# Patient Record
Sex: MALE | Race: ASIAN | NOT HISPANIC OR LATINO | URBAN - METROPOLITAN AREA
[De-identification: names, ages, dates, MRNs, and addresses within clinical notes are randomized per-mention and may not be internally consistent; named-entity substitution may affect disease eponyms.]

---

## 2017-10-11 ENCOUNTER — OUTPATIENT (OUTPATIENT)
Dept: OUTPATIENT SERVICES | Facility: HOSPITAL | Age: 62
LOS: 1 days | Discharge: HOME | End: 2017-10-11
Payer: MEDICAID

## 2017-10-11 ENCOUNTER — RESULT REVIEW (OUTPATIENT)
Age: 62
End: 2017-10-11

## 2017-10-11 DIAGNOSIS — R10.826 EPIGASTRIC REBOUND ABDOMINAL TENDERNESS: ICD-10-CM

## 2017-10-11 LAB — GLUCOSE BLDC GLUCOMTR-MCNC: 93 MG/DL — SIGNIFICANT CHANGE UP (ref 70–99)

## 2017-10-11 PROCEDURE — 88305 TISSUE EXAM BY PATHOLOGIST: CPT | Mod: 26

## 2017-10-11 PROCEDURE — 88312 SPECIAL STAINS GROUP 1: CPT | Mod: 26

## 2017-10-13 LAB — SURGICAL PATHOLOGY STUDY: SIGNIFICANT CHANGE UP

## 2018-05-14 PROBLEM — Z00.00 ENCOUNTER FOR PREVENTIVE HEALTH EXAMINATION: Status: ACTIVE | Noted: 2018-05-14

## 2018-05-15 ENCOUNTER — APPOINTMENT (OUTPATIENT)
Dept: CT IMAGING | Facility: IMAGING CENTER | Age: 63
End: 2018-05-15
Payer: MEDICAID

## 2018-05-15 ENCOUNTER — OUTPATIENT (OUTPATIENT)
Dept: OUTPATIENT SERVICES | Facility: HOSPITAL | Age: 63
LOS: 1 days | End: 2018-05-15
Payer: MEDICAID

## 2018-05-15 DIAGNOSIS — Z00.8 ENCOUNTER FOR OTHER GENERAL EXAMINATION: ICD-10-CM

## 2018-05-15 PROCEDURE — 74177 CT ABD & PELVIS W/CONTRAST: CPT | Mod: 26

## 2018-05-15 PROCEDURE — 82565 ASSAY OF CREATININE: CPT

## 2018-05-15 PROCEDURE — 74177 CT ABD & PELVIS W/CONTRAST: CPT

## 2022-01-01 ENCOUNTER — EMERGENCY (EMERGENCY)
Facility: HOSPITAL | Age: 67
LOS: 1 days | Discharge: HOME | End: 2022-01-01
Attending: EMERGENCY MEDICINE | Admitting: STUDENT IN AN ORGANIZED HEALTH CARE EDUCATION/TRAINING PROGRAM

## 2022-01-01 ENCOUNTER — EMERGENCY (EMERGENCY)
Facility: HOSPITAL | Age: 67
LOS: 1 days | Discharge: HOME | End: 2022-01-01
Attending: STUDENT IN AN ORGANIZED HEALTH CARE EDUCATION/TRAINING PROGRAM | Admitting: STUDENT IN AN ORGANIZED HEALTH CARE EDUCATION/TRAINING PROGRAM
Payer: MEDICARE

## 2022-01-01 VITALS
OXYGEN SATURATION: 98 % | RESPIRATION RATE: 18 BRPM | DIASTOLIC BLOOD PRESSURE: 66 MMHG | SYSTOLIC BLOOD PRESSURE: 150 MMHG | HEART RATE: 60 BPM | TEMPERATURE: 99 F

## 2022-01-01 VITALS
SYSTOLIC BLOOD PRESSURE: 135 MMHG | TEMPERATURE: 99 F | DIASTOLIC BLOOD PRESSURE: 59 MMHG | OXYGEN SATURATION: 98 % | HEART RATE: 65 BPM | RESPIRATION RATE: 18 BRPM

## 2022-01-01 VITALS
SYSTOLIC BLOOD PRESSURE: 158 MMHG | DIASTOLIC BLOOD PRESSURE: 72 MMHG | RESPIRATION RATE: 16 BRPM | TEMPERATURE: 100 F | HEART RATE: 70 BPM | OXYGEN SATURATION: 100 %

## 2022-01-01 VITALS
RESPIRATION RATE: 20 BRPM | OXYGEN SATURATION: 100 % | HEART RATE: 58 BPM | DIASTOLIC BLOOD PRESSURE: 66 MMHG | TEMPERATURE: 98 F | SYSTOLIC BLOOD PRESSURE: 186 MMHG

## 2022-01-01 DIAGNOSIS — Z95.1 PRESENCE OF AORTOCORONARY BYPASS GRAFT: Chronic | ICD-10-CM

## 2022-01-01 LAB
ALBUMIN SERPL ELPH-MCNC: 3.6 G/DL — SIGNIFICANT CHANGE UP (ref 3.3–5)
ALBUMIN SERPL ELPH-MCNC: 3.8 G/DL — SIGNIFICANT CHANGE UP (ref 3.3–5)
ALP SERPL-CCNC: 261 U/L — HIGH (ref 40–120)
ALP SERPL-CCNC: 261 U/L — HIGH (ref 40–120)
ALT FLD-CCNC: 13 U/L — SIGNIFICANT CHANGE UP (ref 4–41)
ALT FLD-CCNC: 18 U/L — SIGNIFICANT CHANGE UP (ref 4–41)
AMMONIA BLD-MCNC: 14 UMOL/L — SIGNIFICANT CHANGE UP (ref 11–55)
ANION GAP SERPL CALC-SCNC: 11 MMOL/L — SIGNIFICANT CHANGE UP (ref 7–14)
ANION GAP SERPL CALC-SCNC: 8 MMOL/L — SIGNIFICANT CHANGE UP (ref 7–14)
ANISOCYTOSIS BLD QL: SLIGHT — SIGNIFICANT CHANGE UP
APPEARANCE UR: CLEAR — SIGNIFICANT CHANGE UP
APTT BLD: 34.2 SEC — SIGNIFICANT CHANGE UP (ref 27–36.3)
AST SERPL-CCNC: 31 U/L — SIGNIFICANT CHANGE UP (ref 4–40)
AST SERPL-CCNC: 42 U/L — HIGH (ref 4–40)
B-OH-BUTYR SERPL-SCNC: <0 MMOL/L — SIGNIFICANT CHANGE UP (ref 0–0.4)
BASE EXCESS BLDV CALC-SCNC: 2.8 MMOL/L — SIGNIFICANT CHANGE UP (ref -2–3)
BASOPHILS # BLD AUTO: 0 K/UL — SIGNIFICANT CHANGE UP (ref 0–0.2)
BASOPHILS # BLD AUTO: 0.01 K/UL — SIGNIFICANT CHANGE UP (ref 0–0.2)
BASOPHILS NFR BLD AUTO: 0 % — SIGNIFICANT CHANGE UP (ref 0–2)
BASOPHILS NFR BLD AUTO: 0.4 % — SIGNIFICANT CHANGE UP (ref 0–2)
BILIRUB SERPL-MCNC: 0.9 MG/DL — SIGNIFICANT CHANGE UP (ref 0.2–1.2)
BILIRUB SERPL-MCNC: 0.9 MG/DL — SIGNIFICANT CHANGE UP (ref 0.2–1.2)
BILIRUB UR-MCNC: NEGATIVE — SIGNIFICANT CHANGE UP
BLOOD GAS VENOUS COMPREHENSIVE RESULT: SIGNIFICANT CHANGE UP
BUN SERPL-MCNC: 11 MG/DL — SIGNIFICANT CHANGE UP (ref 7–23)
BUN SERPL-MCNC: 9 MG/DL — SIGNIFICANT CHANGE UP (ref 7–23)
CALCIUM SERPL-MCNC: 9.1 MG/DL — SIGNIFICANT CHANGE UP (ref 8.4–10.5)
CALCIUM SERPL-MCNC: 9.3 MG/DL — SIGNIFICANT CHANGE UP (ref 8.4–10.5)
CHLORIDE BLDV-SCNC: 109 MMOL/L — HIGH (ref 96–108)
CHLORIDE SERPL-SCNC: 102 MMOL/L — SIGNIFICANT CHANGE UP (ref 98–107)
CHLORIDE SERPL-SCNC: 108 MMOL/L — HIGH (ref 98–107)
CO2 BLDV-SCNC: 29.3 MMOL/L — HIGH (ref 22–26)
CO2 SERPL-SCNC: 24 MMOL/L — SIGNIFICANT CHANGE UP (ref 22–31)
CO2 SERPL-SCNC: 26 MMOL/L — SIGNIFICANT CHANGE UP (ref 22–31)
COLOR SPEC: SIGNIFICANT CHANGE UP
CREAT SERPL-MCNC: 1.05 MG/DL — SIGNIFICANT CHANGE UP (ref 0.5–1.3)
CREAT SERPL-MCNC: 1.24 MG/DL — SIGNIFICANT CHANGE UP (ref 0.5–1.3)
CULTURE RESULTS: SIGNIFICANT CHANGE UP
DIFF PNL FLD: NEGATIVE — SIGNIFICANT CHANGE UP
EGFR: 63 ML/MIN/1.73M2 — SIGNIFICANT CHANGE UP
EGFR: 77 ML/MIN/1.73M2 — SIGNIFICANT CHANGE UP
EOSINOPHIL # BLD AUTO: 0.08 K/UL — SIGNIFICANT CHANGE UP (ref 0–0.5)
EOSINOPHIL # BLD AUTO: 0.14 K/UL — SIGNIFICANT CHANGE UP (ref 0–0.5)
EOSINOPHIL NFR BLD AUTO: 2.9 % — SIGNIFICANT CHANGE UP (ref 0–6)
EOSINOPHIL NFR BLD AUTO: 4 % — SIGNIFICANT CHANGE UP (ref 0–6)
FLUAV AG NPH QL: SIGNIFICANT CHANGE UP
FLUBV AG NPH QL: SIGNIFICANT CHANGE UP
GAS PNL BLDV: 139 MMOL/L — SIGNIFICANT CHANGE UP (ref 136–145)
GLUCOSE BLDV-MCNC: 177 MG/DL — HIGH (ref 70–99)
GLUCOSE SERPL-MCNC: 153 MG/DL — HIGH (ref 70–99)
GLUCOSE SERPL-MCNC: 222 MG/DL — HIGH (ref 70–99)
GLUCOSE UR QL: NEGATIVE — SIGNIFICANT CHANGE UP
HCO3 BLDV-SCNC: 28 MMOL/L — SIGNIFICANT CHANGE UP (ref 22–29)
HCT VFR BLD CALC: 33.5 % — LOW (ref 39–50)
HCT VFR BLD CALC: 37.4 % — LOW (ref 39–50)
HCT VFR BLDA CALC: 34 % — LOW (ref 39–51)
HGB BLD CALC-MCNC: 11.3 G/DL — LOW (ref 13–17)
HGB BLD-MCNC: 10.8 G/DL — LOW (ref 13–17)
HGB BLD-MCNC: 12.2 G/DL — LOW (ref 13–17)
IANC: 1.53 K/UL — LOW (ref 1.8–7.4)
IANC: 2.29 K/UL — SIGNIFICANT CHANGE UP (ref 1.8–7.4)
IMM GRANULOCYTES NFR BLD AUTO: 0.4 % — SIGNIFICANT CHANGE UP (ref 0–0.9)
INR BLD: 1.18 RATIO — HIGH (ref 0.88–1.16)
KETONES UR-MCNC: NEGATIVE — SIGNIFICANT CHANGE UP
LACTATE BLDV-MCNC: 1.8 MMOL/L — SIGNIFICANT CHANGE UP (ref 0.5–2)
LEUKOCYTE ESTERASE UR-ACNC: NEGATIVE — SIGNIFICANT CHANGE UP
LYMPHOCYTES # BLD AUTO: 0.38 K/UL — LOW (ref 1–3.3)
LYMPHOCYTES # BLD AUTO: 0.66 K/UL — LOW (ref 1–3.3)
LYMPHOCYTES # BLD AUTO: 11 % — LOW (ref 13–44)
LYMPHOCYTES # BLD AUTO: 24.1 % — SIGNIFICANT CHANGE UP (ref 13–44)
MAGNESIUM SERPL-MCNC: 2.1 MG/DL — SIGNIFICANT CHANGE UP (ref 1.6–2.6)
MANUAL SMEAR VERIFICATION: SIGNIFICANT CHANGE UP
MCHC RBC-ENTMCNC: 27.3 PG — SIGNIFICANT CHANGE UP (ref 27–34)
MCHC RBC-ENTMCNC: 27.8 PG — SIGNIFICANT CHANGE UP (ref 27–34)
MCHC RBC-ENTMCNC: 32.2 GM/DL — SIGNIFICANT CHANGE UP (ref 32–36)
MCHC RBC-ENTMCNC: 32.6 GM/DL — SIGNIFICANT CHANGE UP (ref 32–36)
MCV RBC AUTO: 84.6 FL — SIGNIFICANT CHANGE UP (ref 80–100)
MCV RBC AUTO: 85.2 FL — SIGNIFICANT CHANGE UP (ref 80–100)
MONOCYTES # BLD AUTO: 0.38 K/UL — SIGNIFICANT CHANGE UP (ref 0–0.9)
MONOCYTES # BLD AUTO: 0.45 K/UL — SIGNIFICANT CHANGE UP (ref 0–0.9)
MONOCYTES NFR BLD AUTO: 11 % — SIGNIFICANT CHANGE UP (ref 2–14)
MONOCYTES NFR BLD AUTO: 16.4 % — HIGH (ref 2–14)
NEUTROPHILS # BLD AUTO: 1.53 K/UL — LOW (ref 1.8–7.4)
NEUTROPHILS # BLD AUTO: 2.46 K/UL — SIGNIFICANT CHANGE UP (ref 1.8–7.4)
NEUTROPHILS NFR BLD AUTO: 55.8 % — SIGNIFICANT CHANGE UP (ref 43–77)
NEUTROPHILS NFR BLD AUTO: 71 % — SIGNIFICANT CHANGE UP (ref 43–77)
NITRITE UR-MCNC: NEGATIVE — SIGNIFICANT CHANGE UP
NRBC # BLD: 0 /100 WBCS — SIGNIFICANT CHANGE UP (ref 0–0)
NRBC # BLD: 0 /100 — SIGNIFICANT CHANGE UP (ref 0–0)
NRBC # FLD: 0 K/UL — SIGNIFICANT CHANGE UP (ref 0–0)
NT-PROBNP SERPL-SCNC: 604 PG/ML — HIGH
PCO2 BLDV: 44 MMHG — SIGNIFICANT CHANGE UP (ref 42–55)
PH BLDV: 7.41 — SIGNIFICANT CHANGE UP (ref 7.32–7.43)
PH UR: 6.5 — SIGNIFICANT CHANGE UP (ref 5–8)
PLAT MORPH BLD: NORMAL — SIGNIFICANT CHANGE UP
PLATELET # BLD AUTO: 72 K/UL — LOW (ref 150–400)
PLATELET # BLD AUTO: 87 K/UL — LOW (ref 150–400)
PLATELET COUNT - ESTIMATE: ABNORMAL
PO2 BLDV: 23 MMHG — SIGNIFICANT CHANGE UP
POTASSIUM BLDV-SCNC: 4.5 MMOL/L — SIGNIFICANT CHANGE UP (ref 3.5–5.1)
POTASSIUM SERPL-MCNC: 4.3 MMOL/L — SIGNIFICANT CHANGE UP (ref 3.5–5.3)
POTASSIUM SERPL-MCNC: 4.7 MMOL/L — SIGNIFICANT CHANGE UP (ref 3.5–5.3)
POTASSIUM SERPL-SCNC: 4.3 MMOL/L — SIGNIFICANT CHANGE UP (ref 3.5–5.3)
POTASSIUM SERPL-SCNC: 4.7 MMOL/L — SIGNIFICANT CHANGE UP (ref 3.5–5.3)
PROT SERPL-MCNC: 7.4 G/DL — SIGNIFICANT CHANGE UP (ref 6–8.3)
PROT SERPL-MCNC: 7.5 G/DL — SIGNIFICANT CHANGE UP (ref 6–8.3)
PROT UR-MCNC: NEGATIVE — SIGNIFICANT CHANGE UP
PROTHROM AB SERPL-ACNC: 13.7 SEC — HIGH (ref 10.5–13.4)
RBC # BLD: 3.96 M/UL — LOW (ref 4.2–5.8)
RBC # BLD: 4.39 M/UL — SIGNIFICANT CHANGE UP (ref 4.2–5.8)
RBC # FLD: 15.9 % — HIGH (ref 10.3–14.5)
RBC # FLD: 18.8 % — HIGH (ref 10.3–14.5)
RBC BLD AUTO: ABNORMAL
RSV RNA NPH QL NAA+NON-PROBE: SIGNIFICANT CHANGE UP
SAO2 % BLDV: 23 % — SIGNIFICANT CHANGE UP
SARS-COV-2 RNA SPEC QL NAA+PROBE: SIGNIFICANT CHANGE UP
SODIUM SERPL-SCNC: 137 MMOL/L — SIGNIFICANT CHANGE UP (ref 135–145)
SODIUM SERPL-SCNC: 142 MMOL/L — SIGNIFICANT CHANGE UP (ref 135–145)
SP GR SPEC: 1.01 — LOW (ref 1.01–1.05)
SPECIMEN SOURCE: SIGNIFICANT CHANGE UP
TROPONIN T, HIGH SENSITIVITY RESULT: 15 NG/L — SIGNIFICANT CHANGE UP
TROPONIN T, HIGH SENSITIVITY RESULT: 16 NG/L — SIGNIFICANT CHANGE UP
UROBILINOGEN FLD QL: SIGNIFICANT CHANGE UP
VARIANT LYMPHS # BLD: 3 % — SIGNIFICANT CHANGE UP (ref 0–6)
WBC # BLD: 2.74 K/UL — LOW (ref 3.8–10.5)
WBC # BLD: 3.46 K/UL — LOW (ref 3.8–10.5)
WBC # FLD AUTO: 2.74 K/UL — LOW (ref 3.8–10.5)
WBC # FLD AUTO: 3.46 K/UL — LOW (ref 3.8–10.5)

## 2022-01-01 PROCEDURE — 73060 X-RAY EXAM OF HUMERUS: CPT | Mod: 26,LT

## 2022-01-01 PROCEDURE — 99217: CPT

## 2022-01-01 PROCEDURE — 73030 X-RAY EXAM OF SHOULDER: CPT | Mod: 26,LT

## 2022-01-01 PROCEDURE — 71250 CT THORAX DX C-: CPT | Mod: 26,MA

## 2022-01-01 PROCEDURE — 99220: CPT

## 2022-01-01 PROCEDURE — 99284 EMERGENCY DEPT VISIT MOD MDM: CPT

## 2022-01-01 PROCEDURE — 71045 X-RAY EXAM CHEST 1 VIEW: CPT | Mod: 26

## 2022-01-01 RX ORDER — CLOPIDOGREL BISULFATE 75 MG/1
75 TABLET, FILM COATED ORAL DAILY
Refills: 0 | Status: DISCONTINUED | OUTPATIENT
Start: 2022-01-01 | End: 2022-01-01

## 2022-01-01 RX ORDER — SIMETHICONE 80 MG/1
120 TABLET, CHEWABLE ORAL DAILY
Refills: 0 | Status: DISCONTINUED | OUTPATIENT
Start: 2022-01-01 | End: 2022-01-01

## 2022-01-01 RX ORDER — LORATADINE 10 MG/1
10 TABLET ORAL DAILY
Refills: 0 | Status: DISCONTINUED | OUTPATIENT
Start: 2022-01-01 | End: 2022-01-01

## 2022-01-01 RX ORDER — GABAPENTIN 400 MG/1
300 CAPSULE ORAL DAILY
Refills: 0 | Status: DISCONTINUED | OUTPATIENT
Start: 2022-01-01 | End: 2022-01-01

## 2022-01-01 RX ORDER — FUROSEMIDE 40 MG
20 TABLET ORAL DAILY
Refills: 0 | Status: DISCONTINUED | OUTPATIENT
Start: 2022-01-01 | End: 2022-01-01

## 2022-01-01 RX ORDER — ACETAMINOPHEN 500 MG
975 TABLET ORAL ONCE
Refills: 0 | Status: COMPLETED | OUTPATIENT
Start: 2022-01-01 | End: 2022-01-01

## 2022-01-01 RX ORDER — PANTOPRAZOLE SODIUM 20 MG/1
40 TABLET, DELAYED RELEASE ORAL
Refills: 0 | Status: DISCONTINUED | OUTPATIENT
Start: 2022-01-01 | End: 2022-01-01

## 2022-01-01 RX ORDER — TAMSULOSIN HYDROCHLORIDE 0.4 MG/1
0.4 CAPSULE ORAL AT BEDTIME
Refills: 0 | Status: DISCONTINUED | OUTPATIENT
Start: 2022-01-01 | End: 2022-01-01

## 2022-01-01 RX ORDER — IBUPROFEN 200 MG
600 TABLET ORAL ONCE
Refills: 0 | Status: COMPLETED | OUTPATIENT
Start: 2022-01-01 | End: 2022-01-01

## 2022-01-01 RX ADMIN — TAMSULOSIN HYDROCHLORIDE 0.4 MILLIGRAM(S): 0.4 CAPSULE ORAL at 21:50

## 2022-01-01 RX ADMIN — Medication 600 MILLIGRAM(S): at 05:44

## 2022-01-01 RX ADMIN — PANTOPRAZOLE SODIUM 40 MILLIGRAM(S): 20 TABLET, DELAYED RELEASE ORAL at 07:38

## 2022-01-01 RX ADMIN — Medication 20 MILLIGRAM(S): at 07:39

## 2022-01-01 RX ADMIN — Medication 975 MILLIGRAM(S): at 05:44

## 2022-05-29 NOTE — ED PROVIDER NOTE - PATIENT PORTAL LINK FT
You can access the FollowMyHealth Patient Portal offered by [facility] by registering at the following website: [email or web address]. By joining [employer]’s FollowMyHealth portal, you will also be able to view your health information using other applications (apps) compatible with our system.

## 2022-05-29 NOTE — ED ADULT TRIAGE NOTE - CHIEF COMPLAINT QUOTE
Pt arrives with 3 days of atraumatic L shoulder pain. As per EMS, L shoulder was assessed to be red prior to sling placement, tender to palpation, limited ROM. Denies CP, SOB, N+V, abd pain. PMHx DM, BPH, HLD, liver cirrhosis.

## 2022-05-29 NOTE — ED PROVIDER NOTE - OBJECTIVE STATEMENT
65 yo M with hx of HTN, DM, CAD s/p CABG presenting with L shoulder pain x 4 days. Patient reports progressively worsening L shoulder pain. Denies recent falls or injury. Unable to range the shoulder due to the pain tonight. Has been putting warm compresses on the area with minimal relief. Denies chest pain, shortness of breath, fevers/chills.

## 2022-05-29 NOTE — ED PROVIDER NOTE - NSFOLLOWUPCLINICS_GEN_ALL_ED_FT
[facility]  Orthopedic Surgery  [address], 3rd & 4th [place] [address]  Phone: [phone]  Fax:     [facility]  Orthopedic Surgery  [address] [address]  Phone: [phone]  Fax:

## 2022-05-29 NOTE — ED PROVIDER NOTE - ATTENDING CONTRIBUTION TO CARE
I have personally performed a face to face medical and diagnostic evaluation of the patient. I have discussed with and reviewed the Resident's note and agree with the History, ROS, Physical Exam and MDM unless otherwise indicated. A brief summary of my personal evaluation and impression can be found below.    66M hx of HTN DM CAD s/p CABG on Plavix presents with a cc of atraumatic L shoulder pain worsening now a/w difficulty w ROM 2/2 pain, has been using warm compresses w minimal relief, has not taken OTC meds. No CP SOB or AQUINO. Denies numbness, tingling or loss of sensation. Denies loss of motor function. No rash. No fever. Has not had episode like this before.     All other ROS negative, except as above and as per HPI and ROS section.    VITALS: Initial triage and subsequent vitals have been reviewed by me.  GEN APPEARANCE: WDWN, alert, non-toxic, NAD  HEAD: Atraumatic.  EYES: PERRLa, EOMI, vision grossly intact.   NECK: Supple  CV: RRR, S1S2, no c/r/m/g. Cap refill <2 seconds. No bruits.   LUNGS: CTAB. No abnormal breath sounds.  ABDOMEN: Soft, NTND. No guarding or rebound.   MSK/EXT: L shoulder AC joint point ttp, mild warmth decreased ROM no humeral or clavicle or other surrounding erythremia No CVA ttp. Pelvis stable. No peripheral edema.  NEURO: Alert, follows commands. Weight bearing normal. Speech normal. Sensation and motor normal x4 extremities.   SKIN: Warm, dry and intact. No rash.  PSYCH: Appropriate    Plan/MDM: 66M hx of HTN DM CAD s/p CABG on Plavix presents with a cc of atraumatic L shoulder pain worsening now a/w difficulty w ROM 2/2 pain, has been using warm compresses w minimal relief, has not taken OTC meds. exam vss non toxic PE as above exam vss non toxic PE as above noted temp will check rectal noted decreased ROM however shoulder does not appear overly edematous is not erythematous, low c/f septic joint, plan to check basic labs xr meds as needed and reassess.

## 2022-05-29 NOTE — ED PROVIDER NOTE - NS ED ROS FT
Constitutional:  (-) fever, (-) chills, (-) fatigue  Eyes:  (-) eye pain (-) visual changes  ENMT: (-) nasal discharge, (-) sore throat. (-) neck pain or stiffness  Cardiac: (-) chest pain (-) palpitations  Respiratory:  (-) cough (-) shortness of breath  GI:  (-) nausea (-) vomiting (-) diarrhea (-) abdominal pain  :  (-) dysuria (-) frequency (-) burning  MS:  (+) shoulder pain (-) back pain  Neuro:  (-) headache (-) numbness (-) tingling (-) focal weakness  Skin:  (-) rash  Except as documented in the HPI,  all other systems are negative

## 2022-05-29 NOTE — ED PROVIDER NOTE - NSFOLLOWUPINSTRUCTIONS_ED_ALL_ED_FT
Thank you for visiting our Emergency Department, it has been a pleasure taking part in your healthcare.    Your discharge diagnosis is: pain in the Left AC joint  Please take all discharge medications as indicated below:  Take Motrin/Tylenol for pain as needed, please follow instructions on manufacturers label. If you have any questions please consult a pharmacist or your PMD.  Please follow up with your PMD within x48 hours.  Please follow up with orthopedics within x48 hours.  A copy of resulted labs, imaging, and findings have been provided to you.   You have had a detailed discussion with your provider regarding your diagnosis, care management and discharge planning including, but not limited to: return precautions, follow up visits with existing or new providers, new prescriptions and/or medication changes, wound and/or splint/cast care or other care   aspects specific to your diagnosis and treatment. You have been given the opportunity to have your questions answered. At this time you have been deemed stable and fit for discharge.  Return precautions to the Emergency Department include but are not limited to: unrelenting nausea, vomiting, fever, chills, chest pain, shortness of breath, dizziness, chest or abdominal pain, worsening back pain, syncope, blood in urine or stool, headache that doesn't resolve, numbness or tingling, loss of sensation, loss of motor function, or any other concerning symptoms.

## 2022-05-29 NOTE — ED ADULT NURSE NOTE - OBJECTIVE STATEMENT
Pt arrives to ED rm 26 A&Ox4 and ambulatory c/o L shoulder pain that started 4 days ago but worsened this morning. PMHx 3 blocked coronary arteries, and cirrhosis of the liver. Pt denies trauma to the shoulder, falls, or accidents. Pt is unsure of why the pain started, but suddenly worsened. Pt denies the pain radiating anywhere else. Shoulder does not appear swollen and there is no deformity, but is tender upon palpation with limited ROM without pain. Pt deneis chest pain, headache, abd pain, nausea, vomiting and diarrhea. MD at bedside for eval. Awaiting further orders.

## 2022-05-29 NOTE — ED PROVIDER NOTE - PHYSICAL EXAMINATION
Gen: NAD, AAOx3, uncomfortable, non-toxic appearing  HEENT: NCAT, normal conjunctiva, oral mucosa moist  Lung: speaking in full sentences, good aeration bilaterally, lungs CTA b/l  CV: regular rate and rhythm. cap refill <2x. peripheral pulses 2+bilaterally   Abd: soft, ND, NT  MSK: point tenderness of medial aspect L shoulder joint, range of motion limited 2/2 pain, no obvious joint swelling  Neuro: No focal deficits  Skin: Intact, no rash  Psych: normal affect

## 2022-12-10 PROBLEM — I25.10 ATHEROSCLEROTIC HEART DISEASE OF NATIVE CORONARY ARTERY WITHOUT ANGINA PECTORIS: Chronic | Status: ACTIVE | Noted: 2022-01-01

## 2022-12-10 PROBLEM — E11.9 TYPE 2 DIABETES MELLITUS WITHOUT COMPLICATIONS: Chronic | Status: ACTIVE | Noted: 2022-01-01

## 2022-12-10 NOTE — ED PROVIDER NOTE - CLINICAL SUMMARY MEDICAL DECISION MAKING FREE TEXT BOX
impression: 67-year-old male pmhx of  cirrhosis, diabetes comes to ED w/ episode of hypoglycemia with altered mental status. Their symptoms of altered mental status in the setting of their history of diabetes and missing a meal this morning are concerning for  episode of hypoglycemia due to the diabetes medication. Symptoms are fully resolved currently, we will monitor, and work-up for etiology of infectious viral URI like symptoms.    Ordered labs, imaging, medications for diagnosis, management, and treatment.

## 2022-12-10 NOTE — ED PROVIDER NOTE - ATTENDING CONTRIBUTION TO CARE
Agree with resident note  67-year-old male past medical history of cirrhosis, diabetes presents to the ED for altered mental status in the setting of hypoglycemia.  Family noticed this morning that patient was minimally responsive and mumbling.  He called EMS immediately when EMS arrived his fingerstick was found to be 48.  Was given oral glucose by EMS and immediately improved his altered mental status.  Patient states ate dinner last night, states may be eating less so recently due to his cirrhosis and abdominal ascites.  Has been taking his normal diabetic medications which include a sulfonylurea.  Denies any infectious symptoms recently.  At present patient has no complaints.  Physical exam  Gen: pt well appearing in no respiratory distress  vital signs stable  NCAT  Lungs: CTAB/L  Cardiac: s1 s2 no m/r/g  abdomen: soft/NT/ND  ext: no edema  Neuro: CNs intact 5/5 motor UE and LE; sensation intact; gait stable  skin: no rash  Impression; hypoglycemia likely due to decreased p.o. intake, patient on sulfonylurea; will work-up for infectious causes give lunch to eat placed in observation given on sulfonylurea.  Patient understands if eating less needs to monitor sugar and possibly give himself less meds.

## 2022-12-10 NOTE — ED CDU PROVIDER INITIAL DAY NOTE - OBJECTIVE STATEMENT
67-year-old male with past medical history of diabetes, hypertension, CAD status post CABG, cirrhosis, presents to ED complaining of altered mental status with hypoglycemia this morning.  Family called EMS as patient was confused, and not making sense and mumbling his words.  Fingerstick was found to be 48.  Patient states he did not take any medication or eat today.  Patient states he last took his diabetes medication last night after having dinner.  Patient takes glipizide and a sulfonylurea, however does not know the rest of his medication.  His family is bringing his medicine to ER.  Patient states he feels back to normal. Pt is also complaining of a productive cough for the last 3 weeks.  Denies any chest pain shortness of breath abdominal pain nausea vomiting fevers.  Patient is a former smoker.

## 2022-12-10 NOTE — ED ADULT NURSE NOTE - OBJECTIVE STATEMENT
Patient A&o X4, history of , received in room , with complaints of . Patient states, " ". Patient able to speak in clear sentences, respirations equal and unlabored. Lung sounds clear b/l, equal chest rise and fall noted. Denies CP/SOB, fever, chills, nausea, vomiting and diarrhea at this time. Patient awaiting provider eval, pending further orders. Patient A&o X4, history of DM, CAD and ascites, received in room 20, with complaints of hypoglycemia. Patient states, "I wasn't sure what happened this morning". As per family patient was confused at home, EMS FS 48 on arrival to home. Patient's BG currently 105. Patient complains of weakness at this time, denies blurry vision or dizziness at this time. Patient denies any pain at this time. Patient able to speak in clear sentences, respirations equal and unlabored. Lung sounds clear b/l, equal chest rise and fall noted. Denies CP/SOB, fever, chills, nausea, vomiting and diarrhea at this time. Patient awaiting provider eval, pending further orders.

## 2022-12-10 NOTE — ED CDU PROVIDER INITIAL DAY NOTE - MEDICAL DECISION MAKING DETAILS
67-year-old male with past medical history of diabetes, hypertension, CAD status post CABG, cirrhosis, presents to ED complaining of altered mental status with hypoglycemia this morning.  Family called EMS as patient was confused, and not making sense and mumbling his words.  Fingerstick was found to be 48.  Patient states he did not take any medication or eat today.  Patient states he last took his diabetes medication last night after having dinner.  Patient takes glipizide and a sulfonylurea, however does not know the rest of his medication.  His family is bringing his medicine to ER.  Patient states he feels back to normal. Pt is also complaining of a productive cough for the last 3 weeks. pt cxr with concern for possible pneumonia, suggesting ct chest. plan to check ct, monitor glucose in CDU, and consult endo.

## 2022-12-10 NOTE — ED PROVIDER NOTE - OBJECTIVE STATEMENT
67-year-old male pmhx of  cirrhosis, diabetes comes to ED w/  episode of hypoglycemia with altered mental status.  Patient woke up this morning  when family noticed  that he was mumbling and not making much sense.  They called EMS who checked his fingerstick glucose which was 48.  He was given oral glucose by EMS and started improving.  Patient's last meal was last night patient and the last took their diabetes medication last night as well. Patient is on glipizide in addition to 2 other medications for diabetes but does not remember the other medications. Their AMS symptom is resolved, fully mediated with oral glucose. Patient recently started developing a cough and cold over the past 3 weeks. Denies any other current symptoms.

## 2022-12-10 NOTE — ED ADULT TRIAGE NOTE - CHIEF COMPLAINT QUOTE
Pt brought in for altered mental status this morning. Pt FS 48 upon EMS arrival. Pt given oral glucose. Pt awake and alert currently.

## 2022-12-10 NOTE — ED CDU PROVIDER INITIAL DAY NOTE - ATTENDING APP SHARED VISIT CONTRIBUTION OF CARE
agree with PA note  "67-year-old male past medical history of cirrhosis, diabetes presents to the ED for altered mental status in the setting of hypoglycemia.  Family noticed this morning that patient was minimally responsive and mumbling.  He called EMS immediately when EMS arrived his fingerstick was found to be 48.  Was given oral glucose by EMS and immediately improved his altered mental status.  Patient states ate dinner last night, states may be eating less so recently due to his cirrhosis and abdominal ascites.  Has been taking his normal diabetic medications which include a sulfonylurea.  Denies any infectious symptoms recently.  At present patient has no complaints.  Physical exam  Gen: pt well appearing in no respiratory distress  vital signs stable  NCAT  Lungs: CTAB/L  Cardiac: s1 s2 no m/r/g  abdomen: soft/NT/ND  ext: no edema  Neuro: CNs intact 5/5 motor UE and LE; sensation intact; gait stable  skin: no rash  Impression; hypoglycemia likely due to decreased p.o. intake, patient on sulfonylurea; will work-up for infectious causes give lunch to eat placed in observation given on sulfonylurea.  Patient understands if eating less needs to monitor sugar and possibly give himself less meds."

## 2022-12-10 NOTE — ED PROVIDER NOTE - NS ED ROS FT
Constitutional: no fevers, chills  HEENT: cough, rhinorrhea  Cardiac: no chest pain, palpitations  Respiratory: no SOB  GI: no n/v, abd pain, bloody or dark stools  : no dysuria, frequency, or hematuria  MSK: no joint pain  Skin: no rashes  Neuro: no headache, change in vision, weakness  Psych: negative

## 2022-12-10 NOTE — ED CDU PROVIDER INITIAL DAY NOTE - RESPIRATORY, MLM
Problem: Falls - Risk of  Goal: *Absence of Falls  Description: Document Bard  Fall Risk and appropriate interventions in the flowsheet.   Outcome: Progressing Towards Goal  Note: Fall Risk Interventions:  Mobility Interventions: Communicate number of staff needed for ambulation/transfer         Medication Interventions: Assess postural VS orthostatic hypotension    Elimination Interventions: Call light in reach Breath sounds clear and equal bilaterally.

## 2022-12-11 NOTE — ED CDU PROVIDER SUBSEQUENT DAY NOTE - ATTENDING APP SHARED VISIT CONTRIBUTION OF CARE
I have personally performed a face to face medical and diagnostic evaluation of the patient. I have discussed with and reviewed the DONNA's note and agree with the History, ROS, Physical Exam and MDM unless otherwise indicated. A brief summary of my personal evaluation and impression can be found below.    Clay DAVENPORT: 68 y/o male pmh htn, CAD, Cirrhosis, c/o AMS and hypoglycemia. Pt was found by EMS with glucose in the 40s. After receiving oral glucose, pts mental status improved and he returned to baseline. pt is on a sulfonylurea and was sent to CDU for glucose monitoring and endocrine eval. Pt did well in CDU and glucose remained stable. Endocrine recommend to stop the glipizide and to continue other home meds. Pt has private endocrinologist to f/u with. Pt made aware of R moderate pleural effusion on CT scan, pt has MRI results from this week which also showed R pleural effusion. Pt has appointment tomorrow at [facility] to have ascites drained. Pt currently feels better, has no sob and is not hypoxic, pt is stable for dc as he has definitive care set up as outpatient.     No acute events overnight pt reports feels well, has no complaints at this time. As above pt with excellent follow up care for pleural effusion and was found on MRI results earlier this week. He is currently not symptomatic from his effusion and has an appointment with his doctor tomorrow.     All other ROS negative, except as above and as per HPI and ROS section.    VITALS: Initial triage and subsequent vitals have been reviewed by me.  GEN APPEARANCE: WDWN, alert, non-toxic, NAD  HEAD: Atraumatic.  EYES: PERRLa, EOMI, vision grossly intact.   NECK: Supple  CV: RRR, S1S2, no c/r/m/g. Cap refill <2 seconds. No bruits.   LUNGS: diminished R breath sounds.  ABDOMEN: Soft, NTND. No guarding or rebound.   MSK/EXT: No spinal or extremity point tenderness. No CVA ttp. Pelvis stable. No peripheral edema.  NEURO: Alert, follows commands. Weight bearing normal. Speech normal. Sensation and motor normal x4 extremities.   SKIN: Warm, dry and intact. No rash.  PSYCH: Appropriate    Plan/MDM:  Clay DAVENPORT: 68 y/o male pmh htn, CAD, Cirrhosis, c/o AMS and hypoglycemia. Pt was found by EMS with glucose in the 40s. After receiving oral glucose, pts mental status improved and he returned to baseline. pt is on a sulfonylurea and was sent to CDU for glucose monitoring and endocrine eval. Pt did well in CDU and glucose remained stable. Endocrine recommend to stop the glipizide and to continue other home meds. Pt has private endocrinologist to f/u with. Pt made aware of R moderate pleural effusion on CT scan, pt has MRI results from this week which also showed R pleural effusion. Pt has appointment tomorrow at [facility] to have ascites drained. Pt currently feels better, has no sob and is not hypoxic, pt is stable for dc as he has definitive care set up as outpatient.     No acute events overnight pt reports feels well, has no complaints at this time. As above pt with excellent follow up care for pleural effusion and was found on MRI results earlier this week. He is currently not symptomatic from his effusion and has an appointment with his doctor tomorrow. exam vss non toxic PE as above ddx c/f likely hypoglycemia in setting of sulfonylurea, seen by endo plan to dc sulfonylurea, pleural effusion okay for further workup outpt as above, no symptoms at this time, possibly 2/2 known cirrhosis. stable for dc.

## 2022-12-11 NOTE — CHART NOTE - NSCHARTNOTEFT_GEN_A_CORE
67M with T2DM, cirrhosis presenting for hypoglycemic episode at home associated with confusion.     A1c is 6.5%  Per team, patient is on Glipizide 10 mg daily, Januvia 50 mg daily, and Pioglitazone 15 mg daily.   Hypoglycemia likely 2/2 sulfonylurea. Has not been hypoglycemic for > 24 hrs now    Recs:   -Stop Glipizide 10 mg daily  -Continue Januvia 50 mg daily and Pioglitazone 15 mg daily  -Can continue f/u with PCP - consider GLP1 agonist as an outpatient in setting of CAD hx- would confirm with hepatologist as an outpatient in setting of cirrhosis  -F/u with PCP    Discussed with primary team    Please call back with questions/concerns    Ana Laura Ulloa MD  Endocrine Fellow  Can be reached via teams.     For all urgent matters, can call answering service at [phone] (weekdays); [phone] (nights/weekends)  Any non-urgent consults or questions can be emailed to [email or web address] or [email or web address] 67M with T2DM, cirrhosis presenting for hypoglycemic episode at home associated with confusion.     A1c is 6.5%  Per team, patient is on Glipizide 10 mg daily, Januvia 50 mg daily, and Pioglitazone 15 mg daily.   Hypoglycemia likely 2/2 sulfonylurea. Has not been hypoglycemic for > 24 hrs now    Recs:   -While inpatient: can start low dose correctional scale before each meal and low dose correctional scale at bedtime  -For discharge:   --Stop Glipizide 10 mg daily  --Continue Januvia 50 mg daily and Pioglitazone 15 mg daily  --Can continue f/u with PCP - consider GLP1 agonist as an outpatient in setting of CAD hx- would confirm with hepatologist as an outpatient in setting of cirrhosis  -F/u with PCP    Discussed with primary team    Please call back with questions/concerns    Ana Laura Ulloa MD  Endocrine Fellow  Can be reached via teams.     For all urgent matters, can call answering service at [phone] (weekdays); [phone] (nights/weekends)  Any non-urgent consults or questions can be emailed to [email or web address] or [email or web address]

## 2022-12-11 NOTE — ED CDU PROVIDER DISPOSITION NOTE - CLINICAL COURSE
68 y/o male pmh htn, CAD, Cirrhosis, c/o AMS and hypoglycemia. Pt was found by EMS with glucose in the 40s. After receiveing oral glucose, pts mental status improved and he returned to baseline. pt is on a sulfonylurea and was sent to CDU for glucose monitoring and endocrine eval. Pt did well in CDU and glucose remained stable. Endocrine recommend to stop the glipizide and to continue other home meds. Pt has private endocrinologist to f/u with. Pt made aware of R moderate pleural effusion on CT scan, pt has MRI results from this week which also showed R pleural effusion. Pt has appointment tomorrow at [facility] to have ascites drained. Pt currently feels better, has no sob and is not hypoxic, pt is stable for dc as he has definitive care set up as outpatient. Pt given strict return precautions. stable for dc.

## 2022-12-11 NOTE — ED CDU PROVIDER SUBSEQUENT DAY NOTE - PROGRESS NOTE DETAILS
Pt feeling better after CDU stay, glucose has remoained stable. Spoke with endocrine fellow who recommends to dc glipizinde and continue other 2 medications. Pt to f/u with his PMD/Endocrinologist as an outpatient. Pt also made aware of moderate R pleural effusion on CT scan. Pt was c/o cough x2 weeks ago but denies currently and denies sob or chest pain or fever.  PT also states that he has an appointment tomorrow at [facility] to have his ascites drained. Pt had outpatient MRI earlier this week which also showed R pleural effusion so his doctors are aware. Pt is currently doing well, not hypoxic and not sob. Will dc for is appointment tomorrow. Pt given strict return precautions

## 2022-12-11 NOTE — ED CDU PROVIDER DISPOSITION NOTE - ATTENDING CONTRIBUTION TO CARE
I have personally performed a face to face medical and diagnostic evaluation of the patient. I have discussed with and reviewed the DONNA's note and agree with the History, ROS, Physical Exam and MDM unless otherwise indicated. A brief summary of my personal evaluation and impression can be found below.    Please see CDU Subsequent day note for further details.

## 2022-12-11 NOTE — ED CDU PROVIDER DISPOSITION NOTE - PATIENT PORTAL LINK FT
You can access the FollowMyHealth Patient Portal offered by [facility] by registering at the following website: [email or web address]. By joining [employer]’s FollowMyHealth portal, you will also be able to view your health information using other applications (apps) compatible with our system.

## 2022-12-11 NOTE — ED CDU PROVIDER SUBSEQUENT DAY NOTE - HISTORY
67-year-old male with past medical history of diabetes, hypertension, CAD status post CABG, cirrhosis, presents to ED complaining of altered mental status with hypoglycemia.  Family called EMS as patient was confused, and not making sense and mumbling his words.  Fingerstick was found to be 48. Glucose controlled overnight. CT chest shows moderate R pleural effusion. No respiratory distress. Pt to be seen by Endo this morning.

## 2023-01-01 ENCOUNTER — TRANSCRIPTION ENCOUNTER (OUTPATIENT)
Age: 68
End: 2023-01-01

## 2023-01-01 ENCOUNTER — INPATIENT (INPATIENT)
Facility: HOSPITAL | Age: 68
LOS: 42 days | Discharge: EXPIRED | End: 2023-04-08
Attending: HOSPITALIST | Admitting: HOSPITALIST
Payer: MEDICARE

## 2023-01-01 VITALS
DIASTOLIC BLOOD PRESSURE: 11 MMHG | RESPIRATION RATE: 10 BRPM | HEART RATE: 55 BPM | OXYGEN SATURATION: 58 % | SYSTOLIC BLOOD PRESSURE: 37 MMHG

## 2023-01-01 VITALS
RESPIRATION RATE: 18 BRPM | OXYGEN SATURATION: 100 % | HEART RATE: 59 BPM | DIASTOLIC BLOOD PRESSURE: 57 MMHG | TEMPERATURE: 98 F | SYSTOLIC BLOOD PRESSURE: 106 MMHG

## 2023-01-01 DIAGNOSIS — D69.6 THROMBOCYTOPENIA, UNSPECIFIED: ICD-10-CM

## 2023-01-01 DIAGNOSIS — J15.1 PNEUMONIA DUE TO PSEUDOMONAS: ICD-10-CM

## 2023-01-01 DIAGNOSIS — Z29.9 ENCOUNTER FOR PROPHYLACTIC MEASURES, UNSPECIFIED: ICD-10-CM

## 2023-01-01 DIAGNOSIS — Z71.89 OTHER SPECIFIED COUNSELING: ICD-10-CM

## 2023-01-01 DIAGNOSIS — E87.0 HYPEROSMOLALITY AND HYPERNATREMIA: ICD-10-CM

## 2023-01-01 DIAGNOSIS — G93.1 ANOXIC BRAIN DAMAGE, NOT ELSEWHERE CLASSIFIED: ICD-10-CM

## 2023-01-01 DIAGNOSIS — Z51.5 ENCOUNTER FOR PALLIATIVE CARE: ICD-10-CM

## 2023-01-01 DIAGNOSIS — R63.8 OTHER SYMPTOMS AND SIGNS CONCERNING FOOD AND FLUID INTAKE: ICD-10-CM

## 2023-01-01 DIAGNOSIS — I95.9 HYPOTENSION, UNSPECIFIED: ICD-10-CM

## 2023-01-01 DIAGNOSIS — N17.9 ACUTE KIDNEY FAILURE, UNSPECIFIED: ICD-10-CM

## 2023-01-01 DIAGNOSIS — K74.60 UNSPECIFIED CIRRHOSIS OF LIVER: ICD-10-CM

## 2023-01-01 DIAGNOSIS — E03.9 HYPOTHYROIDISM, UNSPECIFIED: ICD-10-CM

## 2023-01-01 DIAGNOSIS — B19.10 UNSPECIFIED VIRAL HEPATITIS B WITHOUT HEPATIC COMA: ICD-10-CM

## 2023-01-01 DIAGNOSIS — E11.9 TYPE 2 DIABETES MELLITUS WITHOUT COMPLICATIONS: ICD-10-CM

## 2023-01-01 DIAGNOSIS — I85.01 ESOPHAGEAL VARICES WITH BLEEDING: ICD-10-CM

## 2023-01-01 DIAGNOSIS — R57.8 OTHER SHOCK: ICD-10-CM

## 2023-01-01 DIAGNOSIS — K92.2 GASTROINTESTINAL HEMORRHAGE, UNSPECIFIED: ICD-10-CM

## 2023-01-01 DIAGNOSIS — Z95.1 PRESENCE OF AORTOCORONARY BYPASS GRAFT: Chronic | ICD-10-CM

## 2023-01-01 DIAGNOSIS — C82.90 FOLLICULAR LYMPHOMA, UNSPECIFIED, UNSPECIFIED SITE: ICD-10-CM

## 2023-01-01 DIAGNOSIS — R11.2 NAUSEA WITH VOMITING, UNSPECIFIED: ICD-10-CM

## 2023-01-01 DIAGNOSIS — K75.81 NONALCOHOLIC STEATOHEPATITIS (NASH): ICD-10-CM

## 2023-01-01 DIAGNOSIS — G93.41 METABOLIC ENCEPHALOPATHY: ICD-10-CM

## 2023-01-01 LAB
-  AMIKACIN: SIGNIFICANT CHANGE UP
-  AZTREONAM: SIGNIFICANT CHANGE UP
-  CEFEPIME: SIGNIFICANT CHANGE UP
-  CEFTAZIDIME: SIGNIFICANT CHANGE UP
-  CIPROFLOXACIN: SIGNIFICANT CHANGE UP
-  GENTAMICIN: SIGNIFICANT CHANGE UP
-  IMIPENEM: SIGNIFICANT CHANGE UP
-  LEVOFLOXACIN: SIGNIFICANT CHANGE UP
-  MEROPENEM: SIGNIFICANT CHANGE UP
-  PIPERACILLIN/TAZOBACTAM: SIGNIFICANT CHANGE UP
-  TOBRAMYCIN: SIGNIFICANT CHANGE UP
A1C WITH ESTIMATED AVERAGE GLUCOSE RESULT: 6.1 % — HIGH (ref 4–5.6)
ALBUMIN FLD-MCNC: 0.4 G/DL — SIGNIFICANT CHANGE UP
ALBUMIN FLD-MCNC: <0.3 G/DL — SIGNIFICANT CHANGE UP
ALBUMIN SERPL ELPH-MCNC: 1.8 G/DL — LOW (ref 3.3–5)
ALBUMIN SERPL ELPH-MCNC: 1.9 G/DL — LOW (ref 3.3–5)
ALBUMIN SERPL ELPH-MCNC: 2.2 G/DL — LOW (ref 3.3–5)
ALBUMIN SERPL ELPH-MCNC: 2.3 G/DL — LOW (ref 3.3–5)
ALBUMIN SERPL ELPH-MCNC: 2.3 G/DL — LOW (ref 3.3–5)
ALBUMIN SERPL ELPH-MCNC: 2.4 G/DL — LOW (ref 3.3–5)
ALBUMIN SERPL ELPH-MCNC: 2.4 G/DL — LOW (ref 3.3–5)
ALBUMIN SERPL ELPH-MCNC: 2.5 G/DL — LOW (ref 3.3–5)
ALBUMIN SERPL ELPH-MCNC: 2.6 G/DL — LOW (ref 3.3–5)
ALBUMIN SERPL ELPH-MCNC: 2.7 G/DL — LOW (ref 3.3–5)
ALBUMIN SERPL ELPH-MCNC: 2.8 G/DL — LOW (ref 3.3–5)
ALBUMIN SERPL ELPH-MCNC: 2.9 G/DL — LOW (ref 3.3–5)
ALBUMIN SERPL ELPH-MCNC: 2.9 G/DL — LOW (ref 3.3–5)
ALBUMIN SERPL ELPH-MCNC: 3.1 G/DL — LOW (ref 3.3–5)
ALBUMIN SERPL ELPH-MCNC: 3.2 G/DL — LOW (ref 3.3–5)
ALBUMIN SERPL ELPH-MCNC: 3.3 G/DL — SIGNIFICANT CHANGE UP (ref 3.3–5)
ALBUMIN SERPL ELPH-MCNC: 3.4 G/DL — SIGNIFICANT CHANGE UP (ref 3.3–5)
ALBUMIN SERPL ELPH-MCNC: 3.8 G/DL — SIGNIFICANT CHANGE UP (ref 3.3–5)
ALBUMIN SERPL ELPH-MCNC: 4 G/DL — SIGNIFICANT CHANGE UP (ref 3.3–5)
ALBUMIN SERPL ELPH-MCNC: 4.4 G/DL — SIGNIFICANT CHANGE UP (ref 3.3–5)
ALBUMIN SERPL ELPH-MCNC: 4.5 G/DL — SIGNIFICANT CHANGE UP (ref 3.3–5)
ALP SERPL-CCNC: 118 U/L — SIGNIFICANT CHANGE UP (ref 40–120)
ALP SERPL-CCNC: 118 U/L — SIGNIFICANT CHANGE UP (ref 40–120)
ALP SERPL-CCNC: 121 U/L — HIGH (ref 40–120)
ALP SERPL-CCNC: 129 U/L — HIGH (ref 40–120)
ALP SERPL-CCNC: 129 U/L — HIGH (ref 40–120)
ALP SERPL-CCNC: 130 U/L — HIGH (ref 40–120)
ALP SERPL-CCNC: 133 U/L — HIGH (ref 40–120)
ALP SERPL-CCNC: 140 U/L — HIGH (ref 40–120)
ALP SERPL-CCNC: 143 U/L — HIGH (ref 40–120)
ALP SERPL-CCNC: 157 U/L — HIGH (ref 40–120)
ALP SERPL-CCNC: 172 U/L — HIGH (ref 40–120)
ALP SERPL-CCNC: 173 U/L — HIGH (ref 40–120)
ALP SERPL-CCNC: 179 U/L — HIGH (ref 40–120)
ALP SERPL-CCNC: 210 U/L — HIGH (ref 40–120)
ALP SERPL-CCNC: 211 U/L — HIGH (ref 40–120)
ALP SERPL-CCNC: 211 U/L — HIGH (ref 40–120)
ALP SERPL-CCNC: 234 U/L — HIGH (ref 40–120)
ALP SERPL-CCNC: 256 U/L — HIGH (ref 40–120)
ALP SERPL-CCNC: 274 U/L — HIGH (ref 40–120)
ALP SERPL-CCNC: 360 U/L — HIGH (ref 40–120)
ALP SERPL-CCNC: 402 U/L — HIGH (ref 40–120)
ALP SERPL-CCNC: 403 U/L — HIGH (ref 40–120)
ALP SERPL-CCNC: 404 U/L — HIGH (ref 40–120)
ALP SERPL-CCNC: 411 U/L — HIGH (ref 40–120)
ALP SERPL-CCNC: 412 U/L — HIGH (ref 40–120)
ALP SERPL-CCNC: 416 U/L — HIGH (ref 40–120)
ALP SERPL-CCNC: 429 U/L — HIGH (ref 40–120)
ALP SERPL-CCNC: 461 U/L — HIGH (ref 40–120)
ALP SERPL-CCNC: 487 U/L — HIGH (ref 40–120)
ALP SERPL-CCNC: 493 U/L — HIGH (ref 40–120)
ALP SERPL-CCNC: 496 U/L — HIGH (ref 40–120)
ALP SERPL-CCNC: 516 U/L — HIGH (ref 40–120)
ALP SERPL-CCNC: 525 U/L — HIGH (ref 40–120)
ALP SERPL-CCNC: 551 U/L — HIGH (ref 40–120)
ALP SERPL-CCNC: 554 U/L — HIGH (ref 40–120)
ALP SERPL-CCNC: 592 U/L — HIGH (ref 40–120)
ALP SERPL-CCNC: 667 U/L — HIGH (ref 40–120)
ALT FLD-CCNC: 100 U/L — HIGH (ref 4–41)
ALT FLD-CCNC: 105 U/L — HIGH (ref 4–41)
ALT FLD-CCNC: 109 U/L — HIGH (ref 4–41)
ALT FLD-CCNC: 115 U/L — HIGH (ref 4–41)
ALT FLD-CCNC: 15 U/L — SIGNIFICANT CHANGE UP (ref 4–41)
ALT FLD-CCNC: 158 U/L — HIGH (ref 4–41)
ALT FLD-CCNC: 21 U/L — SIGNIFICANT CHANGE UP (ref 4–41)
ALT FLD-CCNC: 35 U/L — SIGNIFICANT CHANGE UP (ref 4–41)
ALT FLD-CCNC: 36 U/L — SIGNIFICANT CHANGE UP (ref 4–41)
ALT FLD-CCNC: 37 U/L — SIGNIFICANT CHANGE UP (ref 4–41)
ALT FLD-CCNC: 37 U/L — SIGNIFICANT CHANGE UP (ref 4–41)
ALT FLD-CCNC: 38 U/L — SIGNIFICANT CHANGE UP (ref 4–41)
ALT FLD-CCNC: 38 U/L — SIGNIFICANT CHANGE UP (ref 4–41)
ALT FLD-CCNC: 40 U/L — SIGNIFICANT CHANGE UP (ref 4–41)
ALT FLD-CCNC: 41 U/L — SIGNIFICANT CHANGE UP (ref 4–41)
ALT FLD-CCNC: 44 U/L — HIGH (ref 4–41)
ALT FLD-CCNC: 53 U/L — HIGH (ref 4–41)
ALT FLD-CCNC: 53 U/L — HIGH (ref 4–41)
ALT FLD-CCNC: 54 U/L — HIGH (ref 4–41)
ALT FLD-CCNC: 55 U/L — HIGH (ref 4–41)
ALT FLD-CCNC: 60 U/L — HIGH (ref 4–41)
ALT FLD-CCNC: 63 U/L — HIGH (ref 4–41)
ALT FLD-CCNC: 64 U/L — HIGH (ref 4–41)
ALT FLD-CCNC: 78 U/L — HIGH (ref 4–41)
ALT FLD-CCNC: 82 U/L — HIGH (ref 4–41)
ALT FLD-CCNC: 82 U/L — HIGH (ref 4–41)
ALT FLD-CCNC: 85 U/L — HIGH (ref 4–41)
ALT FLD-CCNC: 86 U/L — HIGH (ref 4–41)
ALT FLD-CCNC: 92 U/L — HIGH (ref 4–41)
ALT FLD-CCNC: 93 U/L — HIGH (ref 4–41)
ALT FLD-CCNC: SIGNIFICANT CHANGE UP U/L (ref 4–41)
AMMONIA BLD-MCNC: 150 UMOL/L — HIGH (ref 11–55)
AMMONIA BLD-MCNC: 160 UMOL/L — HIGH (ref 11–55)
AMMONIA BLD-MCNC: 164 UMOL/L — HIGH (ref 11–55)
AMMONIA BLD-MCNC: 173 UMOL/L — HIGH (ref 11–55)
AMMONIA BLD-MCNC: 195 UMOL/L — HIGH (ref 11–55)
AMMONIA BLD-MCNC: 25 UMOL/L — SIGNIFICANT CHANGE UP (ref 11–55)
AMMONIA BLD-MCNC: 36 UMOL/L — SIGNIFICANT CHANGE UP (ref 11–55)
AMMONIA BLD-MCNC: 37 UMOL/L — SIGNIFICANT CHANGE UP (ref 11–55)
AMMONIA BLD-MCNC: 51 UMOL/L — SIGNIFICANT CHANGE UP (ref 11–55)
AMMONIA BLD-MCNC: 54 UMOL/L — SIGNIFICANT CHANGE UP (ref 11–55)
AMMONIA BLD-MCNC: 55 UMOL/L — SIGNIFICANT CHANGE UP (ref 11–55)
AMMONIA BLD-MCNC: 65 UMOL/L — HIGH (ref 11–55)
AMMONIA BLD-MCNC: 76 UMOL/L — HIGH (ref 11–55)
AMMONIA BLD-MCNC: 95 UMOL/L — HIGH (ref 11–55)
ANION GAP SERPL CALC-SCNC: 10 MMOL/L — SIGNIFICANT CHANGE UP (ref 7–14)
ANION GAP SERPL CALC-SCNC: 11 MMOL/L — SIGNIFICANT CHANGE UP (ref 7–14)
ANION GAP SERPL CALC-SCNC: 12 MMOL/L — SIGNIFICANT CHANGE UP (ref 7–14)
ANION GAP SERPL CALC-SCNC: 13 MMOL/L — SIGNIFICANT CHANGE UP (ref 7–14)
ANION GAP SERPL CALC-SCNC: 14 MMOL/L — SIGNIFICANT CHANGE UP (ref 7–14)
ANION GAP SERPL CALC-SCNC: 15 MMOL/L — HIGH (ref 7–14)
ANION GAP SERPL CALC-SCNC: 17 MMOL/L — HIGH (ref 7–14)
ANION GAP SERPL CALC-SCNC: 17 MMOL/L — HIGH (ref 7–14)
ANION GAP SERPL CALC-SCNC: 8 MMOL/L — SIGNIFICANT CHANGE UP (ref 7–14)
ANION GAP SERPL CALC-SCNC: 9 MMOL/L — SIGNIFICANT CHANGE UP (ref 7–14)
ANISOCYTOSIS BLD QL: SIGNIFICANT CHANGE UP
ANISOCYTOSIS BLD QL: SLIGHT — SIGNIFICANT CHANGE UP
APPEARANCE UR: ABNORMAL
APPEARANCE UR: ABNORMAL
APPEARANCE UR: CLEAR — SIGNIFICANT CHANGE UP
APPEARANCE UR: CLEAR — SIGNIFICANT CHANGE UP
APTT BLD: 24.8 SEC — LOW (ref 27–36.3)
APTT BLD: 27.7 SEC — SIGNIFICANT CHANGE UP (ref 27–36.3)
APTT BLD: 28 SEC — SIGNIFICANT CHANGE UP (ref 27–36.3)
APTT BLD: 28.7 SEC — SIGNIFICANT CHANGE UP (ref 27–36.3)
APTT BLD: 29.4 SEC — SIGNIFICANT CHANGE UP (ref 27–36.3)
APTT BLD: 30.8 SEC — SIGNIFICANT CHANGE UP (ref 27–36.3)
APTT BLD: 31 SEC — SIGNIFICANT CHANGE UP (ref 27–36.3)
APTT BLD: 31.1 SEC — SIGNIFICANT CHANGE UP (ref 27–36.3)
APTT BLD: 31.4 SEC — SIGNIFICANT CHANGE UP (ref 27–36.3)
APTT BLD: 31.6 SEC — SIGNIFICANT CHANGE UP (ref 27–36.3)
APTT BLD: 31.7 SEC — SIGNIFICANT CHANGE UP (ref 27–36.3)
APTT BLD: 32.2 SEC — SIGNIFICANT CHANGE UP (ref 27–36.3)
APTT BLD: 32.7 SEC — SIGNIFICANT CHANGE UP (ref 27–36.3)
APTT BLD: 32.7 SEC — SIGNIFICANT CHANGE UP (ref 27–36.3)
APTT BLD: 33.5 SEC — SIGNIFICANT CHANGE UP (ref 27–36.3)
APTT BLD: 34.5 SEC — SIGNIFICANT CHANGE UP (ref 27–36.3)
APTT BLD: 35 SEC — SIGNIFICANT CHANGE UP (ref 27–36.3)
APTT BLD: 35.6 SEC — SIGNIFICANT CHANGE UP (ref 27–36.3)
APTT BLD: 38.3 SEC — HIGH (ref 27–36.3)
APTT BLD: 38.4 SEC — HIGH (ref 27–36.3)
APTT BLD: 38.6 SEC — HIGH (ref 27–36.3)
APTT BLD: 51.2 SEC — HIGH (ref 27–36.3)
APTT BLD: 54.8 SEC — HIGH (ref 27–36.3)
APTT BLD: 56.5 SEC — HIGH (ref 27–36.3)
AST SERPL-CCNC: 107 U/L — HIGH (ref 4–40)
AST SERPL-CCNC: 108 U/L — HIGH (ref 4–40)
AST SERPL-CCNC: 112 U/L — HIGH (ref 4–40)
AST SERPL-CCNC: 118 U/L — HIGH (ref 4–40)
AST SERPL-CCNC: 119 U/L — HIGH (ref 4–40)
AST SERPL-CCNC: 119 U/L — HIGH (ref 4–40)
AST SERPL-CCNC: 121 U/L — HIGH (ref 4–40)
AST SERPL-CCNC: 126 U/L — HIGH (ref 4–40)
AST SERPL-CCNC: 130 U/L — HIGH (ref 4–40)
AST SERPL-CCNC: 145 U/L — HIGH (ref 4–40)
AST SERPL-CCNC: 149 U/L — HIGH (ref 4–40)
AST SERPL-CCNC: 189 U/L — HIGH (ref 4–40)
AST SERPL-CCNC: 210 U/L — HIGH (ref 4–40)
AST SERPL-CCNC: 242 U/L — HIGH (ref 4–40)
AST SERPL-CCNC: 293 U/L — HIGH (ref 4–40)
AST SERPL-CCNC: 314 U/L — HIGH (ref 4–40)
AST SERPL-CCNC: 42 U/L — HIGH (ref 4–40)
AST SERPL-CCNC: 48 U/L — HIGH (ref 4–40)
AST SERPL-CCNC: 51 U/L — HIGH (ref 4–40)
AST SERPL-CCNC: 52 U/L — HIGH (ref 4–40)
AST SERPL-CCNC: 59 U/L — HIGH (ref 4–40)
AST SERPL-CCNC: 62 U/L — HIGH (ref 4–40)
AST SERPL-CCNC: 63 U/L — HIGH (ref 4–40)
AST SERPL-CCNC: 64 U/L — HIGH (ref 4–40)
AST SERPL-CCNC: 64 U/L — HIGH (ref 4–40)
AST SERPL-CCNC: 65 U/L — HIGH (ref 4–40)
AST SERPL-CCNC: 66 U/L — HIGH (ref 4–40)
AST SERPL-CCNC: 70 U/L — HIGH (ref 4–40)
AST SERPL-CCNC: 72 U/L — HIGH (ref 4–40)
AST SERPL-CCNC: 72 U/L — HIGH (ref 4–40)
AST SERPL-CCNC: 76 U/L — HIGH (ref 4–40)
AST SERPL-CCNC: 76 U/L — HIGH (ref 4–40)
AST SERPL-CCNC: 78 U/L — HIGH (ref 4–40)
AST SERPL-CCNC: 86 U/L — HIGH (ref 4–40)
AST SERPL-CCNC: 89 U/L — HIGH (ref 4–40)
AST SERPL-CCNC: 94 U/L — HIGH (ref 4–40)
AST SERPL-CCNC: SIGNIFICANT CHANGE UP U/L (ref 4–40)
B PERT IGG+IGM PNL SER: ABNORMAL
BACTERIA # UR AUTO: ABNORMAL
BACTERIA # UR AUTO: ABNORMAL
BACTERIA # UR AUTO: NEGATIVE — SIGNIFICANT CHANGE UP
BASE EXCESS BLDA CALC-SCNC: -3.8 MMOL/L — LOW (ref -2–3)
BASE EXCESS BLDV CALC-SCNC: -0.7 MMOL/L — SIGNIFICANT CHANGE UP (ref -2–3)
BASE EXCESS BLDV CALC-SCNC: -2.3 MMOL/L — LOW (ref -2–3)
BASE EXCESS BLDV CALC-SCNC: -2.7 MMOL/L — LOW (ref -2–3)
BASE EXCESS BLDV CALC-SCNC: -4.1 MMOL/L — LOW (ref -2–3)
BASE EXCESS BLDV CALC-SCNC: 0.1 MMOL/L — SIGNIFICANT CHANGE UP (ref -2–3)
BASOPHILS # BLD AUTO: 0 K/UL — SIGNIFICANT CHANGE UP (ref 0–0.2)
BASOPHILS # BLD AUTO: 0.01 K/UL — SIGNIFICANT CHANGE UP (ref 0–0.2)
BASOPHILS # BLD AUTO: 0.02 K/UL — SIGNIFICANT CHANGE UP (ref 0–0.2)
BASOPHILS # BLD AUTO: 0.03 K/UL — SIGNIFICANT CHANGE UP (ref 0–0.2)
BASOPHILS # BLD AUTO: 0.04 K/UL — SIGNIFICANT CHANGE UP (ref 0–0.2)
BASOPHILS # BLD AUTO: 0.05 K/UL — SIGNIFICANT CHANGE UP (ref 0–0.2)
BASOPHILS # BLD AUTO: 0.06 K/UL — SIGNIFICANT CHANGE UP (ref 0–0.2)
BASOPHILS # BLD AUTO: 0.1 K/UL — SIGNIFICANT CHANGE UP (ref 0–0.2)
BASOPHILS # BLD AUTO: 0.1 K/UL — SIGNIFICANT CHANGE UP (ref 0–0.2)
BASOPHILS # BLD AUTO: 0.11 K/UL — SIGNIFICANT CHANGE UP (ref 0–0.2)
BASOPHILS # BLD AUTO: 0.14 K/UL — SIGNIFICANT CHANGE UP (ref 0–0.2)
BASOPHILS NFR BLD AUTO: 0 % — SIGNIFICANT CHANGE UP (ref 0–2)
BASOPHILS NFR BLD AUTO: 0.1 % — SIGNIFICANT CHANGE UP (ref 0–2)
BASOPHILS NFR BLD AUTO: 0.2 % — SIGNIFICANT CHANGE UP (ref 0–2)
BASOPHILS NFR BLD AUTO: 0.3 % — SIGNIFICANT CHANGE UP (ref 0–2)
BASOPHILS NFR BLD AUTO: 0.4 % — SIGNIFICANT CHANGE UP (ref 0–2)
BASOPHILS NFR BLD AUTO: 0.5 % — SIGNIFICANT CHANGE UP (ref 0–2)
BASOPHILS NFR BLD AUTO: 0.6 % — SIGNIFICANT CHANGE UP (ref 0–2)
BASOPHILS NFR BLD AUTO: 0.7 % — SIGNIFICANT CHANGE UP (ref 0–2)
BASOPHILS NFR BLD AUTO: 0.8 % — SIGNIFICANT CHANGE UP (ref 0–2)
BASOPHILS NFR BLD AUTO: 0.9 % — SIGNIFICANT CHANGE UP (ref 0–2)
BASOPHILS NFR BLD AUTO: 1 % — SIGNIFICANT CHANGE UP (ref 0–2)
BILIRUB DIRECT SERPL-MCNC: 0.5 MG/DL — HIGH (ref 0–0.3)
BILIRUB DIRECT SERPL-MCNC: 0.7 MG/DL — HIGH (ref 0–0.3)
BILIRUB DIRECT SERPL-MCNC: 1 MG/DL — HIGH (ref 0–0.3)
BILIRUB DIRECT SERPL-MCNC: 1.1 MG/DL — HIGH (ref 0–0.3)
BILIRUB INDIRECT FLD-MCNC: 0.9 MG/DL — SIGNIFICANT CHANGE UP (ref 0–1)
BILIRUB INDIRECT FLD-MCNC: 1.1 MG/DL — HIGH (ref 0–1)
BILIRUB INDIRECT FLD-MCNC: 1.2 MG/DL — HIGH (ref 0–1)
BILIRUB INDIRECT FLD-MCNC: 1.5 MG/DL — HIGH (ref 0–1)
BILIRUB INDIRECT FLD-MCNC: 1.9 MG/DL — HIGH (ref 0–1)
BILIRUB SERPL-MCNC: 0.8 MG/DL — SIGNIFICANT CHANGE UP (ref 0.2–1.2)
BILIRUB SERPL-MCNC: 1 MG/DL — SIGNIFICANT CHANGE UP (ref 0.2–1.2)
BILIRUB SERPL-MCNC: 1 MG/DL — SIGNIFICANT CHANGE UP (ref 0.2–1.2)
BILIRUB SERPL-MCNC: 1.2 MG/DL — SIGNIFICANT CHANGE UP (ref 0.2–1.2)
BILIRUB SERPL-MCNC: 1.5 MG/DL — HIGH (ref 0.2–1.2)
BILIRUB SERPL-MCNC: 1.5 MG/DL — HIGH (ref 0.2–1.2)
BILIRUB SERPL-MCNC: 1.6 MG/DL — HIGH (ref 0.2–1.2)
BILIRUB SERPL-MCNC: 1.7 MG/DL — HIGH (ref 0.2–1.2)
BILIRUB SERPL-MCNC: 1.7 MG/DL — HIGH (ref 0.2–1.2)
BILIRUB SERPL-MCNC: 1.8 MG/DL — HIGH (ref 0.2–1.2)
BILIRUB SERPL-MCNC: 1.9 MG/DL — HIGH (ref 0.2–1.2)
BILIRUB SERPL-MCNC: 1.9 MG/DL — HIGH (ref 0.2–1.2)
BILIRUB SERPL-MCNC: 2 MG/DL — HIGH (ref 0.2–1.2)
BILIRUB SERPL-MCNC: 2.1 MG/DL — HIGH (ref 0.2–1.2)
BILIRUB SERPL-MCNC: 2.2 MG/DL — HIGH (ref 0.2–1.2)
BILIRUB SERPL-MCNC: 2.2 MG/DL — HIGH (ref 0.2–1.2)
BILIRUB SERPL-MCNC: 2.3 MG/DL — HIGH (ref 0.2–1.2)
BILIRUB SERPL-MCNC: 2.3 MG/DL — HIGH (ref 0.2–1.2)
BILIRUB SERPL-MCNC: 2.5 MG/DL — HIGH (ref 0.2–1.2)
BILIRUB SERPL-MCNC: 2.5 MG/DL — HIGH (ref 0.2–1.2)
BILIRUB SERPL-MCNC: 2.6 MG/DL — HIGH (ref 0.2–1.2)
BILIRUB SERPL-MCNC: 2.8 MG/DL — HIGH (ref 0.2–1.2)
BILIRUB SERPL-MCNC: 2.9 MG/DL — HIGH (ref 0.2–1.2)
BILIRUB SERPL-MCNC: 3 MG/DL — HIGH (ref 0.2–1.2)
BILIRUB SERPL-MCNC: 3.9 MG/DL — HIGH (ref 0.2–1.2)
BILIRUB SERPL-MCNC: 4 MG/DL — HIGH (ref 0.2–1.2)
BILIRUB SERPL-MCNC: 4.8 MG/DL — HIGH (ref 0.2–1.2)
BILIRUB SERPL-MCNC: 5.2 MG/DL — HIGH (ref 0.2–1.2)
BILIRUB SERPL-MCNC: 5.4 MG/DL — HIGH (ref 0.2–1.2)
BILIRUB UR-MCNC: NEGATIVE — SIGNIFICANT CHANGE UP
BLD GP AB SCN SERPL QL: NEGATIVE — SIGNIFICANT CHANGE UP
BLOOD GAS ARTERIAL - LYTES,HGB,ICA,LACT RESULT: SIGNIFICANT CHANGE UP
BLOOD GAS ARTERIAL COMPREHENSIVE RESULT: SIGNIFICANT CHANGE UP
BLOOD GAS VENOUS COMPREHENSIVE RESULT: SIGNIFICANT CHANGE UP
BUN SERPL-MCNC: 100 MG/DL — HIGH (ref 7–23)
BUN SERPL-MCNC: 100 MG/DL — HIGH (ref 7–23)
BUN SERPL-MCNC: 106 MG/DL — HIGH (ref 7–23)
BUN SERPL-MCNC: 19 MG/DL — SIGNIFICANT CHANGE UP (ref 7–23)
BUN SERPL-MCNC: 20 MG/DL — SIGNIFICANT CHANGE UP (ref 7–23)
BUN SERPL-MCNC: 24 MG/DL — HIGH (ref 7–23)
BUN SERPL-MCNC: 30 MG/DL — HIGH (ref 7–23)
BUN SERPL-MCNC: 31 MG/DL — HIGH (ref 7–23)
BUN SERPL-MCNC: 33 MG/DL — HIGH (ref 7–23)
BUN SERPL-MCNC: 34 MG/DL — HIGH (ref 7–23)
BUN SERPL-MCNC: 34 MG/DL — HIGH (ref 7–23)
BUN SERPL-MCNC: 35 MG/DL — HIGH (ref 7–23)
BUN SERPL-MCNC: 35 MG/DL — HIGH (ref 7–23)
BUN SERPL-MCNC: 36 MG/DL — HIGH (ref 7–23)
BUN SERPL-MCNC: 37 MG/DL — HIGH (ref 7–23)
BUN SERPL-MCNC: 37 MG/DL — HIGH (ref 7–23)
BUN SERPL-MCNC: 38 MG/DL — HIGH (ref 7–23)
BUN SERPL-MCNC: 39 MG/DL — HIGH (ref 7–23)
BUN SERPL-MCNC: 39 MG/DL — HIGH (ref 7–23)
BUN SERPL-MCNC: 41 MG/DL — HIGH (ref 7–23)
BUN SERPL-MCNC: 41 MG/DL — HIGH (ref 7–23)
BUN SERPL-MCNC: 42 MG/DL — HIGH (ref 7–23)
BUN SERPL-MCNC: 42 MG/DL — HIGH (ref 7–23)
BUN SERPL-MCNC: 43 MG/DL — HIGH (ref 7–23)
BUN SERPL-MCNC: 44 MG/DL — HIGH (ref 7–23)
BUN SERPL-MCNC: 46 MG/DL — HIGH (ref 7–23)
BUN SERPL-MCNC: 55 MG/DL — HIGH (ref 7–23)
BUN SERPL-MCNC: 58 MG/DL — HIGH (ref 7–23)
BUN SERPL-MCNC: 58 MG/DL — HIGH (ref 7–23)
BUN SERPL-MCNC: 60 MG/DL — HIGH (ref 7–23)
BUN SERPL-MCNC: 64 MG/DL — HIGH (ref 7–23)
BUN SERPL-MCNC: 70 MG/DL — HIGH (ref 7–23)
BUN SERPL-MCNC: 72 MG/DL — HIGH (ref 7–23)
BUN SERPL-MCNC: 75 MG/DL — HIGH (ref 7–23)
BUN SERPL-MCNC: 80 MG/DL — HIGH (ref 7–23)
BUN SERPL-MCNC: 96 MG/DL — HIGH (ref 7–23)
BURR CELLS BLD QL SMEAR: PRESENT — SIGNIFICANT CHANGE UP
BURR CELLS BLD QL SMEAR: PRESENT — SIGNIFICANT CHANGE UP
CA-I SERPL-SCNC: 1.16 MMOL/L — SIGNIFICANT CHANGE UP (ref 1.15–1.33)
CA-I SERPL-SCNC: 1.21 MMOL/L — SIGNIFICANT CHANGE UP (ref 1.15–1.33)
CA-I SERPL-SCNC: 1.21 MMOL/L — SIGNIFICANT CHANGE UP (ref 1.15–1.33)
CALCIUM SERPL-MCNC: 6.3 MG/DL — CRITICAL LOW (ref 8.4–10.5)
CALCIUM SERPL-MCNC: 6.8 MG/DL — LOW (ref 8.4–10.5)
CALCIUM SERPL-MCNC: 7.8 MG/DL — LOW (ref 8.4–10.5)
CALCIUM SERPL-MCNC: 7.8 MG/DL — LOW (ref 8.4–10.5)
CALCIUM SERPL-MCNC: 7.9 MG/DL — LOW (ref 8.4–10.5)
CALCIUM SERPL-MCNC: 8 MG/DL — LOW (ref 8.4–10.5)
CALCIUM SERPL-MCNC: 8 MG/DL — LOW (ref 8.4–10.5)
CALCIUM SERPL-MCNC: 8.1 MG/DL — LOW (ref 8.4–10.5)
CALCIUM SERPL-MCNC: 8.2 MG/DL — LOW (ref 8.4–10.5)
CALCIUM SERPL-MCNC: 8.2 MG/DL — LOW (ref 8.4–10.5)
CALCIUM SERPL-MCNC: 8.3 MG/DL — LOW (ref 8.4–10.5)
CALCIUM SERPL-MCNC: 8.4 MG/DL — SIGNIFICANT CHANGE UP (ref 8.4–10.5)
CALCIUM SERPL-MCNC: 8.5 MG/DL — SIGNIFICANT CHANGE UP (ref 8.4–10.5)
CALCIUM SERPL-MCNC: 8.6 MG/DL — SIGNIFICANT CHANGE UP (ref 8.4–10.5)
CALCIUM SERPL-MCNC: 8.7 MG/DL — SIGNIFICANT CHANGE UP (ref 8.4–10.5)
CALCIUM SERPL-MCNC: 8.8 MG/DL — SIGNIFICANT CHANGE UP (ref 8.4–10.5)
CALCIUM SERPL-MCNC: 8.9 MG/DL — SIGNIFICANT CHANGE UP (ref 8.4–10.5)
CALCIUM SERPL-MCNC: 8.9 MG/DL — SIGNIFICANT CHANGE UP (ref 8.4–10.5)
CALCIUM SERPL-MCNC: 9 MG/DL — SIGNIFICANT CHANGE UP (ref 8.4–10.5)
CALCIUM SERPL-MCNC: 9.1 MG/DL — SIGNIFICANT CHANGE UP (ref 8.4–10.5)
CHLORIDE BLDV-SCNC: 102 MMOL/L — SIGNIFICANT CHANGE UP (ref 96–108)
CHLORIDE BLDV-SCNC: 103 MMOL/L — SIGNIFICANT CHANGE UP (ref 96–108)
CHLORIDE BLDV-SCNC: 104 MMOL/L — SIGNIFICANT CHANGE UP (ref 96–108)
CHLORIDE BLDV-SCNC: 115 MMOL/L — HIGH (ref 96–108)
CHLORIDE BLDV-SCNC: 115 MMOL/L — HIGH (ref 96–108)
CHLORIDE SERPL-SCNC: 102 MMOL/L — SIGNIFICANT CHANGE UP (ref 98–107)
CHLORIDE SERPL-SCNC: 106 MMOL/L — SIGNIFICANT CHANGE UP (ref 98–107)
CHLORIDE SERPL-SCNC: 107 MMOL/L — SIGNIFICANT CHANGE UP (ref 98–107)
CHLORIDE SERPL-SCNC: 108 MMOL/L — HIGH (ref 98–107)
CHLORIDE SERPL-SCNC: 109 MMOL/L — HIGH (ref 98–107)
CHLORIDE SERPL-SCNC: 110 MMOL/L — HIGH (ref 98–107)
CHLORIDE SERPL-SCNC: 110 MMOL/L — HIGH (ref 98–107)
CHLORIDE SERPL-SCNC: 111 MMOL/L — HIGH (ref 98–107)
CHLORIDE SERPL-SCNC: 111 MMOL/L — HIGH (ref 98–107)
CHLORIDE SERPL-SCNC: 112 MMOL/L — HIGH (ref 98–107)
CHLORIDE SERPL-SCNC: 113 MMOL/L — HIGH (ref 98–107)
CHLORIDE SERPL-SCNC: 114 MMOL/L — HIGH (ref 98–107)
CHLORIDE SERPL-SCNC: 115 MMOL/L — HIGH (ref 98–107)
CHLORIDE SERPL-SCNC: 115 MMOL/L — HIGH (ref 98–107)
CHLORIDE SERPL-SCNC: 116 MMOL/L — HIGH (ref 98–107)
CHLORIDE SERPL-SCNC: 118 MMOL/L — HIGH (ref 98–107)
CHLORIDE SERPL-SCNC: 119 MMOL/L — HIGH (ref 98–107)
CHLORIDE SERPL-SCNC: 121 MMOL/L — HIGH (ref 98–107)
CHLORIDE SERPL-SCNC: 122 MMOL/L — HIGH (ref 98–107)
CHLORIDE SERPL-SCNC: 122 MMOL/L — HIGH (ref 98–107)
CHLORIDE SERPL-SCNC: 123 MMOL/L — HIGH (ref 98–107)
CHLORIDE SERPL-SCNC: 123 MMOL/L — HIGH (ref 98–107)
CHLORIDE SERPL-SCNC: 124 MMOL/L — HIGH (ref 98–107)
CHLORIDE SERPL-SCNC: 126 MMOL/L — HIGH (ref 98–107)
CHLORIDE SERPL-SCNC: 126 MMOL/L — HIGH (ref 98–107)
CHLORIDE SERPL-SCNC: 97 MMOL/L — LOW (ref 98–107)
CLOSURE TME COLL+EPINEP BLD: 37 K/UL — LOW (ref 150–400)
CO2 BLDA-SCNC: 19 MMOL/L — SIGNIFICANT CHANGE UP (ref 19–24)
CO2 BLDV-SCNC: 22.9 MMOL/L — SIGNIFICANT CHANGE UP (ref 22–26)
CO2 BLDV-SCNC: 23.8 MMOL/L — SIGNIFICANT CHANGE UP (ref 22–26)
CO2 BLDV-SCNC: 23.8 MMOL/L — SIGNIFICANT CHANGE UP (ref 22–26)
CO2 BLDV-SCNC: 24.5 MMOL/L — SIGNIFICANT CHANGE UP (ref 22–26)
CO2 BLDV-SCNC: 25.9 MMOL/L — SIGNIFICANT CHANGE UP (ref 22–26)
CO2 SERPL-SCNC: 12 MMOL/L — LOW (ref 22–31)
CO2 SERPL-SCNC: 13 MMOL/L — LOW (ref 22–31)
CO2 SERPL-SCNC: 16 MMOL/L — LOW (ref 22–31)
CO2 SERPL-SCNC: 17 MMOL/L — LOW (ref 22–31)
CO2 SERPL-SCNC: 18 MMOL/L — LOW (ref 22–31)
CO2 SERPL-SCNC: 19 MMOL/L — LOW (ref 22–31)
CO2 SERPL-SCNC: 20 MMOL/L — LOW (ref 22–31)
CO2 SERPL-SCNC: 21 MMOL/L — LOW (ref 22–31)
CO2 SERPL-SCNC: 22 MMOL/L — SIGNIFICANT CHANGE UP (ref 22–31)
CO2 SERPL-SCNC: 23 MMOL/L — SIGNIFICANT CHANGE UP (ref 22–31)
CO2 SERPL-SCNC: 24 MMOL/L — SIGNIFICANT CHANGE UP (ref 22–31)
CO2 SERPL-SCNC: 9 MMOL/L — CRITICAL LOW (ref 22–31)
COLOR FLD: YELLOW
COLOR SPEC: ABNORMAL
COLOR SPEC: SIGNIFICANT CHANGE UP
COLOR SPEC: YELLOW — SIGNIFICANT CHANGE UP
COLOR SPEC: YELLOW — SIGNIFICANT CHANGE UP
CORTICOSTEROID BINDING GLOBULIN RESULT: 1 MG/DL — LOW
CORTIS AM PEAK SERPL-MCNC: 18.1 UG/DL — SIGNIFICANT CHANGE UP (ref 6–18.4)
CORTIS F PM SERPL-MCNC: 31.7 UG/DL — HIGH (ref 2.7–10.5)
CORTIS F/TOTAL MFR SERPL: 46 % — SIGNIFICANT CHANGE UP
CORTIS SERPL-MCNC: 12 UG/DL — SIGNIFICANT CHANGE UP
CORTISOL, FREE RESULT: 5.5 UG/DL — HIGH
CREAT ?TM UR-MCNC: 66 MG/DL — SIGNIFICANT CHANGE UP
CREAT ?TM UR-MCNC: 86 MG/DL — SIGNIFICANT CHANGE UP
CREAT SERPL-MCNC: 0.52 MG/DL — SIGNIFICANT CHANGE UP (ref 0.5–1.3)
CREAT SERPL-MCNC: 0.57 MG/DL — SIGNIFICANT CHANGE UP (ref 0.5–1.3)
CREAT SERPL-MCNC: 0.59 MG/DL — SIGNIFICANT CHANGE UP (ref 0.5–1.3)
CREAT SERPL-MCNC: 0.6 MG/DL — SIGNIFICANT CHANGE UP (ref 0.5–1.3)
CREAT SERPL-MCNC: 0.63 MG/DL — SIGNIFICANT CHANGE UP (ref 0.5–1.3)
CREAT SERPL-MCNC: 0.63 MG/DL — SIGNIFICANT CHANGE UP (ref 0.5–1.3)
CREAT SERPL-MCNC: 0.64 MG/DL — SIGNIFICANT CHANGE UP (ref 0.5–1.3)
CREAT SERPL-MCNC: 0.66 MG/DL — SIGNIFICANT CHANGE UP (ref 0.5–1.3)
CREAT SERPL-MCNC: 0.67 MG/DL — SIGNIFICANT CHANGE UP (ref 0.5–1.3)
CREAT SERPL-MCNC: 0.72 MG/DL — SIGNIFICANT CHANGE UP (ref 0.5–1.3)
CREAT SERPL-MCNC: 0.74 MG/DL — SIGNIFICANT CHANGE UP (ref 0.5–1.3)
CREAT SERPL-MCNC: 0.81 MG/DL — SIGNIFICANT CHANGE UP (ref 0.5–1.3)
CREAT SERPL-MCNC: 0.81 MG/DL — SIGNIFICANT CHANGE UP (ref 0.5–1.3)
CREAT SERPL-MCNC: 0.82 MG/DL — SIGNIFICANT CHANGE UP (ref 0.5–1.3)
CREAT SERPL-MCNC: 0.87 MG/DL — SIGNIFICANT CHANGE UP (ref 0.5–1.3)
CREAT SERPL-MCNC: 0.88 MG/DL — SIGNIFICANT CHANGE UP (ref 0.5–1.3)
CREAT SERPL-MCNC: 0.92 MG/DL — SIGNIFICANT CHANGE UP (ref 0.5–1.3)
CREAT SERPL-MCNC: 0.98 MG/DL — SIGNIFICANT CHANGE UP (ref 0.5–1.3)
CREAT SERPL-MCNC: 0.98 MG/DL — SIGNIFICANT CHANGE UP (ref 0.5–1.3)
CREAT SERPL-MCNC: 1.03 MG/DL — SIGNIFICANT CHANGE UP (ref 0.5–1.3)
CREAT SERPL-MCNC: 1.04 MG/DL — SIGNIFICANT CHANGE UP (ref 0.5–1.3)
CREAT SERPL-MCNC: 1.04 MG/DL — SIGNIFICANT CHANGE UP (ref 0.5–1.3)
CREAT SERPL-MCNC: 1.05 MG/DL — SIGNIFICANT CHANGE UP (ref 0.5–1.3)
CREAT SERPL-MCNC: 1.05 MG/DL — SIGNIFICANT CHANGE UP (ref 0.5–1.3)
CREAT SERPL-MCNC: 1.07 MG/DL — SIGNIFICANT CHANGE UP (ref 0.5–1.3)
CREAT SERPL-MCNC: 1.08 MG/DL — SIGNIFICANT CHANGE UP (ref 0.5–1.3)
CREAT SERPL-MCNC: 1.1 MG/DL — SIGNIFICANT CHANGE UP (ref 0.5–1.3)
CREAT SERPL-MCNC: 1.16 MG/DL — SIGNIFICANT CHANGE UP (ref 0.5–1.3)
CREAT SERPL-MCNC: 1.16 MG/DL — SIGNIFICANT CHANGE UP (ref 0.5–1.3)
CREAT SERPL-MCNC: 1.18 MG/DL — SIGNIFICANT CHANGE UP (ref 0.5–1.3)
CREAT SERPL-MCNC: 1.21 MG/DL — SIGNIFICANT CHANGE UP (ref 0.5–1.3)
CREAT SERPL-MCNC: 1.22 MG/DL — SIGNIFICANT CHANGE UP (ref 0.5–1.3)
CREAT SERPL-MCNC: 1.23 MG/DL — SIGNIFICANT CHANGE UP (ref 0.5–1.3)
CREAT SERPL-MCNC: 1.24 MG/DL — SIGNIFICANT CHANGE UP (ref 0.5–1.3)
CREAT SERPL-MCNC: 1.26 MG/DL — SIGNIFICANT CHANGE UP (ref 0.5–1.3)
CREAT SERPL-MCNC: 1.36 MG/DL — HIGH (ref 0.5–1.3)
CREAT SERPL-MCNC: 1.39 MG/DL — HIGH (ref 0.5–1.3)
CREAT SERPL-MCNC: 1.43 MG/DL — HIGH (ref 0.5–1.3)
CREAT SERPL-MCNC: 1.49 MG/DL — HIGH (ref 0.5–1.3)
CREAT SERPL-MCNC: 1.58 MG/DL — HIGH (ref 0.5–1.3)
CREAT SERPL-MCNC: 1.58 MG/DL — HIGH (ref 0.5–1.3)
CREAT SERPL-MCNC: 1.61 MG/DL — HIGH (ref 0.5–1.3)
CREAT SERPL-MCNC: 1.62 MG/DL — HIGH (ref 0.5–1.3)
CREAT SERPL-MCNC: 1.68 MG/DL — HIGH (ref 0.5–1.3)
CREAT SERPL-MCNC: 1.69 MG/DL — HIGH (ref 0.5–1.3)
CREAT SERPL-MCNC: 1.71 MG/DL — HIGH (ref 0.5–1.3)
CULTURE RESULTS: SIGNIFICANT CHANGE UP
DIFF PNL FLD: ABNORMAL
DIFF PNL FLD: NEGATIVE — SIGNIFICANT CHANGE UP
EGFR: 101 ML/MIN/1.73M2 — SIGNIFICANT CHANGE UP
EGFR: 102 ML/MIN/1.73M2 — SIGNIFICANT CHANGE UP
EGFR: 102 ML/MIN/1.73M2 — SIGNIFICANT CHANGE UP
EGFR: 103 ML/MIN/1.73M2 — SIGNIFICANT CHANGE UP
EGFR: 103 ML/MIN/1.73M2 — SIGNIFICANT CHANGE UP
EGFR: 104 ML/MIN/1.73M2 — SIGNIFICANT CHANGE UP
EGFR: 105 ML/MIN/1.73M2 — SIGNIFICANT CHANGE UP
EGFR: 106 ML/MIN/1.73M2 — SIGNIFICANT CHANGE UP
EGFR: 109 ML/MIN/1.73M2 — SIGNIFICANT CHANGE UP
EGFR: 43 ML/MIN/1.73M2 — LOW
EGFR: 43 ML/MIN/1.73M2 — LOW
EGFR: 44 ML/MIN/1.73M2 — LOW
EGFR: 46 ML/MIN/1.73M2 — LOW
EGFR: 46 ML/MIN/1.73M2 — LOW
EGFR: 47 ML/MIN/1.73M2 — LOW
EGFR: 47 ML/MIN/1.73M2 — LOW
EGFR: 50 ML/MIN/1.73M2 — LOW
EGFR: 53 ML/MIN/1.73M2 — LOW
EGFR: 55 ML/MIN/1.73M2 — LOW
EGFR: 56 ML/MIN/1.73M2 — LOW
EGFR: 62 ML/MIN/1.73M2 — SIGNIFICANT CHANGE UP
EGFR: 63 ML/MIN/1.73M2 — SIGNIFICANT CHANGE UP
EGFR: 64 ML/MIN/1.73M2 — SIGNIFICANT CHANGE UP
EGFR: 64 ML/MIN/1.73M2 — SIGNIFICANT CHANGE UP
EGFR: 65 ML/MIN/1.73M2 — SIGNIFICANT CHANGE UP
EGFR: 67 ML/MIN/1.73M2 — SIGNIFICANT CHANGE UP
EGFR: 68 ML/MIN/1.73M2 — SIGNIFICANT CHANGE UP
EGFR: 68 ML/MIN/1.73M2 — SIGNIFICANT CHANGE UP
EGFR: 73 ML/MIN/1.73M2 — SIGNIFICANT CHANGE UP
EGFR: 74 ML/MIN/1.73M2 — SIGNIFICANT CHANGE UP
EGFR: 75 ML/MIN/1.73M2 — SIGNIFICANT CHANGE UP
EGFR: 77 ML/MIN/1.73M2 — SIGNIFICANT CHANGE UP
EGFR: 77 ML/MIN/1.73M2 — SIGNIFICANT CHANGE UP
EGFR: 78 ML/MIN/1.73M2 — SIGNIFICANT CHANGE UP
EGFR: 78 ML/MIN/1.73M2 — SIGNIFICANT CHANGE UP
EGFR: 79 ML/MIN/1.73M2 — SIGNIFICANT CHANGE UP
EGFR: 83 ML/MIN/1.73M2 — SIGNIFICANT CHANGE UP
EGFR: 83 ML/MIN/1.73M2 — SIGNIFICANT CHANGE UP
EGFR: 90 ML/MIN/1.73M2 — SIGNIFICANT CHANGE UP
EGFR: 93 ML/MIN/1.73M2 — SIGNIFICANT CHANGE UP
EGFR: 93 ML/MIN/1.73M2 — SIGNIFICANT CHANGE UP
EGFR: 95 ML/MIN/1.73M2 — SIGNIFICANT CHANGE UP
EGFR: 98 ML/MIN/1.73M2 — SIGNIFICANT CHANGE UP
EGFR: 99 ML/MIN/1.73M2 — SIGNIFICANT CHANGE UP
EOSINOPHIL # BLD AUTO: 0 K/UL — SIGNIFICANT CHANGE UP (ref 0–0.5)
EOSINOPHIL # BLD AUTO: 0 K/UL — SIGNIFICANT CHANGE UP (ref 0–0.5)
EOSINOPHIL # BLD AUTO: 0.01 K/UL — SIGNIFICANT CHANGE UP (ref 0–0.5)
EOSINOPHIL # BLD AUTO: 0.01 K/UL — SIGNIFICANT CHANGE UP (ref 0–0.5)
EOSINOPHIL # BLD AUTO: 0.02 K/UL — SIGNIFICANT CHANGE UP (ref 0–0.5)
EOSINOPHIL # BLD AUTO: 0.03 K/UL — SIGNIFICANT CHANGE UP (ref 0–0.5)
EOSINOPHIL # BLD AUTO: 0.04 K/UL — SIGNIFICANT CHANGE UP (ref 0–0.5)
EOSINOPHIL # BLD AUTO: 0.07 K/UL — SIGNIFICANT CHANGE UP (ref 0–0.5)
EOSINOPHIL # BLD AUTO: 0.07 K/UL — SIGNIFICANT CHANGE UP (ref 0–0.5)
EOSINOPHIL # BLD AUTO: 0.08 K/UL — SIGNIFICANT CHANGE UP (ref 0–0.5)
EOSINOPHIL # BLD AUTO: 0.09 K/UL — SIGNIFICANT CHANGE UP (ref 0–0.5)
EOSINOPHIL # BLD AUTO: 0.1 K/UL — SIGNIFICANT CHANGE UP (ref 0–0.5)
EOSINOPHIL # BLD AUTO: 0.1 K/UL — SIGNIFICANT CHANGE UP (ref 0–0.5)
EOSINOPHIL # BLD AUTO: 0.12 K/UL — SIGNIFICANT CHANGE UP (ref 0–0.5)
EOSINOPHIL # BLD AUTO: 0.12 K/UL — SIGNIFICANT CHANGE UP (ref 0–0.5)
EOSINOPHIL # BLD AUTO: 0.13 K/UL — SIGNIFICANT CHANGE UP (ref 0–0.5)
EOSINOPHIL # BLD AUTO: 0.13 K/UL — SIGNIFICANT CHANGE UP (ref 0–0.5)
EOSINOPHIL # BLD AUTO: 0.15 K/UL — SIGNIFICANT CHANGE UP (ref 0–0.5)
EOSINOPHIL # BLD AUTO: 0.16 K/UL — SIGNIFICANT CHANGE UP (ref 0–0.5)
EOSINOPHIL # BLD AUTO: 0.17 K/UL — SIGNIFICANT CHANGE UP (ref 0–0.5)
EOSINOPHIL # BLD AUTO: 0.17 K/UL — SIGNIFICANT CHANGE UP (ref 0–0.5)
EOSINOPHIL # BLD AUTO: 0.18 K/UL — SIGNIFICANT CHANGE UP (ref 0–0.5)
EOSINOPHIL # BLD AUTO: 0.18 K/UL — SIGNIFICANT CHANGE UP (ref 0–0.5)
EOSINOPHIL # BLD AUTO: 0.19 K/UL — SIGNIFICANT CHANGE UP (ref 0–0.5)
EOSINOPHIL # BLD AUTO: 0.22 K/UL — SIGNIFICANT CHANGE UP (ref 0–0.5)
EOSINOPHIL # BLD AUTO: 0.24 K/UL — SIGNIFICANT CHANGE UP (ref 0–0.5)
EOSINOPHIL # BLD AUTO: 0.25 K/UL — SIGNIFICANT CHANGE UP (ref 0–0.5)
EOSINOPHIL # BLD AUTO: 0.27 K/UL — SIGNIFICANT CHANGE UP (ref 0–0.5)
EOSINOPHIL # BLD AUTO: 0.32 K/UL — SIGNIFICANT CHANGE UP (ref 0–0.5)
EOSINOPHIL # BLD AUTO: 0.34 K/UL — SIGNIFICANT CHANGE UP (ref 0–0.5)
EOSINOPHIL # BLD AUTO: 0.45 K/UL — SIGNIFICANT CHANGE UP (ref 0–0.5)
EOSINOPHIL # BLD AUTO: 0.48 K/UL — SIGNIFICANT CHANGE UP (ref 0–0.5)
EOSINOPHIL # BLD AUTO: 0.56 K/UL — HIGH (ref 0–0.5)
EOSINOPHIL # BLD AUTO: 0.74 K/UL — HIGH (ref 0–0.5)
EOSINOPHIL # BLD AUTO: 0.88 K/UL — HIGH (ref 0–0.5)
EOSINOPHIL # FLD: 0 % — SIGNIFICANT CHANGE UP
EOSINOPHIL # FLD: 0 % — SIGNIFICANT CHANGE UP
EOSINOPHIL NFR BLD AUTO: 0 % — SIGNIFICANT CHANGE UP (ref 0–6)
EOSINOPHIL NFR BLD AUTO: 0 % — SIGNIFICANT CHANGE UP (ref 0–6)
EOSINOPHIL NFR BLD AUTO: 0.1 % — SIGNIFICANT CHANGE UP (ref 0–6)
EOSINOPHIL NFR BLD AUTO: 0.1 % — SIGNIFICANT CHANGE UP (ref 0–6)
EOSINOPHIL NFR BLD AUTO: 0.2 % — SIGNIFICANT CHANGE UP (ref 0–6)
EOSINOPHIL NFR BLD AUTO: 0.4 % — SIGNIFICANT CHANGE UP (ref 0–6)
EOSINOPHIL NFR BLD AUTO: 0.6 % — SIGNIFICANT CHANGE UP (ref 0–6)
EOSINOPHIL NFR BLD AUTO: 0.6 % — SIGNIFICANT CHANGE UP (ref 0–6)
EOSINOPHIL NFR BLD AUTO: 0.7 % — SIGNIFICANT CHANGE UP (ref 0–6)
EOSINOPHIL NFR BLD AUTO: 0.9 % — SIGNIFICANT CHANGE UP (ref 0–6)
EOSINOPHIL NFR BLD AUTO: 1.6 % — SIGNIFICANT CHANGE UP (ref 0–6)
EOSINOPHIL NFR BLD AUTO: 1.8 % — SIGNIFICANT CHANGE UP (ref 0–6)
EOSINOPHIL NFR BLD AUTO: 1.9 % — SIGNIFICANT CHANGE UP (ref 0–6)
EOSINOPHIL NFR BLD AUTO: 2.1 % — SIGNIFICANT CHANGE UP (ref 0–6)
EOSINOPHIL NFR BLD AUTO: 2.2 % — SIGNIFICANT CHANGE UP (ref 0–6)
EOSINOPHIL NFR BLD AUTO: 2.3 % — SIGNIFICANT CHANGE UP (ref 0–6)
EOSINOPHIL NFR BLD AUTO: 2.5 % — SIGNIFICANT CHANGE UP (ref 0–6)
EOSINOPHIL NFR BLD AUTO: 2.6 % — SIGNIFICANT CHANGE UP (ref 0–6)
EOSINOPHIL NFR BLD AUTO: 2.7 % — SIGNIFICANT CHANGE UP (ref 0–6)
EOSINOPHIL NFR BLD AUTO: 2.8 % — SIGNIFICANT CHANGE UP (ref 0–6)
EOSINOPHIL NFR BLD AUTO: 2.9 % — SIGNIFICANT CHANGE UP (ref 0–6)
EOSINOPHIL NFR BLD AUTO: 2.9 % — SIGNIFICANT CHANGE UP (ref 0–6)
EOSINOPHIL NFR BLD AUTO: 3.1 % — SIGNIFICANT CHANGE UP (ref 0–6)
EOSINOPHIL NFR BLD AUTO: 3.4 % — SIGNIFICANT CHANGE UP (ref 0–6)
EOSINOPHIL NFR BLD AUTO: 3.6 % — SIGNIFICANT CHANGE UP (ref 0–6)
EOSINOPHIL NFR BLD AUTO: 3.6 % — SIGNIFICANT CHANGE UP (ref 0–6)
EOSINOPHIL NFR BLD AUTO: 3.8 % — SIGNIFICANT CHANGE UP (ref 0–6)
EOSINOPHIL NFR BLD AUTO: 4.4 % — SIGNIFICANT CHANGE UP (ref 0–6)
EOSINOPHIL NFR BLD AUTO: 4.6 % — SIGNIFICANT CHANGE UP (ref 0–6)
EOSINOPHIL NFR BLD AUTO: 4.6 % — SIGNIFICANT CHANGE UP (ref 0–6)
EOSINOPHIL NFR BLD AUTO: 4.9 % — SIGNIFICANT CHANGE UP (ref 0–6)
EOSINOPHIL NFR BLD AUTO: 5.1 % — SIGNIFICANT CHANGE UP (ref 0–6)
EOSINOPHIL NFR BLD AUTO: 5.4 % — SIGNIFICANT CHANGE UP (ref 0–6)
EOSINOPHIL NFR BLD AUTO: 5.5 % — SIGNIFICANT CHANGE UP (ref 0–6)
EOSINOPHIL NFR BLD AUTO: 5.7 % — SIGNIFICANT CHANGE UP (ref 0–6)
EPI CELLS # UR: 3 /HPF — SIGNIFICANT CHANGE UP (ref 0–5)
ESTIMATED AVERAGE GLUCOSE: 128 — SIGNIFICANT CHANGE UP
FLUAV AG NPH QL: SIGNIFICANT CHANGE UP
FLUBV AG NPH QL: SIGNIFICANT CHANGE UP
FLUID INTAKE SUBSTANCE CLASS: SIGNIFICANT CHANGE UP
FOLATE+VIT B12 SERBLD-IMP: 0 % — SIGNIFICANT CHANGE UP
FOLATE+VIT B12 SERBLD-IMP: 0 % — SIGNIFICANT CHANGE UP
GAS PNL BLDA: SIGNIFICANT CHANGE UP
GAS PNL BLDV: 132 MMOL/L — LOW (ref 136–145)
GAS PNL BLDV: 133 MMOL/L — LOW (ref 136–145)
GAS PNL BLDV: 134 MMOL/L — LOW (ref 136–145)
GAS PNL BLDV: 146 MMOL/L — HIGH (ref 136–145)
GAS PNL BLDV: 150 MMOL/L — HIGH (ref 136–145)
GAS PNL BLDV: SIGNIFICANT CHANGE UP
GIANT PLATELETS BLD QL SMEAR: PRESENT — SIGNIFICANT CHANGE UP
GLUCOSE BLDC GLUCOMTR-MCNC: 102 MG/DL — HIGH (ref 70–99)
GLUCOSE BLDC GLUCOMTR-MCNC: 106 MG/DL — HIGH (ref 70–99)
GLUCOSE BLDC GLUCOMTR-MCNC: 112 MG/DL — HIGH (ref 70–99)
GLUCOSE BLDC GLUCOMTR-MCNC: 114 MG/DL — HIGH (ref 70–99)
GLUCOSE BLDC GLUCOMTR-MCNC: 119 MG/DL — HIGH (ref 70–99)
GLUCOSE BLDC GLUCOMTR-MCNC: 125 MG/DL — HIGH (ref 70–99)
GLUCOSE BLDC GLUCOMTR-MCNC: 125 MG/DL — HIGH (ref 70–99)
GLUCOSE BLDC GLUCOMTR-MCNC: 126 MG/DL — HIGH (ref 70–99)
GLUCOSE BLDC GLUCOMTR-MCNC: 127 MG/DL — HIGH (ref 70–99)
GLUCOSE BLDC GLUCOMTR-MCNC: 128 MG/DL — HIGH (ref 70–99)
GLUCOSE BLDC GLUCOMTR-MCNC: 129 MG/DL — HIGH (ref 70–99)
GLUCOSE BLDC GLUCOMTR-MCNC: 132 MG/DL — HIGH (ref 70–99)
GLUCOSE BLDC GLUCOMTR-MCNC: 136 MG/DL — HIGH (ref 70–99)
GLUCOSE BLDC GLUCOMTR-MCNC: 136 MG/DL — HIGH (ref 70–99)
GLUCOSE BLDC GLUCOMTR-MCNC: 137 MG/DL — HIGH (ref 70–99)
GLUCOSE BLDC GLUCOMTR-MCNC: 137 MG/DL — HIGH (ref 70–99)
GLUCOSE BLDC GLUCOMTR-MCNC: 138 MG/DL — HIGH (ref 70–99)
GLUCOSE BLDC GLUCOMTR-MCNC: 141 MG/DL — HIGH (ref 70–99)
GLUCOSE BLDC GLUCOMTR-MCNC: 144 MG/DL — HIGH (ref 70–99)
GLUCOSE BLDC GLUCOMTR-MCNC: 146 MG/DL — HIGH (ref 70–99)
GLUCOSE BLDC GLUCOMTR-MCNC: 147 MG/DL — HIGH (ref 70–99)
GLUCOSE BLDC GLUCOMTR-MCNC: 147 MG/DL — HIGH (ref 70–99)
GLUCOSE BLDC GLUCOMTR-MCNC: 148 MG/DL — HIGH (ref 70–99)
GLUCOSE BLDC GLUCOMTR-MCNC: 149 MG/DL — HIGH (ref 70–99)
GLUCOSE BLDC GLUCOMTR-MCNC: 152 MG/DL — HIGH (ref 70–99)
GLUCOSE BLDC GLUCOMTR-MCNC: 152 MG/DL — HIGH (ref 70–99)
GLUCOSE BLDC GLUCOMTR-MCNC: 154 MG/DL — HIGH (ref 70–99)
GLUCOSE BLDC GLUCOMTR-MCNC: 154 MG/DL — HIGH (ref 70–99)
GLUCOSE BLDC GLUCOMTR-MCNC: 155 MG/DL — HIGH (ref 70–99)
GLUCOSE BLDC GLUCOMTR-MCNC: 155 MG/DL — HIGH (ref 70–99)
GLUCOSE BLDC GLUCOMTR-MCNC: 156 MG/DL — HIGH (ref 70–99)
GLUCOSE BLDC GLUCOMTR-MCNC: 157 MG/DL — HIGH (ref 70–99)
GLUCOSE BLDC GLUCOMTR-MCNC: 158 MG/DL — HIGH (ref 70–99)
GLUCOSE BLDC GLUCOMTR-MCNC: 161 MG/DL — HIGH (ref 70–99)
GLUCOSE BLDC GLUCOMTR-MCNC: 161 MG/DL — HIGH (ref 70–99)
GLUCOSE BLDC GLUCOMTR-MCNC: 163 MG/DL — HIGH (ref 70–99)
GLUCOSE BLDC GLUCOMTR-MCNC: 163 MG/DL — HIGH (ref 70–99)
GLUCOSE BLDC GLUCOMTR-MCNC: 164 MG/DL — HIGH (ref 70–99)
GLUCOSE BLDC GLUCOMTR-MCNC: 166 MG/DL — HIGH (ref 70–99)
GLUCOSE BLDC GLUCOMTR-MCNC: 166 MG/DL — HIGH (ref 70–99)
GLUCOSE BLDC GLUCOMTR-MCNC: 167 MG/DL — HIGH (ref 70–99)
GLUCOSE BLDC GLUCOMTR-MCNC: 167 MG/DL — HIGH (ref 70–99)
GLUCOSE BLDC GLUCOMTR-MCNC: 168 MG/DL — HIGH (ref 70–99)
GLUCOSE BLDC GLUCOMTR-MCNC: 169 MG/DL — HIGH (ref 70–99)
GLUCOSE BLDC GLUCOMTR-MCNC: 170 MG/DL — HIGH (ref 70–99)
GLUCOSE BLDC GLUCOMTR-MCNC: 170 MG/DL — HIGH (ref 70–99)
GLUCOSE BLDC GLUCOMTR-MCNC: 171 MG/DL — HIGH (ref 70–99)
GLUCOSE BLDC GLUCOMTR-MCNC: 173 MG/DL — HIGH (ref 70–99)
GLUCOSE BLDC GLUCOMTR-MCNC: 174 MG/DL — HIGH (ref 70–99)
GLUCOSE BLDC GLUCOMTR-MCNC: 175 MG/DL — HIGH (ref 70–99)
GLUCOSE BLDC GLUCOMTR-MCNC: 176 MG/DL — HIGH (ref 70–99)
GLUCOSE BLDC GLUCOMTR-MCNC: 179 MG/DL — HIGH (ref 70–99)
GLUCOSE BLDC GLUCOMTR-MCNC: 179 MG/DL — HIGH (ref 70–99)
GLUCOSE BLDC GLUCOMTR-MCNC: 180 MG/DL — HIGH (ref 70–99)
GLUCOSE BLDC GLUCOMTR-MCNC: 181 MG/DL — HIGH (ref 70–99)
GLUCOSE BLDC GLUCOMTR-MCNC: 183 MG/DL — HIGH (ref 70–99)
GLUCOSE BLDC GLUCOMTR-MCNC: 186 MG/DL — HIGH (ref 70–99)
GLUCOSE BLDC GLUCOMTR-MCNC: 187 MG/DL — HIGH (ref 70–99)
GLUCOSE BLDC GLUCOMTR-MCNC: 188 MG/DL — HIGH (ref 70–99)
GLUCOSE BLDC GLUCOMTR-MCNC: 189 MG/DL — HIGH (ref 70–99)
GLUCOSE BLDC GLUCOMTR-MCNC: 190 MG/DL — HIGH (ref 70–99)
GLUCOSE BLDC GLUCOMTR-MCNC: 197 MG/DL — HIGH (ref 70–99)
GLUCOSE BLDC GLUCOMTR-MCNC: 198 MG/DL — HIGH (ref 70–99)
GLUCOSE BLDC GLUCOMTR-MCNC: 199 MG/DL — HIGH (ref 70–99)
GLUCOSE BLDC GLUCOMTR-MCNC: 200 MG/DL — HIGH (ref 70–99)
GLUCOSE BLDC GLUCOMTR-MCNC: 203 MG/DL — HIGH (ref 70–99)
GLUCOSE BLDC GLUCOMTR-MCNC: 203 MG/DL — HIGH (ref 70–99)
GLUCOSE BLDC GLUCOMTR-MCNC: 204 MG/DL — HIGH (ref 70–99)
GLUCOSE BLDC GLUCOMTR-MCNC: 204 MG/DL — HIGH (ref 70–99)
GLUCOSE BLDC GLUCOMTR-MCNC: 205 MG/DL — HIGH (ref 70–99)
GLUCOSE BLDC GLUCOMTR-MCNC: 205 MG/DL — HIGH (ref 70–99)
GLUCOSE BLDC GLUCOMTR-MCNC: 207 MG/DL — HIGH (ref 70–99)
GLUCOSE BLDC GLUCOMTR-MCNC: 207 MG/DL — HIGH (ref 70–99)
GLUCOSE BLDC GLUCOMTR-MCNC: 208 MG/DL — HIGH (ref 70–99)
GLUCOSE BLDC GLUCOMTR-MCNC: 209 MG/DL — HIGH (ref 70–99)
GLUCOSE BLDC GLUCOMTR-MCNC: 211 MG/DL — HIGH (ref 70–99)
GLUCOSE BLDC GLUCOMTR-MCNC: 211 MG/DL — HIGH (ref 70–99)
GLUCOSE BLDC GLUCOMTR-MCNC: 214 MG/DL — HIGH (ref 70–99)
GLUCOSE BLDC GLUCOMTR-MCNC: 215 MG/DL — HIGH (ref 70–99)
GLUCOSE BLDC GLUCOMTR-MCNC: 218 MG/DL — HIGH (ref 70–99)
GLUCOSE BLDC GLUCOMTR-MCNC: 218 MG/DL — HIGH (ref 70–99)
GLUCOSE BLDC GLUCOMTR-MCNC: 220 MG/DL — HIGH (ref 70–99)
GLUCOSE BLDC GLUCOMTR-MCNC: 220 MG/DL — HIGH (ref 70–99)
GLUCOSE BLDC GLUCOMTR-MCNC: 221 MG/DL — HIGH (ref 70–99)
GLUCOSE BLDC GLUCOMTR-MCNC: 221 MG/DL — HIGH (ref 70–99)
GLUCOSE BLDC GLUCOMTR-MCNC: 223 MG/DL — HIGH (ref 70–99)
GLUCOSE BLDC GLUCOMTR-MCNC: 224 MG/DL — HIGH (ref 70–99)
GLUCOSE BLDC GLUCOMTR-MCNC: 226 MG/DL — HIGH (ref 70–99)
GLUCOSE BLDC GLUCOMTR-MCNC: 229 MG/DL — HIGH (ref 70–99)
GLUCOSE BLDC GLUCOMTR-MCNC: 229 MG/DL — HIGH (ref 70–99)
GLUCOSE BLDC GLUCOMTR-MCNC: 231 MG/DL — HIGH (ref 70–99)
GLUCOSE BLDC GLUCOMTR-MCNC: 232 MG/DL — HIGH (ref 70–99)
GLUCOSE BLDC GLUCOMTR-MCNC: 240 MG/DL — HIGH (ref 70–99)
GLUCOSE BLDC GLUCOMTR-MCNC: 240 MG/DL — HIGH (ref 70–99)
GLUCOSE BLDC GLUCOMTR-MCNC: 242 MG/DL — HIGH (ref 70–99)
GLUCOSE BLDC GLUCOMTR-MCNC: 244 MG/DL — HIGH (ref 70–99)
GLUCOSE BLDC GLUCOMTR-MCNC: 250 MG/DL — HIGH (ref 70–99)
GLUCOSE BLDC GLUCOMTR-MCNC: 257 MG/DL — HIGH (ref 70–99)
GLUCOSE BLDC GLUCOMTR-MCNC: 258 MG/DL — HIGH (ref 70–99)
GLUCOSE BLDC GLUCOMTR-MCNC: 261 MG/DL — HIGH (ref 70–99)
GLUCOSE BLDC GLUCOMTR-MCNC: 263 MG/DL — HIGH (ref 70–99)
GLUCOSE BLDC GLUCOMTR-MCNC: 263 MG/DL — HIGH (ref 70–99)
GLUCOSE BLDC GLUCOMTR-MCNC: 265 MG/DL — HIGH (ref 70–99)
GLUCOSE BLDC GLUCOMTR-MCNC: 267 MG/DL — HIGH (ref 70–99)
GLUCOSE BLDC GLUCOMTR-MCNC: 269 MG/DL — HIGH (ref 70–99)
GLUCOSE BLDC GLUCOMTR-MCNC: 276 MG/DL — HIGH (ref 70–99)
GLUCOSE BLDC GLUCOMTR-MCNC: 280 MG/DL — HIGH (ref 70–99)
GLUCOSE BLDC GLUCOMTR-MCNC: 280 MG/DL — HIGH (ref 70–99)
GLUCOSE BLDC GLUCOMTR-MCNC: 287 MG/DL — HIGH (ref 70–99)
GLUCOSE BLDC GLUCOMTR-MCNC: 302 MG/DL — HIGH (ref 70–99)
GLUCOSE BLDC GLUCOMTR-MCNC: 315 MG/DL — HIGH (ref 70–99)
GLUCOSE BLDC GLUCOMTR-MCNC: 317 MG/DL — HIGH (ref 70–99)
GLUCOSE BLDC GLUCOMTR-MCNC: 323 MG/DL — HIGH (ref 70–99)
GLUCOSE BLDC GLUCOMTR-MCNC: 325 MG/DL — HIGH (ref 70–99)
GLUCOSE BLDC GLUCOMTR-MCNC: 331 MG/DL — HIGH (ref 70–99)
GLUCOSE BLDC GLUCOMTR-MCNC: 333 MG/DL — HIGH (ref 70–99)
GLUCOSE BLDC GLUCOMTR-MCNC: 345 MG/DL — HIGH (ref 70–99)
GLUCOSE BLDC GLUCOMTR-MCNC: 363 MG/DL — HIGH (ref 70–99)
GLUCOSE BLDC GLUCOMTR-MCNC: 89 MG/DL — SIGNIFICANT CHANGE UP (ref 70–99)
GLUCOSE BLDC GLUCOMTR-MCNC: 98 MG/DL — SIGNIFICANT CHANGE UP (ref 70–99)
GLUCOSE BLDV-MCNC: 151 MG/DL — HIGH (ref 70–99)
GLUCOSE BLDV-MCNC: 215 MG/DL — HIGH (ref 70–99)
GLUCOSE BLDV-MCNC: 228 MG/DL — HIGH (ref 70–99)
GLUCOSE BLDV-MCNC: 266 MG/DL — HIGH (ref 70–99)
GLUCOSE BLDV-MCNC: 90 MG/DL — SIGNIFICANT CHANGE UP (ref 70–99)
GLUCOSE FLD-MCNC: 152 MG/DL — SIGNIFICANT CHANGE UP
GLUCOSE FLD-MCNC: 216 MG/DL — SIGNIFICANT CHANGE UP
GLUCOSE SERPL-MCNC: 100 MG/DL — HIGH (ref 70–99)
GLUCOSE SERPL-MCNC: 110 MG/DL — HIGH (ref 70–99)
GLUCOSE SERPL-MCNC: 123 MG/DL — HIGH (ref 70–99)
GLUCOSE SERPL-MCNC: 124 MG/DL — HIGH (ref 70–99)
GLUCOSE SERPL-MCNC: 127 MG/DL — HIGH (ref 70–99)
GLUCOSE SERPL-MCNC: 136 MG/DL — HIGH (ref 70–99)
GLUCOSE SERPL-MCNC: 136 MG/DL — HIGH (ref 70–99)
GLUCOSE SERPL-MCNC: 138 MG/DL — HIGH (ref 70–99)
GLUCOSE SERPL-MCNC: 139 MG/DL — HIGH (ref 70–99)
GLUCOSE SERPL-MCNC: 151 MG/DL — HIGH (ref 70–99)
GLUCOSE SERPL-MCNC: 151 MG/DL — HIGH (ref 70–99)
GLUCOSE SERPL-MCNC: 161 MG/DL — HIGH (ref 70–99)
GLUCOSE SERPL-MCNC: 161 MG/DL — HIGH (ref 70–99)
GLUCOSE SERPL-MCNC: 163 MG/DL — HIGH (ref 70–99)
GLUCOSE SERPL-MCNC: 164 MG/DL — HIGH (ref 70–99)
GLUCOSE SERPL-MCNC: 166 MG/DL — HIGH (ref 70–99)
GLUCOSE SERPL-MCNC: 169 MG/DL — HIGH (ref 70–99)
GLUCOSE SERPL-MCNC: 170 MG/DL — HIGH (ref 70–99)
GLUCOSE SERPL-MCNC: 175 MG/DL — HIGH (ref 70–99)
GLUCOSE SERPL-MCNC: 179 MG/DL — HIGH (ref 70–99)
GLUCOSE SERPL-MCNC: 181 MG/DL — HIGH (ref 70–99)
GLUCOSE SERPL-MCNC: 184 MG/DL — HIGH (ref 70–99)
GLUCOSE SERPL-MCNC: 184 MG/DL — HIGH (ref 70–99)
GLUCOSE SERPL-MCNC: 186 MG/DL — HIGH (ref 70–99)
GLUCOSE SERPL-MCNC: 187 MG/DL — HIGH (ref 70–99)
GLUCOSE SERPL-MCNC: 188 MG/DL — HIGH (ref 70–99)
GLUCOSE SERPL-MCNC: 191 MG/DL — HIGH (ref 70–99)
GLUCOSE SERPL-MCNC: 193 MG/DL — HIGH (ref 70–99)
GLUCOSE SERPL-MCNC: 208 MG/DL — HIGH (ref 70–99)
GLUCOSE SERPL-MCNC: 211 MG/DL — HIGH (ref 70–99)
GLUCOSE SERPL-MCNC: 224 MG/DL — HIGH (ref 70–99)
GLUCOSE SERPL-MCNC: 224 MG/DL — HIGH (ref 70–99)
GLUCOSE SERPL-MCNC: 225 MG/DL — HIGH (ref 70–99)
GLUCOSE SERPL-MCNC: 231 MG/DL — HIGH (ref 70–99)
GLUCOSE SERPL-MCNC: 231 MG/DL — HIGH (ref 70–99)
GLUCOSE SERPL-MCNC: 234 MG/DL — HIGH (ref 70–99)
GLUCOSE SERPL-MCNC: 238 MG/DL — HIGH (ref 70–99)
GLUCOSE SERPL-MCNC: 242 MG/DL — HIGH (ref 70–99)
GLUCOSE SERPL-MCNC: 242 MG/DL — HIGH (ref 70–99)
GLUCOSE SERPL-MCNC: 247 MG/DL — HIGH (ref 70–99)
GLUCOSE SERPL-MCNC: 254 MG/DL — HIGH (ref 70–99)
GLUCOSE SERPL-MCNC: 267 MG/DL — HIGH (ref 70–99)
GLUCOSE SERPL-MCNC: 271 MG/DL — HIGH (ref 70–99)
GLUCOSE SERPL-MCNC: 291 MG/DL — HIGH (ref 70–99)
GLUCOSE SERPL-MCNC: 320 MG/DL — HIGH (ref 70–99)
GLUCOSE SERPL-MCNC: 368 MG/DL — HIGH (ref 70–99)
GLUCOSE SERPL-MCNC: 373 MG/DL — HIGH (ref 70–99)
GLUCOSE SERPL-MCNC: 64 MG/DL — LOW (ref 70–99)
GLUCOSE SERPL-MCNC: 86 MG/DL — SIGNIFICANT CHANGE UP (ref 70–99)
GLUCOSE UR QL: NEGATIVE — SIGNIFICANT CHANGE UP
GRAM STN FLD: SIGNIFICANT CHANGE UP
HAPTOGLOB SERPL-MCNC: 86 MG/DL — SIGNIFICANT CHANGE UP (ref 34–200)
HBV DNA # SERPL NAA+PROBE: SIGNIFICANT CHANGE UP
HBV DNA SERPL NAA+PROBE-LOG#: SIGNIFICANT CHANGE UP LOGIU/ML
HCO3 BLDA-SCNC: 18 MMOL/L — LOW (ref 21–28)
HCO3 BLDV-SCNC: 22 MMOL/L — SIGNIFICANT CHANGE UP (ref 22–29)
HCO3 BLDV-SCNC: 23 MMOL/L — SIGNIFICANT CHANGE UP (ref 22–29)
HCO3 BLDV-SCNC: 25 MMOL/L — SIGNIFICANT CHANGE UP (ref 22–29)
HCT VFR BLD CALC: 18.9 % — CRITICAL LOW (ref 39–50)
HCT VFR BLD CALC: 20.1 % — CRITICAL LOW (ref 39–50)
HCT VFR BLD CALC: 21.5 % — LOW (ref 39–50)
HCT VFR BLD CALC: 25.2 % — LOW (ref 39–50)
HCT VFR BLD CALC: 25.3 % — LOW (ref 39–50)
HCT VFR BLD CALC: 26.1 % — LOW (ref 39–50)
HCT VFR BLD CALC: 26.2 % — LOW (ref 39–50)
HCT VFR BLD CALC: 26.3 % — LOW (ref 39–50)
HCT VFR BLD CALC: 26.5 % — LOW (ref 39–50)
HCT VFR BLD CALC: 26.6 % — LOW (ref 39–50)
HCT VFR BLD CALC: 26.7 % — LOW (ref 39–50)
HCT VFR BLD CALC: 26.8 % — LOW (ref 39–50)
HCT VFR BLD CALC: 26.9 % — LOW (ref 39–50)
HCT VFR BLD CALC: 27.2 % — LOW (ref 39–50)
HCT VFR BLD CALC: 27.3 % — LOW (ref 39–50)
HCT VFR BLD CALC: 27.7 % — LOW (ref 39–50)
HCT VFR BLD CALC: 28 % — LOW (ref 39–50)
HCT VFR BLD CALC: 28.1 % — LOW (ref 39–50)
HCT VFR BLD CALC: 28.2 % — LOW (ref 39–50)
HCT VFR BLD CALC: 28.3 % — LOW (ref 39–50)
HCT VFR BLD CALC: 28.5 % — LOW (ref 39–50)
HCT VFR BLD CALC: 28.8 % — LOW (ref 39–50)
HCT VFR BLD CALC: 29.6 % — LOW (ref 39–50)
HCT VFR BLD CALC: 29.8 % — LOW (ref 39–50)
HCT VFR BLD CALC: 30.2 % — LOW (ref 39–50)
HCT VFR BLD CALC: 30.2 % — LOW (ref 39–50)
HCT VFR BLD CALC: 31 % — LOW (ref 39–50)
HCT VFR BLD CALC: 31.1 % — LOW (ref 39–50)
HCT VFR BLD CALC: 31.1 % — LOW (ref 39–50)
HCT VFR BLD CALC: 31.5 % — LOW (ref 39–50)
HCT VFR BLD CALC: 31.9 % — LOW (ref 39–50)
HCT VFR BLD CALC: 32.4 % — LOW (ref 39–50)
HCT VFR BLD CALC: 32.7 % — LOW (ref 39–50)
HCT VFR BLD CALC: 32.7 % — LOW (ref 39–50)
HCT VFR BLD CALC: 33.2 % — LOW (ref 39–50)
HCT VFR BLD CALC: 33.3 % — LOW (ref 39–50)
HCT VFR BLD CALC: 33.5 % — LOW (ref 39–50)
HCT VFR BLD CALC: 33.5 % — LOW (ref 39–50)
HCT VFR BLD CALC: 33.9 % — LOW (ref 39–50)
HCT VFR BLD CALC: 34.1 % — LOW (ref 39–50)
HCT VFR BLD CALC: 34.8 % — LOW (ref 39–50)
HCT VFR BLD CALC: 35.4 % — LOW (ref 39–50)
HCT VFR BLD CALC: 35.6 % — LOW (ref 39–50)
HCT VFR BLD CALC: 35.6 % — LOW (ref 39–50)
HCT VFR BLD CALC: 36.3 % — LOW (ref 39–50)
HCT VFR BLD CALC: 36.5 % — LOW (ref 39–50)
HCT VFR BLD CALC: 37.3 % — LOW (ref 39–50)
HCT VFR BLD CALC: 37.7 % — LOW (ref 39–50)
HCT VFR BLD CALC: 37.9 % — LOW (ref 39–50)
HCT VFR BLD CALC: 38.1 % — LOW (ref 39–50)
HCT VFR BLD CALC: 38.4 % — LOW (ref 39–50)
HCT VFR BLD CALC: 38.6 % — LOW (ref 39–50)
HCT VFR BLD CALC: 39.2 % — SIGNIFICANT CHANGE UP (ref 39–50)
HCT VFR BLD CALC: 39.9 % — SIGNIFICANT CHANGE UP (ref 39–50)
HCT VFR BLD CALC: 40.1 % — SIGNIFICANT CHANGE UP (ref 39–50)
HCT VFR BLD CALC: 43.2 % — SIGNIFICANT CHANGE UP (ref 39–50)
HCT VFR BLD CALC: 45 % — SIGNIFICANT CHANGE UP (ref 39–50)
HCT VFR BLDA CALC: 20 % — CRITICAL LOW (ref 39–51)
HCT VFR BLDA CALC: 22 % — LOW (ref 39–51)
HCT VFR BLDA CALC: 25 % — LOW (ref 39–51)
HCT VFR BLDA CALC: 36 % — LOW (ref 39–51)
HCT VFR BLDA CALC: 39 % — SIGNIFICANT CHANGE UP (ref 39–51)
HEPARIN-PF4 AB RESULT: <0.6 U/ML — SIGNIFICANT CHANGE UP (ref 0–0.9)
HGB BLD CALC-MCNC: 12 G/DL — LOW (ref 12.6–17.4)
HGB BLD CALC-MCNC: 13 G/DL — SIGNIFICANT CHANGE UP (ref 12.6–17.4)
HGB BLD CALC-MCNC: 6.8 G/DL — CRITICAL LOW (ref 12.6–17.4)
HGB BLD CALC-MCNC: 7.3 G/DL — LOW (ref 12.6–17.4)
HGB BLD CALC-MCNC: 8.3 G/DL — LOW (ref 12.6–17.4)
HGB BLD-MCNC: 10 G/DL — LOW (ref 13–17)
HGB BLD-MCNC: 10.1 G/DL — LOW (ref 13–17)
HGB BLD-MCNC: 10.2 G/DL — LOW (ref 13–17)
HGB BLD-MCNC: 10.3 G/DL — LOW (ref 13–17)
HGB BLD-MCNC: 10.4 G/DL — LOW (ref 13–17)
HGB BLD-MCNC: 10.6 G/DL — LOW (ref 13–17)
HGB BLD-MCNC: 10.8 G/DL — LOW (ref 13–17)
HGB BLD-MCNC: 11.1 G/DL — LOW (ref 13–17)
HGB BLD-MCNC: 11.2 G/DL — LOW (ref 13–17)
HGB BLD-MCNC: 11.4 G/DL — LOW (ref 13–17)
HGB BLD-MCNC: 11.4 G/DL — LOW (ref 13–17)
HGB BLD-MCNC: 11.6 G/DL — LOW (ref 13–17)
HGB BLD-MCNC: 11.7 G/DL — LOW (ref 13–17)
HGB BLD-MCNC: 11.8 G/DL — LOW (ref 13–17)
HGB BLD-MCNC: 11.8 G/DL — LOW (ref 13–17)
HGB BLD-MCNC: 11.9 G/DL — LOW (ref 13–17)
HGB BLD-MCNC: 11.9 G/DL — LOW (ref 13–17)
HGB BLD-MCNC: 12.5 G/DL — LOW (ref 13–17)
HGB BLD-MCNC: 12.8 G/DL — LOW (ref 13–17)
HGB BLD-MCNC: 13 G/DL — SIGNIFICANT CHANGE UP (ref 13–17)
HGB BLD-MCNC: 13.8 G/DL — SIGNIFICANT CHANGE UP (ref 13–17)
HGB BLD-MCNC: 5.6 G/DL — CRITICAL LOW (ref 13–17)
HGB BLD-MCNC: 6.7 G/DL — CRITICAL LOW (ref 13–17)
HGB BLD-MCNC: 6.9 G/DL — CRITICAL LOW (ref 13–17)
HGB BLD-MCNC: 7.9 G/DL — LOW (ref 13–17)
HGB BLD-MCNC: 7.9 G/DL — LOW (ref 13–17)
HGB BLD-MCNC: 8.2 G/DL — LOW (ref 13–17)
HGB BLD-MCNC: 8.3 G/DL — LOW (ref 13–17)
HGB BLD-MCNC: 8.4 G/DL — LOW (ref 13–17)
HGB BLD-MCNC: 8.5 G/DL — LOW (ref 13–17)
HGB BLD-MCNC: 8.6 G/DL — LOW (ref 13–17)
HGB BLD-MCNC: 8.6 G/DL — LOW (ref 13–17)
HGB BLD-MCNC: 8.9 G/DL — LOW (ref 13–17)
HGB BLD-MCNC: 9.1 G/DL — LOW (ref 13–17)
HGB BLD-MCNC: 9.1 G/DL — LOW (ref 13–17)
HGB BLD-MCNC: 9.2 G/DL — LOW (ref 13–17)
HGB BLD-MCNC: 9.4 G/DL — LOW (ref 13–17)
HGB BLD-MCNC: 9.5 G/DL — LOW (ref 13–17)
HGB BLD-MCNC: 9.5 G/DL — LOW (ref 13–17)
HGB BLD-MCNC: 9.6 G/DL — LOW (ref 13–17)
HGB BLD-MCNC: 9.7 G/DL — LOW (ref 13–17)
HGB BLD-MCNC: 9.9 G/DL — LOW (ref 13–17)
HOROWITZ INDEX BLDA+IHG-RTO: 21 — SIGNIFICANT CHANGE UP
HYALINE CASTS # UR AUTO: ABNORMAL (ref 0–7)
IANC: 11.32 K/UL — HIGH (ref 1.8–7.4)
IANC: 12.79 K/UL — HIGH (ref 1.8–7.4)
IANC: 2.35 K/UL — SIGNIFICANT CHANGE UP (ref 1.8–7.4)
IANC: 2.4 K/UL — SIGNIFICANT CHANGE UP (ref 1.8–7.4)
IANC: 2.49 K/UL — SIGNIFICANT CHANGE UP (ref 1.8–7.4)
IANC: 2.62 K/UL — SIGNIFICANT CHANGE UP (ref 1.8–7.4)
IANC: 3.16 K/UL — SIGNIFICANT CHANGE UP (ref 1.8–7.4)
IANC: 3.31 K/UL — SIGNIFICANT CHANGE UP (ref 1.8–7.4)
IANC: 3.31 K/UL — SIGNIFICANT CHANGE UP (ref 1.8–7.4)
IANC: 3.47 K/UL — SIGNIFICANT CHANGE UP (ref 1.8–7.4)
IANC: 3.53 K/UL — SIGNIFICANT CHANGE UP (ref 1.8–7.4)
IANC: 3.61 K/UL — SIGNIFICANT CHANGE UP (ref 1.8–7.4)
IANC: 3.78 K/UL — SIGNIFICANT CHANGE UP (ref 1.8–7.4)
IANC: 3.84 K/UL — SIGNIFICANT CHANGE UP (ref 1.8–7.4)
IANC: 4.14 K/UL — SIGNIFICANT CHANGE UP (ref 1.8–7.4)
IANC: 4.45 K/UL — SIGNIFICANT CHANGE UP (ref 1.8–7.4)
IANC: 4.5 K/UL — SIGNIFICANT CHANGE UP (ref 1.8–7.4)
IANC: 4.6 K/UL — SIGNIFICANT CHANGE UP (ref 1.8–7.4)
IANC: 4.78 K/UL — SIGNIFICANT CHANGE UP (ref 1.8–7.4)
IANC: 4.79 K/UL — SIGNIFICANT CHANGE UP (ref 1.8–7.4)
IANC: 4.92 K/UL — SIGNIFICANT CHANGE UP (ref 1.8–7.4)
IANC: 5.12 K/UL — SIGNIFICANT CHANGE UP (ref 1.8–7.4)
IANC: 5.67 K/UL — SIGNIFICANT CHANGE UP (ref 1.8–7.4)
IANC: 6.08 K/UL — SIGNIFICANT CHANGE UP (ref 1.8–7.4)
IANC: 6.72 K/UL — SIGNIFICANT CHANGE UP (ref 1.8–7.4)
IANC: 6.75 K/UL — SIGNIFICANT CHANGE UP (ref 1.8–7.4)
IANC: 6.75 K/UL — SIGNIFICANT CHANGE UP (ref 1.8–7.4)
IANC: 6.86 K/UL — SIGNIFICANT CHANGE UP (ref 1.8–7.4)
IANC: 7.28 K/UL — SIGNIFICANT CHANGE UP (ref 1.8–7.4)
IANC: 7.35 K/UL — SIGNIFICANT CHANGE UP (ref 1.8–7.4)
IANC: 7.73 K/UL — HIGH (ref 1.8–7.4)
IANC: 7.77 K/UL — HIGH (ref 1.8–7.4)
IANC: 7.84 K/UL — HIGH (ref 1.8–7.4)
IANC: 8.38 K/UL — HIGH (ref 1.8–7.4)
IANC: 8.48 K/UL — HIGH (ref 1.8–7.4)
IANC: 8.54 K/UL — HIGH (ref 1.8–7.4)
IANC: 8.9 K/UL — HIGH (ref 1.8–7.4)
IANC: 8.93 K/UL — HIGH (ref 1.8–7.4)
IANC: 9.71 K/UL — HIGH (ref 1.8–7.4)
IANC: 9.73 K/UL — HIGH (ref 1.8–7.4)
IANC: 9.76 K/UL — HIGH (ref 1.8–7.4)
IMM GRANULOCYTES NFR BLD AUTO: 0.2 % — SIGNIFICANT CHANGE UP (ref 0–0.9)
IMM GRANULOCYTES NFR BLD AUTO: 0.3 % — SIGNIFICANT CHANGE UP (ref 0–0.9)
IMM GRANULOCYTES NFR BLD AUTO: 0.4 % — SIGNIFICANT CHANGE UP (ref 0–0.9)
IMM GRANULOCYTES NFR BLD AUTO: 0.5 % — SIGNIFICANT CHANGE UP (ref 0–0.9)
IMM GRANULOCYTES NFR BLD AUTO: 0.6 % — SIGNIFICANT CHANGE UP (ref 0–0.9)
IMM GRANULOCYTES NFR BLD AUTO: 0.7 % — SIGNIFICANT CHANGE UP (ref 0–0.9)
IMM GRANULOCYTES NFR BLD AUTO: 0.8 % — SIGNIFICANT CHANGE UP (ref 0–0.9)
IMM GRANULOCYTES NFR BLD AUTO: 0.9 % — SIGNIFICANT CHANGE UP (ref 0–0.9)
IMM GRANULOCYTES NFR BLD AUTO: 0.9 % — SIGNIFICANT CHANGE UP (ref 0–0.9)
IMM GRANULOCYTES NFR BLD AUTO: 1.1 % — HIGH (ref 0–0.9)
INR BLD: 1.41 RATIO — HIGH (ref 0.88–1.16)
INR BLD: 1.49 RATIO — HIGH (ref 0.88–1.16)
INR BLD: 1.54 RATIO — HIGH (ref 0.88–1.16)
INR BLD: 1.55 RATIO — HIGH (ref 0.88–1.16)
INR BLD: 1.56 RATIO — HIGH (ref 0.88–1.16)
INR BLD: 1.57 RATIO — HIGH (ref 0.88–1.16)
INR BLD: 1.57 RATIO — HIGH (ref 0.88–1.16)
INR BLD: 1.59 RATIO — HIGH (ref 0.88–1.16)
INR BLD: 1.6 RATIO — HIGH (ref 0.88–1.16)
INR BLD: 1.61 RATIO — HIGH (ref 0.88–1.16)
INR BLD: 1.62 RATIO — HIGH (ref 0.88–1.16)
INR BLD: 1.64 RATIO — HIGH (ref 0.88–1.16)
INR BLD: 1.66 RATIO — HIGH (ref 0.88–1.16)
INR BLD: 1.72 RATIO — HIGH (ref 0.88–1.16)
INR BLD: 1.74 RATIO — HIGH (ref 0.88–1.16)
INR BLD: 1.75 RATIO — HIGH (ref 0.88–1.16)
INR BLD: 1.78 RATIO — HIGH (ref 0.88–1.16)
INR BLD: 1.82 RATIO — HIGH (ref 0.88–1.16)
INR BLD: 1.85 RATIO — HIGH (ref 0.88–1.16)
INR BLD: 1.88 RATIO — HIGH (ref 0.88–1.16)
INR BLD: 1.91 RATIO — HIGH (ref 0.88–1.16)
INR BLD: 2.01 RATIO — HIGH (ref 0.88–1.16)
INR BLD: 2.05 RATIO — HIGH (ref 0.88–1.16)
INR BLD: 2.05 RATIO — HIGH (ref 0.88–1.16)
INR BLD: 2.08 RATIO — HIGH (ref 0.88–1.16)
INR BLD: 2.16 RATIO — HIGH (ref 0.88–1.16)
INR BLD: 2.18 RATIO — HIGH (ref 0.88–1.16)
INR BLD: 2.19 RATIO — HIGH (ref 0.88–1.16)
INR BLD: 2.26 RATIO — HIGH (ref 0.88–1.16)
KETONES UR-MCNC: ABNORMAL
KETONES UR-MCNC: ABNORMAL
KETONES UR-MCNC: NEGATIVE — SIGNIFICANT CHANGE UP
KETONES UR-MCNC: NEGATIVE — SIGNIFICANT CHANGE UP
LACTATE BLDV-MCNC: 1.7 MMOL/L — SIGNIFICANT CHANGE UP (ref 0.5–2)
LACTATE BLDV-MCNC: 1.8 MMOL/L — SIGNIFICANT CHANGE UP (ref 0.5–2)
LACTATE BLDV-MCNC: 2.3 MMOL/L — HIGH (ref 0.5–2)
LACTATE BLDV-MCNC: 2.8 MMOL/L — HIGH (ref 0.5–2)
LACTATE BLDV-MCNC: 4.1 MMOL/L — CRITICAL HIGH (ref 0.5–2)
LACTATE SERPL-SCNC: 2.8 MMOL/L — HIGH (ref 0.5–2)
LACTATE SERPL-SCNC: 4.7 MMOL/L — CRITICAL HIGH (ref 0.5–2)
LACTATE SERPL-SCNC: 7 MMOL/L — CRITICAL HIGH (ref 0.5–2)
LDH SERPL L TO P-CCNC: 243 U/L — HIGH (ref 135–225)
LDH SERPL L TO P-CCNC: 60 U/L — SIGNIFICANT CHANGE UP
LDH SERPL L TO P-CCNC: 71 U/L — SIGNIFICANT CHANGE UP
LEUKOCYTE ESTERASE UR-ACNC: ABNORMAL
LEUKOCYTE ESTERASE UR-ACNC: NEGATIVE — SIGNIFICANT CHANGE UP
LIDOCAIN IGE QN: 50 U/L — SIGNIFICANT CHANGE UP (ref 7–60)
LYMPHOCYTES # BLD AUTO: 0.13 K/UL — LOW (ref 1–3.3)
LYMPHOCYTES # BLD AUTO: 0.15 K/UL — LOW (ref 1–3.3)
LYMPHOCYTES # BLD AUTO: 0.26 K/UL — LOW (ref 1–3.3)
LYMPHOCYTES # BLD AUTO: 0.35 K/UL — LOW (ref 1–3.3)
LYMPHOCYTES # BLD AUTO: 0.37 K/UL — LOW (ref 1–3.3)
LYMPHOCYTES # BLD AUTO: 0.37 K/UL — LOW (ref 1–3.3)
LYMPHOCYTES # BLD AUTO: 0.38 K/UL — LOW (ref 1–3.3)
LYMPHOCYTES # BLD AUTO: 0.39 K/UL — LOW (ref 1–3.3)
LYMPHOCYTES # BLD AUTO: 0.4 K/UL — LOW (ref 1–3.3)
LYMPHOCYTES # BLD AUTO: 0.44 K/UL — LOW (ref 1–3.3)
LYMPHOCYTES # BLD AUTO: 0.44 K/UL — LOW (ref 1–3.3)
LYMPHOCYTES # BLD AUTO: 0.47 K/UL — LOW (ref 1–3.3)
LYMPHOCYTES # BLD AUTO: 0.47 K/UL — LOW (ref 1–3.3)
LYMPHOCYTES # BLD AUTO: 0.5 K/UL — LOW (ref 1–3.3)
LYMPHOCYTES # BLD AUTO: 0.51 K/UL — LOW (ref 1–3.3)
LYMPHOCYTES # BLD AUTO: 0.55 K/UL — LOW (ref 1–3.3)
LYMPHOCYTES # BLD AUTO: 0.59 K/UL — LOW (ref 1–3.3)
LYMPHOCYTES # BLD AUTO: 0.61 K/UL — LOW (ref 1–3.3)
LYMPHOCYTES # BLD AUTO: 0.61 K/UL — LOW (ref 1–3.3)
LYMPHOCYTES # BLD AUTO: 0.63 K/UL — LOW (ref 1–3.3)
LYMPHOCYTES # BLD AUTO: 0.63 K/UL — LOW (ref 1–3.3)
LYMPHOCYTES # BLD AUTO: 0.64 K/UL — LOW (ref 1–3.3)
LYMPHOCYTES # BLD AUTO: 0.68 K/UL — LOW (ref 1–3.3)
LYMPHOCYTES # BLD AUTO: 0.76 K/UL — LOW (ref 1–3.3)
LYMPHOCYTES # BLD AUTO: 0.78 K/UL — LOW (ref 1–3.3)
LYMPHOCYTES # BLD AUTO: 0.83 K/UL — LOW (ref 1–3.3)
LYMPHOCYTES # BLD AUTO: 0.83 K/UL — LOW (ref 1–3.3)
LYMPHOCYTES # BLD AUTO: 0.87 K/UL — LOW (ref 1–3.3)
LYMPHOCYTES # BLD AUTO: 0.9 % — LOW (ref 13–44)
LYMPHOCYTES # BLD AUTO: 0.93 K/UL — LOW (ref 1–3.3)
LYMPHOCYTES # BLD AUTO: 0.99 K/UL — LOW (ref 1–3.3)
LYMPHOCYTES # BLD AUTO: 1 K/UL — SIGNIFICANT CHANGE UP (ref 1–3.3)
LYMPHOCYTES # BLD AUTO: 1.05 K/UL — SIGNIFICANT CHANGE UP (ref 1–3.3)
LYMPHOCYTES # BLD AUTO: 1.35 K/UL — SIGNIFICANT CHANGE UP (ref 1–3.3)
LYMPHOCYTES # BLD AUTO: 1.45 K/UL — SIGNIFICANT CHANGE UP (ref 1–3.3)
LYMPHOCYTES # BLD AUTO: 1.63 K/UL — SIGNIFICANT CHANGE UP (ref 1–3.3)
LYMPHOCYTES # BLD AUTO: 1.75 K/UL — SIGNIFICANT CHANGE UP (ref 1–3.3)
LYMPHOCYTES # BLD AUTO: 1.79 K/UL — SIGNIFICANT CHANGE UP (ref 1–3.3)
LYMPHOCYTES # BLD AUTO: 1.8 % — LOW (ref 13–44)
LYMPHOCYTES # BLD AUTO: 10 % — LOW (ref 13–44)
LYMPHOCYTES # BLD AUTO: 10.6 % — LOW (ref 13–44)
LYMPHOCYTES # BLD AUTO: 10.6 % — LOW (ref 13–44)
LYMPHOCYTES # BLD AUTO: 10.9 % — LOW (ref 13–44)
LYMPHOCYTES # BLD AUTO: 11.4 % — LOW (ref 13–44)
LYMPHOCYTES # BLD AUTO: 11.6 % — LOW (ref 13–44)
LYMPHOCYTES # BLD AUTO: 11.8 % — LOW (ref 13–44)
LYMPHOCYTES # BLD AUTO: 12.3 % — LOW (ref 13–44)
LYMPHOCYTES # BLD AUTO: 12.8 % — LOW (ref 13–44)
LYMPHOCYTES # BLD AUTO: 13.9 % — SIGNIFICANT CHANGE UP (ref 13–44)
LYMPHOCYTES # BLD AUTO: 16.9 % — SIGNIFICANT CHANGE UP (ref 13–44)
LYMPHOCYTES # BLD AUTO: 17.5 % — SIGNIFICANT CHANGE UP (ref 13–44)
LYMPHOCYTES # BLD AUTO: 24 % — SIGNIFICANT CHANGE UP (ref 13–44)
LYMPHOCYTES # BLD AUTO: 3.8 % — LOW (ref 13–44)
LYMPHOCYTES # BLD AUTO: 4.7 % — LOW (ref 13–44)
LYMPHOCYTES # BLD AUTO: 5 % — LOW (ref 13–44)
LYMPHOCYTES # BLD AUTO: 5 % — LOW (ref 13–44)
LYMPHOCYTES # BLD AUTO: 5.6 % — LOW (ref 13–44)
LYMPHOCYTES # BLD AUTO: 5.8 % — LOW (ref 13–44)
LYMPHOCYTES # BLD AUTO: 6.3 % — LOW (ref 13–44)
LYMPHOCYTES # BLD AUTO: 6.5 % — LOW (ref 13–44)
LYMPHOCYTES # BLD AUTO: 6.6 % — LOW (ref 13–44)
LYMPHOCYTES # BLD AUTO: 6.6 % — LOW (ref 13–44)
LYMPHOCYTES # BLD AUTO: 7.2 % — LOW (ref 13–44)
LYMPHOCYTES # BLD AUTO: 7.3 % — LOW (ref 13–44)
LYMPHOCYTES # BLD AUTO: 7.4 % — LOW (ref 13–44)
LYMPHOCYTES # BLD AUTO: 7.5 % — LOW (ref 13–44)
LYMPHOCYTES # BLD AUTO: 7.6 % — LOW (ref 13–44)
LYMPHOCYTES # BLD AUTO: 7.7 % — LOW (ref 13–44)
LYMPHOCYTES # BLD AUTO: 7.8 % — LOW (ref 13–44)
LYMPHOCYTES # BLD AUTO: 7.8 % — LOW (ref 13–44)
LYMPHOCYTES # BLD AUTO: 8.5 % — LOW (ref 13–44)
LYMPHOCYTES # BLD AUTO: 9 % — LOW (ref 13–44)
LYMPHOCYTES # BLD AUTO: 9.1 % — LOW (ref 13–44)
LYMPHOCYTES # BLD AUTO: 9.4 % — LOW (ref 13–44)
LYMPHOCYTES # FLD: 18 % — SIGNIFICANT CHANGE UP
LYMPHOCYTES # FLD: 57 % — SIGNIFICANT CHANGE UP
LYMPHOCYTES # FLD: 60 % — SIGNIFICANT CHANGE UP
MACROCYTES BLD QL: SIGNIFICANT CHANGE UP
MACROCYTES BLD QL: SLIGHT — SIGNIFICANT CHANGE UP
MAGNESIUM SERPL-MCNC: 1.8 MG/DL — SIGNIFICANT CHANGE UP (ref 1.6–2.6)
MAGNESIUM SERPL-MCNC: 1.8 MG/DL — SIGNIFICANT CHANGE UP (ref 1.6–2.6)
MAGNESIUM SERPL-MCNC: 2 MG/DL — SIGNIFICANT CHANGE UP (ref 1.6–2.6)
MAGNESIUM SERPL-MCNC: 2 MG/DL — SIGNIFICANT CHANGE UP (ref 1.6–2.6)
MAGNESIUM SERPL-MCNC: 2.1 MG/DL — SIGNIFICANT CHANGE UP (ref 1.6–2.6)
MAGNESIUM SERPL-MCNC: 2.1 MG/DL — SIGNIFICANT CHANGE UP (ref 1.6–2.6)
MAGNESIUM SERPL-MCNC: 2.2 MG/DL — SIGNIFICANT CHANGE UP (ref 1.6–2.6)
MAGNESIUM SERPL-MCNC: 2.3 MG/DL — SIGNIFICANT CHANGE UP (ref 1.6–2.6)
MAGNESIUM SERPL-MCNC: 2.4 MG/DL — SIGNIFICANT CHANGE UP (ref 1.6–2.6)
MAGNESIUM SERPL-MCNC: 2.5 MG/DL — SIGNIFICANT CHANGE UP (ref 1.6–2.6)
MAGNESIUM SERPL-MCNC: 2.6 MG/DL — SIGNIFICANT CHANGE UP (ref 1.6–2.6)
MAGNESIUM SERPL-MCNC: 2.7 MG/DL — HIGH (ref 1.6–2.6)
MAGNESIUM SERPL-MCNC: 2.8 MG/DL — HIGH (ref 1.6–2.6)
MAGNESIUM SERPL-MCNC: 2.8 MG/DL — HIGH (ref 1.6–2.6)
MAGNESIUM SERPL-MCNC: 2.9 MG/DL — HIGH (ref 1.6–2.6)
MAGNESIUM SERPL-MCNC: 3.1 MG/DL — HIGH (ref 1.6–2.6)
MAGNESIUM SERPL-MCNC: 3.2 MG/DL — HIGH (ref 1.6–2.6)
MAGNESIUM SERPL-MCNC: 3.3 MG/DL — HIGH (ref 1.6–2.6)
MAGNESIUM SERPL-MCNC: 3.3 MG/DL — HIGH (ref 1.6–2.6)
MAGNESIUM SERPL-MCNC: 3.4 MG/DL — HIGH (ref 1.6–2.6)
MANUAL SMEAR VERIFICATION: SIGNIFICANT CHANGE UP
MANUAL SMEAR VERIFICATION: SIGNIFICANT CHANGE UP
MCHC RBC-ENTMCNC: 28.2 PG — SIGNIFICANT CHANGE UP (ref 27–34)
MCHC RBC-ENTMCNC: 28.3 PG — SIGNIFICANT CHANGE UP (ref 27–34)
MCHC RBC-ENTMCNC: 28.4 PG — SIGNIFICANT CHANGE UP (ref 27–34)
MCHC RBC-ENTMCNC: 28.5 PG — SIGNIFICANT CHANGE UP (ref 27–34)
MCHC RBC-ENTMCNC: 28.5 PG — SIGNIFICANT CHANGE UP (ref 27–34)
MCHC RBC-ENTMCNC: 28.6 GM/DL — LOW (ref 32–36)
MCHC RBC-ENTMCNC: 28.6 PG — SIGNIFICANT CHANGE UP (ref 27–34)
MCHC RBC-ENTMCNC: 28.7 PG — SIGNIFICANT CHANGE UP (ref 27–34)
MCHC RBC-ENTMCNC: 28.7 PG — SIGNIFICANT CHANGE UP (ref 27–34)
MCHC RBC-ENTMCNC: 28.8 PG — SIGNIFICANT CHANGE UP (ref 27–34)
MCHC RBC-ENTMCNC: 28.9 PG — SIGNIFICANT CHANGE UP (ref 27–34)
MCHC RBC-ENTMCNC: 28.9 PG — SIGNIFICANT CHANGE UP (ref 27–34)
MCHC RBC-ENTMCNC: 29 PG — SIGNIFICANT CHANGE UP (ref 27–34)
MCHC RBC-ENTMCNC: 29.1 GM/DL — LOW (ref 32–36)
MCHC RBC-ENTMCNC: 29.1 PG — SIGNIFICANT CHANGE UP (ref 27–34)
MCHC RBC-ENTMCNC: 29.1 PG — SIGNIFICANT CHANGE UP (ref 27–34)
MCHC RBC-ENTMCNC: 29.2 PG — SIGNIFICANT CHANGE UP (ref 27–34)
MCHC RBC-ENTMCNC: 29.2 PG — SIGNIFICANT CHANGE UP (ref 27–34)
MCHC RBC-ENTMCNC: 29.3 PG — SIGNIFICANT CHANGE UP (ref 27–34)
MCHC RBC-ENTMCNC: 29.4 GM/DL — LOW (ref 32–36)
MCHC RBC-ENTMCNC: 29.4 PG — SIGNIFICANT CHANGE UP (ref 27–34)
MCHC RBC-ENTMCNC: 29.5 PG — SIGNIFICANT CHANGE UP (ref 27–34)
MCHC RBC-ENTMCNC: 29.6 GM/DL — LOW (ref 32–36)
MCHC RBC-ENTMCNC: 29.6 GM/DL — LOW (ref 32–36)
MCHC RBC-ENTMCNC: 29.6 PG — SIGNIFICANT CHANGE UP (ref 27–34)
MCHC RBC-ENTMCNC: 29.6 PG — SIGNIFICANT CHANGE UP (ref 27–34)
MCHC RBC-ENTMCNC: 29.7 GM/DL — LOW (ref 32–36)
MCHC RBC-ENTMCNC: 29.7 PG — SIGNIFICANT CHANGE UP (ref 27–34)
MCHC RBC-ENTMCNC: 29.8 GM/DL — LOW (ref 32–36)
MCHC RBC-ENTMCNC: 29.8 PG — SIGNIFICANT CHANGE UP (ref 27–34)
MCHC RBC-ENTMCNC: 29.8 PG — SIGNIFICANT CHANGE UP (ref 27–34)
MCHC RBC-ENTMCNC: 29.9 GM/DL — LOW (ref 32–36)
MCHC RBC-ENTMCNC: 29.9 PG — SIGNIFICANT CHANGE UP (ref 27–34)
MCHC RBC-ENTMCNC: 29.9 PG — SIGNIFICANT CHANGE UP (ref 27–34)
MCHC RBC-ENTMCNC: 30 GM/DL — LOW (ref 32–36)
MCHC RBC-ENTMCNC: 30 GM/DL — LOW (ref 32–36)
MCHC RBC-ENTMCNC: 30 PG — SIGNIFICANT CHANGE UP (ref 27–34)
MCHC RBC-ENTMCNC: 30.1 GM/DL — LOW (ref 32–36)
MCHC RBC-ENTMCNC: 30.1 PG — SIGNIFICANT CHANGE UP (ref 27–34)
MCHC RBC-ENTMCNC: 30.2 GM/DL — LOW (ref 32–36)
MCHC RBC-ENTMCNC: 30.2 PG — SIGNIFICANT CHANGE UP (ref 27–34)
MCHC RBC-ENTMCNC: 30.2 PG — SIGNIFICANT CHANGE UP (ref 27–34)
MCHC RBC-ENTMCNC: 30.3 GM/DL — LOW (ref 32–36)
MCHC RBC-ENTMCNC: 30.3 PG — SIGNIFICANT CHANGE UP (ref 27–34)
MCHC RBC-ENTMCNC: 30.4 PG — SIGNIFICANT CHANGE UP (ref 27–34)
MCHC RBC-ENTMCNC: 30.5 GM/DL — LOW (ref 32–36)
MCHC RBC-ENTMCNC: 30.6 GM/DL — LOW (ref 32–36)
MCHC RBC-ENTMCNC: 30.6 GM/DL — LOW (ref 32–36)
MCHC RBC-ENTMCNC: 30.7 GM/DL — LOW (ref 32–36)
MCHC RBC-ENTMCNC: 30.8 GM/DL — LOW (ref 32–36)
MCHC RBC-ENTMCNC: 30.9 GM/DL — LOW (ref 32–36)
MCHC RBC-ENTMCNC: 30.9 GM/DL — LOW (ref 32–36)
MCHC RBC-ENTMCNC: 31 GM/DL — LOW (ref 32–36)
MCHC RBC-ENTMCNC: 31.2 GM/DL — LOW (ref 32–36)
MCHC RBC-ENTMCNC: 31.2 GM/DL — LOW (ref 32–36)
MCHC RBC-ENTMCNC: 31.4 GM/DL — LOW (ref 32–36)
MCHC RBC-ENTMCNC: 31.5 GM/DL — LOW (ref 32–36)
MCHC RBC-ENTMCNC: 31.5 GM/DL — LOW (ref 32–36)
MCHC RBC-ENTMCNC: 31.7 GM/DL — LOW (ref 32–36)
MCHC RBC-ENTMCNC: 31.8 GM/DL — LOW (ref 32–36)
MCHC RBC-ENTMCNC: 32 GM/DL — SIGNIFICANT CHANGE UP (ref 32–36)
MCHC RBC-ENTMCNC: 32.1 GM/DL — SIGNIFICANT CHANGE UP (ref 32–36)
MCHC RBC-ENTMCNC: 32.1 GM/DL — SIGNIFICANT CHANGE UP (ref 32–36)
MCHC RBC-ENTMCNC: 32.2 GM/DL — SIGNIFICANT CHANGE UP (ref 32–36)
MCHC RBC-ENTMCNC: 32.3 GM/DL — SIGNIFICANT CHANGE UP (ref 32–36)
MCHC RBC-ENTMCNC: 32.6 GM/DL — SIGNIFICANT CHANGE UP (ref 32–36)
MCHC RBC-ENTMCNC: 32.9 GM/DL — SIGNIFICANT CHANGE UP (ref 32–36)
MCHC RBC-ENTMCNC: 33.2 GM/DL — SIGNIFICANT CHANGE UP (ref 32–36)
MCHC RBC-ENTMCNC: 33.3 GM/DL — SIGNIFICANT CHANGE UP (ref 32–36)
MCHC RBC-ENTMCNC: 33.6 GM/DL — SIGNIFICANT CHANGE UP (ref 32–36)
MCHC RBC-ENTMCNC: 33.9 GM/DL — SIGNIFICANT CHANGE UP (ref 32–36)
MCHC RBC-ENTMCNC: 34 GM/DL — SIGNIFICANT CHANGE UP (ref 32–36)
MCHC RBC-ENTMCNC: 34.1 GM/DL — SIGNIFICANT CHANGE UP (ref 32–36)
MCHC RBC-ENTMCNC: 34.2 GM/DL — SIGNIFICANT CHANGE UP (ref 32–36)
MCHC RBC-ENTMCNC: 34.2 GM/DL — SIGNIFICANT CHANGE UP (ref 32–36)
MCHC RBC-ENTMCNC: 34.3 GM/DL — SIGNIFICANT CHANGE UP (ref 32–36)
MCHC RBC-ENTMCNC: 34.5 GM/DL — SIGNIFICANT CHANGE UP (ref 32–36)
MCHC RBC-ENTMCNC: 34.7 GM/DL — SIGNIFICANT CHANGE UP (ref 32–36)
MCHC RBC-ENTMCNC: 34.8 GM/DL — SIGNIFICANT CHANGE UP (ref 32–36)
MCHC RBC-ENTMCNC: 34.9 GM/DL — SIGNIFICANT CHANGE UP (ref 32–36)
MCHC RBC-ENTMCNC: 35 GM/DL — SIGNIFICANT CHANGE UP (ref 32–36)
MCHC RBC-ENTMCNC: 35.1 GM/DL — SIGNIFICANT CHANGE UP (ref 32–36)
MCHC RBC-ENTMCNC: 35.2 GM/DL — SIGNIFICANT CHANGE UP (ref 32–36)
MCV RBC AUTO: 100.3 FL — HIGH (ref 80–100)
MCV RBC AUTO: 100.4 FL — HIGH (ref 80–100)
MCV RBC AUTO: 100.7 FL — HIGH (ref 80–100)
MCV RBC AUTO: 100.7 FL — HIGH (ref 80–100)
MCV RBC AUTO: 100.8 FL — HIGH (ref 80–100)
MCV RBC AUTO: 101.3 FL — HIGH (ref 80–100)
MCV RBC AUTO: 101.6 FL — HIGH (ref 80–100)
MCV RBC AUTO: 101.6 FL — HIGH (ref 80–100)
MCV RBC AUTO: 105.6 FL — HIGH (ref 80–100)
MCV RBC AUTO: 80 FL — SIGNIFICANT CHANGE UP (ref 80–100)
MCV RBC AUTO: 82.5 FL — SIGNIFICANT CHANGE UP (ref 80–100)
MCV RBC AUTO: 83.1 FL — SIGNIFICANT CHANGE UP (ref 80–100)
MCV RBC AUTO: 83.2 FL — SIGNIFICANT CHANGE UP (ref 80–100)
MCV RBC AUTO: 83.4 FL — SIGNIFICANT CHANGE UP (ref 80–100)
MCV RBC AUTO: 84.4 FL — SIGNIFICANT CHANGE UP (ref 80–100)
MCV RBC AUTO: 84.4 FL — SIGNIFICANT CHANGE UP (ref 80–100)
MCV RBC AUTO: 84.5 FL — SIGNIFICANT CHANGE UP (ref 80–100)
MCV RBC AUTO: 84.7 FL — SIGNIFICANT CHANGE UP (ref 80–100)
MCV RBC AUTO: 84.7 FL — SIGNIFICANT CHANGE UP (ref 80–100)
MCV RBC AUTO: 85 FL — SIGNIFICANT CHANGE UP (ref 80–100)
MCV RBC AUTO: 85.1 FL — SIGNIFICANT CHANGE UP (ref 80–100)
MCV RBC AUTO: 85.2 FL — SIGNIFICANT CHANGE UP (ref 80–100)
MCV RBC AUTO: 86.5 FL — SIGNIFICANT CHANGE UP (ref 80–100)
MCV RBC AUTO: 87.5 FL — SIGNIFICANT CHANGE UP (ref 80–100)
MCV RBC AUTO: 87.5 FL — SIGNIFICANT CHANGE UP (ref 80–100)
MCV RBC AUTO: 87.9 FL — SIGNIFICANT CHANGE UP (ref 80–100)
MCV RBC AUTO: 88.7 FL — SIGNIFICANT CHANGE UP (ref 80–100)
MCV RBC AUTO: 88.8 FL — SIGNIFICANT CHANGE UP (ref 80–100)
MCV RBC AUTO: 89.4 FL — SIGNIFICANT CHANGE UP (ref 80–100)
MCV RBC AUTO: 89.9 FL — SIGNIFICANT CHANGE UP (ref 80–100)
MCV RBC AUTO: 90 FL — SIGNIFICANT CHANGE UP (ref 80–100)
MCV RBC AUTO: 91.1 FL — SIGNIFICANT CHANGE UP (ref 80–100)
MCV RBC AUTO: 91.7 FL — SIGNIFICANT CHANGE UP (ref 80–100)
MCV RBC AUTO: 92 FL — SIGNIFICANT CHANGE UP (ref 80–100)
MCV RBC AUTO: 92.3 FL — SIGNIFICANT CHANGE UP (ref 80–100)
MCV RBC AUTO: 92.4 FL — SIGNIFICANT CHANGE UP (ref 80–100)
MCV RBC AUTO: 93.1 FL — SIGNIFICANT CHANGE UP (ref 80–100)
MCV RBC AUTO: 93.4 FL — SIGNIFICANT CHANGE UP (ref 80–100)
MCV RBC AUTO: 94 FL — SIGNIFICANT CHANGE UP (ref 80–100)
MCV RBC AUTO: 94.1 FL — SIGNIFICANT CHANGE UP (ref 80–100)
MCV RBC AUTO: 94.7 FL — SIGNIFICANT CHANGE UP (ref 80–100)
MCV RBC AUTO: 94.8 FL — SIGNIFICANT CHANGE UP (ref 80–100)
MCV RBC AUTO: 95.1 FL — SIGNIFICANT CHANGE UP (ref 80–100)
MCV RBC AUTO: 95.2 FL — SIGNIFICANT CHANGE UP (ref 80–100)
MCV RBC AUTO: 95.2 FL — SIGNIFICANT CHANGE UP (ref 80–100)
MCV RBC AUTO: 95.4 FL — SIGNIFICANT CHANGE UP (ref 80–100)
MCV RBC AUTO: 95.5 FL — SIGNIFICANT CHANGE UP (ref 80–100)
MCV RBC AUTO: 95.8 FL — SIGNIFICANT CHANGE UP (ref 80–100)
MCV RBC AUTO: 96.3 FL — SIGNIFICANT CHANGE UP (ref 80–100)
MCV RBC AUTO: 96.7 FL — SIGNIFICANT CHANGE UP (ref 80–100)
MCV RBC AUTO: 97 FL — SIGNIFICANT CHANGE UP (ref 80–100)
MCV RBC AUTO: 98.5 FL — SIGNIFICANT CHANGE UP (ref 80–100)
MCV RBC AUTO: 98.6 FL — SIGNIFICANT CHANGE UP (ref 80–100)
MCV RBC AUTO: 99 FL — SIGNIFICANT CHANGE UP (ref 80–100)
MCV RBC AUTO: 99.2 FL — SIGNIFICANT CHANGE UP (ref 80–100)
MCV RBC AUTO: 99.3 FL — SIGNIFICANT CHANGE UP (ref 80–100)
MCV RBC AUTO: 99.7 FL — SIGNIFICANT CHANGE UP (ref 80–100)
MELD SCORE WITH DIALYSIS: 24 POINTS — SIGNIFICANT CHANGE UP
MELD SCORE WITHOUT DIALYSIS: 15 POINTS — SIGNIFICANT CHANGE UP
MESOTHL CELL # FLD: 1 % — SIGNIFICANT CHANGE UP
MESOTHL CELL # FLD: 3 % — SIGNIFICANT CHANGE UP
MESOTHL CELL # FLD: 6 % — SIGNIFICANT CHANGE UP
METHOD TYPE: SIGNIFICANT CHANGE UP
MONOCYTES # BLD AUTO: 0.09 K/UL — SIGNIFICANT CHANGE UP (ref 0–0.9)
MONOCYTES # BLD AUTO: 0.36 K/UL — SIGNIFICANT CHANGE UP (ref 0–0.9)
MONOCYTES # BLD AUTO: 0.42 K/UL — SIGNIFICANT CHANGE UP (ref 0–0.9)
MONOCYTES # BLD AUTO: 0.43 K/UL — SIGNIFICANT CHANGE UP (ref 0–0.9)
MONOCYTES # BLD AUTO: 0.44 K/UL — SIGNIFICANT CHANGE UP (ref 0–0.9)
MONOCYTES # BLD AUTO: 0.47 K/UL — SIGNIFICANT CHANGE UP (ref 0–0.9)
MONOCYTES # BLD AUTO: 0.47 K/UL — SIGNIFICANT CHANGE UP (ref 0–0.9)
MONOCYTES # BLD AUTO: 0.51 K/UL — SIGNIFICANT CHANGE UP (ref 0–0.9)
MONOCYTES # BLD AUTO: 0.54 K/UL — SIGNIFICANT CHANGE UP (ref 0–0.9)
MONOCYTES # BLD AUTO: 0.55 K/UL — SIGNIFICANT CHANGE UP (ref 0–0.9)
MONOCYTES # BLD AUTO: 0.57 K/UL — SIGNIFICANT CHANGE UP (ref 0–0.9)
MONOCYTES # BLD AUTO: 0.57 K/UL — SIGNIFICANT CHANGE UP (ref 0–0.9)
MONOCYTES # BLD AUTO: 0.6 K/UL — SIGNIFICANT CHANGE UP (ref 0–0.9)
MONOCYTES # BLD AUTO: 0.62 K/UL — SIGNIFICANT CHANGE UP (ref 0–0.9)
MONOCYTES # BLD AUTO: 0.64 K/UL — SIGNIFICANT CHANGE UP (ref 0–0.9)
MONOCYTES # BLD AUTO: 0.64 K/UL — SIGNIFICANT CHANGE UP (ref 0–0.9)
MONOCYTES # BLD AUTO: 0.65 K/UL — SIGNIFICANT CHANGE UP (ref 0–0.9)
MONOCYTES # BLD AUTO: 0.65 K/UL — SIGNIFICANT CHANGE UP (ref 0–0.9)
MONOCYTES # BLD AUTO: 0.69 K/UL — SIGNIFICANT CHANGE UP (ref 0–0.9)
MONOCYTES # BLD AUTO: 0.8 K/UL — SIGNIFICANT CHANGE UP (ref 0–0.9)
MONOCYTES # BLD AUTO: 0.81 K/UL — SIGNIFICANT CHANGE UP (ref 0–0.9)
MONOCYTES # BLD AUTO: 0.9 K/UL — SIGNIFICANT CHANGE UP (ref 0–0.9)
MONOCYTES # BLD AUTO: 0.92 K/UL — HIGH (ref 0–0.9)
MONOCYTES # BLD AUTO: 0.94 K/UL — HIGH (ref 0–0.9)
MONOCYTES # BLD AUTO: 1.03 K/UL — HIGH (ref 0–0.9)
MONOCYTES # BLD AUTO: 1.04 K/UL — HIGH (ref 0–0.9)
MONOCYTES # BLD AUTO: 1.21 K/UL — HIGH (ref 0–0.9)
MONOCYTES # BLD AUTO: 1.26 K/UL — HIGH (ref 0–0.9)
MONOCYTES # BLD AUTO: 1.37 K/UL — HIGH (ref 0–0.9)
MONOCYTES # BLD AUTO: 1.42 K/UL — HIGH (ref 0–0.9)
MONOCYTES # BLD AUTO: 1.46 K/UL — HIGH (ref 0–0.9)
MONOCYTES # BLD AUTO: 1.46 K/UL — HIGH (ref 0–0.9)
MONOCYTES # BLD AUTO: 1.75 K/UL — HIGH (ref 0–0.9)
MONOCYTES # BLD AUTO: 1.88 K/UL — HIGH (ref 0–0.9)
MONOCYTES # BLD AUTO: 1.97 K/UL — HIGH (ref 0–0.9)
MONOCYTES # BLD AUTO: 2.05 K/UL — HIGH (ref 0–0.9)
MONOCYTES # BLD AUTO: 2.29 K/UL — HIGH (ref 0–0.9)
MONOCYTES # BLD AUTO: 2.49 K/UL — HIGH (ref 0–0.9)
MONOCYTES # BLD AUTO: 3.4 K/UL — HIGH (ref 0–0.9)
MONOCYTES # BLD AUTO: 3.49 K/UL — HIGH (ref 0–0.9)
MONOCYTES # BLD AUTO: 4.38 K/UL — HIGH (ref 0–0.9)
MONOCYTES NFR BLD AUTO: 0.9 % — LOW (ref 2–14)
MONOCYTES NFR BLD AUTO: 10 % — SIGNIFICANT CHANGE UP (ref 2–14)
MONOCYTES NFR BLD AUTO: 10.1 % — SIGNIFICANT CHANGE UP (ref 2–14)
MONOCYTES NFR BLD AUTO: 10.1 % — SIGNIFICANT CHANGE UP (ref 2–14)
MONOCYTES NFR BLD AUTO: 10.4 % — SIGNIFICANT CHANGE UP (ref 2–14)
MONOCYTES NFR BLD AUTO: 10.4 % — SIGNIFICANT CHANGE UP (ref 2–14)
MONOCYTES NFR BLD AUTO: 10.8 % — SIGNIFICANT CHANGE UP (ref 2–14)
MONOCYTES NFR BLD AUTO: 10.9 % — SIGNIFICANT CHANGE UP (ref 2–14)
MONOCYTES NFR BLD AUTO: 11.5 % — SIGNIFICANT CHANGE UP (ref 2–14)
MONOCYTES NFR BLD AUTO: 11.6 % — SIGNIFICANT CHANGE UP (ref 2–14)
MONOCYTES NFR BLD AUTO: 11.8 % — SIGNIFICANT CHANGE UP (ref 2–14)
MONOCYTES NFR BLD AUTO: 12.1 % — SIGNIFICANT CHANGE UP (ref 2–14)
MONOCYTES NFR BLD AUTO: 12.1 % — SIGNIFICANT CHANGE UP (ref 2–14)
MONOCYTES NFR BLD AUTO: 12.3 % — SIGNIFICANT CHANGE UP (ref 2–14)
MONOCYTES NFR BLD AUTO: 12.4 % — SIGNIFICANT CHANGE UP (ref 2–14)
MONOCYTES NFR BLD AUTO: 12.6 % — SIGNIFICANT CHANGE UP (ref 2–14)
MONOCYTES NFR BLD AUTO: 12.6 % — SIGNIFICANT CHANGE UP (ref 2–14)
MONOCYTES NFR BLD AUTO: 12.7 % — SIGNIFICANT CHANGE UP (ref 2–14)
MONOCYTES NFR BLD AUTO: 13 % — SIGNIFICANT CHANGE UP (ref 2–14)
MONOCYTES NFR BLD AUTO: 13.2 % — SIGNIFICANT CHANGE UP (ref 2–14)
MONOCYTES NFR BLD AUTO: 13.4 % — SIGNIFICANT CHANGE UP (ref 2–14)
MONOCYTES NFR BLD AUTO: 13.8 % — SIGNIFICANT CHANGE UP (ref 2–14)
MONOCYTES NFR BLD AUTO: 13.9 % — SIGNIFICANT CHANGE UP (ref 2–14)
MONOCYTES NFR BLD AUTO: 13.9 % — SIGNIFICANT CHANGE UP (ref 2–14)
MONOCYTES NFR BLD AUTO: 15.1 % — HIGH (ref 2–14)
MONOCYTES NFR BLD AUTO: 16 % — HIGH (ref 2–14)
MONOCYTES NFR BLD AUTO: 17.1 % — HIGH (ref 2–14)
MONOCYTES NFR BLD AUTO: 18.2 % — HIGH (ref 2–14)
MONOCYTES NFR BLD AUTO: 19.3 % — HIGH (ref 2–14)
MONOCYTES NFR BLD AUTO: 19.8 % — HIGH (ref 2–14)
MONOCYTES NFR BLD AUTO: 20.1 % — HIGH (ref 2–14)
MONOCYTES NFR BLD AUTO: 20.7 % — HIGH (ref 2–14)
MONOCYTES NFR BLD AUTO: 22.7 % — HIGH (ref 2–14)
MONOCYTES NFR BLD AUTO: 26 % — HIGH (ref 2–14)
MONOCYTES NFR BLD AUTO: 5.2 % — SIGNIFICANT CHANGE UP (ref 2–14)
MONOCYTES NFR BLD AUTO: 7.8 % — SIGNIFICANT CHANGE UP (ref 2–14)
MONOCYTES NFR BLD AUTO: 9.1 % — SIGNIFICANT CHANGE UP (ref 2–14)
MONOCYTES NFR BLD AUTO: 9.5 % — SIGNIFICANT CHANGE UP (ref 2–14)
MONOCYTES NFR BLD AUTO: 9.7 % — SIGNIFICANT CHANGE UP (ref 2–14)
MONOS+MACROS # FLD: 41 % — SIGNIFICANT CHANGE UP
MONOS+MACROS # FLD: 53 % — SIGNIFICANT CHANGE UP
MONOS+MACROS # FLD: 9 % — SIGNIFICANT CHANGE UP
MRSA PCR RESULT.: DETECTED
MRSA PCR RESULT.: SIGNIFICANT CHANGE UP
MRSA PCR RESULT.: SIGNIFICANT CHANGE UP
NEUTROPHILS # BLD AUTO: 12.2 K/UL — HIGH (ref 1.8–7.4)
NEUTROPHILS # BLD AUTO: 14.79 K/UL — HIGH (ref 1.8–7.4)
NEUTROPHILS # BLD AUTO: 2.35 K/UL — SIGNIFICANT CHANGE UP (ref 1.8–7.4)
NEUTROPHILS # BLD AUTO: 2.4 K/UL — SIGNIFICANT CHANGE UP (ref 1.8–7.4)
NEUTROPHILS # BLD AUTO: 2.49 K/UL — SIGNIFICANT CHANGE UP (ref 1.8–7.4)
NEUTROPHILS # BLD AUTO: 2.62 K/UL — SIGNIFICANT CHANGE UP (ref 1.8–7.4)
NEUTROPHILS # BLD AUTO: 3.16 K/UL — SIGNIFICANT CHANGE UP (ref 1.8–7.4)
NEUTROPHILS # BLD AUTO: 3.31 K/UL — SIGNIFICANT CHANGE UP (ref 1.8–7.4)
NEUTROPHILS # BLD AUTO: 3.31 K/UL — SIGNIFICANT CHANGE UP (ref 1.8–7.4)
NEUTROPHILS # BLD AUTO: 3.47 K/UL — SIGNIFICANT CHANGE UP (ref 1.8–7.4)
NEUTROPHILS # BLD AUTO: 3.53 K/UL — SIGNIFICANT CHANGE UP (ref 1.8–7.4)
NEUTROPHILS # BLD AUTO: 3.61 K/UL — SIGNIFICANT CHANGE UP (ref 1.8–7.4)
NEUTROPHILS # BLD AUTO: 3.78 K/UL — SIGNIFICANT CHANGE UP (ref 1.8–7.4)
NEUTROPHILS # BLD AUTO: 3.84 K/UL — SIGNIFICANT CHANGE UP (ref 1.8–7.4)
NEUTROPHILS # BLD AUTO: 4.14 K/UL — SIGNIFICANT CHANGE UP (ref 1.8–7.4)
NEUTROPHILS # BLD AUTO: 4.45 K/UL — SIGNIFICANT CHANGE UP (ref 1.8–7.4)
NEUTROPHILS # BLD AUTO: 4.5 K/UL — SIGNIFICANT CHANGE UP (ref 1.8–7.4)
NEUTROPHILS # BLD AUTO: 4.6 K/UL — SIGNIFICANT CHANGE UP (ref 1.8–7.4)
NEUTROPHILS # BLD AUTO: 4.78 K/UL — SIGNIFICANT CHANGE UP (ref 1.8–7.4)
NEUTROPHILS # BLD AUTO: 4.79 K/UL — SIGNIFICANT CHANGE UP (ref 1.8–7.4)
NEUTROPHILS # BLD AUTO: 4.92 K/UL — SIGNIFICANT CHANGE UP (ref 1.8–7.4)
NEUTROPHILS # BLD AUTO: 5.67 K/UL — SIGNIFICANT CHANGE UP (ref 1.8–7.4)
NEUTROPHILS # BLD AUTO: 6.08 K/UL — SIGNIFICANT CHANGE UP (ref 1.8–7.4)
NEUTROPHILS # BLD AUTO: 6.47 K/UL — SIGNIFICANT CHANGE UP (ref 1.8–7.4)
NEUTROPHILS # BLD AUTO: 6.72 K/UL — SIGNIFICANT CHANGE UP (ref 1.8–7.4)
NEUTROPHILS # BLD AUTO: 6.75 K/UL — SIGNIFICANT CHANGE UP (ref 1.8–7.4)
NEUTROPHILS # BLD AUTO: 6.75 K/UL — SIGNIFICANT CHANGE UP (ref 1.8–7.4)
NEUTROPHILS # BLD AUTO: 6.86 K/UL — SIGNIFICANT CHANGE UP (ref 1.8–7.4)
NEUTROPHILS # BLD AUTO: 7.28 K/UL — SIGNIFICANT CHANGE UP (ref 1.8–7.4)
NEUTROPHILS # BLD AUTO: 7.73 K/UL — HIGH (ref 1.8–7.4)
NEUTROPHILS # BLD AUTO: 7.77 K/UL — HIGH (ref 1.8–7.4)
NEUTROPHILS # BLD AUTO: 7.84 K/UL — HIGH (ref 1.8–7.4)
NEUTROPHILS # BLD AUTO: 8.38 K/UL — HIGH (ref 1.8–7.4)
NEUTROPHILS # BLD AUTO: 8.48 K/UL — HIGH (ref 1.8–7.4)
NEUTROPHILS # BLD AUTO: 8.54 K/UL — HIGH (ref 1.8–7.4)
NEUTROPHILS # BLD AUTO: 8.72 K/UL — HIGH (ref 1.8–7.4)
NEUTROPHILS # BLD AUTO: 8.9 K/UL — HIGH (ref 1.8–7.4)
NEUTROPHILS # BLD AUTO: 8.93 K/UL — HIGH (ref 1.8–7.4)
NEUTROPHILS # BLD AUTO: 9.71 K/UL — HIGH (ref 1.8–7.4)
NEUTROPHILS # BLD AUTO: 9.73 K/UL — HIGH (ref 1.8–7.4)
NEUTROPHILS # BLD AUTO: 9.76 K/UL — HIGH (ref 1.8–7.4)
NEUTROPHILS NFR BLD AUTO: 57.7 % — SIGNIFICANT CHANGE UP (ref 43–77)
NEUTROPHILS NFR BLD AUTO: 62 % — SIGNIFICANT CHANGE UP (ref 43–77)
NEUTROPHILS NFR BLD AUTO: 62.7 % — SIGNIFICANT CHANGE UP (ref 43–77)
NEUTROPHILS NFR BLD AUTO: 64.8 % — SIGNIFICANT CHANGE UP (ref 43–77)
NEUTROPHILS NFR BLD AUTO: 68.6 % — SIGNIFICANT CHANGE UP (ref 43–77)
NEUTROPHILS NFR BLD AUTO: 69.4 % — SIGNIFICANT CHANGE UP (ref 43–77)
NEUTROPHILS NFR BLD AUTO: 70.3 % — SIGNIFICANT CHANGE UP (ref 43–77)
NEUTROPHILS NFR BLD AUTO: 70.4 % — SIGNIFICANT CHANGE UP (ref 43–77)
NEUTROPHILS NFR BLD AUTO: 70.6 % — SIGNIFICANT CHANGE UP (ref 43–77)
NEUTROPHILS NFR BLD AUTO: 71.5 % — SIGNIFICANT CHANGE UP (ref 43–77)
NEUTROPHILS NFR BLD AUTO: 71.9 % — SIGNIFICANT CHANGE UP (ref 43–77)
NEUTROPHILS NFR BLD AUTO: 72.1 % — SIGNIFICANT CHANGE UP (ref 43–77)
NEUTROPHILS NFR BLD AUTO: 72.3 % — SIGNIFICANT CHANGE UP (ref 43–77)
NEUTROPHILS NFR BLD AUTO: 72.3 % — SIGNIFICANT CHANGE UP (ref 43–77)
NEUTROPHILS NFR BLD AUTO: 72.8 % — SIGNIFICANT CHANGE UP (ref 43–77)
NEUTROPHILS NFR BLD AUTO: 73.3 % — SIGNIFICANT CHANGE UP (ref 43–77)
NEUTROPHILS NFR BLD AUTO: 73.3 % — SIGNIFICANT CHANGE UP (ref 43–77)
NEUTROPHILS NFR BLD AUTO: 73.6 % — SIGNIFICANT CHANGE UP (ref 43–77)
NEUTROPHILS NFR BLD AUTO: 73.8 % — SIGNIFICANT CHANGE UP (ref 43–77)
NEUTROPHILS NFR BLD AUTO: 73.9 % — SIGNIFICANT CHANGE UP (ref 43–77)
NEUTROPHILS NFR BLD AUTO: 74 % — SIGNIFICANT CHANGE UP (ref 43–77)
NEUTROPHILS NFR BLD AUTO: 74.1 % — SIGNIFICANT CHANGE UP (ref 43–77)
NEUTROPHILS NFR BLD AUTO: 74.6 % — SIGNIFICANT CHANGE UP (ref 43–77)
NEUTROPHILS NFR BLD AUTO: 74.7 % — SIGNIFICANT CHANGE UP (ref 43–77)
NEUTROPHILS NFR BLD AUTO: 75.4 % — SIGNIFICANT CHANGE UP (ref 43–77)
NEUTROPHILS NFR BLD AUTO: 75.9 % — SIGNIFICANT CHANGE UP (ref 43–77)
NEUTROPHILS NFR BLD AUTO: 76.3 % — SIGNIFICANT CHANGE UP (ref 43–77)
NEUTROPHILS NFR BLD AUTO: 76.7 % — SIGNIFICANT CHANGE UP (ref 43–77)
NEUTROPHILS NFR BLD AUTO: 76.7 % — SIGNIFICANT CHANGE UP (ref 43–77)
NEUTROPHILS NFR BLD AUTO: 76.8 % — SIGNIFICANT CHANGE UP (ref 43–77)
NEUTROPHILS NFR BLD AUTO: 76.9 % — SIGNIFICANT CHANGE UP (ref 43–77)
NEUTROPHILS NFR BLD AUTO: 77.1 % — HIGH (ref 43–77)
NEUTROPHILS NFR BLD AUTO: 77.8 % — HIGH (ref 43–77)
NEUTROPHILS NFR BLD AUTO: 78.6 % — HIGH (ref 43–77)
NEUTROPHILS NFR BLD AUTO: 80.6 % — HIGH (ref 43–77)
NEUTROPHILS NFR BLD AUTO: 80.9 % — HIGH (ref 43–77)
NEUTROPHILS NFR BLD AUTO: 81.2 % — HIGH (ref 43–77)
NEUTROPHILS NFR BLD AUTO: 82.2 % — HIGH (ref 43–77)
NEUTROPHILS NFR BLD AUTO: 87.7 % — HIGH (ref 43–77)
NEUTROPHILS NFR BLD AUTO: 87.8 % — HIGH (ref 43–77)
NEUTROPHILS NFR BLD AUTO: 87.8 % — HIGH (ref 43–77)
NEUTROPHILS-BODY FLUID: 1 % — SIGNIFICANT CHANGE UP
NEUTROPHILS-BODY FLUID: 23 % — SIGNIFICANT CHANGE UP
NEUTROPHILS-BODY FLUID: 28 % — SIGNIFICANT CHANGE UP
NEUTS BAND # BLD: 0.9 % — SIGNIFICANT CHANGE UP (ref 0–6)
NEUTS BAND # BLD: 5.2 % — SIGNIFICANT CHANGE UP (ref 0–6)
NITRITE UR-MCNC: NEGATIVE — SIGNIFICANT CHANGE UP
NON-GYNECOLOGICAL CYTOLOGY STUDY: SIGNIFICANT CHANGE UP
NRBC # BLD: 0 /100 WBCS — SIGNIFICANT CHANGE UP (ref 0–0)
NRBC # FLD: 0 K/UL — SIGNIFICANT CHANGE UP (ref 0–0)
NRBC # FLD: 0.02 K/UL — HIGH (ref 0–0)
NRBC # FLD: 0.03 K/UL — HIGH (ref 0–0)
NRBC # FLD: 0.04 K/UL — HIGH (ref 0–0)
NRBC # FLD: 0.06 K/UL — HIGH (ref 0–0)
ORGANISM # SPEC MICROSCOPIC CNT: SIGNIFICANT CHANGE UP
ORGANISM # SPEC MICROSCOPIC CNT: SIGNIFICANT CHANGE UP
OSMOLALITY UR: 529 MOSM/KG — SIGNIFICANT CHANGE UP (ref 50–1200)
OSMOLALITY UR: 613 MOSM/KG — SIGNIFICANT CHANGE UP (ref 50–1200)
OTHER CELLS FLD MANUAL: 0 % — SIGNIFICANT CHANGE UP
OTHER CELLS FLD MANUAL: 0 % — SIGNIFICANT CHANGE UP
OVALOCYTES BLD QL SMEAR: SLIGHT — SIGNIFICANT CHANGE UP
PCO2 BLDA: 24 MMHG — LOW (ref 35–48)
PCO2 BLDV: 32 MMHG — LOW (ref 42–55)
PCO2 BLDV: 39 MMHG — LOW (ref 42–55)
PCO2 BLDV: 40 MMHG — LOW (ref 42–55)
PCO2 BLDV: 41 MMHG — LOW (ref 42–55)
PCO2 BLDV: 42 MMHG — SIGNIFICANT CHANGE UP (ref 42–55)
PF4 HEPARIN CMPLX AB SER-ACNC: NEGATIVE — SIGNIFICANT CHANGE UP
PH BLDA: 7.48 — HIGH (ref 7.35–7.45)
PH BLDV: 7.33 — SIGNIFICANT CHANGE UP (ref 7.32–7.43)
PH BLDV: 7.35 — SIGNIFICANT CHANGE UP (ref 7.32–7.43)
PH BLDV: 7.36 — SIGNIFICANT CHANGE UP (ref 7.32–7.43)
PH BLDV: 7.41 — SIGNIFICANT CHANGE UP (ref 7.32–7.43)
PH BLDV: 7.46 — HIGH (ref 7.32–7.43)
PH UR: 5.5 — SIGNIFICANT CHANGE UP (ref 5–8)
PH UR: 6 — SIGNIFICANT CHANGE UP (ref 5–8)
PHOSPHATE SERPL-MCNC: 1.3 MG/DL — LOW (ref 2.5–4.5)
PHOSPHATE SERPL-MCNC: 1.4 MG/DL — LOW (ref 2.5–4.5)
PHOSPHATE SERPL-MCNC: 1.7 MG/DL — LOW (ref 2.5–4.5)
PHOSPHATE SERPL-MCNC: 1.8 MG/DL — LOW (ref 2.5–4.5)
PHOSPHATE SERPL-MCNC: 1.9 MG/DL — LOW (ref 2.5–4.5)
PHOSPHATE SERPL-MCNC: 2.1 MG/DL — LOW (ref 2.5–4.5)
PHOSPHATE SERPL-MCNC: 2.1 MG/DL — LOW (ref 2.5–4.5)
PHOSPHATE SERPL-MCNC: 2.2 MG/DL — LOW (ref 2.5–4.5)
PHOSPHATE SERPL-MCNC: 2.2 MG/DL — LOW (ref 2.5–4.5)
PHOSPHATE SERPL-MCNC: 2.3 MG/DL — LOW (ref 2.5–4.5)
PHOSPHATE SERPL-MCNC: 2.4 MG/DL — LOW (ref 2.5–4.5)
PHOSPHATE SERPL-MCNC: 2.4 MG/DL — LOW (ref 2.5–4.5)
PHOSPHATE SERPL-MCNC: 2.5 MG/DL — SIGNIFICANT CHANGE UP (ref 2.5–4.5)
PHOSPHATE SERPL-MCNC: 2.5 MG/DL — SIGNIFICANT CHANGE UP (ref 2.5–4.5)
PHOSPHATE SERPL-MCNC: 2.6 MG/DL — SIGNIFICANT CHANGE UP (ref 2.5–4.5)
PHOSPHATE SERPL-MCNC: 2.6 MG/DL — SIGNIFICANT CHANGE UP (ref 2.5–4.5)
PHOSPHATE SERPL-MCNC: 2.7 MG/DL — SIGNIFICANT CHANGE UP (ref 2.5–4.5)
PHOSPHATE SERPL-MCNC: 2.8 MG/DL — SIGNIFICANT CHANGE UP (ref 2.5–4.5)
PHOSPHATE SERPL-MCNC: 2.9 MG/DL — SIGNIFICANT CHANGE UP (ref 2.5–4.5)
PHOSPHATE SERPL-MCNC: 3 MG/DL — SIGNIFICANT CHANGE UP (ref 2.5–4.5)
PHOSPHATE SERPL-MCNC: 3.1 MG/DL — SIGNIFICANT CHANGE UP (ref 2.5–4.5)
PHOSPHATE SERPL-MCNC: 3.3 MG/DL — SIGNIFICANT CHANGE UP (ref 2.5–4.5)
PHOSPHATE SERPL-MCNC: 3.4 MG/DL — SIGNIFICANT CHANGE UP (ref 2.5–4.5)
PHOSPHATE SERPL-MCNC: 3.9 MG/DL — SIGNIFICANT CHANGE UP (ref 2.5–4.5)
PHOSPHATE SERPL-MCNC: 4.2 MG/DL — SIGNIFICANT CHANGE UP (ref 2.5–4.5)
PHOSPHATE SERPL-MCNC: 4.5 MG/DL — SIGNIFICANT CHANGE UP (ref 2.5–4.5)
PHOSPHATE SERPL-MCNC: 4.7 MG/DL — HIGH (ref 2.5–4.5)
PHOSPHATE SERPL-MCNC: 5.2 MG/DL — HIGH (ref 2.5–4.5)
PHOSPHATE SERPL-MCNC: 5.3 MG/DL — HIGH (ref 2.5–4.5)
PLAT MORPH BLD: NORMAL — SIGNIFICANT CHANGE UP
PLATELET # BLD AUTO: 101 K/UL — LOW (ref 150–400)
PLATELET # BLD AUTO: 101 K/UL — LOW (ref 150–400)
PLATELET # BLD AUTO: 106 K/UL — LOW (ref 150–400)
PLATELET # BLD AUTO: 109 K/UL — LOW (ref 150–400)
PLATELET # BLD AUTO: 109 K/UL — LOW (ref 150–400)
PLATELET # BLD AUTO: 113 K/UL — LOW (ref 150–400)
PLATELET # BLD AUTO: 117 K/UL — LOW (ref 150–400)
PLATELET # BLD AUTO: 118 K/UL — LOW (ref 150–400)
PLATELET # BLD AUTO: 139 K/UL — LOW (ref 150–400)
PLATELET # BLD AUTO: 139 K/UL — LOW (ref 150–400)
PLATELET # BLD AUTO: 147 K/UL — LOW (ref 150–400)
PLATELET # BLD AUTO: 155 K/UL — SIGNIFICANT CHANGE UP (ref 150–400)
PLATELET # BLD AUTO: 157 K/UL — SIGNIFICANT CHANGE UP (ref 150–400)
PLATELET # BLD AUTO: 159 K/UL — SIGNIFICANT CHANGE UP (ref 150–400)
PLATELET # BLD AUTO: 163 K/UL — SIGNIFICANT CHANGE UP (ref 150–400)
PLATELET # BLD AUTO: 163 K/UL — SIGNIFICANT CHANGE UP (ref 150–400)
PLATELET # BLD AUTO: 173 K/UL — SIGNIFICANT CHANGE UP (ref 150–400)
PLATELET # BLD AUTO: 188 K/UL — SIGNIFICANT CHANGE UP (ref 150–400)
PLATELET # BLD AUTO: 191 K/UL — SIGNIFICANT CHANGE UP (ref 150–400)
PLATELET # BLD AUTO: 197 K/UL — SIGNIFICANT CHANGE UP (ref 150–400)
PLATELET # BLD AUTO: 215 K/UL — SIGNIFICANT CHANGE UP (ref 150–400)
PLATELET # BLD AUTO: 235 K/UL — SIGNIFICANT CHANGE UP (ref 150–400)
PLATELET # BLD AUTO: 247 K/UL — SIGNIFICANT CHANGE UP (ref 150–400)
PLATELET # BLD AUTO: 26 K/UL — LOW (ref 150–400)
PLATELET # BLD AUTO: 32 K/UL — LOW (ref 150–400)
PLATELET # BLD AUTO: 34 K/UL — LOW (ref 150–400)
PLATELET # BLD AUTO: 37 K/UL — LOW (ref 150–400)
PLATELET # BLD AUTO: 38 K/UL — LOW (ref 150–400)
PLATELET # BLD AUTO: 40 K/UL — LOW (ref 150–400)
PLATELET # BLD AUTO: 43 K/UL — LOW (ref 150–400)
PLATELET # BLD AUTO: 44 K/UL — LOW (ref 150–400)
PLATELET # BLD AUTO: 45 K/UL — LOW (ref 150–400)
PLATELET # BLD AUTO: 45 K/UL — LOW (ref 150–400)
PLATELET # BLD AUTO: 47 K/UL — LOW (ref 150–400)
PLATELET # BLD AUTO: 47 K/UL — LOW (ref 150–400)
PLATELET # BLD AUTO: 48 K/UL — LOW (ref 150–400)
PLATELET # BLD AUTO: 50 K/UL — LOW (ref 150–400)
PLATELET # BLD AUTO: 51 K/UL — LOW (ref 150–400)
PLATELET # BLD AUTO: 53 K/UL — LOW (ref 150–400)
PLATELET # BLD AUTO: 54 K/UL — LOW (ref 150–400)
PLATELET # BLD AUTO: 55 K/UL — LOW (ref 150–400)
PLATELET # BLD AUTO: 58 K/UL — LOW (ref 150–400)
PLATELET # BLD AUTO: 58 K/UL — LOW (ref 150–400)
PLATELET # BLD AUTO: 63 K/UL — LOW (ref 150–400)
PLATELET # BLD AUTO: 66 K/UL — LOW (ref 150–400)
PLATELET # BLD AUTO: 70 K/UL — LOW (ref 150–400)
PLATELET # BLD AUTO: 71 K/UL — LOW (ref 150–400)
PLATELET # BLD AUTO: 72 K/UL — LOW (ref 150–400)
PLATELET # BLD AUTO: 73 K/UL — LOW (ref 150–400)
PLATELET # BLD AUTO: 76 K/UL — LOW (ref 150–400)
PLATELET # BLD AUTO: 77 K/UL — LOW (ref 150–400)
PLATELET # BLD AUTO: 80 K/UL — LOW (ref 150–400)
PLATELET # BLD AUTO: 83 K/UL — LOW (ref 150–400)
PLATELET # BLD AUTO: 91 K/UL — LOW (ref 150–400)
PLATELET # BLD AUTO: 91 K/UL — LOW (ref 150–400)
PLATELET COUNT - ESTIMATE: ABNORMAL
PLATELET COUNT - ESTIMATE: NORMAL — SIGNIFICANT CHANGE UP
PLATELET COUNT - ESTIMATE: NORMAL — SIGNIFICANT CHANGE UP
PO2 BLDA: 77 MMHG — LOW (ref 83–108)
PO2 BLDV: 22 MMHG — LOW (ref 25–45)
PO2 BLDV: 28 MMHG — SIGNIFICANT CHANGE UP (ref 25–45)
PO2 BLDV: 31 MMHG — SIGNIFICANT CHANGE UP (ref 25–45)
PO2 BLDV: 45 MMHG — SIGNIFICANT CHANGE UP (ref 25–45)
PO2 BLDV: 93 MMHG — HIGH (ref 25–45)
POIKILOCYTOSIS BLD QL AUTO: SIGNIFICANT CHANGE UP
POLYCHROMASIA BLD QL SMEAR: SIGNIFICANT CHANGE UP
POLYCHROMASIA BLD QL SMEAR: SIGNIFICANT CHANGE UP
POLYCHROMASIA BLD QL SMEAR: SLIGHT — SIGNIFICANT CHANGE UP
POTASSIUM BLDV-SCNC: 3.7 MMOL/L — SIGNIFICANT CHANGE UP (ref 3.5–5.1)
POTASSIUM BLDV-SCNC: 3.8 MMOL/L — SIGNIFICANT CHANGE UP (ref 3.5–5.1)
POTASSIUM BLDV-SCNC: 4.1 MMOL/L — SIGNIFICANT CHANGE UP (ref 3.5–5.1)
POTASSIUM BLDV-SCNC: 4.5 MMOL/L — SIGNIFICANT CHANGE UP (ref 3.5–5.1)
POTASSIUM BLDV-SCNC: 5 MMOL/L — SIGNIFICANT CHANGE UP (ref 3.5–5.1)
POTASSIUM SERPL-MCNC: 3 MMOL/L — LOW (ref 3.5–5.3)
POTASSIUM SERPL-MCNC: 3.2 MMOL/L — LOW (ref 3.5–5.3)
POTASSIUM SERPL-MCNC: 3.3 MMOL/L — LOW (ref 3.5–5.3)
POTASSIUM SERPL-MCNC: 3.3 MMOL/L — LOW (ref 3.5–5.3)
POTASSIUM SERPL-MCNC: 3.4 MMOL/L — LOW (ref 3.5–5.3)
POTASSIUM SERPL-MCNC: 3.5 MMOL/L — SIGNIFICANT CHANGE UP (ref 3.5–5.3)
POTASSIUM SERPL-MCNC: 3.6 MMOL/L — SIGNIFICANT CHANGE UP (ref 3.5–5.3)
POTASSIUM SERPL-MCNC: 3.7 MMOL/L — SIGNIFICANT CHANGE UP (ref 3.5–5.3)
POTASSIUM SERPL-MCNC: 3.8 MMOL/L — SIGNIFICANT CHANGE UP (ref 3.5–5.3)
POTASSIUM SERPL-MCNC: 3.9 MMOL/L — SIGNIFICANT CHANGE UP (ref 3.5–5.3)
POTASSIUM SERPL-MCNC: 4.1 MMOL/L — SIGNIFICANT CHANGE UP (ref 3.5–5.3)
POTASSIUM SERPL-MCNC: 4.2 MMOL/L — SIGNIFICANT CHANGE UP (ref 3.5–5.3)
POTASSIUM SERPL-MCNC: 4.3 MMOL/L — SIGNIFICANT CHANGE UP (ref 3.5–5.3)
POTASSIUM SERPL-MCNC: 4.4 MMOL/L — SIGNIFICANT CHANGE UP (ref 3.5–5.3)
POTASSIUM SERPL-MCNC: 4.5 MMOL/L — SIGNIFICANT CHANGE UP (ref 3.5–5.3)
POTASSIUM SERPL-MCNC: 4.5 MMOL/L — SIGNIFICANT CHANGE UP (ref 3.5–5.3)
POTASSIUM SERPL-MCNC: 5 MMOL/L — SIGNIFICANT CHANGE UP (ref 3.5–5.3)
POTASSIUM SERPL-MCNC: 5.1 MMOL/L — SIGNIFICANT CHANGE UP (ref 3.5–5.3)
POTASSIUM SERPL-SCNC: 3 MMOL/L — LOW (ref 3.5–5.3)
POTASSIUM SERPL-SCNC: 3.2 MMOL/L — LOW (ref 3.5–5.3)
POTASSIUM SERPL-SCNC: 3.3 MMOL/L — LOW (ref 3.5–5.3)
POTASSIUM SERPL-SCNC: 3.3 MMOL/L — LOW (ref 3.5–5.3)
POTASSIUM SERPL-SCNC: 3.4 MMOL/L — LOW (ref 3.5–5.3)
POTASSIUM SERPL-SCNC: 3.5 MMOL/L — SIGNIFICANT CHANGE UP (ref 3.5–5.3)
POTASSIUM SERPL-SCNC: 3.6 MMOL/L — SIGNIFICANT CHANGE UP (ref 3.5–5.3)
POTASSIUM SERPL-SCNC: 3.7 MMOL/L — SIGNIFICANT CHANGE UP (ref 3.5–5.3)
POTASSIUM SERPL-SCNC: 3.8 MMOL/L — SIGNIFICANT CHANGE UP (ref 3.5–5.3)
POTASSIUM SERPL-SCNC: 3.9 MMOL/L — SIGNIFICANT CHANGE UP (ref 3.5–5.3)
POTASSIUM SERPL-SCNC: 4.1 MMOL/L — SIGNIFICANT CHANGE UP (ref 3.5–5.3)
POTASSIUM SERPL-SCNC: 4.2 MMOL/L — SIGNIFICANT CHANGE UP (ref 3.5–5.3)
POTASSIUM SERPL-SCNC: 4.3 MMOL/L — SIGNIFICANT CHANGE UP (ref 3.5–5.3)
POTASSIUM SERPL-SCNC: 4.4 MMOL/L — SIGNIFICANT CHANGE UP (ref 3.5–5.3)
POTASSIUM SERPL-SCNC: 4.5 MMOL/L — SIGNIFICANT CHANGE UP (ref 3.5–5.3)
POTASSIUM SERPL-SCNC: 4.5 MMOL/L — SIGNIFICANT CHANGE UP (ref 3.5–5.3)
POTASSIUM SERPL-SCNC: 5 MMOL/L — SIGNIFICANT CHANGE UP (ref 3.5–5.3)
POTASSIUM SERPL-SCNC: 5.1 MMOL/L — SIGNIFICANT CHANGE UP (ref 3.5–5.3)
POTASSIUM UR-SCNC: 62.6 MMOL/L — SIGNIFICANT CHANGE UP
PROT ?TM UR-MCNC: 16 MG/DL — SIGNIFICANT CHANGE UP
PROT FLD-MCNC: 0.5 G/DL — SIGNIFICANT CHANGE UP
PROT FLD-MCNC: 0.8 G/DL — SIGNIFICANT CHANGE UP
PROT SERPL-MCNC: 3.2 G/DL — LOW (ref 6–8.3)
PROT SERPL-MCNC: 4.2 G/DL — LOW (ref 6–8.3)
PROT SERPL-MCNC: 5 G/DL — LOW (ref 6–8.3)
PROT SERPL-MCNC: 5 G/DL — LOW (ref 6–8.3)
PROT SERPL-MCNC: 5.1 G/DL — LOW (ref 6–8.3)
PROT SERPL-MCNC: 5.1 G/DL — LOW (ref 6–8.3)
PROT SERPL-MCNC: 5.2 G/DL — LOW (ref 6–8.3)
PROT SERPL-MCNC: 5.3 G/DL — LOW (ref 6–8.3)
PROT SERPL-MCNC: 5.3 G/DL — LOW (ref 6–8.3)
PROT SERPL-MCNC: 5.4 G/DL — LOW (ref 6–8.3)
PROT SERPL-MCNC: 5.5 G/DL — LOW (ref 6–8.3)
PROT SERPL-MCNC: 5.6 G/DL — LOW (ref 6–8.3)
PROT SERPL-MCNC: 5.7 G/DL — LOW (ref 6–8.3)
PROT SERPL-MCNC: 5.8 G/DL — LOW (ref 6–8.3)
PROT SERPL-MCNC: 5.9 G/DL — LOW (ref 6–8.3)
PROT SERPL-MCNC: 6 G/DL — SIGNIFICANT CHANGE UP (ref 6–8.3)
PROT SERPL-MCNC: 6 G/DL — SIGNIFICANT CHANGE UP (ref 6–8.3)
PROT SERPL-MCNC: 6.1 G/DL — SIGNIFICANT CHANGE UP (ref 6–8.3)
PROT SERPL-MCNC: 6.2 G/DL — SIGNIFICANT CHANGE UP (ref 6–8.3)
PROT UR-MCNC: ABNORMAL
PROT UR-MCNC: NEGATIVE — SIGNIFICANT CHANGE UP
PROT/CREAT UR-RTO: 0.2 RATIO — SIGNIFICANT CHANGE UP (ref 0–0.2)
PROTHROM AB SERPL-ACNC: 16.4 SEC — HIGH (ref 10.5–13.4)
PROTHROM AB SERPL-ACNC: 17.3 SEC — HIGH (ref 10.5–13.4)
PROTHROM AB SERPL-ACNC: 17.9 SEC — HIGH (ref 10.5–13.4)
PROTHROM AB SERPL-ACNC: 18.1 SEC — HIGH (ref 10.5–13.4)
PROTHROM AB SERPL-ACNC: 18.2 SEC — HIGH (ref 10.5–13.4)
PROTHROM AB SERPL-ACNC: 18.3 SEC — HIGH (ref 10.5–13.4)
PROTHROM AB SERPL-ACNC: 18.3 SEC — HIGH (ref 10.5–13.4)
PROTHROM AB SERPL-ACNC: 18.5 SEC — HIGH (ref 10.5–13.4)
PROTHROM AB SERPL-ACNC: 18.7 SEC — HIGH (ref 10.5–13.4)
PROTHROM AB SERPL-ACNC: 18.8 SEC — HIGH (ref 10.5–13.4)
PROTHROM AB SERPL-ACNC: 18.9 SEC — HIGH (ref 10.5–13.4)
PROTHROM AB SERPL-ACNC: 19.1 SEC — HIGH (ref 10.5–13.4)
PROTHROM AB SERPL-ACNC: 19.3 SEC — HIGH (ref 10.5–13.4)
PROTHROM AB SERPL-ACNC: 20.1 SEC — HIGH (ref 10.5–13.4)
PROTHROM AB SERPL-ACNC: 20.3 SEC — HIGH (ref 10.5–13.4)
PROTHROM AB SERPL-ACNC: 20.4 SEC — HIGH (ref 10.5–13.4)
PROTHROM AB SERPL-ACNC: 20.8 SEC — HIGH (ref 10.5–13.4)
PROTHROM AB SERPL-ACNC: 21.2 SEC — HIGH (ref 10.5–13.4)
PROTHROM AB SERPL-ACNC: 21.6 SEC — HIGH (ref 10.5–13.4)
PROTHROM AB SERPL-ACNC: 21.9 SEC — HIGH (ref 10.5–13.4)
PROTHROM AB SERPL-ACNC: 22.3 SEC — HIGH (ref 10.5–13.4)
PROTHROM AB SERPL-ACNC: 23.5 SEC — HIGH (ref 10.5–13.4)
PROTHROM AB SERPL-ACNC: 23.9 SEC — HIGH (ref 10.5–13.4)
PROTHROM AB SERPL-ACNC: 24 SEC — HIGH (ref 10.5–13.4)
PROTHROM AB SERPL-ACNC: 24.3 SEC — HIGH (ref 10.5–13.4)
PROTHROM AB SERPL-ACNC: 25.3 SEC — HIGH (ref 10.5–13.4)
PROTHROM AB SERPL-ACNC: 25.5 SEC — HIGH (ref 10.5–13.4)
PROTHROM AB SERPL-ACNC: 25.6 SEC — HIGH (ref 10.5–13.4)
PROTHROM AB SERPL-ACNC: 26.4 SEC — HIGH (ref 10.5–13.4)
RBC # BLD: 1.86 M/UL — LOW (ref 4.2–5.8)
RBC # BLD: 2.36 M/UL — LOW (ref 4.2–5.8)
RBC # BLD: 2.42 M/UL — LOW (ref 4.2–5.8)
RBC # BLD: 2.61 M/UL — LOW (ref 4.2–5.8)
RBC # BLD: 2.65 M/UL — LOW (ref 4.2–5.8)
RBC # BLD: 2.71 M/UL — LOW (ref 4.2–5.8)
RBC # BLD: 2.82 M/UL — LOW (ref 4.2–5.8)
RBC # BLD: 2.9 M/UL — LOW (ref 4.2–5.8)
RBC # BLD: 2.91 M/UL — LOW (ref 4.2–5.8)
RBC # BLD: 2.96 M/UL — LOW (ref 4.2–5.8)
RBC # BLD: 2.98 M/UL — LOW (ref 4.2–5.8)
RBC # BLD: 3 M/UL — LOW (ref 4.2–5.8)
RBC # BLD: 3 M/UL — LOW (ref 4.2–5.8)
RBC # BLD: 3.02 M/UL — LOW (ref 4.2–5.8)
RBC # BLD: 3.04 M/UL — LOW (ref 4.2–5.8)
RBC # BLD: 3.06 M/UL — LOW (ref 4.2–5.8)
RBC # BLD: 3.13 M/UL — LOW (ref 4.2–5.8)
RBC # BLD: 3.16 M/UL — LOW (ref 4.2–5.8)
RBC # BLD: 3.19 M/UL — LOW (ref 4.2–5.8)
RBC # BLD: 3.2 M/UL — LOW (ref 4.2–5.8)
RBC # BLD: 3.2 M/UL — LOW (ref 4.2–5.8)
RBC # BLD: 3.21 M/UL — LOW (ref 4.2–5.8)
RBC # BLD: 3.27 M/UL — LOW (ref 4.2–5.8)
RBC # BLD: 3.31 M/UL — LOW (ref 4.2–5.8)
RBC # BLD: 3.33 M/UL — LOW (ref 4.2–5.8)
RBC # BLD: 3.34 M/UL — LOW (ref 4.2–5.8)
RBC # BLD: 3.35 M/UL — LOW (ref 4.2–5.8)
RBC # BLD: 3.37 M/UL — LOW (ref 4.2–5.8)
RBC # BLD: 3.4 M/UL — LOW (ref 4.2–5.8)
RBC # BLD: 3.45 M/UL — LOW (ref 4.2–5.8)
RBC # BLD: 3.48 M/UL — LOW (ref 4.2–5.8)
RBC # BLD: 3.49 M/UL — LOW (ref 4.2–5.8)
RBC # BLD: 3.52 M/UL — LOW (ref 4.2–5.8)
RBC # BLD: 3.54 M/UL — LOW (ref 4.2–5.8)
RBC # BLD: 3.56 M/UL — LOW (ref 4.2–5.8)
RBC # BLD: 3.6 M/UL — LOW (ref 4.2–5.8)
RBC # BLD: 3.68 M/UL — LOW (ref 4.2–5.8)
RBC # BLD: 3.72 M/UL — LOW (ref 4.2–5.8)
RBC # BLD: 3.72 M/UL — LOW (ref 4.2–5.8)
RBC # BLD: 3.75 M/UL — LOW (ref 4.2–5.8)
RBC # BLD: 3.78 M/UL — LOW (ref 4.2–5.8)
RBC # BLD: 3.79 M/UL — LOW (ref 4.2–5.8)
RBC # BLD: 3.84 M/UL — LOW (ref 4.2–5.8)
RBC # BLD: 3.93 M/UL — LOW (ref 4.2–5.8)
RBC # BLD: 3.94 M/UL — LOW (ref 4.2–5.8)
RBC # BLD: 3.98 M/UL — LOW (ref 4.2–5.8)
RBC # BLD: 4 M/UL — LOW (ref 4.2–5.8)
RBC # BLD: 4.03 M/UL — LOW (ref 4.2–5.8)
RBC # BLD: 4.11 M/UL — LOW (ref 4.2–5.8)
RBC # BLD: 4.11 M/UL — LOW (ref 4.2–5.8)
RBC # BLD: 4.15 M/UL — LOW (ref 4.2–5.8)
RBC # BLD: 4.27 M/UL — SIGNIFICANT CHANGE UP (ref 4.2–5.8)
RBC # BLD: 4.38 M/UL — SIGNIFICANT CHANGE UP (ref 4.2–5.8)
RBC # BLD: 4.55 M/UL — SIGNIFICANT CHANGE UP (ref 4.2–5.8)
RBC # BLD: 4.71 M/UL — SIGNIFICANT CHANGE UP (ref 4.2–5.8)
RBC # FLD: 14.6 % — HIGH (ref 10.3–14.5)
RBC # FLD: 14.6 % — HIGH (ref 10.3–14.5)
RBC # FLD: 15.7 % — HIGH (ref 10.3–14.5)
RBC # FLD: 16.5 % — HIGH (ref 10.3–14.5)
RBC # FLD: 16.6 % — HIGH (ref 10.3–14.5)
RBC # FLD: 16.8 % — HIGH (ref 10.3–14.5)
RBC # FLD: 16.9 % — HIGH (ref 10.3–14.5)
RBC # FLD: 17 % — HIGH (ref 10.3–14.5)
RBC # FLD: 17.2 % — HIGH (ref 10.3–14.5)
RBC # FLD: 17.4 % — HIGH (ref 10.3–14.5)
RBC # FLD: 17.5 % — HIGH (ref 10.3–14.5)
RBC # FLD: 17.5 % — HIGH (ref 10.3–14.5)
RBC # FLD: 17.8 % — HIGH (ref 10.3–14.5)
RBC # FLD: 18.3 % — HIGH (ref 10.3–14.5)
RBC # FLD: 19.1 % — HIGH (ref 10.3–14.5)
RBC # FLD: 19.3 % — HIGH (ref 10.3–14.5)
RBC # FLD: 19.9 % — HIGH (ref 10.3–14.5)
RBC # FLD: 20.1 % — HIGH (ref 10.3–14.5)
RBC # FLD: 20.8 % — HIGH (ref 10.3–14.5)
RBC # FLD: 20.9 % — HIGH (ref 10.3–14.5)
RBC # FLD: 21.1 % — HIGH (ref 10.3–14.5)
RBC # FLD: 21.2 % — HIGH (ref 10.3–14.5)
RBC # FLD: 21.4 % — HIGH (ref 10.3–14.5)
RBC # FLD: 21.9 % — HIGH (ref 10.3–14.5)
RBC # FLD: 22.1 % — HIGH (ref 10.3–14.5)
RBC # FLD: 22.2 % — HIGH (ref 10.3–14.5)
RBC # FLD: 22.4 % — HIGH (ref 10.3–14.5)
RBC # FLD: 22.4 % — HIGH (ref 10.3–14.5)
RBC # FLD: 22.5 % — HIGH (ref 10.3–14.5)
RBC # FLD: 22.7 % — HIGH (ref 10.3–14.5)
RBC # FLD: 23 % — HIGH (ref 10.3–14.5)
RBC # FLD: 23 % — HIGH (ref 10.3–14.5)
RBC # FLD: 23.1 % — HIGH (ref 10.3–14.5)
RBC # FLD: 23.2 % — HIGH (ref 10.3–14.5)
RBC # FLD: 23.3 % — HIGH (ref 10.3–14.5)
RBC # FLD: 23.4 % — HIGH (ref 10.3–14.5)
RBC # FLD: 23.6 % — HIGH (ref 10.3–14.5)
RBC # FLD: 23.6 % — HIGH (ref 10.3–14.5)
RBC # FLD: 23.7 % — HIGH (ref 10.3–14.5)
RBC # FLD: 23.9 % — HIGH (ref 10.3–14.5)
RBC # FLD: 24 % — HIGH (ref 10.3–14.5)
RBC # FLD: 24.1 % — HIGH (ref 10.3–14.5)
RBC # FLD: 24.1 % — HIGH (ref 10.3–14.5)
RBC # FLD: 24.2 % — HIGH (ref 10.3–14.5)
RBC BLD AUTO: ABNORMAL
RBC CASTS # UR COMP ASSIST: 3 /HPF — SIGNIFICANT CHANGE UP (ref 0–4)
RBC CASTS # UR COMP ASSIST: SIGNIFICANT CHANGE UP /HPF (ref 0–4)
RCV VOL RI: 2000 CELLS/UL — HIGH (ref 0–5)
RCV VOL RI: 3000 CELLS/UL — HIGH (ref 0–5)
RCV VOL RI: <2000 CELLS/UL — HIGH (ref 0–5)
RH IG SCN BLD-IMP: POSITIVE — SIGNIFICANT CHANGE UP
RSV RNA NPH QL NAA+NON-PROBE: SIGNIFICANT CHANGE UP
S AUREUS DNA NOSE QL NAA+PROBE: DETECTED
S AUREUS DNA NOSE QL NAA+PROBE: DETECTED
S AUREUS DNA NOSE QL NAA+PROBE: SIGNIFICANT CHANGE UP
SAO2 % BLDA: 96.2 % — SIGNIFICANT CHANGE UP (ref 94–98)
SAO2 % BLDV: 28.7 % — LOW (ref 67–88)
SAO2 % BLDV: 43.5 % — LOW (ref 67–88)
SAO2 % BLDV: 57.1 % — LOW (ref 67–88)
SAO2 % BLDV: 79.2 % — SIGNIFICANT CHANGE UP (ref 67–88)
SAO2 % BLDV: 96.5 % — HIGH (ref 67–88)
SARS-COV-2 RNA SPEC QL NAA+PROBE: SIGNIFICANT CHANGE UP
SARS-COV-2 RNA SPEC QL NAA+PROBE: SIGNIFICANT CHANGE UP
SCHISTOCYTES BLD QL AUTO: SLIGHT — SIGNIFICANT CHANGE UP
SMUDGE CELLS # BLD: PRESENT — SIGNIFICANT CHANGE UP
SMUDGE CELLS # BLD: PRESENT — SIGNIFICANT CHANGE UP
SODIUM SERPL-SCNC: 130 MMOL/L — LOW (ref 135–145)
SODIUM SERPL-SCNC: 133 MMOL/L — LOW (ref 135–145)
SODIUM SERPL-SCNC: 137 MMOL/L — SIGNIFICANT CHANGE UP (ref 135–145)
SODIUM SERPL-SCNC: 139 MMOL/L — SIGNIFICANT CHANGE UP (ref 135–145)
SODIUM SERPL-SCNC: 140 MMOL/L — SIGNIFICANT CHANGE UP (ref 135–145)
SODIUM SERPL-SCNC: 141 MMOL/L — SIGNIFICANT CHANGE UP (ref 135–145)
SODIUM SERPL-SCNC: 143 MMOL/L — SIGNIFICANT CHANGE UP (ref 135–145)
SODIUM SERPL-SCNC: 144 MMOL/L — SIGNIFICANT CHANGE UP (ref 135–145)
SODIUM SERPL-SCNC: 145 MMOL/L — SIGNIFICANT CHANGE UP (ref 135–145)
SODIUM SERPL-SCNC: 145 MMOL/L — SIGNIFICANT CHANGE UP (ref 135–145)
SODIUM SERPL-SCNC: 146 MMOL/L — HIGH (ref 135–145)
SODIUM SERPL-SCNC: 147 MMOL/L — HIGH (ref 135–145)
SODIUM SERPL-SCNC: 148 MMOL/L — HIGH (ref 135–145)
SODIUM SERPL-SCNC: 149 MMOL/L — HIGH (ref 135–145)
SODIUM SERPL-SCNC: 149 MMOL/L — HIGH (ref 135–145)
SODIUM SERPL-SCNC: 150 MMOL/L — HIGH (ref 135–145)
SODIUM SERPL-SCNC: 151 MMOL/L — HIGH (ref 135–145)
SODIUM SERPL-SCNC: 152 MMOL/L — HIGH (ref 135–145)
SODIUM SERPL-SCNC: 153 MMOL/L — HIGH (ref 135–145)
SODIUM SERPL-SCNC: 153 MMOL/L — HIGH (ref 135–145)
SODIUM SERPL-SCNC: 154 MMOL/L — HIGH (ref 135–145)
SODIUM SERPL-SCNC: 154 MMOL/L — HIGH (ref 135–145)
SODIUM SERPL-SCNC: 156 MMOL/L — HIGH (ref 135–145)
SODIUM SERPL-SCNC: 159 MMOL/L — HIGH (ref 135–145)
SODIUM UR-SCNC: <20 MMOL/L — SIGNIFICANT CHANGE UP
SODIUM UR-SCNC: <20 MMOL/L — SIGNIFICANT CHANGE UP
SP GR SPEC: 1.01 — SIGNIFICANT CHANGE UP (ref 1.01–1.05)
SP GR SPEC: 1.02 — SIGNIFICANT CHANGE UP (ref 1.01–1.05)
SPECIMEN SOURCE: SIGNIFICANT CHANGE UP
TOTAL CELLS COUNTED, BODY FLUID: 100 CELLS — SIGNIFICANT CHANGE UP
TOTAL NUCLEATED CELL COUNT, BODY FLUID: 148 CELLS/UL — HIGH (ref 0–5)
TOTAL NUCLEATED CELL COUNT, BODY FLUID: 317 CELLS/UL — HIGH (ref 0–5)
TOTAL NUCLEATED CELL COUNT, BODY FLUID: 380 CELLS/UL — HIGH (ref 0–5)
TROPONIN T, HIGH SENSITIVITY RESULT: 12 NG/L — SIGNIFICANT CHANGE UP
TSH SERPL-MCNC: 1.4 UIU/ML — SIGNIFICANT CHANGE UP (ref 0.27–4.2)
TUBE TYPE: SIGNIFICANT CHANGE UP
UROBILINOGEN FLD QL: SIGNIFICANT CHANGE UP
UUN UR-MCNC: 1108.2 MG/DL — SIGNIFICANT CHANGE UP
VARIANT LYMPHS # BLD: 0.9 % — SIGNIFICANT CHANGE UP (ref 0–6)
VARIANT LYMPHS # BLD: 4.5 % — SIGNIFICANT CHANGE UP (ref 0–6)
WBC # BLD: 10.12 K/UL — SIGNIFICANT CHANGE UP (ref 3.8–10.5)
WBC # BLD: 10.35 K/UL — SIGNIFICANT CHANGE UP (ref 3.8–10.5)
WBC # BLD: 10.53 K/UL — HIGH (ref 3.8–10.5)
WBC # BLD: 10.75 K/UL — HIGH (ref 3.8–10.5)
WBC # BLD: 10.79 K/UL — HIGH (ref 3.8–10.5)
WBC # BLD: 10.84 K/UL — HIGH (ref 3.8–10.5)
WBC # BLD: 10.92 K/UL — HIGH (ref 3.8–10.5)
WBC # BLD: 10.96 K/UL — HIGH (ref 3.8–10.5)
WBC # BLD: 11.05 K/UL — HIGH (ref 3.8–10.5)
WBC # BLD: 11.31 K/UL — HIGH (ref 3.8–10.5)
WBC # BLD: 11.43 K/UL — HIGH (ref 3.8–10.5)
WBC # BLD: 11.54 K/UL — HIGH (ref 3.8–10.5)
WBC # BLD: 12.17 K/UL — HIGH (ref 3.8–10.5)
WBC # BLD: 12.73 K/UL — HIGH (ref 3.8–10.5)
WBC # BLD: 12.87 K/UL — HIGH (ref 3.8–10.5)
WBC # BLD: 14.22 K/UL — HIGH (ref 3.8–10.5)
WBC # BLD: 14.3 K/UL — HIGH (ref 3.8–10.5)
WBC # BLD: 14.97 K/UL — HIGH (ref 3.8–10.5)
WBC # BLD: 16.84 K/UL — HIGH (ref 3.8–10.5)
WBC # BLD: 16.86 K/UL — HIGH (ref 3.8–10.5)
WBC # BLD: 19.18 K/UL — HIGH (ref 3.8–10.5)
WBC # BLD: 2.84 K/UL — LOW (ref 3.8–10.5)
WBC # BLD: 3.33 K/UL — LOW (ref 3.8–10.5)
WBC # BLD: 3.48 K/UL — LOW (ref 3.8–10.5)
WBC # BLD: 3.56 K/UL — LOW (ref 3.8–10.5)
WBC # BLD: 3.64 K/UL — LOW (ref 3.8–10.5)
WBC # BLD: 3.87 K/UL — SIGNIFICANT CHANGE UP (ref 3.8–10.5)
WBC # BLD: 4.4 K/UL — SIGNIFICANT CHANGE UP (ref 3.8–10.5)
WBC # BLD: 4.48 K/UL — SIGNIFICANT CHANGE UP (ref 3.8–10.5)
WBC # BLD: 4.52 K/UL — SIGNIFICANT CHANGE UP (ref 3.8–10.5)
WBC # BLD: 4.69 K/UL — SIGNIFICANT CHANGE UP (ref 3.8–10.5)
WBC # BLD: 4.8 K/UL — SIGNIFICANT CHANGE UP (ref 3.8–10.5)
WBC # BLD: 4.88 K/UL — SIGNIFICANT CHANGE UP (ref 3.8–10.5)
WBC # BLD: 4.91 K/UL — SIGNIFICANT CHANGE UP (ref 3.8–10.5)
WBC # BLD: 4.91 K/UL — SIGNIFICANT CHANGE UP (ref 3.8–10.5)
WBC # BLD: 5.15 K/UL — SIGNIFICANT CHANGE UP (ref 3.8–10.5)
WBC # BLD: 5.15 K/UL — SIGNIFICANT CHANGE UP (ref 3.8–10.5)
WBC # BLD: 5.31 K/UL — SIGNIFICANT CHANGE UP (ref 3.8–10.5)
WBC # BLD: 5.87 K/UL — SIGNIFICANT CHANGE UP (ref 3.8–10.5)
WBC # BLD: 5.89 K/UL — SIGNIFICANT CHANGE UP (ref 3.8–10.5)
WBC # BLD: 5.99 K/UL — SIGNIFICANT CHANGE UP (ref 3.8–10.5)
WBC # BLD: 6.23 K/UL — SIGNIFICANT CHANGE UP (ref 3.8–10.5)
WBC # BLD: 6.42 K/UL — SIGNIFICANT CHANGE UP (ref 3.8–10.5)
WBC # BLD: 6.44 K/UL — SIGNIFICANT CHANGE UP (ref 3.8–10.5)
WBC # BLD: 6.48 K/UL — SIGNIFICANT CHANGE UP (ref 3.8–10.5)
WBC # BLD: 6.96 K/UL — SIGNIFICANT CHANGE UP (ref 3.8–10.5)
WBC # BLD: 7.86 K/UL — SIGNIFICANT CHANGE UP (ref 3.8–10.5)
WBC # BLD: 7.91 K/UL — SIGNIFICANT CHANGE UP (ref 3.8–10.5)
WBC # BLD: 8.21 K/UL — SIGNIFICANT CHANGE UP (ref 3.8–10.5)
WBC # BLD: 8.27 K/UL — SIGNIFICANT CHANGE UP (ref 3.8–10.5)
WBC # BLD: 8.35 K/UL — SIGNIFICANT CHANGE UP (ref 3.8–10.5)
WBC # BLD: 8.51 K/UL — SIGNIFICANT CHANGE UP (ref 3.8–10.5)
WBC # BLD: 8.74 K/UL — SIGNIFICANT CHANGE UP (ref 3.8–10.5)
WBC # BLD: 9.37 K/UL — SIGNIFICANT CHANGE UP (ref 3.8–10.5)
WBC # BLD: 9.69 K/UL — SIGNIFICANT CHANGE UP (ref 3.8–10.5)
WBC # BLD: 9.84 K/UL — SIGNIFICANT CHANGE UP (ref 3.8–10.5)
WBC # BLD: 9.95 K/UL — SIGNIFICANT CHANGE UP (ref 3.8–10.5)
WBC # FLD AUTO: 10.12 K/UL — SIGNIFICANT CHANGE UP (ref 3.8–10.5)
WBC # FLD AUTO: 10.35 K/UL — SIGNIFICANT CHANGE UP (ref 3.8–10.5)
WBC # FLD AUTO: 10.53 K/UL — HIGH (ref 3.8–10.5)
WBC # FLD AUTO: 10.75 K/UL — HIGH (ref 3.8–10.5)
WBC # FLD AUTO: 10.79 K/UL — HIGH (ref 3.8–10.5)
WBC # FLD AUTO: 10.84 K/UL — HIGH (ref 3.8–10.5)
WBC # FLD AUTO: 10.92 K/UL — HIGH (ref 3.8–10.5)
WBC # FLD AUTO: 10.96 K/UL — HIGH (ref 3.8–10.5)
WBC # FLD AUTO: 11.05 K/UL — HIGH (ref 3.8–10.5)
WBC # FLD AUTO: 11.31 K/UL — HIGH (ref 3.8–10.5)
WBC # FLD AUTO: 11.43 K/UL — HIGH (ref 3.8–10.5)
WBC # FLD AUTO: 11.54 K/UL — HIGH (ref 3.8–10.5)
WBC # FLD AUTO: 12.17 K/UL — HIGH (ref 3.8–10.5)
WBC # FLD AUTO: 12.73 K/UL — HIGH (ref 3.8–10.5)
WBC # FLD AUTO: 12.87 K/UL — HIGH (ref 3.8–10.5)
WBC # FLD AUTO: 14.22 K/UL — HIGH (ref 3.8–10.5)
WBC # FLD AUTO: 14.3 K/UL — HIGH (ref 3.8–10.5)
WBC # FLD AUTO: 14.97 K/UL — HIGH (ref 3.8–10.5)
WBC # FLD AUTO: 16.84 K/UL — HIGH (ref 3.8–10.5)
WBC # FLD AUTO: 16.86 K/UL — HIGH (ref 3.8–10.5)
WBC # FLD AUTO: 19.18 K/UL — HIGH (ref 3.8–10.5)
WBC # FLD AUTO: 2.84 K/UL — LOW (ref 3.8–10.5)
WBC # FLD AUTO: 3.33 K/UL — LOW (ref 3.8–10.5)
WBC # FLD AUTO: 3.48 K/UL — LOW (ref 3.8–10.5)
WBC # FLD AUTO: 3.56 K/UL — LOW (ref 3.8–10.5)
WBC # FLD AUTO: 3.64 K/UL — LOW (ref 3.8–10.5)
WBC # FLD AUTO: 3.87 K/UL — SIGNIFICANT CHANGE UP (ref 3.8–10.5)
WBC # FLD AUTO: 4.4 K/UL — SIGNIFICANT CHANGE UP (ref 3.8–10.5)
WBC # FLD AUTO: 4.48 K/UL — SIGNIFICANT CHANGE UP (ref 3.8–10.5)
WBC # FLD AUTO: 4.52 K/UL — SIGNIFICANT CHANGE UP (ref 3.8–10.5)
WBC # FLD AUTO: 4.69 K/UL — SIGNIFICANT CHANGE UP (ref 3.8–10.5)
WBC # FLD AUTO: 4.8 K/UL — SIGNIFICANT CHANGE UP (ref 3.8–10.5)
WBC # FLD AUTO: 4.88 K/UL — SIGNIFICANT CHANGE UP (ref 3.8–10.5)
WBC # FLD AUTO: 4.91 K/UL — SIGNIFICANT CHANGE UP (ref 3.8–10.5)
WBC # FLD AUTO: 4.91 K/UL — SIGNIFICANT CHANGE UP (ref 3.8–10.5)
WBC # FLD AUTO: 5.15 K/UL — SIGNIFICANT CHANGE UP (ref 3.8–10.5)
WBC # FLD AUTO: 5.15 K/UL — SIGNIFICANT CHANGE UP (ref 3.8–10.5)
WBC # FLD AUTO: 5.31 K/UL — SIGNIFICANT CHANGE UP (ref 3.8–10.5)
WBC # FLD AUTO: 5.87 K/UL — SIGNIFICANT CHANGE UP (ref 3.8–10.5)
WBC # FLD AUTO: 5.89 K/UL — SIGNIFICANT CHANGE UP (ref 3.8–10.5)
WBC # FLD AUTO: 5.99 K/UL — SIGNIFICANT CHANGE UP (ref 3.8–10.5)
WBC # FLD AUTO: 6.23 K/UL — SIGNIFICANT CHANGE UP (ref 3.8–10.5)
WBC # FLD AUTO: 6.42 K/UL — SIGNIFICANT CHANGE UP (ref 3.8–10.5)
WBC # FLD AUTO: 6.44 K/UL — SIGNIFICANT CHANGE UP (ref 3.8–10.5)
WBC # FLD AUTO: 6.48 K/UL — SIGNIFICANT CHANGE UP (ref 3.8–10.5)
WBC # FLD AUTO: 6.96 K/UL — SIGNIFICANT CHANGE UP (ref 3.8–10.5)
WBC # FLD AUTO: 7.86 K/UL — SIGNIFICANT CHANGE UP (ref 3.8–10.5)
WBC # FLD AUTO: 7.91 K/UL — SIGNIFICANT CHANGE UP (ref 3.8–10.5)
WBC # FLD AUTO: 8.21 K/UL — SIGNIFICANT CHANGE UP (ref 3.8–10.5)
WBC # FLD AUTO: 8.27 K/UL — SIGNIFICANT CHANGE UP (ref 3.8–10.5)
WBC # FLD AUTO: 8.35 K/UL — SIGNIFICANT CHANGE UP (ref 3.8–10.5)
WBC # FLD AUTO: 8.51 K/UL — SIGNIFICANT CHANGE UP (ref 3.8–10.5)
WBC # FLD AUTO: 8.74 K/UL — SIGNIFICANT CHANGE UP (ref 3.8–10.5)
WBC # FLD AUTO: 9.37 K/UL — SIGNIFICANT CHANGE UP (ref 3.8–10.5)
WBC # FLD AUTO: 9.69 K/UL — SIGNIFICANT CHANGE UP (ref 3.8–10.5)
WBC # FLD AUTO: 9.84 K/UL — SIGNIFICANT CHANGE UP (ref 3.8–10.5)
WBC # FLD AUTO: 9.95 K/UL — SIGNIFICANT CHANGE UP (ref 3.8–10.5)
WBC UR QL: 3 /HPF — SIGNIFICANT CHANGE UP (ref 0–5)
WBC UR QL: >50 /HPF — SIGNIFICANT CHANGE UP (ref 0–5)
WBC UR QL: SIGNIFICANT CHANGE UP /HPF (ref 0–5)
WBC UR QL: SIGNIFICANT CHANGE UP /HPF (ref 0–5)

## 2023-01-01 PROCEDURE — 71045 X-RAY EXAM CHEST 1 VIEW: CPT | Mod: 26

## 2023-01-01 PROCEDURE — 99232 SBSQ HOSP IP/OBS MODERATE 35: CPT

## 2023-01-01 PROCEDURE — 99232 SBSQ HOSP IP/OBS MODERATE 35: CPT | Mod: GC

## 2023-01-01 PROCEDURE — 99233 SBSQ HOSP IP/OBS HIGH 50: CPT

## 2023-01-01 PROCEDURE — 99291 CRITICAL CARE FIRST HOUR: CPT | Mod: GC,25

## 2023-01-01 PROCEDURE — 76705 ECHO EXAM OF ABDOMEN: CPT | Mod: 26

## 2023-01-01 PROCEDURE — 93010 ELECTROCARDIOGRAM REPORT: CPT

## 2023-01-01 PROCEDURE — 93306 TTE W/DOPPLER COMPLETE: CPT | Mod: 26

## 2023-01-01 PROCEDURE — 88305 TISSUE EXAM BY PATHOLOGIST: CPT | Mod: 26

## 2023-01-01 PROCEDURE — 36620 INSERTION CATHETER ARTERY: CPT

## 2023-01-01 PROCEDURE — 99222 1ST HOSP IP/OBS MODERATE 55: CPT | Mod: GC

## 2023-01-01 PROCEDURE — 76770 US EXAM ABDO BACK WALL COMP: CPT | Mod: 26

## 2023-01-01 PROCEDURE — 49083 ABD PARACENTESIS W/IMAGING: CPT | Mod: GC

## 2023-01-01 PROCEDURE — 99291 CRITICAL CARE FIRST HOUR: CPT | Mod: GC

## 2023-01-01 PROCEDURE — 99497 ADVNCD CARE PLAN 30 MIN: CPT | Mod: 25

## 2023-01-01 PROCEDURE — 76604 US EXAM CHEST: CPT | Mod: 26,GC

## 2023-01-01 PROCEDURE — 99498 ADVNCD CARE PLAN ADDL 30 MIN: CPT

## 2023-01-01 PROCEDURE — 99233 SBSQ HOSP IP/OBS HIGH 50: CPT | Mod: GC

## 2023-01-01 PROCEDURE — 95813 EEG EXTND MNTR 61-119 MIN: CPT | Mod: 26

## 2023-01-01 PROCEDURE — 95816 EEG AWAKE AND DROWSY: CPT | Mod: 26

## 2023-01-01 PROCEDURE — 99223 1ST HOSP IP/OBS HIGH 75: CPT | Mod: GC

## 2023-01-01 PROCEDURE — 99233 SBSQ HOSP IP/OBS HIGH 50: CPT | Mod: 25

## 2023-01-01 PROCEDURE — 70450 CT HEAD/BRAIN W/O DYE: CPT | Mod: 26

## 2023-01-01 PROCEDURE — 70553 MRI BRAIN STEM W/O & W/DYE: CPT | Mod: 26

## 2023-01-01 PROCEDURE — 99291 CRITICAL CARE FIRST HOUR: CPT

## 2023-01-01 PROCEDURE — 99222 1ST HOSP IP/OBS MODERATE 55: CPT

## 2023-01-01 PROCEDURE — 95720 EEG PHY/QHP EA INCR W/VEEG: CPT

## 2023-01-01 PROCEDURE — 99223 1ST HOSP IP/OBS HIGH 75: CPT

## 2023-01-01 PROCEDURE — 71045 X-RAY EXAM CHEST 1 VIEW: CPT | Mod: 26,76

## 2023-01-01 PROCEDURE — 99291 CRITICAL CARE FIRST HOUR: CPT | Mod: 25

## 2023-01-01 PROCEDURE — 88112 CYTOPATH CELL ENHANCE TECH: CPT | Mod: 26

## 2023-01-01 PROCEDURE — 36620 INSERTION CATHETER ARTERY: CPT | Mod: GC

## 2023-01-01 PROCEDURE — 74019 RADEX ABDOMEN 2 VIEWS: CPT | Mod: 26

## 2023-01-01 PROCEDURE — 43244 EGD VARICES LIGATION: CPT | Mod: GC

## 2023-01-01 PROCEDURE — 74177 CT ABD & PELVIS W/CONTRAST: CPT | Mod: 26

## 2023-01-01 PROCEDURE — 93308 TTE F-UP OR LMTD: CPT | Mod: 26,GC

## 2023-01-01 DEVICE — SPEEDBAND SPRVW 7: Type: IMPLANTABLE DEVICE | Status: FUNCTIONAL

## 2023-01-01 DEVICE — RETRIEVAL FOOD BOLUS ROTHNET: Type: IMPLANTABLE DEVICE | Status: FUNCTIONAL

## 2023-01-01 DEVICE — IMPLANTABLE DEVICE: Type: IMPLANTABLE DEVICE | Status: FUNCTIONAL

## 2023-01-01 RX ORDER — SODIUM CHLORIDE 9 MG/ML
1000 INJECTION, SOLUTION INTRAVENOUS
Refills: 0 | Status: DISCONTINUED | OUTPATIENT
Start: 2023-01-01 | End: 2023-01-01

## 2023-01-01 RX ORDER — HUMAN INSULIN 100 [IU]/ML
13 INJECTION, SUSPENSION SUBCUTANEOUS EVERY 6 HOURS
Refills: 0 | Status: DISCONTINUED | OUTPATIENT
Start: 2023-01-01 | End: 2023-01-01

## 2023-01-01 RX ORDER — PANTOPRAZOLE SODIUM 20 MG/1
8 TABLET, DELAYED RELEASE ORAL
Qty: 80 | Refills: 0 | Status: DISCONTINUED | OUTPATIENT
Start: 2023-01-01 | End: 2023-01-01

## 2023-01-01 RX ORDER — SODIUM CHLORIDE 9 MG/ML
500 INJECTION, SOLUTION INTRAVENOUS ONCE
Refills: 0 | Status: DISCONTINUED | OUTPATIENT
Start: 2023-01-01 | End: 2023-01-01

## 2023-01-01 RX ORDER — ATORVASTATIN CALCIUM 80 MG/1
80 TABLET, FILM COATED ORAL AT BEDTIME
Refills: 0 | Status: DISCONTINUED | OUTPATIENT
Start: 2023-01-01 | End: 2023-01-01

## 2023-01-01 RX ORDER — ROSUVASTATIN CALCIUM 5 MG/1
1 TABLET ORAL
Qty: 0 | Refills: 0 | DISCHARGE

## 2023-01-01 RX ORDER — DEXTROSE 50 % IN WATER 50 %
25 SYRINGE (ML) INTRAVENOUS ONCE
Refills: 0 | Status: DISCONTINUED | OUTPATIENT
Start: 2023-01-01 | End: 2023-01-01

## 2023-01-01 RX ORDER — SPIRONOLACTONE 25 MG/1
25 TABLET, FILM COATED ORAL DAILY
Refills: 0 | Status: DISCONTINUED | OUTPATIENT
Start: 2023-01-01 | End: 2023-01-01

## 2023-01-01 RX ORDER — HUMAN INSULIN 100 [IU]/ML
10 INJECTION, SUSPENSION SUBCUTANEOUS ONCE
Refills: 0 | Status: COMPLETED | OUTPATIENT
Start: 2023-01-01 | End: 2023-01-01

## 2023-01-01 RX ORDER — POTASSIUM CHLORIDE 20 MEQ
10 PACKET (EA) ORAL
Refills: 0 | Status: COMPLETED | OUTPATIENT
Start: 2023-01-01 | End: 2023-01-01

## 2023-01-01 RX ORDER — FUROSEMIDE 40 MG
40 TABLET ORAL ONCE
Refills: 0 | Status: COMPLETED | OUTPATIENT
Start: 2023-01-01 | End: 2023-01-01

## 2023-01-01 RX ORDER — HUMAN INSULIN 100 [IU]/ML
10 INJECTION, SUSPENSION SUBCUTANEOUS EVERY 6 HOURS
Refills: 0 | Status: DISCONTINUED | OUTPATIENT
Start: 2023-01-01 | End: 2023-01-01

## 2023-01-01 RX ORDER — CIPROFLOXACIN LACTATE 400MG/40ML
1 VIAL (ML) INTRAVENOUS
Qty: 0 | Refills: 0 | DISCHARGE
Start: 2023-01-01

## 2023-01-01 RX ORDER — FENTANYL CITRATE 50 UG/ML
50 INJECTION INTRAVENOUS ONCE
Refills: 0 | Status: DISCONTINUED | OUTPATIENT
Start: 2023-01-01 | End: 2023-01-01

## 2023-01-01 RX ORDER — CIPROFLOXACIN LACTATE 400MG/40ML
1 VIAL (ML) INTRAVENOUS
Qty: 30 | Refills: 0
Start: 2023-01-01 | End: 2023-05-06

## 2023-01-01 RX ORDER — PIPERACILLIN AND TAZOBACTAM 4; .5 G/20ML; G/20ML
3.38 INJECTION, POWDER, LYOPHILIZED, FOR SOLUTION INTRAVENOUS ONCE
Refills: 0 | Status: COMPLETED | OUTPATIENT
Start: 2023-01-01 | End: 2023-01-01

## 2023-01-01 RX ORDER — LACTULOSE 10 G/15ML
30 SOLUTION ORAL EVERY 8 HOURS
Refills: 0 | Status: DISCONTINUED | OUTPATIENT
Start: 2023-01-01 | End: 2023-01-01

## 2023-01-01 RX ORDER — DEXTROSE 50 % IN WATER 50 %
12.5 SYRINGE (ML) INTRAVENOUS ONCE
Refills: 0 | Status: DISCONTINUED | OUTPATIENT
Start: 2023-01-01 | End: 2023-01-01

## 2023-01-01 RX ORDER — OCTREOTIDE ACETATE 200 UG/ML
50 INJECTION, SOLUTION INTRAVENOUS; SUBCUTANEOUS
Qty: 500 | Refills: 0 | Status: DISCONTINUED | OUTPATIENT
Start: 2023-01-01 | End: 2023-01-01

## 2023-01-01 RX ORDER — HUMAN INSULIN 100 [IU]/ML
11 INJECTION, SUSPENSION SUBCUTANEOUS EVERY 6 HOURS
Refills: 0 | Status: DISCONTINUED | OUTPATIENT
Start: 2023-01-01 | End: 2023-01-01

## 2023-01-01 RX ORDER — HUMAN INSULIN 100 [IU]/ML
15 INJECTION, SUSPENSION SUBCUTANEOUS EVERY 6 HOURS
Refills: 0 | Status: DISCONTINUED | OUTPATIENT
Start: 2023-01-01 | End: 2023-01-01

## 2023-01-01 RX ORDER — PIPERACILLIN AND TAZOBACTAM 4; .5 G/20ML; G/20ML
3.38 INJECTION, POWDER, LYOPHILIZED, FOR SOLUTION INTRAVENOUS EVERY 8 HOURS
Refills: 0 | Status: DISCONTINUED | OUTPATIENT
Start: 2023-01-01 | End: 2023-01-01

## 2023-01-01 RX ORDER — CIPROFLOXACIN LACTATE 400MG/40ML
500 VIAL (ML) INTRAVENOUS EVERY 24 HOURS
Refills: 0 | Status: DISCONTINUED | OUTPATIENT
Start: 2023-01-01 | End: 2023-01-01

## 2023-01-01 RX ORDER — CEFTRIAXONE 500 MG/1
1000 INJECTION, POWDER, FOR SOLUTION INTRAMUSCULAR; INTRAVENOUS ONCE
Refills: 0 | Status: COMPLETED | OUTPATIENT
Start: 2023-01-01 | End: 2023-01-01

## 2023-01-01 RX ORDER — PROPOFOL 10 MG/ML
10 INJECTION, EMULSION INTRAVENOUS
Qty: 1000 | Refills: 0 | Status: DISCONTINUED | OUTPATIENT
Start: 2023-01-01 | End: 2023-01-01

## 2023-01-01 RX ORDER — FUROSEMIDE 40 MG
20 TABLET ORAL DAILY
Refills: 0 | Status: DISCONTINUED | OUTPATIENT
Start: 2023-01-01 | End: 2023-01-01

## 2023-01-01 RX ORDER — GLUCAGON INJECTION, SOLUTION 0.5 MG/.1ML
1 INJECTION, SOLUTION SUBCUTANEOUS ONCE
Refills: 0 | Status: DISCONTINUED | OUTPATIENT
Start: 2023-01-01 | End: 2023-01-01

## 2023-01-01 RX ORDER — HUMAN INSULIN 100 [IU]/ML
12 INJECTION, SUSPENSION SUBCUTANEOUS EVERY 6 HOURS
Refills: 0 | Status: DISCONTINUED | OUTPATIENT
Start: 2023-01-01 | End: 2023-01-01

## 2023-01-01 RX ORDER — POTASSIUM PHOSPHATE, MONOBASIC POTASSIUM PHOSPHATE, DIBASIC 236; 224 MG/ML; MG/ML
15 INJECTION, SOLUTION INTRAVENOUS ONCE
Refills: 0 | Status: COMPLETED | OUTPATIENT
Start: 2023-01-01 | End: 2023-01-01

## 2023-01-01 RX ORDER — MIDODRINE HYDROCHLORIDE 2.5 MG/1
10 TABLET ORAL EVERY 8 HOURS
Refills: 0 | Status: DISCONTINUED | OUTPATIENT
Start: 2023-01-01 | End: 2023-01-01

## 2023-01-01 RX ORDER — SPIRONOLACTONE 25 MG/1
50 TABLET, FILM COATED ORAL DAILY
Refills: 0 | Status: DISCONTINUED | OUTPATIENT
Start: 2023-01-01 | End: 2023-01-01

## 2023-01-01 RX ORDER — INSULIN GLARGINE 100 [IU]/ML
8 INJECTION, SOLUTION SUBCUTANEOUS AT BEDTIME
Refills: 0 | Status: DISCONTINUED | OUTPATIENT
Start: 2023-01-01 | End: 2023-01-01

## 2023-01-01 RX ORDER — NOREPINEPHRINE BITARTRATE/D5W 8 MG/250ML
0.05 PLASTIC BAG, INJECTION (ML) INTRAVENOUS
Qty: 8 | Refills: 0 | Status: DISCONTINUED | OUTPATIENT
Start: 2023-01-01 | End: 2023-01-01

## 2023-01-01 RX ORDER — LEVOTHYROXINE SODIUM 125 MCG
1 TABLET ORAL
Qty: 0 | Refills: 0 | DISCHARGE

## 2023-01-01 RX ORDER — LACTULOSE 10 G/15ML
20 SOLUTION ORAL
Refills: 0 | Status: DISCONTINUED | OUTPATIENT
Start: 2023-01-01 | End: 2023-01-01

## 2023-01-01 RX ORDER — POTASSIUM PHOSPHATE, MONOBASIC POTASSIUM PHOSPHATE, DIBASIC 236; 224 MG/ML; MG/ML
30 INJECTION, SOLUTION INTRAVENOUS ONCE
Refills: 0 | Status: COMPLETED | OUTPATIENT
Start: 2023-01-01 | End: 2023-01-01

## 2023-01-01 RX ORDER — FENTANYL CITRATE 50 UG/ML
100 INJECTION INTRAVENOUS ONCE
Refills: 0 | Status: DISCONTINUED | OUTPATIENT
Start: 2023-01-01 | End: 2023-01-01

## 2023-01-01 RX ORDER — TRAZODONE HCL 50 MG
1 TABLET ORAL
Qty: 0 | Refills: 0 | DISCHARGE

## 2023-01-01 RX ORDER — CHLORHEXIDINE GLUCONATE 213 G/1000ML
1 SOLUTION TOPICAL
Refills: 0 | Status: DISCONTINUED | OUTPATIENT
Start: 2023-01-01 | End: 2023-01-01

## 2023-01-01 RX ORDER — MUPIROCIN 20 MG/G
1 OINTMENT TOPICAL
Refills: 0 | Status: COMPLETED | OUTPATIENT
Start: 2023-01-01 | End: 2023-01-01

## 2023-01-01 RX ORDER — LACTULOSE 10 G/15ML
30 SOLUTION ORAL EVERY 4 HOURS
Refills: 0 | Status: DISCONTINUED | OUTPATIENT
Start: 2023-01-01 | End: 2023-01-01

## 2023-01-01 RX ORDER — CEFTRIAXONE 500 MG/1
1000 INJECTION, POWDER, FOR SOLUTION INTRAMUSCULAR; INTRAVENOUS EVERY 24 HOURS
Refills: 0 | Status: DISCONTINUED | OUTPATIENT
Start: 2023-01-01 | End: 2023-01-01

## 2023-01-01 RX ORDER — LACTULOSE 10 G/15ML
10 SOLUTION ORAL THREE TIMES A DAY
Refills: 0 | Status: DISCONTINUED | OUTPATIENT
Start: 2023-01-01 | End: 2023-01-01

## 2023-01-01 RX ORDER — PROPOFOL 10 MG/ML
11.73 INJECTION, EMULSION INTRAVENOUS
Qty: 1000 | Refills: 0 | Status: DISCONTINUED | OUTPATIENT
Start: 2023-01-01 | End: 2023-01-01

## 2023-01-01 RX ORDER — SODIUM CHLORIDE 9 MG/ML
1000 INJECTION INTRAMUSCULAR; INTRAVENOUS; SUBCUTANEOUS
Refills: 0 | Status: DISCONTINUED | OUTPATIENT
Start: 2023-01-01 | End: 2023-01-01

## 2023-01-01 RX ORDER — SITAGLIPTIN 50 MG/1
1 TABLET, FILM COATED ORAL
Qty: 0 | Refills: 0 | DISCHARGE

## 2023-01-01 RX ORDER — MIDAZOLAM HYDROCHLORIDE 1 MG/ML
0.02 INJECTION, SOLUTION INTRAMUSCULAR; INTRAVENOUS
Qty: 100 | Refills: 0 | Status: DISCONTINUED | OUTPATIENT
Start: 2023-01-01 | End: 2023-01-01

## 2023-01-01 RX ORDER — POTASSIUM CHLORIDE 20 MEQ
40 PACKET (EA) ORAL ONCE
Refills: 0 | Status: COMPLETED | OUTPATIENT
Start: 2023-01-01 | End: 2023-01-01

## 2023-01-01 RX ORDER — SITAGLIPTIN 50 MG/1
1 TABLET, FILM COATED ORAL
Qty: 30 | Refills: 0
Start: 2023-01-01 | End: 2023-05-06

## 2023-01-01 RX ORDER — HUMAN INSULIN 100 [IU]/ML
5 INJECTION, SUSPENSION SUBCUTANEOUS EVERY 6 HOURS
Refills: 0 | Status: DISCONTINUED | OUTPATIENT
Start: 2023-01-01 | End: 2023-01-01

## 2023-01-01 RX ORDER — HUMAN INSULIN 100 [IU]/ML
9 INJECTION, SUSPENSION SUBCUTANEOUS EVERY 6 HOURS
Refills: 0 | Status: DISCONTINUED | OUTPATIENT
Start: 2023-01-01 | End: 2023-01-01

## 2023-01-01 RX ORDER — POLYETHYLENE GLYCOL 3350 17 G/17G
17 POWDER, FOR SOLUTION ORAL
Refills: 0 | Status: DISCONTINUED | OUTPATIENT
Start: 2023-01-01 | End: 2023-01-01

## 2023-01-01 RX ORDER — TENOFOVIR DISOPROXIL FUMARATE 300 MG/1
1 TABLET, FILM COATED ORAL
Qty: 0 | Refills: 0 | DISCHARGE

## 2023-01-01 RX ORDER — LACTULOSE 10 G/15ML
10 SOLUTION ORAL ONCE
Refills: 0 | Status: COMPLETED | OUTPATIENT
Start: 2023-01-01 | End: 2023-01-01

## 2023-01-01 RX ORDER — MIDAZOLAM HYDROCHLORIDE 1 MG/ML
4 INJECTION, SOLUTION INTRAMUSCULAR; INTRAVENOUS ONCE
Refills: 0 | Status: DISCONTINUED | OUTPATIENT
Start: 2023-01-01 | End: 2023-01-01

## 2023-01-01 RX ORDER — SODIUM CHLORIDE 9 MG/ML
500 INJECTION INTRAMUSCULAR; INTRAVENOUS; SUBCUTANEOUS ONCE
Refills: 0 | Status: COMPLETED | OUTPATIENT
Start: 2023-01-01 | End: 2023-01-01

## 2023-01-01 RX ORDER — PANTOPRAZOLE SODIUM 20 MG/1
40 TABLET, DELAYED RELEASE ORAL DAILY
Refills: 0 | Status: DISCONTINUED | OUTPATIENT
Start: 2023-01-01 | End: 2023-01-01

## 2023-01-01 RX ORDER — ASPIRIN/CALCIUM CARB/MAGNESIUM 324 MG
81 TABLET ORAL DAILY
Refills: 0 | Status: DISCONTINUED | OUTPATIENT
Start: 2023-01-01 | End: 2023-01-01

## 2023-01-01 RX ORDER — MIDODRINE HYDROCHLORIDE 2.5 MG/1
10 TABLET ORAL ONCE
Refills: 0 | Status: COMPLETED | OUTPATIENT
Start: 2023-01-01 | End: 2023-01-01

## 2023-01-01 RX ORDER — LACTULOSE 10 G/15ML
200 SOLUTION ORAL EVERY 6 HOURS
Refills: 0 | Status: DISCONTINUED | OUTPATIENT
Start: 2023-01-01 | End: 2023-01-01

## 2023-01-01 RX ORDER — NOREPINEPHRINE BITARTRATE/D5W 8 MG/250ML
0.2 PLASTIC BAG, INJECTION (ML) INTRAVENOUS
Qty: 8 | Refills: 0 | Status: DISCONTINUED | OUTPATIENT
Start: 2023-01-01 | End: 2023-01-01

## 2023-01-01 RX ORDER — POTASSIUM CHLORIDE 20 MEQ
20 PACKET (EA) ORAL ONCE
Refills: 0 | Status: COMPLETED | OUTPATIENT
Start: 2023-01-01 | End: 2023-01-01

## 2023-01-01 RX ORDER — POTASSIUM CHLORIDE 20 MEQ
10 PACKET (EA) ORAL EVERY 4 HOURS
Refills: 0 | Status: DISCONTINUED | OUTPATIENT
Start: 2023-01-01 | End: 2023-01-01

## 2023-01-01 RX ORDER — INSULIN LISPRO 100/ML
VIAL (ML) SUBCUTANEOUS
Refills: 0 | Status: DISCONTINUED | OUTPATIENT
Start: 2023-01-01 | End: 2023-01-01

## 2023-01-01 RX ORDER — PANTOPRAZOLE SODIUM 20 MG/1
40 TABLET, DELAYED RELEASE ORAL EVERY 12 HOURS
Refills: 0 | Status: DISCONTINUED | OUTPATIENT
Start: 2023-01-01 | End: 2023-01-01

## 2023-01-01 RX ORDER — TAMSULOSIN HYDROCHLORIDE 0.4 MG/1
0.8 CAPSULE ORAL AT BEDTIME
Refills: 0 | Status: DISCONTINUED | OUTPATIENT
Start: 2023-01-01 | End: 2023-01-01

## 2023-01-01 RX ORDER — MIDODRINE HYDROCHLORIDE 2.5 MG/1
10 TABLET ORAL ONCE
Refills: 0 | Status: DISCONTINUED | OUTPATIENT
Start: 2023-01-01 | End: 2023-01-01

## 2023-01-01 RX ORDER — INSULIN LISPRO 100/ML
2 VIAL (ML) SUBCUTANEOUS
Refills: 0 | Status: DISCONTINUED | OUTPATIENT
Start: 2023-01-01 | End: 2023-01-01

## 2023-01-01 RX ORDER — LACTULOSE 10 G/15ML
30 SOLUTION ORAL
Qty: 0 | Refills: 0 | DISCHARGE
Start: 2023-01-01

## 2023-01-01 RX ORDER — MIDODRINE HYDROCHLORIDE 2.5 MG/1
10 TABLET ORAL THREE TIMES A DAY
Refills: 0 | Status: DISCONTINUED | OUTPATIENT
Start: 2023-01-01 | End: 2023-01-01

## 2023-01-01 RX ORDER — HUMAN INSULIN 100 [IU]/ML
3 INJECTION, SUSPENSION SUBCUTANEOUS ONCE
Refills: 0 | Status: COMPLETED | OUTPATIENT
Start: 2023-01-01 | End: 2023-01-01

## 2023-01-01 RX ORDER — ACETAMINOPHEN 500 MG
1000 TABLET ORAL ONCE
Refills: 0 | Status: COMPLETED | OUTPATIENT
Start: 2023-01-01 | End: 2023-01-01

## 2023-01-01 RX ORDER — OCTREOTIDE ACETATE 200 UG/ML
50 INJECTION, SOLUTION INTRAVENOUS; SUBCUTANEOUS ONCE
Refills: 0 | Status: COMPLETED | OUTPATIENT
Start: 2023-01-01 | End: 2023-01-01

## 2023-01-01 RX ORDER — SODIUM BICARBONATE 1 MEQ/ML
50 SYRINGE (ML) INTRAVENOUS ONCE
Refills: 0 | Status: COMPLETED | OUTPATIENT
Start: 2023-01-01 | End: 2023-01-01

## 2023-01-01 RX ORDER — HUMAN INSULIN 100 [IU]/ML
13 INJECTION, SUSPENSION SUBCUTANEOUS ONCE
Refills: 0 | Status: COMPLETED | OUTPATIENT
Start: 2023-01-01 | End: 2023-01-01

## 2023-01-01 RX ORDER — PANTOPRAZOLE SODIUM 20 MG/1
80 TABLET, DELAYED RELEASE ORAL ONCE
Refills: 0 | Status: COMPLETED | OUTPATIENT
Start: 2023-01-01 | End: 2023-01-01

## 2023-01-01 RX ORDER — TENOFOVIR DISOPROXIL FUMARATE 300 MG/1
300 TABLET, FILM COATED ORAL DAILY
Refills: 0 | Status: DISCONTINUED | OUTPATIENT
Start: 2023-01-01 | End: 2023-01-01

## 2023-01-01 RX ORDER — THIAMINE MONONITRATE (VIT B1) 100 MG
500 TABLET ORAL EVERY 8 HOURS
Refills: 0 | Status: COMPLETED | OUTPATIENT
Start: 2023-01-01 | End: 2023-01-01

## 2023-01-01 RX ORDER — ETOMIDATE 2 MG/ML
20 INJECTION INTRAVENOUS ONCE
Refills: 0 | Status: COMPLETED | OUTPATIENT
Start: 2023-01-01 | End: 2023-01-01

## 2023-01-01 RX ORDER — ALBUMIN HUMAN 25 %
250 VIAL (ML) INTRAVENOUS ONCE
Refills: 0 | Status: COMPLETED | OUTPATIENT
Start: 2023-01-01 | End: 2023-01-01

## 2023-01-01 RX ORDER — URSODIOL 250 MG/1
1 TABLET, FILM COATED ORAL
Qty: 0 | Refills: 0 | DISCHARGE

## 2023-01-01 RX ORDER — BNT162B2 ORIGINAL AND OMICRON BA.4/BA.5 .1125; .1125 MG/2.25ML; MG/2.25ML
0.3 INJECTION, SUSPENSION INTRAMUSCULAR ONCE
Refills: 0 | Status: DISCONTINUED | OUTPATIENT
Start: 2023-01-01 | End: 2023-01-01

## 2023-01-01 RX ORDER — LACTULOSE 10 G/15ML
200 SOLUTION ORAL DAILY
Refills: 0 | Status: DISCONTINUED | OUTPATIENT
Start: 2023-01-01 | End: 2023-01-01

## 2023-01-01 RX ORDER — NADOLOL 80 MG/1
1 TABLET ORAL
Qty: 0 | Refills: 0 | DISCHARGE

## 2023-01-01 RX ORDER — PANTOPRAZOLE SODIUM 20 MG/1
40 TABLET, DELAYED RELEASE ORAL
Refills: 0 | Status: DISCONTINUED | OUTPATIENT
Start: 2023-01-01 | End: 2023-01-01

## 2023-01-01 RX ORDER — INSULIN LISPRO 100/ML
VIAL (ML) SUBCUTANEOUS EVERY 6 HOURS
Refills: 0 | Status: DISCONTINUED | OUTPATIENT
Start: 2023-01-01 | End: 2023-01-01

## 2023-01-01 RX ORDER — HUMAN INSULIN 100 [IU]/ML
20 INJECTION, SUSPENSION SUBCUTANEOUS EVERY 6 HOURS
Refills: 0 | Status: DISCONTINUED | OUTPATIENT
Start: 2023-01-01 | End: 2023-01-01

## 2023-01-01 RX ORDER — HUMAN INSULIN 100 [IU]/ML
18 INJECTION, SUSPENSION SUBCUTANEOUS EVERY 6 HOURS
Refills: 0 | Status: DISCONTINUED | OUTPATIENT
Start: 2023-01-01 | End: 2023-01-01

## 2023-01-01 RX ORDER — OMEPRAZOLE 10 MG/1
1 CAPSULE, DELAYED RELEASE ORAL
Qty: 0 | Refills: 0 | DISCHARGE

## 2023-01-01 RX ORDER — DEXTROSE 50 % IN WATER 50 %
12.5 SYRINGE (ML) INTRAVENOUS ONCE
Refills: 0 | Status: COMPLETED | OUTPATIENT
Start: 2023-01-01 | End: 2023-01-01

## 2023-01-01 RX ORDER — MIDODRINE HYDROCHLORIDE 2.5 MG/1
1 TABLET ORAL
Qty: 0 | Refills: 0 | DISCHARGE
Start: 2023-01-01

## 2023-01-01 RX ORDER — FENTANYL CITRATE 50 UG/ML
0.5 INJECTION INTRAVENOUS
Qty: 2500 | Refills: 0 | Status: ACTIVE | OUTPATIENT
Start: 2023-01-01 | End: 2023-01-01

## 2023-01-01 RX ORDER — MUPIROCIN 20 MG/G
1 OINTMENT TOPICAL
Refills: 0 | Status: DISCONTINUED | OUTPATIENT
Start: 2023-01-01 | End: 2023-01-01

## 2023-01-01 RX ORDER — HUMAN INSULIN 100 [IU]/ML
16 INJECTION, SUSPENSION SUBCUTANEOUS EVERY 6 HOURS
Refills: 0 | Status: DISCONTINUED | OUTPATIENT
Start: 2023-01-01 | End: 2023-01-01

## 2023-01-01 RX ORDER — ERYTHROMYCIN ETHYLSUCCINATE 400 MG
250 TABLET ORAL ONCE
Refills: 0 | Status: DISCONTINUED | OUTPATIENT
Start: 2023-01-01 | End: 2023-01-01

## 2023-01-01 RX ORDER — LACTULOSE 10 G/15ML
30 SOLUTION ORAL
Qty: 1800 | Refills: 0
Start: 2023-01-01 | End: 2023-05-06

## 2023-01-01 RX ORDER — HUMAN INSULIN 100 [IU]/ML
8 INJECTION, SUSPENSION SUBCUTANEOUS EVERY 6 HOURS
Refills: 0 | Status: DISCONTINUED | OUTPATIENT
Start: 2023-01-01 | End: 2023-01-01

## 2023-01-01 RX ORDER — TAMSULOSIN HYDROCHLORIDE 0.4 MG/1
1 CAPSULE ORAL
Qty: 0 | Refills: 0 | DISCHARGE

## 2023-01-01 RX ORDER — SODIUM BICARBONATE 1 MEQ/ML
650 SYRINGE (ML) INTRAVENOUS THREE TIMES A DAY
Refills: 0 | Status: DISCONTINUED | OUTPATIENT
Start: 2023-01-01 | End: 2023-01-01

## 2023-01-01 RX ORDER — DEXTROSE 50 % IN WATER 50 %
15 SYRINGE (ML) INTRAVENOUS ONCE
Refills: 0 | Status: DISCONTINUED | OUTPATIENT
Start: 2023-01-01 | End: 2023-01-01

## 2023-01-01 RX ORDER — VANCOMYCIN HCL 1 G
1000 VIAL (EA) INTRAVENOUS ONCE
Refills: 0 | Status: COMPLETED | OUTPATIENT
Start: 2023-01-01 | End: 2023-01-01

## 2023-01-01 RX ORDER — CEFTRIAXONE 500 MG/1
2000 INJECTION, POWDER, FOR SOLUTION INTRAMUSCULAR; INTRAVENOUS EVERY 24 HOURS
Refills: 0 | Status: DISCONTINUED | OUTPATIENT
Start: 2023-01-01 | End: 2023-01-01

## 2023-01-01 RX ORDER — LACTULOSE 10 G/15ML
10 SOLUTION ORAL
Refills: 0 | Status: DISCONTINUED | OUTPATIENT
Start: 2023-01-01 | End: 2023-01-01

## 2023-01-01 RX ORDER — FUROSEMIDE 40 MG
1 TABLET ORAL
Qty: 0 | Refills: 0 | DISCHARGE

## 2023-01-01 RX ORDER — TENOFOVIR DISOPROXIL FUMARATE 300 MG/1
1 TABLET, FILM COATED ORAL
Qty: 30 | Refills: 0
Start: 2023-01-01 | End: 2023-05-06

## 2023-01-01 RX ORDER — FENTANYL CITRATE 50 UG/ML
0.5 INJECTION INTRAVENOUS
Qty: 2500 | Refills: 0 | Status: DISCONTINUED | OUTPATIENT
Start: 2023-01-01 | End: 2023-01-01

## 2023-01-01 RX ORDER — PANTOPRAZOLE SODIUM 20 MG/1
1 TABLET, DELAYED RELEASE ORAL
Qty: 30 | Refills: 0
Start: 2023-01-01 | End: 2023-05-06

## 2023-01-01 RX ORDER — FUROSEMIDE 40 MG
40 TABLET ORAL DAILY
Refills: 0 | Status: DISCONTINUED | OUTPATIENT
Start: 2023-01-01 | End: 2023-01-01

## 2023-01-01 RX ORDER — LEVOTHYROXINE SODIUM 125 MCG
75 TABLET ORAL AT BEDTIME
Refills: 0 | Status: DISCONTINUED | OUTPATIENT
Start: 2023-01-01 | End: 2023-01-01

## 2023-01-01 RX ORDER — MIDODRINE HYDROCHLORIDE 2.5 MG/1
5 TABLET ORAL THREE TIMES A DAY
Refills: 0 | Status: DISCONTINUED | OUTPATIENT
Start: 2023-01-01 | End: 2023-01-01

## 2023-01-01 RX ORDER — NOREPINEPHRINE BITARTRATE/D5W 8 MG/250ML
0.23 PLASTIC BAG, INJECTION (ML) INTRAVENOUS
Qty: 8 | Refills: 0 | Status: DISCONTINUED | OUTPATIENT
Start: 2023-01-01 | End: 2023-01-01

## 2023-01-01 RX ORDER — SODIUM BICARBONATE 1 MEQ/ML
0.04 SYRINGE (ML) INTRAVENOUS
Qty: 50 | Refills: 0 | Status: DISCONTINUED | OUTPATIENT
Start: 2023-01-01 | End: 2023-01-01

## 2023-01-01 RX ORDER — ONDANSETRON 8 MG/1
4 TABLET, FILM COATED ORAL EVERY 6 HOURS
Refills: 0 | Status: DISCONTINUED | OUTPATIENT
Start: 2023-01-01 | End: 2023-01-01

## 2023-01-01 RX ORDER — ALBUMIN HUMAN 25 %
250 VIAL (ML) INTRAVENOUS ONCE
Refills: 0 | Status: DISCONTINUED | OUTPATIENT
Start: 2023-01-01 | End: 2023-01-01

## 2023-01-01 RX ORDER — ASPIRIN/CALCIUM CARB/MAGNESIUM 324 MG
1 TABLET ORAL
Qty: 0 | Refills: 0 | DISCHARGE

## 2023-01-01 RX ORDER — POTASSIUM CHLORIDE 20 MEQ
40 PACKET (EA) ORAL ONCE
Refills: 0 | Status: DISCONTINUED | OUTPATIENT
Start: 2023-01-01 | End: 2023-01-01

## 2023-01-01 RX ORDER — SODIUM,POTASSIUM PHOSPHATES 278-250MG
1 POWDER IN PACKET (EA) ORAL THREE TIMES A DAY
Refills: 0 | Status: COMPLETED | OUTPATIENT
Start: 2023-01-01 | End: 2023-01-01

## 2023-01-01 RX ORDER — TENOFOVIR DISOPROXIL FUMARATE 300 MG/1
1 TABLET, FILM COATED ORAL
Qty: 0 | Refills: 0 | DISCHARGE
Start: 2023-01-01

## 2023-01-01 RX ORDER — INSULIN GLARGINE 100 [IU]/ML
6 INJECTION, SOLUTION SUBCUTANEOUS AT BEDTIME
Refills: 0 | Status: DISCONTINUED | OUTPATIENT
Start: 2023-01-01 | End: 2023-01-01

## 2023-01-01 RX ORDER — PIOGLITAZONE HYDROCHLORIDE 15 MG/1
1 TABLET ORAL
Qty: 0 | Refills: 0 | DISCHARGE

## 2023-01-01 RX ORDER — ROCURONIUM BROMIDE 10 MG/ML
100 VIAL (ML) INTRAVENOUS ONCE
Refills: 0 | Status: COMPLETED | OUTPATIENT
Start: 2023-01-01 | End: 2023-01-01

## 2023-01-01 RX ORDER — DEXMEDETOMIDINE HYDROCHLORIDE IN 0.9% SODIUM CHLORIDE 4 UG/ML
0.2 INJECTION INTRAVENOUS
Qty: 400 | Refills: 0 | Status: DISCONTINUED | OUTPATIENT
Start: 2023-01-01 | End: 2023-01-01

## 2023-01-01 RX ORDER — LEVOTHYROXINE SODIUM 125 MCG
100 TABLET ORAL DAILY
Refills: 0 | Status: DISCONTINUED | OUTPATIENT
Start: 2023-01-01 | End: 2023-01-01

## 2023-01-01 RX ORDER — MAGNESIUM SULFATE 500 MG/ML
2 VIAL (ML) INJECTION ONCE
Refills: 0 | Status: COMPLETED | OUTPATIENT
Start: 2023-01-01 | End: 2023-01-01

## 2023-01-01 RX ORDER — LEVOTHYROXINE SODIUM 125 MCG
1 TABLET ORAL
Qty: 30 | Refills: 0
Start: 2023-01-01 | End: 2023-05-06

## 2023-01-01 RX ORDER — FENTANYL CITRATE 50 UG/ML
0.75 INJECTION INTRAVENOUS
Qty: 2500 | Refills: 0 | Status: DISCONTINUED | OUTPATIENT
Start: 2023-01-01 | End: 2023-01-01

## 2023-01-01 RX ORDER — SODIUM BICARBONATE 1 MEQ/ML
0.2 SYRINGE (ML) INTRAVENOUS
Qty: 150 | Refills: 0 | Status: DISCONTINUED | OUTPATIENT
Start: 2023-01-01 | End: 2023-01-01

## 2023-01-01 RX ORDER — ALBUMIN HUMAN 25 %
50 VIAL (ML) INTRAVENOUS ONCE
Refills: 0 | Status: COMPLETED | OUTPATIENT
Start: 2023-01-01 | End: 2023-01-01

## 2023-01-01 RX ORDER — POTASSIUM CHLORIDE 20 MEQ
10 PACKET (EA) ORAL ONCE
Refills: 0 | Status: COMPLETED | OUTPATIENT
Start: 2023-01-01 | End: 2023-01-01

## 2023-01-01 RX ORDER — ALBUMIN HUMAN 25 %
100 VIAL (ML) INTRAVENOUS EVERY 8 HOURS
Refills: 0 | Status: COMPLETED | OUTPATIENT
Start: 2023-01-01 | End: 2023-01-01

## 2023-01-01 RX ORDER — NOREPINEPHRINE BITARTRATE/D5W 8 MG/250ML
0.05 PLASTIC BAG, INJECTION (ML) INTRAVENOUS
Qty: 16 | Refills: 0 | Status: DISCONTINUED | OUTPATIENT
Start: 2023-01-01 | End: 2023-01-01

## 2023-01-01 RX ORDER — RIFAXIMIN 200 MG/1
1 TABLET ORAL
Qty: 60 | Refills: 0
Start: 2023-01-01 | End: 2023-05-06

## 2023-01-01 RX ORDER — NOREPINEPHRINE BITARTRATE/D5W 8 MG/250ML
0.03 PLASTIC BAG, INJECTION (ML) INTRAVENOUS
Qty: 8 | Refills: 0 | Status: DISCONTINUED | OUTPATIENT
Start: 2023-01-01 | End: 2023-01-01

## 2023-01-01 RX ORDER — INSULIN GLARGINE 100 [IU]/ML
5 INJECTION, SOLUTION SUBCUTANEOUS AT BEDTIME
Refills: 0 | Status: DISCONTINUED | OUTPATIENT
Start: 2023-01-01 | End: 2023-01-01

## 2023-01-01 RX ORDER — MIDODRINE HYDROCHLORIDE 2.5 MG/1
1 TABLET ORAL
Qty: 90 | Refills: 0
Start: 2023-01-01 | End: 2023-05-06

## 2023-01-01 RX ORDER — MIDODRINE HYDROCHLORIDE 2.5 MG/1
5 TABLET ORAL ONCE
Refills: 0 | Status: COMPLETED | OUTPATIENT
Start: 2023-01-01 | End: 2023-01-01

## 2023-01-01 RX ORDER — LACTULOSE 10 G/15ML
10 SOLUTION ORAL EVERY 4 HOURS
Refills: 0 | Status: DISCONTINUED | OUTPATIENT
Start: 2023-01-01 | End: 2023-01-01

## 2023-01-01 RX ORDER — FENTANYL CITRATE 50 UG/ML
25 INJECTION INTRAVENOUS ONCE
Refills: 0 | Status: DISCONTINUED | OUTPATIENT
Start: 2023-01-01 | End: 2023-01-01

## 2023-01-01 RX ORDER — LACTULOSE 10 G/15ML
20 SOLUTION ORAL EVERY 8 HOURS
Refills: 0 | Status: DISCONTINUED | OUTPATIENT
Start: 2023-01-01 | End: 2023-01-01

## 2023-01-01 RX ORDER — SODIUM CHLORIDE 0.65 %
1 AEROSOL, SPRAY (ML) NASAL THREE TIMES A DAY
Refills: 0 | Status: DISCONTINUED | OUTPATIENT
Start: 2023-01-01 | End: 2023-01-01

## 2023-01-01 RX ORDER — METOCLOPRAMIDE HCL 10 MG
5 TABLET ORAL DAILY
Refills: 0 | Status: DISCONTINUED | OUTPATIENT
Start: 2023-01-01 | End: 2023-01-01

## 2023-01-01 RX ORDER — ALBUMIN HUMAN 25 %
250 VIAL (ML) INTRAVENOUS EVERY 8 HOURS
Refills: 0 | Status: COMPLETED | OUTPATIENT
Start: 2023-01-01 | End: 2023-01-01

## 2023-01-01 RX ORDER — LACTULOSE 10 G/15ML
200 SOLUTION ORAL ONCE
Refills: 0 | Status: COMPLETED | OUTPATIENT
Start: 2023-01-01 | End: 2023-01-01

## 2023-01-01 RX ORDER — SODIUM CHLORIDE 9 MG/ML
1000 INJECTION, SOLUTION INTRAVENOUS ONCE
Refills: 0 | Status: COMPLETED | OUTPATIENT
Start: 2023-01-01 | End: 2023-01-01

## 2023-01-01 RX ORDER — SIMETHICONE 80 MG/1
1 TABLET, CHEWABLE ORAL
Qty: 0 | Refills: 0 | DISCHARGE

## 2023-01-01 RX ORDER — PIPERACILLIN AND TAZOBACTAM 4; .5 G/20ML; G/20ML
3.38 INJECTION, POWDER, LYOPHILIZED, FOR SOLUTION INTRAVENOUS EVERY 8 HOURS
Refills: 0 | Status: COMPLETED | OUTPATIENT
Start: 2023-01-01 | End: 2023-01-01

## 2023-01-01 RX ORDER — PANTOPRAZOLE SODIUM 20 MG/1
1 TABLET, DELAYED RELEASE ORAL
Qty: 0 | Refills: 0 | DISCHARGE
Start: 2023-01-01

## 2023-01-01 RX ORDER — ACETAMINOPHEN 500 MG
500 TABLET ORAL ONCE
Refills: 0 | Status: COMPLETED | OUTPATIENT
Start: 2023-01-01 | End: 2023-01-01

## 2023-01-01 RX ORDER — CHLORHEXIDINE GLUCONATE 213 G/1000ML
15 SOLUTION TOPICAL EVERY 12 HOURS
Refills: 0 | Status: DISCONTINUED | OUTPATIENT
Start: 2023-01-01 | End: 2023-01-01

## 2023-01-01 RX ORDER — POTASSIUM CHLORIDE 20 MEQ
10 PACKET (EA) ORAL ONCE
Refills: 0 | Status: DISCONTINUED | OUTPATIENT
Start: 2023-01-01 | End: 2023-01-01

## 2023-01-01 RX ORDER — ONDANSETRON 8 MG/1
4 TABLET, FILM COATED ORAL ONCE
Refills: 0 | Status: COMPLETED | OUTPATIENT
Start: 2023-01-01 | End: 2023-01-01

## 2023-01-01 RX ORDER — RIFAXIMIN 200 MG/1
1 TABLET ORAL
Qty: 0 | Refills: 0 | DISCHARGE
Start: 2023-01-01

## 2023-01-01 RX ORDER — HEPARIN SODIUM 5000 [USP'U]/ML
5000 INJECTION INTRAVENOUS; SUBCUTANEOUS EVERY 12 HOURS
Refills: 0 | Status: DISCONTINUED | OUTPATIENT
Start: 2023-01-01 | End: 2023-01-01

## 2023-01-01 RX ORDER — INSULIN LISPRO 100/ML
5 VIAL (ML) SUBCUTANEOUS
Refills: 0 | Status: DISCONTINUED | OUTPATIENT
Start: 2023-01-01 | End: 2023-01-01

## 2023-01-01 RX ORDER — HYDROMORPHONE HYDROCHLORIDE 2 MG/ML
0.25 INJECTION INTRAMUSCULAR; INTRAVENOUS; SUBCUTANEOUS ONCE
Refills: 0 | Status: DISCONTINUED | OUTPATIENT
Start: 2023-01-01 | End: 2023-01-01

## 2023-01-01 RX ORDER — LACTULOSE 10 G/15ML
10 SOLUTION ORAL DAILY
Refills: 0 | Status: DISCONTINUED | OUTPATIENT
Start: 2023-01-01 | End: 2023-01-01

## 2023-01-01 RX ORDER — HYDROXYZINE HCL 10 MG
2 TABLET ORAL
Qty: 0 | Refills: 0 | DISCHARGE

## 2023-01-01 RX ORDER — HUMAN INSULIN 100 [IU]/ML
2 INJECTION, SUSPENSION SUBCUTANEOUS ONCE
Refills: 0 | Status: COMPLETED | OUTPATIENT
Start: 2023-01-01 | End: 2023-01-01

## 2023-01-01 RX ORDER — ALBUMIN HUMAN 25 %
250 VIAL (ML) INTRAVENOUS EVERY 8 HOURS
Refills: 0 | Status: DISCONTINUED | OUTPATIENT
Start: 2023-01-01 | End: 2023-01-01

## 2023-01-01 RX ORDER — LACTULOSE 10 G/15ML
30 SOLUTION ORAL THREE TIMES A DAY
Refills: 0 | Status: DISCONTINUED | OUTPATIENT
Start: 2023-01-01 | End: 2023-01-01

## 2023-01-01 RX ORDER — CALCIUM GLUCONATE 100 MG/ML
2 VIAL (ML) INTRAVENOUS ONCE
Refills: 0 | Status: COMPLETED | OUTPATIENT
Start: 2023-01-01 | End: 2023-01-01

## 2023-01-01 RX ORDER — HUMAN INSULIN 100 [IU]/ML
14 INJECTION, SUSPENSION SUBCUTANEOUS EVERY 6 HOURS
Refills: 0 | Status: DISCONTINUED | OUTPATIENT
Start: 2023-01-01 | End: 2023-01-01

## 2023-01-01 RX ORDER — INSULIN LISPRO 100/ML
VIAL (ML) SUBCUTANEOUS AT BEDTIME
Refills: 0 | Status: DISCONTINUED | OUTPATIENT
Start: 2023-01-01 | End: 2023-01-01

## 2023-01-01 RX ORDER — SPIRONOLACTONE 25 MG/1
1 TABLET, FILM COATED ORAL
Qty: 0 | Refills: 0 | DISCHARGE

## 2023-01-01 RX ORDER — CALCIUM GLUCONATE 100 MG/ML
1 VIAL (ML) INTRAVENOUS ONCE
Refills: 0 | Status: COMPLETED | OUTPATIENT
Start: 2023-01-01 | End: 2023-01-01

## 2023-01-01 RX ORDER — PHYTONADIONE (VIT K1) 5 MG
5 TABLET ORAL ONCE
Refills: 0 | Status: COMPLETED | OUTPATIENT
Start: 2023-01-01 | End: 2023-01-01

## 2023-01-01 RX ORDER — FENTANYL CITRATE 50 UG/ML
0.88 INJECTION INTRAVENOUS
Qty: 2500 | Refills: 0 | Status: DISCONTINUED | OUTPATIENT
Start: 2023-01-01 | End: 2023-01-01

## 2023-01-01 RX ORDER — HUMAN INSULIN 100 [IU]/ML
9 INJECTION, SUSPENSION SUBCUTANEOUS ONCE
Refills: 0 | Status: COMPLETED | OUTPATIENT
Start: 2023-01-01 | End: 2023-01-01

## 2023-01-01 RX ORDER — GABAPENTIN 400 MG/1
1 CAPSULE ORAL
Qty: 0 | Refills: 0 | DISCHARGE

## 2023-01-01 RX ORDER — LACTULOSE 10 G/15ML
10 SOLUTION ORAL EVERY 8 HOURS
Refills: 0 | Status: DISCONTINUED | OUTPATIENT
Start: 2023-01-01 | End: 2023-01-01

## 2023-01-01 RX ADMIN — HUMAN INSULIN 11 UNIT(S): 100 INJECTION, SUSPENSION SUBCUTANEOUS at 00:14

## 2023-01-01 RX ADMIN — PANTOPRAZOLE SODIUM 40 MILLIGRAM(S): 20 TABLET, DELAYED RELEASE ORAL at 18:15

## 2023-01-01 RX ADMIN — LACTULOSE 10 GRAM(S): 10 SOLUTION ORAL at 14:23

## 2023-01-01 RX ADMIN — MUPIROCIN 1 APPLICATION(S): 20 OINTMENT TOPICAL at 17:50

## 2023-01-01 RX ADMIN — PANTOPRAZOLE SODIUM 40 MILLIGRAM(S): 20 TABLET, DELAYED RELEASE ORAL at 06:11

## 2023-01-01 RX ADMIN — PANTOPRAZOLE SODIUM 10 MG/HR: 20 TABLET, DELAYED RELEASE ORAL at 09:23

## 2023-01-01 RX ADMIN — Medication 2: at 17:59

## 2023-01-01 RX ADMIN — HUMAN INSULIN 11 UNIT(S): 100 INJECTION, SUSPENSION SUBCUTANEOUS at 18:22

## 2023-01-01 RX ADMIN — Medication 2: at 23:21

## 2023-01-01 RX ADMIN — Medication 4: at 18:21

## 2023-01-01 RX ADMIN — Medication 4: at 05:21

## 2023-01-01 RX ADMIN — LACTULOSE 20 GRAM(S): 10 SOLUTION ORAL at 18:31

## 2023-01-01 RX ADMIN — Medication 1000 MILLIGRAM(S): at 22:39

## 2023-01-01 RX ADMIN — PANTOPRAZOLE SODIUM 40 MILLIGRAM(S): 20 TABLET, DELAYED RELEASE ORAL at 14:22

## 2023-01-01 RX ADMIN — OCTREOTIDE ACETATE 10 MICROGRAM(S)/HR: 200 INJECTION, SOLUTION INTRAVENOUS; SUBCUTANEOUS at 20:22

## 2023-01-01 RX ADMIN — TENOFOVIR DISOPROXIL FUMARATE 300 MILLIGRAM(S): 300 TABLET, FILM COATED ORAL at 12:33

## 2023-01-01 RX ADMIN — LACTULOSE 10 GRAM(S): 10 SOLUTION ORAL at 05:33

## 2023-01-01 RX ADMIN — SPIRONOLACTONE 50 MILLIGRAM(S): 25 TABLET, FILM COATED ORAL at 06:27

## 2023-01-01 RX ADMIN — CHLORHEXIDINE GLUCONATE 1 APPLICATION(S): 213 SOLUTION TOPICAL at 05:17

## 2023-01-01 RX ADMIN — MUPIROCIN 1 APPLICATION(S): 20 OINTMENT TOPICAL at 18:32

## 2023-01-01 RX ADMIN — PANTOPRAZOLE SODIUM 40 MILLIGRAM(S): 20 TABLET, DELAYED RELEASE ORAL at 18:31

## 2023-01-01 RX ADMIN — Medication 2: at 18:37

## 2023-01-01 RX ADMIN — MIDODRINE HYDROCHLORIDE 5 MILLIGRAM(S): 2.5 TABLET ORAL at 18:05

## 2023-01-01 RX ADMIN — POTASSIUM PHOSPHATE, MONOBASIC POTASSIUM PHOSPHATE, DIBASIC 83.33 MILLIMOLE(S): 236; 224 INJECTION, SOLUTION INTRAVENOUS at 06:29

## 2023-01-01 RX ADMIN — Medication 5 MILLIGRAM(S): at 12:59

## 2023-01-01 RX ADMIN — CHLORHEXIDINE GLUCONATE 1 APPLICATION(S): 213 SOLUTION TOPICAL at 05:57

## 2023-01-01 RX ADMIN — Medication 100 MILLIEQUIVALENT(S): at 03:19

## 2023-01-01 RX ADMIN — CHLORHEXIDINE GLUCONATE 1 APPLICATION(S): 213 SOLUTION TOPICAL at 06:28

## 2023-01-01 RX ADMIN — FENTANYL CITRATE 50 MICROGRAM(S): 50 INJECTION INTRAVENOUS at 03:35

## 2023-01-01 RX ADMIN — PROPOFOL 3.9 MICROGRAM(S)/KG/MIN: 10 INJECTION, EMULSION INTRAVENOUS at 08:08

## 2023-01-01 RX ADMIN — Medication 4: at 23:35

## 2023-01-01 RX ADMIN — Medication 2: at 06:09

## 2023-01-01 RX ADMIN — MIDODRINE HYDROCHLORIDE 10 MILLIGRAM(S): 2.5 TABLET ORAL at 12:49

## 2023-01-01 RX ADMIN — CHLORHEXIDINE GLUCONATE 1 APPLICATION(S): 213 SOLUTION TOPICAL at 05:26

## 2023-01-01 RX ADMIN — HUMAN INSULIN 13 UNIT(S): 100 INJECTION, SUSPENSION SUBCUTANEOUS at 13:04

## 2023-01-01 RX ADMIN — OCTREOTIDE ACETATE 10 MICROGRAM(S)/HR: 200 INJECTION, SOLUTION INTRAVENOUS; SUBCUTANEOUS at 16:50

## 2023-01-01 RX ADMIN — FENTANYL CITRATE 50 MICROGRAM(S): 50 INJECTION INTRAVENOUS at 23:00

## 2023-01-01 RX ADMIN — LACTULOSE 30 GRAM(S): 10 SOLUTION ORAL at 05:47

## 2023-01-01 RX ADMIN — Medication 100 MICROGRAM(S): at 05:32

## 2023-01-01 RX ADMIN — Medication 100 MICROGRAM(S): at 06:52

## 2023-01-01 RX ADMIN — TENOFOVIR DISOPROXIL FUMARATE 300 MILLIGRAM(S): 300 TABLET, FILM COATED ORAL at 12:27

## 2023-01-01 RX ADMIN — Medication 50 MILLILITER(S): at 11:26

## 2023-01-01 RX ADMIN — LACTULOSE 20 GRAM(S): 10 SOLUTION ORAL at 05:40

## 2023-01-01 RX ADMIN — Medication 2: at 18:30

## 2023-01-01 RX ADMIN — PROPOFOL 3.9 MICROGRAM(S)/KG/MIN: 10 INJECTION, EMULSION INTRAVENOUS at 07:48

## 2023-01-01 RX ADMIN — Medication 6: at 06:15

## 2023-01-01 RX ADMIN — Medication 105 MILLIGRAM(S): at 22:52

## 2023-01-01 RX ADMIN — Medication 2.6 MICROGRAM(S)/KG/MIN: at 11:10

## 2023-01-01 RX ADMIN — HUMAN INSULIN 14 UNIT(S): 100 INJECTION, SUSPENSION SUBCUTANEOUS at 17:48

## 2023-01-01 RX ADMIN — CHLORHEXIDINE GLUCONATE 1 APPLICATION(S): 213 SOLUTION TOPICAL at 05:29

## 2023-01-01 RX ADMIN — PIPERACILLIN AND TAZOBACTAM 200 GRAM(S): 4; .5 INJECTION, POWDER, LYOPHILIZED, FOR SOLUTION INTRAVENOUS at 20:50

## 2023-01-01 RX ADMIN — PANTOPRAZOLE SODIUM 40 MILLIGRAM(S): 20 TABLET, DELAYED RELEASE ORAL at 18:04

## 2023-01-01 RX ADMIN — Medication 100 MILLIEQUIVALENT(S): at 09:56

## 2023-01-01 RX ADMIN — CHLORHEXIDINE GLUCONATE 15 MILLILITER(S): 213 SOLUTION TOPICAL at 05:23

## 2023-01-01 RX ADMIN — Medication 1 SPRAY(S): at 01:43

## 2023-01-01 RX ADMIN — Medication 75 MICROGRAM(S): at 22:01

## 2023-01-01 RX ADMIN — LACTULOSE 30 GRAM(S): 10 SOLUTION ORAL at 05:51

## 2023-01-01 RX ADMIN — CHLORHEXIDINE GLUCONATE 15 MILLILITER(S): 213 SOLUTION TOPICAL at 05:19

## 2023-01-01 RX ADMIN — TENOFOVIR DISOPROXIL FUMARATE 300 MILLIGRAM(S): 300 TABLET, FILM COATED ORAL at 11:36

## 2023-01-01 RX ADMIN — SPIRONOLACTONE 50 MILLIGRAM(S): 25 TABLET, FILM COATED ORAL at 05:53

## 2023-01-01 RX ADMIN — LACTULOSE 20 GRAM(S): 10 SOLUTION ORAL at 17:49

## 2023-01-01 RX ADMIN — LACTULOSE 30 GRAM(S): 10 SOLUTION ORAL at 06:10

## 2023-01-01 RX ADMIN — MUPIROCIN 1 APPLICATION(S): 20 OINTMENT TOPICAL at 18:39

## 2023-01-01 RX ADMIN — Medication 100 MICROGRAM(S): at 05:05

## 2023-01-01 RX ADMIN — Medication 20 MILLIEQUIVALENT(S): at 05:54

## 2023-01-01 RX ADMIN — LACTULOSE 20 GRAM(S): 10 SOLUTION ORAL at 18:55

## 2023-01-01 RX ADMIN — HUMAN INSULIN 15 UNIT(S): 100 INJECTION, SUSPENSION SUBCUTANEOUS at 17:22

## 2023-01-01 RX ADMIN — Medication 125 MILLILITER(S): at 12:45

## 2023-01-01 RX ADMIN — Medication 50 MILLILITER(S): at 02:53

## 2023-01-01 RX ADMIN — Medication 75 MEQ/KG/HR: at 07:01

## 2023-01-01 RX ADMIN — SPIRONOLACTONE 50 MILLIGRAM(S): 25 TABLET, FILM COATED ORAL at 07:04

## 2023-01-01 RX ADMIN — LACTULOSE 30 GRAM(S): 10 SOLUTION ORAL at 15:36

## 2023-01-01 RX ADMIN — Medication 5 UNIT(S): at 09:25

## 2023-01-01 RX ADMIN — Medication 5 MILLIGRAM(S): at 14:10

## 2023-01-01 RX ADMIN — Medication 2: at 01:36

## 2023-01-01 RX ADMIN — CHLORHEXIDINE GLUCONATE 1 APPLICATION(S): 213 SOLUTION TOPICAL at 05:05

## 2023-01-01 RX ADMIN — MIDODRINE HYDROCHLORIDE 10 MILLIGRAM(S): 2.5 TABLET ORAL at 13:17

## 2023-01-01 RX ADMIN — Medication 2: at 08:02

## 2023-01-01 RX ADMIN — Medication 75 MICROGRAM(S): at 22:53

## 2023-01-01 RX ADMIN — HUMAN INSULIN 15 UNIT(S): 100 INJECTION, SUSPENSION SUBCUTANEOUS at 07:27

## 2023-01-01 RX ADMIN — Medication 2: at 05:32

## 2023-01-01 RX ADMIN — Medication 2: at 09:15

## 2023-01-01 RX ADMIN — Medication 4: at 18:40

## 2023-01-01 RX ADMIN — Medication 24.4 MICROGRAM(S)/KG/MIN: at 20:22

## 2023-01-01 RX ADMIN — PANTOPRAZOLE SODIUM 40 MILLIGRAM(S): 20 TABLET, DELAYED RELEASE ORAL at 05:51

## 2023-01-01 RX ADMIN — HUMAN INSULIN 11 UNIT(S): 100 INJECTION, SUSPENSION SUBCUTANEOUS at 18:23

## 2023-01-01 RX ADMIN — HUMAN INSULIN 15 UNIT(S): 100 INJECTION, SUSPENSION SUBCUTANEOUS at 13:13

## 2023-01-01 RX ADMIN — POTASSIUM PHOSPHATE, MONOBASIC POTASSIUM PHOSPHATE, DIBASIC 62.5 MILLIMOLE(S): 236; 224 INJECTION, SOLUTION INTRAVENOUS at 02:03

## 2023-01-01 RX ADMIN — TAMSULOSIN HYDROCHLORIDE 0.8 MILLIGRAM(S): 0.4 CAPSULE ORAL at 22:06

## 2023-01-01 RX ADMIN — CHLORHEXIDINE GLUCONATE 1 APPLICATION(S): 213 SOLUTION TOPICAL at 05:01

## 2023-01-01 RX ADMIN — LACTULOSE 20 GRAM(S): 10 SOLUTION ORAL at 21:09

## 2023-01-01 RX ADMIN — PANTOPRAZOLE SODIUM 40 MILLIGRAM(S): 20 TABLET, DELAYED RELEASE ORAL at 12:52

## 2023-01-01 RX ADMIN — OCTREOTIDE ACETATE 10 MICROGRAM(S)/HR: 200 INJECTION, SOLUTION INTRAVENOUS; SUBCUTANEOUS at 04:32

## 2023-01-01 RX ADMIN — MIDODRINE HYDROCHLORIDE 10 MILLIGRAM(S): 2.5 TABLET ORAL at 06:55

## 2023-01-01 RX ADMIN — Medication 500 MILLIGRAM(S): at 17:51

## 2023-01-01 RX ADMIN — LACTULOSE 30 GRAM(S): 10 SOLUTION ORAL at 21:56

## 2023-01-01 RX ADMIN — Medication 125 MILLILITER(S): at 09:33

## 2023-01-01 RX ADMIN — INSULIN GLARGINE 8 UNIT(S): 100 INJECTION, SOLUTION SUBCUTANEOUS at 22:07

## 2023-01-01 RX ADMIN — MUPIROCIN 1 APPLICATION(S): 20 OINTMENT TOPICAL at 18:10

## 2023-01-01 RX ADMIN — HUMAN INSULIN 13 UNIT(S): 100 INJECTION, SUSPENSION SUBCUTANEOUS at 17:38

## 2023-01-01 RX ADMIN — Medication 1 TABLET(S): at 13:19

## 2023-01-01 RX ADMIN — Medication 1 APPLICATION(S): at 17:36

## 2023-01-01 RX ADMIN — LACTULOSE 20 GRAM(S): 10 SOLUTION ORAL at 18:15

## 2023-01-01 RX ADMIN — Medication 500 MILLIGRAM(S): at 16:13

## 2023-01-01 RX ADMIN — HUMAN INSULIN 11 UNIT(S): 100 INJECTION, SUSPENSION SUBCUTANEOUS at 12:04

## 2023-01-01 RX ADMIN — LACTULOSE 20 GRAM(S): 10 SOLUTION ORAL at 05:43

## 2023-01-01 RX ADMIN — Medication 20 MILLIGRAM(S): at 06:26

## 2023-01-01 RX ADMIN — CHLORHEXIDINE GLUCONATE 15 MILLILITER(S): 213 SOLUTION TOPICAL at 17:52

## 2023-01-01 RX ADMIN — POLYETHYLENE GLYCOL 3350 17 GRAM(S): 17 POWDER, FOR SOLUTION ORAL at 21:59

## 2023-01-01 RX ADMIN — MUPIROCIN 1 APPLICATION(S): 20 OINTMENT TOPICAL at 17:10

## 2023-01-01 RX ADMIN — LACTULOSE 20 GRAM(S): 10 SOLUTION ORAL at 18:06

## 2023-01-01 RX ADMIN — CHLORHEXIDINE GLUCONATE 1 APPLICATION(S): 213 SOLUTION TOPICAL at 05:33

## 2023-01-01 RX ADMIN — SODIUM CHLORIDE 75 MILLILITER(S): 9 INJECTION, SOLUTION INTRAVENOUS at 10:46

## 2023-01-01 RX ADMIN — HUMAN INSULIN 18 UNIT(S): 100 INJECTION, SUSPENSION SUBCUTANEOUS at 12:38

## 2023-01-01 RX ADMIN — MIDODRINE HYDROCHLORIDE 10 MILLIGRAM(S): 2.5 TABLET ORAL at 18:30

## 2023-01-01 RX ADMIN — Medication 2: at 23:41

## 2023-01-01 RX ADMIN — MIDODRINE HYDROCHLORIDE 10 MILLIGRAM(S): 2.5 TABLET ORAL at 14:30

## 2023-01-01 RX ADMIN — HUMAN INSULIN 14 UNIT(S): 100 INJECTION, SUSPENSION SUBCUTANEOUS at 00:15

## 2023-01-01 RX ADMIN — CHLORHEXIDINE GLUCONATE 1 APPLICATION(S): 213 SOLUTION TOPICAL at 05:40

## 2023-01-01 RX ADMIN — PIPERACILLIN AND TAZOBACTAM 25 GRAM(S): 4; .5 INJECTION, POWDER, LYOPHILIZED, FOR SOLUTION INTRAVENOUS at 21:59

## 2023-01-01 RX ADMIN — MIDODRINE HYDROCHLORIDE 10 MILLIGRAM(S): 2.5 TABLET ORAL at 23:25

## 2023-01-01 RX ADMIN — CHLORHEXIDINE GLUCONATE 1 APPLICATION(S): 213 SOLUTION TOPICAL at 06:00

## 2023-01-01 RX ADMIN — HUMAN INSULIN 13 UNIT(S): 100 INJECTION, SUSPENSION SUBCUTANEOUS at 00:19

## 2023-01-01 RX ADMIN — TENOFOVIR DISOPROXIL FUMARATE 300 MILLIGRAM(S): 300 TABLET, FILM COATED ORAL at 14:23

## 2023-01-01 RX ADMIN — POTASSIUM PHOSPHATE, MONOBASIC POTASSIUM PHOSPHATE, DIBASIC 62.5 MILLIMOLE(S): 236; 224 INJECTION, SOLUTION INTRAVENOUS at 10:07

## 2023-01-01 RX ADMIN — MIDODRINE HYDROCHLORIDE 10 MILLIGRAM(S): 2.5 TABLET ORAL at 17:28

## 2023-01-01 RX ADMIN — Medication 1 PACKET(S): at 12:26

## 2023-01-01 RX ADMIN — OCTREOTIDE ACETATE 10 MICROGRAM(S)/HR: 200 INJECTION, SOLUTION INTRAVENOUS; SUBCUTANEOUS at 08:08

## 2023-01-01 RX ADMIN — Medication 500 MILLIGRAM(S): at 11:54

## 2023-01-01 RX ADMIN — TENOFOVIR DISOPROXIL FUMARATE 300 MILLIGRAM(S): 300 TABLET, FILM COATED ORAL at 17:06

## 2023-01-01 RX ADMIN — HUMAN INSULIN 15 UNIT(S): 100 INJECTION, SUSPENSION SUBCUTANEOUS at 23:33

## 2023-01-01 RX ADMIN — POLYETHYLENE GLYCOL 3350 17 GRAM(S): 17 POWDER, FOR SOLUTION ORAL at 14:17

## 2023-01-01 RX ADMIN — Medication 500 MILLIGRAM(S): at 12:49

## 2023-01-01 RX ADMIN — TENOFOVIR DISOPROXIL FUMARATE 300 MILLIGRAM(S): 300 TABLET, FILM COATED ORAL at 17:49

## 2023-01-01 RX ADMIN — Medication 75 MICROGRAM(S): at 21:37

## 2023-01-01 RX ADMIN — Medication 5 UNIT(S): at 17:31

## 2023-01-01 RX ADMIN — CHLORHEXIDINE GLUCONATE 15 MILLILITER(S): 213 SOLUTION TOPICAL at 05:15

## 2023-01-01 RX ADMIN — HUMAN INSULIN 11 UNIT(S): 100 INJECTION, SUSPENSION SUBCUTANEOUS at 23:09

## 2023-01-01 RX ADMIN — TAMSULOSIN HYDROCHLORIDE 0.8 MILLIGRAM(S): 0.4 CAPSULE ORAL at 21:40

## 2023-01-01 RX ADMIN — LACTULOSE 10 GRAM(S): 10 SOLUTION ORAL at 23:47

## 2023-01-01 RX ADMIN — LACTULOSE 30 GRAM(S): 10 SOLUTION ORAL at 09:03

## 2023-01-01 RX ADMIN — CHLORHEXIDINE GLUCONATE 1 APPLICATION(S): 213 SOLUTION TOPICAL at 05:28

## 2023-01-01 RX ADMIN — SPIRONOLACTONE 50 MILLIGRAM(S): 25 TABLET, FILM COATED ORAL at 05:27

## 2023-01-01 RX ADMIN — Medication 100 MICROGRAM(S): at 05:57

## 2023-01-01 RX ADMIN — FENTANYL CITRATE 4.88 MICROGRAM(S)/KG/HR: 50 INJECTION INTRAVENOUS at 07:51

## 2023-01-01 RX ADMIN — HUMAN INSULIN 12 UNIT(S): 100 INJECTION, SUSPENSION SUBCUTANEOUS at 18:26

## 2023-01-01 RX ADMIN — OCTREOTIDE ACETATE 50 MICROGRAM(S): 200 INJECTION, SOLUTION INTRAVENOUS; SUBCUTANEOUS at 09:38

## 2023-01-01 RX ADMIN — Medication 75 MICROGRAM(S): at 23:39

## 2023-01-01 RX ADMIN — Medication 75 MICROGRAM(S): at 22:35

## 2023-01-01 RX ADMIN — Medication 1 TABLET(S): at 11:36

## 2023-01-01 RX ADMIN — Medication 50 MILLILITER(S): at 19:49

## 2023-01-01 RX ADMIN — Medication 2.6 MICROGRAM(S)/KG/MIN: at 07:48

## 2023-01-01 RX ADMIN — Medication 1: at 17:32

## 2023-01-01 RX ADMIN — PANTOPRAZOLE SODIUM 40 MILLIGRAM(S): 20 TABLET, DELAYED RELEASE ORAL at 12:22

## 2023-01-01 RX ADMIN — Medication 10: at 18:06

## 2023-01-01 RX ADMIN — Medication 24.4 MICROGRAM(S)/KG/MIN: at 16:50

## 2023-01-01 RX ADMIN — LACTULOSE 10 GRAM(S): 10 SOLUTION ORAL at 11:53

## 2023-01-01 RX ADMIN — Medication 5 MILLIGRAM(S): at 03:00

## 2023-01-01 RX ADMIN — Medication 75 MICROGRAM(S): at 22:43

## 2023-01-01 RX ADMIN — CHLORHEXIDINE GLUCONATE 15 MILLILITER(S): 213 SOLUTION TOPICAL at 05:17

## 2023-01-01 RX ADMIN — Medication 40 MILLIEQUIVALENT(S): at 18:04

## 2023-01-01 RX ADMIN — HUMAN INSULIN 15 UNIT(S): 100 INJECTION, SUSPENSION SUBCUTANEOUS at 23:48

## 2023-01-01 RX ADMIN — CHLORHEXIDINE GLUCONATE 15 MILLILITER(S): 213 SOLUTION TOPICAL at 17:14

## 2023-01-01 RX ADMIN — OCTREOTIDE ACETATE 10 MICROGRAM(S)/HR: 200 INJECTION, SOLUTION INTRAVENOUS; SUBCUTANEOUS at 19:46

## 2023-01-01 RX ADMIN — PANTOPRAZOLE SODIUM 40 MILLIGRAM(S): 20 TABLET, DELAYED RELEASE ORAL at 05:32

## 2023-01-01 RX ADMIN — HUMAN INSULIN 15 UNIT(S): 100 INJECTION, SUSPENSION SUBCUTANEOUS at 12:01

## 2023-01-01 RX ADMIN — Medication 2: at 05:54

## 2023-01-01 RX ADMIN — Medication 1 SPRAY(S): at 12:36

## 2023-01-01 RX ADMIN — TENOFOVIR DISOPROXIL FUMARATE 300 MILLIGRAM(S): 300 TABLET, FILM COATED ORAL at 13:02

## 2023-01-01 RX ADMIN — HUMAN INSULIN 11 UNIT(S): 100 INJECTION, SUSPENSION SUBCUTANEOUS at 11:52

## 2023-01-01 RX ADMIN — Medication 40 MILLIGRAM(S): at 05:52

## 2023-01-01 RX ADMIN — OCTREOTIDE ACETATE 50 MICROGRAM(S): 200 INJECTION, SOLUTION INTRAVENOUS; SUBCUTANEOUS at 09:01

## 2023-01-01 RX ADMIN — PIPERACILLIN AND TAZOBACTAM 25 GRAM(S): 4; .5 INJECTION, POWDER, LYOPHILIZED, FOR SOLUTION INTRAVENOUS at 05:29

## 2023-01-01 RX ADMIN — Medication 25 GRAM(S): at 08:18

## 2023-01-01 RX ADMIN — PIPERACILLIN AND TAZOBACTAM 25 GRAM(S): 4; .5 INJECTION, POWDER, LYOPHILIZED, FOR SOLUTION INTRAVENOUS at 15:07

## 2023-01-01 RX ADMIN — Medication 24.4 MICROGRAM(S)/KG/MIN: at 07:57

## 2023-01-01 RX ADMIN — Medication 40 MILLIGRAM(S): at 04:48

## 2023-01-01 RX ADMIN — Medication 75 MICROGRAM(S): at 21:39

## 2023-01-01 RX ADMIN — Medication 105 MILLIGRAM(S): at 05:46

## 2023-01-01 RX ADMIN — PIPERACILLIN AND TAZOBACTAM 25 GRAM(S): 4; .5 INJECTION, POWDER, LYOPHILIZED, FOR SOLUTION INTRAVENOUS at 14:13

## 2023-01-01 RX ADMIN — LACTULOSE 20 GRAM(S): 10 SOLUTION ORAL at 18:10

## 2023-01-01 RX ADMIN — OCTREOTIDE ACETATE 10 MICROGRAM(S)/HR: 200 INJECTION, SOLUTION INTRAVENOUS; SUBCUTANEOUS at 07:50

## 2023-01-01 RX ADMIN — PANTOPRAZOLE SODIUM 10 MG/HR: 20 TABLET, DELAYED RELEASE ORAL at 16:50

## 2023-01-01 RX ADMIN — PANTOPRAZOLE SODIUM 40 MILLIGRAM(S): 20 TABLET, DELAYED RELEASE ORAL at 17:53

## 2023-01-01 RX ADMIN — HUMAN INSULIN 2 UNIT(S): 100 INJECTION, SUSPENSION SUBCUTANEOUS at 20:33

## 2023-01-01 RX ADMIN — Medication 400 MILLIGRAM(S): at 13:57

## 2023-01-01 RX ADMIN — PROPOFOL 3.9 MICROGRAM(S)/KG/MIN: 10 INJECTION, EMULSION INTRAVENOUS at 20:23

## 2023-01-01 RX ADMIN — CHLORHEXIDINE GLUCONATE 1 APPLICATION(S): 213 SOLUTION TOPICAL at 05:47

## 2023-01-01 RX ADMIN — HUMAN INSULIN 15 UNIT(S): 100 INJECTION, SUSPENSION SUBCUTANEOUS at 18:17

## 2023-01-01 RX ADMIN — Medication 105 MILLIGRAM(S): at 14:02

## 2023-01-01 RX ADMIN — Medication 1: at 12:11

## 2023-01-01 RX ADMIN — Medication 75 MICROGRAM(S): at 22:52

## 2023-01-01 RX ADMIN — HUMAN INSULIN 13 UNIT(S): 100 INJECTION, SUSPENSION SUBCUTANEOUS at 18:20

## 2023-01-01 RX ADMIN — MIDODRINE HYDROCHLORIDE 10 MILLIGRAM(S): 2.5 TABLET ORAL at 15:25

## 2023-01-01 RX ADMIN — CEFTRIAXONE 100 MILLIGRAM(S): 500 INJECTION, POWDER, FOR SOLUTION INTRAMUSCULAR; INTRAVENOUS at 14:17

## 2023-01-01 RX ADMIN — Medication 4: at 18:07

## 2023-01-01 RX ADMIN — Medication 100 MILLIEQUIVALENT(S): at 05:29

## 2023-01-01 RX ADMIN — PANTOPRAZOLE SODIUM 40 MILLIGRAM(S): 20 TABLET, DELAYED RELEASE ORAL at 18:36

## 2023-01-01 RX ADMIN — LACTULOSE 20 GRAM(S): 10 SOLUTION ORAL at 05:37

## 2023-01-01 RX ADMIN — LACTULOSE 10 GRAM(S): 10 SOLUTION ORAL at 12:15

## 2023-01-01 RX ADMIN — Medication 1: at 01:01

## 2023-01-01 RX ADMIN — HUMAN INSULIN 10 UNIT(S): 100 INJECTION, SUSPENSION SUBCUTANEOUS at 00:32

## 2023-01-01 RX ADMIN — HUMAN INSULIN 11 UNIT(S): 100 INJECTION, SUSPENSION SUBCUTANEOUS at 12:06

## 2023-01-01 RX ADMIN — PANTOPRAZOLE SODIUM 40 MILLIGRAM(S): 20 TABLET, DELAYED RELEASE ORAL at 05:30

## 2023-01-01 RX ADMIN — FENTANYL CITRATE 50 MICROGRAM(S): 50 INJECTION INTRAVENOUS at 06:37

## 2023-01-01 RX ADMIN — PANTOPRAZOLE SODIUM 40 MILLIGRAM(S): 20 TABLET, DELAYED RELEASE ORAL at 17:21

## 2023-01-01 RX ADMIN — PANTOPRAZOLE SODIUM 40 MILLIGRAM(S): 20 TABLET, DELAYED RELEASE ORAL at 17:19

## 2023-01-01 RX ADMIN — PANTOPRAZOLE SODIUM 40 MILLIGRAM(S): 20 TABLET, DELAYED RELEASE ORAL at 13:39

## 2023-01-01 RX ADMIN — Medication 500 MILLIGRAM(S): at 17:55

## 2023-01-01 RX ADMIN — Medication 101 MILLIGRAM(S): at 16:06

## 2023-01-01 RX ADMIN — TENOFOVIR DISOPROXIL FUMARATE 300 MILLIGRAM(S): 300 TABLET, FILM COATED ORAL at 11:53

## 2023-01-01 RX ADMIN — PANTOPRAZOLE SODIUM 40 MILLIGRAM(S): 20 TABLET, DELAYED RELEASE ORAL at 06:17

## 2023-01-01 RX ADMIN — PANTOPRAZOLE SODIUM 40 MILLIGRAM(S): 20 TABLET, DELAYED RELEASE ORAL at 12:18

## 2023-01-01 RX ADMIN — CEFTRIAXONE 100 MILLIGRAM(S): 500 INJECTION, POWDER, FOR SOLUTION INTRAMUSCULAR; INTRAVENOUS at 18:20

## 2023-01-01 RX ADMIN — HUMAN INSULIN 14 UNIT(S): 100 INJECTION, SUSPENSION SUBCUTANEOUS at 12:50

## 2023-01-01 RX ADMIN — TENOFOVIR DISOPROXIL FUMARATE 300 MILLIGRAM(S): 300 TABLET, FILM COATED ORAL at 21:52

## 2023-01-01 RX ADMIN — FENTANYL CITRATE 50 MICROGRAM(S): 50 INJECTION INTRAVENOUS at 03:40

## 2023-01-01 RX ADMIN — CHLORHEXIDINE GLUCONATE 1 APPLICATION(S): 213 SOLUTION TOPICAL at 06:19

## 2023-01-01 RX ADMIN — LACTULOSE 20 GRAM(S): 10 SOLUTION ORAL at 06:29

## 2023-01-01 RX ADMIN — HUMAN INSULIN 11 UNIT(S): 100 INJECTION, SUSPENSION SUBCUTANEOUS at 17:52

## 2023-01-01 RX ADMIN — Medication 75 MICROGRAM(S): at 23:46

## 2023-01-01 RX ADMIN — Medication 75 MICROGRAM(S): at 22:15

## 2023-01-01 RX ADMIN — Medication 5 UNIT(S): at 12:39

## 2023-01-01 RX ADMIN — Medication 2: at 12:42

## 2023-01-01 RX ADMIN — CHLORHEXIDINE GLUCONATE 1 APPLICATION(S): 213 SOLUTION TOPICAL at 05:53

## 2023-01-01 RX ADMIN — SPIRONOLACTONE 50 MILLIGRAM(S): 25 TABLET, FILM COATED ORAL at 05:16

## 2023-01-01 RX ADMIN — LACTULOSE 20 GRAM(S): 10 SOLUTION ORAL at 17:50

## 2023-01-01 RX ADMIN — LACTULOSE 10 GRAM(S): 10 SOLUTION ORAL at 21:02

## 2023-01-01 RX ADMIN — POLYETHYLENE GLYCOL 3350 17 GRAM(S): 17 POWDER, FOR SOLUTION ORAL at 21:30

## 2023-01-01 RX ADMIN — MUPIROCIN 1 APPLICATION(S): 20 OINTMENT TOPICAL at 05:02

## 2023-01-01 RX ADMIN — ATORVASTATIN CALCIUM 80 MILLIGRAM(S): 80 TABLET, FILM COATED ORAL at 22:06

## 2023-01-01 RX ADMIN — CHLORHEXIDINE GLUCONATE 1 APPLICATION(S): 213 SOLUTION TOPICAL at 05:03

## 2023-01-01 RX ADMIN — ATORVASTATIN CALCIUM 80 MILLIGRAM(S): 80 TABLET, FILM COATED ORAL at 22:03

## 2023-01-01 RX ADMIN — HUMAN INSULIN 15 UNIT(S): 100 INJECTION, SUSPENSION SUBCUTANEOUS at 06:06

## 2023-01-01 RX ADMIN — Medication 2: at 12:38

## 2023-01-01 RX ADMIN — CHLORHEXIDINE GLUCONATE 15 MILLILITER(S): 213 SOLUTION TOPICAL at 18:03

## 2023-01-01 RX ADMIN — LACTULOSE 20 GRAM(S): 10 SOLUTION ORAL at 14:10

## 2023-01-01 RX ADMIN — Medication 500 MILLIGRAM(S): at 17:26

## 2023-01-01 RX ADMIN — Medication 1: at 22:07

## 2023-01-01 RX ADMIN — LACTULOSE 20 GRAM(S): 10 SOLUTION ORAL at 04:44

## 2023-01-01 RX ADMIN — LACTULOSE 10 GRAM(S): 10 SOLUTION ORAL at 14:16

## 2023-01-01 RX ADMIN — LACTULOSE 10 GRAM(S): 10 SOLUTION ORAL at 14:49

## 2023-01-01 RX ADMIN — PANTOPRAZOLE SODIUM 40 MILLIGRAM(S): 20 TABLET, DELAYED RELEASE ORAL at 05:09

## 2023-01-01 RX ADMIN — Medication 1: at 06:28

## 2023-01-01 RX ADMIN — POLYETHYLENE GLYCOL 3350 17 GRAM(S): 17 POWDER, FOR SOLUTION ORAL at 05:49

## 2023-01-01 RX ADMIN — PIPERACILLIN AND TAZOBACTAM 25 GRAM(S): 4; .5 INJECTION, POWDER, LYOPHILIZED, FOR SOLUTION INTRAVENOUS at 05:14

## 2023-01-01 RX ADMIN — POLYETHYLENE GLYCOL 3350 17 GRAM(S): 17 POWDER, FOR SOLUTION ORAL at 06:13

## 2023-01-01 RX ADMIN — ATORVASTATIN CALCIUM 80 MILLIGRAM(S): 80 TABLET, FILM COATED ORAL at 21:29

## 2023-01-01 RX ADMIN — LACTULOSE 30 GRAM(S): 10 SOLUTION ORAL at 05:02

## 2023-01-01 RX ADMIN — Medication 4: at 23:21

## 2023-01-01 RX ADMIN — MUPIROCIN 1 APPLICATION(S): 20 OINTMENT TOPICAL at 05:34

## 2023-01-01 RX ADMIN — MUPIROCIN 1 APPLICATION(S): 20 OINTMENT TOPICAL at 17:49

## 2023-01-01 RX ADMIN — TENOFOVIR DISOPROXIL FUMARATE 300 MILLIGRAM(S): 300 TABLET, FILM COATED ORAL at 17:50

## 2023-01-01 RX ADMIN — LACTULOSE 20 GRAM(S): 10 SOLUTION ORAL at 18:32

## 2023-01-01 RX ADMIN — CHLORHEXIDINE GLUCONATE 1 APPLICATION(S): 213 SOLUTION TOPICAL at 06:59

## 2023-01-01 RX ADMIN — Medication 1 TABLET(S): at 11:55

## 2023-01-01 RX ADMIN — CHLORHEXIDINE GLUCONATE 15 MILLILITER(S): 213 SOLUTION TOPICAL at 05:33

## 2023-01-01 RX ADMIN — CHLORHEXIDINE GLUCONATE 1 APPLICATION(S): 213 SOLUTION TOPICAL at 05:39

## 2023-01-01 RX ADMIN — LACTULOSE 20 GRAM(S): 10 SOLUTION ORAL at 06:12

## 2023-01-01 RX ADMIN — Medication 500 MILLIGRAM(S): at 16:09

## 2023-01-01 RX ADMIN — HYDROMORPHONE HYDROCHLORIDE 0.25 MILLIGRAM(S): 2 INJECTION INTRAMUSCULAR; INTRAVENOUS; SUBCUTANEOUS at 22:09

## 2023-01-01 RX ADMIN — FENTANYL CITRATE 2.77 MICROGRAM(S)/KG/HR: 50 INJECTION INTRAVENOUS at 19:26

## 2023-01-01 RX ADMIN — PANTOPRAZOLE SODIUM 40 MILLIGRAM(S): 20 TABLET, DELAYED RELEASE ORAL at 18:03

## 2023-01-01 RX ADMIN — PANTOPRAZOLE SODIUM 40 MILLIGRAM(S): 20 TABLET, DELAYED RELEASE ORAL at 12:31

## 2023-01-01 RX ADMIN — PANTOPRAZOLE SODIUM 10 MG/HR: 20 TABLET, DELAYED RELEASE ORAL at 09:25

## 2023-01-01 RX ADMIN — LACTULOSE 200 GRAM(S): 10 SOLUTION ORAL at 16:33

## 2023-01-01 RX ADMIN — Medication 1 TABLET(S): at 11:40

## 2023-01-01 RX ADMIN — DEXMEDETOMIDINE HYDROCHLORIDE IN 0.9% SODIUM CHLORIDE 2.77 MICROGRAM(S)/KG/HR: 4 INJECTION INTRAVENOUS at 10:56

## 2023-01-01 RX ADMIN — CEFTRIAXONE 100 MILLIGRAM(S): 500 INJECTION, POWDER, FOR SOLUTION INTRAMUSCULAR; INTRAVENOUS at 13:00

## 2023-01-01 RX ADMIN — PIPERACILLIN AND TAZOBACTAM 25 GRAM(S): 4; .5 INJECTION, POWDER, LYOPHILIZED, FOR SOLUTION INTRAVENOUS at 01:00

## 2023-01-01 RX ADMIN — HUMAN INSULIN 16 UNIT(S): 100 INJECTION, SUSPENSION SUBCUTANEOUS at 00:07

## 2023-01-01 RX ADMIN — PROPOFOL 3.9 MICROGRAM(S)/KG/MIN: 10 INJECTION, EMULSION INTRAVENOUS at 16:51

## 2023-01-01 RX ADMIN — Medication 1 TABLET(S): at 12:33

## 2023-01-01 RX ADMIN — Medication 75 MICROGRAM(S): at 21:54

## 2023-01-01 RX ADMIN — TENOFOVIR DISOPROXIL FUMARATE 300 MILLIGRAM(S): 300 TABLET, FILM COATED ORAL at 11:54

## 2023-01-01 RX ADMIN — CHLORHEXIDINE GLUCONATE 1 APPLICATION(S): 213 SOLUTION TOPICAL at 05:48

## 2023-01-01 RX ADMIN — PANTOPRAZOLE SODIUM 40 MILLIGRAM(S): 20 TABLET, DELAYED RELEASE ORAL at 05:23

## 2023-01-01 RX ADMIN — LACTULOSE 30 GRAM(S): 10 SOLUTION ORAL at 01:51

## 2023-01-01 RX ADMIN — MIDODRINE HYDROCHLORIDE 5 MILLIGRAM(S): 2.5 TABLET ORAL at 05:38

## 2023-01-01 RX ADMIN — LACTULOSE 10 GRAM(S): 10 SOLUTION ORAL at 22:01

## 2023-01-01 RX ADMIN — POLYETHYLENE GLYCOL 3350 17 GRAM(S): 17 POWDER, FOR SOLUTION ORAL at 05:29

## 2023-01-01 RX ADMIN — LACTULOSE 30 GRAM(S): 10 SOLUTION ORAL at 14:30

## 2023-01-01 RX ADMIN — HUMAN INSULIN 15 UNIT(S): 100 INJECTION, SUSPENSION SUBCUTANEOUS at 12:45

## 2023-01-01 RX ADMIN — ONDANSETRON 4 MILLIGRAM(S): 8 TABLET, FILM COATED ORAL at 08:31

## 2023-01-01 RX ADMIN — Medication 100 MILLIEQUIVALENT(S): at 11:46

## 2023-01-01 RX ADMIN — LACTULOSE 20 GRAM(S): 10 SOLUTION ORAL at 05:29

## 2023-01-01 RX ADMIN — DEXMEDETOMIDINE HYDROCHLORIDE IN 0.9% SODIUM CHLORIDE 2.77 MICROGRAM(S)/KG/HR: 4 INJECTION INTRAVENOUS at 13:16

## 2023-01-01 RX ADMIN — TENOFOVIR DISOPROXIL FUMARATE 300 MILLIGRAM(S): 300 TABLET, FILM COATED ORAL at 12:48

## 2023-01-01 RX ADMIN — CHLORHEXIDINE GLUCONATE 1 APPLICATION(S): 213 SOLUTION TOPICAL at 05:42

## 2023-01-01 RX ADMIN — LACTULOSE 30 GRAM(S): 10 SOLUTION ORAL at 23:24

## 2023-01-01 RX ADMIN — TENOFOVIR DISOPROXIL FUMARATE 300 MILLIGRAM(S): 300 TABLET, FILM COATED ORAL at 18:38

## 2023-01-01 RX ADMIN — ETOMIDATE 20 MILLIGRAM(S): 2 INJECTION INTRAVENOUS at 11:10

## 2023-01-01 RX ADMIN — DEXMEDETOMIDINE HYDROCHLORIDE IN 0.9% SODIUM CHLORIDE 2.77 MICROGRAM(S)/KG/HR: 4 INJECTION INTRAVENOUS at 22:41

## 2023-01-01 RX ADMIN — Medication 105 MILLIGRAM(S): at 06:26

## 2023-01-01 RX ADMIN — TAMSULOSIN HYDROCHLORIDE 0.8 MILLIGRAM(S): 0.4 CAPSULE ORAL at 21:29

## 2023-01-01 RX ADMIN — Medication 75 MEQ/KG/HR: at 03:29

## 2023-01-01 RX ADMIN — Medication 100 MILLIEQUIVALENT(S): at 03:09

## 2023-01-01 RX ADMIN — Medication 500 MILLIGRAM(S): at 18:05

## 2023-01-01 RX ADMIN — HUMAN INSULIN 11 UNIT(S): 100 INJECTION, SUSPENSION SUBCUTANEOUS at 06:12

## 2023-01-01 RX ADMIN — MIDODRINE HYDROCHLORIDE 10 MILLIGRAM(S): 2.5 TABLET ORAL at 01:57

## 2023-01-01 RX ADMIN — POTASSIUM PHOSPHATE, MONOBASIC POTASSIUM PHOSPHATE, DIBASIC 62.5 MILLIMOLE(S): 236; 224 INJECTION, SOLUTION INTRAVENOUS at 14:25

## 2023-01-01 RX ADMIN — Medication 75 MICROGRAM(S): at 21:50

## 2023-01-01 RX ADMIN — Medication 250 MILLIGRAM(S): at 21:30

## 2023-01-01 RX ADMIN — Medication 400 MILLIGRAM(S): at 20:51

## 2023-01-01 RX ADMIN — PANTOPRAZOLE SODIUM 40 MILLIGRAM(S): 20 TABLET, DELAYED RELEASE ORAL at 17:01

## 2023-01-01 RX ADMIN — Medication 75 MICROGRAM(S): at 21:34

## 2023-01-01 RX ADMIN — Medication 75 MICROGRAM(S): at 22:32

## 2023-01-01 RX ADMIN — MIDAZOLAM HYDROCHLORIDE 4 MILLIGRAM(S): 1 INJECTION, SOLUTION INTRAMUSCULAR; INTRAVENOUS at 11:25

## 2023-01-01 RX ADMIN — PANTOPRAZOLE SODIUM 80 MILLIGRAM(S): 20 TABLET, DELAYED RELEASE ORAL at 08:31

## 2023-01-01 RX ADMIN — CHLORHEXIDINE GLUCONATE 1 APPLICATION(S): 213 SOLUTION TOPICAL at 05:30

## 2023-01-01 RX ADMIN — PIPERACILLIN AND TAZOBACTAM 25 GRAM(S): 4; .5 INJECTION, POWDER, LYOPHILIZED, FOR SOLUTION INTRAVENOUS at 13:18

## 2023-01-01 RX ADMIN — Medication 500 MILLIGRAM(S): at 18:31

## 2023-01-01 RX ADMIN — CHLORHEXIDINE GLUCONATE 15 MILLILITER(S): 213 SOLUTION TOPICAL at 05:29

## 2023-01-01 RX ADMIN — Medication 1: at 22:13

## 2023-01-01 RX ADMIN — Medication 4: at 06:43

## 2023-01-01 RX ADMIN — Medication 12.5 GRAM(S): at 23:46

## 2023-01-01 RX ADMIN — SPIRONOLACTONE 50 MILLIGRAM(S): 25 TABLET, FILM COATED ORAL at 21:36

## 2023-01-01 RX ADMIN — HUMAN INSULIN 15 UNIT(S): 100 INJECTION, SUSPENSION SUBCUTANEOUS at 05:53

## 2023-01-01 RX ADMIN — Medication 2: at 09:42

## 2023-01-01 RX ADMIN — Medication 4: at 07:27

## 2023-01-01 RX ADMIN — Medication 2: at 06:12

## 2023-01-01 RX ADMIN — CEFTRIAXONE 100 MILLIGRAM(S): 500 INJECTION, POWDER, FOR SOLUTION INTRAMUSCULAR; INTRAVENOUS at 14:22

## 2023-01-01 RX ADMIN — TENOFOVIR DISOPROXIL FUMARATE 300 MILLIGRAM(S): 300 TABLET, FILM COATED ORAL at 12:37

## 2023-01-01 RX ADMIN — Medication 1: at 23:47

## 2023-01-01 RX ADMIN — HUMAN INSULIN 13 UNIT(S): 100 INJECTION, SUSPENSION SUBCUTANEOUS at 13:16

## 2023-01-01 RX ADMIN — HUMAN INSULIN 14 UNIT(S): 100 INJECTION, SUSPENSION SUBCUTANEOUS at 12:13

## 2023-01-01 RX ADMIN — SODIUM CHLORIDE 1000 MILLILITER(S): 9 INJECTION INTRAMUSCULAR; INTRAVENOUS; SUBCUTANEOUS at 15:05

## 2023-01-01 RX ADMIN — Medication 2: at 05:35

## 2023-01-01 RX ADMIN — CHLORHEXIDINE GLUCONATE 15 MILLILITER(S): 213 SOLUTION TOPICAL at 17:36

## 2023-01-01 RX ADMIN — HUMAN INSULIN 11 UNIT(S): 100 INJECTION, SUSPENSION SUBCUTANEOUS at 23:26

## 2023-01-01 RX ADMIN — Medication 105 MILLIGRAM(S): at 17:49

## 2023-01-01 RX ADMIN — Medication 100 MILLIEQUIVALENT(S): at 02:03

## 2023-01-01 RX ADMIN — HUMAN INSULIN 18 UNIT(S): 100 INJECTION, SUSPENSION SUBCUTANEOUS at 17:31

## 2023-01-01 RX ADMIN — HUMAN INSULIN 11 UNIT(S): 100 INJECTION, SUSPENSION SUBCUTANEOUS at 05:58

## 2023-01-01 RX ADMIN — HUMAN INSULIN 11 UNIT(S): 100 INJECTION, SUSPENSION SUBCUTANEOUS at 05:34

## 2023-01-01 RX ADMIN — Medication 100 MICROGRAM(S): at 05:31

## 2023-01-01 RX ADMIN — PROPOFOL 3.9 MICROGRAM(S)/KG/MIN: 10 INJECTION, EMULSION INTRAVENOUS at 07:50

## 2023-01-01 RX ADMIN — Medication 12.5 GRAM(S): at 12:06

## 2023-01-01 RX ADMIN — Medication 2: at 12:39

## 2023-01-01 RX ADMIN — LACTULOSE 20 GRAM(S): 10 SOLUTION ORAL at 02:06

## 2023-01-01 RX ADMIN — Medication 5 UNIT(S): at 12:43

## 2023-01-01 RX ADMIN — Medication 500 MILLIGRAM(S): at 18:17

## 2023-01-01 RX ADMIN — SODIUM CHLORIDE 75 MILLILITER(S): 9 INJECTION INTRAMUSCULAR; INTRAVENOUS; SUBCUTANEOUS at 14:39

## 2023-01-01 RX ADMIN — LACTULOSE 10 GRAM(S): 10 SOLUTION ORAL at 07:58

## 2023-01-01 RX ADMIN — PANTOPRAZOLE SODIUM 40 MILLIGRAM(S): 20 TABLET, DELAYED RELEASE ORAL at 11:56

## 2023-01-01 RX ADMIN — HUMAN INSULIN 15 UNIT(S): 100 INJECTION, SUSPENSION SUBCUTANEOUS at 13:07

## 2023-01-01 RX ADMIN — LACTULOSE 10 GRAM(S): 10 SOLUTION ORAL at 21:51

## 2023-01-01 RX ADMIN — LACTULOSE 30 GRAM(S): 10 SOLUTION ORAL at 22:55

## 2023-01-01 RX ADMIN — HUMAN INSULIN 15 UNIT(S): 100 INJECTION, SUSPENSION SUBCUTANEOUS at 18:35

## 2023-01-01 RX ADMIN — CHLORHEXIDINE GLUCONATE 1 APPLICATION(S): 213 SOLUTION TOPICAL at 11:21

## 2023-01-01 RX ADMIN — LACTULOSE 30 GRAM(S): 10 SOLUTION ORAL at 12:45

## 2023-01-01 RX ADMIN — Medication 5 UNIT(S): at 18:31

## 2023-01-01 RX ADMIN — LACTULOSE 30 GRAM(S): 10 SOLUTION ORAL at 13:40

## 2023-01-01 RX ADMIN — PIPERACILLIN AND TAZOBACTAM 25 GRAM(S): 4; .5 INJECTION, POWDER, LYOPHILIZED, FOR SOLUTION INTRAVENOUS at 22:41

## 2023-01-01 RX ADMIN — OCTREOTIDE ACETATE 10 MICROGRAM(S)/HR: 200 INJECTION, SOLUTION INTRAVENOUS; SUBCUTANEOUS at 09:24

## 2023-01-01 RX ADMIN — Medication 20 MILLIEQUIVALENT(S): at 05:15

## 2023-01-01 RX ADMIN — Medication 5.19 MICROGRAM(S)/KG/MIN: at 21:35

## 2023-01-01 RX ADMIN — CHLORHEXIDINE GLUCONATE 1 APPLICATION(S): 213 SOLUTION TOPICAL at 05:19

## 2023-01-01 RX ADMIN — MIDODRINE HYDROCHLORIDE 10 MILLIGRAM(S): 2.5 TABLET ORAL at 05:32

## 2023-01-01 RX ADMIN — POTASSIUM PHOSPHATE, MONOBASIC POTASSIUM PHOSPHATE, DIBASIC 83.33 MILLIMOLE(S): 236; 224 INJECTION, SOLUTION INTRAVENOUS at 03:58

## 2023-01-01 RX ADMIN — LACTULOSE 10 GRAM(S): 10 SOLUTION ORAL at 12:26

## 2023-01-01 RX ADMIN — Medication 1 TABLET(S): at 13:02

## 2023-01-01 RX ADMIN — MUPIROCIN 1 APPLICATION(S): 20 OINTMENT TOPICAL at 06:29

## 2023-01-01 RX ADMIN — Medication 500 MILLIGRAM(S): at 12:34

## 2023-01-01 RX ADMIN — POLYETHYLENE GLYCOL 3350 17 GRAM(S): 17 POWDER, FOR SOLUTION ORAL at 21:52

## 2023-01-01 RX ADMIN — LACTULOSE 10 GRAM(S): 10 SOLUTION ORAL at 15:53

## 2023-01-01 RX ADMIN — PROPOFOL 3.9 MICROGRAM(S)/KG/MIN: 10 INJECTION, EMULSION INTRAVENOUS at 07:52

## 2023-01-01 RX ADMIN — Medication 105 MILLIGRAM(S): at 14:22

## 2023-01-01 RX ADMIN — SPIRONOLACTONE 25 MILLIGRAM(S): 25 TABLET, FILM COATED ORAL at 06:53

## 2023-01-01 RX ADMIN — Medication 4: at 13:13

## 2023-01-01 RX ADMIN — HUMAN INSULIN 13 UNIT(S): 100 INJECTION, SUSPENSION SUBCUTANEOUS at 06:13

## 2023-01-01 RX ADMIN — Medication 4: at 18:25

## 2023-01-01 RX ADMIN — PANTOPRAZOLE SODIUM 10 MG/HR: 20 TABLET, DELAYED RELEASE ORAL at 02:04

## 2023-01-01 RX ADMIN — LACTULOSE 20 GRAM(S): 10 SOLUTION ORAL at 17:57

## 2023-01-01 RX ADMIN — LACTULOSE 30 GRAM(S): 10 SOLUTION ORAL at 13:05

## 2023-01-01 RX ADMIN — LACTULOSE 20 GRAM(S): 10 SOLUTION ORAL at 17:26

## 2023-01-01 RX ADMIN — Medication 2: at 18:21

## 2023-01-01 RX ADMIN — Medication 500 MILLIGRAM(S): at 17:43

## 2023-01-01 RX ADMIN — Medication 2: at 18:22

## 2023-01-01 RX ADMIN — HUMAN INSULIN 11 UNIT(S): 100 INJECTION, SUSPENSION SUBCUTANEOUS at 23:45

## 2023-01-01 RX ADMIN — Medication 100 MILLIEQUIVALENT(S): at 10:36

## 2023-01-01 RX ADMIN — Medication 2: at 18:34

## 2023-01-01 RX ADMIN — CHLORHEXIDINE GLUCONATE 15 MILLILITER(S): 213 SOLUTION TOPICAL at 05:10

## 2023-01-01 RX ADMIN — HUMAN INSULIN 14 UNIT(S): 100 INJECTION, SUSPENSION SUBCUTANEOUS at 06:12

## 2023-01-01 RX ADMIN — Medication 500 MILLIGRAM(S): at 17:37

## 2023-01-01 RX ADMIN — HEPARIN SODIUM 5000 UNIT(S): 5000 INJECTION INTRAVENOUS; SUBCUTANEOUS at 17:25

## 2023-01-01 RX ADMIN — Medication 1: at 17:30

## 2023-01-01 RX ADMIN — HUMAN INSULIN 12 UNIT(S): 100 INJECTION, SUSPENSION SUBCUTANEOUS at 01:04

## 2023-01-01 RX ADMIN — ATORVASTATIN CALCIUM 80 MILLIGRAM(S): 80 TABLET, FILM COATED ORAL at 21:40

## 2023-01-01 RX ADMIN — HUMAN INSULIN 14 UNIT(S): 100 INJECTION, SUSPENSION SUBCUTANEOUS at 13:01

## 2023-01-01 RX ADMIN — PANTOPRAZOLE SODIUM 40 MILLIGRAM(S): 20 TABLET, DELAYED RELEASE ORAL at 05:33

## 2023-01-01 RX ADMIN — Medication 2: at 12:21

## 2023-01-01 RX ADMIN — HUMAN INSULIN 5 UNIT(S): 100 INJECTION, SUSPENSION SUBCUTANEOUS at 13:18

## 2023-01-01 RX ADMIN — HUMAN INSULIN 11 UNIT(S): 100 INJECTION, SUSPENSION SUBCUTANEOUS at 23:20

## 2023-01-01 RX ADMIN — Medication 1: at 18:31

## 2023-01-01 RX ADMIN — Medication 2: at 12:25

## 2023-01-01 RX ADMIN — MUPIROCIN 1 APPLICATION(S): 20 OINTMENT TOPICAL at 05:52

## 2023-01-01 RX ADMIN — Medication 4: at 17:37

## 2023-01-01 RX ADMIN — HUMAN INSULIN 11 UNIT(S): 100 INJECTION, SUSPENSION SUBCUTANEOUS at 18:30

## 2023-01-01 RX ADMIN — PANTOPRAZOLE SODIUM 40 MILLIGRAM(S): 20 TABLET, DELAYED RELEASE ORAL at 18:23

## 2023-01-01 RX ADMIN — Medication 500 MILLIGRAM(S): at 18:10

## 2023-01-01 RX ADMIN — TENOFOVIR DISOPROXIL FUMARATE 300 MILLIGRAM(S): 300 TABLET, FILM COATED ORAL at 11:55

## 2023-01-01 RX ADMIN — Medication 125 MILLILITER(S): at 02:06

## 2023-01-01 RX ADMIN — HUMAN INSULIN 16 UNIT(S): 100 INJECTION, SUSPENSION SUBCUTANEOUS at 06:50

## 2023-01-01 RX ADMIN — Medication 200 GRAM(S): at 16:50

## 2023-01-01 RX ADMIN — Medication 100 MILLIEQUIVALENT(S): at 09:12

## 2023-01-01 RX ADMIN — CEFTRIAXONE 100 MILLIGRAM(S): 500 INJECTION, POWDER, FOR SOLUTION INTRAMUSCULAR; INTRAVENOUS at 15:26

## 2023-01-01 RX ADMIN — Medication 4: at 12:05

## 2023-01-01 RX ADMIN — HUMAN INSULIN 10 UNIT(S): 100 INJECTION, SUSPENSION SUBCUTANEOUS at 18:36

## 2023-01-01 RX ADMIN — HUMAN INSULIN 11 UNIT(S): 100 INJECTION, SUSPENSION SUBCUTANEOUS at 00:00

## 2023-01-01 RX ADMIN — HUMAN INSULIN 11 UNIT(S): 100 INJECTION, SUSPENSION SUBCUTANEOUS at 05:48

## 2023-01-01 RX ADMIN — PANTOPRAZOLE SODIUM 40 MILLIGRAM(S): 20 TABLET, DELAYED RELEASE ORAL at 17:18

## 2023-01-01 RX ADMIN — Medication 2: at 12:55

## 2023-01-01 RX ADMIN — TENOFOVIR DISOPROXIL FUMARATE 300 MILLIGRAM(S): 300 TABLET, FILM COATED ORAL at 12:34

## 2023-01-01 RX ADMIN — LACTULOSE 10 GRAM(S): 10 SOLUTION ORAL at 10:22

## 2023-01-01 RX ADMIN — LACTULOSE 20 GRAM(S): 10 SOLUTION ORAL at 05:34

## 2023-01-01 RX ADMIN — POLYETHYLENE GLYCOL 3350 17 GRAM(S): 17 POWDER, FOR SOLUTION ORAL at 21:10

## 2023-01-01 RX ADMIN — PIPERACILLIN AND TAZOBACTAM 25 GRAM(S): 4; .5 INJECTION, POWDER, LYOPHILIZED, FOR SOLUTION INTRAVENOUS at 21:34

## 2023-01-01 RX ADMIN — Medication 125 MILLILITER(S): at 17:26

## 2023-01-01 RX ADMIN — HUMAN INSULIN 3 UNIT(S): 100 INJECTION, SUSPENSION SUBCUTANEOUS at 18:18

## 2023-01-01 RX ADMIN — Medication 2: at 11:58

## 2023-01-01 RX ADMIN — TENOFOVIR DISOPROXIL FUMARATE 300 MILLIGRAM(S): 300 TABLET, FILM COATED ORAL at 12:19

## 2023-01-01 RX ADMIN — HUMAN INSULIN 11 UNIT(S): 100 INJECTION, SUSPENSION SUBCUTANEOUS at 12:22

## 2023-01-01 RX ADMIN — HUMAN INSULIN 11 UNIT(S): 100 INJECTION, SUSPENSION SUBCUTANEOUS at 17:19

## 2023-01-01 RX ADMIN — PANTOPRAZOLE SODIUM 40 MILLIGRAM(S): 20 TABLET, DELAYED RELEASE ORAL at 05:47

## 2023-01-01 RX ADMIN — Medication 2: at 23:55

## 2023-01-01 RX ADMIN — Medication 1: at 09:03

## 2023-01-01 RX ADMIN — Medication 100 GRAM(S): at 01:35

## 2023-01-01 RX ADMIN — HUMAN INSULIN 14 UNIT(S): 100 INJECTION, SUSPENSION SUBCUTANEOUS at 18:50

## 2023-01-01 RX ADMIN — Medication 500 MILLIGRAM(S): at 18:03

## 2023-01-01 RX ADMIN — Medication 105 MILLIGRAM(S): at 22:04

## 2023-01-01 RX ADMIN — Medication 100 MILLIEQUIVALENT(S): at 11:01

## 2023-01-01 RX ADMIN — Medication 75 MICROGRAM(S): at 00:20

## 2023-01-01 RX ADMIN — LACTULOSE 20 GRAM(S): 10 SOLUTION ORAL at 08:49

## 2023-01-01 RX ADMIN — CHLORHEXIDINE GLUCONATE 1 APPLICATION(S): 213 SOLUTION TOPICAL at 05:13

## 2023-01-01 RX ADMIN — HUMAN INSULIN 10 UNIT(S): 100 INJECTION, SUSPENSION SUBCUTANEOUS at 02:20

## 2023-01-01 RX ADMIN — Medication 75 MICROGRAM(S): at 23:02

## 2023-01-01 RX ADMIN — Medication 5 UNIT(S): at 18:46

## 2023-01-01 RX ADMIN — Medication 40 MILLIEQUIVALENT(S): at 05:32

## 2023-01-01 RX ADMIN — LACTULOSE 20 GRAM(S): 10 SOLUTION ORAL at 05:30

## 2023-01-01 RX ADMIN — Medication 2: at 06:59

## 2023-01-01 RX ADMIN — PIPERACILLIN AND TAZOBACTAM 200 GRAM(S): 4; .5 INJECTION, POWDER, LYOPHILIZED, FOR SOLUTION INTRAVENOUS at 05:23

## 2023-01-01 RX ADMIN — Medication 500 MILLIGRAM(S): at 18:16

## 2023-01-01 RX ADMIN — Medication 4: at 06:40

## 2023-01-01 RX ADMIN — Medication 4: at 13:18

## 2023-01-01 RX ADMIN — ATORVASTATIN CALCIUM 80 MILLIGRAM(S): 80 TABLET, FILM COATED ORAL at 21:58

## 2023-01-01 RX ADMIN — Medication 4: at 17:53

## 2023-01-01 RX ADMIN — Medication 1: at 12:51

## 2023-01-01 RX ADMIN — CHLORHEXIDINE GLUCONATE 15 MILLILITER(S): 213 SOLUTION TOPICAL at 05:47

## 2023-01-01 RX ADMIN — HUMAN INSULIN 10 UNIT(S): 100 INJECTION, SUSPENSION SUBCUTANEOUS at 06:28

## 2023-01-01 RX ADMIN — Medication 2.6 MICROGRAM(S)/KG/MIN: at 11:27

## 2023-01-01 RX ADMIN — MIDAZOLAM HYDROCHLORIDE 4 MILLIGRAM(S): 1 INJECTION, SOLUTION INTRAMUSCULAR; INTRAVENOUS at 06:48

## 2023-01-01 RX ADMIN — Medication 75 MICROGRAM(S): at 00:00

## 2023-01-01 RX ADMIN — Medication 125 MILLILITER(S): at 23:27

## 2023-01-01 RX ADMIN — HUMAN INSULIN 10 UNIT(S): 100 INJECTION, SUSPENSION SUBCUTANEOUS at 06:44

## 2023-01-01 RX ADMIN — Medication 500 MILLIGRAM(S): at 16:25

## 2023-01-01 RX ADMIN — Medication 2 UNIT(S): at 08:43

## 2023-01-01 RX ADMIN — LACTULOSE 30 GRAM(S): 10 SOLUTION ORAL at 18:41

## 2023-01-01 RX ADMIN — Medication 125 MILLILITER(S): at 04:17

## 2023-01-01 RX ADMIN — Medication 2: at 00:10

## 2023-01-01 RX ADMIN — FENTANYL CITRATE 50 MICROGRAM(S): 50 INJECTION INTRAVENOUS at 01:23

## 2023-01-01 RX ADMIN — PANTOPRAZOLE SODIUM 40 MILLIGRAM(S): 20 TABLET, DELAYED RELEASE ORAL at 22:00

## 2023-01-01 RX ADMIN — HUMAN INSULIN 18 UNIT(S): 100 INJECTION, SUSPENSION SUBCUTANEOUS at 08:03

## 2023-01-01 RX ADMIN — HUMAN INSULIN 18 UNIT(S): 100 INJECTION, SUSPENSION SUBCUTANEOUS at 01:41

## 2023-01-01 RX ADMIN — Medication 5 MILLIGRAM(S): at 11:08

## 2023-01-01 RX ADMIN — HEPARIN SODIUM 5000 UNIT(S): 5000 INJECTION INTRAVENOUS; SUBCUTANEOUS at 05:16

## 2023-01-01 RX ADMIN — Medication 1000 MILLIGRAM(S): at 14:23

## 2023-01-01 RX ADMIN — FENTANYL CITRATE 4.88 MICROGRAM(S)/KG/HR: 50 INJECTION INTRAVENOUS at 08:08

## 2023-01-01 RX ADMIN — SPIRONOLACTONE 50 MILLIGRAM(S): 25 TABLET, FILM COATED ORAL at 06:30

## 2023-01-01 RX ADMIN — Medication 1 TABLET(S): at 14:46

## 2023-01-01 RX ADMIN — CHLORHEXIDINE GLUCONATE 1 APPLICATION(S): 213 SOLUTION TOPICAL at 06:40

## 2023-01-01 RX ADMIN — TAMSULOSIN HYDROCHLORIDE 0.8 MILLIGRAM(S): 0.4 CAPSULE ORAL at 22:08

## 2023-01-01 RX ADMIN — LACTULOSE 30 GRAM(S): 10 SOLUTION ORAL at 21:28

## 2023-01-01 RX ADMIN — PANTOPRAZOLE SODIUM 40 MILLIGRAM(S): 20 TABLET, DELAYED RELEASE ORAL at 06:53

## 2023-01-01 RX ADMIN — Medication 2: at 23:27

## 2023-01-01 RX ADMIN — Medication 1: at 18:46

## 2023-01-01 RX ADMIN — CHLORHEXIDINE GLUCONATE 1 APPLICATION(S): 213 SOLUTION TOPICAL at 06:44

## 2023-01-01 RX ADMIN — PIPERACILLIN AND TAZOBACTAM 25 GRAM(S): 4; .5 INJECTION, POWDER, LYOPHILIZED, FOR SOLUTION INTRAVENOUS at 14:23

## 2023-01-01 RX ADMIN — LACTULOSE 30 GRAM(S): 10 SOLUTION ORAL at 06:16

## 2023-01-01 RX ADMIN — LACTULOSE 20 GRAM(S): 10 SOLUTION ORAL at 05:31

## 2023-01-01 RX ADMIN — CEFTRIAXONE 1000 MILLIGRAM(S): 500 INJECTION, POWDER, FOR SOLUTION INTRAMUSCULAR; INTRAVENOUS at 09:09

## 2023-01-01 RX ADMIN — INSULIN GLARGINE 8 UNIT(S): 100 INJECTION, SOLUTION SUBCUTANEOUS at 21:28

## 2023-01-01 RX ADMIN — TENOFOVIR DISOPROXIL FUMARATE 300 MILLIGRAM(S): 300 TABLET, FILM COATED ORAL at 12:52

## 2023-01-01 RX ADMIN — PANTOPRAZOLE SODIUM 40 MILLIGRAM(S): 20 TABLET, DELAYED RELEASE ORAL at 13:00

## 2023-01-01 RX ADMIN — LACTULOSE 10 GRAM(S): 10 SOLUTION ORAL at 21:38

## 2023-01-01 RX ADMIN — Medication 75 MICROGRAM(S): at 23:10

## 2023-01-01 RX ADMIN — Medication 2: at 18:16

## 2023-01-01 RX ADMIN — HUMAN INSULIN 10 UNIT(S): 100 INJECTION, SUSPENSION SUBCUTANEOUS at 11:58

## 2023-01-01 RX ADMIN — Medication 2: at 17:18

## 2023-01-01 RX ADMIN — Medication 75 MICROGRAM(S): at 23:06

## 2023-01-01 RX ADMIN — FENTANYL CITRATE 4.88 MICROGRAM(S)/KG/HR: 50 INJECTION INTRAVENOUS at 16:50

## 2023-01-01 RX ADMIN — MIDODRINE HYDROCHLORIDE 10 MILLIGRAM(S): 2.5 TABLET ORAL at 08:59

## 2023-01-01 RX ADMIN — POLYETHYLENE GLYCOL 3350 17 GRAM(S): 17 POWDER, FOR SOLUTION ORAL at 18:20

## 2023-01-01 RX ADMIN — POLYETHYLENE GLYCOL 3350 17 GRAM(S): 17 POWDER, FOR SOLUTION ORAL at 22:42

## 2023-01-01 RX ADMIN — TENOFOVIR DISOPROXIL FUMARATE 300 MILLIGRAM(S): 300 TABLET, FILM COATED ORAL at 12:22

## 2023-01-01 RX ADMIN — MIDODRINE HYDROCHLORIDE 10 MILLIGRAM(S): 2.5 TABLET ORAL at 14:23

## 2023-01-01 RX ADMIN — Medication 2.6 MICROGRAM(S)/KG/MIN: at 07:50

## 2023-01-01 RX ADMIN — CEFTRIAXONE 100 MILLIGRAM(S): 500 INJECTION, POWDER, FOR SOLUTION INTRAMUSCULAR; INTRAVENOUS at 21:54

## 2023-01-01 RX ADMIN — PANTOPRAZOLE SODIUM 40 MILLIGRAM(S): 20 TABLET, DELAYED RELEASE ORAL at 05:41

## 2023-01-01 RX ADMIN — LACTULOSE 10 GRAM(S): 10 SOLUTION ORAL at 14:04

## 2023-01-01 RX ADMIN — HUMAN INSULIN 12 UNIT(S): 100 INJECTION, SUSPENSION SUBCUTANEOUS at 06:14

## 2023-01-01 RX ADMIN — PROPOFOL 3.9 MICROGRAM(S)/KG/MIN: 10 INJECTION, EMULSION INTRAVENOUS at 13:08

## 2023-01-01 RX ADMIN — PANTOPRAZOLE SODIUM 40 MILLIGRAM(S): 20 TABLET, DELAYED RELEASE ORAL at 12:00

## 2023-01-01 RX ADMIN — PANTOPRAZOLE SODIUM 40 MILLIGRAM(S): 20 TABLET, DELAYED RELEASE ORAL at 05:43

## 2023-01-01 RX ADMIN — CHLORHEXIDINE GLUCONATE 1 APPLICATION(S): 213 SOLUTION TOPICAL at 06:35

## 2023-01-01 RX ADMIN — Medication 4: at 05:49

## 2023-01-01 RX ADMIN — MUPIROCIN 1 APPLICATION(S): 20 OINTMENT TOPICAL at 17:23

## 2023-01-01 RX ADMIN — Medication 4: at 06:12

## 2023-01-01 RX ADMIN — Medication 1 PACKET(S): at 05:46

## 2023-01-01 RX ADMIN — Medication 75 MICROGRAM(S): at 21:32

## 2023-01-01 RX ADMIN — Medication 5 MILLIGRAM(S): at 12:15

## 2023-01-01 RX ADMIN — CHLORHEXIDINE GLUCONATE 1 APPLICATION(S): 213 SOLUTION TOPICAL at 05:16

## 2023-01-01 RX ADMIN — Medication 1: at 12:52

## 2023-01-01 RX ADMIN — Medication 500 MILLIGRAM(S): at 17:58

## 2023-01-01 RX ADMIN — PIPERACILLIN AND TAZOBACTAM 25 GRAM(S): 4; .5 INJECTION, POWDER, LYOPHILIZED, FOR SOLUTION INTRAVENOUS at 13:38

## 2023-01-01 RX ADMIN — MIDODRINE HYDROCHLORIDE 5 MILLIGRAM(S): 2.5 TABLET ORAL at 11:36

## 2023-01-01 RX ADMIN — INSULIN GLARGINE 8 UNIT(S): 100 INJECTION, SOLUTION SUBCUTANEOUS at 22:02

## 2023-01-01 RX ADMIN — HUMAN INSULIN 14 UNIT(S): 100 INJECTION, SUSPENSION SUBCUTANEOUS at 18:16

## 2023-01-01 RX ADMIN — MIDODRINE HYDROCHLORIDE 10 MILLIGRAM(S): 2.5 TABLET ORAL at 21:36

## 2023-01-01 RX ADMIN — Medication 1: at 05:19

## 2023-01-01 RX ADMIN — HUMAN INSULIN 13 UNIT(S): 100 INJECTION, SUSPENSION SUBCUTANEOUS at 23:21

## 2023-01-01 RX ADMIN — HUMAN INSULIN 14 UNIT(S): 100 INJECTION, SUSPENSION SUBCUTANEOUS at 23:05

## 2023-01-01 RX ADMIN — LACTULOSE 10 GRAM(S): 10 SOLUTION ORAL at 17:32

## 2023-01-01 RX ADMIN — Medication 3: at 09:25

## 2023-01-01 RX ADMIN — POLYETHYLENE GLYCOL 3350 17 GRAM(S): 17 POWDER, FOR SOLUTION ORAL at 05:57

## 2023-01-01 RX ADMIN — PANTOPRAZOLE SODIUM 40 MILLIGRAM(S): 20 TABLET, DELAYED RELEASE ORAL at 05:20

## 2023-01-01 RX ADMIN — Medication 105 MILLIGRAM(S): at 07:04

## 2023-01-01 RX ADMIN — Medication 20 MILLIGRAM(S): at 05:27

## 2023-01-01 RX ADMIN — Medication 6: at 11:59

## 2023-01-01 RX ADMIN — HUMAN INSULIN 13 UNIT(S): 100 INJECTION, SUSPENSION SUBCUTANEOUS at 18:12

## 2023-01-01 RX ADMIN — PIPERACILLIN AND TAZOBACTAM 25 GRAM(S): 4; .5 INJECTION, POWDER, LYOPHILIZED, FOR SOLUTION INTRAVENOUS at 21:56

## 2023-01-01 RX ADMIN — HUMAN INSULIN 11 UNIT(S): 100 INJECTION, SUSPENSION SUBCUTANEOUS at 06:37

## 2023-01-01 RX ADMIN — Medication 75 MICROGRAM(S): at 21:30

## 2023-01-01 RX ADMIN — MIDODRINE HYDROCHLORIDE 10 MILLIGRAM(S): 2.5 TABLET ORAL at 22:47

## 2023-01-01 RX ADMIN — MIDODRINE HYDROCHLORIDE 10 MILLIGRAM(S): 2.5 TABLET ORAL at 16:09

## 2023-01-01 RX ADMIN — POTASSIUM PHOSPHATE, MONOBASIC POTASSIUM PHOSPHATE, DIBASIC 62.5 MILLIMOLE(S): 236; 224 INJECTION, SOLUTION INTRAVENOUS at 03:03

## 2023-01-01 RX ADMIN — PIPERACILLIN AND TAZOBACTAM 25 GRAM(S): 4; .5 INJECTION, POWDER, LYOPHILIZED, FOR SOLUTION INTRAVENOUS at 21:12

## 2023-01-01 RX ADMIN — Medication 2.6 MICROGRAM(S)/KG/MIN: at 19:25

## 2023-01-01 RX ADMIN — Medication 500 MILLIGRAM(S): at 13:15

## 2023-01-01 RX ADMIN — SODIUM CHLORIDE 50 MILLILITER(S): 9 INJECTION INTRAMUSCULAR; INTRAVENOUS; SUBCUTANEOUS at 09:32

## 2023-01-01 RX ADMIN — Medication 5: at 23:05

## 2023-01-01 RX ADMIN — Medication 4: at 00:51

## 2023-01-01 RX ADMIN — HUMAN INSULIN 13 UNIT(S): 100 INJECTION, SUSPENSION SUBCUTANEOUS at 06:40

## 2023-01-01 RX ADMIN — HEPARIN SODIUM 5000 UNIT(S): 5000 INJECTION INTRAVENOUS; SUBCUTANEOUS at 17:16

## 2023-01-01 RX ADMIN — MUPIROCIN 1 APPLICATION(S): 20 OINTMENT TOPICAL at 05:39

## 2023-01-01 RX ADMIN — FENTANYL CITRATE 2.77 MICROGRAM(S)/KG/HR: 50 INJECTION INTRAVENOUS at 07:50

## 2023-01-01 RX ADMIN — FENTANYL CITRATE 4.88 MICROGRAM(S)/KG/HR: 50 INJECTION INTRAVENOUS at 12:07

## 2023-01-01 RX ADMIN — CHLORHEXIDINE GLUCONATE 15 MILLILITER(S): 213 SOLUTION TOPICAL at 17:20

## 2023-01-01 RX ADMIN — TAMSULOSIN HYDROCHLORIDE 0.8 MILLIGRAM(S): 0.4 CAPSULE ORAL at 22:01

## 2023-01-01 RX ADMIN — Medication 2: at 23:59

## 2023-01-01 RX ADMIN — Medication 1: at 17:28

## 2023-01-01 RX ADMIN — Medication 100 MILLIEQUIVALENT(S): at 04:18

## 2023-01-01 RX ADMIN — Medication 2.6 MICROGRAM(S)/KG/MIN: at 02:59

## 2023-01-01 RX ADMIN — Medication 2: at 00:23

## 2023-01-01 RX ADMIN — POLYETHYLENE GLYCOL 3350 17 GRAM(S): 17 POWDER, FOR SOLUTION ORAL at 17:36

## 2023-01-01 RX ADMIN — HUMAN INSULIN 14 UNIT(S): 100 INJECTION, SUSPENSION SUBCUTANEOUS at 06:29

## 2023-01-01 RX ADMIN — Medication 2.6 MICROGRAM(S)/KG/MIN: at 19:46

## 2023-01-01 RX ADMIN — TENOFOVIR DISOPROXIL FUMARATE 300 MILLIGRAM(S): 300 TABLET, FILM COATED ORAL at 13:00

## 2023-01-01 RX ADMIN — CEFTRIAXONE 100 MILLIGRAM(S): 500 INJECTION, POWDER, FOR SOLUTION INTRAMUSCULAR; INTRAVENOUS at 21:39

## 2023-01-01 RX ADMIN — SODIUM CHLORIDE 1000 MILLILITER(S): 9 INJECTION INTRAMUSCULAR; INTRAVENOUS; SUBCUTANEOUS at 15:13

## 2023-01-01 RX ADMIN — Medication 6: at 17:21

## 2023-01-01 RX ADMIN — LACTULOSE 20 GRAM(S): 10 SOLUTION ORAL at 05:15

## 2023-01-01 RX ADMIN — Medication 2: at 12:41

## 2023-01-01 RX ADMIN — FENTANYL CITRATE 2.77 MICROGRAM(S)/KG/HR: 50 INJECTION INTRAVENOUS at 03:00

## 2023-01-01 RX ADMIN — CHLORHEXIDINE GLUCONATE 15 MILLILITER(S): 213 SOLUTION TOPICAL at 17:18

## 2023-01-01 RX ADMIN — Medication 2 UNIT(S): at 17:44

## 2023-01-01 RX ADMIN — Medication 40 MILLIGRAM(S): at 13:17

## 2023-01-01 RX ADMIN — CHLORHEXIDINE GLUCONATE 15 MILLILITER(S): 213 SOLUTION TOPICAL at 05:41

## 2023-01-01 RX ADMIN — HUMAN INSULIN 14 UNIT(S): 100 INJECTION, SUSPENSION SUBCUTANEOUS at 23:12

## 2023-01-01 RX ADMIN — HUMAN INSULIN 10 UNIT(S): 100 INJECTION, SUSPENSION SUBCUTANEOUS at 06:09

## 2023-01-01 RX ADMIN — POLYETHYLENE GLYCOL 3350 17 GRAM(S): 17 POWDER, FOR SOLUTION ORAL at 05:17

## 2023-01-01 RX ADMIN — Medication 20 MILLIGRAM(S): at 07:04

## 2023-01-01 RX ADMIN — Medication 24.4 MICROGRAM(S)/KG/MIN: at 12:08

## 2023-01-01 RX ADMIN — FENTANYL CITRATE 50 MICROGRAM(S): 50 INJECTION INTRAVENOUS at 04:59

## 2023-01-01 RX ADMIN — HUMAN INSULIN 8 UNIT(S): 100 INJECTION, SUSPENSION SUBCUTANEOUS at 18:05

## 2023-01-01 RX ADMIN — Medication 1: at 18:10

## 2023-01-01 RX ADMIN — Medication 100 MICROGRAM(S): at 05:29

## 2023-01-01 RX ADMIN — Medication 20 MILLIGRAM(S): at 06:54

## 2023-01-01 RX ADMIN — HUMAN INSULIN 15 UNIT(S): 100 INJECTION, SUSPENSION SUBCUTANEOUS at 12:53

## 2023-01-01 RX ADMIN — POLYETHYLENE GLYCOL 3350 17 GRAM(S): 17 POWDER, FOR SOLUTION ORAL at 05:11

## 2023-01-01 RX ADMIN — HUMAN INSULIN 14 UNIT(S): 100 INJECTION, SUSPENSION SUBCUTANEOUS at 23:57

## 2023-01-01 RX ADMIN — Medication 2: at 14:09

## 2023-01-01 RX ADMIN — Medication 1: at 06:50

## 2023-01-01 RX ADMIN — PANTOPRAZOLE SODIUM 40 MILLIGRAM(S): 20 TABLET, DELAYED RELEASE ORAL at 13:06

## 2023-01-01 RX ADMIN — CHLORHEXIDINE GLUCONATE 15 MILLILITER(S): 213 SOLUTION TOPICAL at 17:01

## 2023-01-01 RX ADMIN — LACTULOSE 30 GRAM(S): 10 SOLUTION ORAL at 15:55

## 2023-01-01 RX ADMIN — PIPERACILLIN AND TAZOBACTAM 25 GRAM(S): 4; .5 INJECTION, POWDER, LYOPHILIZED, FOR SOLUTION INTRAVENOUS at 14:10

## 2023-01-01 RX ADMIN — LACTULOSE 20 GRAM(S): 10 SOLUTION ORAL at 21:39

## 2023-01-01 RX ADMIN — POTASSIUM PHOSPHATE, MONOBASIC POTASSIUM PHOSPHATE, DIBASIC 62.5 MILLIMOLE(S): 236; 224 INJECTION, SOLUTION INTRAVENOUS at 09:44

## 2023-01-01 RX ADMIN — Medication 1000 MILLIGRAM(S): at 00:00

## 2023-01-01 RX ADMIN — Medication 20 MILLIGRAM(S): at 05:16

## 2023-01-01 RX ADMIN — Medication 50 MILLIEQUIVALENT(S): at 02:15

## 2023-01-01 RX ADMIN — OCTREOTIDE ACETATE 10 MICROGRAM(S)/HR: 200 INJECTION, SOLUTION INTRAVENOUS; SUBCUTANEOUS at 23:47

## 2023-01-01 RX ADMIN — OCTREOTIDE ACETATE 10 MICROGRAM(S)/HR: 200 INJECTION, SOLUTION INTRAVENOUS; SUBCUTANEOUS at 07:03

## 2023-01-01 RX ADMIN — Medication 1 TABLET(S): at 12:14

## 2023-01-01 RX ADMIN — Medication 4: at 13:07

## 2023-01-01 RX ADMIN — Medication 100 MILLIEQUIVALENT(S): at 09:19

## 2023-01-01 RX ADMIN — CHLORHEXIDINE GLUCONATE 15 MILLILITER(S): 213 SOLUTION TOPICAL at 17:33

## 2023-01-01 RX ADMIN — LACTULOSE 30 GRAM(S): 10 SOLUTION ORAL at 22:47

## 2023-01-01 RX ADMIN — CEFTRIAXONE 100 MILLIGRAM(S): 500 INJECTION, POWDER, FOR SOLUTION INTRAMUSCULAR; INTRAVENOUS at 08:28

## 2023-01-01 RX ADMIN — Medication 5 MILLIGRAM(S): at 13:13

## 2023-01-01 RX ADMIN — Medication 75 MICROGRAM(S): at 21:52

## 2023-01-01 RX ADMIN — Medication 3: at 23:06

## 2023-01-01 RX ADMIN — MIDODRINE HYDROCHLORIDE 10 MILLIGRAM(S): 2.5 TABLET ORAL at 17:53

## 2023-01-01 RX ADMIN — Medication 5 UNIT(S): at 18:17

## 2023-01-01 RX ADMIN — PIPERACILLIN AND TAZOBACTAM 25 GRAM(S): 4; .5 INJECTION, POWDER, LYOPHILIZED, FOR SOLUTION INTRAVENOUS at 05:09

## 2023-01-01 RX ADMIN — MUPIROCIN 1 APPLICATION(S): 20 OINTMENT TOPICAL at 17:13

## 2023-01-01 RX ADMIN — MIDAZOLAM HYDROCHLORIDE 4 MILLIGRAM(S): 1 INJECTION, SOLUTION INTRAMUSCULAR; INTRAVENOUS at 16:49

## 2023-01-01 RX ADMIN — CEFTRIAXONE 100 MILLIGRAM(S): 500 INJECTION, POWDER, FOR SOLUTION INTRAMUSCULAR; INTRAVENOUS at 08:40

## 2023-01-01 RX ADMIN — PANTOPRAZOLE SODIUM 40 MILLIGRAM(S): 20 TABLET, DELAYED RELEASE ORAL at 17:06

## 2023-01-01 RX ADMIN — Medication 1: at 12:53

## 2023-01-01 RX ADMIN — TENOFOVIR DISOPROXIL FUMARATE 300 MILLIGRAM(S): 300 TABLET, FILM COATED ORAL at 18:21

## 2023-01-01 RX ADMIN — CHLORHEXIDINE GLUCONATE 1 APPLICATION(S): 213 SOLUTION TOPICAL at 05:41

## 2023-01-01 RX ADMIN — Medication 5 UNIT(S): at 12:56

## 2023-01-01 RX ADMIN — Medication 40 MILLIGRAM(S): at 04:17

## 2023-01-01 RX ADMIN — PIPERACILLIN AND TAZOBACTAM 25 GRAM(S): 4; .5 INJECTION, POWDER, LYOPHILIZED, FOR SOLUTION INTRAVENOUS at 05:32

## 2023-01-01 RX ADMIN — Medication 50 MILLILITER(S): at 02:59

## 2023-01-01 RX ADMIN — PANTOPRAZOLE SODIUM 40 MILLIGRAM(S): 20 TABLET, DELAYED RELEASE ORAL at 12:34

## 2023-01-01 RX ADMIN — Medication 2.6 MICROGRAM(S)/KG/MIN: at 07:49

## 2023-01-01 RX ADMIN — Medication 1 APPLICATION(S): at 05:33

## 2023-01-01 RX ADMIN — PIPERACILLIN AND TAZOBACTAM 25 GRAM(S): 4; .5 INJECTION, POWDER, LYOPHILIZED, FOR SOLUTION INTRAVENOUS at 21:08

## 2023-01-01 RX ADMIN — LACTULOSE 10 GRAM(S): 10 SOLUTION ORAL at 05:27

## 2023-01-01 RX ADMIN — HUMAN INSULIN 14 UNIT(S): 100 INJECTION, SUSPENSION SUBCUTANEOUS at 05:57

## 2023-01-01 RX ADMIN — MIDODRINE HYDROCHLORIDE 10 MILLIGRAM(S): 2.5 TABLET ORAL at 14:10

## 2023-01-01 RX ADMIN — POLYETHYLENE GLYCOL 3350 17 GRAM(S): 17 POWDER, FOR SOLUTION ORAL at 13:38

## 2023-01-01 RX ADMIN — LACTULOSE 30 GRAM(S): 10 SOLUTION ORAL at 21:36

## 2023-01-01 RX ADMIN — SODIUM CHLORIDE 1000 MILLILITER(S): 9 INJECTION, SOLUTION INTRAVENOUS at 10:46

## 2023-01-01 RX ADMIN — Medication 500 MILLIGRAM(S): at 23:30

## 2023-01-01 RX ADMIN — Medication 2: at 22:56

## 2023-01-01 RX ADMIN — HYDROMORPHONE HYDROCHLORIDE 0.25 MILLIGRAM(S): 2 INJECTION INTRAMUSCULAR; INTRAVENOUS; SUBCUTANEOUS at 21:39

## 2023-01-01 RX ADMIN — PANTOPRAZOLE SODIUM 40 MILLIGRAM(S): 20 TABLET, DELAYED RELEASE ORAL at 11:54

## 2023-01-01 RX ADMIN — PANTOPRAZOLE SODIUM 40 MILLIGRAM(S): 20 TABLET, DELAYED RELEASE ORAL at 05:15

## 2023-01-01 RX ADMIN — Medication 20 MILLIGRAM(S): at 21:37

## 2023-01-01 RX ADMIN — HUMAN INSULIN 14 UNIT(S): 100 INJECTION, SUSPENSION SUBCUTANEOUS at 18:10

## 2023-01-01 RX ADMIN — Medication 5 UNIT(S): at 12:45

## 2023-01-01 RX ADMIN — SODIUM CHLORIDE 75 MILLILITER(S): 9 INJECTION INTRAMUSCULAR; INTRAVENOUS; SUBCUTANEOUS at 00:18

## 2023-01-01 RX ADMIN — POLYETHYLENE GLYCOL 3350 17 GRAM(S): 17 POWDER, FOR SOLUTION ORAL at 05:34

## 2023-01-01 RX ADMIN — Medication 100 MILLIEQUIVALENT(S): at 04:35

## 2023-01-01 RX ADMIN — PANTOPRAZOLE SODIUM 40 MILLIGRAM(S): 20 TABLET, DELAYED RELEASE ORAL at 17:52

## 2023-01-01 RX ADMIN — Medication 100 MILLIGRAM(S): at 11:10

## 2023-01-01 RX ADMIN — TENOFOVIR DISOPROXIL FUMARATE 300 MILLIGRAM(S): 300 TABLET, FILM COATED ORAL at 11:38

## 2023-01-01 RX ADMIN — SPIRONOLACTONE 50 MILLIGRAM(S): 25 TABLET, FILM COATED ORAL at 05:14

## 2023-01-01 RX ADMIN — Medication 500 MILLIGRAM(S): at 11:59

## 2023-01-01 RX ADMIN — PIPERACILLIN AND TAZOBACTAM 25 GRAM(S): 4; .5 INJECTION, POWDER, LYOPHILIZED, FOR SOLUTION INTRAVENOUS at 13:54

## 2023-01-01 RX ADMIN — Medication 1: at 12:15

## 2023-01-01 RX ADMIN — DEXMEDETOMIDINE HYDROCHLORIDE IN 0.9% SODIUM CHLORIDE 2.77 MICROGRAM(S)/KG/HR: 4 INJECTION INTRAVENOUS at 19:49

## 2023-01-01 RX ADMIN — MIDODRINE HYDROCHLORIDE 5 MILLIGRAM(S): 2.5 TABLET ORAL at 11:35

## 2023-01-01 RX ADMIN — Medication 1: at 17:35

## 2023-01-01 RX ADMIN — MIDODRINE HYDROCHLORIDE 10 MILLIGRAM(S): 2.5 TABLET ORAL at 17:24

## 2023-01-01 RX ADMIN — PANTOPRAZOLE SODIUM 40 MILLIGRAM(S): 20 TABLET, DELAYED RELEASE ORAL at 17:36

## 2023-01-01 RX ADMIN — Medication 2: at 23:43

## 2023-01-01 RX ADMIN — HUMAN INSULIN 15 UNIT(S): 100 INJECTION, SUSPENSION SUBCUTANEOUS at 18:02

## 2023-01-01 RX ADMIN — Medication 200 MILLIGRAM(S): at 05:14

## 2023-01-01 RX ADMIN — MIDODRINE HYDROCHLORIDE 10 MILLIGRAM(S): 2.5 TABLET ORAL at 21:08

## 2023-01-01 RX ADMIN — Medication 3: at 05:32

## 2023-01-01 RX ADMIN — HUMAN INSULIN 10 UNIT(S): 100 INJECTION, SUSPENSION SUBCUTANEOUS at 00:11

## 2023-01-01 RX ADMIN — HUMAN INSULIN 15 UNIT(S): 100 INJECTION, SUSPENSION SUBCUTANEOUS at 00:10

## 2023-01-01 RX ADMIN — Medication 500 MILLIGRAM(S): at 17:30

## 2023-01-01 RX ADMIN — Medication 2: at 18:02

## 2023-01-01 RX ADMIN — MUPIROCIN 1 APPLICATION(S): 20 OINTMENT TOPICAL at 05:15

## 2023-01-01 RX ADMIN — POLYETHYLENE GLYCOL 3350 17 GRAM(S): 17 POWDER, FOR SOLUTION ORAL at 14:06

## 2023-01-01 RX ADMIN — OCTREOTIDE ACETATE 10 MICROGRAM(S)/HR: 200 INJECTION, SOLUTION INTRAVENOUS; SUBCUTANEOUS at 21:43

## 2023-01-01 RX ADMIN — Medication 5 UNIT(S): at 09:03

## 2023-01-01 RX ADMIN — Medication 100 MILLIEQUIVALENT(S): at 11:36

## 2023-01-01 RX ADMIN — HUMAN INSULIN 10 UNIT(S): 100 INJECTION, SUSPENSION SUBCUTANEOUS at 17:46

## 2023-01-01 RX ADMIN — Medication 75 MICROGRAM(S): at 21:38

## 2023-01-01 RX ADMIN — Medication 400 MILLIGRAM(S): at 22:09

## 2023-01-01 RX ADMIN — HUMAN INSULIN 11 UNIT(S): 100 INJECTION, SUSPENSION SUBCUTANEOUS at 00:52

## 2023-01-01 RX ADMIN — POLYETHYLENE GLYCOL 3350 17 GRAM(S): 17 POWDER, FOR SOLUTION ORAL at 21:56

## 2023-01-01 RX ADMIN — PANTOPRAZOLE SODIUM 10 MG/HR: 20 TABLET, DELAYED RELEASE ORAL at 08:08

## 2023-01-01 RX ADMIN — CHLORHEXIDINE GLUCONATE 1 APPLICATION(S): 213 SOLUTION TOPICAL at 05:10

## 2023-01-01 RX ADMIN — SODIUM CHLORIDE 50 MILLILITER(S): 9 INJECTION, SOLUTION INTRAVENOUS at 23:10

## 2023-01-01 RX ADMIN — Medication 400 MILLIGRAM(S): at 05:15

## 2023-01-01 RX ADMIN — Medication 3: at 18:29

## 2023-01-01 RX ADMIN — Medication 500 MILLIGRAM(S): at 06:04

## 2023-01-01 RX ADMIN — PROPOFOL 3.9 MICROGRAM(S)/KG/MIN: 10 INJECTION, EMULSION INTRAVENOUS at 19:26

## 2023-01-01 RX ADMIN — Medication 1: at 13:01

## 2023-01-01 RX ADMIN — CHLORHEXIDINE GLUCONATE 15 MILLILITER(S): 213 SOLUTION TOPICAL at 05:32

## 2023-01-01 RX ADMIN — Medication 75 MEQ/KG/HR: at 02:47

## 2023-01-01 RX ADMIN — ATORVASTATIN CALCIUM 80 MILLIGRAM(S): 80 TABLET, FILM COATED ORAL at 22:02

## 2023-01-01 RX ADMIN — Medication 75 MICROGRAM(S): at 21:08

## 2023-01-01 RX ADMIN — CHLORHEXIDINE GLUCONATE 1 APPLICATION(S): 213 SOLUTION TOPICAL at 17:17

## 2023-01-01 RX ADMIN — Medication 1000 MILLIGRAM(S): at 05:45

## 2023-01-01 RX ADMIN — POTASSIUM PHOSPHATE, MONOBASIC POTASSIUM PHOSPHATE, DIBASIC 62.5 MILLIMOLE(S): 236; 224 INJECTION, SOLUTION INTRAVENOUS at 15:51

## 2023-01-01 RX ADMIN — MIDODRINE HYDROCHLORIDE 10 MILLIGRAM(S): 2.5 TABLET ORAL at 10:55

## 2023-01-01 RX ADMIN — Medication 1: at 12:59

## 2023-01-01 RX ADMIN — TENOFOVIR DISOPROXIL FUMARATE 300 MILLIGRAM(S): 300 TABLET, FILM COATED ORAL at 13:07

## 2023-01-01 RX ADMIN — Medication 1 APPLICATION(S): at 17:21

## 2023-01-01 RX ADMIN — Medication 1: at 17:21

## 2023-01-01 RX ADMIN — Medication 500 MILLIGRAM(S): at 17:49

## 2023-01-01 RX ADMIN — HUMAN INSULIN 16 UNIT(S): 100 INJECTION, SUSPENSION SUBCUTANEOUS at 13:00

## 2023-01-01 RX ADMIN — PANTOPRAZOLE SODIUM 40 MILLIGRAM(S): 20 TABLET, DELAYED RELEASE ORAL at 12:24

## 2023-01-01 RX ADMIN — LACTULOSE 10 GRAM(S): 10 SOLUTION ORAL at 05:16

## 2023-01-01 RX ADMIN — Medication 2: at 08:37

## 2023-01-01 RX ADMIN — SODIUM CHLORIDE 50 MILLILITER(S): 9 INJECTION INTRAMUSCULAR; INTRAVENOUS; SUBCUTANEOUS at 20:18

## 2023-01-01 RX ADMIN — HUMAN INSULIN 9 UNIT(S): 100 INJECTION, SUSPENSION SUBCUTANEOUS at 06:30

## 2023-01-01 RX ADMIN — DEXMEDETOMIDINE HYDROCHLORIDE IN 0.9% SODIUM CHLORIDE 2.77 MICROGRAM(S)/KG/HR: 4 INJECTION INTRAVENOUS at 18:25

## 2023-01-01 RX ADMIN — Medication 2: at 06:36

## 2023-01-01 RX ADMIN — MIDODRINE HYDROCHLORIDE 10 MILLIGRAM(S): 2.5 TABLET ORAL at 15:55

## 2023-01-01 RX ADMIN — HUMAN INSULIN 12 UNIT(S): 100 INJECTION, SUSPENSION SUBCUTANEOUS at 12:45

## 2023-01-01 RX ADMIN — FENTANYL CITRATE 50 MICROGRAM(S): 50 INJECTION INTRAVENOUS at 05:02

## 2023-01-01 RX ADMIN — TENOFOVIR DISOPROXIL FUMARATE 300 MILLIGRAM(S): 300 TABLET, FILM COATED ORAL at 13:18

## 2023-01-01 RX ADMIN — HUMAN INSULIN 16 UNIT(S): 100 INJECTION, SUSPENSION SUBCUTANEOUS at 18:32

## 2023-01-01 RX ADMIN — CHLORHEXIDINE GLUCONATE 1 APPLICATION(S): 213 SOLUTION TOPICAL at 05:31

## 2023-01-01 RX ADMIN — TENOFOVIR DISOPROXIL FUMARATE 300 MILLIGRAM(S): 300 TABLET, FILM COATED ORAL at 16:08

## 2023-01-01 RX ADMIN — Medication 4: at 17:46

## 2023-01-01 RX ADMIN — Medication 75 MICROGRAM(S): at 21:55

## 2023-01-01 RX ADMIN — HUMAN INSULIN 15 UNIT(S): 100 INJECTION, SUSPENSION SUBCUTANEOUS at 06:14

## 2023-01-01 RX ADMIN — HUMAN INSULIN 15 UNIT(S): 100 INJECTION, SUSPENSION SUBCUTANEOUS at 06:42

## 2023-01-01 RX ADMIN — INSULIN GLARGINE 5 UNIT(S): 100 INJECTION, SOLUTION SUBCUTANEOUS at 22:43

## 2023-01-01 RX ADMIN — Medication 125 MILLILITER(S): at 10:55

## 2023-01-01 RX ADMIN — Medication 500 MILLIGRAM(S): at 17:16

## 2023-01-01 RX ADMIN — Medication 8: at 23:16

## 2023-01-01 RX ADMIN — HUMAN INSULIN 13 UNIT(S): 100 INJECTION, SUSPENSION SUBCUTANEOUS at 12:24

## 2023-01-01 RX ADMIN — Medication 1: at 22:53

## 2023-01-01 RX ADMIN — CHLORHEXIDINE GLUCONATE 1 APPLICATION(S): 213 SOLUTION TOPICAL at 06:17

## 2023-01-01 RX ADMIN — Medication 105 MILLIGRAM(S): at 21:31

## 2023-01-01 RX ADMIN — Medication 3: at 17:32

## 2023-01-01 RX ADMIN — Medication 75 MICROGRAM(S): at 21:42

## 2023-01-01 RX ADMIN — HUMAN INSULIN 11 UNIT(S): 100 INJECTION, SUSPENSION SUBCUTANEOUS at 05:21

## 2023-01-01 RX ADMIN — Medication 4: at 12:31

## 2023-01-01 RX ADMIN — FENTANYL CITRATE 2.77 MICROGRAM(S)/KG/HR: 50 INJECTION INTRAVENOUS at 07:49

## 2023-01-01 RX ADMIN — Medication 75 MICROGRAM(S): at 21:00

## 2023-01-01 RX ADMIN — Medication 5 UNIT(S): at 09:43

## 2023-01-01 RX ADMIN — Medication 2: at 17:30

## 2023-01-01 RX ADMIN — Medication 100 MILLIEQUIVALENT(S): at 06:44

## 2023-01-01 RX ADMIN — Medication 4: at 12:47

## 2023-01-01 RX ADMIN — Medication 5 UNIT(S): at 13:07

## 2023-01-01 RX ADMIN — Medication 100 MICROGRAM(S): at 06:27

## 2023-01-01 RX ADMIN — HUMAN INSULIN 15 UNIT(S): 100 INJECTION, SUSPENSION SUBCUTANEOUS at 18:38

## 2023-01-01 RX ADMIN — Medication 100 MILLIEQUIVALENT(S): at 04:00

## 2023-01-01 RX ADMIN — HUMAN INSULIN 11 UNIT(S): 100 INJECTION, SUSPENSION SUBCUTANEOUS at 06:54

## 2023-01-01 RX ADMIN — PANTOPRAZOLE SODIUM 40 MILLIGRAM(S): 20 TABLET, DELAYED RELEASE ORAL at 11:57

## 2023-01-01 RX ADMIN — Medication 1: at 14:51

## 2023-01-01 RX ADMIN — MUPIROCIN 1 APPLICATION(S): 20 OINTMENT TOPICAL at 18:37

## 2023-01-01 RX ADMIN — FENTANYL CITRATE 50 MICROGRAM(S): 50 INJECTION INTRAVENOUS at 17:15

## 2023-01-01 RX ADMIN — LACTULOSE 20 GRAM(S): 10 SOLUTION ORAL at 05:50

## 2023-01-01 RX ADMIN — HUMAN INSULIN 11 UNIT(S): 100 INJECTION, SUSPENSION SUBCUTANEOUS at 12:56

## 2023-01-01 RX ADMIN — Medication 100 MILLIEQUIVALENT(S): at 03:36

## 2023-01-01 RX ADMIN — Medication 3: at 08:43

## 2023-01-01 RX ADMIN — PANTOPRAZOLE SODIUM 10 MG/HR: 20 TABLET, DELAYED RELEASE ORAL at 12:33

## 2023-01-01 RX ADMIN — Medication 2: at 00:14

## 2023-01-01 RX ADMIN — CHLORHEXIDINE GLUCONATE 1 APPLICATION(S): 213 SOLUTION TOPICAL at 07:31

## 2023-01-01 RX ADMIN — PIPERACILLIN AND TAZOBACTAM 25 GRAM(S): 4; .5 INJECTION, POWDER, LYOPHILIZED, FOR SOLUTION INTRAVENOUS at 05:47

## 2023-01-01 RX ADMIN — PANTOPRAZOLE SODIUM 40 MILLIGRAM(S): 20 TABLET, DELAYED RELEASE ORAL at 11:50

## 2023-01-01 RX ADMIN — LACTULOSE 10 GRAM(S): 10 SOLUTION ORAL at 11:57

## 2023-01-01 RX ADMIN — Medication 6: at 12:57

## 2023-01-01 RX ADMIN — LACTULOSE 20 GRAM(S): 10 SOLUTION ORAL at 17:55

## 2023-01-01 RX ADMIN — CHLORHEXIDINE GLUCONATE 1 APPLICATION(S): 213 SOLUTION TOPICAL at 06:08

## 2023-01-01 RX ADMIN — PIPERACILLIN AND TAZOBACTAM 25 GRAM(S): 4; .5 INJECTION, POWDER, LYOPHILIZED, FOR SOLUTION INTRAVENOUS at 05:17

## 2023-01-01 RX ADMIN — SODIUM CHLORIDE 50 MILLILITER(S): 9 INJECTION, SOLUTION INTRAVENOUS at 09:21

## 2023-01-01 RX ADMIN — Medication 20 MILLIGRAM(S): at 05:46

## 2023-01-01 RX ADMIN — Medication 5 UNIT(S): at 18:11

## 2023-01-01 RX ADMIN — MUPIROCIN 1 APPLICATION(S): 20 OINTMENT TOPICAL at 18:55

## 2023-01-01 RX ADMIN — POTASSIUM PHOSPHATE, MONOBASIC POTASSIUM PHOSPHATE, DIBASIC 62.5 MILLIMOLE(S): 236; 224 INJECTION, SOLUTION INTRAVENOUS at 08:50

## 2023-01-01 RX ADMIN — Medication 1 SPRAY(S): at 18:36

## 2023-01-01 RX ADMIN — Medication 4: at 06:15

## 2023-01-01 RX ADMIN — Medication 5 MILLIGRAM(S): at 12:05

## 2023-01-01 RX ADMIN — HUMAN INSULIN 11 UNIT(S): 100 INJECTION, SUSPENSION SUBCUTANEOUS at 12:58

## 2023-01-01 RX ADMIN — Medication 2: at 12:56

## 2023-01-01 RX ADMIN — TENOFOVIR DISOPROXIL FUMARATE 300 MILLIGRAM(S): 300 TABLET, FILM COATED ORAL at 12:24

## 2023-01-01 RX ADMIN — TENOFOVIR DISOPROXIL FUMARATE 300 MILLIGRAM(S): 300 TABLET, FILM COATED ORAL at 14:43

## 2023-01-01 RX ADMIN — MUPIROCIN 1 APPLICATION(S): 20 OINTMENT TOPICAL at 07:01

## 2023-01-01 RX ADMIN — LACTULOSE 10 GRAM(S): 10 SOLUTION ORAL at 17:44

## 2023-01-01 RX ADMIN — PANTOPRAZOLE SODIUM 40 MILLIGRAM(S): 20 TABLET, DELAYED RELEASE ORAL at 17:13

## 2023-01-01 RX ADMIN — CHLORHEXIDINE GLUCONATE 15 MILLILITER(S): 213 SOLUTION TOPICAL at 18:15

## 2023-01-01 RX ADMIN — PANTOPRAZOLE SODIUM 40 MILLIGRAM(S): 20 TABLET, DELAYED RELEASE ORAL at 17:32

## 2023-01-01 RX ADMIN — HUMAN INSULIN 11 UNIT(S): 100 INJECTION, SUSPENSION SUBCUTANEOUS at 18:41

## 2023-01-01 RX ADMIN — PIPERACILLIN AND TAZOBACTAM 25 GRAM(S): 4; .5 INJECTION, POWDER, LYOPHILIZED, FOR SOLUTION INTRAVENOUS at 05:26

## 2023-01-01 RX ADMIN — Medication 100 MILLIEQUIVALENT(S): at 05:33

## 2023-01-01 RX ADMIN — INSULIN GLARGINE 8 UNIT(S): 100 INJECTION, SOLUTION SUBCUTANEOUS at 21:38

## 2023-01-01 RX ADMIN — INSULIN GLARGINE 6 UNIT(S): 100 INJECTION, SOLUTION SUBCUTANEOUS at 22:59

## 2023-01-01 RX ADMIN — PANTOPRAZOLE SODIUM 40 MILLIGRAM(S): 20 TABLET, DELAYED RELEASE ORAL at 05:17

## 2023-01-01 RX ADMIN — MIDAZOLAM HYDROCHLORIDE 1.11 MG/KG/HR: 1 INJECTION, SOLUTION INTRAMUSCULAR; INTRAVENOUS at 09:01

## 2023-01-01 RX ADMIN — HUMAN INSULIN 8 UNIT(S): 100 INJECTION, SUSPENSION SUBCUTANEOUS at 23:15

## 2023-01-01 RX ADMIN — MIDODRINE HYDROCHLORIDE 10 MILLIGRAM(S): 2.5 TABLET ORAL at 00:56

## 2023-01-01 RX ADMIN — Medication 50 MILLILITER(S): at 17:55

## 2023-01-01 RX ADMIN — MIDODRINE HYDROCHLORIDE 10 MILLIGRAM(S): 2.5 TABLET ORAL at 05:51

## 2023-01-01 RX ADMIN — FENTANYL CITRATE 50 MICROGRAM(S): 50 INJECTION INTRAVENOUS at 07:07

## 2023-01-01 RX ADMIN — HUMAN INSULIN 11 UNIT(S): 100 INJECTION, SUSPENSION SUBCUTANEOUS at 23:56

## 2023-01-01 RX ADMIN — FENTANYL CITRATE 4.88 MICROGRAM(S)/KG/HR: 50 INJECTION INTRAVENOUS at 20:22

## 2023-01-01 RX ADMIN — DEXMEDETOMIDINE HYDROCHLORIDE IN 0.9% SODIUM CHLORIDE 2.77 MICROGRAM(S)/KG/HR: 4 INJECTION INTRAVENOUS at 20:25

## 2023-01-01 RX ADMIN — TENOFOVIR DISOPROXIL FUMARATE 300 MILLIGRAM(S): 300 TABLET, FILM COATED ORAL at 11:59

## 2023-01-01 RX ADMIN — LACTULOSE 10 GRAM(S): 10 SOLUTION ORAL at 21:00

## 2023-01-01 RX ADMIN — PANTOPRAZOLE SODIUM 40 MILLIGRAM(S): 20 TABLET, DELAYED RELEASE ORAL at 11:37

## 2023-01-01 RX ADMIN — Medication 1: at 06:15

## 2023-01-01 RX ADMIN — Medication 5 MILLIGRAM(S): at 12:55

## 2023-01-01 RX ADMIN — HUMAN INSULIN 14 UNIT(S): 100 INJECTION, SUSPENSION SUBCUTANEOUS at 05:32

## 2023-01-01 RX ADMIN — PANTOPRAZOLE SODIUM 40 MILLIGRAM(S): 20 TABLET, DELAYED RELEASE ORAL at 17:50

## 2023-01-01 RX ADMIN — ATORVASTATIN CALCIUM 80 MILLIGRAM(S): 80 TABLET, FILM COATED ORAL at 22:44

## 2023-01-01 RX ADMIN — HUMAN INSULIN 10 UNIT(S): 100 INJECTION, SUSPENSION SUBCUTANEOUS at 20:23

## 2023-01-01 RX ADMIN — DEXMEDETOMIDINE HYDROCHLORIDE IN 0.9% SODIUM CHLORIDE 2.77 MICROGRAM(S)/KG/HR: 4 INJECTION INTRAVENOUS at 09:04

## 2023-01-01 RX ADMIN — MIDODRINE HYDROCHLORIDE 10 MILLIGRAM(S): 2.5 TABLET ORAL at 05:15

## 2023-01-01 RX ADMIN — SPIRONOLACTONE 50 MILLIGRAM(S): 25 TABLET, FILM COATED ORAL at 13:16

## 2023-01-01 RX ADMIN — HUMAN INSULIN 10 UNIT(S): 100 INJECTION, SUSPENSION SUBCUTANEOUS at 00:28

## 2023-01-01 RX ADMIN — PANTOPRAZOLE SODIUM 40 MILLIGRAM(S): 20 TABLET, DELAYED RELEASE ORAL at 05:37

## 2023-01-01 RX ADMIN — DEXMEDETOMIDINE HYDROCHLORIDE IN 0.9% SODIUM CHLORIDE 2.77 MICROGRAM(S)/KG/HR: 4 INJECTION INTRAVENOUS at 12:06

## 2023-01-01 RX ADMIN — Medication 75 MICROGRAM(S): at 22:12

## 2023-01-01 RX ADMIN — TAMSULOSIN HYDROCHLORIDE 0.8 MILLIGRAM(S): 0.4 CAPSULE ORAL at 22:44

## 2023-01-01 RX ADMIN — LACTULOSE 10 GRAM(S): 10 SOLUTION ORAL at 07:05

## 2023-01-01 RX ADMIN — OCTREOTIDE ACETATE 10 MICROGRAM(S)/HR: 200 INJECTION, SOLUTION INTRAVENOUS; SUBCUTANEOUS at 16:06

## 2023-01-01 RX ADMIN — LACTULOSE 10 GRAM(S): 10 SOLUTION ORAL at 05:15

## 2023-01-01 RX ADMIN — Medication 5 MILLIGRAM(S): at 11:20

## 2023-01-01 RX ADMIN — Medication 75 MICROGRAM(S): at 00:40

## 2023-01-01 RX ADMIN — Medication 1 PACKET(S): at 21:36

## 2023-01-01 RX ADMIN — Medication 2: at 11:51

## 2023-01-01 RX ADMIN — Medication 1: at 12:45

## 2023-01-01 RX ADMIN — SODIUM CHLORIDE 100 MILLILITER(S): 9 INJECTION INTRAMUSCULAR; INTRAVENOUS; SUBCUTANEOUS at 17:27

## 2023-01-01 RX ADMIN — Medication 2: at 05:58

## 2023-01-01 RX ADMIN — Medication 500 MILLIGRAM(S): at 12:38

## 2023-01-01 RX ADMIN — PANTOPRAZOLE SODIUM 10 MG/HR: 20 TABLET, DELAYED RELEASE ORAL at 20:22

## 2023-01-01 RX ADMIN — PANTOPRAZOLE SODIUM 40 MILLIGRAM(S): 20 TABLET, DELAYED RELEASE ORAL at 05:29

## 2023-02-24 NOTE — H&P ADULT - HISTORY OF PRESENT ILLNESS
67-year-old male with past medical history of lymphoma on chemo, diabetes, hypertension, CAD status post CABG, cirrhosis presenting with concern of R sided pain and constipation. Patient follows primarily at [facility]. He states the pain began this morning. Sharp, non radiating. No associated N/V/D, dysuria, trouble urinating, fever, chills, cough, dyspnea, sore throat. Pt states he has been constipated for 4d. Was told to take miralax by his oncologist. Pt has known TANG.

## 2023-02-24 NOTE — ED PROVIDER NOTE - CARE PLAN
1 Principal Discharge DX:	Upper GI bleed   Principal Discharge DX:	Upper GI bleed  Secondary Diagnosis:	Hypotension

## 2023-02-24 NOTE — ED PROCEDURE NOTE - ATTENDING CONTRIBUTION TO CARE
Dr. Diane:  I have personally performed a face to face bedside history and physical examination of this patient. I have discussed the history, examination, review of systems, assessment and plan of management with the resident. I have reviewed the electronic medical record and amended it to reflect my history, review of systems, physical exam, assessment and plan.
Dr. Diane:  I have personally performed a face to face bedside history and physical examination of this patient. I have discussed the history, examination, review of systems, assessment and plan of management with the resident. I have reviewed the electronic medical record and amended it to reflect my history, review of systems, physical exam, assessment and plan.

## 2023-02-24 NOTE — ED PROVIDER NOTE - PROGRESS NOTE DETAILS
Kirsten Navarro, PGY-2, EM: To the bedside by a PCA for hematemesis.  Patient with a large volume bright red blood with clots.  3 additional episodes of vomiting without any clots.  Ordered Zofran Protonix, 2 units of uncrossed unmatched blood.  Blood pressure 60s over 40s.  While awaiting blood liter of NS started.  Ordered ceftriaxone and octreotide gtt.  MICU consulted.  GI and hepatology consulted.  Blood at bedside on initiation of first unit patient had improvement of his blood pressure to 100s over 60s.  Throughout patient responding to questions.  Patient protecting airway throughout though airway equipment available at the bedside including suction. Family at bedside aware of treatment needs. Kirsten Navarro, PGY-2, EM: Pt continuing to have large volume hematemesis. CORDIS placed for massive transfusion. Pt started on levophed. After cordis placed pt continued to have large volume hematemesis. Pt intubated with glide scope. Admit to MICU Kirsten Navarro, PGY-2, EM: Pt continuing to have large volume hematemesis. CORDIS placed for massive transfusion. Pt started on levophed. After cordis placed pt continued to have large volume hematemesis. Pt intubated with glide scope. Admitted to MICU. Will likely transfuse and then patient will be transported upstairs. Benedicto:  Patient with multiple episodes of gross hematemesis in the ED.  BP fluctuating but repeatedly dropping to as low as 60s/40s.  Patient given Protonix, Octreotide, Ceftriaxone; also started on Levophed for BP support.  Patient had episode of sustained Vtach, that spontaneously resolved (after 3rd unit of RBC started).  Patient placed on Zoll, and massive transfusion protocol activated.  Cordis placed RIJ for rapid blood transfusion.  MICU and hepatologist consulted.  MICU team at bedside.  Given active hematemesis, will intubate for airway preparation and also for emergent endoscopy, which hepatology fellow states will be planned for when patient arrives in MICU.  Patient's sons also at bedside and informed of patient's progress/plans.

## 2023-02-24 NOTE — ED ADULT TRIAGE NOTE - AS PAIN REST
HPI:  Monik Santiago is a 75 year old female who is seen in consultation at the request of Ken Mar MD.    Pt presents for eval of:   (Onset, Location, L/R, Character, Treatments, Injury if yes)    XR Left and Right foot today, 1/4/2018     1. Onset Summer 2017, callus medial Left and Right foot > Left.   With pressure sharp, stabbing, pain 10  No barefoot walking  2. Flat feet  3. Left and Right ankle pain    2012 custom orthotics fabricated at [place], but does not wear them causes her feet to hurt w/swelling when she got them.    Retired.    BMI is normal.    ROS:  10 point ROS neg other than the symptoms noted above in the HPI.    PAST MEDICAL HISTORY:   Past Medical History:   Diagnosis Date     Clostridium difficile diarrhea      Coronary artery disease      Generalized anxiety disorder      GERD (gastroesophageal reflux disease)      History of MRI of brain and brain stem 4/10/2015    MR BRAIN FOR STROKE COMPLETE W/O & W CONTRAST 4/9/2015 9:34 PM MRI of the brain without and with contrast MRA of the head without contrast MRA of the neck without and with contrast History: eval for PRESS, Comparison: 3/19/2015,  Technique:  Brain: Axial diffusion-weighted with ADC map, T2-weighted with fat saturation, T1-weighted and turboFLAIR and coronal T1-weighted images of the brain were obt     Myocardial infarction     NSTEMI     Other alteration of consciousness 7/2007    see neuro consult 7/25/2007. MinCep THOMSON was neg. Does not have seizures.      Other and unspecified hyperlipidemia      Syncope 10/19/2009    related to BP medications     Unspecified essential hypertension         PAST SURGICAL HISTORY:   Past Surgical History:   Procedure Laterality Date     C NONSPECIFIC PROCEDURE      abd hernia repair     C TOTAL KNEE ARTHROPLASTY  08/23/10    Right knee     Cardiac stents       COLONOSCOPY N/A 2/17/2016    Procedure: COMBINED COLONOSCOPY, SINGLE OR MULTIPLE BIOPSY/POLYPECTOMY BY BIOPSY;  Surgeon: Elvia  Jose VILLATORO MD;  Location: [facility]     ESOPHAGOSCOPY, GASTROSCOPY, DUODENOSCOPY (EGD), COMBINED N/A 12/17/2014    Procedure: COMBINED ESOPHAGOSCOPY, GASTROSCOPY, DUODENOSCOPY (EGD);  Surgeon: Kaz Mccoy MD;  Location: [facility]     HC REMV CATARACT EXTRACAP,INSERT LENS,COMP      darrian     HC UGI ENDOSCOPY DIAG W BIOPSY  12/16/09     HC YAG LASER CAPSULOTOMY  9/17/2009    Right eye     HEART CATH, ANGIOPLASTY  10/03/2012    Stent of LAD     HEART CATH, ANGIOPLASTY  05/17/2014     CAD, patent stent of LAD     LAPAROSCOPIC CHOLECYSTECTOMY N/A 12/3/2016    Procedure: LAPAROSCOPIC CHOLECYSTECTOMY;  Surgeon: Viral Meyers MD;  Location: [facility]     LAPAROSCOPIC HERNIORRHAPHY HIATAL N/A 1/30/2015    Procedure: LAPAROSCOPIC HERNIORRHAPHY HIATAL;  Surgeon: Chase Camara MD;  Location: [facility]     LAPAROSCOPIC NISSEN FUNDOPLICATION N/A 1/30/2015    Procedure: LAPAROSCOPIC NISSEN FUNDOPLICATION;  Surgeon: Chase Camara MD;  Location: [facility]     MOHS MICROGRAPHIC PROCEDURE  09/14/11    left upper forehead-09/14/11        MEDICATIONS:   Current Outpatient Prescriptions:      amLODIPine (NORVASC) 5 MG tablet, TAKE 1 TABLET BY MOUTH EVERY DAY, Disp: 30 tablet, Rfl: 9     clobetasol (TEMOVATE) 0.05 % ointment, Apply sparingly to affected area for two weeks, then 2-3 times per week., Disp: 30 g, Rfl: 2     lisinopril (PRINIVIL/ZESTRIL) 20 MG tablet, TAKE 1 TABLET (20 MG) BY MOUTH 2 TIMES DAILY, Disp: 60 tablet, Rfl: 8     furosemide (LASIX) 20 MG tablet, Take 1 tablet (20 mg) by mouth daily, Disp: 90 tablet, Rfl: 1     Multiple Vitamins-Minerals (MULTIVITAMIN ADULT) CHEW, Take 2 chew tab by mouth daily, Disp: , Rfl:      loperamide (IMODIUM) 2 MG capsule, Take 2 mg by mouth 4 times daily as needed for diarrhea, Disp: , Rfl:      Probiotic Product (PROBIOTIC DAILY PO), Take 1 capsule by mouth daily, Disp: , Rfl:      DULoxetine (CYMBALTA) 20 MG EC capsule, Take 1 capsule (20 mg) by mouth 2 times daily, Disp: 180  "capsule, Rfl: 3     metoprolol (LOPRESSOR) 25 MG tablet, Take 1 tablet (25 mg) by mouth 2 times daily, Disp: 180 tablet, Rfl: 3     atorvastatin (LIPITOR) 40 MG tablet, Take 1 tablet (40 mg) by mouth daily, Disp: 90 tablet, Rfl: 3     ASPIRIN PO, Take 81 mg by mouth daily, Disp: , Rfl:      ACETAMINOPHEN PO, Take 500-1,000 mg by mouth every 8 hours as needed for pain, Disp: , Rfl:      Lysine 500 MG TABS, Take 1 tablet by mouth daily as needed , Disp: , Rfl:      NITROGLYCERIN SL, Place 0.4 mg under the tongue, Disp: , Rfl:      ALLERGIES:    Allergies   Allergen Reactions     Codeine Fatigue     Latex      Lidocaine Other (See Comments)     was one of 3 drugs given during GA that caused difficulty with anesthesia reversal     Sulfa Drugs Hives     Trimethoprim Hives     Valium Fatigue        SOCIAL HISTORY:   Social History     Social History     Marital status:      Spouse name: N/A     Number of children: N/A     Years of education: N/A     Occupational History     retired      Social History Main Topics     Smoking status: Never Smoker     Smokeless tobacco: Never Used     Alcohol use No     Drug use: No     Sexual activity: No     Other Topics Concern     Not on file     Social History Narrative    Lives with daughters family since June 2007. Moved from [place].        FAMILY HISTORY:   Family History   Problem Relation Age of Onset     Cardiovascular Father      Cardiovascular Brother      Cardiovascular Mother      Hypertension Mother      Lipids Mother      Cardiovascular Sister      stents x 2-3     Hypertension Sister      Cardiovascular Sister      MI 64     DIABETES No family hx of         EXAM:Vitals: Temp 97.9  F (36.6  C) (Temporal)  Ht 5' 2.6\" (1.59 m)  Wt 151 lb (68.5 kg)  BMI 27.09 kg/m2  BMI= Body mass index is 27.09 kg/(m^2).    General appearance: Patient is alert and fully cooperative with history & exam.  No sign of distress is noted during the visit.     Psychiatric: Affect is " pleasant & appropriate.  Patient appears motivated to improve health.     Respiratory: Breathing is regular & unlabored while sitting.     HEENT: Hearing is intact to spoken word.  Speech is clear.  No gross evidence of visual impairment that would impact ambulation.     Vascular: DP & PT pulses are intact & regular bilaterally.       Neurologic: Lower extremity sensation is intact to light touch.  No evidence of weakness or contracture in the lower extremities.  No evidence of neuropathy.    Dermatologic: Skin is intact to both lower extremities with adequate texture, turgor and tone about the integument.  No paronychia or evidence of soft tissue infection is noted.     Musculoskeletal: Patient is ambulatory without assistive device or brace.  Severe triplanar valgus deformity is noted about bilateral feet.  There is hyperkeratosis at the navicular head left much greater than right however no hematogenous exudate is noted.  This is rigid deformity partially reducible.  Equinus noted as well when attempting to reduce the deformity.    Radiographs: Generalized osteopenia noted throughout both radiographs with dislocation of the talus navicular joint.  Talus is near vertical with severe collapse of the medial column severe pes valgus and abnormal morphology of bones of the midfoot and rear foot.     ASSESSMENT:       ICD-10-CM    1. Acquired pes planus of both feet M21.41 XR Foot Bilateral G/E 3 Views    M21.42         PLAN:  Reviewed patient's chart in [facility].      1/4/2018   Interpreted radiographs  This is severe pes valgus and bilateral.  Conservative options include custom molded orthotics and much more aggressive shoe gear versus AFO such as ankle gauntlet with Velcro strap and then progressed to surgical options.  She is not a strong surgical candidate and this would require months of nonweightbearing and is bilateral.  After discussing these options with the patient and daughter who presents with her today they  would like to progress through conservative treatment options.  Her current custom molded orthotic conforms quite well but is quite flexible in her shoe gear, slippers, would not be adequate.  We discussed utilizing OTC shoes and appropriate fillers and written instructions were dispensed and if this is not satisfying or reasonable for the patient we will then consider molding AFO gauntlet and then obtain orthopedic shoes for her.  Follow-up with me in about 1 month.    Patient and daughter agree this is the best plan for now.    Chinedu Thorne DPM       0 (no pain/absence of nonverbal indicators of pain)

## 2023-02-24 NOTE — ED ADULT NURSE REASSESSMENT NOTE - NS ED NURSE REASSESS COMMENT FT1
guerda rn- pt in rm 17 intubation at present 7. 5 intubated,22 lip line- pt to respirator- peep 5, o2 60 ,tv 400  rr 16. og inserted draining bright red blood. 4 units packed cells completed. lpiv x 2 in place. r 20 ac,r 22 piv 22 in place. r ij cordis placed  by md.  pt awiting icu bed. ed attending at bedside with continuous monitoring

## 2023-02-24 NOTE — ED ADULT NURSE NOTE - OBJECTIVE STATEMENT
Pt received to room 16. Pt A&Ox4, ambulatory at baseline. Pt c/o constipation x4 days. Pmhx of lymphoma, states he just recently began chemo, last treatment yesterday. States he has not tried any at home remedies for constipation. RR even and unlabored, pt endorsing some abd pain. Denies any n/v/d, dizziness, SOB. VSS and noted. IV access established with 20G to L AC, labs collected and sent as per MD orders. Pt medicated as per MAR. Safety in place, family at bedside.

## 2023-02-24 NOTE — H&P ADULT - ASSESSMENT
NOTE IN PROGRESS    66yo M w/ pmhx CABG, TANG cirrhosis, HTN, DM presented to the ED w/ chest pain and constipation while in the ED patient w/ multiple episodes of hematemesis and hypotension; MICU consulted for hypotension.     PLAN _______________________________________________________    [ NEURO ]  - no active issues; AOx3 during examination  - started on Prop and ?? for sedation prior to intubation    [ CARDIO ]  # Hypovolemic Shock 2/2 GIB  - s/p 5U PRBCs;   - on Levophed     [ RESPIRATORY ]  # Airway Protection  - patient intubated for airway protection 2/2 hematemesis  -     [ GASTRO/NUTRITION ]  #UGIB  - pt presented for chest pain and constipation; during ED course patient with multiple episodes of massive hematemesis  - s/p Octreotide, PPI, CTX  - started on Octreotide and PPI ggt  - GI consulted; plan to scope at bedside  - continue Octreotide and PPI gtt      [ ENDO]      [ RENAL/ ]      [ METABOLIC ]      [ INFECTIOUS ]      [ HEME ]      [ SKIN ]      [ ETHICS ] 66yo M w/ pmhx CABG, TANG cirrhosis, HTN, DM presented to the ED w/ chest pain and constipation while in the ED patient w/ multiple episodes of hematemesis and hypotension; MICU consulted for hypotension, massive upper GI bleed s/p intubation requiring scope at bedside.     PLAN:     [ NEURO ]  - intubated and sedated   - started on Prop and fentanyl     [ CARDIO ]  # Hypovolemic Shock 2/2 GIB  - s/p 5U PRBCs;   - on Levophed, continue to monitor     [ RESPIRATORY ]  # Airway Protection  - patient intubated for airway protection 2/2 hematemesis  - A/C 400/16/5/100    [ GASTRO/NUTRITION ]  #UGIB  - pt presented for chest pain and constipation; during ED course patient with multiple episodes of massive hematemesis  - s/p Octreotide, PPI, CTX  - started on Octreotide and PPI ggt  - GI consulted; plan to scope at bedside, erythromycin given prior to scope   - continue Octreotide and PPI gtt    [ ENDO]  #DM   -NPO for now  -ISS when resume diet     [ RENAL/ ]  -no active issues  -Cr stable at 1.2, continue to monitor     [ INFECTIOUS ]  -Pt given 1 dose ceftriaxone given in ED for SBP prophylaxis  -Continue with ceftriaxone     [ HEME ]  #GI Bleed   -Hgb 6.7, patient given 5 units pRBC in ED with massive transfusion protocol. Continue with plan above      [ ETHICS ]  -Patient is Full Code

## 2023-02-24 NOTE — ED PROVIDER NOTE - CLINICAL SUMMARY MEDICAL DECISION MAKING FREE TEXT BOX
67-year-old male with past medical history of lymphoma on chemo, diabetes, hypertension, CAD status post CABG, cirrhosis presenting with concern of R sided pain and constipation. Differential diagnosis includes but is not limited to hepatitis, biliary pathology, infection, medication s/e, ACS, PE, PTX. Pt unwell appearing. No fever, less concerning for infection. Given tx with chemo, possibly a medication side effect. Would get labs, ecg, cxr, ua.

## 2023-02-24 NOTE — PATIENT PROFILE ADULT - FUNCTIONAL ASSESSMENT - BASIC MOBILITY 1.
Detail Level: Zone Plan: A.M. Routine:\\nGently scrub affected areas with prescribed cleanser and allow this to sit on the skin for approximately 1 minutes then rinse off (this provides a deeper pore cleansing).\\nApply Prescribed Cream Clindamycin 1% [place]\\nApply sunscreen Daily! ELTA MD CLEAR\\n\\nP.M. Routine:\\nGently scrub affected areas with prescribed cleanser allow and this to sit on the skin for approximately 1 minutes then rinse off (this provides a deeper pore cleansing).\\nApply prescribed cream at bedtime as directed Plan: Recommended tretinoin and melamix as directed 1 = Total assistance

## 2023-02-24 NOTE — CONSULT NOTE ADULT - ASSESSMENT
67M follows at [facility] Hx decompensated TANG cirrhosis (+ascites, +EV -- MELD Na 8), CAD s/p CABG s/p PCI (last in 2012 on ASA, now off plavix x2 weeks), newly diagnosed follicular lymphoma (on rituximab), HTN, DM, currently on tenofovir (HBV Core +) presented with right sided chest/abdominal pain now with lizzette hematemesis + clots.       Impression:   #Hemorrhagic shock: on-going lizzette hematemesis with clots, now hypotensive requiring pressor support. In setting of known cirrhosis with prior Hx of EV on EGD. C/f UGI bleed 2/2 EV currently.         Recommendations:   - NPO  - Trend CBC q8-12hr, transfuse Hb < 7   - Please stabilization patient with intubation + urgent ICU evaluation  - Active T&S  - Oct gtt  - PPI ggt  - CTX 1g daily  - Patient requires urgent endoscopic intervention once intubated/in ICU        Thank you for involving us in the care of this patient, please reach out if any further questions.     Theron Bryant MD  Gastroenterology/Hepatology Fellow, PGY6    Available on Microsoft Teams  [phone] ([facility])  [phone] ([facility])  Please contact on call fellow weekdays after 5pm-7am and weekends: [phone]   67M follows at [facility] Hx decompensated TANG cirrhosis (+ascites, +EV -- previously MELD Na 8), CAD s/p CABG s/p PCI (last in 2012 on ASA, now off plavix x2 weeks), newly diagnosed follicular lymphoma (on rituximab), HTN, DM, currently on tenofovir (HBV Core +) presented with right sided chest/abdominal pain now with lizzette hematemesis + clots.       Impression:   #Hemorrhagic shock: on-going lizzette hematemesis with clots, now hypotensive requiring pressor support. In setting of known cirrhosis with prior Hx of EV on EGD. C/f UGI bleed 2/2 EV currently.     #Decompensated TANG cirrhosis: follows at [facility], previously low meld Na 8  --MELD Na: 20 (2/24/23)  --Ascites: (+) Hx, on lasix 40, spironolactone 50  --SBP; no Hx  --EV: (+) Hx, no EVB --> is on nadolol at home  --PVT: no Hx  --HCC: no Hx      Recommendations:   - NPO  - Trend CBC q8-12hr, transfuse Hb < 7   - Please stabilization patient with intubation + urgent ICU evaluation  - Active T&S  - Oct gtt  - PPI ggt  - CTX 1g daily  - Patient requires urgent endoscopic intervention once intubated/in ICU        Thank you for involving us in the care of this patient, please reach out if any further questions.     Theron Bryant MD  Gastroenterology/Hepatology Fellow, PGY6    Available on Microsoft Teams  [phone] ([facility])  [id number] ([facility])  Please contact on call fellow weekdays after 5pm-7am and weekends: [phone]

## 2023-02-24 NOTE — ED ADULT NURSE REASSESSMENT NOTE - NS ED NURSE REASSESS COMMENT FT1
Immediately after shift change; pt had an episode of hematemesis; large clots of blood vomited; pt was assisted by team. Pt subsequently had two more episodes of hematemesis; pt was given 1 unit RBC emergently.

## 2023-02-24 NOTE — ED PROCEDURE NOTE - CPROC ED ANATOMIC LOCATION1
Recommended weight and BMI control through healthy diet and exercise, green leafy veggies, UV protection, and not smoking. Reviewed the benefits of AREDS II VITAMINS VERUS GENOTYPE directed vitamin therapy, and recommended following one or the other to try and prevent the progression of the disease. Reviewed the importance of daily monitoring of the vision in each eye independently, along with the use of the Amsler grid daily and instructed patient to call and return immediately for any new changes in their vision or on the Amsler grid. Patient instructed on the importance of regular follow up and monitoring for the early detection of conversion to wet AMD as early detection results in early treatment and better outcomes. internal jugular vein

## 2023-02-24 NOTE — ED PROCEDURE NOTE - PROCEDURE ADDITIONAL DETAILS
OG tube insertion after intubation protocol.  For intubation detail, see intubation note separately.  OG tube placed, with copious return of dark red blood.  Placement confirmed by chest x-ray.

## 2023-02-24 NOTE — CONSULT NOTE ADULT - SUBJECTIVE AND OBJECTIVE BOX
CHIEF COMPLAINT:    HPI:    PAST MEDICAL & SURGICAL HISTORY:  CAD (coronary artery disease)      Diabetes      Lymphoma      Cirrhosis      S/P CABG x 1          FAMILY HISTORY:  No pertinent family history in first degree relatives        SOCIAL HISTORY:  Smoking: __ packs x ___ years  EtOH Use:  Marital Status:  Occupation:  Recent Travel:  Country of Birth:  Advance Directives:    Allergies    No Known Allergies    Intolerances        HOME MEDICATIONS:    REVIEW OF SYSTEMS:  Constitutional:   Eyes:  ENT:  CV:  Resp:  GI:  :  MSK:  Integumentary:  Neurological:  Psychiatric:  Endocrine:  Hematologic/Lymphatic:  Allergic/Immunologic:  [ ] All other systems negative  [ ] Unable to assess ROS because ________    OBJECTIVE:  ICU Vital Signs Last 24 Hrs  T(C): 36.4 (24 Feb 2023 07:17), Max: 36.5 (24 Feb 2023 06:17)  T(F): 97.6 (24 Feb 2023 07:17), Max: 97.7 (24 Feb 2023 06:17)  HR: 60 (24 Feb 2023 07:17) (59 - 60)  BP: 120/51 (24 Feb 2023 07:17) (106/57 - 120/51)  BP(mean): --  ABP: --  ABP(mean): --  RR: 15 (24 Feb 2023 07:17) (15 - 18)  SpO2: 100% (24 Feb 2023 07:17) (100% - 100%)    O2 Parameters below as of 24 Feb 2023 07:17  Patient On (Oxygen Delivery Method): room air              CAPILLARY BLOOD GLUCOSE      POCT Blood Glucose.: 266 mg/dL (24 Feb 2023 06:21)      PHYSICAL EXAM:  General:   HEENT:   Lymph Nodes:  Neck:   Respiratory:   Cardiovascular:   Abdomen:   Extremities:   Skin:   Neurological:  Psychiatry:    HOSPITAL MEDICATIONS:  MEDICATIONS  (STANDING):  octreotide  Infusion 50 MICROgram(s)/Hr (10 mL/Hr) IV Continuous <Continuous>  octreotide  Injectable 50 MICROGram(s) IV Push Once  pantoprazole Infusion 8 mG/Hr (10 mL/Hr) IV Continuous <Continuous>    MEDICATIONS  (PRN):      LABS:                        6.9    10.96 )-----------( 117      ( 24 Feb 2023 07:57 )             21.5     02-24    130<L>  |  97<L>  |  19  ----------------------------<  254<H>  4.4   |  23  |  1.22    Ca    8.8      24 Feb 2023 07:57  Mg     2.00     02-24    TPro  5.8<L>  /  Alb  2.7<L>  /  TBili  0.8  /  DBili  x   /  AST  42<H>  /  ALT  15  /  AlkPhos  179<H>  02-24    PT/INR - ( 24 Feb 2023 07:57 )   PT: 18.8 sec;   INR: 1.61 ratio         PTT - ( 24 Feb 2023 07:57 )  PTT:24.8 sec      Venous Blood Gas:  02-24 @ 07:57  7.41/39/22/25/28.7  VBG Lactate: 1.7      MICROBIOLOGY:     RADIOLOGY:  [ ] Reviewed and interpreted by me    EKG:

## 2023-02-24 NOTE — CONSULT NOTE ADULT - SUBJECTIVE AND OBJECTIVE BOX
HPI: 67M follows at [facility] Hx decompensated TANG cirrhosis (+ascites, +EV -- MELD Na 8), CAD s/p CABG s/p PCI (last in 2012 on ASA, now off plavix x2 weeks), newly diagnosed follicular lymphoma (on rituximab), HTN, DM, currently on tenofovir (HBV Core +) presented with right sided chest/abdominal pain now with lizzette hematemesis + clots.     Son reports he brought father in 2/2 new one day complains of right sided chest/abdominal pain, constipation x several days. Denies n/v/d/f/c, worsening abd distension.     During ED visit, pt developed lizzette hematemesis with clots and was hypotensive to SBPs in 60s.           Allergies:  No Known Allergies      Home Medications:    Hospital Medications:  octreotide  Infusion 50 MICROgram(s)/Hr IV Continuous <Continuous>  pantoprazole Infusion 8 mG/Hr IV Continuous <Continuous>      PMHX/PSHX:  CAD (coronary artery disease)    Diabetes    Lymphoma    Cirrhosis    S/P CABG x 1        Family history:  No pertinent family history in first degree relatives        Denies family history of colon cancer/polyps, stomach cancer/polyps, pancreatic cancer/masses, liver cancer/disease, ovarian cancer and endometrial cancer.    Social History:     Tob: Denies  EtOH: Denies  Illicit Drugs: Denies    ROS: see above    PHYSICAL EXAM:     GENERAL:  No acute distress, tired appearing  HEENT:  Normocephalic/atraumatic, no scleral icterus  CHEST: no accessory muscle use  HEART:  Regular rate and rhythm, no murmurs/rubs/gallops  ABDOMEN:  Soft, non-tender, +distended  EXTREMITIES: No cyanosis, clubbing, or edema  SKIN:  No rash/warm/dry  NEURO:  Alert and oriented x 3, no asterixis    Vital Signs:  Vital Signs Last 24 Hrs  T(C): 36.4 (24 Feb 2023 09:10), Max: 36.5 (24 Feb 2023 06:17)  T(F): 97.5 (24 Feb 2023 09:10), Max: 97.7 (24 Feb 2023 06:17)  HR: 61 (24 Feb 2023 09:10) (59 - 61)  BP: 120/51 (24 Feb 2023 09:10) (106/57 - 128/45)  BP(mean): --  RR: 18 (24 Feb 2023 09:10) (15 - 18)  SpO2: 99% (24 Feb 2023 09:10) (99% - 100%)    Parameters below as of 24 Feb 2023 09:10    O2 Flow (L/min): 2    Daily     Daily     LABS:                        6.9    10.96 )-----------( 117      ( 24 Feb 2023 07:57 )             21.5     Mean Cell Volume: 88.8 fL (02-24-23 @ 07:57)    02-24    130<L>  |  97<L>  |  19  ----------------------------<  254<H>  4.4   |  23  |  1.22    Ca    8.8      24 Feb 2023 07:57  Mg     2.00     02-24    TPro  5.8<L>  /  Alb  2.7<L>  /  TBili  0.8  /  DBili  x   /  AST  42<H>  /  ALT  15  /  AlkPhos  179<H>  02-24    LIVER FUNCTIONS - ( 24 Feb 2023 07:57 )  Alb: 2.7 g/dL / Pro: 5.8 g/dL / ALK PHOS: 179 U/L / ALT: 15 U/L / AST: 42 U/L / GGT: x           PT/INR - ( 24 Feb 2023 07:57 )   PT: 18.8 sec;   INR: 1.61 ratio         PTT - ( 24 Feb 2023 07:57 )  PTT:24.8 sec    Amylase Serum--      Lipase serum50       Ammonia--                          6.9    10.96 )-----------( 117      ( 24 Feb 2023 07:57 )             21.5       Imaging:

## 2023-02-24 NOTE — ED PROVIDER NOTE - OBJECTIVE STATEMENT
67-year-old male with past medical history of diabetes, hypertension, CAD status post CABG, cirrhosis, 67-year-old male with past medical history of lymphoma on chemo, diabetes, hypertension, CAD status post CABG, cirrhosis presenting with concern of R sided pain and constipation. Patient follows primarily at [facility]. He states the pain began this morning. Sharp, non radiating. No associated N/V/D, dysuria, trouble urinating, fever, chills, cough, dyspnea, sore throat. Pt states he has been constipated for 4d. Was told to take miralax by his oncologist. Pt has known TANG.

## 2023-02-24 NOTE — CONSULT NOTE ADULT - ASSESSMENT
IN PROGRESS    66yo M w/ pmhx CABG, TANG cirrhosis, HTN, DM presented to the ED w/ chest pain and constipation while in the ED patient w/ multiple episodes of hematemesis and hypotension; MICU consulted for hypotension.   - on examination pt awake, aware, NAD, blood on gown and floor. IVFs were running and 1U PRBC was to be started  - repeat BP was 118/50 after ~250cc IVF  - pt asymptomatic at the moment of evaluation; labs were pending  - received CTX, Octreotide, PPI, Zofran; was to be started on PPI and Octreotide gtt    RECOMMENDATIONS  - Transfuse for Hgb goal >8 iso ischemic cardiac hx and bleed  - 2 large bore IV, continue PPI and Octreotide gtt  - f/u GI and Hep recs  - NPO  - Patient asymptomatic, AOx3, BP responded well to fluids >> Not a MICU candidate at the moment; reconsult as needed

## 2023-02-24 NOTE — ED PROVIDER NOTE - ATTENDING CONTRIBUTION TO CARE
Dr. Diane:  I have personally performed a face to face bedside history and physical examination of this patient. I have discussed the history, examination, review of systems, assessment and plan of management with the resident. I have reviewed the electronic medical record and amended it to reflect my history, review of systems, physical exam, assessment and plan.    67M h/o lymphoma on chemo infusion (last yesterday), CAD, DM, presents with constipation x 4 days.  Saw his oncologist and was recommended to take Miralax, but patient had not yet tried. Also c/o R sided upper abdomen/lower chest pain today.  Denies fever/chills, sob, n/v/d.    Exam:  - nad  - rrr  - ctab   -abd soft ntnd    A/P  - RUQ/side pain, non-tender abdomen; low suspicion for abd pathology at this time, will evaluate for PNA, r/o ACS  - cbc, cmp, trop, coags, ekg, cxr Dr. Diane:  I have personally performed a face to face bedside history and physical examination of this patient. I have discussed the history, examination, review of systems, assessment and plan of management with the resident. I have reviewed the electronic medical record and amended it to reflect my history, review of systems, physical exam, assessment and plan.    67M h/o lymphoma on chemo infusion (last yesterday), CAD, DM, presents with constipation x 4 days.  Saw his oncologist and was recommended to take Miralax, but patient had not yet tried. Also c/o R sided upper abdomen/lower chest pain today.  Denies fever/chills, sob, n/v/d.    Exam:  - nad  - rrr  - ctab   -abd soft ntnd    A/P  - RUQ/side pain, non-tender abdomen; eval abd pathology, PNA, r/o ACS  - cbc, cmp, trop, coags, ekg, cxr, CT abd/pelvis

## 2023-02-24 NOTE — ED PROVIDER NOTE - PHYSICAL EXAMINATION
General: chronically ill appearing gentleman  Head: Atraumatic, normocephalic  Eyes: EOM grossly in tact  ENT: dry mucous membranes  Neurology: A&Ox3, nonfocal, WILKERSON x 4  Respiratory: CTAB, no wheezing, normal respiratory effort  CV: RRR, good s1/s2, no S3, Extremities warm and well perfused  Abdominal: Soft, non-distended, non-tender, no masses  Extremities: No edema, no deformities  Skin: warm and dry. No rashes

## 2023-02-24 NOTE — PROCEDURE NOTE - SUPERVISORY STATEMENT
Dr. Diane:  I have personally performed a face to face bedside history and physical examination of this patient. I have discussed the history, examination, review of systems, assessment and plan of management with the resident. I have reviewed the electronic medical record and amended it to reflect my history, review of systems, physical exam, assessment and plan.

## 2023-02-24 NOTE — PATIENT PROFILE ADULT - FUNCTIONAL ASSESSMENT - BASIC MOBILITY 6.
1-calculated by average/Not able to assess (calculate score using [facility] averaging method)

## 2023-02-25 NOTE — PROGRESS NOTE ADULT - ATTENDING COMMENTS
Patient status post upper endoscopy on 2/24, large esophageal varices, status post band ligation x6.  Stable hemoglobin/hematocrit.  Hemodynamically stable.  Agree with above outlined recommendations.  Continue to monitor with follow-up labs.  Continue with IV octreotide for 72 hours postprocedure.  Patient status post diagnostic paracentesis today.  Ascitic fluid analysis negative for SBP.  Would also recommend continue with ceftriaxone empirically for SBP prophylaxis 5-7 days.

## 2023-02-25 NOTE — PROGRESS NOTE ADULT - ATTENDING COMMENTS
This is a 68 y/o M with PMHx of TANG cirrhosis decompensated by ascites, CAD s/p CABG, recent diagnosis of lymphoma on chemotherapy who presented for massive GI bleed s/p transfusion of blood products (6/1/1) requiring vasopressor support and admission to MICU. Endoscopy performed yesterday revealed large esophageal varices as the likely culprit for the bleed, s/p banding by GI yesterday. Remains in MICU for vasopressor support and mechanical ventilation.    1) Acute blood loss anemia 2/2 variceal bleed - S/p banding by GI yesterday. No further transfusions required in 24 hours. Continue PPI gtt and octreotide gtt. Continue ceftriaxone for SBP ppx. Monitor CBC and transfuse prn for Hb < 7.  2) Shock state - Bedside POCUS consistent with vasoplegic shock, potentially 2/2 sepsis. F/u blood cultures. Perform diagnostic paracentesis today to r/o SBP. Continue ceftriaxone.  3) TANG cirrhosis - Holding diuretics in the setting of shock state. No reported history of hepatic encephalopathy.  4) CAD - Holding antiplatelets in the setting of acute blood loss.  5) DVT ppx - SCD    Full Code

## 2023-02-25 NOTE — PROGRESS NOTE ADULT - SUBJECTIVE AND OBJECTIVE BOX
Interval Events:   Patient remains critically ill in the ICU.    ROS:   Unable to fully obtain ROS.    Hospital Medications:  chlorhexidine 0.12% Liquid 15 milliLiter(s) Oral Mucosa every 12 hours  chlorhexidine 2% Cloths 1 Application(s) Topical <User Schedule>  [id number]   Infusion. 0.881 MICROgram(s)/kG/Hr IV Continuous <Continuous>  norepinephrine Infusion 0.235 MICROgram(s)/kG/Min IV Continuous <Continuous>  octreotide  Infusion 50 MICROgram(s)/Hr IV Continuous <Continuous>  pantoprazole Infusion 8 mG/Hr IV Continuous <Continuous>  propofol Infusion 11.733 MICROgram(s)/kG/Min IV Continuous <Continuous>      PHYSICAL EXAM:   Vital Signs:  Vital Signs Last 24 Hrs  T(C): 37.7 (2023 08:00), Max: 37.7 (2023 08:00)  T(F): 99.8 (2023 08:00), Max: 99.8 (2023 08:00)  HR: 56 (2023 08:00) (43 - 250)  BP: 92/51 (2023 17:00) (55/39 - 193/94)  BP(mean): 65 (2023 17:00) (65 - 123)  RR: 18 (2023 08:00) (12 - 26)  SpO2: 99% (2023 08:00) (99% - 100%)    Parameters below as of 2023 08:00  Patient On (Oxygen Delivery Method): ventilator    O2 Concentration (%): 40  Daily Height in cm: 170.18 (2023 12:00)    Daily Weight in k.2 (2023 02:00)    GENERAL: critically ill  NEURO: not fully alert/oriented  HEENT: NCAT, no conjunctival pallor appreciated  CHEST: intubated, mechanical breath sounds  CARDIAC: regular rate, +S1/S2  ABDOMEN: soft, nontender, no rebound or guarding  EXTREMITIES: warm, well perfused  SKIN: no lesions noted    LABS: reviewed                         )-----------( 157      ( 2023 03:00 )             33.9     02-    137  |  106  |  24<H>  ----------------------------<  188<H>  3.9   |  22  |  1.16    Ca    8.3<L>      2023 03:00  Phos  3.3       Mg     1.80         TPro  5.2<L>  /  Alb  2.6<L>  /  TBili  1.2  /  DBili  x   /  AST  112<H>  /  ALT  53<H>  /  AlkPhos  118      LIVER FUNCTIONS - ( 2023 03:00 )  Alb: 2.6 g/dL / Pro: 5.2 g/dL / ALK PHOS: 118 U/L / ALT: 53 U/L / AST: 112 U/L / GGT: x             Interval Diagnostic Studies: see sunrise for full report

## 2023-02-25 NOTE — PROGRESS NOTE ADULT - ASSESSMENT
68yo M w/ pmhx CABG, TANG cirrhosis, HTN, DM presented to the ED w/ chest pain and constipation while in the ED patient w/ multiple episodes of hematemesis and hypotension; MICU consulted for hypotension, massive upper GI bleed s/p intubation requiring scope at bedside.     PLAN:     [ NEURO ]  - intubated and sedated   - started on Prop and fentanyl     [ CARDIO ]  # Hypovolemic Shock 2/2 GIB  - s/p 5U PRBCs;   - on Levophed, continue to monitor     [ RESPIRATORY ]  # Airway Protection  - patient intubated for airway protection 2/2 hematemesis  - A/C 400/16/5/100    [ GASTRO/NUTRITION ]  #UGIB  - pt presented for chest pain and constipation; during ED course patient with multiple episodes of massive hematemesis  - s/p Octreotide, PPI, CTX  - started on Octreotide and PPI ggt  - GI consulted; plan to scope at bedside, erythromycin given prior to scope   - continue Octreotide and PPI gtt    [ ENDO]  #DM   -NPO for now  -ISS when resume diet     [ RENAL/ ]  -no active issues  -Cr stable at 1.2, continue to monitor     [ INFECTIOUS ]  -Pt given 1 dose ceftriaxone given in ED for SBP prophylaxis  -Continue with ceftriaxone     [ HEME ]  #GI Bleed   -Hgb 6.7, patient given 5 units pRBC in ED with massive transfusion protocol. Continue with plan above      [ ETHICS ]  -Patient is Full Code  68yo M w/ pmhx CABG, TANG cirrhosis, HTN, DM presented to the ED w/ chest pain and constipation while in the ED patient w/ multiple episodes of hematemesis and hypotension; MICU consulted for hypotension, massive upper GI bleed s/p intubation requiring scope at bedside.     PLAN:     [ NEURO ]  - intubated and sedated   - continue Prop and fentanyl     [ CARDIO ]  # Hypovolemic Shock 2/2 GIB  - s/p 5U PRBCs;   - on Levophed, continue to monitor     [ RESPIRATORY ]  # Airway Protection  - patient intubated for airway protection 2/2 hematemesis  - A/C 400/16/5/100    [ GASTRO/NUTRITION ]  #UGIB  - pt presented for chest pain and constipation; during ED course patient with multiple episodes of massive hematemesis  - s/p Octreotide, PPI, CTX  - started on Octreotide and PPI ggt  - GI consulted; plan to scope at bedside, erythromycin given prior to scope   - continue Octreotide and PPI gtt    [ ENDO]  #DM   -NPO for now  -ISS when resume diet     [ RENAL/ ]  -no active issues  -Cr stable at 1.2, continue to monitor     [ INFECTIOUS ]  -Pt given 1 dose ceftriaxone given in ED for SBP prophylaxis  -Continue with ceftriaxone     [ HEME ]  #GI Bleed   -Hgb 6.7, patient given 5 units pRBC in ED with massive transfusion protocol. Continue with plan above      [ ETHICS ]  -Patient is Full Code  68yo M w/ pmhx CABG, TANG cirrhosis, HTN, DM presented to the ED w/ chest pain and constipation while in the ED patient w/ multiple episodes of hematemesis and hypotension; MICU consulted for hypotension, massive upper GI bleed s/p intubation requiring scope at bedside.     PLAN:     [ NEURO ]  - intubated and sedated   - continue Prop and fentanyl     [ CARDIO ]  # Hypovolemic Shock 2/2 GIB vs vasoplegia iso cirrhosis vs sepsis   - s/p 6U PRBCs  - on Levophed, with rising pressor requirements   - consider diagnostic paracentesis given hx of ascites, if continued increasing pressor requirements    [ RESPIRATORY ]  # Airway Protection  - patient intubated for airway protection 2/2 hematemesis  - A/C 310/18/5/40%    [ GASTRO/NUTRITION ]  #UGIB  - pt presented for chest pain and constipation; during ED course patient with multiple episodes of massive hematemesis  - s/p Octreotide, PPI, CTX  - GI consulted; s/p 2/24 scope, erythromycin given prior to scope -> multiple large esophageal varices, six bands successfully placed with incomplete eradication of varices. Per GI bleeding likely due to EV, however unable to completely visualize stomach given presence of clotted blood.  - continue Octreotide gtt x 5 days and PPI ggt  - f/u GI recs     [ ENDO]  #DM   -NPO for now  -FS q6 hrs  -ISS    [ RENAL/ ]  -no active issues  -Cr stable at 1.16, continue to monitor     [ INFECTIOUS ]  -Pt given 1 dose ceftriaxone given in ED for SBP prophylaxis  -Continue with ceftriaxone   -Order blood cx, UA  -Consider diagnostic para to r/o SBP given increasing pressor requirements    [ HEME ]  #GI Bleed   -Hgb 6.7 on admission  -given 6 units pRBC, 1 FFP, 1 plts  -Hgb stable today 11.8  -monitor CBC q8  -continue with plan above      [ ETHICS ]  -Patient is Full Code  68yo M w/ pmhx CABG, TANG cirrhosis, HTN, DM presented to the ED w/ chest pain and constipation while in the ED patient w/ multiple episodes of hematemesis and hypotension; MICU consulted for hypotension, massive upper GI bleed s/p intubation requiring scope at bedside.     PLAN:     [ NEURO ]  - intubated and sedated   - continue Prop and fentanyl     [ CARDIO ]  # Hypovolemic Shock 2/2 GIB vs vasoplegia iso cirrhosis vs sepsis   - s/p 6U PRBCs  - on Levophed, with rising pressor requirements   - consider diagnostic paracentesis given hx of ascites, if continued increasing pressor requirements  - bedside POCUS today AM with neema    [ RESPIRATORY ]  # Airway Protection  - patient intubated for airway protection 2/2 hematemesis  - A/C 310/18/5/40%    [ GASTRO/NUTRITION ]  #UGIB  - pt presented for chest pain and constipation; during ED course patient with multiple episodes of massive hematemesis  - s/p Octreotide, PPI, CTX  - GI consulted; s/p 2/24 scope, erythromycin given prior to scope -> multiple large esophageal varices, six bands successfully placed with incomplete eradication of varices. Per GI bleeding likely due to EV, however unable to completely visualize stomach given presence of clotted blood.  - continue Octreotide gtt x 5 days and PPI ggt  - f/u GI recs     [ ENDO]  #DM   -NPO for now  -FS q6 hrs  -ISS    [ RENAL/ ]  -no active issues  -Cr stable at 1.16, continue to monitor     [ INFECTIOUS ]  -Pt given 1 dose ceftriaxone given in ED for SBP prophylaxis  -Continue with ceftriaxone   -Order blood cx, UA  -Consider diagnostic para to r/o SBP given increasing pressor requirements    [ HEME ]  #GI Bleed   -Hgb 6.7 on admission  -given 6 units pRBC, 1 FFP, 1 plts  -Hgb stable today 11.8  -monitor CBC q8  -continue with plan above      [ ETHICS ]  -Patient is Full Code  68yo M w/ pmhx CAD s/p CABG, TANG cirrhosis, follicular lymphoma (on Rituximab infusions outpt), HTN, DM, currently on tenofovir (HBV Core +), presented to the ED w/ chest pain and constipation while in the ED patient w/ multiple episodes of hematemesis and hypotension; MICU consulted for hypotension, massive upper GI bleed s/p intubation, s/p 2/24 bedside scope with banding x6 of esophageal varices.     PLAN:     [ NEURO ]  - intubated and sedated   - continue Prop and fentanyl     [ CARDIO ]  # Hypovolemic Shock 2/2 GIB vs vasoplegia iso cirrhosis vs sepsis   - s/p 6U PRBCs  - on Levophed, with rising pressor requirements   - consider diagnostic paracentesis given hx of ascites, if continued increasing pressor requirements  - bedside POCUS today AM with neema    [ RESPIRATORY ]  # Airway Protection  - patient intubated for airway protection 2/2 hematemesis  - A/C 310/18/5/40%    [ GASTRO/NUTRITION ]  #UGIB  - pt presented for chest pain and constipation; during ED course patient with multiple episodes of massive hematemesis  - s/p Octreotide, PPI, CTX  - GI consulted; s/p 2/24 scope, erythromycin given prior to scope -> multiple large esophageal varices, six bands successfully placed with incomplete eradication of varices. Per GI bleeding likely due to EV, however unable to completely visualize stomach given presence of clotted blood.  - continue Octreotide gtt x 5 days and PPI ggt  - f/u GI recs     [ ENDO]  #DM   -NPO for now  -FS q6 hrs  -ISS    [ RENAL/ ]  -no active issues  -Cr stable at 1.16, continue to monitor     [ INFECTIOUS ]  -Pt given 1 dose ceftriaxone given in ED for SBP prophylaxis  -Continue with ceftriaxone   -Order blood cx, UA  -Consider diagnostic para to r/o SBP given increasing pressor requirements    [ HEME ]  #GI Bleed   -Hgb 6.7 on admission  -given 6 units pRBC, 1 FFP, 1 plts  -Hgb stable today 11.8  -monitor CBC q8  -continue with plan above      [ ETHICS ]  -Patient is Full Code  68yo M w/ pmhx CAD s/p CABG, TANG cirrhosis, follicular lymphoma (on Rituximab infusions outpt), HTN, DM, currently on tenofovir (HBV Core +), presented to the ED w/ chest pain and constipation while in the ED patient w/ multiple episodes of hematemesis and hypotension; MICU consulted for hypotension, massive upper GI bleed s/p intubation, s/p 2/24 bedside scope with banding x6 of esophageal varices.     PLAN:     [ NEURO ]  - intubated and sedated   - continue Prop and fentanyl     [ CARDIO ]  # Hypovolemic Shock 2/2 GIB vs vasoplegia iso cirrhosis vs sepsis   - s/p 6U PRBCs  - on Levophed, with rising pressor requirements   - consider diagnostic paracentesis given hx of ascites, if continued increasing pressor requirements  - bedside POCUS today AM with grossly normal LV systolic fx   - f/u formal TTE given shock state    [ RESPIRATORY ]  # Airway Protection  - patient intubated for airway protection 2/2 hematemesis  - A/C 310/18/5/40%    [ GASTRO/NUTRITION ]  #UGIB  - pt presented for chest pain and constipation; during ED course patient with multiple episodes of massive hematemesis  - s/p Octreotide, PPI, CTX  - GI consulted; s/p 2/24 scope, erythromycin given prior to scope -> multiple large esophageal varices, six bands successfully placed with incomplete eradication of varices. Per GI bleeding likely due to EV, however unable to completely visualize stomach given presence of clotted blood.  - continue Octreotide gtt x 5 days and PPI ggt  - f/u GI recs     [ ENDO]  #DM   -NPO for now  -FS q6 hrs  -ISS    [ RENAL/ ]  -no active issues  -Cr stable at 1.16, continue to monitor     [ INFECTIOUS ]  -Pt given 1 dose ceftriaxone given in ED for SBP prophylaxis  -Continue with ceftriaxone   -Order blood cx, UA  -Consider diagnostic para to r/o SBP given increasing pressor requirements    [ HEME ]  #GI Bleed   -Hgb 6.7 on admission  -given 6 units pRBC, 1 FFP, 1 plts  -Hgb stable today 11.8  -monitor CBC q8  -continue with plan above      [ ETHICS ]  -Patient is Full Code  68yo M w/ pmhx CAD s/p CABG, TANG cirrhosis, follicular lymphoma (on Rituximab infusions outpt), HTN, DM, hypothyroidism, currently on tenofovir (HBV Core +), presented to the ED w/ chest pain and constipation while in the ED patient w/ multiple episodes of hematemesis and hypotension; MICU consulted for hypotension, massive upper GI bleed s/p intubation, s/p 2/24 bedside scope with banding x6 of esophageal varices.     PLAN:     [ NEURO ]  - intubated and sedated   - continue Prop and fentanyl     [ CARDIO ]  # Hypovolemic Shock 2/2 GIB vs vasoplegia iso cirrhosis vs sepsis   - s/p 6U PRBCs  - on Levophed, with rising pressor requirements   - consider diagnostic paracentesis given hx of ascites, if continued increasing pressor requirements  - bedside POCUS today AM with grossly normal LV systolic fx   - f/u formal TTE given shock state    [ RESPIRATORY ]  # Airway Protection  - patient intubated for airway protection 2/2 hematemesis  - A/C 310/18/5/40%    [ GASTRO/NUTRITION ]  #UGIB  - pt presented for chest pain and constipation; during ED course patient with multiple episodes of massive hematemesis  - s/p Octreotide, PPI, CTX  - GI consulted; s/p 2/24 scope, erythromycin given prior to scope -> multiple large esophageal varices, six bands successfully placed with incomplete eradication of varices. Per GI bleeding likely due to EV, however unable to completely visualize stomach given presence of clotted blood.  - continue Octreotide gtt x 5 days and PPI ggt  - f/u GI recs     [ ENDO]  #DM   -NPO for now  -FS q6 hrs  -ISS    #Hypothyroidism  -home synthroid IV while NPO    [ RENAL/ ]  -no active issues  -Cr stable at 1.16, continue to monitor     [ INFECTIOUS ]  -Pt given 1 dose ceftriaxone given in ED for SBP prophylaxis  -Continue with ceftriaxone   -Order blood cx, UA  -Consider diagnostic para to r/o SBP given increasing pressor requirements    [ HEME ]  #GI Bleed   -Hgb 6.7 on admission  -given 6 units pRBC, 1 FFP, 1 plts  -Hgb stable today 11.8  -monitor CBC q8  -continue with plan above    -hold chemical DVT ppx given GIB, maintain SCDs    [ ETHICS ]  -Patient is Full Code  66yo M w/ pmhx CAD s/p CABG, TANG cirrhosis, follicular lymphoma (on Rituximab infusions outpt), HTN, DM, hypothyroidism, currently on tenofovir (HBV Core +), presented to the ED w/ chest pain and constipation while in the ED patient w/ multiple episodes of hematemesis and hypotension; MICU consulted for hypotension, massive upper GI bleed s/p intubation, s/p 2/24 bedside scope with banding x6 of esophageal varices.     PLAN:     [ NEURO ]  - intubated and sedated, but arousable  - continue Prop and fentanyl     [ CARDIO ]  # Hypovolemic Shock 2/2 GIB vs vasoplegia iso cirrhosis vs sepsis   - s/p 6U PRBCs  - on Levophed, with rising pressor requirements   - consider diagnostic paracentesis given hx of ascites, if continued increasing pressor requirements  - bedside POCUS today AM with grossly normal LV systolic fx   - f/u formal TTE given shock state    [ RESPIRATORY ]  # Airway Protection  - patient intubated for airway protection 2/2 hematemesis  - A/C 310/18/5/40%    [ GASTRO/NUTRITION ]  #UGIB  - pt presented for chest pain and constipation; during ED course patient with multiple episodes of massive hematemesis  - s/p Octreotide, PPI, CTX  - GI consulted; s/p 2/24 scope, erythromycin given prior to scope -> multiple large esophageal varices, six bands successfully placed with incomplete eradication of varices. Per GI bleeding likely due to EV, however unable to completely visualize stomach given presence of clotted blood.  - continue Octreotide gtt x 5 days and PPI ggt  - f/u GI recs     [ ENDO]  #DM   -NPO for now  -FS q6 hrs  -ISS    #Hypothyroidism  -home synthroid IV while NPO    [ RENAL/ ]  -no active issues  -Cr stable at 1.16, continue to monitor     [ INFECTIOUS ]  -Pt given 1 dose ceftriaxone given in ED for SBP prophylaxis  -Continue with ceftriaxone   -Order blood cx, UA  -Consider diagnostic para to r/o SBP given increasing pressor requirements    [ HEME ]  #GI Bleed   -Hgb 6.7 on admission  -given 6 units pRBC, 1 FFP, 1 plts  -Hgb stable today 11.8  -monitor CBC q8  -continue with plan above    -hold chemical DVT ppx given GIB, maintain SCDs    [ ETHICS ]  -Patient is Full Code  66yo M w/ pmhx CAD s/p CABG, TANG cirrhosis, follicular lymphoma (on Rituximab infusions outpt), HTN, DM, hypothyroidism, currently on tenofovir (HBV Core +), presented to the ED w/ chest pain and constipation while in the ED patient w/ multiple episodes of hematemesis and hypotension; MICU consulted for hypotension, massive upper GI bleed s/p intubation, s/p 2/24 bedside scope with banding x6 of esophageal varices.     PLAN:     [ NEURO ]  - intubated and sedated, but arousable  - continue Prop and fentanyl     [ CARDIO ]  # Hypovolemic Shock 2/2 GIB vs vasoplegia iso cirrhosis vs sepsis   - s/p 6U PRBCs  - on Levophed, with rising pressor requirements   - consider diagnostic paracentesis given hx of ascites, if continued increasing pressor requirements  - bedside POCUS today AM with grossly normal LV systolic fx   - f/u formal TTE given shock state    [ RESPIRATORY ]  # Airway Protection  - patient intubated for airway protection 2/2 hematemesis  - A/C 310/18/5/40%    [ GASTRO/NUTRITION ]  #UGIB  - pt presented for chest pain and constipation; during ED course patient with multiple episodes of massive hematemesis  - s/p Octreotide, PPI, CTX  - GI consulted; s/p 2/24 scope, erythromycin given prior to scope -> multiple large esophageal varices, six bands successfully placed with incomplete eradication of varices. Per GI bleeding likely due to EV, however unable to completely visualize stomach given presence of clotted blood.  - continue Octreotide gtt x 5 days and PPI ggt  - f/u GI recs     [ ENDO]  #DM   -NPO for now  -FS q6 hrs  -ISS    #Hypothyroidism  -home synthroid IV while NPO    [ RENAL/ ]  -no active issues  -Cr stable at 1.16, continue to monitor     [ INFECTIOUS ]  -Pt given 1 dose ceftriaxone given in ED for SBP prophylaxis  -Continue with ceftriaxone 1g daily for SBP ppx  -Monitor WBC, for fever  -Order blood cx, UA  -Consider diagnostic para to r/o SBP given increasing pressor requirements    [ HEME ]  #GI Bleed   -Hgb 6.7 on admission  -given 6 units pRBC, 1 FFP, 1 plts  -Hgb stable today 11.8  -monitor CBC q8  -continue with plan above    -hold chemical DVT ppx given GIB, maintain SCDs    [ ETHICS ]  -Patient is Full Code  68yo M w/ pmhx CAD s/p CABG, TANG cirrhosis, follicular lymphoma (on Rituximab infusions outpt), HTN, DM, hypothyroidism, currently on tenofovir (HBV Core +), presented to the ED w/ chest pain and constipation while in the ED patient w/ multiple episodes of hematemesis and hypotension; MICU consulted for hypotension, massive upper GI bleed s/p intubation, s/p 2/24 bedside scope with banding x6 of esophageal varices.     PLAN:     [ NEURO ]  - intubated and sedated, but arousable  - continue Prop and fentanyl     [ CARDIO ]  # Hypovolemic Shock 2/2 GIB vs vasoplegia iso cirrhosis vs sepsis   - s/p 6U PRBCs  - on Levophed, with rising pressor requirements   - consider diagnostic paracentesis given hx of ascites, if continued increasing pressor requirements  - bedside POCUS today AM with grossly normal LV systolic fx   - f/u formal TTE given shock state    [ RESPIRATORY ]  # Airway Protection  - patient intubated for airway protection 2/2 hematemesis  - A/C 310/18/5/40%    [ GASTRO/NUTRITION ]  #UGIB  - pt presented for chest pain and constipation; during ED course patient with multiple episodes of massive hematemesis  - given Octreotide, PPI, CTX  - GI consulted; s/p 2/24 scope, erythromycin given prior to scope -> multiple large esophageal varices, six bands successfully placed with incomplete eradication of varices. Per GI bleeding likely due to EV, however unable to completely visualize stomach given presence of clotted blood.  - continue Octreotide gtt x 5 days and PPI ggt  - f/u GI recs     [ ENDO]  #DM   -NPO for now  -FS q6 hrs  -ISS    #Hypothyroidism  -home synthroid IV while NPO    [ RENAL/ ]  -no active issues  -Cr stable at 1.16, continue to monitor     [ INFECTIOUS ]  -Pt given 1 dose ceftriaxone given in ED for SBP prophylaxis  -Continue with ceftriaxone 1g daily for SBP ppx  -Monitor WBC, for fever  -Order blood cx, UA  -Consider diagnostic para to r/o SBP given increasing pressor requirements    [ HEME ]  #GI Bleed   -Hgb 6.7 on admission  -given 6 units pRBC, 1 FFP, 1 plts  -Hgb stable today 11.8  -monitor CBC q8  -continue with plan above    -hold chemical DVT ppx given GIB, maintain SCDs    [ ETHICS ]  -Patient is Full Code  66yo M w/ pmhx CAD s/p CABG, TANG cirrhosis, follicular lymphoma (on Rituximab infusions outpt), HTN, DM, hypothyroidism, currently on tenofovir (HBV Core +), presented to the ED w/ chest pain and constipation while in the ED patient w/ multiple episodes of hematemesis and hypotension; MICU consulted for hypotension, massive upper GI bleed s/p intubation, s/p 2/24 bedside scope with banding x6 of esophageal varices.     PLAN:     [ NEURO ]  - intubated and sedated, but arousable  - continue Prop and fentanyl     [ CARDIO ]  # Hypovolemic Shock 2/2 GIB vs vasoplegia iso cirrhosis vs sepsis?   - s/p 6U PRBCs  - on Levophed, with rising pressor requirements   - infectious workup and abx as below  - consider diagnostic paracentesis given hx of ascites, if continued increasing pressor requirements  - bedside POCUS today AM with grossly normal LV systolic fx   - f/u formal TTE given shock state    [ RESPIRATORY ]  # Airway Protection  - patient intubated for airway protection 2/2 hematemesis  - A/C 310/18/5/40%    [ GASTRO/NUTRITION ]  #UGIB  - pt presented for chest pain and constipation; during ED course patient with multiple episodes of massive hematemesis  - given Octreotide, PPI, CTX  - GI consulted; s/p 2/24 scope, erythromycin given prior to scope -> multiple large esophageal varices, six bands successfully placed with incomplete eradication of varices. Per GI bleeding likely due to EV, however unable to completely visualize stomach given presence of clotted blood.  - continue Octreotide gtt x 5 days and PPI ggt  - f/u GI recs     [ ENDO]  #DM   -NPO for now  -FS q6 hrs  -ISS    #Hypothyroidism  -home synthroid IV while NPO    [ RENAL/ ]  -no active issues  -Cr stable at 1.16, continue to monitor     [ INFECTIOUS ]  -Pt given 1 dose ceftriaxone given in ED for SBP prophylaxis  -Continue with ceftriaxone 1g daily for SBP ppx  -Monitor WBC, for fever  -Order blood cx, UA  -Consider diagnostic para to r/o SBP given increasing pressor requirements    [ HEME ]  #GI Bleed   -Hgb 6.7 on admission  -given 6 units pRBC, 1 FFP, 1 plts  -Hgb stable today 11.8  -monitor CBC q8  -continue with plan above    -hold chemical DVT ppx given GIB, maintain SCDs    [ ETHICS ]  -Patient is Full Code  68yo M w/ pmhx CAD s/p CABG, TANG cirrhosis, follicular lymphoma (on Rituximab infusions outpt), HTN, DM, hypothyroidism, currently on tenofovir (HBV Core +), presented to the ED w/ chest pain and constipation while in the ED patient w/ multiple episodes of hematemesis and hypotension; MICU consulted for hypotension, massive upper GI bleed s/p intubation, s/p 2/24 bedside scope with banding x6 of esophageal varices.     PLAN:     [ NEURO ]  - intubated and sedated, but arousable  - continue Prop and fentanyl     [ CARDIO ]  # Hypovolemic Shock 2/2 GIB vs vasoplegia iso cirrhosis vs sepsis?   - s/p 6U PRBCs  - on Levophed, with rising pressor requirements   - infectious workup and abx as below  - consider diagnostic paracentesis to r/o SBP given hx of ascites, if continued increasing pressor requirements  - bedside POCUS today AM with grossly normal LV systolic fx   - f/u formal TTE given shock state    [ RESPIRATORY ]  # Airway Protection  - patient intubated for airway protection 2/2 hematemesis  - A/C 310/18/5/40%    [ GASTRO/NUTRITION ]  #UGIB  - pt presented for chest pain and constipation; during ED course patient with multiple episodes of massive hematemesis  - given Octreotide, PPI, CTX  - GI consulted; s/p 2/24 scope, erythromycin given prior to scope -> multiple large esophageal varices, six bands successfully placed with incomplete eradication of varices. Per GI bleeding likely due to EV, however unable to completely visualize stomach given presence of clotted blood.  - continue Octreotide gtt x 5 days and PPI ggt  - f/u GI recs     [ ENDO]  #DM   -NPO for now  -FS q6 hrs  -ISS    #Hypothyroidism  -home synthroid IV while NPO    [ RENAL/ ]  -no active issues  -Cr stable at 1.16, continue to monitor   -monitor electrolytes    [ INFECTIOUS ]  -Pt given 1 dose ceftriaxone given in ED for SBP prophylaxis  -Continue with ceftriaxone 1g daily for SBP ppx  -Monitor WBC, for fever  -Order blood cx, UA  -Consider diagnostic para to r/o SBP given increasing pressor requirements    [ HEME ]  #GI Bleed   -Hgb 6.7 on admission  -s/p 6 units pRBC, 1 FFP, 1 plts  -Hgb stable today 11.8  -monitor CBC q8 for now, if stable consider CBC q12  -continue with plan as above    -hold chemical DVT ppx given GIB, maintain SCDs    [ ETHICS ]  -Patient is Full Code  68yo M w/ pmhx CAD s/p CABG, TANG cirrhosis, follicular lymphoma (on Rituximab infusions outpt), HTN, DM, hypothyroidism, currently on tenofovir (HBV Core +), presented to the ED w/ chest pain and constipation while in the ED patient w/ multiple episodes of hematemesis and hypotension; MICU consulted for hypotension, massive upper GI bleed s/p intubation, s/p 2/24 bedside scope with banding x6 of esophageal varices.     PLAN:     [ NEURO ]  - intubated and sedated, but arousable  - continue Prop and fentanyl     [ CARDIO ]  # Hypovolemic Shock 2/2 GIB vs vasoplegia iso cirrhosis vs sepsis?   - s/p 6U PRBCs  - on Levophed, with rising pressor requirements   - infectious workup and abx as below  - consider diagnostic paracentesis to r/o SBP given hx of ascites, if continued increasing pressor requirements  - bedside POCUS today AM with grossly normal LV systolic fx   - f/u formal TTE given shock state    [ RESPIRATORY ]  # Airway Protection  - patient intubated for airway protection 2/2 hematemesis  - A/C 310/18/5/40%    [ GASTRO/NUTRITION ]  #UGIB  - pt presented for chest pain and constipation; during ED course patient with multiple episodes of massive hematemesis  - given Octreotide, PPI, CTX  - GI consulted; s/p 2/24 scope, erythromycin given prior to scope -> multiple large esophageal varices, six bands successfully placed with incomplete eradication of varices. Per GI bleeding likely due to EV, however unable to completely visualize stomach given presence of clotted blood.  - continue Octreotide gtt and PPI ggt  - f/u GI recs     [ ENDO]  #DM   -NPO for now  -FS q6 hrs  -ISS    #Hypothyroidism  -home synthroid IV while NPO    [ RENAL/ ]  -no active issues  -Cr stable at 1.16, continue to monitor   -monitor electrolytes    [ INFECTIOUS ]  -Pt given 1 dose ceftriaxone given in ED for SBP prophylaxis  -Continue with ceftriaxone 1g daily for SBP ppx  -Monitor WBC, for fever  -Order blood cx, UA  -Consider diagnostic para to r/o SBP given increasing pressor requirements    [ HEME ]  #GI Bleed   -Hgb 6.7 on admission  -s/p 6 units pRBC, 1 FFP, 1 plts  -Hgb stable today 11.8  -monitor CBC q8 for now, if stable consider CBC q12  -continue with plan as above    -hold chemical DVT ppx given GIB, maintain SCDs    [ ETHICS ]  -Patient is Full Code  68yo M w/ pmhx CAD s/p CABG, TANG cirrhosis, follicular lymphoma (on Rituximab infusions outpt), HTN, DM, hypothyroidism, currently on tenofovir (HBV Core +), presented to the ED w/ chest pain and constipation while in the ED patient w/ multiple episodes of hematemesis and hypotension; MICU consulted for hypotension, massive upper GI bleed s/p intubation, s/p 2/24 bedside scope with banding x6 of esophageal varices.     PLAN:     [ NEURO ]  - intubated and sedated, but arousable  - continue Prop and fentanyl     [ CARDIO ]  # Hypovolemic Shock 2/2 GIB vs vasoplegia iso cirrhosis vs sepsis?   - s/p 6U PRBCs  - on Levophed, with rising pressor requirements   - infectious workup and abx as below  - consider diagnostic paracentesis to r/o SBP given hx of ascites, if continued increasing pressor requirements  - bedside POCUS today AM with grossly normal LV systolic fx   - f/u formal TTE given shock state    [ RESPIRATORY ]  # Airway Protection  - patient intubated for airway protection 2/2 hematemesis  - A/C 310/18/5/40%    [ GASTRO/NUTRITION ]  #UGIB  - pt presented for chest pain and constipation; during ED course patient with multiple episodes of massive hematemesis  - given Octreotide, PPI, CTX  - GI consulted; s/p 2/24 scope, erythromycin given prior to scope -> multiple large esophageal varices, six bands successfully placed with incomplete eradication of varices. Per GI bleeding likely due to EV, however unable to completely visualize stomach given presence of clotted blood.  - continue Octreotide gtt and PPI gtt  - f/u GI recs     [ ENDO]  #DM   -NPO for now  -FS q6 hrs  -ISS    #Hypothyroidism  -home synthroid IV while NPO    [ RENAL/ ]  -no active issues  -Cr stable at 1.16, continue to monitor   -monitor electrolytes    [ INFECTIOUS ]  -Pt given 1 dose ceftriaxone given in ED for SBP prophylaxis  -Continue with ceftriaxone 1g daily for SBP ppx  -Monitor WBC, for fever  -Order blood cx, UA  -Consider diagnostic para to r/o SBP given increasing pressor requirements    [ HEME ]  #GI Bleed   -Hgb 6.7 on admission  -s/p 6 units pRBC, 1 FFP, 1 plts  -Hgb stable today 11.8  -monitor CBC q8 for now, if stable consider CBC q12  -continue with plan as above    -hold chemical DVT ppx given GIB, maintain SCDs    [ ETHICS ]  -Patient is Full Code  66yo M w/ pmhx CAD s/p CABG, TANG cirrhosis, follicular lymphoma (on Rituximab infusions outpt), HTN, DM, hypothyroidism, currently on tenofovir (HBV Core +), presented to the ED w/ chest pain and constipation while in the ED patient w/ multiple episodes of hematemesis and hypotension; MICU consulted for hypotension, massive upper GI bleed s/p intubation, s/p 2/24 bedside scope with banding x6 of esophageal varices.     PLAN:     [ NEURO ]  - intubated and sedated, but arousable  - continue Prop and fentanyl     [ CARDIO ]  # Hypovolemic Shock 2/2 GIB vs vasoplegia iso cirrhosis vs sepsis?   - s/p 6U PRBCs  - on Levophed, with rising pressor requirements   - infectious workup and abx as below  - consider diagnostic paracentesis to r/o SBP given hx of ascites, if continued increasing pressor requirements  - bedside POCUS today AM with grossly normal LV systolic fx   - f/u formal TTE given shock state    [ RESPIRATORY ]  # Airway Protection  - patient intubated for airway protection 2/2 hematemesis  - A/C 310/18/5/40%      [ GASTRO/NUTRITION ]  #UGIB  - pt presented for chest pain and constipation; during ED course patient with multiple episodes of massive hematemesis  - given Octreotide, PPI, CTX  - GI consulted; s/p 2/24 scope, erythromycin given prior to scope -> multiple large esophageal varices, six bands successfully placed with incomplete eradication of varices. Per GI bleeding likely due to EV, however unable to completely visualize stomach given presence of clotted blood.  - continue Octreotide gtt and PPI gtt  - f/u GI recs     [ ENDO]  #DM   -NPO for now  -FS q6 hrs  -ISS    #Hypothyroidism  -home synthroid IV while NPO    [ RENAL/ ]  -no active issues  -Cr stable at 1.16, continue to monitor   -monitor electrolytes    [ INFECTIOUS ]  -Pt given 1 dose ceftriaxone given in ED for SBP prophylaxis  -Continue with ceftriaxone 1g daily for SBP ppx  -Monitor WBC, for fever  -Order blood cx, UA  -Consider diagnostic para to r/o SBP given increasing pressor requirements    [ HEME ]  #GI Bleed   -Hgb 6.7 on admission  -s/p 6 units pRBC, 1 FFP, 1 plts  -Hgb stable today 11.8  -monitor CBC q8 for now, if stable consider CBC q12  -continue with plan as above    -hold chemical DVT ppx given GIB, maintain SCDs    [ ETHICS ]  -Patient is Full Code

## 2023-02-25 NOTE — PROGRESS NOTE ADULT - ASSESSMENT
67M follows at [facility] Hx decompensated TANG cirrhosis (+ascites, +EV -- previously MELD Na 8), CAD s/p CABG s/p PCI (last in 2012 on ASA, now off plavix x2 weeks), newly diagnosed follicular lymphoma (on rituximab), HTN, DM, currently on tenofovir (HBV Core +) presented with right sided chest/abdominal pain now with lizzette hematemesis + clots.     #Hemorrhagic shock  #Hematemesis: s/p EGD 2/24/2023 s/p EVL x6, bleeding likely due to EV, however unable to completely visualize stomach given presence of clotted blood  #Decompensated TANG cirrhosis: follows at [facility], previously low meld Na 8  --MELD Na: 20 (2/24/23)  --Ascites: (+) Hx, on lasix 40, spironolactone 50  --SBP; no Hx  --EV: (+) Hx, no EVB --> is on nadolol at home  --PVT: no Hx  --HCC: no Hx    Recommendations:  -trend clinical symptoms, exam findings, vital signs, CBC, CMP, INR, and monitor for clinical signs of bleeding  -maintain active type and screen, however would recommend extremely conservative transfusion strategy as to not exacerbate elevated portal pressures  -INR is a marker of hepatic synthetic function and does not correlate accurately with bleeding risk.  If concerned for cirrhotic coagulopathy contributing to hemorrhage, recommend guidance of transfusion of plasma products via thromboelastography [TEG] and not via INR  -transfusion goal to maintain hemoglobin >/= 7.0 and platelets >/= 50  -avoid NSAIDs  -PPI IV BID  -octreotide for 72 hours post-procedure  -ceftriaxone for empiric SBP prophylaxis for 7 day course  -if significant on-going GI bleeding with acute blood loss anemia / hemodynamic instability, may need IR consultation for further management of variceal bleeding    Note incomplete until finalized by attending signature/attestation.    Jose Flowers  GI/Hepatology Fellow    MONDAY-FRIDAY 8AM-5PM:  Pager# [phone] ([facility]) or [phone] ([facility])    NON-URGENT CONSULTS:  Please email [email or web address] OR [email or web address]  AT NIGHT AND ON WEEKENDS:  Contact on-call GI fellow via answering service ([phone]) from 5pm-8am and on weekends/holidays

## 2023-02-25 NOTE — PROGRESS NOTE ADULT - SUBJECTIVE AND OBJECTIVE BOX
INTERVAL HPI/OVERNIGHT EVENTS:    SUBJECTIVE: Patient seen and examined at bedside.       VITAL SIGNS:  ICU Vital Signs Last 24 Hrs  T(C): 36.7 (25 Feb 2023 04:00), Max: 36.8 (25 Feb 2023 00:00)  T(F): 98.1 (25 Feb 2023 04:00), Max: 98.3 (25 Feb 2023 00:00)  HR: 56 (25 Feb 2023 06:39) (43 - 250)  BP: 92/51 (24 Feb 2023 17:00) (55/39 - 193/94)  BP(mean): 65 (24 Feb 2023 17:00) (65 - 123)  ABP: 100/42 (25 Feb 2023 06:39) (81/32 - 141/50)  ABP(mean): 63 (25 Feb 2023 06:39) (50 - 81)  RR: 18 (25 Feb 2023 06:39) (12 - 26)  SpO2: 99% (25 Feb 2023 06:39) (99% - 100%)    O2 Parameters below as of 25 Feb 2023 06:39  Patient On (Oxygen Delivery Method): ventilator    O2 Concentration (%): 40      Mode: AC/ CMV (Assist Control/ Continuous Mandatory Ventilation), RR (machine): 18, TV (machine): 310, FiO2: 40, PEEP: 5, ITime: 0.71, MAP: 8, PIP: 19  Plateau pressure:   P/F ratio:     02-24 @ 07:01  -  02-25 @ 07:00  --------------------------------------------------------  IN: 2087.1 mL / OUT: 1035 mL / NET: 1052.1 mL      CAPILLARY BLOOD GLUCOSE      POCT Blood Glucose.: 232 mg/dL (24 Feb 2023 12:59)    ECG:    PHYSICAL EXAM:    General:   HEENT:   Neck:   Respiratory:   Cardiovascular:   Abdomen:   Extremities:  Neurological:    MEDICATIONS:  MEDICATIONS  (STANDING):  cefTRIAXone   IVPB 1000 milliGRAM(s) IV Intermittent once  chlorhexidine 0.12% Liquid 15 milliLiter(s) Oral Mucosa every 12 hours  chlorhexidine 2% Cloths 1 Application(s) Topical <User Schedule>  fentaNYL   Infusion. 0.881 MICROgram(s)/kG/Hr (4.88 mL/Hr) IV Continuous <Continuous>  norepinephrine Infusion 0.235 MICROgram(s)/kG/Min (24.4 mL/Hr) IV Continuous <Continuous>  octreotide  Infusion 50 MICROgram(s)/Hr (10 mL/Hr) IV Continuous <Continuous>  pantoprazole Infusion 8 mG/Hr (10 mL/Hr) IV Continuous <Continuous>  propofol Infusion 11.733 MICROgram(s)/kG/Min (3.9 mL/Hr) IV Continuous <Continuous>    MEDICATIONS  (PRN):      ALLERGIES:  Allergies    No Known Allergies    Intolerances        LABS:                        11.8   12.87 )-----------( 157      ( 25 Feb 2023 03:00 )             33.9     02-25    137  |  106  |  24<H>  ----------------------------<  188<H>  3.9   |  22  |  1.16    Ca    8.3<L>      25 Feb 2023 03:00  Phos  3.3     02-25  Mg     1.80     02-25    TPro  5.2<L>  /  Alb  2.6<L>  /  TBili  1.2  /  DBili  x   /  AST  112<H>  /  ALT  53<H>  /  AlkPhos  118  02-25    PT/INR - ( 25 Feb 2023 03:00 )   PT: 18.1 sec;   INR: 1.55 ratio         PTT - ( 25 Feb 2023 03:00 )  PTT:30.8 sec      RADIOLOGY & ADDITIONAL TESTS: Reviewed.   INTERVAL HPI/OVERNIGHT EVENTS:    SUBJECTIVE: Patient seen and examined at bedside.       VITAL SIGNS:  ICU Vital Signs Last 24 Hrs  T(C): 36.7 (25 Feb 2023 04:00), Max: 36.8 (25 Feb 2023 00:00)  T(F): 98.1 (25 Feb 2023 04:00), Max: 98.3 (25 Feb 2023 00:00)  HR: 56 (25 Feb 2023 06:39) (43 - 250)  BP: 92/51 (24 Feb 2023 17:00) (55/39 - 193/94)  BP(mean): 65 (24 Feb 2023 17:00) (65 - 123)  ABP: 100/42 (25 Feb 2023 06:39) (81/32 - 141/50)  ABP(mean): 63 (25 Feb 2023 06:39) (50 - 81)  RR: 18 (25 Feb 2023 06:39) (12 - 26)  SpO2: 99% (25 Feb 2023 06:39) (99% - 100%)    O2 Parameters below as of 25 Feb 2023 06:39  Patient On (Oxygen Delivery Method): ventilator    O2 Concentration (%): 40      Mode: AC/ CMV (Assist Control/ Continuous Mandatory Ventilation), RR (machine): 18, TV (machine): 310, FiO2: 40, PEEP: 5, ITime: 0.71, MAP: 8, PIP: 19  Plateau pressure:   P/F ratio:     02-24 @ 07:01  -  02-25 @ 07:00  --------------------------------------------------------  IN: 2087.1 mL / OUT: 1035 mL / NET: 1052.1 mL      CAPILLARY BLOOD GLUCOSE      POCT Blood Glucose.: 232 mg/dL (24 Feb 2023 12:59)    ECG:    PHYSICAL EXAM:    HEENT:   Respiratory: Intubated. Grossly clear to auscultation on anterior fields. Limited auscultation to posterior fields.    Cardiovascular: +S1, S2  Abdomen: Soft, distended   Extremities: No LE edema   Neurological: Sedated     MEDICATIONS:  MEDICATIONS  (STANDING):  cefTRIAXone   IVPB 1000 milliGRAM(s) IV Intermittent once  chlorhexidine 0.12% Liquid 15 milliLiter(s) Oral Mucosa every 12 hours  chlorhexidine 2% Cloths 1 Application(s) Topical <User Schedule>  fentaNYL   Infusion. 0.881 MICROgram(s)/kG/Hr (4.88 mL/Hr) IV Continuous <Continuous>  norepinephrine Infusion 0.235 MICROgram(s)/kG/Min (24.4 mL/Hr) IV Continuous <Continuous>  octreotide  Infusion 50 MICROgram(s)/Hr (10 mL/Hr) IV Continuous <Continuous>  pantoprazole Infusion 8 mG/Hr (10 mL/Hr) IV Continuous <Continuous>  propofol Infusion 11.733 MICROgram(s)/kG/Min (3.9 mL/Hr) IV Continuous <Continuous>    MEDICATIONS  (PRN):      ALLERGIES:  Allergies    No Known Allergies    Intolerances        LABS:                        11.8   12.87 )-----------( 157      ( 25 Feb 2023 03:00 )             33.9     02-25    137  |  106  |  24<H>  ----------------------------<  188<H>  3.9   |  22  |  1.16    Ca    8.3<L>      25 Feb 2023 03:00  Phos  3.3     02-25  Mg     1.80     02-25    TPro  5.2<L>  /  Alb  2.6<L>  /  TBili  1.2  /  DBili  x   /  AST  112<H>  /  ALT  53<H>  /  AlkPhos  118  02-25    PT/INR - ( 25 Feb 2023 03:00 )   PT: 18.1 sec;   INR: 1.55 ratio         PTT - ( 25 Feb 2023 03:00 )  PTT:30.8 sec      RADIOLOGY & ADDITIONAL TESTS: Reviewed.   INTERVAL HPI/OVERNIGHT EVENTS:    SUBJECTIVE: Patient seen and examined at bedside.       VITAL SIGNS:  ICU Vital Signs Last 24 Hrs  T(C): 36.7 (25 Feb 2023 04:00), Max: 36.8 (25 Feb 2023 00:00)  T(F): 98.1 (25 Feb 2023 04:00), Max: 98.3 (25 Feb 2023 00:00)  HR: 56 (25 Feb 2023 06:39) (43 - 250)  BP: 92/51 (24 Feb 2023 17:00) (55/39 - 193/94)  BP(mean): 65 (24 Feb 2023 17:00) (65 - 123)  ABP: 100/42 (25 Feb 2023 06:39) (81/32 - 141/50)  ABP(mean): 63 (25 Feb 2023 06:39) (50 - 81)  RR: 18 (25 Feb 2023 06:39) (12 - 26)  SpO2: 99% (25 Feb 2023 06:39) (99% - 100%)    O2 Parameters below as of 25 Feb 2023 06:39  Patient On (Oxygen Delivery Method): ventilator    O2 Concentration (%): 40      Mode: AC/ CMV (Assist Control/ Continuous Mandatory Ventilation), RR (machine): 18, TV (machine): 310, FiO2: 40, PEEP: 5, ITime: 0.71, MAP: 8, PIP: 19  Plateau pressure:   P/F ratio:     02-24 @ 07:01  -  02-25 @ 07:00  --------------------------------------------------------  IN: 2087.1 mL / OUT: 1035 mL / NET: 1052.1 mL      CAPILLARY BLOOD GLUCOSE      POCT Blood Glucose.: 232 mg/dL (24 Feb 2023 12:59)    ECG:    PHYSICAL EXAM:    HEENT: Normocephalic, atraumatic  Respiratory: Intubated. Grossly clear to auscultation on anterior fields. Limited auscultation to posterior fields.    Cardiovascular: +S1, S2  Abdomen: Soft, distended   Extremities: No LE edema. No gross deformities.   Neurological: Sedated     MEDICATIONS:  MEDICATIONS  (STANDING):  cefTRIAXone   IVPB 1000 milliGRAM(s) IV Intermittent once  chlorhexidine 0.12% Liquid 15 milliLiter(s) Oral Mucosa every 12 hours  chlorhexidine 2% Cloths 1 Application(s) Topical <User Schedule>  fentaNYL   Infusion. 0.881 MICROgram(s)/kG/Hr (4.88 mL/Hr) IV Continuous <Continuous>  norepinephrine Infusion 0.235 MICROgram(s)/kG/Min (24.4 mL/Hr) IV Continuous <Continuous>  octreotide  Infusion 50 MICROgram(s)/Hr (10 mL/Hr) IV Continuous <Continuous>  pantoprazole Infusion 8 mG/Hr (10 mL/Hr) IV Continuous <Continuous>  propofol Infusion 11.733 MICROgram(s)/kG/Min (3.9 mL/Hr) IV Continuous <Continuous>    MEDICATIONS  (PRN):      ALLERGIES:  Allergies    No Known Allergies    Intolerances        LABS:                        11.8   12.87 )-----------( 157      ( 25 Feb 2023 03:00 )             33.9     02-25    137  |  106  |  24<H>  ----------------------------<  188<H>  3.9   |  22  |  1.16    Ca    8.3<L>      25 Feb 2023 03:00  Phos  3.3     02-25  Mg     1.80     02-25    TPro  5.2<L>  /  Alb  2.6<L>  /  TBili  1.2  /  DBili  x   /  AST  112<H>  /  ALT  53<H>  /  AlkPhos  118  02-25    PT/INR - ( 25 Feb 2023 03:00 )   PT: 18.1 sec;   INR: 1.55 ratio         PTT - ( 25 Feb 2023 03:00 )  PTT:30.8 sec      RADIOLOGY & ADDITIONAL TESTS: Reviewed.   INTERVAL HPI/OVERNIGHT EVENTS:    SUBJECTIVE: Patient seen and examined at bedside.       VITAL SIGNS:  ICU Vital Signs Last 24 Hrs  T(C): 36.7 (25 Feb 2023 04:00), Max: 36.8 (25 Feb 2023 00:00)  T(F): 98.1 (25 Feb 2023 04:00), Max: 98.3 (25 Feb 2023 00:00)  HR: 56 (25 Feb 2023 06:39) (43 - 250)  BP: 92/51 (24 Feb 2023 17:00) (55/39 - 193/94)  BP(mean): 65 (24 Feb 2023 17:00) (65 - 123)  ABP: 100/42 (25 Feb 2023 06:39) (81/32 - 141/50)  ABP(mean): 63 (25 Feb 2023 06:39) (50 - 81)  RR: 18 (25 Feb 2023 06:39) (12 - 26)  SpO2: 99% (25 Feb 2023 06:39) (99% - 100%)    O2 Parameters below as of 25 Feb 2023 06:39  Patient On (Oxygen Delivery Method): ventilator    O2 Concentration (%): 40      Mode: AC/ CMV (Assist Control/ Continuous Mandatory Ventilation), RR (machine): 18, TV (machine): 310, FiO2: 40, PEEP: 5, ITime: 0.71, MAP: 8, PIP: 19  Plateau pressure:   P/F ratio:     02-24 @ 07:01  -  02-25 @ 07:00  --------------------------------------------------------  IN: 2087.1 mL / OUT: 1035 mL / NET: 1052.1 mL      CAPILLARY BLOOD GLUCOSE      POCT Blood Glucose.: 232 mg/dL (24 Feb 2023 12:59)    ECG:    PHYSICAL EXAM:    HEENT: Normocephalic, atraumatic  Respiratory: Intubated. Grossly clear to auscultation on anterior fields. Limited auscultation to posterior fields.    Cardiovascular: +S1, S2  Abdomen: Soft, distended   Extremities: No LE edema. No gross deformities.   Neurological: Sedated, but arousable to sound and touch.     MEDICATIONS:  MEDICATIONS  (STANDING):  cefTRIAXone   IVPB 1000 milliGRAM(s) IV Intermittent once  chlorhexidine 0.12% Liquid 15 milliLiter(s) Oral Mucosa every 12 hours  chlorhexidine 2% Cloths 1 Application(s) Topical <User Schedule>  fentaNYL   Infusion. 0.881 MICROgram(s)/kG/Hr (4.88 mL/Hr) IV Continuous <Continuous>  norepinephrine Infusion 0.235 MICROgram(s)/kG/Min (24.4 mL/Hr) IV Continuous <Continuous>  octreotide  Infusion 50 MICROgram(s)/Hr (10 mL/Hr) IV Continuous <Continuous>  pantoprazole Infusion 8 mG/Hr (10 mL/Hr) IV Continuous <Continuous>  propofol Infusion 11.733 MICROgram(s)/kG/Min (3.9 mL/Hr) IV Continuous <Continuous>    MEDICATIONS  (PRN):      ALLERGIES:  Allergies    No Known Allergies    Intolerances        LABS:                        11.8   12.87 )-----------( 157      ( 25 Feb 2023 03:00 )             33.9     02-25    137  |  106  |  24<H>  ----------------------------<  188<H>  3.9   |  22  |  1.16    Ca    8.3<L>      25 Feb 2023 03:00  Phos  3.3     02-25  Mg     1.80     02-25    TPro  5.2<L>  /  Alb  2.6<L>  /  TBili  1.2  /  DBili  x   /  AST  112<H>  /  ALT  53<H>  /  AlkPhos  118  02-25    PT/INR - ( 25 Feb 2023 03:00 )   PT: 18.1 sec;   INR: 1.55 ratio         PTT - ( 25 Feb 2023 03:00 )  PTT:30.8 sec      RADIOLOGY & ADDITIONAL TESTS: Reviewed.   INTERVAL HPI/OVERNIGHT EVENTS:    SUBJECTIVE: Patient seen and examined at bedside.   Overnight pt with increasing pressors requirements. Bedside POCUS by overnight team with B-lines on L side, b/l effusions. Given Lasix 40mg x1. Cardiac POCUS with concern for possible decreased LV systolic function. Formal TTE ordered for AM.     Pt's son given an update at bedside by team during morning rounds.       VITAL SIGNS:  ICU Vital Signs Last 24 Hrs  T(C): 36.7 (25 Feb 2023 04:00), Max: 36.8 (25 Feb 2023 00:00)  T(F): 98.1 (25 Feb 2023 04:00), Max: 98.3 (25 Feb 2023 00:00)  HR: 56 (25 Feb 2023 06:39) (43 - 250)  BP: 92/51 (24 Feb 2023 17:00) (55/39 - 193/94)  BP(mean): 65 (24 Feb 2023 17:00) (65 - 123)  ABP: 100/42 (25 Feb 2023 06:39) (81/32 - 141/50)  ABP(mean): 63 (25 Feb 2023 06:39) (50 - 81)  RR: 18 (25 Feb 2023 06:39) (12 - 26)  SpO2: 99% (25 Feb 2023 06:39) (99% - 100%)    O2 Parameters below as of 25 Feb 2023 06:39  Patient On (Oxygen Delivery Method): ventilator    O2 Concentration (%): 40      Mode: AC/ CMV (Assist Control/ Continuous Mandatory Ventilation), RR (machine): 18, TV (machine): 310, FiO2: 40, PEEP: 5, ITime: 0.71, MAP: 8, PIP: 19  Plateau pressure:   P/F ratio:     02-24 @ 07:01  -  02-25 @ 07:00  --------------------------------------------------------  IN: 2087.1 mL / OUT: 1035 mL / NET: 1052.1 mL      CAPILLARY BLOOD GLUCOSE      POCT Blood Glucose.: 232 mg/dL (24 Feb 2023 12:59)    ECG:    PHYSICAL EXAM:    HEENT: Normocephalic, atraumatic  Respiratory: Intubated. Grossly clear to auscultation on anterior fields. Limited auscultation to posterior fields.    Cardiovascular: +S1, S2  Abdomen: Soft, distended   Extremities: No LE edema. No gross deformities.   Neurological: Sedated, but arousable to sound and touch.     MEDICATIONS:  MEDICATIONS  (STANDING):  cefTRIAXone   IVPB 1000 milliGRAM(s) IV Intermittent once  chlorhexidine 0.12% Liquid 15 milliLiter(s) Oral Mucosa every 12 hours  chlorhexidine 2% Cloths 1 Application(s) Topical <User Schedule>  fentaNYL   Infusion. 0.881 MICROgram(s)/kG/Hr (4.88 mL/Hr) IV Continuous <Continuous>  norepinephrine Infusion 0.235 MICROgram(s)/kG/Min (24.4 mL/Hr) IV Continuous <Continuous>  octreotide  Infusion 50 MICROgram(s)/Hr (10 mL/Hr) IV Continuous <Continuous>  pantoprazole Infusion 8 mG/Hr (10 mL/Hr) IV Continuous <Continuous>  propofol Infusion 11.733 MICROgram(s)/kG/Min (3.9 mL/Hr) IV Continuous <Continuous>    MEDICATIONS  (PRN):      ALLERGIES:  Allergies    No Known Allergies    Intolerances        LABS:                        11.8   12.87 )-----------( 157      ( 25 Feb 2023 03:00 )             33.9     02-25    137  |  106  |  24<H>  ----------------------------<  188<H>  3.9   |  22  |  1.16    Ca    8.3<L>      25 Feb 2023 03:00  Phos  3.3     02-25  Mg     1.80     02-25    TPro  5.2<L>  /  Alb  2.6<L>  /  TBili  1.2  /  DBili  x   /  AST  112<H>  /  ALT  53<H>  /  AlkPhos  118  02-25    PT/INR - ( 25 Feb 2023 03:00 )   PT: 18.1 sec;   INR: 1.55 ratio         PTT - ( 25 Feb 2023 03:00 )  PTT:30.8 sec      RADIOLOGY & ADDITIONAL TESTS: Reviewed.   INTERVAL HPI/OVERNIGHT EVENTS:    SUBJECTIVE: Patient seen and examined at bedside.   Overnight pt with increasing pressors requirements. Bedside POCUS by overnight team with B-lines on L side, b/l effusions. Given Lasix 40mg x1. Cardiac POCUS with concern for possible decreased LV systolic function. Formal TTE ordered for AM.     Pt's son given an update at bedside by team during morning rounds.       VITAL SIGNS:  ICU Vital Signs Last 24 Hrs  T(C): 36.7 (25 Feb 2023 04:00), Max: 36.8 (25 Feb 2023 00:00)  T(F): 98.1 (25 Feb 2023 04:00), Max: 98.3 (25 Feb 2023 00:00)  HR: 56 (25 Feb 2023 06:39) (43 - 250)  BP: 92/51 (24 Feb 2023 17:00) (55/39 - 193/94)  BP(mean): 65 (24 Feb 2023 17:00) (65 - 123)  ABP: 100/42 (25 Feb 2023 06:39) (81/32 - 141/50)  ABP(mean): 63 (25 Feb 2023 06:39) (50 - 81)  RR: 18 (25 Feb 2023 06:39) (12 - 26)  SpO2: 99% (25 Feb 2023 06:39) (99% - 100%)    O2 Parameters below as of 25 Feb 2023 06:39  Patient On (Oxygen Delivery Method): ventilator    O2 Concentration (%): 40      Mode: AC/ CMV (Assist Control/ Continuous Mandatory Ventilation), RR (machine): 18, TV (machine): 310, FiO2: 40, PEEP: 5, ITime: 0.71, MAP: 8, PIP: 19  Plateau pressure:   P/F ratio:     02-24 @ 07:01  -  02-25 @ 07:00  --------------------------------------------------------  IN: 2087.1 mL / OUT: 1035 mL / NET: 1052.1 mL      CAPILLARY BLOOD GLUCOSE      POCT Blood Glucose.: 232 mg/dL (24 Feb 2023 12:59)    ECG:    PHYSICAL EXAM:    HEENT: Normocephalic, atraumatic  Respiratory: Intubated. Grossly clear to auscultation on anterior fields. Limited auscultation to posterior fields.    Cardiovascular: +S1, S2  Abdomen: Soft, distended   Extremities: No LE edema. No gross deformities. SCDs in place  Neurological: Sedated, but arousable to sound and touch.     MEDICATIONS:  MEDICATIONS  (STANDING):  cefTRIAXone   IVPB 1000 milliGRAM(s) IV Intermittent once  chlorhexidine 0.12% Liquid 15 milliLiter(s) Oral Mucosa every 12 hours  chlorhexidine 2% Cloths 1 Application(s) Topical <User Schedule>  fentaNYL   Infusion. 0.881 MICROgram(s)/kG/Hr (4.88 mL/Hr) IV Continuous <Continuous>  norepinephrine Infusion 0.235 MICROgram(s)/kG/Min (24.4 mL/Hr) IV Continuous <Continuous>  octreotide  Infusion 50 MICROgram(s)/Hr (10 mL/Hr) IV Continuous <Continuous>  pantoprazole Infusion 8 mG/Hr (10 mL/Hr) IV Continuous <Continuous>  propofol Infusion 11.733 MICROgram(s)/kG/Min (3.9 mL/Hr) IV Continuous <Continuous>    MEDICATIONS  (PRN):      ALLERGIES:  Allergies    No Known Allergies    Intolerances        LABS:                        11.8   12.87 )-----------( 157      ( 25 Feb 2023 03:00 )             33.9     02-25    137  |  106  |  24<H>  ----------------------------<  188<H>  3.9   |  22  |  1.16    Ca    8.3<L>      25 Feb 2023 03:00  Phos  3.3     02-25  Mg     1.80     02-25    TPro  5.2<L>  /  Alb  2.6<L>  /  TBili  1.2  /  DBili  x   /  AST  112<H>  /  ALT  53<H>  /  AlkPhos  118  02-25    PT/INR - ( 25 Feb 2023 03:00 )   PT: 18.1 sec;   INR: 1.55 ratio         PTT - ( 25 Feb 2023 03:00 )  PTT:30.8 sec      RADIOLOGY & ADDITIONAL TESTS: Reviewed.   INTERVAL HPI/OVERNIGHT EVENTS:    SUBJECTIVE: Patient seen and examined at bedside.   Overnight pt with increasing pressors requirements. Bedside POCUS by overnight team with B-lines, b/l effusions. Given Lasix 40mg x1. Cardiac POCUS with concern for possible decreased LV systolic function. Formal TTE ordered for AM.     Pt's son given an update at bedside by team during morning rounds.       VITAL SIGNS:  ICU Vital Signs Last 24 Hrs  T(C): 36.7 (25 Feb 2023 04:00), Max: 36.8 (25 Feb 2023 00:00)  T(F): 98.1 (25 Feb 2023 04:00), Max: 98.3 (25 Feb 2023 00:00)  HR: 56 (25 Feb 2023 06:39) (43 - 250)  BP: 92/51 (24 Feb 2023 17:00) (55/39 - 193/94)  BP(mean): 65 (24 Feb 2023 17:00) (65 - 123)  ABP: 100/42 (25 Feb 2023 06:39) (81/32 - 141/50)  ABP(mean): 63 (25 Feb 2023 06:39) (50 - 81)  RR: 18 (25 Feb 2023 06:39) (12 - 26)  SpO2: 99% (25 Feb 2023 06:39) (99% - 100%)    O2 Parameters below as of 25 Feb 2023 06:39  Patient On (Oxygen Delivery Method): ventilator    O2 Concentration (%): 40      Mode: AC/ CMV (Assist Control/ Continuous Mandatory Ventilation), RR (machine): 18, TV (machine): 310, FiO2: 40, PEEP: 5, ITime: 0.71, MAP: 8, PIP: 19  Plateau pressure:   P/F ratio:     02-24 @ 07:01  -  02-25 @ 07:00  --------------------------------------------------------  IN: 2087.1 mL / OUT: 1035 mL / NET: 1052.1 mL      CAPILLARY BLOOD GLUCOSE      POCT Blood Glucose.: 232 mg/dL (24 Feb 2023 12:59)    ECG:    PHYSICAL EXAM:    HEENT: Normocephalic, atraumatic  Respiratory: Intubated. Grossly clear to auscultation on anterior fields. Limited auscultation to posterior fields.    Cardiovascular: +S1, S2  Abdomen: Soft, distended   Extremities: No LE edema. No gross deformities. SCDs in place  Neurological: Sedated, but arousable to sound and touch.     MEDICATIONS:  MEDICATIONS  (STANDING):  cefTRIAXone   IVPB 1000 milliGRAM(s) IV Intermittent once  chlorhexidine 0.12% Liquid 15 milliLiter(s) Oral Mucosa every 12 hours  chlorhexidine 2% Cloths 1 Application(s) Topical <User Schedule>  fentaNYL   Infusion. 0.881 MICROgram(s)/kG/Hr (4.88 mL/Hr) IV Continuous <Continuous>  norepinephrine Infusion 0.235 MICROgram(s)/kG/Min (24.4 mL/Hr) IV Continuous <Continuous>  octreotide  Infusion 50 MICROgram(s)/Hr (10 mL/Hr) IV Continuous <Continuous>  pantoprazole Infusion 8 mG/Hr (10 mL/Hr) IV Continuous <Continuous>  propofol Infusion 11.733 MICROgram(s)/kG/Min (3.9 mL/Hr) IV Continuous <Continuous>    MEDICATIONS  (PRN):      ALLERGIES:  Allergies    No Known Allergies    Intolerances        LABS:                        11.8   12.87 )-----------( 157      ( 25 Feb 2023 03:00 )             33.9     02-25    137  |  106  |  24<H>  ----------------------------<  188<H>  3.9   |  22  |  1.16    Ca    8.3<L>      25 Feb 2023 03:00  Phos  3.3     02-25  Mg     1.80     02-25    TPro  5.2<L>  /  Alb  2.6<L>  /  TBili  1.2  /  DBili  x   /  AST  112<H>  /  ALT  53<H>  /  AlkPhos  118  02-25    PT/INR - ( 25 Feb 2023 03:00 )   PT: 18.1 sec;   INR: 1.55 ratio         PTT - ( 25 Feb 2023 03:00 )  PTT:30.8 sec      RADIOLOGY & ADDITIONAL TESTS: Reviewed.

## 2023-02-26 NOTE — PROGRESS NOTE ADULT - ATTENDING COMMENTS
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 Endoscopy performed yesterday revealed large esophageal varices as the likely culprit for the bleed, s/p banding by GI yesterday. Remains in MICU for vasopressor support and mechanical ventilation.    1) Acute blood loss anemia 2/2 variceal bleed - S/p banding by GI 2/24. No further transfusions required in 48 hours. Continue PPI bid and octreotide gtt. Continue ceftriaxone for SBP ppx. Monitor CBC and transfuse prn for Hb < 7. Keep NPO.  2) Shock state - Bedside POCUS consistent with vasoplegic shock, potentially 2/2 sepsis. F/u blood cultures. Paracentesis negative for SBP, UA concerning for infection. Continue ceftriaxone.  3) TANG cirrhosis - Holding diuretics in the setting of shock state. No reported history of hepatic encephalopathy.  4) Likely hemodynamically mediated resulting in ATN. Start albumin q8h today. F/u urine lytes. Monitor electrolytes and I/Os closely.  4) CAD - Holding antiplatelets in the setting of acute blood loss.  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 Endoscopy performed yesterday revealed large esophageal varices as the likely culprit for the bleed, s/p banding by GI yesterday. Remains in MICU for vasopressor support and mechanical ventilation.    1) Acute blood loss anemia 2/2 variceal bleed - S/p banding by GI 2/24. No further transfusions required in 48 hours. Continue PPI bid and octreotide gtt. Continue ceftriaxone for SBP ppx. Monitor CBC and transfuse prn for Hb < 7. Keep NPO.  2) Shock state - Bedside POCUS consistent with vasoplegic shock, potentially 2/2 sepsis. F/u blood cultures. Paracentesis negative for SBP, UA concerning for infection. Continue ceftriaxone.  3) TANG cirrhosis - Holding diuretics in the setting of shock state. No reported history of hepatic encephalopathy.  4) Likely hemodynamically mediated resulting in ATN. Start albumin q8h today. F/u urine lytes. Monitor electrolytes and I/Os closely.  5) CAD - Holding antiplatelets in the setting of acute blood loss.  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 Endoscopy performed yesterday revealed large esophageal varices as the likely culprit for the bleed, s/p banding by GI yesterday. Remains in MICU for vasopressor support and mechanical ventilation.    1) Acute blood loss anemia 2/2 variceal bleed - S/p banding by GI 2/24. No further transfusions required in 48 hours. Continue PPI bid and octreotide gtt. Continue ceftriaxone for SBP ppx. Monitor CBC and transfuse prn for Hb < 7. Keep NPO.  2) Shock state - Bedside POCUS consistent with vasoplegic shock, potentially 2/2 sepsis. F/u blood cultures. Paracentesis negative for SBP, UA concerning for infection. Continue ceftriaxone.  3) TANG cirrhosis - Holding diuretics in the setting of shock state. No reported history of hepatic encephalopathy.  4) NANCI - Likely hemodynamically mediated resulting in ATN. Start albumin q8h today. F/u urine lytes. Monitor electrolytes and I/Os closely.  5) CAD - Holding antiplatelets in the setting of acute blood loss.  6) DVT ppx - SCD    Full Code

## 2023-02-26 NOTE — PROGRESS NOTE ADULT - ASSESSMENT
66yo M w/ pmhx CAD s/p CABG, TANG cirrhosis, follicular lymphoma (on Rituximab infusions outpt), HTN, DM, hypothyroidism, currently on tenofovir (HBV Core +), presented to the ED w/ chest pain and constipation while in the ED patient w/ multiple episodes of hematemesis and hypotension; MICU consulted for hypotension, massive upper GI bleed s/p intubation, s/p 2/24 bedside scope with banding x6 of esophageal varices.     PLAN:     [ NEURO ]  - intubated and sedated, but arousable  - continue Prop and fentanyl     [ CARDIO ]  # Hypovolemic Shock 2/2 GIB vs vasoplegia iso cirrhosis vs sepsis?   - s/p 6U PRBCs  - on Levophed, with rising pressor requirements   - infectious workup and abx as below  - consider diagnostic paracentesis to r/o SBP given hx of ascites, if continued increasing pressor requirements  - bedside POCUS today AM with grossly normal LV systolic fx   - f/u formal TTE given shock state    [ RESPIRATORY ]  # Airway Protection  - patient intubated for airway protection 2/2 hematemesis  - A/C 310/18/5/40%      [ GASTRO/NUTRITION ]  #UGIB  - pt presented for chest pain and constipation; during ED course patient with multiple episodes of massive hematemesis  - given Octreotide, PPI, CTX  - GI consulted; s/p 2/24 scope, erythromycin given prior to scope -> multiple large esophageal varices, six bands successfully placed with incomplete eradication of varices. Per GI bleeding likely due to EV, however unable to completely visualize stomach given presence of clotted blood.  - continue Octreotide gtt and PPI gtt  - f/u GI recs     [ ENDO]  #DM   -NPO for now  -FS q6 hrs  -ISS    #Hypothyroidism  -home synthroid IV while NPO    [ RENAL/ ]  -no active issues  -Cr stable at 1.16, continue to monitor   -monitor electrolytes    [ INFECTIOUS ]  -Pt given 1 dose ceftriaxone given in ED for SBP prophylaxis  -Continue with ceftriaxone 1g daily for SBP ppx  -Monitor WBC, for fever  -Order blood cx, UA  -Consider diagnostic para to r/o SBP given increasing pressor requirements    [ HEME ]  #GI Bleed   -Hgb 6.7 on admission  -s/p 6 units pRBC, 1 FFP, 1 plts  -Hgb stable today 11.8  -monitor CBC q8 for now, if stable consider CBC q12  -continue with plan as above    -hold chemical DVT ppx given GIB, maintain SCDs    [ ETHICS ]  -Patient is Full Code  66yo M w/ pmhx CAD s/p CABG, TANG cirrhosis, follicular lymphoma (on Rituximab infusions outpt), HTN, DM, hypothyroidism, currently on tenofovir (HBV Core +), presented to the ED w/ chest pain and constipation while in the ED patient w/ multiple episodes of hematemesis and hypotension; MICU consulted for hypotension, massive upper GI bleed s/p intubation, s/p 2/24 bedside scope with banding x6 of esophageal varices.     PLAN:     [ NEURO ]  - intubated and sedated, but arousable  - on Prop wean as tolerated. Fent d/c 2/26     [ CARDIO ]  # Hypovolemic Shock 2/2 GIB vs vasoplegia iso cirrhosis vs sepsis  - s/p 6U PRBCs   - on Levophed at .62  - diagnostic paracentesis to r/o SBP given hx of ascites 2/25 - transudative with no evidence of SBP   - bedside POCUS with grossly normal LV systolic fx   - TTE completed 2/25 - dilated LA     [ RESPIRATORY ]  # Airway Protection  - patient intubated for airway protection 2/2 hematemesis  - A/C 310/18/5/40%      [ GASTRO/NUTRITION ]  #UGIB  - pt presented for chest pain and constipation; during ED course patient with multiple episodes of massive hematemesis  - given Octreotide, PPI, CTX  - GI consulted; s/p 2/24 scope, erythromycin given prior to scope -> multiple large esophageal varices, six bands successfully placed with incomplete eradication of varices. Per GI bleeding likely due to EV, however unable to completely visualize stomach given presence of clotted blood.  - continue Octreotide gtt (72 hours post procedure) and PPI 40mg IVP BID   - 2/26: GI no plan to rescope at this time, recommending albumin 25% 100cc q8h x3 doses    [ ENDO]  #DM   -NPO for now  -FS q6 hrs  -ISS    #Hypothyroidism  -home synthroid IV while NPO    [ RENAL/ ]  -no active issues  -Cr rising from 1.16 to 1.58, continue to monitor   -monitor electrolytes    [ INFECTIOUS ]  -Pt given 1 dose ceftriaxone given in ED for SBP prophylaxis  -Continue with ceftriaxone 1g daily for SBP ppx  -Monitor WBC, for fever  -f/u blood cx, UA    [ HEME ]  #GI Bleed   -Hgb 6.7 on admission  -s/p 6 units pRBC, 1 FFP, 1 plts  -Hgb stable today 12  -monitor CBC q8 for now, if stable consider CBC q12  -continue with plan as above    -hold chemical DVT ppx given GIB, maintain SCDs    [ ETHICS ]  -Patient is Full Code

## 2023-02-26 NOTE — PROGRESS NOTE ADULT - SUBJECTIVE AND OBJECTIVE BOX
INTERVAL HPI/OVERNIGHT EVENTS:    SUBJECTIVE: Patient seen and examined at bedside.       VITAL SIGNS:  ICU Vital Signs Last 24 Hrs  T(C): 37.9 (2023 04:00), Max: 38.3 (2023 08:00)  T(F): 100.3 (2023 04:00), Max: 101 (2023 08:00)  HR: 56 (2023 07:07) (54 - 62)  BP: --  BP(mean): --  ABP: 99/39 (2023 07:07) (81/36 - 115/42)  ABP(mean): 60 (2023 07:07) (52 - 71)  RR: 18 (2023 07:07) (17 - 21)  SpO2: 96% (2023 07:07) (93% - 99%)    O2 Parameters below as of 2023 07:07  Patient On (Oxygen Delivery Method): ventilator    O2 Concentration (%): 30      Mode: AC/ CMV (Assist Control/ Continuous Mandatory Ventilation), RR (machine): 18, TV (machine): 310, FiO2: 30, PEEP: 5, ITime: 0.62, MAP: 10, PIP: 29  Plateau pressure:   P/F ratio:     -25 @ 07:01  -  - @ 07:00  --------------------------------------------------------  IN: 1803 mL / OUT: 970 mL / NET: 833 mL      CAPILLARY BLOOD GLUCOSE      POCT Blood Glucose.: 127 mg/dL (2023 05:33)    ECG:    PHYSICAL EXAM:    General:   HEENT:   Neck:   Respiratory:   Cardiovascular:   Abdomen:   Extremities:  Neurological:    MEDICATIONS:  MEDICATIONS  (STANDING):  cefTRIAXone   IVPB 2000 milliGRAM(s) IV Intermittent every 24 hours  chlorhexidine 0.12% Liquid 15 milliLiter(s) Oral Mucosa every 12 hours  chlorhexidine 2% Cloths 1 Application(s) Topical <User Schedule>  coronavirus bivalent (EUA) Booster Vaccine (PFIZER) 0.3 milliLiter(s) IntraMuscular once  dextrose 5%. 1000 milliLiter(s) (100 mL/Hr) IV Continuous <Continuous>  dextrose 5%. 1000 milliLiter(s) (50 mL/Hr) IV Continuous <Continuous>  dextrose 50% Injectable 25 Gram(s) IV Push once  dextrose 50% Injectable 12.5 Gram(s) IV Push once  dextrose 50% Injectable 25 Gram(s) IV Push once  fentaNYL   Infusion. 0.881 MICROgram(s)/kG/Hr (4.88 mL/Hr) IV Continuous <Continuous>  glucagon  Injectable 1 milliGRAM(s) IntraMuscular once  insulin lispro (ADMELOG) corrective regimen sliding scale   SubCutaneous every 6 hours  levothyroxine Injectable 75 MICROGram(s) IV Push at bedtime  norepinephrine Infusion 0.05 MICROgram(s)/kG/Min (2.6 mL/Hr) IV Continuous <Continuous>  octreotide  Infusion 50 MICROgram(s)/Hr (10 mL/Hr) IV Continuous <Continuous>  pantoprazole  Injectable 40 milliGRAM(s) IV Push two times a day  propofol Infusion 11.733 MICROgram(s)/kG/Min (3.9 mL/Hr) IV Continuous <Continuous>    MEDICATIONS  (PRN):  dextrose Oral Gel 15 Gram(s) Oral once PRN Blood Glucose LESS THAN 70 milliGRAM(s)/deciliter      ALLERGIES:  Allergies    No Known Allergies    Intolerances        LABS:                        11.9   16.86 )-----------( 139      ( 2023 01:30 )             34.8         139  |  110<H>  |  38<H>  ----------------------------<  161<H>  3.9   |  20<L>  |  1.58<H>    Ca    8.0<L>      2023 01:30  Phos  3.9       Mg     2.40         TPro  5.3<L>  /  Alb  2.5<L>  /  TBili  1.0  /  DBili  x   /  AST  145<H>  /  ALT  86<H>  /  AlkPhos  129<H>      PT/INR - ( 2023 01:30 )   PT: 17.9 sec;   INR: 1.54 ratio         PTT - ( 2023 01:30 )  PTT:35.6 sec  Urinalysis Basic - ( 2023 11:10 )    Color: Light Yellow / Appearance: Clear / S.011 / pH: x  Gluc: x / Ketone: Negative  / Bili: Negative / Urobili: <2 mg/dL   Blood: x / Protein: Negative / Nitrite: Negative   Leuk Esterase: Moderate / RBC: 6-10 /HPF / WBC 10-15 /HPF   Sq Epi: x / Non Sq Epi: x / Bacteria: Occasional        RADIOLOGY & ADDITIONAL TESTS: Reviewed.   INTERVAL HPI/OVERNIGHT EVENTS:    -No acute events overnight  -Patient s/p paracentesis y/d - increase in ceftriaxone to 2g  -No additional episodes of bleeding, Hgb stable     SUBJECTIVE: Patient seen and examined at bedside.       VITAL SIGNS:  ICU Vital Signs Last 24 Hrs  T(C): 37.9 (2023 04:00), Max: 38.3 (2023 08:00)  T(F): 100.3 (2023 04:00), Max: 101 (2023 08:00)  HR: 56 (2023 07:07) (54 - 62)  BP: --  BP(mean): --  ABP: 99/39 (2023 07:07) (81/36 - 115/42)  ABP(mean): 60 (2023 07:07) (52 - 71)  RR: 18 (2023 07:07) (17 - 21)  SpO2: 96% (2023 07:07) (93% - 99%)    O2 Parameters below as of 2023 07:07  Patient On (Oxygen Delivery Method): ventilator    O2 Concentration (%): 30      Mode: AC/ CMV (Assist Control/ Continuous Mandatory Ventilation), RR (machine): 18, TV (machine): 310, FiO2: 30, PEEP: 5, ITime: 0.62, MAP: 10, PIP: 29  Plateau pressure:   P/F ratio:     - @ 07:01  -  02-26 @ 07:00  --------------------------------------------------------  IN: 1803 mL / OUT: 970 mL / NET: 833 mL      CAPILLARY BLOOD GLUCOSE      POCT Blood Glucose.: 127 mg/dL (2023 05:33)    ECG:    PHYSICAL EXAM:    HEENT: Normocephalic, atraumatic  Respiratory: Intubated. Grossly clear to auscultation on anterior fields. Limited auscultation to posterior fields.    Cardiovascular: +S1, S2  Abdomen: Soft, distended   Extremities: No LE edema. No gross deformities. SCDs in place  Neurological: Sedated, but arousable to sound and touch.       MEDICATIONS:  MEDICATIONS  (STANDING):  cefTRIAXone   IVPB 2000 milliGRAM(s) IV Intermittent every 24 hours  chlorhexidine 0.12% Liquid 15 milliLiter(s) Oral Mucosa every 12 hours  chlorhexidine 2% Cloths 1 Application(s) Topical <User Schedule>  coronavirus bivalent (EUA) Booster Vaccine (PFIZER) 0.3 milliLiter(s) IntraMuscular once  dextrose 5%. 1000 milliLiter(s) (100 mL/Hr) IV Continuous <Continuous>  dextrose 5%. 1000 milliLiter(s) (50 mL/Hr) IV Continuous <Continuous>  dextrose 50% Injectable 25 Gram(s) IV Push once  dextrose 50% Injectable 12.5 Gram(s) IV Push once  dextrose 50% Injectable 25 Gram(s) IV Push once  fentaNYL   Infusion. 0.881 MICROgram(s)/kG/Hr (4.88 mL/Hr) IV Continuous <Continuous>  glucagon  Injectable 1 milliGRAM(s) IntraMuscular once  insulin lispro (ADMELOG) corrective regimen sliding scale   SubCutaneous every 6 hours  levothyroxine Injectable 75 MICROGram(s) IV Push at bedtime  norepinephrine Infusion 0.05 MICROgram(s)/kG/Min (2.6 mL/Hr) IV Continuous <Continuous>  octreotide  Infusion 50 MICROgram(s)/Hr (10 mL/Hr) IV Continuous <Continuous>  pantoprazole  Injectable 40 milliGRAM(s) IV Push two times a day  propofol Infusion 11.733 MICROgram(s)/kG/Min (3.9 mL/Hr) IV Continuous <Continuous>    MEDICATIONS  (PRN):  dextrose Oral Gel 15 Gram(s) Oral once PRN Blood Glucose LESS THAN 70 milliGRAM(s)/deciliter      ALLERGIES:  Allergies    No Known Allergies    Intolerances        LABS:                        11.9   16.86 )-----------( 139      ( 2023 01:30 )             34.8     02-    139  |  110<H>  |  38<H>  ----------------------------<  161<H>  3.9   |  20<L>  |  1.58<H>    Ca    8.0<L>      2023 01:30  Phos  3.9     -  Mg     2.40         TPro  5.3<L>  /  Alb  2.5<L>  /  TBili  1.0  /  DBili  x   /  AST  145<H>  /  ALT  86<H>  /  AlkPhos  129<H>      PT/INR - ( 2023 01:30 )   PT: 17.9 sec;   INR: 1.54 ratio         PTT - ( 2023 01:30 )  PTT:35.6 sec  Urinalysis Basic - ( 2023 11:10 )    Color: Light Yellow / Appearance: Clear / S.011 / pH: x  Gluc: x / Ketone: Negative  / Bili: Negative / Urobili: <2 mg/dL   Blood: x / Protein: Negative / Nitrite: Negative   Leuk Esterase: Moderate / RBC: 6-10 /HPF / WBC 10-15 /HPF   Sq Epi: x / Non Sq Epi: x / Bacteria: Occasional        RADIOLOGY & ADDITIONAL TESTS: Reviewed.   INTERVAL HPI/OVERNIGHT EVENTS:    -No acute events overnight  -Patient s/p paracentesis y/d - increase in ceftriaxone to 2g  -No additional episodes of bleeding, Hgb stable     SUBJECTIVE: Patient seen and examined at bedside. Plan to wean sedation, if patient doesn't wake up, will order ammonia level. Plan for repeat EKG to evaluate for QT to give Reglan to push out any clotted blood evaluated on POCUS in stomach.       VITAL SIGNS:  ICU Vital Signs Last 24 Hrs  T(C): 37.9 (2023 04:00), Max: 38.3 (2023 08:00)  T(F): 100.3 (2023 04:00), Max: 101 (2023 08:00)  HR: 56 (2023 07:07) (54 - 62)  BP: --  BP(mean): --  ABP: 99/39 (2023 07:07) (81/36 - 115/42)  ABP(mean): 60 (2023 07:07) (52 - 71)  RR: 18 (2023 07:07) (17 - 21)  SpO2: 96% (2023 07:07) (93% - 99%)    O2 Parameters below as of 2023 07:07  Patient On (Oxygen Delivery Method): ventilator    O2 Concentration (%): 30      Mode: AC/ CMV (Assist Control/ Continuous Mandatory Ventilation), RR (machine): 18, TV (machine): 310, FiO2: 30, PEEP: 5, ITime: 0.62, MAP: 10, PIP: 29  Plateau pressure:   P/F ratio:     -25 @ 07:01  -  02-26 @ 07:00  --------------------------------------------------------  IN: 1803 mL / OUT: 970 mL / NET: 833 mL      CAPILLARY BLOOD GLUCOSE      POCT Blood Glucose.: 127 mg/dL (2023 05:33)    ECG: Prolonged QT     PHYSICAL EXAM:    HEENT: Normocephalic, atraumatic  Respiratory: Intubated. Grossly clear to auscultation on anterior fields. Limited auscultation to posterior fields.    Cardiovascular: +S1, S2  Abdomen: Soft, distended   Extremities: No LE edema. No gross deformities. SCDs in place  Neurological: Sedated, but arousable to sound and touch.       MEDICATIONS:  MEDICATIONS  (STANDING):  cefTRIAXone   IVPB 2000 milliGRAM(s) IV Intermittent every 24 hours  chlorhexidine 0.12% Liquid 15 milliLiter(s) Oral Mucosa every 12 hours  chlorhexidine 2% Cloths 1 Application(s) Topical <User Schedule>  coronavirus bivalent (EUA) Booster Vaccine (PFIZER) 0.3 milliLiter(s) IntraMuscular once  dextrose 5%. 1000 milliLiter(s) (100 mL/Hr) IV Continuous <Continuous>  dextrose 5%. 1000 milliLiter(s) (50 mL/Hr) IV Continuous <Continuous>  dextrose 50% Injectable 25 Gram(s) IV Push once  dextrose 50% Injectable 12.5 Gram(s) IV Push once  dextrose 50% Injectable 25 Gram(s) IV Push once  fentaNYL   Infusion. 0.881 MICROgram(s)/kG/Hr (4.88 mL/Hr) IV Continuous <Continuous>  glucagon  Injectable 1 milliGRAM(s) IntraMuscular once  insulin lispro (ADMELOG) corrective regimen sliding scale   SubCutaneous every 6 hours  levothyroxine Injectable 75 MICROGram(s) IV Push at bedtime  norepinephrine Infusion 0.05 MICROgram(s)/kG/Min (2.6 mL/Hr) IV Continuous <Continuous>  octreotide  Infusion 50 MICROgram(s)/Hr (10 mL/Hr) IV Continuous <Continuous>  pantoprazole  Injectable 40 milliGRAM(s) IV Push two times a day  propofol Infusion 11.733 MICROgram(s)/kG/Min (3.9 mL/Hr) IV Continuous <Continuous>    MEDICATIONS  (PRN):  dextrose Oral Gel 15 Gram(s) Oral once PRN Blood Glucose LESS THAN 70 milliGRAM(s)/deciliter      ALLERGIES:  Allergies    No Known Allergies    Intolerances        LABS:                        11.9   16.86 )-----------( 139      ( 2023 01:30 )             34.8     02-    139  |  110<H>  |  38<H>  ----------------------------<  161<H>  3.9   |  20<L>  |  1.58<H>    Ca    8.0<L>      2023 01:30  Phos  3.9       Mg     2.40         TPro  5.3<L>  /  Alb  2.5<L>  /  TBili  1.0  /  DBili  x   /  AST  145<H>  /  ALT  86<H>  /  AlkPhos  129<H>      PT/INR - ( 2023 01:30 )   PT: 17.9 sec;   INR: 1.54 ratio         PTT - ( 2023 01:30 )  PTT:35.6 sec  Urinalysis Basic - ( 2023 11:10 )    Color: Light Yellow / Appearance: Clear / S.011 / pH: x  Gluc: x / Ketone: Negative  / Bili: Negative / Urobili: <2 mg/dL   Blood: x / Protein: Negative / Nitrite: Negative   Leuk Esterase: Moderate / RBC: 6-10 /HPF / WBC 10-15 /HPF   Sq Epi: x / Non Sq Epi: x / Bacteria: Occasional        RADIOLOGY & ADDITIONAL TESTS: Reviewed.

## 2023-02-26 NOTE — PROGRESS NOTE ADULT - SUBJECTIVE AND OBJECTIVE BOX
Interval Events:   Patient remains critically ill in the ICU.  No overt GI bleeding reported overnight.  Family at bedside.    ROS:   Unable to fully obtain ROS.    Hospital Medications:  cefTRIAXone   IVPB 2000 milliGRAM(s) IV Intermittent every 24 hours  chlorhexidine 0.12% Liquid 15 milliLiter(s) Oral Mucosa every 12 hours  chlorhexidine 2% Cloths 1 Application(s) Topical <User Schedule>  coronavirus bivalent (EUA) Booster Vaccine (PFIZER) 0.3 milliLiter(s) IntraMuscular once  dextrose 5%. 1000 milliLiter(s) IV Continuous <Continuous>  dextrose 5%. 1000 milliLiter(s) IV Continuous <Continuous>  dextrose 50% Injectable 25 Gram(s) IV Push once  dextrose 50% Injectable 12.5 Gram(s) IV Push once  dextrose 50% Injectable 25 Gram(s) IV Push once  dextrose Oral Gel 15 Gram(s) Oral once PRN  fentaNYL   Infusion. 0.881 MICROgram(s)/kG/Hr IV Continuous <Continuous>  glucagon  Injectable 1 milliGRAM(s) IntraMuscular once  insulin lispro (ADMELOG) corrective regimen sliding scale   SubCutaneous every 6 hours  levothyroxine Injectable 75 MICROGram(s) IV Push at bedtime  norepinephrine Infusion 0.05 MICROgram(s)/kG/Min IV Continuous <Continuous>  octreotide  Infusion 50 MICROgram(s)/Hr IV Continuous <Continuous>  pantoprazole  Injectable 40 milliGRAM(s) IV Push two times a day  propofol Infusion 11.733 MICROgram(s)/kG/Min IV Continuous <Continuous>      PHYSICAL EXAM:   Vital Signs:  Vital Signs Last 24 Hrs  T(C): 37.9 (2023 04:00), Max: 38.1 (2023 12:00)  T(F): 100.3 (2023 04:00), Max: 100.5 (2023 12:00)  HR: 57 (2023 08:31) (54 - 62)  BP: --  BP(mean): --  RR: 18 (2023 07:07) (17 - 21)  SpO2: 96% (2023 08:31) (93% - 98%)    Parameters below as of 2023 07:07  Patient On (Oxygen Delivery Method): ventilator    O2 Concentration (%): 30  Daily     Daily Weight in k.3 (2023 04:00)    GENERAL: critically ill  NEURO: not fully alert/oriented  HEENT: NCAT, no conjunctival pallor appreciated  CHEST: intubated, mechanical breath sounds  CARDIAC: regular rate, +S1/S2  ABDOMEN: soft, nontender, no rebound or guarding  EXTREMITIES: warm, well perfused  SKIN: no lesions noted    LABS: reviewed                        11.9   16.86 )-----------( 139      ( 2023 01:30 )             34.8         139  |  110<H>  |  38<H>  ----------------------------<  161<H>  3.9   |  20<L>  |  1.58<H>    Ca    8.0<L>      2023 01:30  Phos  3.9       Mg     2.40         TPro  5.3<L>  /  Alb  2.5<L>  /  TBili  1.0  /  DBili  x   /  AST  145<H>  /  ALT  86<H>  /  AlkPhos  129<H>      LIVER FUNCTIONS - ( 2023 01:30 )  Alb: 2.5 g/dL / Pro: 5.3 g/dL / ALK PHOS: 129 U/L / ALT: 86 U/L / AST: 145 U/L / GGT: x             Interval Diagnostic Studies: see sunrise for full report

## 2023-02-26 NOTE — PROGRESS NOTE ADULT - ASSESSMENT
67M follows at [facility] Hx decompensated TANG cirrhosis (+ascites, +EV -- previously MELD Na 8), CAD s/p CABG s/p PCI (last in 2012 on ASA, now off plavix x2 weeks), newly diagnosed follicular lymphoma (on rituximab), HTN, DM, currently on tenofovir (HBV Core +) presented with right sided chest/abdominal pain now with lizzette hematemesis + clots.     #Hemorrhagic shock  #Hematemesis: s/p EGD 2/24/2023 s/p EVL x6, bleeding likely due to EV, however unable to completely visualize stomach given presence of clotted blood  #NANCI  #Decompensated TANG cirrhosis: follows at [facility], previously low meld Na 8  --MELD Na: 20 (2/24/23)  --Ascites: (+) Hx, on lasix 40, spironolactone 50  --SBP; no Hx  --EV: (+) Hx, no EVB --> is on nadolol at home  --PVT: no Hx  --HCC: no Hx    Recommendations:  -trend clinical symptoms, exam findings, vital signs, CBC, CMP, INR, and monitor for clinical signs of bleeding  -maintain active type and screen, however would recommend extremely conservative transfusion strategy as to not exacerbate elevated portal pressures  -INR is a marker of hepatic synthetic function and does not correlate accurately with bleeding risk.  If concerned for cirrhotic coagulopathy contributing to hemorrhage, recommend guidance of transfusion of plasma products via thromboelastography [TEG] and not via INR  -transfusion goal to maintain hemoglobin >/= 7.0 and platelets >/= 50  -avoid NSAIDs  -PPI IV BID  -octreotide for 72 hours post-procedure  -ceftriaxone for empiric SBP prophylaxis for 7 day course  -if significant on-going GI bleeding with acute blood loss anemia / hemodynamic instability, may need IR consultation for further management of variceal bleeding, however no evidence of that at this time  -for NANCI, paracentesis negative for SBP, however would augment with albumin 25% 100cc q8h x3 doses  -check urinalysis and urine sodium  -if stabilized and ultimately discharged home, will need repeat EGD in ~4 weeks for variceal surveillance    Note incomplete until finalized by attending signature/attestation.    Jose Flowers  GI/Hepatology Fellow    MONDAY-FRIDAY 8AM-5PM:  Pager# [phone] ([facility]) or [phone] ([facility])    NON-URGENT CONSULTS:  Please email [email or web address]  AT NIGHT AND ON WEEKENDS:  Contact on-call GI fellow via answering service ([phone]) from 5pm-8am and on weekends/holidays

## 2023-02-26 NOTE — PROGRESS NOTE ADULT - ATTENDING COMMENTS
Patient status post upper endoscopy on 2/24, large esophageal varices, status post band ligation x6.   Stable hemoglobin/hematocrit.    Hemodynamically stable.    Continues to remain intubated.  Agree with above outlined recommendations.    Continue to monitor with follow-up labs.    Continue with IV octreotide for 72 hours postprocedure.   Ascitic fluid analysis negative for SBP.    Would also recommend continue with ceftriaxone empirically for SBP prophylaxis 5-7 days.  Patient will need repeat EGD  in about 4 weeks as oupatient to evaluated for further band ligation.

## 2023-02-27 NOTE — PROGRESS NOTE ADULT - ASSESSMENT
67M follows at [facility] Hx decompensated TANG cirrhosis (+ascites, +EV -- previously MELD Na 8), CAD s/p CABG s/p PCI (last in 2012 on ASA, now off plavix x2 weeks), newly diagnosed follicular lymphoma (on rituximab), HTN, DM, currently on tenofovir (HBV Core +) presented with right sided chest/abdominal pain with lizzette hematemesis + clots c/b hemorrhagic shock, s/p intubation in MICU, EGD showing large varices s/p banding, unable to completely clear stomach due to clot. Now w/ new fever and downtrending Hg    #Hemorrhagic shock  #Hematemesis: s/p EGD 2/24/2023 s/p EVL x6, bleeding likely due to EV, however unable to completely visualize stomach given presence of clotted blood  #NANCI  #Decompensated TANG cirrhosis: follows at [facility], previously low meld Na 8  --MELD Na: 22  --Ascites: (+) Hx, on lasix 40, spironolactone 50 at home, holding here  --SBP; no Hx  --EV: (+) Hx, no EVB --> is on nadolol at home  --PVT: no Hx  --HCC: no Hx    Recommendations:  - f/u infectious workup, c/w zosyn  - c/w octreotide x 72 hours, then can discontinue  - c/w pantoprazole 40 IV BID  - no need for further albumi (serum albumin 3.2, NANCI improving)  - hold diuretics  - repeat EGD in 2 weeks  - rest of care per ICU    Hepatology will continue to follow.     All recommendations are tentative until note is attested by attending.     Jose Raul Bar, PGY6  Gastroenterology/Hepatology Fellow  During weekdays: Available on Microsoft Teams or pager:[phone] ([employer] Short Range Pager); [phone] (Long Range Pager)  Overnight/Weekend: Please page the on-call GI fellow       67M follows at [facility] Hx decompensated TANG cirrhosis (+ascites, +EV -- previously MELD Na 8), CAD s/p CABG s/p PCI (last in 2012 on ASA, now off plavix x2 weeks), newly diagnosed follicular lymphoma (on rituximab), HTN, DM, currently on tenofovir (HBV Core +) presented with right sided chest/abdominal pain with lizzetet hematemesis + clots c/b hemorrhagic shock, s/p intubation in MICU, EGD showing large varices s/p banding, unable to completely clear stomach due to clot. Now w/ new fever and downtrending Hg    #Hemorrhagic shock  #Hematemesis: s/p EGD 2/24/2023 s/p EVL x6, bleeding likely due to EV, however unable to completely visualize stomach given presence of clotted blood  #NANCI  #Decompensated TANG cirrhosis: follows at [facility], previously low meld Na 8  --MELD Na: 22  --Ascites: (+) Hx, on lasix 40, spironolactone 50 at home, holding here  --SBP; no Hx  --EV: (+) Hx, no EVB --> is on nadolol at home  --PVT: no Hx  --HCC: no Hx    Recommendations:  - CT A/P with contrast triple phase to evaluate anatomy and plan for possible early TIPS if patient continues to have bleeding from varices  - will need repeat EGD to clear rest of stomach, possibly tomorrow  - keep intubated   - f/u infectious workup, c/w zosyn  - c/w octreotide x 72 hours, then can discontinue  - c/w pantoprazole 40 IV BID  - no need for further albumin (serum albumin 3.2, NANCI improving)  - hold diuretics  - repeat EGD in 4 weeks for variceal banding  - rest of care per ICU    Hepatology will continue to follow.     All recommendations are tentative until note is attested by attending.     Jose Raul Bar, PGY6  Gastroenterology/Hepatology Fellow  During weekdays: Available on Microsoft Teams or pager:[phone] ([employer] Short Range Pager); [phone] (Long Range Pager)  Overnight/Weekend: Please page the on-call GI fellow

## 2023-02-27 NOTE — PROGRESS NOTE ADULT - ATTENDING COMMENTS
67M TANG Cirrhosis, hx of lymphoma being followed by Dr. Lu Gonzalez at [facility]  Baseline Hb 8.9 presenting with GI Bleeding  EGD with banding of large EV x 6 with incomplete eradication  Reported brown BM today  Off pressors    Obtain cross sectional imaging for possible need for TIPS  ECHO noted with some pulm HTN  NGT  Feeds and golytely  Aim for weaning off respirator

## 2023-02-27 NOTE — PROGRESS NOTE ADULT - ASSESSMENT
66yo M w/ pmhx CAD s/p CABG, TANG cirrhosis, follicular lymphoma (on Rituximab infusions outpt), HTN, DM, hypothyroidism, currently on tenofovir (HBV Core +), presented to the ED w/ chest pain and constipation while in the ED patient w/ multiple episodes of hematemesis and hypotension; MICU consulted for hypotension, massive upper GI bleed s/p intubation, s/p 2/24 bedside scope with banding x6 of esophageal varices.     PLAN:     [ NEURO ]  - intubated and sedated, but arousable  - on Prop wean as tolerated. Fent d/c 2/26     [ CARDIO ]  # Hypovolemic Shock 2/2 GIB vs vasoplegia iso cirrhosis vs sepsis  - s/p 6U PRBCs   - on Levophed at .62  - diagnostic paracentesis to r/o SBP given hx of ascites 2/25 - transudative with no evidence of SBP   - bedside POCUS with grossly normal LV systolic fx   - TTE completed 2/25 - dilated LA     [ RESPIRATORY ]  # Airway Protection  - patient intubated for airway protection 2/2 hematemesis  - A/C 310/18/5/40%      [ GASTRO/NUTRITION ]  #UGIB  - pt presented for chest pain and constipation; during ED course patient with multiple episodes of massive hematemesis  - given Octreotide, PPI, CTX  - GI consulted; s/p 2/24 scope, erythromycin given prior to scope -> multiple large esophageal varices, six bands successfully placed with incomplete eradication of varices. Per GI bleeding likely due to EV, however unable to completely visualize stomach given presence of clotted blood.  - continue Octreotide gtt (72 hours post procedure) and PPI 40mg IVP BID   - 2/26: GI no plan to rescope at this time, recommending albumin 25% 100cc q8h x3 doses    [ ENDO]  #DM   -NPO for now  -FS q6 hrs  -ISS    #Hypothyroidism  -home synthroid IV while NPO    [ RENAL/ ]  -no active issues  -Cr rising from 1.16 to 1.58, continue to monitor   -monitor electrolytes    [ INFECTIOUS ]  -Pt given 1 dose ceftriaxone given in ED for SBP prophylaxis  -Continue with ceftriaxone 1g daily for SBP ppx  -Monitor WBC, for fever  -f/u blood cx, UA    [ HEME ]  #GI Bleed   -Hgb 6.7 on admission  -s/p 6 units pRBC, 1 FFP, 1 plts  -Hgb stable today 12  -monitor CBC q8 for now, if stable consider CBC q12  -continue with plan as above    -hold chemical DVT ppx given GIB, maintain SCDs    [ ETHICS ]  -Patient is Full Code  66yo M w/ pmhx CAD s/p CABG, TANG cirrhosis, follicular lymphoma (on Rituximab infusions outpt), HTN, DM, hypothyroidism, currently on tenofovir (HBV Core +), presented to the ED w/ chest pain and constipation while in the ED patient w/ multiple episodes of hematemesis and hypotension; MICU consulted for hypotension, massive upper GI bleed s/p intubation, s/p 2/24 bedside scope with banding x6 of esophageal varices.     PLAN:     [ NEURO ]  -sedated on fent, prop, versed for over-breathing vent; wean as tolerated  -intubated and sedated    [ CARDIO ]  #Hypovolemic Shock 2/2 GIB vs vasoplegia iso cirrhosis vs sepsis  -s/p 6U PRBCs   -on Levophed at .6  -diagnostic paracentesis to r/o SBP given hx of ascites 2/25 - transudative with no evidence of SBP   -bedside POCUS with grossly normal LV systolic fx   -2/25 (TTE) - dilated LA     [ RESPIRATORY ]  #Airway Protection  -patient intubated for airway protection 2/2 hematemesis    #Respiratory Alkalosis  -noted episodes of over-breathing vent  -AC: 10 - 8 - 5 - 30    [ GASTRO]  #UGIB  -pt presented for chest pain and constipation; during ED course patient with multiple episodes of massive hematemesis  -given Octreotide, PPI, CTX  -GI consulted; s/p 2/24 scope, erythromycin given prior to scope -> multiple large esophageal varices, six bands successfully placed with incomplete eradication of varices. Per GI bleeding likely due to EV, however unable to completely visualize stomach given presence of clotted blood.  -s/p Octreotide gtt (72 hours post procedure) and PPI 40mg IVP BID   > GI no plan to rescope at this time, recommending albumin 25% 100cc q8h x3 doses  > Per hepatology, rec CT abdomen/pelvis for visualization in case TIPS procedure is required    [ RENAL/ ]  -no active issues  -monitor electrolytes, Cr    [ INFECTIOUS ]  -Pt given 1 dose ceftriaxone given in ED for SBP prophylaxis  -Stopped with ceftriaxone 1g daily for SBP ppx (2/27)  -transitioned to zosyn (2/27) for broad sepsis coverage in light of fever  -f/u blood cx, UA    [ HEME ]  #GI Bleed   -Hgb 6.7 on admission  -s/p 6 units pRBC, 1 FFP, 1 plts  > monitor CBC q8 for now, if stable consider CBC q12  > hold chemical DVT ppx given GIB  > maintain SCDs    [ENDO]  #DM   -NPO for now  -FS q6 hrs  -ISS    #Hypothyroidism  -home synthroid IV while NPO    [ ETHICS ]  -Patient is Full Code

## 2023-02-27 NOTE — PROGRESS NOTE ADULT - SUBJECTIVE AND OBJECTIVE BOX
Gastroenterology/Hepatology Progress Note      Interval Events:   - overnight febrile, recultured, broadened abx from CTX to zosyn  - increased sedation due to worsening vent synchrony  - 3 bowel movements, brown, downtrending Hg (now 8.4)    Allergies:  No Known Allergies      Hospital Medications:  chlorhexidine 0.12% Liquid 15 milliLiter(s) Oral Mucosa every 12 hours  chlorhexidine 2% Cloths 1 Application(s) Topical <User Schedule>  coronavirus bivalent (EUA) Booster Vaccine (PFIZER) 0.3 milliLiter(s) IntraMuscular once  dextrose 5%. 1000 milliLiter(s) IV Continuous <Continuous>  dextrose 5%. 1000 milliLiter(s) IV Continuous <Continuous>  dextrose 50% Injectable 25 Gram(s) IV Push once  dextrose 50% Injectable 12.5 Gram(s) IV Push once  dextrose 50% Injectable 25 Gram(s) IV Push once  dextrose Oral Gel 15 Gram(s) Oral once PRN  fentaNYL   Infusion. 0.5 MICROgram(s)/kG/Hr IV Continuous <Continuous>  glucagon  Injectable 1 milliGRAM(s) IntraMuscular once  insulin lispro (ADMELOG) corrective regimen sliding scale   SubCutaneous every 6 hours  levothyroxine Injectable 75 MICROGram(s) IV Push at bedtime  metoclopramide Injectable 5 milliGRAM(s) IV Push daily  midazolam Infusion 0.02 mG/kG/Hr IV Continuous <Continuous>  norepinephrine Infusion 0.05 MICROgram(s)/kG/Min IV Continuous <Continuous>  octreotide  Infusion 50 MICROgram(s)/Hr IV Continuous <Continuous>  pantoprazole  Injectable 40 milliGRAM(s) IV Push two times a day  petrolatum Ophthalmic Ointment 1 Application(s) Both EYES two times a day PRN  piperacillin/tazobactam IVPB.- 3.375 Gram(s) IV Intermittent once  piperacillin/tazobactam IVPB.. 3.375 Gram(s) IV Intermittent every 8 hours  propofol Infusion 10 MICROgram(s)/kG/Min IV Continuous <Continuous>  propofol Infusion 11.733 MICROgram(s)/kG/Min IV Continuous <Continuous>      ROS: 14 point ROS negative unless otherwise state in subjective    PHYSICAL EXAM:   Vital Signs:  Vital Signs Last 24 Hrs  T(C): 36.8 (2023 08:00), Max: 38.4 (2023 20:00)  T(F): 98.3 (2023 08:00), Max: 101.2 (2023 20:00)  HR: 66 (2023 09:00) (56 - 77)  BP: --  BP(mean): --  RR: 16 (2023 09:00) (16 - 26)  SpO2: 97% (2023 09:00) (95% - 100%)    Parameters below as of 2023 08:00  Patient On (Oxygen Delivery Method): ventilator    O2 Concentration (%): 30  Daily     Daily Weight in k.8 (2023 05:00)    GENERAL:  intubated, sedated   HEENT:  NCAT, no scleral icterus  CHEST: intubated   HEART:  RRR  ABDOMEN:  Soft, non-tender, non-distended, normoactive bowel sounds, no masses  EXTREMITIES:  No cyanosis, clubbing, or edema  SKIN:  No rash/erythema/ecchymoses/petechiae/wounds/abscess/warm/dry  NEURO: sedated    LABS:                        8.6    9.37  )-----------( 58       ( 2023 06:00 )             25.3     Mean Cell Volume: 83.2 fL (- @ 06:00)        141  |  111<H>  |  44<H>  ----------------------------<  164<H>  3.6   |  13<L>  |  1.49<H>    Ca    8.2<L>      2023 00:15  Phos  2.7       Mg     2.40         TPro  5.5<L>  /  Alb  3.2<L>  /  TBili  2.0<H>  /  DBili  x   /  AST  242<H>  /  ALT  100<H>  /  AlkPhos  121<H>      LIVER FUNCTIONS - ( 2023 00:15 )  Alb: 3.2 g/dL / Pro: 5.5 g/dL / ALK PHOS: 121 U/L / ALT: 100 U/L / AST: 242 U/L / GGT: x           PT/INR - ( 2023 00:15 )   PT: 26.4 sec;   INR: 2.26 ratio         PTT - ( 2023 00:15 )  PTT:34.5 sec  Urinalysis Basic - ( 2023 10:50 )    Color: Yellow / Appearance: Slightly Turbid / S.023 / pH: x  Gluc: x / Ketone: Trace  / Bili: Negative / Urobili: <2 mg/dL   Blood: x / Protein: 30 mg/dL / Nitrite: Negative   Leuk Esterase: Small / RBC: 10-20 /HPF / WBC 2-5 /HPF   Sq Epi: x / Non Sq Epi: x / Bacteria: x            Imaging:

## 2023-02-27 NOTE — PROGRESS NOTE ADULT - ATTENDING COMMENTS
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 Endoscopy performed yesterday revealed large esophageal varices as the likely culprit for the bleed, s/p banding by GI yesterday. Remains in MICU for vasopressor support and mechanical ventilation.    # Encephalopathy 2/2 to elevated ammonia level  # Cirrhosis  # Upper GI bleed 2/2 esophogeal varices  # Lymphoma  # Hep B  # Shock on pressors  - Shock on pressor, s/p PRBC and EGD s/p banding, trend CBC, transfuse PRN, pending repeat scope  - Completed octreotide, c/w PPI  - Wean pressors as tolerated, check cortisol level.   - Cirrhosis 2/2 to TANG, ammonia elevated to 195, will need to d/w GI possible NGT for lactulose and rifaxamin if unable to plave NGT will do lactulose enema  - Wean sedation as able  - NANCI improving, now off of albumin, continue to monitor  - DVT ppx on hold 2/2 to GIB  - Dispo- full code per family at bedside.     D/W MICU and family at bedside.

## 2023-02-27 NOTE — PROGRESS NOTE ADULT - SUBJECTIVE AND OBJECTIVE BOX
INTERVAL HPI/OVERNIGHT EVENTS:    SUBJECTIVE: Patient seen and examined at bedside.     OBJECTIVE:    VITAL SIGNS:  ICU Vital Signs Last 24 Hrs  T(C): 36.8 (2023 08:00), Max: 38.4 (2023 20:00)  T(F): 98.3 (2023 08:00), Max: 101.2 (2023 20:00)  HR: 66 (2023 10:00) (56 - 77)  BP: --  BP(mean): --  ABP: 109/41 (2023 10:00) (96/37 - 164/61)  ABP(mean): 67 (2023 10:00) (58 - 97)  RR: 14 (2023 10:00) (14 - 26)  SpO2: 97% (2023 10:00) (95% - 100%)    O2 Parameters below as of 2023 08:00  Patient On (Oxygen Delivery Method): ventilator    O2 Concentration (%): 30      Mode: AC/ CMV (Assist Control/ Continuous Mandatory Ventilation), RR (machine): 12, TV (machine): 250, FiO2: 30, PEEP: 5, MAP: 7, PIP: 10     @ 07: @ 07:00  --------------------------------------------------------  IN: 1649.1 mL / OUT: 1300 mL / NET: 349.1 mL     @ 07: @ 10:19  --------------------------------------------------------  IN: 190.4 mL / OUT: 205 mL / NET: -14.6 mL      CAPILLARY BLOOD GLUCOSE      POCT Blood Glucose.: 167 mg/dL (2023 06:14)      PHYSICAL EXAM:  GENERAL: NAD, well-groomed, well-developed  HEAD:  Atraumatic, Normocephalic  EYES: EOMI, PERRLA, conjunctiva and sclera clear  ENMT: No tonsillar erythema, exudates, or enlargement; Moist mucous membranes, Good dentition, No lesions  NECK: Supple, No JVD, Normal thyroid  CHEST/LUNG: Clear to auscultation bilaterally; No rales, rhonchi, wheezing, or rubs  HEART: Regular rate and rhythm; No murmurs, rubs, or gallops  ABDOMEN: Soft, Nontender, Nondistended; Bowel sounds present  VASCULAR:  2+ Peripheral Pulses, No clubbing, cyanosis, or edema  LYMPH: No lymphadenopathy noted  SKIN: No rashes or lesions  NERVOUS SYSTEM:  Alert & Oriented X3, Good concentration; Motor Strength 5/5 B/L upper and lower extremities; DTRs 2+ intact and symmetric    MEDICATIONS:  MEDICATIONS  (STANDING):  chlorhexidine 0.12% Liquid 15 milliLiter(s) Oral Mucosa every 12 hours  chlorhexidine 2% Cloths 1 Application(s) Topical <User Schedule>  coronavirus bivalent (EUA) Booster Vaccine (PFIZER) 0.3 milliLiter(s) IntraMuscular once  dextrose 5%. 1000 milliLiter(s) (100 mL/Hr) IV Continuous <Continuous>  dextrose 5%. 1000 milliLiter(s) (50 mL/Hr) IV Continuous <Continuous>  dextrose 50% Injectable 25 Gram(s) IV Push once  dextrose 50% Injectable 12.5 Gram(s) IV Push once  dextrose 50% Injectable 25 Gram(s) IV Push once  fentaNYL   Infusion. 0.5 MICROgram(s)/kG/Hr (2.77 mL/Hr) IV Continuous <Continuous>  glucagon  Injectable 1 milliGRAM(s) IntraMuscular once  insulin lispro (ADMELOG) corrective regimen sliding scale   SubCutaneous every 6 hours  levothyroxine Injectable 75 MICROGram(s) IV Push at bedtime  metoclopramide Injectable 5 milliGRAM(s) IV Push daily  midazolam Infusion 0.02 mG/kG/Hr (1.11 mL/Hr) IV Continuous <Continuous>  norepinephrine Infusion 0.05 MICROgram(s)/kG/Min (2.6 mL/Hr) IV Continuous <Continuous>  octreotide  Infusion 50 MICROgram(s)/Hr (10 mL/Hr) IV Continuous <Continuous>  pantoprazole  Injectable 40 milliGRAM(s) IV Push two times a day  piperacillin/tazobactam IVPB.- 3.375 Gram(s) IV Intermittent once  piperacillin/tazobactam IVPB.. 3.375 Gram(s) IV Intermittent every 8 hours  propofol Infusion 11.733 MICROgram(s)/kG/Min (3.9 mL/Hr) IV Continuous <Continuous>    MEDICATIONS  (PRN):  dextrose Oral Gel 15 Gram(s) Oral once PRN Blood Glucose LESS THAN 70 milliGRAM(s)/deciliter  petrolatum Ophthalmic Ointment 1 Application(s) Both EYES two times a day PRN dry eyes      ALLERGIES:  Allergies    No Known Allergies    Intolerances        LABS:                        8.6    9.37  )-----------( 58       ( 2023 06:00 )             25.3     02    141  |  111<H>  |  44<H>  ----------------------------<  164<H>  3.6   |  13<L>  |  1.49<H>    Ca    8.2<L>      2023 00:15  Phos  2.7       Mg     2.40         TPro  5.5<L>  /  Alb  3.2<L>  /  TBili  2.0<H>  /  DBili  x   /  AST  242<H>  /  ALT  100<H>  /  AlkPhos  121<H>      PT/INR - ( 2023 00:15 )   PT: 26.4 sec;   INR: 2.26 ratio         PTT - ( 2023 00:15 )  PTT:34.5 sec  Urinalysis Basic - ( 2023 10:50 )    Color: Yellow / Appearance: Slightly Turbid / S.023 / pH: x  Gluc: x / Ketone: Trace  / Bili: Negative / Urobili: <2 mg/dL   Blood: x / Protein: 30 mg/dL / Nitrite: Negative   Leuk Esterase: Small / RBC: 10-20 /HPF / WBC 2-5 /HPF   Sq Epi: x / Non Sq Epi: x / Bacteria: x        RADIOLOGY & ADDITIONAL TESTS: Reviewed. INTERVAL HPI/OVERNIGHT EVENTS: Fever overnight responsive to tylenol. Switched ceft to zosyn. Fentanyl, propofol, versed were started d/t pt over breathing the vent.      SUBJECTIVE: Patient seen and examined at bedside.     OBJECTIVE:    VITAL SIGNS:  ICU Vital Signs Last 24 Hrs  T(C): 36.8 (2023 08:00), Max: 38.4 (2023 20:00)  T(F): 98.3 (2023 08:00), Max: 101.2 (2023 20:00)  HR: 66 (2023 10:00) (56 - 77)  BP: --  BP(mean): --  ABP: 109/41 (2023 10:00) (96/37 - 164/61)  ABP(mean): 67 (2023 10:00) (58 - 97)  RR: 14 (2023 10:00) (14 - 26)  SpO2: 97% (2023 10:00) (95% - 100%)    O2 Parameters below as of 2023 08:00  Patient On (Oxygen Delivery Method): ventilator    O2 Concentration (%): 30      Mode: AC/ CMV (Assist Control/ Continuous Mandatory Ventilation), RR (machine): 12, TV (machine): 250, FiO2: 30, PEEP: 5, MAP: 7, PIP: 10     @ 07: @ 07:00  --------------------------------------------------------  IN: 1649.1 mL / OUT: 1300 mL / NET: 349.1 mL     @ 07: @ 10:19  --------------------------------------------------------  IN: 190.4 mL / OUT: 205 mL / NET: -14.6 mL      CAPILLARY BLOOD GLUCOSE      POCT Blood Glucose.: 167 mg/dL (2023 06:14)      PHYSICAL EXAM:  GENERAL: Sedated, intubated  HEAD:  Atraumatic, Normocephalic  EYES: EOMI, PERRLA, conjunctiva and sclera clear  ENMT: No tonsillar erythema, exudates, or enlargement; Moist mucous membranes, Good dentition, No lesions  NECK: Supple, No JVD, Normal thyroid  CHEST/LUNG: Clear to auscultation bilaterally; No rales, rhonchi, wheezing, or rubs  HEART: Regular rate and rhythm; No murmurs, rubs, or gallops  ABDOMEN: Soft, Nontender, Nondistended; Bowel sounds present  VASCULAR:  2+ Peripheral Pulses, No clubbing, cyanosis, or edema  LYMPH: No lymphadenopathy noted  SKIN: No rashes or lesions  NERVOUS SYSTEM: A&O x 0, sedated    MEDICATIONS:  MEDICATIONS  (STANDING):  chlorhexidine 0.12% Liquid 15 milliLiter(s) Oral Mucosa every 12 hours  chlorhexidine 2% Cloths 1 Application(s) Topical <User Schedule>  coronavirus bivalent (EUA) Booster Vaccine (PFIZER) 0.3 milliLiter(s) IntraMuscular once  dextrose 5%. 1000 milliLiter(s) (100 mL/Hr) IV Continuous <Continuous>  dextrose 5%. 1000 milliLiter(s) (50 mL/Hr) IV Continuous <Continuous>  dextrose 50% Injectable 25 Gram(s) IV Push once  dextrose 50% Injectable 12.5 Gram(s) IV Push once  dextrose 50% Injectable 25 Gram(s) IV Push once  fentaNYL   Infusion. 0.5 MICROgram(s)/kG/Hr (2.77 mL/Hr) IV Continuous <Continuous>  glucagon  Injectable 1 milliGRAM(s) IntraMuscular once  insulin lispro (ADMELOG) corrective regimen sliding scale   SubCutaneous every 6 hours  levothyroxine Injectable 75 MICROGram(s) IV Push at bedtime  metoclopramide Injectable 5 milliGRAM(s) IV Push daily  midazolam Infusion 0.02 mG/kG/Hr (1.11 mL/Hr) IV Continuous <Continuous>  norepinephrine Infusion 0.05 MICROgram(s)/kG/Min (2.6 mL/Hr) IV Continuous <Continuous>  octreotide  Infusion 50 MICROgram(s)/Hr (10 mL/Hr) IV Continuous <Continuous>  pantoprazole  Injectable 40 milliGRAM(s) IV Push two times a day  piperacillin/tazobactam IVPB.- 3.375 Gram(s) IV Intermittent once  piperacillin/tazobactam IVPB.. 3.375 Gram(s) IV Intermittent every 8 hours  propofol Infusion 11.733 MICROgram(s)/kG/Min (3.9 mL/Hr) IV Continuous <Continuous>    MEDICATIONS  (PRN):  dextrose Oral Gel 15 Gram(s) Oral once PRN Blood Glucose LESS THAN 70 milliGRAM(s)/deciliter  petrolatum Ophthalmic Ointment 1 Application(s) Both EYES two times a day PRN dry eyes      ALLERGIES:  Allergies    No Known Allergies    Intolerances        LABS:                        8.6    9.37  )-----------( 58       ( 2023 06:00 )             25.3     02-    141  |  111<H>  |  44<H>  ----------------------------<  164<H>  3.6   |  13<L>  |  1.49<H>    Ca    8.2<L>      2023 00:15  Phos  2.7       Mg     2.40         TPro  5.5<L>  /  Alb  3.2<L>  /  TBili  2.0<H>  /  DBili  x   /  AST  242<H>  /  ALT  100<H>  /  AlkPhos  121<H>      PT/INR - ( 2023 00:15 )   PT: 26.4 sec;   INR: 2.26 ratio         PTT - ( 2023 00:15 )  PTT:34.5 sec  Urinalysis Basic - ( 2023 10:50 )    Color: Yellow / Appearance: Slightly Turbid / S.023 / pH: x  Gluc: x / Ketone: Trace  / Bili: Negative / Urobili: <2 mg/dL   Blood: x / Protein: 30 mg/dL / Nitrite: Negative   Leuk Esterase: Small / RBC: 10-20 /HPF / WBC 2-5 /HPF   Sq Epi: x / Non Sq Epi: x / Bacteria: x        RADIOLOGY & ADDITIONAL TESTS: Reviewed. INTERVAL HPI/OVERNIGHT EVENTS: Fever overnight responsive to tylenol. Switched ceft to zosyn. Fentanyl, propofol, versed were started d/t pt over breathing the vent.      SUBJECTIVE: Patient seen and examined at bedside. Intubated, sedated.     OBJECTIVE:    VITAL SIGNS:  ICU Vital Signs Last 24 Hrs  T(C): 36.8 (2023 08:00), Max: 38.4 (2023 20:00)  T(F): 98.3 (2023 08:00), Max: 101.2 (2023 20:00)  HR: 66 (2023 10:00) (56 - 77)  BP: --  BP(mean): --  ABP: 109/41 (2023 10:00) (96/37 - 164/61)  ABP(mean): 67 (2023 10:00) (58 - 97)  RR: 14 (2023 10:) (14 - 26)  SpO2: 97% (2023 10:00) (95% - 100%)    O2 Parameters below as of 2023 08:00  Patient On (Oxygen Delivery Method): ventilator    O2 Concentration (%): 30      Mode: AC/ CMV (Assist Control/ Continuous Mandatory Ventilation), RR (machine): 12, TV (machine): 250, FiO2: 30, PEEP: 5, MAP: 7, PIP: 10     @ 07: @ 07:00  --------------------------------------------------------  IN: 1649.1 mL / OUT: 1300 mL / NET: 349.1 mL     @ 07:  -   @ 10:19  --------------------------------------------------------  IN: 190.4 mL / OUT: 205 mL / NET: -14.6 mL      CAPILLARY BLOOD GLUCOSE      POCT Blood Glucose.: 167 mg/dL (2023 06:14)      PHYSICAL EXAM:  GENERAL: Sedated, intubated  NECK: Supple, No JVD, Normal thyroid  CHEST/LUNG: Clear to auscultation bilaterally; No rales, rhonchi, wheezing, or rubs  HEART: Regular rate and rhythm; No murmurs, rubs, or gallops  ABDOMEN: Soft, Nontender, Nondistended; Bowel sounds present  VASCULAR:  2+ Peripheral Pulses, No clubbing, cyanosis, or edema  LYMPH: No lymphadenopathy noted  SKIN: No rashes or lesions  NERVOUS SYSTEM: A&O x 0, sedated    MEDICATIONS:  MEDICATIONS  (STANDING):  chlorhexidine 0.12% Liquid 15 milliLiter(s) Oral Mucosa every 12 hours  chlorhexidine 2% Cloths 1 Application(s) Topical <User Schedule>  coronavirus bivalent (EUA) Booster Vaccine (PFIZER) 0.3 milliLiter(s) IntraMuscular once  dextrose 5%. 1000 milliLiter(s) (100 mL/Hr) IV Continuous <Continuous>  dextrose 5%. 1000 milliLiter(s) (50 mL/Hr) IV Continuous <Continuous>  dextrose 50% Injectable 25 Gram(s) IV Push once  dextrose 50% Injectable 12.5 Gram(s) IV Push once  dextrose 50% Injectable 25 Gram(s) IV Push once  fentaNYL   Infusion. 0.5 MICROgram(s)/kG/Hr (2.77 mL/Hr) IV Continuous <Continuous>  glucagon  Injectable 1 milliGRAM(s) IntraMuscular once  insulin lispro (ADMELOG) corrective regimen sliding scale   SubCutaneous every 6 hours  levothyroxine Injectable 75 MICROGram(s) IV Push at bedtime  metoclopramide Injectable 5 milliGRAM(s) IV Push daily  midazolam Infusion 0.02 mG/kG/Hr (1.11 mL/Hr) IV Continuous <Continuous>  norepinephrine Infusion 0.05 MICROgram(s)/kG/Min (2.6 mL/Hr) IV Continuous <Continuous>  octreotide  Infusion 50 MICROgram(s)/Hr (10 mL/Hr) IV Continuous <Continuous>  pantoprazole  Injectable 40 milliGRAM(s) IV Push two times a day  piperacillin/tazobactam IVPB.- 3.375 Gram(s) IV Intermittent once  piperacillin/tazobactam IVPB.. 3.375 Gram(s) IV Intermittent every 8 hours  propofol Infusion 11.733 MICROgram(s)/kG/Min (3.9 mL/Hr) IV Continuous <Continuous>    MEDICATIONS  (PRN):  dextrose Oral Gel 15 Gram(s) Oral once PRN Blood Glucose LESS THAN 70 milliGRAM(s)/deciliter  petrolatum Ophthalmic Ointment 1 Application(s) Both EYES two times a day PRN dry eyes      ALLERGIES:  Allergies    No Known Allergies    Intolerances        LABS:                        8.6    9.37  )-----------( 58       ( 2023 06:00 )             25.3     02    141  |  111<H>  |  44<H>  ----------------------------<  164<H>  3.6   |  13<L>  |  1.49<H>    Ca    8.2<L>      2023 00:15  Phos  2.7       Mg     2.40         TPro  5.5<L>  /  Alb  3.2<L>  /  TBili  2.0<H>  /  DBili  x   /  AST  242<H>  /  ALT  100<H>  /  AlkPhos  121<H>      PT/INR - ( 2023 00:15 )   PT: 26.4 sec;   INR: 2.26 ratio         PTT - ( 2023 00:15 )  PTT:34.5 sec  Urinalysis Basic - ( 2023 10:50 )    Color: Yellow / Appearance: Slightly Turbid / S.023 / pH: x  Gluc: x / Ketone: Trace  / Bili: Negative / Urobili: <2 mg/dL   Blood: x / Protein: 30 mg/dL / Nitrite: Negative   Leuk Esterase: Small / RBC: 10-20 /HPF / WBC 2-5 /HPF   Sq Epi: x / Non Sq Epi: x / Bacteria: x        RADIOLOGY & ADDITIONAL TESTS: Reviewed.

## 2023-02-28 NOTE — PROGRESS NOTE ADULT - ASSESSMENT
67M follows at [facility] Hx decompensated TANG cirrhosis (+ascites, +EV -- previously MELD Na 8), CAD s/p CABG s/p PCI (last in 2012 on ASA, now off plavix x2 weeks), newly diagnosed follicular lymphoma (on rituximab), HTN, DM, currently on tenofovir (HBV Core +) presented with right sided chest/abdominal pain with lizzette hematemesis + clots c/b hemorrhagic shock, s/p intubation in MICU, EGD showing large varices s/p banding, unable to completely clear stomach due to clot. Now w/ new fever and downtrending Hg    #Hemorrhagic shock  #Hematemesis: s/p EGD 2/24/2023 s/p EVL x6, bleeding likely due to EV, however unable to completely visualize stomach given presence of clotted blood. H/H now stable.   #NANCI  #Decompensated TANG cirrhosis: follows at [facility], previously low meld Na 8  --MELD Na: 22  --Ascites: (+) Hx, on lasix 40, spironolactone 50 at home, holding here  --SBP; no Hx  --EV: (+) Hx, no EVB --> is on nadolol at home  --PVT: no Hx  --HCC: no Hx    Recommendations:  - CT A/P with contrast triple phase to evaluate anatomy and plan for possible early TIPS if patient continues to have bleeding from varices  - No plans for repeat EGD at this time  - keep intubated   - f/u infectious workup, c/w zosyn, consider repeat paracentesis  - c/w pantoprazole 40 IV BID  - no need for further albumin (serum albumin 3.2, NANCI improving)  - hold diuretics  - repeat EGD in 4 weeks for variceal banding  - rest of care per ICU, consider starting tube feeds    Please note that the recommendations are not final until attested by an attending.    Thank you for involving us in the care of this patient. Please reach out if any further questions.    Ulices Ray, PGY-6  Gastroenterology/Hepatology Fellow    Available on [employer] Teams  After 5PM/Weekends, please contact the on-call GI fellow: [phone]

## 2023-02-28 NOTE — DIETITIAN INITIAL EVALUATION ADULT - REASON FOR ADMISSION
66yo M w TANG cirrhosis & follicular lymphoma presented w chest pain and constipation.  Pt. with multiple episodes hematemesis & hypotension.  Found to have massive UGIB requiring intubation.  S/p EGD (2/24) w findings of esophageal varices x6, s/p banding, however with incomplete eradication of varices.

## 2023-02-28 NOTE — DIETITIAN INITIAL EVALUATION ADULT - OTHER INFO
Pt. remains intubated/sedated.  Enteral OGT in place & observed with dark red output.    Son states Pt.'s usual body weight was ~135lbs.

## 2023-02-28 NOTE — DIETITIAN INITIAL EVALUATION ADULT - PERTINENT MEDS FT
MEDICATIONS  (STANDING):  chlorhexidine 0.12% Liquid 15 milliLiter(s) Oral Mucosa every 12 hours  chlorhexidine 2% Cloths 1 Application(s) Topical <User Schedule>  coronavirus bivalent (EUA) Booster Vaccine (PFIZER) 0.3 milliLiter(s) IntraMuscular once  dextrose 5%. 1000 milliLiter(s) (100 mL/Hr) IV Continuous <Continuous>  dextrose 5%. 1000 milliLiter(s) (50 mL/Hr) IV Continuous <Continuous>  dextrose 50% Injectable 25 Gram(s) IV Push once  dextrose 50% Injectable 12.5 Gram(s) IV Push once  dextrose 50% Injectable 25 Gram(s) IV Push once  fentaNYL   Infusion. 0.5 MICROgram(s)/kG/Hr (2.77 mL/Hr) IV Continuous <Continuous>  glucagon  Injectable 1 milliGRAM(s) IntraMuscular once  insulin lispro (ADMELOG) corrective regimen sliding scale   SubCutaneous every 6 hours  lactulose Retention Enema 200 Gram(s) Rectal daily  levothyroxine Injectable 75 MICROGram(s) IV Push at bedtime  metoclopramide Injectable 5 milliGRAM(s) IV Push daily  norepinephrine Infusion 0.05 MICROgram(s)/kG/Min (2.6 mL/Hr) IV Continuous <Continuous>  pantoprazole  Injectable 40 milliGRAM(s) IV Push two times a day  piperacillin/tazobactam IVPB.. 3.375 Gram(s) IV Intermittent every 8 hours  propofol Infusion 11.733 MICROgram(s)/kG/Min (3.9 mL/Hr) IV Continuous <Continuous>  rifAXIMin 550 milliGRAM(s) Oral two times a day    MEDICATIONS  (PRN):  dextrose Oral Gel 15 Gram(s) Oral once PRN Blood Glucose LESS THAN 70 milliGRAM(s)/deciliter  petrolatum Ophthalmic Ointment 1 Application(s) Both EYES two times a day PRN dry eyes

## 2023-02-28 NOTE — DIETITIAN INITIAL EVALUATION ADULT - ENTERAL
-- Start VitalHP @ 10mL/hr then increase by 10mL q 4hrs, as tolerated, to goal of 50mL/hr continuously    provides:  1200mL total volume  1200 kcals  105g protein  1003mL free water

## 2023-02-28 NOTE — DIETITIAN INITIAL EVALUATION ADULT - PERTINENT LABORATORY DATA
02-28    144  |  113<H>  |  39<H>  ----------------------------<  151<H>  3.5   |  18<L>  |  1.21    Ca    8.3<L>      28 Feb 2023 00:45  Phos  2.6     02-27  Mg     2.50     02-27    TPro  5.4<L>  /  Alb  2.9<L>  /  TBili  2.5<H>  /  DBili  x   /  AST  149<H>  /  ALT  78<H>  /  AlkPhos  130<H>  02-28    CAPILLARY BLOOD GLUCOSE      POCT Blood Glucose.: 154 mg/dL (28 Feb 2023 06:27)  POCT Blood Glucose.: 146 mg/dL (28 Feb 2023 00:50)  POCT Blood Glucose.: 156 mg/dL (27 Feb 2023 17:19)  POCT Blood Glucose.: 167 mg/dL (27 Feb 2023 12:46)    A1C with Estimated Average Glucose Result: 6.1 % (02-26-23 @ 01:30)  A1C with Estimated Average Glucose Result: 6.5 % (12-10-22 @ 12:50)

## 2023-02-28 NOTE — PROGRESS NOTE ADULT - ATTENDING COMMENTS
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 Endoscopy performed yesterday revealed large esophageal varices as the likely culprit for the bleed, s/p banding by GI yesterday. Remains in MICU for vasopressor support and mechanical ventilation.    H/H stable but with persistent encephalopathy 2/2 to elevate ammonia, not with NGT getting rifaximin and lactulose.     # Encephalopathy 2/2 to elevated ammonia level  # Cirrhosis  # Upper GI bleed 2/2 esophogeal varices  # Lymphoma  # Hep B  # Shock on pressors  - Shock on pressor, s/p PRBC and EGD s/p banding, trend CBC, transfuse PRN, pending repeat scope  - Completed octreotide, c/w PPI  - Wean pressors as tolerated, check cortisol level.  - Cirrhosis 2/2 to TANG, ammonia elevated to 195, now with NGT for rifaximin and lactulose.  - Wean sedation as able  - NANCI improving, now off of albumin, continue to monitor  - DVT ppx on hold 2/2 to GIB  - Dispo- full code per family at bedside.     D/W MICU and family at bedside.

## 2023-02-28 NOTE — DIETITIAN INITIAL EVALUATION ADULT - ORAL INTAKE PTA/DIET HISTORY
Spoke w son.  Pt. with constipation and decreased PO intake x ~1 month since recent [new] Dx of lymphoma.  Has been sleeping throughout day and eating breakfast @ 2p, lunch @ 530p & dinner @ 8p.    Food allergy to beef and lentils w symptom of "itchiness" reported.

## 2023-02-28 NOTE — PROGRESS NOTE ADULT - SUBJECTIVE AND OBJECTIVE BOX
Interval Events:   - OG placed with 300-400 cc of "dark blood" per team  - H/H stable, pressors requirements unchanged  - No BMs overnight  - Febrile this AM    Allergies:  &quot;lentils&quot;-&quot;itchiness&quot; (Other)  Beef (Other)  Drug Allergies Not Recorded      Hospital Medications:  chlorhexidine 0.12% Liquid 15 milliLiter(s) Oral Mucosa every 12 hours  chlorhexidine 2% Cloths 1 Application(s) Topical <User Schedule>  coronavirus bivalent (EUA) Booster Vaccine (PFIZER) 0.3 milliLiter(s) IntraMuscular once  dextrose 5%. 1000 milliLiter(s) IV Continuous <Continuous>  dextrose 5%. 1000 milliLiter(s) IV Continuous <Continuous>  dextrose 50% Injectable 25 Gram(s) IV Push once  dextrose 50% Injectable 12.5 Gram(s) IV Push once  dextrose 50% Injectable 25 Gram(s) IV Push once  dextrose Oral Gel 15 Gram(s) Oral once PRN  fentaNYL   Infusion. 0.5 MICROgram(s)/kG/Hr IV Continuous <Continuous>  glucagon  Injectable 1 milliGRAM(s) IntraMuscular once  insulin lispro (ADMELOG) corrective regimen sliding scale   SubCutaneous every 6 hours  lactulose Retention Enema 200 Gram(s) Rectal daily  lactulose Syrup 10 Gram(s) Oral every 2 hours  levothyroxine Injectable 75 MICROGram(s) IV Push at bedtime  metoclopramide Injectable 5 milliGRAM(s) IV Push daily  midodrine 10 milliGRAM(s) Oral every 8 hours  norepinephrine Infusion 0.05 MICROgram(s)/kG/Min IV Continuous <Continuous>  pantoprazole  Injectable 40 milliGRAM(s) IV Push two times a day  petrolatum Ophthalmic Ointment 1 Application(s) Both EYES two times a day PRN  piperacillin/tazobactam IVPB.. 3.375 Gram(s) IV Intermittent every 8 hours  propofol Infusion 11.733 MICROgram(s)/kG/Min IV Continuous <Continuous>  rifAXIMin 550 milliGRAM(s) Oral two times a day      PMHX/PSHX:  CAD (coronary artery disease)    Diabetes    Lymphoma    Cirrhosis    S/P CABG x 1        Family history:  No pertinent family history in first degree relatives        ROS: As per HPI, otherwise 14-point ROS reviewed and negative.      PHYSICAL EXAM:   Vital Signs:  Vital Signs Last 24 Hrs  T(C): 38.3 (2023 08:00), Max: 38.5 (2023 04:00)  T(F): 100.9 (2023 08:00), Max: 101.3 (2023 04:00)  HR: 60 (2023 11:16) (58 - 69)  BP: --  BP(mean): --  RR: 15 (2023 11:00) (11 - 19)  SpO2: 95% (2023 11:16) (93% - 100%)    Parameters below as of 2023 08:00  Patient On (Oxygen Delivery Method): ventilator    O2 Concentration (%): 30  Daily     Daily Weight in k.8 (2023 01:00)      23 @ 07:01  -  23 @ 07:00  --------------------------------------------------------  IN: 1487.2 mL / OUT: 1815 mL / NET: -327.8 mL    23 @ 07:01  -  23 @ 12:03  --------------------------------------------------------  IN: 171.2 mL / OUT: 270 mL / NET: -98.8 mL        GENERAL:  intubated, sedated   HEENT:  NCAT, no scleral icterus  CHEST: intubated   HEART:  RRR  ABDOMEN:  Soft, non-tender, non-distended, normoactive bowel sounds, no masses  EXTREMITIES:  No cyanosis, clubbing, or edema  SKIN:  No rash/erythema/ecchymoses/petechiae/wounds/abscess/warm/dry  NEURO: sedated    LABS:                        9.9    10.53 )-----------( 77       ( 2023 10:26 )             28.3     Mean Cell Volume: 84.7 fL (23 @ 10:26)        144  |  113<H>  |  39<H>  ----------------------------<  151<H>  3.5   |  18<L>  |  1.21    Ca    8.3<L>      2023 00:45  Phos  2.6       Mg     2.50         TPro  5.4<L>  /  Alb  2.9<L>  /  TBili  2.5<H>  /  DBili  x   /  AST  149<H>  /  ALT  78<H>  /  AlkPhos  130<H>      LIVER FUNCTIONS - ( 2023 00:45 )  Alb: 2.9 g/dL / Pro: 5.4 g/dL / ALK PHOS: 130 U/L / ALT: 78 U/L / AST: 149 U/L / GGT: x           PT/INR - ( 2023 00:15 )   PT: 26.4 sec;   INR: 2.26 ratio         PTT - ( 2023 00:15 )  PTT:34.5 sec    Amylase Serum--      Lipase serum--       [id number]  Amylase Serum--      Lipase serum--       [id number]                          9.9    10.53 )-----------( 77       ( 2023 10:26 )             28.3                         9.5    10.92 )-----------( 83       ( 2023 01:44 )             27.2                         9.6    8.35  )-----------( 80       ( 2023 21:05 )             28.2                         9.1    10.35 )-----------( 76       ( 2023 13:09 )             26.7                         8.6    9.37  )-----------( 58       ( 2023 06:00 )             25.3       Imaging:

## 2023-02-28 NOTE — DIETITIAN INITIAL EVALUATION ADULT - NSFNSGIIOFT_GEN_A_CORE
02-27-23 @ 07:01  -  02-28-23 @ 07:00  --------------------------------------------------------  OUT:    Nasogastric/Oral tube (mL): 400 mL  Total OUT: 400 mL    Total NET: -400 mL     (2/27).      02-28-23 @ 07:01  -  02-28-23 @ 08:53  --------------------------------------------------------  OUT:    Nasogastric/Oral tube (mL): 150 mL  Total OUT: 150 mL    Total NET: -150 mL

## 2023-02-28 NOTE — PROGRESS NOTE ADULT - ATTENDING COMMENTS
67M with UGIB follows at [facility] -  of lymphoma  Reported bloody contents from OG tube but likely old clots  CBC stable  BP stable    Would give miralax via OG tube  Continue Rifaximin  Repeat infectious workup given fevers  On Zosyn    Defer repeat EGD at this time unless actively bleeding  Continue supportive management  Begin nutrition     Damion Larios MD

## 2023-02-28 NOTE — PROGRESS NOTE ADULT - ASSESSMENT
66yo M w/ pmhx CAD s/p CABG, TANG cirrhosis, follicular lymphoma (on Rituximab infusions outpt), HTN, DM, hypothyroidism, currently on tenofovir (HBV Core +), presented to the ED w/ chest pain and constipation while in the ED patient w/ multiple episodes of hematemesis and hypotension; MICU consulted for hypotension, massive upper GI bleed s/p intubation, s/p 2/24 bedside scope with banding x6 of esophageal varices.     PLAN:     [ NEURO ]  -sedated on fent, prop, versed for over-breathing vent; wean as tolerated  -intubated and sedated    [ CARDIO ]  #Hypovolemic Shock 2/2 GIB vs vasoplegia iso cirrhosis vs sepsis  -s/p 6U PRBCs   -on Levophed at .6  -diagnostic paracentesis to r/o SBP given hx of ascites 2/25 - transudative with no evidence of SBP   -bedside POCUS with grossly normal LV systolic fx   -2/25 (TTE) - dilated LA     [ RESPIRATORY ]  #Airway Protection  -patient intubated for airway protection 2/2 hematemesis    #Respiratory Alkalosis  -noted episodes of over-breathing vent  -AC: 10 - 8 - 5 - 30    [ GASTRO]  #UGIB  -pt presented for chest pain and constipation; during ED course patient with multiple episodes of massive hematemesis  -given Octreotide, PPI, CTX  -GI consulted; s/p 2/24 scope, erythromycin given prior to scope -> multiple large esophageal varices, six bands successfully placed with incomplete eradication of varices. Per GI bleeding likely due to EV, however unable to completely visualize stomach given presence of clotted blood.  -s/p Octreotide gtt (72 hours post procedure) and PPI 40mg IVP BID   > GI no plan to rescope at this time, recommending albumin 25% 100cc q8h x3 doses  > Per hepatology, rec CT abdomen/pelvis for visualization in case TIPS procedure is required    [ RENAL/ ]  -no active issues  -monitor electrolytes, Cr    [ INFECTIOUS ]  -Pt given 1 dose ceftriaxone given in ED for SBP prophylaxis  -Stopped with ceftriaxone 1g daily for SBP ppx (2/27)  -transitioned to zosyn (2/27) for broad sepsis coverage in light of fever  -f/u blood cx, UA    [ HEME ]  #GI Bleed   -Hgb 6.7 on admission  -s/p 6 units pRBC, 1 FFP, 1 plts  > monitor CBC q8 for now, if stable consider CBC q12  > hold chemical DVT ppx given GIB  > maintain SCDs    [ENDO]  #DM   -NPO for now  -FS q6 hrs  -ISS    #Hypothyroidism  -home synthroid IV while NPO    [ ETHICS ]  -Patient is Full Code  68yo M w/ pmhx CAD s/p CABG, TANG cirrhosis, follicular lymphoma (on Rituximab infusions outpt), HTN, DM, hypothyroidism, currently on tenofovir (HBV Core +), presented to the ED w/ chest pain and constipation while in the ED patient w/ multiple episodes of hematemesis and hypotension; MICU consulted for hypotension, massive upper GI bleed s/p intubation, s/p 2/24 bedside scope with banding x6 of esophageal varices.     PLAN:     [ NEURO ]  #AMS   #r/o sedation vs hepatic encep  -sedated on fent, prop for over-breathing vent; wean as tolerated  > f/u ammonia  > ordered rifaximin BID  > ordered lactulose syrup 10 q2hrs    [ CARDIO ]  #Hypovolemic Shock 2/2 GIB vs vasoplegia iso cirrhosis vs sepsis  -s/p 6U PRBCs   -on Levophed at .5, wean as tolerated  -diagnostic paracentesis to r/o SBP given hx of ascites 2/25 - transudative with no evidence of SBP   -bedside POCUS with grossly normal LV systolic fx   -2/25 (TTE) - dilated LA   > f/u AM cortisol  > ordered midodrine 10 TID    [ RESPIRATORY ]  #Airway Protection  -patient intubated for airway protection 2/2 hematemesis    #Respiratory Alkalosis  -noted episodes of over-breathing vent  -P-AC: 10 - 5 - 30    [ GASTRO]  #UGIB  -pt presented for chest pain and constipation; during ED course patient with multiple episodes of massive hematemesis  -given Octreotide, PPI, CTX  -GI consulted; s/p 2/24 scope, erythromycin given prior to scope -> multiple large esophageal varices, six bands successfully placed with incomplete eradication of varices. Per GI bleeding likely due to EV, however unable to completely visualize stomach given presence of clotted blood.  -s/p Octreotide gtt (72 hours post procedure) and PPI 40mg IVP BID   > GI no plan to re-scope at this time, suspected dried blood   > consider diagnostic tap (paracentesis) in light of o/n fever    [ RENAL/ ]  -no active issues  -monitor electrolytes, Cr    [ INFECTIOUS ]  -Pt given 1 dose ceftriaxone given in ED for SBP prophylaxis  -Stopped with ceftriaxone 1g daily for SBP ppx (2/27)  -transitioned to zosyn (2/27) for broad sepsis coverage in light of fever  > f/u Bcx (2/28)    [ HEME ]  #GI Bleed   -Hgb 6.7 on admission  -s/p 6 units pRBC, 1 FFP, 1 plts  > monitor CBC q8 for now, if stable consider CBC q12  > hold chemical DVT ppx given GIB  > maintain SCDs    [ENDO]  #DM   -NPO for now  -FS q6 hrs  -ISS    #Hypothyroidism  -home synthroid IV while NPO    [ ETHICS ]  -Patient is Full Code

## 2023-02-28 NOTE — PROGRESS NOTE ADULT - SUBJECTIVE AND OBJECTIVE BOX
INTERVAL HPI/OVERNIGHT EVENTS:    SUBJECTIVE: Patient seen and examined at bedside.     OBJECTIVE:    VITAL SIGNS:  ICU Vital Signs Last 24 Hrs  T(C): 38.3 (2023 08:00), Max: 38.5 (2023 04:00)  T(F): 100.9 (2023 08:00), Max: 101.3 (2023 04:00)  HR: 64 (:00) (58 - 69)  BP: --  BP(mean): --  ABP: 117/41 (2023 08:00) (109/41 - 139/48)  ABP(mean): 67 (2023 08:00) (67 - 83)  RR: 16 (:) (11 - 19)  SpO2: 94% (:00) (93% - 100%)    O2 Parameters below as of 2023 08:00  Patient On (Oxygen Delivery Method): ventilator    O2 Concentration (%): 30      Mode: AC/ CMV (Assist Control/ Continuous Mandatory Ventilation), RR (machine): 10, TV (machine): 250, FiO2: 50, PEEP: 5, ITime: 1, MAP: 8, PC: 8, PIP: 15     @ 07: @ 07:00  --------------------------------------------------------  IN: 1487.2 mL / OUT: 1815 mL / NET: -327.8 mL     @ 07:01   @ 09:24  --------------------------------------------------------  IN: 44.8 mL / OUT: 180 mL / NET: -135.2 mL      CAPILLARY BLOOD GLUCOSE      POCT Blood Glucose.: 154 mg/dL (2023 06:27)      PHYSICAL EXAM:  GENERAL: NAD, well-groomed, well-developed  HEAD:  Atraumatic, Normocephalic  EYES: EOMI, PERRLA, conjunctiva and sclera clear  ENMT: No tonsillar erythema, exudates, or enlargement; Moist mucous membranes, Good dentition, No lesions  NECK: Supple, No JVD, Normal thyroid  CHEST/LUNG: Clear to auscultation bilaterally; No rales, rhonchi, wheezing, or rubs  HEART: Regular rate and rhythm; No murmurs, rubs, or gallops  ABDOMEN: Soft, Nontender, Nondistended; Bowel sounds present  VASCULAR:  2+ Peripheral Pulses, No clubbing, cyanosis, or edema  LYMPH: No lymphadenopathy noted  SKIN: No rashes or lesions  NERVOUS SYSTEM:  Alert & Oriented X3, Good concentration; Motor Strength 5/5 B/L upper and lower extremities; DTRs 2+ intact and symmetric    MEDICATIONS:  MEDICATIONS  (STANDING):  chlorhexidine 0.12% Liquid 15 milliLiter(s) Oral Mucosa every 12 hours  chlorhexidine 2% Cloths 1 Application(s) Topical <User Schedule>  coronavirus bivalent (EUA) Booster Vaccine (PFIZER) 0.3 milliLiter(s) IntraMuscular once  dextrose 5%. 1000 milliLiter(s) (100 mL/Hr) IV Continuous <Continuous>  dextrose 5%. 1000 milliLiter(s) (50 mL/Hr) IV Continuous <Continuous>  dextrose 50% Injectable 25 Gram(s) IV Push once  dextrose 50% Injectable 12.5 Gram(s) IV Push once  dextrose 50% Injectable 25 Gram(s) IV Push once  fentaNYL   Infusion. 0.5 MICROgram(s)/kG/Hr (2.77 mL/Hr) IV Continuous <Continuous>  glucagon  Injectable 1 milliGRAM(s) IntraMuscular once  insulin lispro (ADMELOG) corrective regimen sliding scale   SubCutaneous every 6 hours  lactulose Retention Enema 200 Gram(s) Rectal daily  lactulose Syrup 10 Gram(s) Oral every 2 hours  levothyroxine Injectable 75 MICROGram(s) IV Push at bedtime  metoclopramide Injectable 5 milliGRAM(s) IV Push daily  midodrine 10 milliGRAM(s) Oral every 8 hours  norepinephrine Infusion 0.05 MICROgram(s)/kG/Min (2.6 mL/Hr) IV Continuous <Continuous>  pantoprazole  Injectable 40 milliGRAM(s) IV Push two times a day  piperacillin/tazobactam IVPB.. 3.375 Gram(s) IV Intermittent every 8 hours  propofol Infusion 11.733 MICROgram(s)/kG/Min (3.9 mL/Hr) IV Continuous <Continuous>  rifAXIMin 550 milliGRAM(s) Oral two times a day    MEDICATIONS  (PRN):  dextrose Oral Gel 15 Gram(s) Oral once PRN Blood Glucose LESS THAN 70 milliGRAM(s)/deciliter  petrolatum Ophthalmic Ointment 1 Application(s) Both EYES two times a day PRN dry eyes      ALLERGIES:  Allergies    &quot;lentils&quot;-&quot;itchiness&quot; (Other)  Beef (Other)  Drug Allergies Not Recorded    Intolerances        LABS:                        9.5    10.92 )-----------( 83       ( 2023 01:44 )             27.2     02-    144  |  113<H>  |  39<H>  ----------------------------<  151<H>  3.5   |  18<L>  |  1.21    Ca    8.3<L>      2023 00:45  Phos  2.6     02-  Mg     2.50     -    TPro  5.4<L>  /  Alb  2.9<L>  /  TBili  2.5<H>  /  DBili  x   /  AST  149<H>  /  ALT  78<H>  /  AlkPhos  130<H>      PT/INR - ( 2023 00:15 )   PT: 26.4 sec;   INR: 2.26 ratio         PTT - ( 2023 00:15 )  PTT:34.5 sec  Urinalysis Basic - ( 2023 10:50 )    Color: Yellow / Appearance: Slightly Turbid / S.023 / pH: x  Gluc: x / Ketone: Trace  / Bili: Negative / Urobili: <2 mg/dL   Blood: x / Protein: 30 mg/dL / Nitrite: Negative   Leuk Esterase: Small / RBC: 10-20 /HPF / WBC 2-5 /HPF   Sq Epi: x / Non Sq Epi: x / Bacteria: x        RADIOLOGY & ADDITIONAL TESTS: Reviewed. INTERVAL HPI/OVERNIGHT EVENTS: Noted 200-400 cc of dried blood per wall suction o/n.     SUBJECTIVE: Patient seen and examined at bedside. Sedated, intubated.     OBJECTIVE:    VITAL SIGNS:  ICU Vital Signs Last 24 Hrs  T(C): 38.3 (2023 08:00), Max: 38.5 (2023 04:00)  T(F): 100.9 (2023 08:00), Max: 101.3 (2023 04:00)  HR: 64 (:00) (58 - 69)  BP: --  BP(mean): --  ABP: 117/41 (2023 08:00) (109/41 - 139/48)  ABP(mean): 67 (:) (67 - 83)  RR: 16 (:) (11 - 19)  SpO2: 94% (:) (93% - 100%)    O2 Parameters below as of :00  Patient On (Oxygen Delivery Method): ventilator    O2 Concentration (%): 30      Mode: AC/ CMV (Assist Control/ Continuous Mandatory Ventilation), RR (machine): 10, TV (machine): 250, FiO2: 50, PEEP: 5, ITime: 1, MAP: 8, PC: 8, PIP: 15     @ 07: @ 07:00  --------------------------------------------------------  IN: 1487.2 mL / OUT: 1815 mL / NET: -327.8 mL     @ 07: @ 09:24  --------------------------------------------------------  IN: 44.8 mL / OUT: 180 mL / NET: -135.2 mL      CAPILLARY BLOOD GLUCOSE      POCT Blood Glucose.: 154 mg/dL (2023 06:27)      PHYSICAL EXAM:  GENERAL: Intubated, sedated  NECK: Supple, No JVD, Normal thyroid  CHEST/LUNG: Clear to auscultation bilaterally; No rales, rhonchi, wheezing, or rubs  HEART: Regular rate and rhythm; No murmurs, rubs, or gallops  ABDOMEN: Soft, Nontender, Nondistended; Bowel sounds present  SKIN: No rashes or lesions  NERVOUS SYSTEM:  Alert & Oriented X0, sedated, Noted deviated gaze, Unresponsive to pain    MEDICATIONS:  MEDICATIONS  (STANDING):  chlorhexidine 0.12% Liquid 15 milliLiter(s) Oral Mucosa every 12 hours  chlorhexidine 2% Cloths 1 Application(s) Topical <User Schedule>  coronavirus bivalent (EUA) Booster Vaccine (PFIZER) 0.3 milliLiter(s) IntraMuscular once  dextrose 5%. 1000 milliLiter(s) (100 mL/Hr) IV Continuous <Continuous>  dextrose 5%. 1000 milliLiter(s) (50 mL/Hr) IV Continuous <Continuous>  dextrose 50% Injectable 25 Gram(s) IV Push once  dextrose 50% Injectable 12.5 Gram(s) IV Push once  dextrose 50% Injectable 25 Gram(s) IV Push once  fentaNYL   Infusion. 0.5 MICROgram(s)/kG/Hr (2.77 mL/Hr) IV Continuous <Continuous>  glucagon  Injectable 1 milliGRAM(s) IntraMuscular once  insulin lispro (ADMELOG) corrective regimen sliding scale   SubCutaneous every 6 hours  lactulose Retention Enema 200 Gram(s) Rectal daily  lactulose Syrup 10 Gram(s) Oral every 2 hours  levothyroxine Injectable 75 MICROGram(s) IV Push at bedtime  metoclopramide Injectable 5 milliGRAM(s) IV Push daily  midodrine 10 milliGRAM(s) Oral every 8 hours  norepinephrine Infusion 0.05 MICROgram(s)/kG/Min (2.6 mL/Hr) IV Continuous <Continuous>  pantoprazole  Injectable 40 milliGRAM(s) IV Push two times a day  piperacillin/tazobactam IVPB.. 3.375 Gram(s) IV Intermittent every 8 hours  propofol Infusion 11.733 MICROgram(s)/kG/Min (3.9 mL/Hr) IV Continuous <Continuous>  rifAXIMin 550 milliGRAM(s) Oral two times a day    MEDICATIONS  (PRN):  dextrose Oral Gel 15 Gram(s) Oral once PRN Blood Glucose LESS THAN 70 milliGRAM(s)/deciliter  petrolatum Ophthalmic Ointment 1 Application(s) Both EYES two times a day PRN dry eyes      ALLERGIES:  Allergies    &quot;lentils&quot;-&quot;itchiness&quot; (Other)  Beef (Other)  Drug Allergies Not Recorded    Intolerances        LABS:                        9.5    10.92 )-----------( 83       ( 2023 01:44 )             27.2     02    144  |  113<H>  |  39<H>  ----------------------------<  151<H>  3.5   |  18<L>  |  1.21    Ca    8.3<L>      2023 00:45  Phos  2.6     -  Mg     2.50         TPro  5.4<L>  /  Alb  2.9<L>  /  TBili  2.5<H>  /  DBili  x   /  AST  149<H>  /  ALT  78<H>  /  AlkPhos  130<H>      PT/INR - ( 2023 00:15 )   PT: 26.4 sec;   INR: 2.26 ratio         PTT - ( 2023 00:15 )  PTT:34.5 sec  Urinalysis Basic - ( 2023 10:50 )    Color: Yellow / Appearance: Slightly Turbid / S.023 / pH: x  Gluc: x / Ketone: Trace  / Bili: Negative / Urobili: <2 mg/dL   Blood: x / Protein: 30 mg/dL / Nitrite: Negative   Leuk Esterase: Small / RBC: 10-20 /HPF / WBC 2-5 /HPF   Sq Epi: x / Non Sq Epi: x / Bacteria: x        RADIOLOGY & ADDITIONAL TESTS: Reviewed.

## 2023-03-01 NOTE — PROGRESS NOTE ADULT - SUBJECTIVE AND OBJECTIVE BOX
INTERVAL HPI/OVERNIGHT EVENTS:    SUBJECTIVE: Patient seen and examined at bedside.     OBJECTIVE:    VITAL SIGNS:  ICU Vital Signs Last 24 Hrs  T(C): 37.1 (01 Mar 2023 08:00), Max: 37.1 (28 Feb 2023 16:00)  T(F): 98.7 (01 Mar 2023 08:00), Max: 98.7 (28 Feb 2023 16:00)  HR: 66 (01 Mar 2023 10:00) (52 - 66)  BP: --  BP(mean): --  ABP: 125/50 (01 Mar 2023 10:00) (116/58 - 170/68)  ABP(mean): 76 (01 Mar 2023 10:00) (71 - 105)  RR: 25 (01 Mar 2023 10:00) (12 - 26)  SpO2: 96% (01 Mar 2023 10:00) (93% - 97%)    O2 Parameters below as of 01 Mar 2023 10:00  Patient On (Oxygen Delivery Method): ventilator    O2 Concentration (%): 30      Mode: AC/ CMV (Assist Control/ Continuous Mandatory Ventilation), RR (machine): 10, FiO2: 30, PEEP: 5, ITime: 1, MAP: 8, PC: 8, PIP: 15    02-28 @ 07:01 - 03-01 @ 07:00  --------------------------------------------------------  IN: 1777.5 mL / OUT: 1390 mL / NET: 387.5 mL    03-01 @ 07:01 - 03-01 @ 11:40  --------------------------------------------------------  IN: 91 mL / OUT: 110 mL / NET: -19 mL      CAPILLARY BLOOD GLUCOSE      POCT Blood Glucose.: 190 mg/dL (01 Mar 2023 05:17)      PHYSICAL EXAM:  GENERAL: NAD, well-groomed, well-developed  HEAD:  Atraumatic, Normocephalic  EYES: EOMI, PERRLA, conjunctiva and sclera clear  ENMT: No tonsillar erythema, exudates, or enlargement; Moist mucous membranes, Good dentition, No lesions  NECK: Supple, No JVD, Normal thyroid  CHEST/LUNG: Clear to auscultation bilaterally; No rales, rhonchi, wheezing, or rubs  HEART: Regular rate and rhythm; No murmurs, rubs, or gallops  ABDOMEN: Soft, Nontender, Nondistended; Bowel sounds present  VASCULAR:  2+ Peripheral Pulses, No clubbing, cyanosis, or edema  LYMPH: No lymphadenopathy noted  SKIN: No rashes or lesions  NERVOUS SYSTEM:  Alert & Oriented X3, Good concentration; Motor Strength 5/5 B/L upper and lower extremities; DTRs 2+ intact and symmetric    MEDICATIONS:  MEDICATIONS  (STANDING):  chlorhexidine 0.12% Liquid 15 milliLiter(s) Oral Mucosa every 12 hours  chlorhexidine 2% Cloths 1 Application(s) Topical <User Schedule>  coronavirus bivalent (EUA) Booster Vaccine (PFIZER) 0.3 milliLiter(s) IntraMuscular once  dexMEDEtomidine Infusion 0.2 MICROgram(s)/kG/Hr (2.77 mL/Hr) IV Continuous <Continuous>  dextrose 5%. 1000 milliLiter(s) (100 mL/Hr) IV Continuous <Continuous>  dextrose 5%. 1000 milliLiter(s) (50 mL/Hr) IV Continuous <Continuous>  dextrose 50% Injectable 25 Gram(s) IV Push once  dextrose 50% Injectable 12.5 Gram(s) IV Push once  dextrose 50% Injectable 25 Gram(s) IV Push once  glucagon  Injectable 1 milliGRAM(s) IntraMuscular once  insulin lispro (ADMELOG) corrective regimen sliding scale   SubCutaneous every 6 hours  lactulose Syrup 20 Gram(s) Oral every 2 hours  levothyroxine Injectable 75 MICROGram(s) IV Push at bedtime  metoclopramide Injectable 5 milliGRAM(s) IV Push daily  midodrine 10 milliGRAM(s) Oral every 8 hours  norepinephrine Infusion 0.05 MICROgram(s)/kG/Min (2.6 mL/Hr) IV Continuous <Continuous>  pantoprazole  Injectable 40 milliGRAM(s) IV Push two times a day  piperacillin/tazobactam IVPB.. 3.375 Gram(s) IV Intermittent every 8 hours  polyethylene glycol 3350 17 Gram(s) Oral two times a day  rifAXIMin 550 milliGRAM(s) Oral two times a day    MEDICATIONS  (PRN):  dextrose Oral Gel 15 Gram(s) Oral once PRN Blood Glucose LESS THAN 70 milliGRAM(s)/deciliter  petrolatum Ophthalmic Ointment 1 Application(s) Both EYES two times a day PRN dry eyes      ALLERGIES:  Allergies    [This allergen will not trigger allergy alert] &quot;lentils&quot;-&quot;itchiness&quot; (Other)  Beef (Other)  No Known Drug Allergies    Intolerances        LABS:                        9.4    5.99  )-----------( 47       ( 01 Mar 2023 09:00 )             28.0     03-01    144  |  114<H>  |  37<H>  ----------------------------<  208<H>  3.3<L>   |  18<L>  |  1.10    Ca    8.3<L>      01 Mar 2023 01:10  Phos  1.4     03-01  Mg     2.20     03-01    TPro  5.6<L>  /  Alb  2.6<L>  /  TBili  2.9<H>  /  DBili  x   /  AST  TNP  /  ALT  TNP  /  AlkPhos  157<H>  03-01    PT/INR - ( 01 Mar 2023 01:10 )   PT: 22.3 sec;   INR: 1.91 ratio         PTT - ( 01 Mar 2023 01:10 )  PTT:38.6 sec      RADIOLOGY & ADDITIONAL TESTS: Reviewed. INTERVAL HPI/OVERNIGHT EVENTS: Decreased levo, fent and prop stopped overnight.     SUBJECTIVE: Patient seen and examined at bedside. Sedated, intubated.     OBJECTIVE:    VITAL SIGNS:  ICU Vital Signs Last 24 Hrs  T(C): 37.1 (01 Mar 2023 08:00), Max: 37.1 (28 Feb 2023 16:00)  T(F): 98.7 (01 Mar 2023 08:00), Max: 98.7 (28 Feb 2023 16:00)  HR: 66 (01 Mar 2023 10:00) (52 - 66)  BP: --  BP(mean): --  ABP: 125/50 (01 Mar 2023 10:00) (116/58 - 170/68)  ABP(mean): 76 (01 Mar 2023 10:00) (71 - 105)  RR: 25 (01 Mar 2023 10:00) (12 - 26)  SpO2: 96% (01 Mar 2023 10:00) (93% - 97%)    O2 Parameters below as of 01 Mar 2023 10:00  Patient On (Oxygen Delivery Method): ventilator    O2 Concentration (%): 30      Mode: AC/ CMV (Assist Control/ Continuous Mandatory Ventilation), RR (machine): 10, FiO2: 30, PEEP: 5, ITime: 1, MAP: 8, PC: 8, PIP: 15    02-28 @ 07:01 - 03-01 @ 07:00  --------------------------------------------------------  IN: 1777.5 mL / OUT: 1390 mL / NET: 387.5 mL    03-01 @ 07:01 - 03-01 @ 11:40  --------------------------------------------------------  IN: 91 mL / OUT: 110 mL / NET: -19 mL      CAPILLARY BLOOD GLUCOSE      POCT Blood Glucose.: 190 mg/dL (01 Mar 2023 05:17)      PHYSICAL EXAM:  GENERAL: Intubated, sedated  NECK: Supple, No JVD, Normal thyroid  CHEST/LUNG: Clear to auscultation bilaterally; No rales, rhonchi, wheezing, or rubs  HEART: Regular rate and rhythm; No murmurs, rubs, or gallops  ABDOMEN: Soft, Nontender, Nondistended; Bowel sounds present  SKIN: No rashes or lesions  NERVOUS SYSTEM:  Alert & Oriented X0, sedated, Noted deviated gaze, Unresponsive to pain    MEDICATIONS:  MEDICATIONS  (STANDING):  chlorhexidine 0.12% Liquid 15 milliLiter(s) Oral Mucosa every 12 hours  chlorhexidine 2% Cloths 1 Application(s) Topical <User Schedule>  coronavirus bivalent (EUA) Booster Vaccine (PFIZER) 0.3 milliLiter(s) IntraMuscular once  dexMEDEtomidine Infusion 0.2 MICROgram(s)/kG/Hr (2.77 mL/Hr) IV Continuous <Continuous>  dextrose 5%. 1000 milliLiter(s) (100 mL/Hr) IV Continuous <Continuous>  dextrose 5%. 1000 milliLiter(s) (50 mL/Hr) IV Continuous <Continuous>  dextrose 50% Injectable 25 Gram(s) IV Push once  dextrose 50% Injectable 12.5 Gram(s) IV Push once  dextrose 50% Injectable 25 Gram(s) IV Push once  glucagon  Injectable 1 milliGRAM(s) IntraMuscular once  insulin lispro (ADMELOG) corrective regimen sliding scale   SubCutaneous every 6 hours  lactulose Syrup 20 Gram(s) Oral every 2 hours  levothyroxine Injectable 75 MICROGram(s) IV Push at bedtime  metoclopramide Injectable 5 milliGRAM(s) IV Push daily  midodrine 10 milliGRAM(s) Oral every 8 hours  norepinephrine Infusion 0.05 MICROgram(s)/kG/Min (2.6 mL/Hr) IV Continuous <Continuous>  pantoprazole  Injectable 40 milliGRAM(s) IV Push two times a day  piperacillin/tazobactam IVPB.. 3.375 Gram(s) IV Intermittent every 8 hours  polyethylene glycol 3350 17 Gram(s) Oral two times a day  rifAXIMin 550 milliGRAM(s) Oral two times a day    MEDICATIONS  (PRN):  dextrose Oral Gel 15 Gram(s) Oral once PRN Blood Glucose LESS THAN 70 milliGRAM(s)/deciliter  petrolatum Ophthalmic Ointment 1 Application(s) Both EYES two times a day PRN dry eyes      ALLERGIES:  Allergies    [This allergen will not trigger allergy alert] &quot;lentils&quot;-&quot;itchiness&quot; (Other)  Beef (Other)  No Known Drug Allergies    Intolerances        LABS:                        9.4    5.99  )-----------( 47       ( 01 Mar 2023 09:00 )             28.0     03-01    144  |  114<H>  |  37<H>  ----------------------------<  208<H>  3.3<L>   |  18<L>  |  1.10    Ca    8.3<L>      01 Mar 2023 01:10  Phos  1.4     03-01  Mg     2.20     03-01    TPro  5.6<L>  /  Alb  2.6<L>  /  TBili  2.9<H>  /  DBili  x   /  AST  TNP  /  ALT  TNP  /  AlkPhos  157<H>  03-01    PT/INR - ( 01 Mar 2023 01:10 )   PT: 22.3 sec;   INR: 1.91 ratio         PTT - ( 01 Mar 2023 01:10 )  PTT:38.6 sec      RADIOLOGY & ADDITIONAL TESTS: Reviewed.

## 2023-03-01 NOTE — PROGRESS NOTE ADULT - ATTENDING COMMENTS
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 Endoscopy performed yesterday revealed large esophageal varices as the likely culprit for the bleed, s/p banding by GI yesterday. Remains in MICU for vasopressor support and mechanical ventilation.    H/H stable but with persistent encephalopathy 2/2 to elevate ammonia, not with NGT getting rifaximin and lactulose.    # Encephalopathy 2/2 to elevated ammonia level  # Cirrhosis  # Upper GI bleed 2/2 esophogeal varices  # Lymphoma  # Hep B  # Shock on pressors  - Shock on pressor, s/p PRBC and EGD s/p banding, trend CBC, transfuse PRN, pending repeat scope  - Completed octreotide, c/w PPI  - Wean pressors as tolerated, check cortisol level.  - Cirrhosis 2/2 to TANG, ammonia elevated to 195, now with NGT for rifaximin and lactulose.  - Wean sedation as able  - NANCI improving, now off of albumin, continue to monitor  - DVT ppx on hold 2/2 to GIB  - Dispo- full code per family at bedside.     D/W MICU and family at bedside. 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 Endoscopy performed yesterday revealed large esophageal varices as the likely culprit for the bleed, s/p banding by GI yesterday. Remains in MICU for vasopressor support and mechanical ventilation.    H/H stable but with persistent encephalopathy 2/2 to elevate ammonia,   NGT getting rifaximin and lactulose.    # Encephalopathy 2/2 to elevated ammonia level  # Cirrhosis  # Upper GI bleed 2/2 esophogeal varices  # Lymphoma  # Hep B  # Shock on pressors  # PA pneumonia  - Shock on pressor, s/p PRBC and EGD s/p banding, trend CBC, transfuse PRN,   - Being weaned off pressors  - Completed octreotide, c/w PPI  - Wean pressors as tolerated, Cortisol WNL  - Cirrhosis 2/2 to TANG, ammonia elevated to 195, now with NGT for rifaximin and lactulose. Add miralax, now making stool.   - Monitor off sedation  - Wean sedation as able  - PA growing from sputum, c/w abx  - C/W midodrine.   - NANCI improving, now off of albumin, continue to monitor  - DVT ppx on hold 2/2 to GIB  - Dispo- full code per family at bedside.     D/W MICU and family at bedside.

## 2023-03-01 NOTE — PROGRESS NOTE ADULT - ASSESSMENT
67M follows at [facility] Hx decompensated TANG cirrhosis (+ascites, +EV -- previously MELD Na 8), CAD s/p CABG s/p PCI (last in 2012 on ASA, now off plavix x2 weeks), newly diagnosed follicular lymphoma (on rituximab), HTN, DM, currently on tenofovir (HBV Core +) presented with right sided chest/abdominal pain with lizzette hematemesis + clots c/b hemorrhagic shock, s/p intubation in MICU, EGD showing large varices s/p banding, unable to completely clear stomach due to clot. Now w/ new fever and downtrending Hg    #Hemorrhagic shock  #Hematemesis: s/p EGD 2/24/2023 s/p EVL x6, bleeding likely due to EV, however unable to completely visualize stomach given presence of clotted blood. H/H now stable.   #NANCI  #Decompensated TANG cirrhosis: follows at [facility], previously low meld Na 8  --MELD Na: 22  --Ascites: (+) Hx, on lasix 40, spironolactone 50 at home, holding here  --SBP; no Hx  --EV: (+) Hx, no EVB --> is on nadolol at home  --PVT: no Hx  --HCC: no Hx    Recommendations:  - CT A/P with contrast triple phase to evaluate anatomy and plan for possible early TIPS if patient continues to have bleeding from varices  - No plans for repeat EGD at this time  - f/u infectious workup, c/w zosyn, consider repeat paracentesis  - c/w pantoprazole 40 IV BID  - no need for further albumin (serum albumin 3.2, NANCI improving)  - hold diuretics  - repeat EGD in 4 weeks for variceal banding  - trend CMP, CBC, coags    Please note that the recommendations are not final until attested by an attending.    Thank you for involving us in the care of this patient. Please reach out if any further questions.    Ulices Ray, PGY-6  Gastroenterology/Hepatology Fellow    Available on [employer] Teams  After 5PM/Weekends, please contact the on-call GI fellow: [phone]

## 2023-03-01 NOTE — PROGRESS NOTE ADULT - ATTENDING COMMENTS
67M with UGIB follows at [facility] - hx of lymphoma  Reported bloody contents from OG tube but likely old clots - CBC stable - low dose pressors    Would give miralax via OG tube  Continue Rifaximin  Repeat infectious workup given fevers - On Zosyn  Continue supportive management  On tube feeds    Continue to wean sedation and aim to extubate as per ICU     Damion Larios MD

## 2023-03-01 NOTE — PROGRESS NOTE ADULT - SUBJECTIVE AND OBJECTIVE BOX
Interval Events:   - Stable H/H  - Pressors requirements trending down  - Having multiple bowel movements      Allergies:  [This allergen will not trigger allergy alert] &quot;lentils&quot;-&quot;itchiness&quot; (Other)  Beef (Other)  No Known Drug Allergies        Hospital Medications:  chlorhexidine 0.12% Liquid 15 milliLiter(s) Oral Mucosa every 12 hours  chlorhexidine 2% Cloths 1 Application(s) Topical <User Schedule>  coronavirus bivalent (EUA) Booster Vaccine (PFIZER) 0.3 milliLiter(s) IntraMuscular once  dextrose 5%. 1000 milliLiter(s) IV Continuous <Continuous>  dextrose 5%. 1000 milliLiter(s) IV Continuous <Continuous>  dextrose 50% Injectable 25 Gram(s) IV Push once  dextrose 50% Injectable 12.5 Gram(s) IV Push once  dextrose 50% Injectable 25 Gram(s) IV Push once  dextrose Oral Gel 15 Gram(s) Oral once PRN  glucagon  Injectable 1 milliGRAM(s) IntraMuscular once  insulin lispro (ADMELOG) corrective regimen sliding scale   SubCutaneous every 6 hours  lactulose Syrup 20 Gram(s) Oral every 8 hours  levothyroxine Injectable 75 MICROGram(s) IV Push at bedtime  metoclopramide Injectable 5 milliGRAM(s) IV Push daily  midodrine 10 milliGRAM(s) Oral every 8 hours  norepinephrine Infusion 0.05 MICROgram(s)/kG/Min IV Continuous <Continuous>  pantoprazole  Injectable 40 milliGRAM(s) IV Push two times a day  petrolatum Ophthalmic Ointment 1 Application(s) Both EYES two times a day PRN  piperacillin/tazobactam IVPB.. 3.375 Gram(s) IV Intermittent every 8 hours  polyethylene glycol 3350 17 Gram(s) Oral two times a day  rifAXIMin 550 milliGRAM(s) Oral two times a day      PMHX/PSHX:  CAD (coronary artery disease)    Diabetes    Lymphoma    Cirrhosis    S/P CABG x 1        Family history:  No pertinent family history in first degree relatives        ROS: As per HPI, otherwise 14-point ROS reviewed and negative.      PHYSICAL EXAM:   Vital Signs:  Vital Signs Last 24 Hrs  T(C): 36.9 (01 Mar 2023 12:00), Max: 37.1 (2023 16:00)  T(F): 98.4 (01 Mar 2023 12:00), Max: 98.7 (2023 16:00)  HR: 63 (01 Mar 2023 14:00) (52 - 66)  BP: --  BP(mean): --  RR: 18 (01 Mar 2023 14:00) (12 - 26)  SpO2: 97% (01 Mar 2023 14:00) (93% - 97%)    Parameters below as of 01 Mar 2023 12:00  Patient On (Oxygen Delivery Method): ventilator    O2 Concentration (%): 30  Daily     Daily Weight in k.5 (01 Mar 2023 03:00)      23 @ 07:01  -  23 @ 07:00  --------------------------------------------------------  IN: 1777.5 mL / OUT: 1390 mL / NET: 387.5 mL    23 @ 07:01  -  23 @ 14:40  --------------------------------------------------------  IN: 138.8 mL / OUT: 110 mL / NET: 28.8 mL      GENERAL:  intubated  HEENT:  NCAT, no scleral icterus  CHEST: intubated   HEART:  RRR  ABDOMEN:  Soft, non-tender, non-distended, normoactive bowel sounds, no masses  EXTREMITIES:  No cyanosis, clubbing, or edema  SKIN:  No rash/erythema/ecchymoses/petechiae/wounds/abscess/warm/dry  NEURO: A&O x 0    LABS:                        9.4    5.99  )-----------( 47       ( 01 Mar 2023 09:00 )             28.0     Mean Cell Volume: 87.5 fL (23 @ 09:00)    03    144  |  114<H>  |  37<H>  ----------------------------<  208<H>  3.3<L>   |  18<L>  |  1.10    Ca    8.3<L>      01 Mar 2023 01:10  Phos  1.4     03  Mg     2.20     03    TPro  5.6<L>  /  Alb  2.6<L>  /  TBili  2.9<H>  /  DBili  x   /  AST  TNP  /  ALT  TNP  /  AlkPhos  157<H>      LIVER FUNCTIONS - ( 01 Mar 2023 01:10 )  Alb: 2.6 g/dL / Pro: 5.6 g/dL / ALK PHOS: 157 U/L / ALT: TNP U/L / AST: TNP U/L / GGT: x           PT/INR - ( 01 Mar 2023 01:10 )   PT: 22.3 sec;   INR: 1.91 ratio         PTT - ( 01 Mar 2023 01:10 )  PTT:38.6 sec    Amylase Serum--      Lipase serum--       [id number]  Amylase Serum--      Lipase serum--       [id number]  Amylase Serum--      Lipase serum--       [id number]                          9.4    5.99  )-----------( 47       ( 01 Mar 2023 09:00 )             28.0                         10.0   8.74  )-----------( 66       ( 01 Mar 2023 01:10 )             28.8                         9.7    9.84  )-----------( 72       ( 2023 17:50 )             28.1                         9.9    10.53 )-----------( 77       ( 2023 10:26 )             28.3                         9.5    10.92 )-----------( 83       ( 2023 01:44 )             27.2       Imaging:

## 2023-03-01 NOTE — PROGRESS NOTE ADULT - ASSESSMENT
66yo M w/ pmhx CAD s/p CABG, TANG cirrhosis, follicular lymphoma (on Rituximab infusions outpt), HTN, DM, hypothyroidism, currently on tenofovir (HBV Core +), presented to the ED w/ chest pain and constipation while in the ED patient w/ multiple episodes of hematemesis and hypotension; MICU consulted for hypotension, massive upper GI bleed s/p intubation, s/p 2/24 bedside scope with banding x6 of esophageal varices.     PLAN:     [ NEURO ]  #AMS   #r/o sedation vs hepatic encep  -sedated on fent, prop for over-breathing vent; wean as tolerated  > f/u ammonia  > ordered rifaximin BID  > ordered lactulose syrup 10 q2hrs    [ CARDIO ]  #Hypovolemic Shock 2/2 GIB vs vasoplegia iso cirrhosis vs sepsis  -s/p 6U PRBCs   -on Levophed at .5, wean as tolerated  -diagnostic paracentesis to r/o SBP given hx of ascites 2/25 - transudative with no evidence of SBP   -bedside POCUS with grossly normal LV systolic fx   -2/25 (TTE) - dilated LA   > f/u AM cortisol  > ordered midodrine 10 TID    [ RESPIRATORY ]  #Airway Protection  -patient intubated for airway protection 2/2 hematemesis    #Respiratory Alkalosis  -noted episodes of over-breathing vent  -P-AC: 10 - 5 - 30    [ GASTRO]  #UGIB  -pt presented for chest pain and constipation; during ED course patient with multiple episodes of massive hematemesis  -given Octreotide, PPI, CTX  -GI consulted; s/p 2/24 scope, erythromycin given prior to scope -> multiple large esophageal varices, six bands successfully placed with incomplete eradication of varices. Per GI bleeding likely due to EV, however unable to completely visualize stomach given presence of clotted blood.  -s/p Octreotide gtt (72 hours post procedure) and PPI 40mg IVP BID   > GI no plan to re-scope at this time, suspected dried blood   > consider diagnostic tap (paracentesis) in light of o/n fever    [ RENAL/ ]  -no active issues  -monitor electrolytes, Cr    [ INFECTIOUS ]  -Pt given 1 dose ceftriaxone given in ED for SBP prophylaxis  -Stopped with ceftriaxone 1g daily for SBP ppx (2/27)  -transitioned to zosyn (2/27) for broad sepsis coverage in light of fever  > f/u Bcx (2/28)    [ HEME ]  #GI Bleed   -Hgb 6.7 on admission  -s/p 6 units pRBC, 1 FFP, 1 plts  > monitor CBC q8 for now, if stable consider CBC q12  > hold chemical DVT ppx given GIB  > maintain SCDs    [ENDO]  #DM   -NPO for now  -FS q6 hrs  -ISS    #Hypothyroidism  -home synthroid IV while NPO    [ ETHICS ]  -Patient is Full Code  68yo M w/ pmhx CAD s/p CABG, TANG cirrhosis, follicular lymphoma (on Rituximab infusions outpt), HTN, DM, hypothyroidism, currently on tenofovir (HBV Core +), presented to the ED w/ chest pain and constipation while in the ED patient w/ multiple episodes of hematemesis and hypotension; MICU consulted for hypotension, massive upper GI bleed s/p intubation, s/p 2/24 bedside scope with banding x6 of esophageal varices.     PLAN:     [ NEURO ]  #AMS   #r/o sedation vs hepatic encep  -sedated on fent, prop for over-breathing vent; wean as tolerated  > f/u ammonia  > ordered rifaximin BID  > ordered lactulose syrup 10 q2hrs    [ CARDIO ]  #Hypovolemic Shock 2/2 GIB vs vasoplegia iso cirrhosis vs sepsis  -s/p 6U PRBCs   -on Levophed at .5, wean as tolerated  -diagnostic paracentesis to r/o SBP given hx of ascites 2/25 - transudative with no evidence of SBP   -bedside POCUS with grossly normal LV systolic fx   -2/25 (TTE) - dilated LA   -cortisol normal  > ordered midodrine 10 TID    [ RESPIRATORY ]  #Airway Protection  -patient intubated for airway protection 2/2 hematemesis    #Respiratory Alkalosis  -noted episodes of over-breathing vent  -P-AC: 10 - 8/5 - 30  > consider precedex if r/p episode    [ GASTRO]  #UGIB  -pt presented for chest pain and constipation; during ED course patient with multiple episodes of massive hematemesis  -given Octreotide, PPI, CTX  -GI consulted; s/p 2/24 scope, erythromycin given prior to scope -> multiple large esophageal varices, six bands successfully placed with incomplete eradication of varices. Per GI bleeding likely due to EV, however unable to completely visualize stomach given presence of clotted blood.  -s/p Octreotide gtt (72 hours post procedure) and PPI 40mg IVP BID  -placed rectal tube; per hepatology no risk of rectal varices   > GI no plan to re-scope at this time, suspected dried blood   > consider diagnostic tap (paracentesis) in light of o/n fever    [ RENAL/ ]  -no active issues  -monitor electrolytes, Cr    [ INFECTIOUS ]  -Pt given 1 dose ceftriaxone given in ED for SBP prophylaxis  -Stopped with ceftriaxone 1g daily for SBP ppx (2/27)  -s/p ceft 2/24-2/27; transitioned to zosyn (2/27) for broad sepsis coverage in light of fever  -2/27 (sputum culture): growing pseudomonas   > c/w Zosyn (2/27-)    [ HEME ]  #GI Bleed   -Hgb 6.7 on admission  -s/p 6 units pRBC, 1 FFP, 1 plts  > monitor CBC q8 for now, if stable consider CBC q12  > hold chemical DVT ppx given GIB  > maintain SCDs    [ENDO]  #DM   -NPO for now  -FS q6 hrs  -ISS    #Hypothyroidism  -home synthroid IV while NPO    [ ETHICS ]  -Patient is Full Code

## 2023-03-02 NOTE — PROGRESS NOTE ADULT - SUBJECTIVE AND OBJECTIVE BOX
INTERVAL HPI/OVERNIGHT EVENTS: Provided Fent due to agitation.     SUBJECTIVE: Patient seen and examined at bedside. Intubated.     OBJECTIVE:    VITAL SIGNS:  ICU Vital Signs Last 24 Hrs  T(C): 37.4 (02 Mar 2023 08:00), Max: 37.4 (02 Mar 2023 08:00)  T(F): 99.3 (02 Mar 2023 08:00), Max: 99.3 (02 Mar 2023 08:00)  HR: 95 (02 Mar 2023 13:00) (60 - 95)  BP: 184/84 (02 Mar 2023 13:00) (110/54 - 184/84)  BP(mean): 112 (02 Mar 2023 13:00) (71 - 112)  ABP: 193/89 (02 Mar 2023 13:00) (101/43 - 195/81)  ABP(mean): 133 (02 Mar 2023 13:00) (65 - 133)  RR: 42 (02 Mar 2023 13:00) (16 - 42)  SpO2: 97% (02 Mar 2023 13:00) (95% - 100%)    O2 Parameters below as of 02 Mar 2023 09:00  Patient On (Oxygen Delivery Method): ventilator    O2 Concentration (%): 30      Mode: CPAP with PS, FiO2: 30, PEEP: 5, PS: 5, MAP: 7, PIP: 11    03-01 @ 07:01 - 03-02 @ 07:00  --------------------------------------------------------  IN: 1329.8 mL / OUT: 2565 mL / NET: -1235.2 mL    03-02 @ 07:01  -  03-02 @ 14:23  --------------------------------------------------------  IN: 120 mL / OUT: 260 mL / NET: -140 mL      CAPILLARY BLOOD GLUCOSE      POCT Blood Glucose.: 331 mg/dL (02 Mar 2023 11:55)      PHYSICAL EXAM:  GENERAL: Intubated, sedated  NECK: Supple, No JVD, Normal thyroid  CHEST/LUNG: Clear to auscultation bilaterally; No rales, rhonchi, wheezing, or rubs  HEART: Regular rate and rhythm; No murmurs, rubs, or gallops  ABDOMEN: Soft, Nontender, Nondistended; Bowel sounds present  SKIN: No rashes or lesions  NERVOUS SYSTEM:  Alert & Oriented X0, sedated, Noted deviated gaze, Nonpurposeful LE movement.     MEDICATIONS:  MEDICATIONS  (STANDING):  chlorhexidine 0.12% Liquid 15 milliLiter(s) Oral Mucosa every 12 hours  chlorhexidine 2% Cloths 1 Application(s) Topical <User Schedule>  coronavirus bivalent (EUA) Booster Vaccine (PFIZER) 0.3 milliLiter(s) IntraMuscular once  dexMEDEtomidine Infusion 0.2 MICROgram(s)/kG/Hr (2.77 mL/Hr) IV Continuous <Continuous>  dextrose 5%. 1000 milliLiter(s) (100 mL/Hr) IV Continuous <Continuous>  dextrose 5%. 1000 milliLiter(s) (50 mL/Hr) IV Continuous <Continuous>  dextrose 50% Injectable 25 Gram(s) IV Push once  dextrose 50% Injectable 12.5 Gram(s) IV Push once  dextrose 50% Injectable 25 Gram(s) IV Push once  glucagon  Injectable 1 milliGRAM(s) IntraMuscular once  insulin lispro (ADMELOG) corrective regimen sliding scale   SubCutaneous every 6 hours  insulin NPH human recombinant 5 Unit(s) SubCutaneous every 6 hours  levothyroxine Injectable 75 MICROGram(s) IV Push at bedtime  metoclopramide Injectable 5 milliGRAM(s) IV Push daily  pantoprazole  Injectable 40 milliGRAM(s) IV Push two times a day  piperacillin/tazobactam IVPB.. 3.375 Gram(s) IV Intermittent every 8 hours  polyethylene glycol 3350 17 Gram(s) Oral <User Schedule>  rifAXIMin 550 milliGRAM(s) Oral two times a day    MEDICATIONS  (PRN):  dextrose Oral Gel 15 Gram(s) Oral once PRN Blood Glucose LESS THAN 70 milliGRAM(s)/deciliter  petrolatum Ophthalmic Ointment 1 Application(s) Both EYES two times a day PRN dry eyes      ALLERGIES:  Allergies    [This allergen will not trigger allergy alert] &quot;lentils&quot;-&quot;itchiness&quot; (Other)  Beef (Other)  No Known Drug Allergies    Intolerances        LABS:                        8.9    5.31  )-----------( 47       ( 02 Mar 2023 01:15 )             26.8     03-02    153<H>  |  124<H>  |  38<H>  ----------------------------<  320<H>  3.0<L>   |  17<L>  |  1.05    Ca    8.2<L>      02 Mar 2023 01:15  Phos  1.3     03-02  Mg     2.40     03-02    TPro  5.2<L>  /  Alb  2.3<L>  /  TBili  2.3<H>  /  DBili  x   /  AST  78<H>  /  ALT  53<H>  /  AlkPhos  172<H>  03-02    PT/INR - ( 02 Mar 2023 01:15 )   PT: 23.9 sec;   INR: 2.05 ratio         PTT - ( 02 Mar 2023 01:15 )  PTT:35.0 sec      RADIOLOGY & ADDITIONAL TESTS: Reviewed.

## 2023-03-02 NOTE — PROGRESS NOTE ADULT - ASSESSMENT
68yo M w/ pmhx CAD s/p CABG, TANG cirrhosis, follicular lymphoma (on Rituximab infusions outpt), HTN, DM, hypothyroidism, currently on tenofovir (HBV Core +), presented to the ED w/ chest pain and constipation while in the ED patient w/ multiple episodes of hematemesis and hypotension; MICU consulted for hypotension, massive upper GI bleed s/p intubation, s/p 2/24 bedside scope with banding x6 of esophageal varices.     PLAN:     [ NEURO ]  #AMS   #r/o sedation vs hepatic encep  -sedation d/c on 3/1  > f/u daily ammonia  > c/w rifaximin BID, miralax BID  > consider head CT if no change in MS    [ CARDIO ]  #Hypovolemic Shock 2/2 GIB vs vasoplegia iso cirrhosis vs sepsis  -s/p 6U PRBCs   -on Levophed at .5, wean as tolerated  -diagnostic paracentesis to r/o SBP given hx of ascites 2/25 - transudative with no evidence of SBP   -bedside POCUS with grossly normal LV systolic fx   -2/25 (TTE) - dilated LA   -cortisol normal  > d/c midodrine    [ RESPIRATORY ]  #Airway Protection  -patient intubated for airway protection 2/2 hematemesis  > SBT as tolerated    #Respiratory Alkalosis  -noted episodes of over-breathing vent  -P-AC: 10 - 8/5 - 30  > consider precedex if r/p episode    [ GASTRO]  #UGIB  -pt presented for chest pain and constipation; during ED course patient with multiple episodes of massive hematemesis  -given Octreotide, PPI, CTX  -GI consulted; s/p 2/24 scope, erythromycin given prior to scope -> multiple large esophageal varices, six bands successfully placed with incomplete eradication of varices. Per GI bleeding likely due to EV, however unable to completely visualize stomach given presence of clotted blood.  -s/p Octreotide gtt (72 hours post procedure) and PPI 40mg IVP BID  -placed rectal tube; per hepatology no risk of rectal varices   > consider diagnostic tap (paracentesis) if continued fever  > f/u daily ammonia, c/w rifaximin BID, miralax BID  > c/w TF, goal 40 cc/hr  > will h/o CT abdomen     [ RENAL/ ]  -no active issues  -monitor electrolytes, Cr    [ INFECTIOUS ]  -Pt given 1 dose ceftriaxone given in ED for SBP prophylaxis  -Stopped with ceftriaxone 1g daily for SBP ppx (2/27)  -s/p ceft 2/24-2/27; transitioned to zosyn (2/27) for broad sepsis coverage in light of fever  -2/27 (sputum culture): growing pseudomonas   > c/w Zosyn (2/27-)  > ctm fevers, wbc    [ HEME ]  #GI Bleed   -Hgb 6.7 on admission  -s/p 6 units pRBC, 1 FFP, 1 plts  > monitor CBC q8 for now, if stable consider CBC q12  > hold chemical DVT ppx given GIB  > maintain SCDs    [ENDO]  #DM   -tube feeds  -FS q6 hrs  -ISS    #Hypothyroidism  -home synthroid IV     [ ETHICS ]  -Patient is Full Code  68yo M w/ pmhx CAD s/p CABG, TANG cirrhosis, follicular lymphoma (on Rituximab infusions outpt), HTN, DM, hypothyroidism, currently on tenofovir (HBV Core +), presented to the ED w/ chest pain and constipation while in the ED patient w/ multiple episodes of hematemesis and hypotension; MICU consulted for hypotension, massive upper GI bleed s/p intubation, s/p 2/24 bedside scope with banding x6 of esophageal varices.     PLAN:     [ NEURO ]  #AMS   #r/o sedation vs hepatic encep  -sedation d/c on 3/1  > f/u daily ammonia  > c/w rifaximin BID, miralax BID  > consider head CT if no change in MS    [ CARDIO ]  #Hypovolemic Shock 2/2 GIB vs vasoplegia iso cirrhosis vs sepsis  -s/p 6U PRBCs   -on Levophed at .5, wean as tolerated  -diagnostic paracentesis to r/o SBP given hx of ascites 2/25 - transudative with no evidence of SBP   -bedside POCUS with grossly normal LV systolic fx   -2/25 (TTE) - dilated LA   -cortisol normal  > d/c midodrine    [ RESPIRATORY ]  #Airway Protection  -patient intubated for airway protection 2/2 hematemesis  > SBT as tolerated    #Respiratory Alkalosis  -noted episodes of over-breathing vent  -P-AC: 10 - 8/5 - 30  > consider precedex if r/p episode    [ GASTRO]  #UGIB  -pt presented for chest pain and constipation; during ED course patient with multiple episodes of massive hematemesis  -given Octreotide, PPI, CTX  -GI consulted; s/p 2/24 scope, erythromycin given prior to scope -> multiple large esophageal varices, six bands successfully placed with incomplete eradication of varices. Per GI bleeding likely due to EV, however unable to completely visualize stomach given presence of clotted blood.  -s/p Octreotide gtt (72 hours post procedure) and PPI 40mg IVP BID  -placed rectal tube; per hepatology no risk of rectal varices   > consider diagnostic tap (paracentesis) if continued fever  > f/u daily ammonia, c/w rifaximin BID, miralax BID  > c/w TF, goal 40 cc/hr  > will h/o CT abdomen   > c/w reglan & PPI    [ RENAL/ ]  #Hypernatremia  -noted to Na 151  > c/w FWF 300q6  > 2.6L calculated deficit    [ INFECTIOUS ]  -Pt given 1 dose ceftriaxone given in ED for SBP prophylaxis  -Stopped with ceftriaxone 1g daily for SBP ppx (2/27)  -s/p ceft 2/24-2/27; transitioned to zosyn (2/27) for broad sepsis coverage in light of fever  -2/27 (sputum culture): growing pseudomonas   > c/w Zosyn (2/27-)  > ctm fevers, wbc    [ HEME ]  #GI Bleed   -Hgb 6.7 on admission  -s/p 6 units pRBC, 1 FFP, 1 plts  > monitor CBC q8 for now, if stable consider CBC q12  > hold chemical DVT ppx given GIB  > maintain SCDs    [ENDO]  #DM  -tube feeds  -NPH 5u q6hrs, ISS    #Hypothyroidism  -home synthroid IV     [ ETHICS ]  -Patient is Full Code

## 2023-03-02 NOTE — PROGRESS NOTE ADULT - ATTENDING COMMENTS
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 Endoscopy performed yesterday revealed large esophageal varices as the likely culprit for the bleed, s/p banding by GI yesterday. Remains in MICU for vasopressor support and mechanical ventilation.    H/H stable but with persistent encephalopathy 2/2 to elevate ammonia,   NGT getting rifaximin and lactulose.  Ammonia improving, still with poor mental status. renal function improving. No longer having fevers.     # Encephalopathy 2/2 to elevated ammonia level  # Cirrhosis  # Upper GI bleed 2/2 esophogeal varices  # Lymphoma  # Hep B  # Shock on pressors  # PA pneumonia  - Shock on pressor, s/p PRBC and EGD s/p banding, trend CBC, transfuse PRN,   - Being weaned off pressors  - Completed octreotide, c/w PPI  - Wean pressors as tolerated, Cortisol WNL  - Cirrhosis 2/2 to TANG, ammonia elevated to 195, now with NGT for rifaximin and lactulose. Add miralax, now making stool.   - Monitor off sedation  - Wean sedation as able  - PA growing from sputum, c/w abx  - C/W midodrine.   - NANCI improving, now off of albumin, continue to monitor  - DVT ppx on hold 2/2 to GIB  - Dispo- full code per family at bedside.     D/W MICU and family at bedside.

## 2023-03-03 NOTE — PROGRESS NOTE ADULT - ATTENDING COMMENTS
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 Endoscopy performed yesterday revealed large esophageal varices as the likely culprit for the bleed, s/p banding by GI yesterday. Remains in MICU for vasopressor support and mechanical ventilation.    H/H stable but with persistent encephalopathy 2/2 to elevate ammonia,   NGT getting rifaximin and lactulose.  Ammonia improving, still with poor mental status. renal function improving. No longer having fevers.    Now waking up despite normal ammonia. Checking CTH.     # Encephalopathy 2/2 to elevated ammonia level  # Cirrhosis  # Upper GI bleed 2/2 esophogeal varices  # Lymphoma  # Hep B  # Shock on pressors  # PA pneumonia  - Shock on pressor, s/p PRBC and EGD s/p banding, trend CBC, transfuse PRN,   - Being weaned off pressors- on and off. C/W midodrine.   - Completed octreotide, c/w PPI  - Wean pressors as tolerated, Cortisol WNL  - Cirrhosis 2/2 to TANG, ammonia elevated to 195, now with NGT for rifaximin and miralax. Now improved.  - Will check CT A/P with contrast to assess for TIPS  - Monitor off sedation- check CTH today.   - Wean sedation as able  - PA growing from sputum, c/w abx  - C/W midodrine.   - NANCI improving, now off of albumin, continue to monitor  - DVT ppx on hold 2/2 to GIB  - Dispo- full code per family at bedside.     D/W MICU and family at bedside.

## 2023-03-03 NOTE — PROGRESS NOTE ADULT - ASSESSMENT
67M follows at [facility] Hx decompensated TANG cirrhosis (+ascites, +EV -- previously MELD Na 8), CAD s/p CABG s/p PCI (last in 2012 on ASA, now off plavix x2 weeks), newly diagnosed follicular lymphoma (on rituximab), HTN, DM, currently on tenofovir (HBV Core +) presented with right sided chest/abdominal pain with lizzette hematemesis + clots c/b hemorrhagic shock, s/p intubation in MICU, EGD showing large varices s/p banding, unable to completely clear stomach due to clot. Now w/ new fever and downtrending Hg    #Hemorrhagic shock  #Hematemesis: s/p EGD 2/24/2023 s/p EVL x6, bleeding likely due to EV, however unable to completely visualize stomach given presence of clotted blood. H/H now stable.   #NANCI  #Decompensated TANG cirrhosis: follows at [facility], previously low meld Na 8  --MELD Na: 22  --Ascites: (+) Hx, on lasix 40, spironolactone 50 at home, holding here  --SBP; no Hx  --EV: (+) Hx, no EVB --> is on nadolol at home  --PVT: no Hx  --HCC: no Hx    Recommendations:  - CT A/P with contrast triple phase to evaluate anatomy and plan for possible early TIPS if patient continues to have bleeding from varices  - No plans for repeat EGD at this time  - Aggressive lactulose, titrate for 3-5BMs or ~800cc stool output per day when considering extubation  - f/u infectious workup, c/w zosyn, consider repeat paracentesis  - c/w pantoprazole 40 IV BID  - no need for further albumin  - hold diuretics  - repeat EGD in 4 weeks for variceal banding  - trend CMP, CBC, coags    Please note that the recommendations are not final until attested by an attending.    Thank you for involving us in the care of this patient. Please reach out if any further questions.    Ulices Ray, PGY-6  Gastroenterology/Hepatology Fellow    Available on [employer] Teams  After 5PM/Weekends, please contact the on-call GI fellow: [phone]

## 2023-03-03 NOTE — PROGRESS NOTE ADULT - ASSESSMENT
68yo M w/ pmhx CAD s/p CABG, TANG cirrhosis, follicular lymphoma (on Rituximab infusions outpt), HTN, DM, hypothyroidism, currently on tenofovir (HBV Core +), presented to the ED w/ chest pain and constipation while in the ED patient w/ multiple episodes of hematemesis and hypotension; MICU consulted for hypotension, massive upper GI bleed s/p intubation, s/p 2/24 bedside scope with banding x6 of esophageal varices.     PLAN:     [ NEURO ]  #AMS   #r/o sedation vs hepatic encep  -sedation d/c on 3/1  > f/u daily ammonia  > c/w rifaximin BID, miralax BID  > consider head CT if no change in MS    [ CARDIO ]  #Hypovolemic Shock 2/2 GIB vs vasoplegia iso cirrhosis vs sepsis  -s/p 6U PRBCs   -on Levophed at .5, wean as tolerated  -diagnostic paracentesis to r/o SBP given hx of ascites 2/25 - transudative with no evidence of SBP   -bedside POCUS with grossly normal LV systolic fx   -2/25 (TTE) - dilated LA   -cortisol normal  > d/c midodrine    [ RESPIRATORY ]  #Airway Protection  -patient intubated for airway protection 2/2 hematemesis  > SBT as tolerated    #Respiratory Alkalosis  -noted episodes of over-breathing vent  -P-AC: 10 - 8/5 - 30  > consider precedex if r/p episode    [ GASTRO]  #UGIB  -pt presented for chest pain and constipation; during ED course patient with multiple episodes of massive hematemesis  -given Octreotide, PPI, CTX  -GI consulted; s/p 2/24 scope, erythromycin given prior to scope -> multiple large esophageal varices, six bands successfully placed with incomplete eradication of varices. Per GI bleeding likely due to EV, however unable to completely visualize stomach given presence of clotted blood.  -s/p Octreotide gtt (72 hours post procedure) and PPI 40mg IVP BID  -placed rectal tube; per hepatology no risk of rectal varices   > consider diagnostic tap (paracentesis) if continued fever  > f/u daily ammonia, c/w rifaximin BID, miralax BID  > c/w TF, goal 40 cc/hr  > will h/o CT abdomen   > c/w reglan & PPI    [ RENAL/ ]  #Hypernatremia  -noted to Na 151  > c/w FWF 300q6  > 2.6L calculated deficit    [ INFECTIOUS ]  -Pt given 1 dose ceftriaxone given in ED for SBP prophylaxis  -Stopped with ceftriaxone 1g daily for SBP ppx (2/27)  -s/p ceft 2/24-2/27; transitioned to zosyn (2/27) for broad sepsis coverage in light of fever  -2/27 (sputum culture): growing pseudomonas   > c/w Zosyn (2/27-)  > ctm fevers, wbc    [ HEME ]  #GI Bleed   -Hgb 6.7 on admission  -s/p 6 units pRBC, 1 FFP, 1 plts  > monitor CBC q8 for now, if stable consider CBC q12  > hold chemical DVT ppx given GIB  > maintain SCDs    [ENDO]  #DM  -tube feeds  -NPH 5u q6hrs, ISS    #Hypothyroidism  -home synthroid IV     [ ETHICS ]  -Patient is Full Code  66yo M w/ pmhx CAD s/p CABG, TANG cirrhosis, follicular lymphoma (on Rituximab infusions outpt), HTN, DM, hypothyroidism, currently on tenofovir (HBV Core +), presented to the ED w/ chest pain and constipation while in the ED patient w/ multiple episodes of hematemesis and hypotension; MICU consulted for hypotension, massive upper GI bleed s/p intubation, s/p 2/24 bedside scope with banding x6 of esophageal varices.     PLAN:     [ NEURO ]  #AMS   #r/o sedation vs hepatic encep  -sedation d/c on 3/1  > f/u daily ammonia  > c/w rifaximin BID, miralax BID  > ordered CTH due to continued AMS    [ CARDIO ]  #Hypovolemic Shock 2/2 GIB vs vasoplegia iso cirrhosis vs sepsis  -s/p 6U PRBCs   -on Levophed at .5, wean as tolerated  -diagnostic paracentesis to r/o SBP given hx of ascites 2/25 - transudative with no evidence of SBP   -bedside POCUS with grossly normal LV systolic fx   -2/25 (TTE) - dilated LA   -cortisol normal  > consider midodrine if hypotensive    [ RESPIRATORY ]  #Airway Protection  -patient intubated for airway protection 2/2 hematemesis  > SBT as tolerated    #Respiratory Alkalosis  -noted episodes of over-breathing vent  -P-AC: 10 - 8/5 - 30  > wean Precedex    [ GASTRO]  #UGIB  -pt presented for chest pain and constipation; during ED course patient with multiple episodes of massive hematemesis  -given Octreotide, PPI, CTX  -GI consulted; s/p 2/24 scope, erythromycin given prior to scope -> multiple large esophageal varices, six bands successfully placed with incomplete eradication of varices. Per GI bleeding likely due to EV, however unable to completely visualize stomach given presence of clotted blood.  -s/p Octreotide gtt (72 hours post procedure) and PPI 40mg IVP BID  -placed rectal tube; per hepatology no risk of rectal varices   > consider diagnostic tap (paracentesis) if continued fever  > f/u daily ammonia, c/w rifaximin BID, miralax BID  > c/w TF, goal 40 cc/hr  > ordered CT abdomen   > c/w reglan & PPI    [ RENAL/ ]  #Hypernatremia  -noted to Na 151  > c/w FWF 350q4  > 2.6L calculated deficit    [ INFECTIOUS ]  -Pt given 1 dose ceftriaxone given in ED for SBP prophylaxis  -Stopped with ceftriaxone 1g daily for SBP ppx (2/27)  -s/p ceft 2/24-2/27; transitioned to zosyn (2/27) for broad sepsis coverage in light of fever  -2/27 (sputum culture): growing pseudomonas   > c/w Zosyn (2/27-), 7 days  > ctm fevers, wbc    [ HEME ]  #GI Bleed   -Hgb 6.7 on admission  -s/p 6 units pRBC, 1 FFP, 1 plts  > monitor CBC q8 for now, if stable consider CBC q12  > hold chemical DVT ppx given GIB  > maintain SCDs    [ENDO]  #DM  -tube feeds  -NPH 11u q6hrs, ISS    #Hypothyroidism  -home synthroid IV     [ ETHICS ]  -Patient is Full Code

## 2023-03-03 NOTE — PROGRESS NOTE ADULT - SUBJECTIVE AND OBJECTIVE BOX
INTERVAL HPI/OVERNIGHT EVENTS: Fent x 2 o/n w/ Precedex for agitation. Requiring levo for limited period.      SUBJECTIVE: Patient seen and examined at bedside. Intubated. A&O x 0.     OBJECTIVE:    VITAL SIGNS:  ICU Vital Signs Last 24 Hrs  T(C): 37.8 (03 Mar 2023 12:00), Max: 37.8 (03 Mar 2023 12:00)  T(F): 100 (03 Mar 2023 12:00), Max: 100 (03 Mar 2023 12:00)  HR: 63 (03 Mar 2023 14:55) (57 - 91)  BP: 110/66 (03 Mar 2023 08:30) (110/66 - 137/68)  BP(mean): 75 (03 Mar 2023 08:30) (75 - 90)  ABP: 101/44 (03 Mar 2023 14:00) (89/44 - 145/67)  ABP(mean): 65 (03 Mar 2023 14:00) (61 - 98)  RR: 26 (03 Mar 2023 14:00) (21 - 31)  SpO2: 96% (03 Mar 2023 14:55) (93% - 99%)    O2 Parameters below as of 03 Mar 2023 14:00  Patient On (Oxygen Delivery Method): ventilator,CPAP 5/5    O2 Concentration (%): 30      Mode: CPAP with PS, FiO2: 30, PEEP: 5, PS: 5, MAP: 8, PC: , PIP: 11    03-02 @ 07:01 - 03-03 @ 07:00  --------------------------------------------------------  IN: 2069.6 mL / OUT: 1995 mL / NET: 74.6 mL    03-03 @ 07:01  -  03-03 @ 15:39  --------------------------------------------------------  IN: 1249.6 mL / OUT: 285 mL / NET: 964.6 mL      CAPILLARY BLOOD GLUCOSE      POCT Blood Glucose.: 280 mg/dL (03 Mar 2023 12:32)      PHYSICAL EXAM:  GENERAL: Intubated, sedated  NECK: Supple, No JVD, Normal thyroid  CHEST/LUNG: Clear to auscultation bilaterally; No rales, rhonchi, wheezing, or rubs  HEART: Regular rate and rhythm; No murmurs, rubs, or gallops  ABDOMEN: Soft, Nontender, Nondistended; Bowel sounds present  SKIN: No rashes or lesions  NERVOUS SYSTEM:  Alert & Oriented X0, sedated, Noted deviated gaze, Nonpurposeful LE movement.     MEDICATIONS:  MEDICATIONS  (STANDING):  chlorhexidine 0.12% Liquid 15 milliLiter(s) Oral Mucosa every 12 hours  chlorhexidine 2% Cloths 1 Application(s) Topical <User Schedule>  coronavirus bivalent (EUA) Booster Vaccine (PFIZER) 0.3 milliLiter(s) IntraMuscular once  dexMEDEtomidine Infusion 0.2 MICROgram(s)/kG/Hr (2.77 mL/Hr) IV Continuous <Continuous>  dextrose 5%. 1000 milliLiter(s) (100 mL/Hr) IV Continuous <Continuous>  dextrose 5%. 1000 milliLiter(s) (50 mL/Hr) IV Continuous <Continuous>  dextrose 50% Injectable 25 Gram(s) IV Push once  dextrose 50% Injectable 12.5 Gram(s) IV Push once  dextrose 50% Injectable 25 Gram(s) IV Push once  glucagon  Injectable 1 milliGRAM(s) IntraMuscular once  insulin lispro (ADMELOG) corrective regimen sliding scale   SubCutaneous every 6 hours  insulin NPH human recombinant 11 Unit(s) SubCutaneous every 6 hours  levothyroxine Injectable 75 MICROGram(s) IV Push at bedtime  metoclopramide Injectable 5 milliGRAM(s) IV Push daily  norepinephrine Infusion 0.03 MICROgram(s)/kG/Min (3.12 mL/Hr) IV Continuous <Continuous>  pantoprazole  Injectable 40 milliGRAM(s) IV Push two times a day  piperacillin/tazobactam IVPB.. 3.375 Gram(s) IV Intermittent every 8 hours  polyethylene glycol 3350 17 Gram(s) Oral <User Schedule>  rifAXIMin 550 milliGRAM(s) Oral two times a day    MEDICATIONS  (PRN):  dextrose Oral Gel 15 Gram(s) Oral once PRN Blood Glucose LESS THAN 70 milliGRAM(s)/deciliter  petrolatum Ophthalmic Ointment 1 Application(s) Both EYES two times a day PRN dry eyes      ALLERGIES:  Allergies    [This allergen will not trigger allergy alert] &quot;lentils&quot;-&quot;itchiness&quot; (Other)  Beef (Other)  No Known Drug Allergies    Intolerances        LABS:                        8.3    4.91  )-----------( 55       ( 03 Mar 2023 01:30 )             25.2     03-03    154<H>  |  126<H>  |  44<H>  ----------------------------<  368<H>  3.6   |  17<L>  |  1.23    Ca    8.1<L>      03 Mar 2023 01:00  Phos  2.1     03-03  Mg     2.40     03-03    TPro  5.0<L>  /  Alb  2.2<L>  /  TBili  1.6<H>  /  DBili  x   /  AST  52<H>  /  ALT  40  /  AlkPhos  211<H>  03-03    PT/INR - ( 03 Mar 2023 01:00 )   PT: 23.5 sec;   INR: 2.01 ratio         PTT - ( 03 Mar 2023 01:00 )  PTT:38.3 sec      RADIOLOGY & ADDITIONAL TESTS: Reviewed.

## 2023-03-03 NOTE — PROGRESS NOTE ADULT - SUBJECTIVE AND OBJECTIVE BOX
Interval Events:   - Remains intubated  - Having multiple dark-green/brown BMs      Allergies:  [This allergen will not trigger allergy alert] &quot;lentils&quot;-&quot;itchiness&quot; (Other)  Beef (Other)  No Known Drug Allergies        Hospital Medications:  chlorhexidine 0.12% Liquid 15 milliLiter(s) Oral Mucosa every 12 hours  chlorhexidine 2% Cloths 1 Application(s) Topical <User Schedule>  coronavirus bivalent (EUA) Booster Vaccine (PFIZER) 0.3 milliLiter(s) IntraMuscular once  dexMEDEtomidine Infusion 0.2 MICROgram(s)/kG/Hr IV Continuous <Continuous>  dextrose 5%. 1000 milliLiter(s) IV Continuous <Continuous>  dextrose 5%. 1000 milliLiter(s) IV Continuous <Continuous>  dextrose 50% Injectable 25 Gram(s) IV Push once  dextrose 50% Injectable 12.5 Gram(s) IV Push once  dextrose 50% Injectable 25 Gram(s) IV Push once  dextrose Oral Gel 15 Gram(s) Oral once PRN  glucagon  Injectable 1 milliGRAM(s) IntraMuscular once  insulin lispro (ADMELOG) corrective regimen sliding scale   SubCutaneous every 6 hours  insulin NPH human recombinant 11 Unit(s) SubCutaneous every 6 hours  levothyroxine Injectable 75 MICROGram(s) IV Push at bedtime  metoclopramide Injectable 5 milliGRAM(s) IV Push daily  norepinephrine Infusion 0.03 MICROgram(s)/kG/Min IV Continuous <Continuous>  pantoprazole  Injectable 40 milliGRAM(s) IV Push two times a day  petrolatum Ophthalmic Ointment 1 Application(s) Both EYES two times a day PRN  piperacillin/tazobactam IVPB.. 3.375 Gram(s) IV Intermittent every 8 hours  polyethylene glycol 3350 17 Gram(s) Oral <User Schedule>  rifAXIMin 550 milliGRAM(s) Oral two times a day      PMHX/PSHX:  CAD (coronary artery disease)    Diabetes    Lymphoma    Cirrhosis    S/P CABG x 1        Family history:  No pertinent family history in first degree relatives        ROS: As per HPI, otherwise 14-point ROS reviewed and negative.      PHYSICAL EXAM:   Vital Signs:  Vital Signs Last 24 Hrs  T(C): 37.8 (03 Mar 2023 12:00), Max: 37.8 (03 Mar 2023 12:00)  T(F): 100 (03 Mar 2023 12:00), Max: 100 (03 Mar 2023 12:00)  HR: 64 (03 Mar 2023 13:00) (57 - 91)  BP: 110/66 (03 Mar 2023 08:30) (96/58 - 137/68)  BP(mean): 75 (03 Mar 2023 08:30) (71 - 90)  RR: 26 (03 Mar 2023 13:00) (21 - 31)  SpO2: 96% (03 Mar 2023 13:00) (93% - 99%)    Parameters below as of 03 Mar 2023 13:00  Patient On (Oxygen Delivery Method): ventilator,CPAP 5/5    O2 Concentration (%): 30  Daily     Daily Weight in k.2 (03 Mar 2023 04:00)      23 @ 07:01  -  23 @ 07:00  --------------------------------------------------------  IN: 2069.6 mL / OUT: 1995 mL / NET: 74.6 mL    23 @ 07:01  -  23 @ 13:52  --------------------------------------------------------  IN: 756.8 mL / OUT: 255 mL / NET: 501.8 mL      GENERAL:  intubated  HEENT:  NCAT, no scleral icterus  CHEST: intubated   HEART:  RRR  ABDOMEN:  Soft, non-tender, non-distended, normoactive bowel sounds, no masses  EXTREMITIES:  No cyanosis, clubbing, or edema  SKIN:  No rash/erythema/ecchymoses/petechiae/wounds/abscess/warm/dry  NEURO: A&O x 0    LABS:                        8.3    4.91  )-----------( 55       ( 03 Mar 2023 01:30 )             25.2     Mean Cell Volume: 89.4 fL (23 @ 01:30)        154<H>  |  126<H>  |  44<H>  ----------------------------<  368<H>  3.6   |  17<L>  |  1.23    Ca    8.1<L>      03 Mar 2023 01:00  Phos  2.1       Mg     2.40         TPro  5.0<L>  /  Alb  2.2<L>  /  TBili  1.6<H>  /  DBili  x   /  AST  52<H>  /  ALT  40  /  AlkPhos  211<H>      LIVER FUNCTIONS - ( 03 Mar 2023 01:00 )  Alb: 2.2 g/dL / Pro: 5.0 g/dL / ALK PHOS: 211 U/L / ALT: 40 U/L / AST: 52 U/L / GGT: x           PT/INR - ( 03 Mar 2023 01:00 )   PT: 23.5 sec;   INR: 2.01 ratio         PTT - ( 03 Mar 2023 01:00 )  PTT:38.3 sec    Amylase Serum--      Lipase serum--       [id number]  Amylase Serum--      Lipase serum--       [id number]                          8.3    4.91  )-----------( 55       ( 03 Mar 2023 01:30 )             25.2                         8.9    5.31  )-----------( 47       ( 02 Mar 2023 01:15 )             26.8                         9.2    5.87  )-----------( 44       ( 01 Mar 2023 17:30 )             26.9                         9.4    5.99  )-----------( 47       ( 01 Mar 2023 09:00 )             28.0                         10.0   8.74  )-----------( 66       ( 01 Mar 2023 01:10 )             28.8       Imaging:

## 2023-03-04 NOTE — PROGRESS NOTE ADULT - ATTENDING COMMENTS
67 M with TANG cirrhosis, CABG, lymphoma on chemo here with hemorraghic shock and acute hypoxemic respiratory failure requiring intubation s/p EGD and banding    Still in shock  still not fully awake, suspect due to hyperammonemia and sedation      # acute metabolic Encephalopathy 2/2 to elevated ammonia level  # Cirrhosis  # Upper GI bleed 2/2 esophogeal varices  # Lymphoma  # Shock on pressors, hemorraghic and septic  # PA pneumonia  - c/w full vent support for now, PS trials as tolerated  - c/w vasopressors as needed  - c/w ppi, serial CBCs  - f/u CT head given persistent encephalopathy, although suspect acute metabolic encephalopathy  - c/w broad abx for pseudomonas pneumonia, f/u CT ordered  - abdomen distended but having some BMs, f/u CT     Critically ill patient requiring frequent bedside visits with therapy changes.

## 2023-03-04 NOTE — PROGRESS NOTE ADULT - ASSESSMENT
68yo M w/ pmhx CAD s/p CABG, TANG cirrhosis, follicular lymphoma (on Rituximab infusions outpt), HTN, DM, hypothyroidism, currently on tenofovir (HBV Core +), presented to the ED w/ chest pain and constipation while in the ED patient w/ multiple episodes of hematemesis and hypotension; MICU consulted for hypotension, massive upper GI bleed s/p intubation, s/p 2/24 bedside scope with banding x6 of esophageal varices.     PLAN:     [ NEURO ]  #AMS   #r/o sedation vs hepatic encep  -sedation d/c on 3/1  > f/u daily ammonia  > c/w rifaximin BID, miralax BID  > ordered CTH due to continued AMS    [ CARDIO ]  #Hypovolemic Shock 2/2 GIB vs vasoplegia iso cirrhosis vs sepsis  -s/p 6U PRBCs   -on Levophed at .5, wean as tolerated  -diagnostic paracentesis to r/o SBP given hx of ascites 2/25 - transudative with no evidence of SBP   -bedside POCUS with grossly normal LV systolic fx   -2/25 (TTE) - dilated LA   -cortisol normal  > consider midodrine if hypotensive    [ RESPIRATORY ]  #Airway Protection  -patient intubated for airway protection 2/2 hematemesis  > SBT as tolerated    #Respiratory Alkalosis  -noted episodes of over-breathing vent  -P-AC: 10 - 8/5 - 30  > wean Precedex    [ GASTRO]  #UGIB  -pt presented for chest pain and constipation; during ED course patient with multiple episodes of massive hematemesis  -given Octreotide, PPI, CTX  -GI consulted; s/p 2/24 scope, erythromycin given prior to scope -> multiple large esophageal varices, six bands successfully placed with incomplete eradication of varices. Per GI bleeding likely due to EV, however unable to completely visualize stomach given presence of clotted blood.  -s/p Octreotide gtt (72 hours post procedure) and PPI 40mg IVP BID  -placed rectal tube; per hepatology no risk of rectal varices   > consider diagnostic tap (paracentesis) if continued fever  > f/u daily ammonia, c/w rifaximin BID, miralax BID  > c/w TF, goal 40 cc/hr  > ordered CT abdomen   > c/w reglan & PPI    [ RENAL/ ]  #Hypernatremia  -noted to Na 151  > c/w FWF 350q4  > 2.6L calculated deficit    [ INFECTIOUS ]  -Pt given 1 dose ceftriaxone given in ED for SBP prophylaxis  -Stopped with ceftriaxone 1g daily for SBP ppx (2/27)  -s/p ceft 2/24-2/27; transitioned to zosyn (2/27) for broad sepsis coverage in light of fever  -2/27 (sputum culture): growing pseudomonas   > c/w Zosyn (2/27-), 7 days  > ctm fevers, wbc    [ HEME ]  #GI Bleed   -Hgb 6.7 on admission  -s/p 6 units pRBC, 1 FFP, 1 plts  > monitor CBC q8 for now, if stable consider CBC q12  > hold chemical DVT ppx given GIB  > maintain SCDs    [ENDO]  #DM  -tube feeds  -NPH 11u q6hrs, ISS    #Hypothyroidism  -home synthroid IV     [ ETHICS ]  -Patient is Full Code  68yo M w/ pmhx CAD s/p CABG, TANG cirrhosis, follicular lymphoma (on Rituximab infusions outpt), HTN, DM, hypothyroidism, currently on tenofovir (HBV Core +), presented to the ED w/ chest pain and constipation while in the ED patient w/ multiple episodes of hematemesis and hypotension; MICU consulted for hypotension, massive upper GI bleed s/p intubation, s/p 2/24 bedside scope with banding x6 of esophageal varices.     PLAN:     [ NEURO ]  #AMS   #r/o sedation vs hepatic encep  -sedation d/c on 3/1  > f/u daily ammonia - normal now   > c/w rifaximin BID, miralax BID  > f/u CTH due to continued AMS  > consider EEG to r/o seizure given continued AMS with occ tachy/HTN    [ CARDIO ]  #Hypovolemic Shock 2/2 GIB vs vasoplegia iso cirrhosis vs sepsis  -s/p 6U PRBCs   -on Levophed at .5, wean as tolerated  -diagnostic paracentesis to r/o SBP given hx of ascites 2/25 - transudative with no evidence of SBP   -bedside POCUS with grossly normal LV systolic fx   -2/25 (TTE) - dilated LA   -cortisol normal  > on midodrine 10mg q8h     [ RESPIRATORY ]  #Airway Protection  -patient intubated for airway protection 2/2 hematemesis  > SBT as tolerated    #Respiratory Alkalosis  -noted episodes of over-breathing vent  -P-AC: 10 - 8/5 - 30  > attempt to wean Precedex    [ GASTRO]  #UGIB  -pt presented for chest pain and constipation; during ED course patient with multiple episodes of massive hematemesis  -given Octreotide, PPI, CTX  -GI consulted; s/p 2/24 scope, erythromycin given prior to scope -> multiple large esophageal varices, six bands successfully placed with incomplete eradication of varices. Per GI bleeding likely due to EV, however unable to completely visualize stomach given presence of clotted blood.  -s/p Octreotide gtt (72 hours post procedure) and PPI 40mg IVP BID  -placed rectal tube; per hepatology no risk of rectal varices   > consider diagnostic tap (paracentesis) if continued fever  > f/u daily ammonia, c/w rifaximin BID, miralax BID  > c/w TF, goal 40 cc/hr  > f/u CT abdomen w/ triple phase for TIPS planning  > c/w reglan & PPI    [ RENAL/ ]  #Hypernatremia  -noted to Na 149  > c/w FWF 350q4  > 2.6L calculated deficit    [ INFECTIOUS ]  -Pt given 1 dose ceftriaxone given in ED for SBP prophylaxis  -Stopped with ceftriaxone 1g daily for SBP ppx (2/27)  -s/p ceft 2/24-2/27; transitioned to zosyn (2/27) for broad sepsis coverage in light of fever  -2/27 (sputum culture): growing pseudomonas   > c/w Zosyn (2/27-3/5), 7 days  > ctm fevers, wbc    [ HEME ]  #GI Bleed   -Hgb 6.7 on admission  -s/p 6 units pRBC, 1 FFP, 1 plts  > monitor CBC q8 for now, if stable consider CBC q12  > hold chemical DVT ppx given GIB  > maintain SCDs    [ENDO]  #DM  -tube feeds  -NPH 11u q6hrs, ISS    #Hypothyroidism  -home synthroid IV     [ ETHICS ]  -Patient is Full Code  68yo M w/ pmhx CAD s/p CABG, TANG cirrhosis, follicular lymphoma (on Rituximab infusions outpt), HTN, DM, hypothyroidism, currently on tenofovir (HBV Core +), presented to the ED w/ chest pain and constipation while in the ED patient w/ multiple episodes of hematemesis and hypotension; MICU consulted for hypotension, massive upper GI bleed s/p intubation, s/p 2/24 bedside scope with banding x6 of esophageal varices.     PLAN:     [ NEURO ]  #AMS   #r/o sedation vs hepatic encep  -sedation d/c on 3/1  > f/u daily ammonia - normal now   > c/w rifaximin BID, miralax BID  > f/u CTH due to continued AMS  > f/u EEG to r/o seizure given continued AMS with occ tachy/HTN    [ CARDIO ]  #Hypovolemic Shock 2/2 GIB vs vasoplegia iso cirrhosis vs sepsis  -s/p 6U PRBCs   -on Levophed at .5, wean as tolerated  -diagnostic paracentesis to r/o SBP given hx of ascites 2/25 - transudative with no evidence of SBP   -bedside POCUS with grossly normal LV systolic fx   -2/25 (TTE) - dilated LA   -cortisol normal  > on midodrine 10mg q8h     [ RESPIRATORY ]  #Airway Protection  -patient intubated for airway protection 2/2 hematemesis  > SBT as tolerated  > pressure support     #Respiratory Alkalosis  -noted episodes of over-breathing vent  -P-AC: 10 - 5/5 - 30  > attempt to wean Precedex    [ GASTRO]  #UGIB  -pt presented for chest pain and constipation; during ED course patient with multiple episodes of massive hematemesis  -given Octreotide, PPI, CTX  -GI consulted; s/p 2/24 scope, erythromycin given prior to scope -> multiple large esophageal varices, six bands successfully placed with incomplete eradication of varices. Per GI bleeding likely due to EV, however unable to completely visualize stomach given presence of clotted blood.  -s/p Octreotide gtt (72 hours post procedure) and PPI 40mg IVP BID  -placed rectal tube; per hepatology no risk of rectal varices   > consider diagnostic tap (paracentesis) if continued fever  > f/u daily ammonia, c/w rifaximin BID, miralax BID  > c/w TF, goal 40 cc/hr  > f/u CT abdomen w/ triple phase for TIPS planning  > c/w reglan & PPI    [ RENAL/ ]  #Hypernatremia  -noted to Na 149  > c/w FWF 350q4  > 2.6L calculated deficit    [ INFECTIOUS ]  -Pt given 1 dose ceftriaxone given in ED for SBP prophylaxis  -Stopped with ceftriaxone 1g daily for SBP ppx (2/27)  -s/p ceft 2/24-2/27; transitioned to zosyn (2/27) for broad sepsis coverage in light of fever  -2/27 (sputum culture): growing pseudomonas   > c/w Zosyn (2/27-3/5), 7 days  > ctm fevers, wbc    [ HEME ]  #GI Bleed   -Hgb 6.7 on admission  -s/p 6 units pRBC, 1 FFP, 1 plts  > monitor CBC q8 for now, if stable consider CBC q12  > hold chemical DVT ppx given GIB  > maintain SCDs    [ENDO]  #DM  -tube feeds  -NPH 11u q6hrs, ISS    #Hypothyroidism  -home synthroid IV     [ ETHICS ]  -Patient is Full Code

## 2023-03-04 NOTE — PROGRESS NOTE ADULT - SUBJECTIVE AND OBJECTIVE BOX
Patient is a 67y old  Male who presents with a chief complaint of 66yo M w TANG cirrhosis & follicular lymphoma presented w chest pain and constipation.  Pt. with multiple episodes hematemesis & hypotension.  Found to have massive UGIB requiring intubation.  S/p EGD (2/24) w findings of esophageal varices x6, s/p banding, however with incomplete eradication of varices.      (28 Feb 2023 08:53)      INTERVAL HPI/OVERNIGHT EVENTS:   No overnight events   Afebrile, hemodynamically stable     ICU Vital Signs Last 24 Hrs  T(C): 38 (04 Mar 2023 00:00), Max: 38 (04 Mar 2023 00:00)  T(F): 100.4 (04 Mar 2023 00:00), Max: 100.4 (04 Mar 2023 00:00)  HR: 60 (04 Mar 2023 06:00) (54 - 74)  BP: 110/66 (03 Mar 2023 08:30) (110/66 - 110/66)  BP(mean): 75 (03 Mar 2023 08:30) (75 - 75)  ABP: 101/46 (04 Mar 2023 06:00) (98/39 - 149/53)  ABP(mean): 66 (04 Mar 2023 06:00) (60 - 90)  RR: 22 (04 Mar 2023 06:00) (17 - 31)  SpO2: 96% (04 Mar 2023 06:00) (93% - 99%)    O2 Parameters below as of 04 Mar 2023 07:00  Patient On (Oxygen Delivery Method): ventilator,p/ac    O2 Concentration (%): 30      I&O's Summary    03 Mar 2023 07:01  -  04 Mar 2023 07:00  --------------------------------------------------------  IN: 3464.6 mL / OUT: 1015 mL / NET: 2449.6 mL          LABS:                        8.6    6.48  )-----------( 54       ( 04 Mar 2023 02:15 )             26.6     03-04    149<H>  |  121<H>  |  41<H>  ----------------------------<  191<H>  3.5   |  17<L>  |  1.24    Ca    7.9<L>      04 Mar 2023 02:33  Phos  3.0     03-04  Mg     2.30     03-04    TPro  5.2<L>  /  Alb  2.5<L>  /  TBili  2.0<H>  /  DBili  x   /  AST  66<H>  /  ALT  37  /  AlkPhos  234<H>  03-04    PT/INR - ( 03 Mar 2023 01:00 )   PT: 23.5 sec;   INR: 2.01 ratio         PTT - ( 04 Mar 2023 02:15 )  PTT:32.7 sec    CAPILLARY BLOOD GLUCOSE      POCT Blood Glucose.: 174 mg/dL (04 Mar 2023 05:19)  POCT Blood Glucose.: 183 mg/dL (03 Mar 2023 23:05)  POCT Blood Glucose.: 218 mg/dL (03 Mar 2023 17:19)  POCT Blood Glucose.: 280 mg/dL (03 Mar 2023 12:32)    ABG - ( 04 Mar 2023 02:15 )  pH, Arterial: 7.48  pH, Blood: x     /  pCO2: 24    /  pO2: 77    / HCO3: 18    / Base Excess: -3.8  /  SaO2: 96.2                RADIOLOGY & ADDITIONAL TESTS:    Consultant(s) Notes Reviewed:  [x ] YES  [ ] NO    MEDICATIONS  (STANDING):  chlorhexidine 0.12% Liquid 15 milliLiter(s) Oral Mucosa every 12 hours  chlorhexidine 2% Cloths 1 Application(s) Topical <User Schedule>  coronavirus bivalent (EUA) Booster Vaccine (PFIZER) 0.3 milliLiter(s) IntraMuscular once  dexMEDEtomidine Infusion 0.2 MICROgram(s)/kG/Hr (2.77 mL/Hr) IV Continuous <Continuous>  dextrose 5%. 1000 milliLiter(s) (50 mL/Hr) IV Continuous <Continuous>  dextrose 5%. 1000 milliLiter(s) (100 mL/Hr) IV Continuous <Continuous>  dextrose 50% Injectable 25 Gram(s) IV Push once  dextrose 50% Injectable 12.5 Gram(s) IV Push once  dextrose 50% Injectable 25 Gram(s) IV Push once  glucagon  Injectable 1 milliGRAM(s) IntraMuscular once  insulin lispro (ADMELOG) corrective regimen sliding scale   SubCutaneous every 6 hours  insulin NPH human recombinant 11 Unit(s) SubCutaneous every 6 hours  levothyroxine Injectable 75 MICROGram(s) IV Push at bedtime  metoclopramide Injectable 5 milliGRAM(s) IV Push daily  midodrine 10 milliGRAM(s) Oral every 8 hours  norepinephrine Infusion 0.03 MICROgram(s)/kG/Min (3.12 mL/Hr) IV Continuous <Continuous>  pantoprazole  Injectable 40 milliGRAM(s) IV Push two times a day  piperacillin/tazobactam IVPB.. 3.375 Gram(s) IV Intermittent every 8 hours  polyethylene glycol 3350 17 Gram(s) Oral <User Schedule>  rifAXIMin 550 milliGRAM(s) Oral two times a day    MEDICATIONS  (PRN):  dextrose Oral Gel 15 Gram(s) Oral once PRN Blood Glucose LESS THAN 70 milliGRAM(s)/deciliter  petrolatum Ophthalmic Ointment 1 Application(s) Both EYES two times a day PRN dry eyes      PHYSICAL EXAM:  General: Comfortable, sedated  Neurological: sedated  HEENT: NC/AT; EOMI, clear conjunctiva, no nasal or oropharyngeal discharge or exudates  Cardiovascular: +S1/S2, no murmurs/rubs/gallops, RRR  Respiratory: CTA B/L, no diminished breath sounds, no wheezes/rales/rhonchi, no increased work of breathing or accessory muscle use  Gastrointestinal: soft, NT/ND; active BSx4 quadrants  Genitourinary: no suprapubic tenderness, no CVA tenderness  Extremities: no edema, clubbing or cyanosis  Vascular: 2+ radial, DP/PT pulses B/L  Skin: no rashes  Lines/Drains:     Care Discussed with Consultants/Other Providers [ x] YES  [ ] NO Patient is a 67y old  Male who presents with a chief complaint of 68yo M w TANG cirrhosis & follicular lymphoma presented w chest pain and constipation.  Pt. with multiple episodes hematemesis & hypotension.  Found to have massive UGIB requiring intubation.  S/p EGD (2/24) w findings of esophageal varices x6, s/p banding, however with incomplete eradication of varices.      (28 Feb 2023 08:53)      INTERVAL HPI/OVERNIGHT EVENTS:   No overnight events   Afebrile, hemodynamically stable     ICU Vital Signs Last 24 Hrs  T(C): 38 (04 Mar 2023 00:00), Max: 38 (04 Mar 2023 00:00)  T(F): 100.4 (04 Mar 2023 00:00), Max: 100.4 (04 Mar 2023 00:00)  HR: 60 (04 Mar 2023 06:00) (54 - 74)  BP: 110/66 (03 Mar 2023 08:30) (110/66 - 110/66)  BP(mean): 75 (03 Mar 2023 08:30) (75 - 75)  ABP: 101/46 (04 Mar 2023 06:00) (98/39 - 149/53)  ABP(mean): 66 (04 Mar 2023 06:00) (60 - 90)  RR: 22 (04 Mar 2023 06:00) (17 - 31)  SpO2: 96% (04 Mar 2023 06:00) (93% - 99%)    O2 Parameters below as of 04 Mar 2023 07:00  Patient On (Oxygen Delivery Method): ventilator,p/ac    O2 Concentration (%): 30      I&O's Summary    03 Mar 2023 07:01  -  04 Mar 2023 07:00  --------------------------------------------------------  IN: 3464.6 mL / OUT: 1015 mL / NET: 2449.6 mL          LABS:                        8.6    6.48  )-----------( 54       ( 04 Mar 2023 02:15 )             26.6     03-04    149<H>  |  121<H>  |  41<H>  ----------------------------<  191<H>  3.5   |  17<L>  |  1.24    Ca    7.9<L>      04 Mar 2023 02:33  Phos  3.0     03-04  Mg     2.30     03-04    TPro  5.2<L>  /  Alb  2.5<L>  /  TBili  2.0<H>  /  DBili  x   /  AST  66<H>  /  ALT  37  /  AlkPhos  234<H>  03-04    PT/INR - ( 03 Mar 2023 01:00 )   PT: 23.5 sec;   INR: 2.01 ratio         PTT - ( 04 Mar 2023 02:15 )  PTT:32.7 sec    CAPILLARY BLOOD GLUCOSE      POCT Blood Glucose.: 174 mg/dL (04 Mar 2023 05:19)  POCT Blood Glucose.: 183 mg/dL (03 Mar 2023 23:05)  POCT Blood Glucose.: 218 mg/dL (03 Mar 2023 17:19)  POCT Blood Glucose.: 280 mg/dL (03 Mar 2023 12:32)    ABG - ( 04 Mar 2023 02:15 )  pH, Arterial: 7.48  pH, Blood: x     /  pCO2: 24    /  pO2: 77    / HCO3: 18    / Base Excess: -3.8  /  SaO2: 96.2                RADIOLOGY & ADDITIONAL TESTS:    Consultant(s) Notes Reviewed:  [x ] YES  [ ] NO    MEDICATIONS  (STANDING):  chlorhexidine 0.12% Liquid 15 milliLiter(s) Oral Mucosa every 12 hours  chlorhexidine 2% Cloths 1 Application(s) Topical <User Schedule>  coronavirus bivalent (EUA) Booster Vaccine (PFIZER) 0.3 milliLiter(s) IntraMuscular once  dexMEDEtomidine Infusion 0.2 MICROgram(s)/kG/Hr (2.77 mL/Hr) IV Continuous <Continuous>  dextrose 5%. 1000 milliLiter(s) (50 mL/Hr) IV Continuous <Continuous>  dextrose 5%. 1000 milliLiter(s) (100 mL/Hr) IV Continuous <Continuous>  dextrose 50% Injectable 25 Gram(s) IV Push once  dextrose 50% Injectable 12.5 Gram(s) IV Push once  dextrose 50% Injectable 25 Gram(s) IV Push once  glucagon  Injectable 1 milliGRAM(s) IntraMuscular once  insulin lispro (ADMELOG) corrective regimen sliding scale   SubCutaneous every 6 hours  insulin NPH human recombinant 11 Unit(s) SubCutaneous every 6 hours  levothyroxine Injectable 75 MICROGram(s) IV Push at bedtime  metoclopramide Injectable 5 milliGRAM(s) IV Push daily  midodrine 10 milliGRAM(s) Oral every 8 hours  norepinephrine Infusion 0.03 MICROgram(s)/kG/Min (3.12 mL/Hr) IV Continuous <Continuous>  pantoprazole  Injectable 40 milliGRAM(s) IV Push two times a day  piperacillin/tazobactam IVPB.. 3.375 Gram(s) IV Intermittent every 8 hours  polyethylene glycol 3350 17 Gram(s) Oral <User Schedule>  rifAXIMin 550 milliGRAM(s) Oral two times a day    MEDICATIONS  (PRN):  dextrose Oral Gel 15 Gram(s) Oral once PRN Blood Glucose LESS THAN 70 milliGRAM(s)/deciliter  petrolatum Ophthalmic Ointment 1 Application(s) Both EYES two times a day PRN dry eyes      PHYSICAL EXAM:  General: Comfortable, sedated on precedex  Neurological: sedated with some spontaneous movements L>R  HEENT: NC/AT; EOMI, clear conjunctiva, no nasal or oropharyngeal discharge or exudates  Cardiovascular: +S1/S2, no murmurs/rubs/gallops, RRR  Respiratory: CTA B/L, no diminished breath sounds, no wheezes/rales/rhonchi, no increased work of breathing or accessory muscle use  Gastrointestinal: soft, NT/ND; active BSx4 quadrants  Extremities: edema evident in b/l hands       Care Discussed with Consultants/Other Providers [ x] YES  [ ] NO

## 2023-03-05 NOTE — EEG REPORT - NS EEG TEXT BOX
[facility]  [facility]  Report of Routine EEG with Video    [facility]: [address] , [place] [address], Phone [phone]  [place] Office: [address], [address], [place] [address] Phone [phone]    [facility]: [address], [place] [address], Phone [phone]  [place] Office: [address], [place] [address], Phone [phone]    Patient Name: ABBIE RODRIGUEZ    Age: 67 year  : 1955  Patient ID: -, MRN #: -, Location: [facility] 11-  Referring Physician: NETO REYES  EEG #: [id number]    Study Date: 3/5/2023     Technical Information:					  On Instrument: [id number]  Placement and Labeling of Electrodes:  The EEG was performed utilizing 20 channels referential EEG connections (coronal over temporal over parasagittal montage) using all standard 10-20 electrode placements with EKG.  Recording was at a sampling rate of 256 samples per second per channel.  Time synchronized digital video recording was done simultaneously with EEG recording.  A low light infrared camera was used for low light recording.  Rip and seizure detection algorithms were utilized.    History:   VEEG AT BEDSIDE MICU 11  67 YEAR OLD MALE  COR: AWAKE / DROWSY  P/W: GASTROINTESTINAL HEMORRHAGE  PMH:LYMPHOMA, CIRRHOSIS, DIABETES, CAD  HV:NOT COMPLETED DUE TO AGE  PHOTIC:COMPLETED  A&OX0  CONCERN FOR SEIZURE    Pertinent Medication  Synthroid  Protonix  REGLAN  Miralax  Promatine  Zosyn  Xifaxan  AdmeLOG  Humulin    Study Interpretation:  Findings: The background was continuous and reactive  No posterior dominant rhythm seen.  Background predominantly consisted of theta, delta and faster activities.    Focal Slowing:   Near continuous theta/delta slowing over the left posterior head region.    Sleep Background:  Stage II sleep transients were not recorded.    Other Non-Epileptiform Findings:  None were present.    Interictal Epileptiform Activity:   - lateralized periodic discharge with fast activity (LPD+F), left posterior (max O1); no evolution.    Events:  No event or seizure recorded.    Activation Procedures:   Hyperventilation was not performed.    Photic stimulation was not performed.     Artifacts:  Intermittent myogenic and movement artifacts were noted.    EEG Summary / Classification:  Abnormal EEG   - Left posterior LPDs and slowing.  - Moderate generalized slowing.    EEG Impression / Clinical Correlate:  Abnormal EEG study.  1. Potential epileptogenic focus in the left posterior head region.  2. Structural or functional abnormality in the left posterior head region.  3. Moderate nonspecific diffuse or multifocal cerebral dysfunction.   4. No seizure seen.    Makr Pedersen MD  Attending Physician, [facility]

## 2023-03-05 NOTE — PROGRESS NOTE ADULT - ASSESSMENT
68yo M w/ pmhx CAD s/p CABG, TANG cirrhosis, follicular lymphoma (on Rituximab infusions outpt), HTN, DM, hypothyroidism, currently on tenofovir (HBV Core +), presented to the ED w/ chest pain and constipation while in the ED patient w/ multiple episodes of hematemesis and hypotension; MICU consulted for hypotension, massive upper GI bleed s/p intubation, s/p 2/24 bedside scope with banding x6 of esophageal varices.     [ NEURO ]  #AMS   #r/o sedation vs hepatic encep  -sedation d/c on 3/1  -CTH (3/5): no IC abnormalities  > f/u daily ammonia - normal now   > c/w rifaximin BID, miralax BID  > f/u EEG to r/o seizure given continued AMS with occ tachy/HTN  > sedated on precedex, wean as tolerated    [ CARDIO ]  #Hypovolemic Shock 2/2 GIB vs vasoplegia iso cirrhosis vs sepsis  -s/p 6U PRBCs   -on Levophed at .5, wean as tolerated  -diagnostic paracentesis to r/o SBP given hx of ascites 2/25 - transudative with no evidence of SBP   -bedside POCUS with grossly normal LV systolic fx   -2/25 (TTE) - dilated LA   -cortisol normal  > on midodrine 10mg q8h     [ RESPIRATORY ]  #Airway Protection  -patient intubated for airway protection 2/2 hematemesis  > SBT as tolerated  > pressure support     #Respiratory Alkalosis  -noted episodes of over-breathing vent  -P-AC: 10 - 5/5 - 30  > attempt to wean Precedex    [ GASTRO]  #UGIB  -pt presented for chest pain and constipation; during ED course patient with multiple episodes of massive hematemesis  -given Octreotide, PPI, CTX  -GI consulted; s/p 2/24 scope, erythromycin given prior to scope -> multiple large esophageal varices, six bands successfully placed with incomplete eradication of varices. Per GI bleeding likely due to EV, however unable to completely visualize stomach given presence of clotted blood.  -s/p Octreotide gtt (72 hours post procedure) and PPI 40mg IVP BID  -placed rectal tube; per hepatology no risk of rectal varices   > consider diagnostic tap (paracentesis) if continued fever  > f/u daily ammonia, c/w rifaximin BID, miralax BID  > c/w TF, goal 40 cc/hr  > f/u CT abdomen w/ triple phase for TIPS planning  > c/w reglan & PPI    [ RENAL/ ]  #Hypernatremia  -noted to Na 149  > c/w FWF 350q4  > 2.6L calculated deficit    [ INFECTIOUS ]  -Pt given 1 dose ceftriaxone given in ED for SBP prophylaxis  -Stopped with ceftriaxone 1g daily for SBP ppx (2/27)  -s/p ceft 2/24-2/27; transitioned to zosyn (2/27) for broad sepsis coverage in light of fever  -2/27 (sputum culture): growing pseudomonas   > c/w Zosyn (2/27-3/5), 7 days  > ctm fevers, wbc    [ HEME ]  #GI Bleed   -Hgb 6.7 on admission  -s/p 6 units pRBC, 1 FFP, 1 plts  > monitor CBC q8 for now, if stable consider CBC q12  > hold chemical DVT ppx given GIB  > maintain SCDs    [ENDO]  #DM  -tube feeds  -NPH 11u q6hrs, ISS    #Hypothyroidism  -home synthroid IV     [ ETHICS ]  -Patient is Full Code    68yo M w/ pmhx CAD s/p CABG, TANG cirrhosis, follicular lymphoma (on Rituximab infusions outpt), HTN, DM, hypothyroidism, currently on tenofovir (HBV Core +), presented to the ED w/ chest pain and constipation while in the ED patient w/ multiple episodes of hematemesis and hypotension; MICU consulted for hypotension, massive upper GI bleed s/p intubation, s/p 2/24 bedside scope with banding x6 of esophageal varices.     [ NEURO ]  #AMS   #r/o sedation vs hepatic encep  -sedation d/c on 3/1  -CTH (3/5): no IC abnormalities  > f/u daily ammonia - normal now   > c/w rifaximin BID, miralax BID  > f/u EEG to r/o seizure given continued AMS with occ tachy/HTN  > sedated on precedex, wean as tolerated    [ CARDIO ]  #Hypovolemic Shock 2/2 GIB vs vasoplegia iso cirrhosis vs sepsis  -s/p 6U PRBCs   -on Levophed at .5, wean as tolerated  -diagnostic paracentesis to r/o SBP given hx of ascites 2/25 - transudative with no evidence of SBP   -bedside POCUS with grossly normal LV systolic fx   -2/25 (TTE) - dilated LA   -cortisol normal  > on midodrine 10mg q8h     [ RESPIRATORY ]  #Airway Protection  -patient intubated for airway protection 2/2 hematemesis  > SBT as tolerated  > pressure support     #Respiratory Alkalosis  -noted episodes of over-breathing vent  -P-AC: 10 - 5/5 - 30  > attempt to wean Precedex    [ GASTRO]  #UGIB  -pt presented for chest pain and constipation; during ED course patient with multiple episodes of massive hematemesis  -given Octreotide, PPI, CTX  -GI consulted; s/p 2/24 scope, erythromycin given prior to scope -> multiple large esophageal varices, six bands successfully placed with incomplete eradication of varices. Per GI bleeding likely due to EV, however unable to completely visualize stomach given presence of clotted blood.  -s/p Octreotide gtt (72 hours post procedure) and PPI 40mg IVP BID  -placed rectal tube; per hepatology no risk of rectal varices  > therapeutic para in light of abdominal ascites; may benefit respiratory status   > consider diagnostic tap (paracentesis) if continued fever  > f/u daily ammonia, c/w rifaximin BID, miralax BID  > c/w TF, goal 40 cc/hr  > f/u CT abdomen w/ triple phase for TIPS planning  > c/w reglan & PPI    [ RENAL/ ]  #Hypernatremia  -noted to Na 149  > c/w FWF 350q6  > 2.6L calculated deficit    [ INFECTIOUS ]  -Pt given 1 dose ceftriaxone given in ED for SBP prophylaxis  -Stopped with ceftriaxone 1g daily for SBP ppx (2/27)  -s/p ceft 2/24-2/27; transitioned to zosyn (2/27) for broad sepsis coverage in light of fever  -2/27 (sputum culture): growing pseudomonas   > c/w Zosyn (2/27-3/5), 7 days  > ctm fevers, wbc    [ HEME ]  #GI Bleed   -Hgb 6.7 on admission  -s/p 6 units pRBC, 1 FFP, 1 plts  > monitor CBC q8 for now, if stable consider CBC q12  > hold chemical DVT ppx given GIB  > maintain SCDs    [ENDO]  #DM  -tube feeds  -NPH 11u q6hrs, ISS    #Hypothyroidism  -home synthroid IV     [ ETHICS ]  -Patient is Full Code

## 2023-03-05 NOTE — PROGRESS NOTE ADULT - SUBJECTIVE AND OBJECTIVE BOX
INTERVAL HPI/OVERNIGHT EVENTS: NAEON.     SUBJECTIVE: Patient seen and examined at bedside. Intubated, agitated. Gtt: Precedex (.3). Vent 10/8/30/5.     OBJECTIVE:    VITAL SIGNS:  ICU Vital Signs Last 24 Hrs  T(C): 36.4 (05 Mar 2023 04:00), Max: 37.1 (04 Mar 2023 12:00)  T(F): 97.6 (05 Mar 2023 04:00), Max: 98.7 (04 Mar 2023 12:00)  HR: 42 (05 Mar 2023 07:17) (41 - 50)  BP: --  BP(mean): --  ABP: 112/47 (05 Mar 2023 07:17) (100/48 - 142/55)  ABP(mean): 69 (05 Mar 2023 07:17) (62 - 84)  RR: 21 (05 Mar 2023 07:17) (18 - 29)  SpO2: 99% (05 Mar 2023 07:17) (95% - 100%)    O2 Parameters below as of 05 Mar 2023 07:00  Patient On (Oxygen Delivery Method): ventilator    O2 Concentration (%): 30      Mode: AC/ CMV (Assist Control/ Continuous Mandatory Ventilation), RR (machine): 10, FiO2: 30, PEEP: 5, ITime: 1, MAP: 8, PC: 8, PIP: 15    03-04 @ 07:01  -  03-05 @ 07:00  --------------------------------------------------------  IN: 3406.5 mL / OUT: 845 mL / NET: 2561.5 mL      CAPILLARY BLOOD GLUCOSE      POCT Blood Glucose.: 211 mg/dL (05 Mar 2023 05:16)      PHYSICAL EXAM:  General: Comfortable, sedated on precedex  Neurological: sedated with some spontaneous movements L>R  HEENT: NC/AT; EOMI, clear conjunctiva, no nasal or oropharyngeal discharge or exudates  Cardiovascular: +S1/S2, no murmurs/rubs/gallops, RRR  Respiratory: CTA B/L, no diminished breath sounds, no wheezes/rales/rhonchi, no increased work of breathing or accessory muscle use  Gastrointestinal: soft, NT/ND; active BSx4 quadrants  Extremities: edema evident in b/l hands       MEDICATIONS:  MEDICATIONS  (STANDING):  chlorhexidine 0.12% Liquid 15 milliLiter(s) Oral Mucosa every 12 hours  chlorhexidine 2% Cloths 1 Application(s) Topical <User Schedule>  coronavirus bivalent (EUA) Booster Vaccine (PFIZER) 0.3 milliLiter(s) IntraMuscular once  dexMEDEtomidine Infusion 0.2 MICROgram(s)/kG/Hr (2.77 mL/Hr) IV Continuous <Continuous>  dextrose 5%. 1000 milliLiter(s) (50 mL/Hr) IV Continuous <Continuous>  dextrose 5%. 1000 milliLiter(s) (100 mL/Hr) IV Continuous <Continuous>  dextrose 50% Injectable 25 Gram(s) IV Push once  dextrose 50% Injectable 12.5 Gram(s) IV Push once  dextrose 50% Injectable 25 Gram(s) IV Push once  glucagon  Injectable 1 milliGRAM(s) IntraMuscular once  insulin lispro (ADMELOG) corrective regimen sliding scale   SubCutaneous every 6 hours  insulin NPH human recombinant 11 Unit(s) SubCutaneous every 6 hours  levothyroxine Injectable 75 MICROGram(s) IV Push at bedtime  metoclopramide Injectable 5 milliGRAM(s) IV Push daily  midodrine 10 milliGRAM(s) Oral every 8 hours  pantoprazole  Injectable 40 milliGRAM(s) IV Push two times a day  piperacillin/tazobactam IVPB.. 3.375 Gram(s) IV Intermittent every 8 hours  polyethylene glycol 3350 17 Gram(s) Oral <User Schedule>  rifAXIMin 550 milliGRAM(s) Oral two times a day    MEDICATIONS  (PRN):  dextrose Oral Gel 15 Gram(s) Oral once PRN Blood Glucose LESS THAN 70 milliGRAM(s)/deciliter  petrolatum Ophthalmic Ointment 1 Application(s) Both EYES two times a day PRN dry eyes      ALLERGIES:  Allergies    [This allergen will not trigger allergy alert] &quot;lentils&quot;-&quot;itchiness&quot; (Other)  Beef (Other)  No Known Drug Allergies    Intolerances        LABS:                        8.4    4.48  )-----------( 53       ( 05 Mar 2023 03:09 )             26.5     03-05    143  |  116<H>  |  42<H>  ----------------------------<  193<H>  3.8   |  17<L>  |  1.16    Ca    8.0<L>      05 Mar 2023 03:09  Phos  3.1     03-05  Mg     2.30     03-05    TPro  5.0<L>  /  Alb  2.3<L>  /  TBili  1.9<H>  /  DBili  x   /  AST  65<H>  /  ALT  36  /  AlkPhos  274<H>  03-05    PT/INR - ( 05 Mar 2023 03:09 )   PT: 20.4 sec;   INR: 1.75 ratio         PTT - ( 05 Mar 2023 03:09 )  PTT:31.0 sec      RADIOLOGY & ADDITIONAL TESTS: Reviewed.

## 2023-03-05 NOTE — CHART NOTE - NSCHARTNOTEFT_GEN_A_CORE
:  Nixon/LeJeune    INDICATION:  hypoxia, abdominal distension    PROCEDURE:  [ x] LIMITED ECHO  [x ] LIMITED CHEST  [ ] LIMITED RETROPERITONEAL  [ x] LIMITED ABDOMINAL  [ ] LIMITED DVT  [ ] NEEDLE GUIDANCE VASCULAR  [ ] NEEDLE GUIDANCE THORACENTESIS  [ ] NEEDLE GUIDANCE PARACENTESIS  [ ] NEEDLE GUIDANCE PERICARDIOCENTESIS  [ ] OTHER    FINDINGS:  ~5 b lines anteriorly    normal biv systolic function. no pericardial effusion.    ascites, safe pocket for para    INTERPRETATION:  normal biv systolic function  safe pocket for para    Images uploaded to [employer]    Time spent on the procedure was separate from the critical care time spent for patient care. :  Nixon/LeJeune    INDICATION:  hypoxia, abdominal distension    PROCEDURE:  [ x] LIMITED ECHO  [x ] LIMITED CHEST  [ ] LIMITED RETROPERITONEAL  [ x] LIMITED ABDOMINAL  [ ] LIMITED DVT  [ ] NEEDLE GUIDANCE VASCULAR  [ ] NEEDLE GUIDANCE THORACENTESIS  [ ] NEEDLE GUIDANCE PARACENTESIS  [ ] NEEDLE GUIDANCE PERICARDIOCENTESIS  [ ] OTHER    FINDINGS:  ~5 b lines anteriorly    normal biventricular systolic function. no pericardial effusion.    ascites, safe pocket for para    INTERPRETATION:  normal biv systolic function  safe pocket for para    Images uploaded to [employer]    Time spent on the procedure was separate from the critical care time spent for patient care.    Attending Attestation:  I was present during the key portions of the procedure and immediately available during the entire procedure.  Marisol Alicea MD  Attending  Pulmonary & Critical Care Medicine

## 2023-03-05 NOTE — PROGRESS NOTE ADULT - ATTENDING COMMENTS
67 M with TANG cirrhosis, CABG, lymphoma on chemo here with hemorraghic shock and acute hypoxemic respiratory failure requiring intubation s/p EGD and banding    Still in shock  still not fully awake, suspect due to hyperammonemia and sedation      # acute metabolic Encephalopathy 2/2 to elevated ammonia level  # Cirrhosis  # Upper GI bleed 2/2 esophogeal varices  # Lymphoma  # Shock on pressors, hemorraghic and septic  # PA pneumonia  - c/w full vent support for now, PS trials as tolerated  - c/w vasopressors as needed  - c/w ppi, serial CBCs  - suspect acute metabolic encephalopathy, opening eyes more now than prior  - c/w broad abx for pseudomonas pneumonia  - abdomen distended but having some BMs, f/u CT, may need paracentesis

## 2023-03-06 NOTE — PROGRESS NOTE ADULT - SUBJECTIVE AND OBJECTIVE BOX
INTERVAL HPI/OVERNIGHT EVENTS:    SUBJECTIVE: Patient seen and examined at bedside.     OBJECTIVE:    VITAL SIGNS:  ICU Vital Signs Last 24 Hrs  T(C): 37.1 (06 Mar 2023 04:00), Max: 37.3 (05 Mar 2023 16:00)  T(F): 98.7 (06 Mar 2023 04:00), Max: 99.1 (05 Mar 2023 16:00)  HR: 58 (06 Mar 2023 07:06) (43 - 61)  BP: --  BP(mean): --  ABP: 114/44 (06 Mar 2023 07:00) (95/34 - 153/55)  ABP(mean): 69 (06 Mar 2023 07:00) (58 - 89)  RR: 23 (06 Mar 2023 07:00) (19 - 28)  SpO2: 100% (06 Mar 2023 07:06) (95% - 100%)    O2 Parameters below as of 06 Mar 2023 07:00  Patient On (Oxygen Delivery Method): ventilator    O2 Concentration (%): 30      Mode: AC/ CMV (Assist Control/ Continuous Mandatory Ventilation), RR (machine): 10, FiO2: 30, PEEP: 5, ITime: 1, MAP: 8, PC: 8, PIP: 14    03-05 @ 07:01  -  03-06 @ 07:00  --------------------------------------------------------  IN: 2707.7 mL / OUT: 1185 mL / NET: 1522.7 mL      CAPILLARY BLOOD GLUCOSE      POCT Blood Glucose.: 179 mg/dL (06 Mar 2023 06:32)      PHYSICAL EXAM:  GENERAL: NAD, well-groomed, well-developed  HEAD:  Atraumatic, Normocephalic  EYES: EOMI, PERRLA, conjunctiva and sclera clear  ENMT: No tonsillar erythema, exudates, or enlargement; Moist mucous membranes, Good dentition, No lesions  NECK: Supple, No JVD, Normal thyroid  CHEST/LUNG: Clear to auscultation bilaterally; No rales, rhonchi, wheezing, or rubs  HEART: Regular rate and rhythm; No murmurs, rubs, or gallops  ABDOMEN: Soft, Nontender, Nondistended; Bowel sounds present  VASCULAR:  2+ Peripheral Pulses, No clubbing, cyanosis, or edema  LYMPH: No lymphadenopathy noted  SKIN: No rashes or lesions  NERVOUS SYSTEM:  Alert & Oriented X3, Good concentration; Motor Strength 5/5 B/L upper and lower extremities; DTRs 2+ intact and symmetric    MEDICATIONS:  MEDICATIONS  (STANDING):  chlorhexidine 0.12% Liquid 15 milliLiter(s) Oral Mucosa every 12 hours  chlorhexidine 2% Cloths 1 Application(s) Topical <User Schedule>  coronavirus bivalent (EUA) Booster Vaccine (PFIZER) 0.3 milliLiter(s) IntraMuscular once  dexMEDEtomidine Infusion 0.2 MICROgram(s)/kG/Hr (2.77 mL/Hr) IV Continuous <Continuous>  dextrose 5%. 1000 milliLiter(s) (50 mL/Hr) IV Continuous <Continuous>  dextrose 5%. 1000 milliLiter(s) (100 mL/Hr) IV Continuous <Continuous>  dextrose 50% Injectable 25 Gram(s) IV Push once  dextrose 50% Injectable 12.5 Gram(s) IV Push once  dextrose 50% Injectable 25 Gram(s) IV Push once  glucagon  Injectable 1 milliGRAM(s) IntraMuscular once  insulin lispro (ADMELOG) corrective regimen sliding scale   SubCutaneous every 6 hours  insulin NPH human recombinant 11 Unit(s) SubCutaneous every 6 hours  levothyroxine Injectable 75 MICROGram(s) IV Push at bedtime  metoclopramide Injectable 5 milliGRAM(s) IV Push daily  midodrine 10 milliGRAM(s) Oral every 8 hours  pantoprazole  Injectable 40 milliGRAM(s) IV Push two times a day  polyethylene glycol 3350 17 Gram(s) Oral <User Schedule>  rifAXIMin 550 milliGRAM(s) Oral two times a day    MEDICATIONS  (PRN):  dextrose Oral Gel 15 Gram(s) Oral once PRN Blood Glucose LESS THAN 70 milliGRAM(s)/deciliter  petrolatum Ophthalmic Ointment 1 Application(s) Both EYES two times a day PRN dry eyes      ALLERGIES:  Allergies    [This allergen will not trigger allergy alert] &quot;lentils&quot;-&quot;itchiness&quot; (Other)  Beef (Other)  No Known Drug Allergies    Intolerances        LABS:                        8.9    4.88  )-----------( 63       ( 06 Mar 2023 03:13 )             27.7     03-06    139  |  113<H>  |  38<H>  ----------------------------<  179<H>  3.8   |  17<L>  |  1.10    Ca    7.9<L>      06 Mar 2023 03:13  Phos  3.1     03-05  Mg     2.30     03-06    TPro  5.1<L>  /  Alb  2.4<L>  /  TBili  2.0<H>  /  DBili  x   /  AST  86<H>  /  ALT  37  /  AlkPhos  360<H>  03-06    PT/INR - ( 06 Mar 2023 03:13 )   PT: 18.9 sec;   INR: 1.62 ratio         PTT - ( 05 Mar 2023 03:09 )  PTT:31.0 sec      RADIOLOGY & ADDITIONAL TESTS: Reviewed. INTERVAL HPI/OVERNIGHT EVENTS: NAEON.     SUBJECTIVE: Patient seen and examined at bedside. Intubated, sedated.     OBJECTIVE:    VITAL SIGNS:  ICU Vital Signs Last 24 Hrs  T(C): 37.1 (06 Mar 2023 04:00), Max: 37.3 (05 Mar 2023 16:00)  T(F): 98.7 (06 Mar 2023 04:00), Max: 99.1 (05 Mar 2023 16:00)  HR: 58 (06 Mar 2023 07:06) (43 - 61)  BP: --  BP(mean): --  ABP: 114/44 (06 Mar 2023 07:00) (95/34 - 153/55)  ABP(mean): 69 (06 Mar 2023 07:00) (58 - 89)  RR: 23 (06 Mar 2023 07:00) (19 - 28)  SpO2: 100% (06 Mar 2023 07:06) (95% - 100%)    O2 Parameters below as of 06 Mar 2023 07:00  Patient On (Oxygen Delivery Method): ventilator    O2 Concentration (%): 30      Mode: AC/ CMV (Assist Control/ Continuous Mandatory Ventilation), RR (machine): 10, FiO2: 30, PEEP: 5, ITime: 1, MAP: 8, PC: 8, PIP: 14    03-05 @ 07:01  -  03-06 @ 07:00  --------------------------------------------------------  IN: 2707.7 mL / OUT: 1185 mL / NET: 1522.7 mL      CAPILLARY BLOOD GLUCOSE      POCT Blood Glucose.: 179 mg/dL (06 Mar 2023 06:32)      PHYSICAL EXAM:  General: Comfortable, sedated on precedex  Neurological: sedated with some spontaneous movements L>R  HEENT: NC/AT; EOMI, clear conjunctiva, no nasal or oropharyngeal discharge or exudates  Cardiovascular: +S1/S2, no murmurs/rubs/gallops, RRR  Respiratory: CTA B/L, no diminished breath sounds, no wheezes/rales/rhonchi, no increased work of breathing or accessory muscle use  Gastrointestinal: soft, NT/ND; active BSx4 quadrants  Extremities: edema evident in b/l hands ic    MEDICATIONS:  MEDICATIONS  (STANDING):  chlorhexidine 0.12% Liquid 15 milliLiter(s) Oral Mucosa every 12 hours  chlorhexidine 2% Cloths 1 Application(s) Topical <User Schedule>  coronavirus bivalent (EUA) Booster Vaccine (PFIZER) 0.3 milliLiter(s) IntraMuscular once  dexMEDEtomidine Infusion 0.2 MICROgram(s)/kG/Hr (2.77 mL/Hr) IV Continuous <Continuous>  dextrose 5%. 1000 milliLiter(s) (50 mL/Hr) IV Continuous <Continuous>  dextrose 5%. 1000 milliLiter(s) (100 mL/Hr) IV Continuous <Continuous>  dextrose 50% Injectable 25 Gram(s) IV Push once  dextrose 50% Injectable 12.5 Gram(s) IV Push once  dextrose 50% Injectable 25 Gram(s) IV Push once  glucagon  Injectable 1 milliGRAM(s) IntraMuscular once  insulin lispro (ADMELOG) corrective regimen sliding scale   SubCutaneous every 6 hours  insulin NPH human recombinant 11 Unit(s) SubCutaneous every 6 hours  levothyroxine Injectable 75 MICROGram(s) IV Push at bedtime  metoclopramide Injectable 5 milliGRAM(s) IV Push daily  midodrine 10 milliGRAM(s) Oral every 8 hours  pantoprazole  Injectable 40 milliGRAM(s) IV Push two times a day  polyethylene glycol 3350 17 Gram(s) Oral <User Schedule>  rifAXIMin 550 milliGRAM(s) Oral two times a day    MEDICATIONS  (PRN):  dextrose Oral Gel 15 Gram(s) Oral once PRN Blood Glucose LESS THAN 70 milliGRAM(s)/deciliter  petrolatum Ophthalmic Ointment 1 Application(s) Both EYES two times a day PRN dry eyes      ALLERGIES:  Allergies    [This allergen will not trigger allergy alert] &quot;lentils&quot;-&quot;itchiness&quot; (Other)  Beef (Other)  No Known Drug Allergies    Intolerances        LABS:                        8.9    4.88  )-----------( 63       ( 06 Mar 2023 03:13 )             27.7     03-06    139  |  113<H>  |  38<H>  ----------------------------<  179<H>  3.8   |  17<L>  |  1.10    Ca    7.9<L>      06 Mar 2023 03:13  Phos  3.1     03-05  Mg     2.30     03-06    TPro  5.1<L>  /  Alb  2.4<L>  /  TBili  2.0<H>  /  DBili  x   /  AST  86<H>  /  ALT  37  /  AlkPhos  360<H>  03-06    PT/INR - ( 06 Mar 2023 03:13 )   PT: 18.9 sec;   INR: 1.62 ratio         PTT - ( 05 Mar 2023 03:09 )  PTT:31.0 sec      RADIOLOGY & ADDITIONAL TESTS: Reviewed.

## 2023-03-06 NOTE — PROGRESS NOTE ADULT - ASSESSMENT
66yo M w/ pmhx CAD s/p CABG, TANG cirrhosis, follicular lymphoma (on Rituximab infusions outpt), HTN, DM, hypothyroidism, currently on tenofovir (HBV Core +), presented to the ED w/ chest pain and constipation while in the ED patient w/ multiple episodes of hematemesis and hypotension; MICU consulted for hypotension, massive upper GI bleed s/p intubation, s/p 2/24 bedside scope with banding x6 of esophageal varices.     [ NEURO ]  #AMS   #r/o sedation vs hepatic encep  -sedation d/c on 3/1  -CTH (3/5): no IC abnormalities  > f/u daily ammonia - normal now   > c/w rifaximin BID, miralax BID  > f/u EEG to r/o seizure given continued AMS with occ tachy/HTN  > sedated on precedex, wean as tolerated    [ CARDIO ]  #Hypovolemic Shock 2/2 GIB vs vasoplegia iso cirrhosis vs sepsis  -s/p 6U PRBCs   -on Levophed at .5, wean as tolerated  -diagnostic paracentesis to r/o SBP given hx of ascites 2/25 - transudative with no evidence of SBP   -bedside POCUS with grossly normal LV systolic fx   -2/25 (TTE) - dilated LA   -cortisol normal  > on midodrine 10mg q8h     [ RESPIRATORY ]  #Airway Protection  -patient intubated for airway protection 2/2 hematemesis  > SBT as tolerated  > pressure support     #Respiratory Alkalosis  -noted episodes of over-breathing vent  -P-AC: 10 - 5/5 - 30  > attempt to wean Precedex    [ GASTRO]  #UGIB  -pt presented for chest pain and constipation; during ED course patient with multiple episodes of massive hematemesis  -given Octreotide, PPI, CTX  -GI consulted; s/p 2/24 scope, erythromycin given prior to scope -> multiple large esophageal varices, six bands successfully placed with incomplete eradication of varices. Per GI bleeding likely due to EV, however unable to completely visualize stomach given presence of clotted blood.  -s/p Octreotide gtt (72 hours post procedure) and PPI 40mg IVP BID  -placed rectal tube; per hepatology no risk of rectal varices  > therapeutic para in light of abdominal ascites; may benefit respiratory status   > consider diagnostic tap (paracentesis) if continued fever  > f/u daily ammonia, c/w rifaximin BID, miralax BID  > c/w TF, goal 40 cc/hr  > f/u CT abdomen w/ triple phase for TIPS planning  > c/w reglan & PPI    [ RENAL/ ]  #Hypernatremia  -noted to Na 149  > c/w FWF 350q6  > 2.6L calculated deficit    [ INFECTIOUS ]  -Pt given 1 dose ceftriaxone given in ED for SBP prophylaxis  -Stopped with ceftriaxone 1g daily for SBP ppx (2/27)  -s/p ceft 2/24-2/27; transitioned to zosyn (2/27) for broad sepsis coverage in light of fever  -2/27 (sputum culture): growing pseudomonas   > c/w Zosyn (2/27-3/5), 7 days  > ctm fevers, wbc    [ HEME ]  #GI Bleed   -Hgb 6.7 on admission  -s/p 6 units pRBC, 1 FFP, 1 plts  > monitor CBC q8 for now, if stable consider CBC q12  > hold chemical DVT ppx given GIB  > maintain SCDs    [ENDO]  #DM  -tube feeds  -NPH 11u q6hrs, ISS    #Hypothyroidism  -home synthroid IV     [ ETHICS ]  -Patient is Full Code    66yo M w/ pmhx CAD s/p CABG, TANG cirrhosis, follicular lymphoma (on Rituximab infusions outpt), HTN, DM, hypothyroidism, currently on tenofovir (HBV Core +), presented to the ED w/ chest pain and constipation while in the ED patient w/ multiple episodes of hematemesis and hypotension; MICU consulted for hypotension, massive upper GI bleed s/p intubation, s/p 2/24 bedside scope with banding x6 of esophageal varices.     [ NEURO ]  #AMS   #r/o sedation vs hepatic encep  -sedation d/c on 3/1  -CTH (3/5): no IC abnormalities  -EEG: r/o seizure given continued AMS with occ tachy/HTN  -weaned precedex  > f/u daily ammonia - normal now   > c/w rifaximin BID, miralax BID    [ CARDIO ]  #Hypovolemic Shock 2/2 GIB vs vasoplegia iso cirrhosis vs sepsis  -s/p 6U PRBCs   -on Levophed at .5, wean as tolerated  -diagnostic paracentesis to r/o SBP given hx of ascites 2/25 - transudative with no evidence of SBP   -bedside POCUS with grossly normal LV systolic fx   -2/25 (TTE) - dilated LA   -cortisol normal  > on midodrine 10mg q8h w/ hold parameters     [ RESPIRATORY ]  #Airway Protection  -patient intubated for airway protection 2/2 hematemesis  -noted episodes of over-breathing vent, requiring limited pushes of sedation  > SBT as tolerated  > CPAP    [ GASTRO]  #UGIB  -pt presented for chest pain and constipation; during ED course patient with multiple episodes of massive hematemesis  -given Octreotide, PPI, CTX  -GI consulted; s/p 2/24 scope, erythromycin given prior to scope -> multiple large esophageal varices, six bands successfully placed with incomplete eradication of varices. Per GI bleeding likely due to EV, however unable to completely visualize stomach given presence of clotted blood.  -s/p Octreotide gtt (72 hours post procedure) and PPI 40mg IVP BID  -placed rectal tube; per hepatology no risk of rectal varices  -therapeutic para (3/5): removed 4.5L  -CT abdomen w/ triple phase (3/5): noted for cirrhosis  > consider diagnostic tap (paracentesis) if continued fever  > f/u daily ammonia, c/w rifaximin BID, miralax BID  > c/w TF, goal 40 cc/hr  > c/w reglan & PPI    [ RENAL/ ]  #Hypernatremia  -noted to Na 149  > c/w FWF 350q6  > 2.6L calculated deficit    [ INFECTIOUS ]  -Pt given 1 dose ceftriaxone given in ED for SBP prophylaxis  -Stopped with ceftriaxone 1g daily for SBP ppx (2/27)  -s/p ceft 2/24-2/27; transitioned to zosyn (2/27) for broad sepsis coverage in light of fever  -2/27 (sputum culture): growing pseudomonas   -s/p Zosyn (2/27-3/5), 7 days  > ctm fevers, wbc    [ HEME ]  #GI Bleed   -Hgb 6.7 on admission  -s/p 6 units pRBC, 1 FFP, 1 plts  > monitor CBC q8 for now, if stable consider CBC q12  > hold chemical DVT ppx given GIB  > maintain SCDs    [ENDO]  #DM  -tube feeds  -NPH 11u q6hrs, ISS    #Hypothyroidism  -home synthroid IV     [ ETHICS ]  -Patient is Full Code

## 2023-03-06 NOTE — PROGRESS NOTE ADULT - ATTENDING COMMENTS
— off zosyn, low ascites protein start CTX/cipro for SBP prophylaxis  — lactulose, titrate to 2-3 BM/day, continue rifaximin  — resume tenofovir

## 2023-03-06 NOTE — PROGRESS NOTE ADULT - ASSESSMENT
67M follows at [facility] Hx decompensated TANG cirrhosis (+ascites, +EV -- previously MELD Na 8), CAD s/p CABG s/p PCI (last in 2012 on ASA, now off plavix x2 weeks), newly diagnosed follicular lymphoma (on rituximab), HTN, DM, currently on tenofovir (HBV Core +) presented with right sided chest/abdominal pain with lizzette hematemesis + clots c/b hemorrhagic shock, s/p intubation in MICU, EGD showing large varices s/p banding, unable to completely clear stomach due to clot. Now w/ new fever and downtrending Hg    #Hemorrhagic shock  #Hematemesis: s/p EGD 2/24/2023 s/p EVL x6, bleeding likely due to EV, however unable to completely visualize stomach given presence of clotted blood. H/H now stable.   #NANCI  #Decompensated TANG cirrhosis: follows at [facility], previously low meld Na 8  --MELD Na: 22  --Ascites: (+) Hx, on lasix 40, spironolactone 50 at home, holding here  --SBP; no Hx  --EV: (+) Hx, no EVB --> is on nadolol at home  --PVT: no Hx  --HCC: no Hx    Recommendations:  - No plans for repeat EGD at this time  - c/w Rifaximin and Miralax, would  3-5BMs or ~800cc stool output per day when considering extubation  - Please start patient on ciprofloxacin 500mg daily for SBP prophylaxis given low ascitic protein  - c/w pantoprazole 40 IV BID  - hold diuretics  - No need to trend ammonia  - repeat EGD in 4 weeks for variceal banding  - trend CMP, CBC, coags    Please note that the recommendations are not final until attested by an attending.    Thank you for involving us in the care of this patient. Please reach out if any further questions.    Ulices Ray, PGY-6  Gastroenterology/Hepatology Fellow    Available on [employer] Teams  After 5PM/Weekends, please contact the on-call GI fellow: [phone]

## 2023-03-06 NOTE — PROGRESS NOTE ADULT - SUBJECTIVE AND OBJECTIVE BOX
Interval Events:     Allergies:  [This allergen will not trigger allergy alert] &quot;lentils&quot;-&quot;itchiness&quot; (Other)  Beef (Other)  No Known Drug Allergies        Hospital Medications:  chlorhexidine 0.12% Liquid 15 milliLiter(s) Oral Mucosa every 12 hours  chlorhexidine 2% Cloths 1 Application(s) Topical <User Schedule>  coronavirus bivalent (EUA) Booster Vaccine (PFIZER) 0.3 milliLiter(s) IntraMuscular once  dexMEDEtomidine Infusion 0.2 MICROgram(s)/kG/Hr IV Continuous <Continuous>  dextrose 5%. 1000 milliLiter(s) IV Continuous <Continuous>  dextrose 5%. 1000 milliLiter(s) IV Continuous <Continuous>  dextrose 50% Injectable 25 Gram(s) IV Push once  dextrose 50% Injectable 12.5 Gram(s) IV Push once  dextrose 50% Injectable 25 Gram(s) IV Push once  dextrose Oral Gel 15 Gram(s) Oral once PRN  glucagon  Injectable 1 milliGRAM(s) IntraMuscular once  insulin lispro (ADMELOG) corrective regimen sliding scale   SubCutaneous every 6 hours  insulin NPH human recombinant 11 Unit(s) SubCutaneous every 6 hours  levothyroxine Injectable 75 MICROGram(s) IV Push at bedtime  metoclopramide Injectable 5 milliGRAM(s) IV Push daily  midodrine 10 milliGRAM(s) Oral every 8 hours  pantoprazole  Injectable 40 milliGRAM(s) IV Push two times a day  petrolatum Ophthalmic Ointment 1 Application(s) Both EYES two times a day PRN  polyethylene glycol 3350 17 Gram(s) Oral <User Schedule>  rifAXIMin 550 milliGRAM(s) Oral two times a day      PMHX/PSHX:  CAD (coronary artery disease)    Diabetes    Lymphoma    Cirrhosis    S/P CABG x 1        Family history:  No pertinent family history in first degree relatives        ROS: As per HPI, otherwise 14-point ROS reviewed and negative.      PHYSICAL EXAM:   Vital Signs:  Vital Signs Last 24 Hrs  T(C): 37.4 (06 Mar 2023 08:00), Max: 37.4 (06 Mar 2023 08:00)  T(F): 99.3 (06 Mar 2023 08:00), Max: 99.3 (06 Mar 2023 08:00)  HR: 58 (06 Mar 2023 08:00) (44 - 61)  BP: --  BP(mean): --  RR: 23 (06 Mar 2023 08:00) (19 - 28)  SpO2: 99% (06 Mar 2023 08:00) (95% - 100%)    Parameters below as of 06 Mar 2023 08:00  Patient On (Oxygen Delivery Method): ventilator,PAC--10/8/5    O2 Concentration (%): 30  Daily     Daily Weight in k.9 (06 Mar 2023 06:00)      23 @ 07:01  -  23 @ 07:00  --------------------------------------------------------  IN: 2707.7 mL / OUT: 1185 mL / NET: 1522.7 mL    23 @ 07:01  -  23 @ 09:46  --------------------------------------------------------  IN: 41.4 mL / OUT: 35 mL / NET: 6.4 mL        GENERAL:  No acute distress  HEENT:  Normocephalic/atraumtic,  no scleral icterus  CHEST:  No accessory muscle use  HEART:  Regular rate and rhythm  ABDOMEN:  Soft, non-tender, non-distended, normoactive bowel sounds, no masses, no hepato-splenomegaly, no signs of chronic liver disease  EXTREMITIES:  No cyanosis, clubbing, or edema  SKIN:  No rash  NEURO:  Alert and oriented x 3, no asterixis, no tremor    LABS:                        8.9    4.88  )-----------( 63       ( 06 Mar 2023 03:13 )             27.7     Mean Cell Volume: 92.3 fL (23 @ 03:13)    03    139  |  113<H>  |  38<H>  ----------------------------<  179<H>  3.8   |  17<L>  |  1.10    Ca    7.9<L>      06 Mar 2023 03:13  Phos  3.1     03-05  Mg     2.30     -06    TPro  5.1<L>  /  Alb  2.4<L>  /  TBili  2.0<H>  /  DBili  x   /  AST  86<H>  /  ALT  37  /  AlkPhos  360<H>      LIVER FUNCTIONS - ( 06 Mar 2023 03:13 )  Alb: 2.4 g/dL / Pro: 5.1 g/dL / ALK PHOS: 360 U/L / ALT: 37 U/L / AST: 86 U/L / GGT: x           PT/INR - ( 06 Mar 2023 03:13 )   PT: 18.9 sec;   INR: 1.62 ratio         PTT - ( 05 Mar 2023 03:09 )  PTT:31.0 sec    Amylase Serum--      Lipase serum--       [id number]                          8.9    4.88  )-----------( 63       ( 06 Mar 2023 03:13 )             27.7                         8.4    4.48  )-----------( 53       ( 05 Mar 2023 03:09 )             26.5                         8.6    6.48  )-----------( 54       ( 04 Mar 2023 02:15 )             26.6                         8.5    5.15  )-----------( 50       ( 03 Mar 2023 17:00 )             26.1       Imaging:           Interval Events:   - Remains intubated, sedated on precedex, not fully awake  - EEG with focal epileptogenic focus  - Having adequate BMs per team  - Underwent paracentesis, 5L removed, no SBP    Allergies:  [This allergen will not trigger allergy alert] &quot;lentils&quot;-&quot;itchiness&quot; (Other)  Beef (Other)  No Known Drug Allergies        Hospital Medications:  chlorhexidine 0.12% Liquid 15 milliLiter(s) Oral Mucosa every 12 hours  chlorhexidine 2% Cloths 1 Application(s) Topical <User Schedule>  coronavirus bivalent (EUA) Booster Vaccine (PFIZER) 0.3 milliLiter(s) IntraMuscular once  dexMEDEtomidine Infusion 0.2 MICROgram(s)/kG/Hr IV Continuous <Continuous>  dextrose 5%. 1000 milliLiter(s) IV Continuous <Continuous>  dextrose 5%. 1000 milliLiter(s) IV Continuous <Continuous>  dextrose 50% Injectable 25 Gram(s) IV Push once  dextrose 50% Injectable 12.5 Gram(s) IV Push once  dextrose 50% Injectable 25 Gram(s) IV Push once  dextrose Oral Gel 15 Gram(s) Oral once PRN  glucagon  Injectable 1 milliGRAM(s) IntraMuscular once  insulin lispro (ADMELOG) corrective regimen sliding scale   SubCutaneous every 6 hours  insulin NPH human recombinant 11 Unit(s) SubCutaneous every 6 hours  levothyroxine Injectable 75 MICROGram(s) IV Push at bedtime  metoclopramide Injectable 5 milliGRAM(s) IV Push daily  midodrine 10 milliGRAM(s) Oral every 8 hours  pantoprazole  Injectable 40 milliGRAM(s) IV Push two times a day  petrolatum Ophthalmic Ointment 1 Application(s) Both EYES two times a day PRN  polyethylene glycol 3350 17 Gram(s) Oral <User Schedule>  rifAXIMin 550 milliGRAM(s) Oral two times a day      PMHX/PSHX:  CAD (coronary artery disease)    Diabetes    Lymphoma    Cirrhosis    S/P CABG x 1        Family history:  No pertinent family history in first degree relatives        ROS: As per HPI, otherwise 14-point ROS reviewed and negative.      PHYSICAL EXAM:   Vital Signs:  Vital Signs Last 24 Hrs  T(C): 37.4 (06 Mar 2023 08:00), Max: 37.4 (06 Mar 2023 08:00)  T(F): 99.3 (06 Mar 2023 08:00), Max: 99.3 (06 Mar 2023 08:00)  HR: 58 (06 Mar 2023 08:00) (44 - 61)  BP: --  BP(mean): --  RR: 23 (06 Mar 2023 08:00) (19 - 28)  SpO2: 99% (06 Mar 2023 08:00) (95% - 100%)    Parameters below as of 06 Mar 2023 08:00  Patient On (Oxygen Delivery Method): ventilator,PAC--10/8/5    O2 Concentration (%): 30  Daily     Daily Weight in k.9 (06 Mar 2023 06:00)      23 @ 07:01  -  23 @ 07:00  --------------------------------------------------------  IN: 2707.7 mL / OUT: 1185 mL / NET: 1522.7 mL    23 @ 07:01  -  23 @ 09:46  --------------------------------------------------------  IN: 41.4 mL / OUT: 35 mL / NET: 6.4 mL      GENERAL:  intubated  HEENT:  NCAT, no scleral icterus  CHEST: intubated   HEART:  RRR  ABDOMEN:  Soft, non-tender, non-distended, normoactive bowel sounds, no masses  EXTREMITIES:  No cyanosis, clubbing, or edema  SKIN:  No rash/erythema/ecchymoses/petechiae/wounds/abscess/warm/dry  NEURO: A&O x 0    LABS:                        8.9    4.88  )-----------( 63       ( 06 Mar 2023 03:13 )             27.7     Mean Cell Volume: 92.3 fL (23 @ 03:13)    03    139  |  113<H>  |  38<H>  ----------------------------<  179<H>  3.8   |  17<L>  |  1.10    Ca    7.9<L>      06 Mar 2023 03:13  Phos  3.1     03-05  Mg     2.30     -    TPro  5.1<L>  /  Alb  2.4<L>  /  TBili  2.0<H>  /  DBili  x   /  AST  86<H>  /  ALT  37  /  AlkPhos  360<H>      LIVER FUNCTIONS - ( 06 Mar 2023 03:13 )  Alb: 2.4 g/dL / Pro: 5.1 g/dL / ALK PHOS: 360 U/L / ALT: 37 U/L / AST: 86 U/L / GGT: x           PT/INR - ( 06 Mar 2023 03:13 )   PT: 18.9 sec;   INR: 1.62 ratio         PTT - ( 05 Mar 2023 03:09 )  PTT:31.0 sec    Amylase Serum--      Lipase serum--       [id number]                          8.9    4.88  )-----------( 63       ( 06 Mar 2023 03:13 )             27.7                         8.4    4.48  )-----------( 53       ( 05 Mar 2023 03:09 )             26.5                         8.6    6.48  )-----------( 54       ( 04 Mar 2023 02:15 )             26.6                         8.5    5.15  )-----------( 50       ( 03 Mar 2023 17:00 )             26.1       Imaging:

## 2023-03-07 NOTE — PROGRESS NOTE ADULT - ATTENDING COMMENTS
- variceal bleed: PPI once daily   - not waking up off sedatives: likely hepatic encephalopathy. Would switch from miralax to lactulose 30g tid, continue rifaximin.  - resume tenofovir  - LFT elevation: stop statin for now  - continue prophylactic antibiotic

## 2023-03-07 NOTE — PROGRESS NOTE ADULT - ASSESSMENT
68yo M w/ pmhx CAD s/p CABG, TANG cirrhosis, follicular lymphoma (on Rituximab infusions outpt), HTN, DM, hypothyroidism, currently on tenofovir (HBV Core +), presented to the ED w/ chest pain and constipation while in the ED patient w/ multiple episodes of hematemesis and hypotension; MICU consulted for hypotension, massive upper GI bleed s/p intubation, s/p 2/24 bedside scope with banding x6 of esophageal varices.     [ NEURO ]  #AMS   #r/o sedation vs hepatic encep  -sedation d/c on 3/1  -CTH (3/5): no IC abnormalities  -EEG: r/o seizure given continued AMS with occ tachy/HTN  -weaned precedex  > f/u daily ammonia - normal now   > c/w rifaximin BID, miralax BID    [ CARDIO ]  #Hypovolemic Shock 2/2 GIB vs vasoplegia iso cirrhosis vs sepsis  -s/p 6U PRBCs   -on Levophed at .5, wean as tolerated  -diagnostic paracentesis to r/o SBP given hx of ascites 2/25 - transudative with no evidence of SBP   -bedside POCUS with grossly normal LV systolic fx   -2/25 (TTE) - dilated LA   -cortisol normal  > on midodrine 10mg q8h w/ hold parameters     [ RESPIRATORY ]  #Airway Protection  -patient intubated for airway protection 2/2 hematemesis  -noted episodes of over-breathing vent, requiring limited pushes of sedation  > SBT as tolerated  > CPAP    [ GASTRO]  #UGIB  -pt presented for chest pain and constipation; during ED course patient with multiple episodes of massive hematemesis  -given Octreotide, PPI, CTX  -GI consulted; s/p 2/24 scope, erythromycin given prior to scope -> multiple large esophageal varices, six bands successfully placed with incomplete eradication of varices. Per GI bleeding likely due to EV, however unable to completely visualize stomach given presence of clotted blood.  -s/p Octreotide gtt (72 hours post procedure) and PPI 40mg IVP BID  -placed rectal tube; per hepatology no risk of rectal varices  -therapeutic para (3/5): removed 4.5L  -CT abdomen w/ triple phase (3/5): noted for cirrhosis  > consider diagnostic tap (paracentesis) if continued fever  > f/u daily ammonia, c/w rifaximin BID, miralax BID  > c/w TF, goal 40 cc/hr  > c/w reglan & PPI    [ RENAL/ ]  #Hypernatremia  -noted to Na 149  > c/w FWF 350q6  > 2.6L calculated deficit    [ INFECTIOUS ]  -Pt given 1 dose ceftriaxone given in ED for SBP prophylaxis  -Stopped with ceftriaxone 1g daily for SBP ppx (2/27)  -s/p ceft 2/24-2/27; transitioned to zosyn (2/27) for broad sepsis coverage in light of fever  -2/27 (sputum culture): growing pseudomonas   -s/p Zosyn (2/27-3/5), 7 days  > ctm fevers, wbc    [ HEME ]  #GI Bleed   -Hgb 6.7 on admission  -s/p 6 units pRBC, 1 FFP, 1 plts  > monitor CBC q8 for now, if stable consider CBC q12  > hold chemical DVT ppx given GIB  > maintain SCDs    [ENDO]  #DM  -tube feeds  -NPH 11u q6hrs, ISS    #Hypothyroidism  -home synthroid IV     [ ETHICS ]  -Patient is Full Code    66yo M w/ pmhx CAD s/p CABG, TANG cirrhosis, follicular lymphoma (on Rituximab infusions outpt), HTN, DM, hypothyroidism, currently on tenofovir (HBV Core +), presented to the ED w/ chest pain and constipation while in the ED patient w/ multiple episodes of hematemesis and hypotension; MICU consulted for hypotension, massive upper GI bleed s/p intubation, s/p 2/24 bedside scope with banding x6 of esophageal varices.     [ NEURO ]  #AMS   #r/o sedation vs hepatic encep  -sedation d/c on 3/1  -CTH (3/5): no IC abnormalities  -EEG: r/o seizure given continued AMS with occ tachy/HTN  -weaned precedex  > f/u daily ammonia - 65 on 3/6  > c/w rifaximin BID, miralax BID    [ CARDIO ]  #Hypovolemic Shock 2/2 GIB vs vasoplegia iso cirrhosis vs sepsis  -s/p 6U PRBCs   -on Levophed at .5, wean as tolerated  -diagnostic paracentesis to r/o SBP given hx of ascites 2/25 - transudative with no evidence of SBP   -bedside POCUS with grossly normal LV systolic fx   -2/25 (TTE) - dilated LA   -cortisol normal  > on midodrine 10mg q8h w/ hold parameters     [ RESPIRATORY ]  #Airway Protection  -patient intubated for airway protection 2/2 hematemesis  -noted episodes of over-breathing vent, requiring limited pushes of sedation  > SBT as tolerated  > CPAP for now, anticipate extubation when mental status improves    [ GASTRO ]  #UGIB  -pt presented for chest pain and constipation; during ED course patient with multiple episodes of massive hematemesis  -given Octreotide, PPI, CTX  -GI consulted; s/p 2/24 scope, erythromycin given prior to scope -> multiple large esophageal varices, six bands successfully placed with incomplete eradication of varices. Per GI bleeding likely due to EV, however unable to completely visualize stomach given presence of clotted blood.  -s/p Octreotide gtt (72 hours post procedure) and PPI 40mg IVP BID  -placed rectal tube; per hepatology no risk of rectal varices  -therapeutic para (3/5): removed 4.5L  -CT abdomen w/ triple phase (3/5): noted for cirrhosis  > consider diagnostic tap (paracentesis) if continued fever  > f/u daily ammonia, c/w rifaximin BID, miralax BID  > start ppx CTX as recommended by GI  > c/w TF @ 40 cc/hr  > c/w PPI  > d/c reglan, no clear indication    [ RENAL/ ]  #Hypernatremia  -noted to Na 149  > c/w FWF 350q6  > 2.6L calculated deficit    [ INFECTIOUS ]  -Pt given 1 dose ceftriaxone given in ED for SBP prophylaxis  -s/p ceft 2/24-2/27; transitioned to zosyn (2/27) for broad sepsis coverage in light of fever  -2/27 (sputum culture): growing pseudomonas   -s/p Zosyn (2/27-3/5), 7 days  > resume ceftriaxone 1g daily for SBP ppx  > ctm fevers, wbc    [ HEME ]  #GI Bleed   -Hgb 6.7 on admission  -s/p 6 units pRBC, 1 FFP, 1 plts  > monitor CBC q8 for now, if stable consider CBC q12  > hold chemical DVT ppx given GIB  > maintain SCDs    [ENDO]  #DM  -tube feeds  -NPH 11u q6hrs, ISS    #Hypothyroidism  -home synthroid IV     [ ETHICS ]  -Patient is Full Code

## 2023-03-07 NOTE — PROGRESS NOTE ADULT - SUBJECTIVE AND OBJECTIVE BOX
Interval Events:   - Minimal improvement in mental status, remains obtunded off sedation    Allergies:  [This allergen will not trigger allergy alert] &quot;lentils&quot;-&quot;itchiness&quot; (Other)  Beef (Other)  No Known Drug Allergies        Hospital Medications:  cefTRIAXone   IVPB 1000 milliGRAM(s) IV Intermittent every 24 hours  chlorhexidine 0.12% Liquid 15 milliLiter(s) Oral Mucosa every 12 hours  chlorhexidine 2% Cloths 1 Application(s) Topical <User Schedule>  coronavirus bivalent (EUA) Booster Vaccine (PFIZER) 0.3 milliLiter(s) IntraMuscular once  dextrose 5%. 1000 milliLiter(s) IV Continuous <Continuous>  dextrose 5%. 1000 milliLiter(s) IV Continuous <Continuous>  dextrose 50% Injectable 25 Gram(s) IV Push once  dextrose 50% Injectable 12.5 Gram(s) IV Push once  dextrose 50% Injectable 25 Gram(s) IV Push once  dextrose Oral Gel 15 Gram(s) Oral once PRN  glucagon  Injectable 1 milliGRAM(s) IntraMuscular once  insulin lispro (ADMELOG) corrective regimen sliding scale   SubCutaneous every 6 hours  insulin NPH human recombinant 11 Unit(s) SubCutaneous every 6 hours  levothyroxine Injectable 75 MICROGram(s) IV Push at bedtime  midodrine 10 milliGRAM(s) Oral every 8 hours  pantoprazole  Injectable 40 milliGRAM(s) IV Push two times a day  petrolatum Ophthalmic Ointment 1 Application(s) Both EYES two times a day PRN  polyethylene glycol 3350 17 Gram(s) Oral <User Schedule>  rifAXIMin 550 milliGRAM(s) Oral two times a day      PMHX/PSHX:  CAD (coronary artery disease)    Diabetes    Lymphoma    Cirrhosis    S/P CABG x 1        Family history:  No pertinent family history in first degree relatives        ROS: As per HPI, otherwise 14-point ROS reviewed and negative.      PHYSICAL EXAM:   Vital Signs:  Vital Signs Last 24 Hrs  T(C): 36.9 (07 Mar 2023 08:00), Max: 37.8 (06 Mar 2023 16:00)  T(F): 98.5 (07 Mar 2023 08:00), Max: 100.1 (06 Mar 2023 16:00)  HR: 75 (07 Mar 2023 11:00) (55 - 79)  BP: 117/61 (07 Mar 2023 08:00) (108/47 - 131/74)  BP(mean): 77 (07 Mar 2023 08:00) (64 - 91)  RR: 23 (07 Mar 2023 11:00) (19 - 31)  SpO2: 100% (07 Mar 2023 11:00) (95% - 100%)    Parameters below as of 07 Mar 2023 09:00  Patient On (Oxygen Delivery Method): BiPAP/CPAP    O2 Concentration (%): 30  Daily     Daily Weight in k.2 (07 Mar 2023 04:00)      23 @ 07:01  -  23 @ 07:00  --------------------------------------------------------  IN: 1565.6 mL / OUT: 1895 mL / NET: -329.4 mL    23 @ 07:01  -  23 @ 12:05  --------------------------------------------------------  IN: 350 mL / OUT: 550 mL / NET: -200 mL    GENERAL:  intubated  HEENT:  NCAT, no scleral icterus  CHEST: intubated   HEART:  RRR  ABDOMEN:  Soft, non-tender, non-distended, normoactive bowel sounds, no masses  EXTREMITIES:  No cyanosis, clubbing, or edema  SKIN:  No rash/erythema/ecchymoses/petechiae/wounds/abscess/warm/dry  NEURO: A&O x 0      LABS:                        9.7    8.21  )-----------( 91       ( 07 Mar 2023 02:55 )             31.1     Mean Cell Volume: 93.1 fL (23 @ 02:55)        141  |  114<H>  |  34<H>  ----------------------------<  127<H>  3.9   |  16<L>  |  0.98    Ca    7.9<L>      07 Mar 2023 02:55  Phos  2.9     03-07  Mg     2.10     03-07    TPro  5.4<L>  /  Alb  2.5<L>  /  TBili  2.8<H>  /  DBili  x   /  AST  89<H>  /  ALT  41  /  AlkPhos  403<H>  03-07    LIVER FUNCTIONS - ( 07 Mar 2023 02:55 )  Alb: 2.5 g/dL / Pro: 5.4 g/dL / ALK PHOS: 403 U/L / ALT: 41 U/L / AST: 89 U/L / GGT: x           PT/INR - ( 07 Mar 2023 02:55 )   PT: 18.1 sec;   INR: 1.55 ratio         PTT - ( 07 Mar 2023 02:55 )  PTT:31.1 sec                            9.7    8.21  )-----------( 91       ( 07 Mar 2023 02:55 )             31.1                         8.9    4.88  )-----------( 63       ( 06 Mar 2023 03:13 )             27.7                         8.4    4.48  )-----------( 53       ( 05 Mar 2023 03:09 )             26.5       Imaging:

## 2023-03-07 NOTE — PROGRESS NOTE ADULT - SUBJECTIVE AND OBJECTIVE BOX
Internal Medicine   Keon Vazquez | PGY-1    OVERNIGHT EVENTS: No acute overnight events. TF held for possible extubation. Weaned off precedex.    SUBJECTIVE: Unable to assess given mental status. Per son at bedside had opened eyes earlier.    MEDICATIONS  (STANDING):  cefTRIAXone   IVPB 1000 milliGRAM(s) IV Intermittent every 24 hours  chlorhexidine 0.12% Liquid 15 milliLiter(s) Oral Mucosa every 12 hours  chlorhexidine 2% Cloths 1 Application(s) Topical <User Schedule>  coronavirus bivalent (EUA) Booster Vaccine (PFIZER) 0.3 milliLiter(s) IntraMuscular once  dextrose 5%. 1000 milliLiter(s) (50 mL/Hr) IV Continuous <Continuous>  dextrose 5%. 1000 milliLiter(s) (100 mL/Hr) IV Continuous <Continuous>  dextrose 50% Injectable 25 Gram(s) IV Push once  dextrose 50% Injectable 12.5 Gram(s) IV Push once  dextrose 50% Injectable 25 Gram(s) IV Push once  glucagon  Injectable 1 milliGRAM(s) IntraMuscular once  insulin lispro (ADMELOG) corrective regimen sliding scale   SubCutaneous every 6 hours  insulin NPH human recombinant 11 Unit(s) SubCutaneous every 6 hours  levothyroxine Injectable 75 MICROGram(s) IV Push at bedtime  midodrine 10 milliGRAM(s) Oral every 8 hours  pantoprazole  Injectable 40 milliGRAM(s) IV Push two times a day  polyethylene glycol 3350 17 Gram(s) Oral <User Schedule>  rifAXIMin 550 milliGRAM(s) Oral two times a day    MEDICATIONS  (PRN):  dextrose Oral Gel 15 Gram(s) Oral once PRN Blood Glucose LESS THAN 70 milliGRAM(s)/deciliter  petrolatum Ophthalmic Ointment 1 Application(s) Both EYES two times a day PRN dry eyes    VITALS:  T(F): 98 (03-07-23 @ 12:00), Max: 100.1 (03-06-23 @ 16:00)  HR: 71 (03-07-23 @ 14:00) (55 - 79)  BP: 117/61 (03-07-23 @ 08:00) (108/47 - 131/74)  BP(mean): 77 (03-07-23 @ 08:00) (64 - 91)  RR: 27 (03-07-23 @ 14:00) (19 - 31)  SpO2: 99% (03-07-23 @ 14:00) (95% - 100%)    PHYSICAL EXAM:   GENERAL: NAD, lying in bed, intubated, not rousable / does not open eyes or follow commands however is spontaneously wiggling LE  HEAD: Atraumatic, normocephalic  EYES: PERRLA, conjunctiva and sclera clear, no nystagmus noted  ENT: Moist mucous membranes  CHEST/LUNG: Mildly rhonchorous throughout; no rales, wheezing, or rubs  HEART: Regular rate and rhythm; no murmurs, rubs, or gallops, normal S1/S2  ABDOMEN: Normal bowel sounds; soft, nontender, nondistended  EXTREMITIES: Extremities edematous; no clubbing or cyanosis  MSK: No gross deformities noted   NEURO: Unable to assess well; grossly nonfocal  SKIN: No rashes or lesions  PSYCH: Unable to assess    LABS:                      9.7    8.21  )-----------( 91       ( 07 Mar 2023 02:55 )             31.1     03-07  141  |  114<H>  |  34<H>  ----------------------------<  127<H>  3.9   |  16<L>  |  0.98    Ca    7.9<L>      07 Mar 2023 02:55  Phos  2.9     03-07  Mg     2.10     03-07    TPro  5.4<L>  /  Alb  2.5<L>  /  TBili  2.8<H>  /  DBili  x   /  AST  89<H>  /  ALT  41  /  AlkPhos  403<H>  03-07    PT/INR - ( 07 Mar 2023 02:55 )   PT: 18.1 sec;   INR: 1.55 ratio    PTT - ( 07 Mar 2023 02:55 )  PTT:31.1 sec    Creatinine Trend: 0.98<--, 1.10<--, 1.16<--, 1.24<--, 1.26<--, 1.23<--    I&O's Summary  06 Mar 2023 07:01  -  07 Mar 2023 07:00  --------------------------------------------------------  IN: 1565.6 mL / OUT: 1895 mL / NET: -329.4 mL    07 Mar 2023 07:01  -  07 Mar 2023 14:31  --------------------------------------------------------  IN: 510 mL / OUT: 725 mL / NET: -215 mL

## 2023-03-07 NOTE — PROGRESS NOTE ADULT - ASSESSMENT
67M follows at [facility] Hx decompensated TANG cirrhosis (+ascites, +EV -- previously MELD Na 8), CAD s/p CABG s/p PCI (last in 2012 on ASA, now off plavix x2 weeks), newly diagnosed follicular lymphoma (on rituximab), HTN, DM, currently on tenofovir (HBV Core +) presented with right sided chest/abdominal pain with lizzette hematemesis + clots c/b hemorrhagic shock, s/p intubation in MICU, EGD showing large varices s/p banding, unable to completely clear stomach due to clot. Now w/ new fever and downtrending Hg    # AMS - Possible component of HE. Would continue with an aggressive laxative regimen.  #Hemorrhagic shock  #Hematemesis: s/p EGD 2/24/2023 s/p EVL x6, bleeding likely due to EV, however unable to completely visualize stomach given presence of clotted blood. H/H now stable.   #NANCI  #Decompensated TANG cirrhosis: follows at [facility], previously low meld Na 8  --MELD Na: 22  --Ascites: (+) Hx, on lasix 40, spironolactone 50 at home, holding here  --SBP; no Hx  --EV: (+) Hx, no EVB --> is on nadolol at home  --PVT: no Hx  --HCC: no Hx    Recommendations:  - No plans for repeat EGD at this time  - c/w Rifaximin and Miralax, would  3-5BMs or ~800cc stool output per day when considering extubation, ideally would like to use lactulose if no significant abdominal distention  - Please start patient on ciprofloxacin 500mg daily for SBP prophylaxis given low ascitic protein  - c/w pantoprazole 40 IV BID  - Please resume tenofovir  - hold diuretics  - No need to trend ammonia  - repeat EGD in 4 weeks for variceal banding  - trend CMP, CBC, coags    Please note that the recommendations are not final until attested by an attending.    Thank you for involving us in the care of this patient. Please reach out if any further questions.    Ulices Ray, PGY-6  Gastroenterology/Hepatology Fellow    Available on [employer] Teams  After 5PM/Weekends, please contact the on-call GI fellow: [phone]

## 2023-03-08 NOTE — PROGRESS NOTE ADULT - SUBJECTIVE AND OBJECTIVE BOX
Interval Events:   - Extubated  - Remains altered, but opens eyes to voice    Allergies:  [This allergen will not trigger allergy alert] &quot;lentils&quot;-&quot;itchiness&quot; (Other)  Beef (Other)  No Known Drug Allergies      Hospital Medications:  cefTRIAXone   IVPB 1000 milliGRAM(s) IV Intermittent every 24 hours  chlorhexidine 2% Cloths 1 Application(s) Topical <User Schedule>  coronavirus bivalent (EUA) Booster Vaccine (PFIZER) 0.3 milliLiter(s) IntraMuscular once  dextrose 5%. 1000 milliLiter(s) IV Continuous <Continuous>  dextrose 5%. 1000 milliLiter(s) IV Continuous <Continuous>  dextrose 50% Injectable 25 Gram(s) IV Push once  dextrose 50% Injectable 12.5 Gram(s) IV Push once  dextrose 50% Injectable 25 Gram(s) IV Push once  dextrose Oral Gel 15 Gram(s) Oral once PRN  glucagon  Injectable 1 milliGRAM(s) IntraMuscular once  insulin lispro (ADMELOG) corrective regimen sliding scale   SubCutaneous every 6 hours  insulin NPH human recombinant 11 Unit(s) SubCutaneous every 6 hours  lactulose Syrup 10 Gram(s) Oral daily  levothyroxine Injectable 75 MICROGram(s) IV Push at bedtime  pantoprazole  Injectable 40 milliGRAM(s) IV Push two times a day  petrolatum Ophthalmic Ointment 1 Application(s) Both EYES two times a day PRN  polyethylene glycol 3350 17 Gram(s) Oral <User Schedule>  rifAXIMin 550 milliGRAM(s) Oral two times a day  tenofovir disoproxil fumarate (VIREAD) 300 milliGRAM(s) Oral daily      PMHX/PSHX:  CAD (coronary artery disease)    Diabetes    Lymphoma    Cirrhosis    S/P CABG x 1        Family history:  No pertinent family history in first degree relatives        ROS: As per HPI, otherwise 14-point ROS reviewed and negative.      PHYSICAL EXAM:   Vital Signs:  Vital Signs Last 24 Hrs  T(C): 37.2 (08 Mar 2023 12:00), Max: 37.4 (08 Mar 2023 00:00)  T(F): 99 (08 Mar 2023 12:00), Max: 99.3 (08 Mar 2023 00:00)  HR: 77 (08 Mar 2023 14:00) (74 - 92)  BP: --  BP(mean): --  RR: 20 (08 Mar 2023 14:00) (13 - 27)  SpO2: 100% (08 Mar 2023 14:00) (96% - 100%)    Parameters below as of 08 Mar 2023 14:00  Patient On (Oxygen Delivery Method): nasal cannula  O2 Flow (L/min): 4    Daily     Daily Weight in k (08 Mar 2023 02:00)      23 @ 07:01  -  23 @ 07:00  --------------------------------------------------------  IN: 2205 mL / OUT: 1795 mL / NET: 410 mL    23 @ 07:01  -  23 @ 14:58  --------------------------------------------------------  IN: 20 mL / OUT: 235 mL / NET: -215 mL        GENERAL:  NAD  HEENT:  NCAT, no scleral icterus  CHEST: intubated   HEART:  RRR  ABDOMEN:  Soft, non-tender, non-distended, normoactive bowel sounds, no masses  EXTREMITIES:  No cyanosis, clubbing, or edema  SKIN:  No rash/erythema/ecchymoses/petechiae/wounds/abscess/warm/dry  NEURO: A&O x 0, non focal    LABS:                        10.2   9.69  )-----------( 118      ( 08 Mar 2023 00:46 )             31.9     Mean Cell Volume: 91.7 fL (23 @ 00:46)    03-08    139  |  109<H>  |  36<H>  ----------------------------<  184<H>  3.7   |  17<L>  |  0.98    Ca    8.1<L>      08 Mar 2023 00:46  Phos  3.1     03-08  Mg     2.30     03-08    TPro  5.8<L>  /  Alb  2.6<L>  /  TBili  2.1<H>  /  DBili  x   /  AST  70<H>  /  ALT  38  /  AlkPhos  412<H>      LIVER FUNCTIONS - ( 08 Mar 2023 00:46 )  Alb: 2.6 g/dL / Pro: 5.8 g/dL / ALK PHOS: 412 U/L / ALT: 38 U/L / AST: 70 U/L / GGT: x           PT/INR - ( 08 Mar 2023 00:46 )   PT: 17.3 sec;   INR: 1.49 ratio         PTT - ( 08 Mar 2023 00:46 )  PTT:28.7 sec    Amylase Serum--      Lipase serum--       [id number]                          10.2   9.69  )-----------( 118      ( 08 Mar 2023 00:46 )             31.9                         9.7    8.21  )-----------( 91       ( 07 Mar 2023 02:55 )             31.1                         8.9    4.88  )-----------( 63       ( 06 Mar 2023 03:13 )             27.7       Imaging:

## 2023-03-08 NOTE — PROGRESS NOTE ADULT - ASSESSMENT
67M follows at [facility] Hx decompensated TANG cirrhosis (+ascites, +EV -- previously MELD Na 8), CAD s/p CABG s/p PCI (last in 2012 on ASA, now off plavix x2 weeks), newly diagnosed follicular lymphoma (on rituximab), HTN, DM, currently on tenofovir (HBV Core +) presented with right sided chest/abdominal pain with lizzette hematemesis + clots c/b hemorrhagic shock, s/p intubation in MICU, EGD showing large varices s/p banding, unable to completely clear stomach due to clot. Now w/ new fever and downtrending Hg    # AMS - Possible component of HE. Would continue with an aggressive laxative regimen.  #Hemorrhagic shock  #Hematemesis: s/p EGD 2/24/2023 s/p EVL x6, bleeding likely due to EV, however unable to completely visualize stomach given presence of clotted blood. H/H now stable.   #NANCI  #Decompensated TANG cirrhosis: follows at [facility], previously low meld Na 8  --MELD Na: 22  --Ascites: (+) Hx, on lasix 40, spironolactone 50 at home, holding here  --SBP; no Hx  --EV: (+) Hx, no EVB --> is on nadolol at home  --PVT: no Hx  --HCC: no Hx    Recommendations:  - No plans for repeat EGD at this time  - c/w Rifaximin and lactulose, aim for 2-3 BMs per day  - Once CTX in completed, should be on ciprofloxacin 500mg daily for SBP prophylaxis given low ascitic protein  - c/w pantoprazole 40 IV BID  - c/w tenofovir  - hold diuretics  - No need to trend ammonia  - repeat EGD in 4 weeks for variceal banding  - trend CMP, CBC, coags    Please note that the recommendations are not final until attested by an attending.    Thank you for involving us in the care of this patient. Please reach out if any further questions.    Ulices Ray, PGY-6  Gastroenterology/Hepatology Fellow    Available on [employer] Teams  After 5PM/Weekends, please contact the on-call GI fellow: [phone]

## 2023-03-08 NOTE — PROGRESS NOTE ADULT - SUBJECTIVE AND OBJECTIVE BOX
INTERVAL HPI/OVERNIGHT EVENTS:    SUBJECTIVE: Patient seen and examined at bedside.     OBJECTIVE:     VITAL SIGNS:  ICU Vital Signs Last 24 Hrs  T(C): 37.4 (08 Mar 2023 04:00), Max: 37.4 (08 Mar 2023 00:00)  T(F): 99.3 (08 Mar 2023 04:00), Max: 99.3 (08 Mar 2023 00:00)  HR: 86 (08 Mar 2023 07:00) (71 - 92)  BP: --  BP(mean): --  ABP: 149/63 (08 Mar 2023 07:00) (113/49 - 160/68)  ABP(mean): 96 (08 Mar 2023 07:00) (69 - 105)  RR: 24 (08 Mar 2023 07:00) (19 - 27)  SpO2: 100% (08 Mar 2023 07:00) (97% - 100%)    O2 Parameters below as of 08 Mar 2023 07:00  Patient On (Oxygen Delivery Method): ventilator,CPAP 5/5    O2 Concentration (%): 30      Mode: CPAP with PS, FiO2: 30, PEEP: 5, MAP: 8, PC: 5, PIP: 11    03-07 @ 07:01  -  03-08 @ 07:00  --------------------------------------------------------  IN: 2200 mL / OUT: 1795 mL / NET: 405 mL      CAPILLARY BLOOD GLUCOSE      POCT Blood Glucose.: 158 mg/dL (08 Mar 2023 05:39)      PHYSICAL EXAM:  GENERAL: NAD, well-groomed, well-developed  HEAD:  Atraumatic, Normocephalic  EYES: EOMI, PERRLA, conjunctiva and sclera clear  ENMT: No tonsillar erythema, exudates, or enlargement; Moist mucous membranes, Good dentition, No lesions  NECK: Supple, No JVD, Normal thyroid  CHEST/LUNG: Clear to auscultation bilaterally; No rales, rhonchi, wheezing, or rubs  HEART: Regular rate and rhythm; No murmurs, rubs, or gallops  ABDOMEN: Soft, Nontender, Nondistended; Bowel sounds present  VASCULAR:  2+ Peripheral Pulses, No clubbing, cyanosis, or edema  LYMPH: No lymphadenopathy noted  SKIN: No rashes or lesions  NERVOUS SYSTEM:  Alert & Oriented X3, Good concentration; Motor Strength 5/5 B/L upper and lower extremities; DTRs 2+ intact and symmetric    MEDICATIONS:  MEDICATIONS  (STANDING):  cefTRIAXone   IVPB 1000 milliGRAM(s) IV Intermittent every 24 hours  chlorhexidine 0.12% Liquid 15 milliLiter(s) Oral Mucosa every 12 hours  chlorhexidine 2% Cloths 1 Application(s) Topical <User Schedule>  coronavirus bivalent (EUA) Booster Vaccine (PFIZER) 0.3 milliLiter(s) IntraMuscular once  dextrose 5%. 1000 milliLiter(s) (100 mL/Hr) IV Continuous <Continuous>  dextrose 5%. 1000 milliLiter(s) (50 mL/Hr) IV Continuous <Continuous>  dextrose 50% Injectable 25 Gram(s) IV Push once  dextrose 50% Injectable 12.5 Gram(s) IV Push once  dextrose 50% Injectable 25 Gram(s) IV Push once  glucagon  Injectable 1 milliGRAM(s) IntraMuscular once  insulin lispro (ADMELOG) corrective regimen sliding scale   SubCutaneous every 6 hours  insulin NPH human recombinant 11 Unit(s) SubCutaneous every 6 hours  lactulose Syrup 10 Gram(s) Oral daily  levothyroxine Injectable 75 MICROGram(s) IV Push at bedtime  midodrine 10 milliGRAM(s) Oral every 8 hours  pantoprazole  Injectable 40 milliGRAM(s) IV Push two times a day  polyethylene glycol 3350 17 Gram(s) Oral <User Schedule>  rifAXIMin 550 milliGRAM(s) Oral two times a day  tenofovir disoproxil fumarate (VIREAD) 300 milliGRAM(s) Oral daily    MEDICATIONS  (PRN):  dextrose Oral Gel 15 Gram(s) Oral once PRN Blood Glucose LESS THAN 70 milliGRAM(s)/deciliter  petrolatum Ophthalmic Ointment 1 Application(s) Both EYES two times a day PRN dry eyes      ALLERGIES:  Allergies    [This allergen will not trigger allergy alert] &quot;lentils&quot;-&quot;itchiness&quot; (Other)  Beef (Other)  No Known Drug Allergies    Intolerances        LABS:                        10.2   9.69  )-----------( 118      ( 08 Mar 2023 00:46 )             31.9     03-08    139  |  109<H>  |  36<H>  ----------------------------<  184<H>  3.7   |  17<L>  |  0.98    Ca    8.1<L>      08 Mar 2023 00:46  Phos  3.1     03-08  Mg     2.30     03-08    TPro  5.8<L>  /  Alb  2.6<L>  /  TBili  2.1<H>  /  DBili  x   /  AST  70<H>  /  ALT  38  /  AlkPhos  412<H>  03-08    PT/INR - ( 08 Mar 2023 00:46 )   PT: 17.3 sec;   INR: 1.49 ratio         PTT - ( 08 Mar 2023 00:46 )  PTT:28.7 sec      RADIOLOGY & ADDITIONAL TESTS: Reviewed. INTERVAL HPI/OVERNIGHT EVENTS:    SUBJECTIVE: Patient seen and examined at bedside.     OBJECTIVE:     VITAL SIGNS:  ICU Vital Signs Last 24 Hrs  T(C): 37.4 (08 Mar 2023 04:00), Max: 37.4 (08 Mar 2023 00:00)  T(F): 99.3 (08 Mar 2023 04:00), Max: 99.3 (08 Mar 2023 00:00)  HR: 86 (08 Mar 2023 07:00) (71 - 92)  BP: --  BP(mean): --  ABP: 149/63 (08 Mar 2023 07:00) (113/49 - 160/68)  ABP(mean): 96 (08 Mar 2023 07:00) (69 - 105)  RR: 24 (08 Mar 2023 07:00) (19 - 27)  SpO2: 100% (08 Mar 2023 07:00) (97% - 100%)    O2 Parameters below as of 08 Mar 2023 07:00  Patient On (Oxygen Delivery Method): ventilator,CPAP 5/5    O2 Concentration (%): 30      Mode: CPAP with PS, FiO2: 30, PEEP: 5, MAP: 8, PC: 5, PIP: 11    03-07 @ 07:01  -  03-08 @ 07:00  --------------------------------------------------------  IN: 2200 mL / OUT: 1795 mL / NET: 405 mL      CAPILLARY BLOOD GLUCOSE      POCT Blood Glucose.: 158 mg/dL (08 Mar 2023 05:39)      PHYSICAL EXAM:  GENERAL: NAD, lying in bed, intubated, not rousable / does not open eyes or follow commands however is spontaneously wiggling LE  HEAD: Atraumatic, normocephalic  EYES: PERRLA, conjunctiva and sclera clear, no nystagmus noted  ENT: Moist mucous membranes  CHEST/LUNG: Mildly rhonchorous throughout; no rales, wheezing, or rubs  HEART: Regular rate and rhythm; no murmurs, rubs, or gallops, normal S1/S2  ABDOMEN: Normal bowel sounds; soft, nontender, nondistended  EXTREMITIES: Extremities edematous; no clubbing or cyanosis  MSK: No gross deformities noted   NEURO: Unable to assess well; grossly nonfocal  SKIN: No rashes or lesions  PSYCH: Unable to assess    MEDICATIONS:  MEDICATIONS  (STANDING):  cefTRIAXone   IVPB 1000 milliGRAM(s) IV Intermittent every 24 hours  chlorhexidine 0.12% Liquid 15 milliLiter(s) Oral Mucosa every 12 hours  chlorhexidine 2% Cloths 1 Application(s) Topical <User Schedule>  coronavirus bivalent (EUA) Booster Vaccine (PFIZER) 0.3 milliLiter(s) IntraMuscular once  dextrose 5%. 1000 milliLiter(s) (100 mL/Hr) IV Continuous <Continuous>  dextrose 5%. 1000 milliLiter(s) (50 mL/Hr) IV Continuous <Continuous>  dextrose 50% Injectable 25 Gram(s) IV Push once  dextrose 50% Injectable 12.5 Gram(s) IV Push once  dextrose 50% Injectable 25 Gram(s) IV Push once  glucagon  Injectable 1 milliGRAM(s) IntraMuscular once  insulin lispro (ADMELOG) corrective regimen sliding scale   SubCutaneous every 6 hours  insulin NPH human recombinant 11 Unit(s) SubCutaneous every 6 hours  lactulose Syrup 10 Gram(s) Oral daily  levothyroxine Injectable 75 MICROGram(s) IV Push at bedtime  midodrine 10 milliGRAM(s) Oral every 8 hours  pantoprazole  Injectable 40 milliGRAM(s) IV Push two times a day  polyethylene glycol 3350 17 Gram(s) Oral <User Schedule>  rifAXIMin 550 milliGRAM(s) Oral two times a day  tenofovir disoproxil fumarate (VIREAD) 300 milliGRAM(s) Oral daily    MEDICATIONS  (PRN):  dextrose Oral Gel 15 Gram(s) Oral once PRN Blood Glucose LESS THAN 70 milliGRAM(s)/deciliter  petrolatum Ophthalmic Ointment 1 Application(s) Both EYES two times a day PRN dry eyes      ALLERGIES:  Allergies    [This allergen will not trigger allergy alert] &quot;lentils&quot;-&quot;itchiness&quot; (Other)  Beef (Other)  No Known Drug Allergies    Intolerances        LABS:                        10.2   9.69  )-----------( 118      ( 08 Mar 2023 00:46 )             31.9     03-08    139  |  109<H>  |  36<H>  ----------------------------<  184<H>  3.7   |  17<L>  |  0.98    Ca    8.1<L>      08 Mar 2023 00:46  Phos  3.1     03-08  Mg     2.30     03-08    TPro  5.8<L>  /  Alb  2.6<L>  /  TBili  2.1<H>  /  DBili  x   /  AST  70<H>  /  ALT  38  /  AlkPhos  412<H>  03-08    PT/INR - ( 08 Mar 2023 00:46 )   PT: 17.3 sec;   INR: 1.49 ratio         PTT - ( 08 Mar 2023 00:46 )  PTT:28.7 sec      RADIOLOGY & ADDITIONAL TESTS: Reviewed. INTERVAL HPI/OVERNIGHT EVENTS: NAEON.     SUBJECTIVE: Patient seen and examined at bedside. Sedated & intubated.    OBJECTIVE:     VITAL SIGNS:  ICU Vital Signs Last 24 Hrs  T(C): 37.4 (08 Mar 2023 04:00), Max: 37.4 (08 Mar 2023 00:00)  T(F): 99.3 (08 Mar 2023 04:00), Max: 99.3 (08 Mar 2023 00:00)  HR: 86 (08 Mar 2023 07:00) (71 - 92)  BP: --  BP(mean): --  ABP: 149/63 (08 Mar 2023 07:00) (113/49 - 160/68)  ABP(mean): 96 (08 Mar 2023 07:00) (69 - 105)  RR: 24 (08 Mar 2023 07:00) (19 - 27)  SpO2: 100% (08 Mar 2023 07:00) (97% - 100%)    O2 Parameters below as of 08 Mar 2023 07:00  Patient On (Oxygen Delivery Method): ventilator,CPAP 5/5    O2 Concentration (%): 30      Mode: CPAP with PS, FiO2: 30, PEEP: 5, MAP: 8, PC: 5, PIP: 11    03-07 @ 07:01  -  03-08 @ 07:00  --------------------------------------------------------  IN: 2200 mL / OUT: 1795 mL / NET: 405 mL      CAPILLARY BLOOD GLUCOSE      POCT Blood Glucose.: 158 mg/dL (08 Mar 2023 05:39)      PHYSICAL EXAM:  GENERAL: NAD, lying in bed, intubated, arousable / does open eyes or follow commands  HEAD: Atraumatic, normocephalic  EYES: PERRLA, conjunctiva and sclera clear, no nystagmus noted  ENT: Moist mucous membranes  CHEST/LUNG: Mildly rhonchorous throughout; no rales, wheezing, or rubs  HEART: Regular rate and rhythm; no murmurs, rubs, or gallops, normal S1/S2  ABDOMEN: Normal bowel sounds; soft, nontender, nondistended  EXTREMITIES: Extremities edematous; no clubbing or cyanosis  MSK: No gross deformities noted   NEURO: Unable to assess well; grossly nonfocal  SKIN: No rashes or lesions  PSYCH: Unable to assess    MEDICATIONS:  MEDICATIONS  (STANDING):  cefTRIAXone   IVPB 1000 milliGRAM(s) IV Intermittent every 24 hours  chlorhexidine 0.12% Liquid 15 milliLiter(s) Oral Mucosa every 12 hours  chlorhexidine 2% Cloths 1 Application(s) Topical <User Schedule>  coronavirus bivalent (EUA) Booster Vaccine (PFIZER) 0.3 milliLiter(s) IntraMuscular once  dextrose 5%. 1000 milliLiter(s) (100 mL/Hr) IV Continuous <Continuous>  dextrose 5%. 1000 milliLiter(s) (50 mL/Hr) IV Continuous <Continuous>  dextrose 50% Injectable 25 Gram(s) IV Push once  dextrose 50% Injectable 12.5 Gram(s) IV Push once  dextrose 50% Injectable 25 Gram(s) IV Push once  glucagon  Injectable 1 milliGRAM(s) IntraMuscular once  insulin lispro (ADMELOG) corrective regimen sliding scale   SubCutaneous every 6 hours  insulin NPH human recombinant 11 Unit(s) SubCutaneous every 6 hours  lactulose Syrup 10 Gram(s) Oral daily  levothyroxine Injectable 75 MICROGram(s) IV Push at bedtime  midodrine 10 milliGRAM(s) Oral every 8 hours  pantoprazole  Injectable 40 milliGRAM(s) IV Push two times a day  polyethylene glycol 3350 17 Gram(s) Oral <User Schedule>  rifAXIMin 550 milliGRAM(s) Oral two times a day  tenofovir disoproxil fumarate (VIREAD) 300 milliGRAM(s) Oral daily    MEDICATIONS  (PRN):  dextrose Oral Gel 15 Gram(s) Oral once PRN Blood Glucose LESS THAN 70 milliGRAM(s)/deciliter  petrolatum Ophthalmic Ointment 1 Application(s) Both EYES two times a day PRN dry eyes      ALLERGIES:  Allergies    [This allergen will not trigger allergy alert] &quot;lentils&quot;-&quot;itchiness&quot; (Other)  Beef (Other)  No Known Drug Allergies    Intolerances        LABS:                        10.2   9.69  )-----------( 118      ( 08 Mar 2023 00:46 )             31.9     03-08    139  |  109<H>  |  36<H>  ----------------------------<  184<H>  3.7   |  17<L>  |  0.98    Ca    8.1<L>      08 Mar 2023 00:46  Phos  3.1     03-08  Mg     2.30     03-08    TPro  5.8<L>  /  Alb  2.6<L>  /  TBili  2.1<H>  /  DBili  x   /  AST  70<H>  /  ALT  38  /  AlkPhos  412<H>  03-08    PT/INR - ( 08 Mar 2023 00:46 )   PT: 17.3 sec;   INR: 1.49 ratio         PTT - ( 08 Mar 2023 00:46 )  PTT:28.7 sec      RADIOLOGY & ADDITIONAL TESTS: Reviewed.

## 2023-03-08 NOTE — PROGRESS NOTE ADULT - ATTENDING COMMENTS
- variceal bleed: PPI once daily   - extubated, was awake with eyes open, but unresponsive and unable to follow commands when seen - may have hepatic encephalopathy and delayed clearance of sedatives. If mentation does not improve with lactulose and rifaximin, will also consider Wernicke encephalopathy. Pt. is very thin.   - resumed tenofovir    -- lactulose 30 g TID - titrate to ~3 soft BM/day, continue rifaximin; would remove recta tube soon  -- decrease PPI to once daily PO/IV  -- hold statin  -- continue prophylactic antibiotic

## 2023-03-08 NOTE — PROGRESS NOTE ADULT - ASSESSMENT
68yo M w/ pmhx CAD s/p CABG, TANG cirrhosis, follicular lymphoma (on Rituximab infusions outpt), HTN, DM, hypothyroidism, currently on tenofovir (HBV Core +), presented to the ED w/ chest pain and constipation while in the ED patient w/ multiple episodes of hematemesis and hypotension; MICU consulted for hypotension, massive upper GI bleed s/p intubation, s/p 2/24 bedside scope with banding x6 of esophageal varices.     [ NEURO ]  #AMS   #r/o sedation vs hepatic encep  -sedation d/c on 3/1  -CTH (3/5): no IC abnormalities  -EEG: r/o seizure given continued AMS with occ tachy/HTN  -weaned precedex  > f/u daily ammonia - 65 on 3/6  > c/w rifaximin BID, miralax BID    [ CARDIO ]  #Hypovolemic Shock 2/2 GIB vs vasoplegia iso cirrhosis vs sepsis  -s/p 6U PRBCs   -on Levophed at .5, wean as tolerated  -diagnostic paracentesis to r/o SBP given hx of ascites 2/25 - transudative with no evidence of SBP   -bedside POCUS with grossly normal LV systolic fx   -2/25 (TTE) - dilated LA   -cortisol normal  > on midodrine 10mg q8h w/ hold parameters     [ RESPIRATORY ]  #Airway Protection  -patient intubated for airway protection 2/2 hematemesis  -noted episodes of over-breathing vent, requiring limited pushes of sedation  > SBT as tolerated  > CPAP for now, anticipate extubation when mental status improves    [ GASTRO ]  #UGIB  -pt presented for chest pain and constipation; during ED course patient with multiple episodes of massive hematemesis  -given Octreotide, PPI, CTX  -GI consulted; s/p 2/24 scope, erythromycin given prior to scope -> multiple large esophageal varices, six bands successfully placed with incomplete eradication of varices. Per GI bleeding likely due to EV, however unable to completely visualize stomach given presence of clotted blood.  -s/p Octreotide gtt (72 hours post procedure) and PPI 40mg IVP BID  -placed rectal tube; per hepatology no risk of rectal varices  -therapeutic para (3/5): removed 4.5L  -CT abdomen w/ triple phase (3/5): noted for cirrhosis  > consider diagnostic tap (paracentesis) if continued fever  > f/u daily ammonia, c/w rifaximin BID, miralax BID  > start ppx CTX as recommended by GI  > c/w TF @ 40 cc/hr  > c/w PPI  > d/c reglan, no clear indication    [ RENAL/ ]  #Hypernatremia  -noted to Na 149  > c/w FWF 350q6  > 2.6L calculated deficit    [ INFECTIOUS ]  -Pt given 1 dose ceftriaxone given in ED for SBP prophylaxis  -s/p ceft 2/24-2/27; transitioned to zosyn (2/27) for broad sepsis coverage in light of fever  -2/27 (sputum culture): growing pseudomonas   -s/p Zosyn (2/27-3/5), 7 days  > resume ceftriaxone 1g daily for SBP ppx  > ctm fevers, wbc    [ HEME ]  #GI Bleed   -Hgb 6.7 on admission  -s/p 6 units pRBC, 1 FFP, 1 plts  > monitor CBC q8 for now, if stable consider CBC q12  > hold chemical DVT ppx given GIB  > maintain SCDs    [ENDO]  #DM  -tube feeds  -NPH 11u q6hrs, ISS    #Hypothyroidism  -home synthroid IV     [ ETHICS ]  -Patient is Full Code    66yo M w/ pmhx CAD s/p CABG, TANG cirrhosis, follicular lymphoma (on Rituximab infusions outpt), HTN, DM, hypothyroidism, currently on tenofovir (HBV Core +), presented to the ED w/ chest pain and constipation while in the ED patient w/ multiple episodes of hematemesis and hypotension; MICU consulted for hypotension, massive upper GI bleed s/p intubation, s/p 2/24 bedside scope with banding x6 of esophageal varices.     [ NEURO ]  #AMS   #r/o sedation vs hepatic encep  -sedation d/c on 3/1  -CTH (3/5): no IC abnormalities  -EEG: r/o seizure given continued AMS with occ tachy/HTN  -weaned precedex  > f/u daily ammonia  > c/w rifaximin BID, miralax BID    [ CARDIO ]  #Hypovolemic Shock 2/2 GIB vs vasoplegia iso cirrhosis vs sepsis  -s/p 6U PRBCs   -on Levophed at .5, wean as tolerated  -diagnostic paracentesis to r/o SBP given hx of ascites 2/25 - transudative with no evidence of SBP   -bedside POCUS with grossly normal LV systolic fx   -2/25 (TTE) - dilated LA   -cortisol normal  > on midodrine 10mg q8h w/ hold parameters     [ RESPIRATORY ]  #Airway Protection  -patient intubated for airway protection 2/2 hematemesis  -noted episodes of over-breathing vent, requiring limited pushes of sedation  > SBT as tolerated  > CPAP for now, extubate today    [ GASTRO ]  #UGIB  -pt presented for chest pain and constipation; during ED course patient with multiple episodes of massive hematemesis  -given Octreotide, PPI, CTX  -GI consulted; s/p 2/24 scope, erythromycin given prior to scope -> multiple large esophageal varices, six bands successfully placed with incomplete eradication of varices. Per GI bleeding likely due to EV, however unable to completely visualize stomach given presence of clotted blood.  -s/p Octreotide gtt (72 hours post procedure) and PPI 40mg IVP BID  -placed rectal tube; per hepatology no risk of rectal varices  -therapeutic para (3/5): removed 4.5L  -CT abdomen w/ triple phase (3/5): noted for cirrhosis  > consider diagnostic tap (paracentesis) if continued fever  > f/u daily ammonia, c/w rifaximin BID, miralax BID  > start ppx CTX as recommended by GI  > c/w TF @ 40 cc/hr  > c/w PPI  > d/c reglan, no clear indication    [ RENAL/ ]  #Hypernatremia  -resolved  > c/w FWF 350q12  > 2.6L calculated deficit    [ INFECTIOUS ]  -Pt given 1 dose ceftriaxone given in ED for SBP prophylaxis  -s/p ceft 2/24-2/27; transitioned to zosyn (2/27) for broad sepsis coverage in light of fever  -2/27 (sputum culture): growing pseudomonas   -s/p Zosyn (2/27-3/5), 7 days  > resume ceftriaxone 1g daily for SBP ppx  > continue w/ tenofovir   > ctm fevers, wbc    [ HEME ]  #GI Bleed   -Hgb 6.7 on admission  -s/p 6 units pRBC, 1 FFP, 1 plts  > monitor CBC q8 for now, if stable consider CBC q12  > hold chemical DVT ppx given GIB  > maintain SCDs    [ENDO]  #DM  -tube feeds  -NPH 11u q6hrs, ISS    #Hypothyroidism  -home synthroid IV     [ ETHICS ]  -Patient is Full Code    66yo M w/ pmhx CAD s/p CABG, TANG cirrhosis, follicular lymphoma (on Rituximab infusions outpt), HTN, DM, hypothyroidism, currently on tenofovir (HBV Core +), presented to the ED w/ chest pain and constipation while in the ED patient w/ multiple episodes of hematemesis and hypotension; MICU consulted for hypotension, massive upper GI bleed s/p intubation, s/p 2/24 bedside scope with banding x6 of esophageal varices.     [ NEURO ]  #AMS   #r/o sedation vs hepatic encep  -sedation d/c on 3/1  -CTH (3/5): no IC abnormalities  -EEG: r/o seizure given continued AMS with occ tachy/HTN  -weaned precedex  > f/u daily ammonia  > c/w rifaximin BID, miralax BID, lactulose 10mlqday    [ CARDIO ]  #Hypovolemic Shock 2/2 GIB vs vasoplegia iso cirrhosis vs sepsis  -s/p 6U PRBCs   -on Levophed at .5, wean as tolerated  -diagnostic paracentesis to r/o SBP given hx of ascites 2/25 - transudative with no evidence of SBP   -bedside POCUS with grossly normal LV systolic fx   -2/25 (TTE) - dilated LA   -cortisol normal  > on midodrine 10mg q8h w/ hold parameters     [ RESPIRATORY ]  #Airway Protection  -patient intubated for airway protection 2/2 hematemesis  -noted episodes of over-breathing vent, requiring limited pushes of sedation  > SBT as tolerated  > CPAP for now, extubate today    [ GASTRO ]  #UGIB  -pt presented for chest pain and constipation; during ED course patient with multiple episodes of massive hematemesis  -given Octreotide, PPI, CTX  -GI consulted; s/p 2/24 scope, erythromycin given prior to scope -> multiple large esophageal varices, six bands successfully placed with incomplete eradication of varices. Per GI bleeding likely due to EV, however unable to completely visualize stomach given presence of clotted blood.  -s/p Octreotide gtt (72 hours post procedure) and PPI 40mg IVP BID  -placed rectal tube; per hepatology no risk of rectal varices  -therapeutic para (3/5): removed 4.5L  -CT abdomen w/ triple phase (3/5): noted for cirrhosis  > consider diagnostic tap (paracentesis) if continued fever  > f/u daily ammonia, c/w rifaximin BID, miralax BID  > start ppx CTX as recommended by GI  > c/w TF @ 40 cc/hr  > c/w PPI  > d/c reglan, no clear indication    [ RENAL/ ]  #Hypernatremia  -resolved  > c/w FWF 350q12  > 2.6L calculated deficit    [ INFECTIOUS ]  -Pt given 1 dose ceftriaxone given in ED for SBP prophylaxis  -s/p ceft 2/24-2/27; transitioned to zosyn (2/27) for broad sepsis coverage in light of fever  -2/27 (sputum culture): growing pseudomonas   -s/p Zosyn (2/27-3/5), 7 days  > resume ceftriaxone 1g daily for SBP ppx (3/7-; x 7 days)  > continue w/ tenofovir   > ctm fevers, wbc    [ HEME ]  #GI Bleed   -Hgb 6.7 on admission  -s/p 6 units pRBC, 1 FFP, 1 plts  > monitor CBC q8 for now, if stable consider CBC q12  > hold chemical DVT ppx given GIB  > maintain SCDs    [ENDO]  #DM  > NPO pending S/s  > will require NG tube if fails  > NPH 11u q6hrs, ISS    #Hypothyroidism  -home synthroid IV     [ ETHICS ]  -Patient is Full Code

## 2023-03-08 NOTE — AIRWAY REMOVAL NOTE  ADULT & PEDS - BREATH SOUNDS ALL FIELDS
Nursing you can call him, let him know I would use Lotrisone on the area twice daily until resolved, I did send this into his pharmacy coarse/Anterior:/Lateral:

## 2023-03-08 NOTE — CHART NOTE - NSCHARTNOTEFT_GEN_A_CORE
NUTRITION FOLLOW-UP      68yo M w PMH CAD s/p CABG, TANG cirrhosis, follicular lymphoma, HTN, DM, hypothyroidism, presented to the ED w/ chest pain and constipation while in the ED patient w/ multiple episodes of hematemesis and hypotension.  Dx of massive upper GI bleed s/p intubation. S/p scop with banding of esophageal varices x6.      Pt. has overall been consistently receiving Glucerna 1.2 at prescribed goal.  However, this regimen inadequately meets estimated kcal and protein needs.  Previously recommended for VitalHP.  Pt. with diarrhea w 325mL rectal tube output over past 24hrs (noted on Lactulose) and abdominal distention (attributed to ascites). S/p paracentesis (3/5) w 4.5L fluid removal.  Changes in fluid status likely contributory to significant weight variations.    Also receiving 350mL free H2O q 6hours.    TF currently on hold as Pt. pending possible extubation.  If TF remains warranted, advise changing to Glucerna 1.5 with goal of 45mL/hr addition of NoCarb Prosource protein supplement to help meet increased protein needs.         _________________Diet___________________  Diet, NPO with Tube Feed:   Tube Feeding Modality: Orogastric  Glucerna 1.2 Nelson (GLUCERNARTH)  Total Volume for 24 Hours (mL): 960  Continuous  Starting Tube Feed Rate {mL per Hour}: 40  Until Goal Tube Feed Rate (mL per Hour): 40  Tube Feed Duration (in Hours): 24  Tube Feed Start Time: 12:00 (03-07-23 @ 11:58) [Active]      provides:  1152 kcals & 58g protein       Weight:                    Height: 67"                    Ideal Body Weight: 148lbs/ 67.3kg  60kg (3/8)  64.5kg (3/1)  55.4kg (2/25 on admit)          _____Estimated Energy Needs (based on Ideal Body Weight)_____  20-25kcals/kg =5929-0202 kcals/d  1.5-1.8g protein/kg = 101-121g protein/d        Edema: 2+ generalized, 4+ scrotum  Skin: No pressure injuries         ________________________Pertinent Medications____________   MEDICATIONS  (STANDING):  cefTRIAXone   IVPB 1000 milliGRAM(s) IV Intermittent every 24 hours  chlorhexidine 2% Cloths 1 Application(s) Topical <User Schedule>  coronavirus bivalent (EUA) Booster Vaccine (PFIZER) 0.3 milliLiter(s) IntraMuscular once  dextrose 5%. 1000 milliLiter(s) (50 mL/Hr) IV Continuous <Continuous>  dextrose 5%. 1000 milliLiter(s) (100 mL/Hr) IV Continuous <Continuous>  dextrose 50% Injectable 25 Gram(s) IV Push once  dextrose 50% Injectable 12.5 Gram(s) IV Push once  dextrose 50% Injectable 25 Gram(s) IV Push once  glucagon  Injectable 1 milliGRAM(s) IntraMuscular once  insulin lispro (ADMELOG) corrective regimen sliding scale   SubCutaneous every 6 hours  insulin NPH human recombinant 11 Unit(s) SubCutaneous every 6 hours  lactulose Syrup 10 Gram(s) Oral daily  levothyroxine Injectable 75 MICROGram(s) IV Push at bedtime  midodrine 10 milliGRAM(s) Oral every 8 hours  pantoprazole  Injectable 40 milliGRAM(s) IV Push two times a day  polyethylene glycol 3350 17 Gram(s) Oral <User Schedule>  rifAXIMin 550 milliGRAM(s) Oral two times a day  tenofovir disoproxil fumarate (VIREAD) 300 milliGRAM(s) Oral daily    MEDICATIONS  (PRN):  dextrose Oral Gel 15 Gram(s) Oral once PRN Blood Glucose LESS THAN 70 milliGRAM(s)/deciliter  petrolatum Ophthalmic Ointment 1 Application(s) Both EYES two times a day PRN dry eyes          _________________________Pertinent Labs____________________     03-08    139  |  109<H>  |  36<H>  ----------------------------<  184<H>  3.7   |  17<L>  |  0.98    Ca    8.1<L>      08 Mar 2023 00:46  Phos  3.1     03-08  Mg     2.30     03-08    TPro  5.8<L>  /  Alb  2.6<L>  /  TBili  2.1<H>  /  DBili  x   /  AST  70<H>  /  ALT  38  /  AlkPhos  412<H>  03-08                                                                   10.2   9.69  )-----------( 118      ( 08 Mar 2023 00:46 )             31.9         CAPILLARY BLOOD GLUCOSE      POCT Blood Glucose.: 158 mg/dL (08 Mar 2023 05:39)    POCT Blood Glucose.: 158 mg/dL (03-08-23 @ 05:39)  POCT Blood Glucose.: 163 mg/dL (03-07-23 @ 23:18)  POCT Blood Glucose.: 161 mg/dL (03-07-23 @ 17:13)  POCT Blood Glucose.: 125 mg/dL (03-07-23 @ 11:42)        ________NUTRITION Dx_________    Pt. remains severely malnourished       PLAN/RECOMMENDATIONS:    1) If TF remains warranted, change enteral regimen to Glucerna 1.5 w goal of 45mL/hr continuously + 2packets NoCarb Prosource per day    provides:  1080mL total volume  1620 kcals  89g protein (+ 30g additional protein via NoCarb Prosource)= 119g total protein   820mL free water       2) Obtain daily weights  3) Monitor GI status, electrolytes, glucose     RDN remains available and will f/u PRN.          Jaki Alvarez, BOSSMANN, CDN pager [phone] NUTRITION FOLLOW-UP      68yo M w PMH CAD s/p CABG, TANG cirrhosis, follicular lymphoma, HTN, DM, hypothyroidism, presented to the ED w/ chest pain and constipation while in the ED patient w/ multiple episodes of hematemesis and hypotension.  Dx of massive upper GI bleed s/p intubation. S/p scop with banding of esophageal varices x6.      Pt. has overall been consistently receiving Glucerna 1.2 at prescribed goal.  However, this regimen inadequately meets estimated kcal and protein needs.  Previously recommended for VitalHP.  Pt. with diarrhea w 325mL rectal tube output over past 24hrs (noted on Lactulose) and abdominal distention (attributed to ascites). S/p paracentesis (3/5) w 4.5L fluid removal.  Changes in fluid status likely contributory to significant weight variations.    Also receiving 350mL free H2O q 6hours.    Pt. s/p extubation this morning.  OGT removed. Observed on face tent.   If TF remains warranted, advise changing to Glucerna 1.5 with goal of 45mL/hr addition of NoCarb Prosource protein supplement to help meet increased protein needs.  If/when mental status improves and considering advancing to PO diet, obtain swallow evaluation prior to.   Discussed w RN.        _________________Diet___________________  Diet, NPO with Tube Feed:   Tube Feeding Modality: Orogastric  Glucerna 1.2 Nelson (GLUCERNARTH)  Total Volume for 24 Hours (mL): 960  Continuous  Starting Tube Feed Rate {mL per Hour}: 40  Until Goal Tube Feed Rate (mL per Hour): 40  Tube Feed Duration (in Hours): 24  Tube Feed Start Time: 12:00 (03-07-23 @ 11:58) [Active]      provides:  1152 kcals & 58g protein       Weight:                    Height: 67"                    Ideal Body Weight: 148lbs/ 67.3kg  60kg (3/8)  64.5kg (3/1)  55.4kg (2/25 on admit)          _____Estimated Energy Needs (based on Ideal Body Weight)_____  20-25kcals/kg =1379-5183 kcals/d  1.5-1.8g protein/kg = 101-121g protein/d        Edema: 2+ generalized, 4+ scrotum  Skin: No pressure injuries         ________________________Pertinent Medications____________   MEDICATIONS  (STANDING):  cefTRIAXone   IVPB 1000 milliGRAM(s) IV Intermittent every 24 hours  chlorhexidine 2% Cloths 1 Application(s) Topical <User Schedule>  coronavirus bivalent (EUA) Booster Vaccine (PFIZER) 0.3 milliLiter(s) IntraMuscular once  dextrose 5%. 1000 milliLiter(s) (50 mL/Hr) IV Continuous <Continuous>  dextrose 5%. 1000 milliLiter(s) (100 mL/Hr) IV Continuous <Continuous>  dextrose 50% Injectable 25 Gram(s) IV Push once  dextrose 50% Injectable 12.5 Gram(s) IV Push once  dextrose 50% Injectable 25 Gram(s) IV Push once  glucagon  Injectable 1 milliGRAM(s) IntraMuscular once  insulin lispro (ADMELOG) corrective regimen sliding scale   SubCutaneous every 6 hours  insulin NPH human recombinant 11 Unit(s) SubCutaneous every 6 hours  lactulose Syrup 10 Gram(s) Oral daily  levothyroxine Injectable 75 MICROGram(s) IV Push at bedtime  midodrine 10 milliGRAM(s) Oral every 8 hours  pantoprazole  Injectable 40 milliGRAM(s) IV Push two times a day  polyethylene glycol 3350 17 Gram(s) Oral <User Schedule>  rifAXIMin 550 milliGRAM(s) Oral two times a day  tenofovir disoproxil fumarate (VIREAD) 300 milliGRAM(s) Oral daily    MEDICATIONS  (PRN):  dextrose Oral Gel 15 Gram(s) Oral once PRN Blood Glucose LESS THAN 70 milliGRAM(s)/deciliter  petrolatum Ophthalmic Ointment 1 Application(s) Both EYES two times a day PRN dry eyes          _________________________Pertinent Labs____________________     03-08    139  |  109<H>  |  36<H>  ----------------------------<  184<H>  3.7   |  17<L>  |  0.98    Ca    8.1<L>      08 Mar 2023 00:46  Phos  3.1     03-08  Mg     2.30     03-08    TPro  5.8<L>  /  Alb  2.6<L>  /  TBili  2.1<H>  /  DBili  x   /  AST  70<H>  /  ALT  38  /  AlkPhos  412<H>  03-08                                                                   10.2   9.69  )-----------( 118      ( 08 Mar 2023 00:46 )             31.9         CAPILLARY BLOOD GLUCOSE      POCT Blood Glucose.: 158 mg/dL (08 Mar 2023 05:39)    POCT Blood Glucose.: 158 mg/dL (03-08-23 @ 05:39)  POCT Blood Glucose.: 163 mg/dL (03-07-23 @ 23:18)  POCT Blood Glucose.: 161 mg/dL (03-07-23 @ 17:13)  POCT Blood Glucose.: 125 mg/dL (03-07-23 @ 11:42)        ________NUTRITION Dx_________    Pt. remains severely malnourished       PLAN/RECOMMENDATIONS:    1) As/if appropriate, can consider advancing to PO diet.... obtain swallow evaluation prior to initiating PO.    2) If TF warranted, change enteral regimen to Glucerna 1.5 w goal of 45mL/hr continuously + 2packets NoCarb Prosource per day    provides:  1080mL total volume  1620 kcals  89g protein (+ 30g additional protein via NoCarb Prosource)= 119g total protein   820mL free water         3) Obtain daily weights  4) Monitor GI status, electrolytes, glucose     RDN remains available and will f/u PRN.          Jaki Alvarez, KEVIN, CDN pager [phone]

## 2023-03-09 NOTE — PROGRESS NOTE ADULT - ASSESSMENT
68yo M w/ pmhx CAD s/p CABG, TANG cirrhosis, follicular lymphoma (on Rituximab infusions outpt), HTN, DM, hypothyroidism, currently on tenofovir (HBV Core +), presented to the ED w/ chest pain and constipation while in the ED patient w/ multiple episodes of hematemesis and hypotension; MICU consulted for hypotension, massive upper GI bleed s/p intubation, s/p 2/24 bedside scope with banding x6 of esophageal varices.     [ NEURO ]  #AMS   #r/o sedation vs hepatic encep  -sedation d/c on 3/1  -CTH (3/5): no IC abnormalities  -EEG: r/o seizure given continued AMS with occ tachy/HTN  -weaned precedex  > f/u daily ammonia  > c/w rifaximin BID, miralax BID, lactulose 10mlqday    [ CARDIO ]  #Hypovolemic Shock 2/2 GIB vs vasoplegia iso cirrhosis vs sepsis  -s/p 6U PRBCs   -on Levophed at .5, wean as tolerated  -diagnostic paracentesis to r/o SBP given hx of ascites 2/25 - transudative with no evidence of SBP   -bedside POCUS with grossly normal LV systolic fx   -2/25 (TTE) - dilated LA   -cortisol normal  > on midodrine 10mg q8h w/ hold parameters     [ RESPIRATORY ]  #Airway Protection  -patient intubated for airway protection 2/2 hematemesis  -noted episodes of over-breathing vent, requiring limited pushes of sedation  > SBT as tolerated  > CPAP for now, extubate today    [ GASTRO ]  #UGIB  -pt presented for chest pain and constipation; during ED course patient with multiple episodes of massive hematemesis  -given Octreotide, PPI, CTX  -GI consulted; s/p 2/24 scope, erythromycin given prior to scope -> multiple large esophageal varices, six bands successfully placed with incomplete eradication of varices. Per GI bleeding likely due to EV, however unable to completely visualize stomach given presence of clotted blood.  -s/p Octreotide gtt (72 hours post procedure) and PPI 40mg IVP BID  -placed rectal tube; per hepatology no risk of rectal varices  -therapeutic para (3/5): removed 4.5L  -CT abdomen w/ triple phase (3/5): noted for cirrhosis  > consider diagnostic tap (paracentesis) if continued fever  > f/u daily ammonia, c/w rifaximin BID, miralax BID  > start ppx CTX as recommended by GI  > c/w TF @ 40 cc/hr  > c/w PPI  > d/c reglan, no clear indication    [ RENAL/ ]  #Hypernatremia  -resolved  > c/w FWF 350q12  > 2.6L calculated deficit    [ INFECTIOUS ]  -Pt given 1 dose ceftriaxone given in ED for SBP prophylaxis  -s/p ceft 2/24-2/27; transitioned to zosyn (2/27) for broad sepsis coverage in light of fever  -2/27 (sputum culture): growing pseudomonas   -s/p Zosyn (2/27-3/5), 7 days  > resume ceftriaxone 1g daily for SBP ppx (3/7-; x 7 days)  > continue w/ tenofovir   > ctm fevers, wbc    [ HEME ]  #GI Bleed   -Hgb 6.7 on admission  -s/p 6 units pRBC, 1 FFP, 1 plts  > monitor CBC q8 for now, if stable consider CBC q12  > hold chemical DVT ppx given GIB  > maintain SCDs    [ENDO]  #DM  > NPO pending S/s  > will require NG tube if fails  > NPH 11u q6hrs, ISS    #Hypothyroidism  -home synthroid IV     [ ETHICS ]  -Patient is Full Code    68yo M w/ pmhx CAD s/p CABG, TANG cirrhosis, follicular lymphoma (on Rituximab infusions outpt), HTN, DM, hypothyroidism, currently on tenofovir (HBV Core +), presented to the ED w/ chest pain and constipation while in the ED patient w/ multiple episodes of hematemesis and hypotension; MICU consulted for hypotension, massive upper GI bleed s/p intubation, s/p 2/24 bedside scope with banding x6 of esophageal varices.     [ NEURO ]  #AMS   #r/o sedation vs hepatic encep  -CTH (3/5): no IC abnormalities  -EEG: r/o seizure given continued AMS with occ tachy/HTN  > c/w rifaximin BID, miralax BID, lactulose 10mlqday  > ctm MS    [ CARDIO ]  #Hypovolemic Shock 2/2 GIB vs vasoplegia iso cirrhosis vs sepsis  #Stable  -s/p 6U PRBCs   -on Levophed at .5, wean as tolerated  -diagnostic paracentesis to r/o SBP given hx of ascites 2/25 - transudative with no evidence of SBP   -bedside POCUS with grossly normal LV systolic fx   -2/25 (TTE) - dilated LA   -cortisol normal    [ RESPIRATORY ]  #Airway Protection  #Stable  -patient intubated for airway protection 2/2 hematemesis  -noted episodes of over-breathing vent, requiring limited pushes of sedation  -Extubated 3/8, tolerated well per ABG    [ GASTRO ]  #UGIB  -pt presented for chest pain and constipation; during ED course patient with multiple episodes of massive hematemesis  -given Octreotide, PPI, CTX  -GI consulted; s/p 2/24 scope, erythromycin given prior to scope -> multiple large esophageal varices, six bands successfully placed with incomplete eradication of varices. Per GI bleeding likely due to EV, however unable to completely visualize stomach given presence of clotted blood.  -s/p Octreotide gtt (72 hours post procedure) and PPI 40mg IVP BID  -placed rectal tube; per hepatology no risk of rectal varices  -therapeutic para (3/5): removed 4.5L  -CT abdomen w/ triple phase (3/5): noted for cirrhosis  > consider diagnostic tap (paracentesis) if continued fever  > c/w rifaximin BID, miralax BID, lactulose  > start ppx CTX as recommended by GI  > NG tube placed (3/8); confirmed w/ radiology reading room  > c/w TF @ 30 cc/hr  > c/w PPI    [ RENAL/ ]  #Hypernatremia  > c/w FWF 150q12    [ INFECTIOUS ]  -Pt given 1 dose ceftriaxone given in ED for SBP prophylaxis  -s/p ceft 2/24-2/27; transitioned to zosyn (2/27) for broad sepsis coverage in light of fever  -2/27 (sputum culture): growing pseudomonas   -s/p Zosyn (2/27-3/5), 7 days  > resume ceftriaxone 1g daily for SBP ppx (3/7-; x 7 days)  > continue w/ tenofovir   > ctm fevers, wbc    [ HEME ]  #GI Bleed   -Hgb 6.7 on admission  -s/p 6 units pRBC, 1 FFP, 1 plts  > monitor CBC q8 for now, if stable consider CBC q12  > hold chemical DVT ppx given GIB  > maintain SCDs    [ENDO]  #DM  > NPH 11u q6hrs, ISS    #Hypothyroidism  -home synthroid IV     [ ETHICS ]  -Patient is Full Code

## 2023-03-09 NOTE — PROGRESS NOTE ADULT - SUBJECTIVE AND OBJECTIVE BOX
INTERVAL HPI/OVERNIGHT EVENTS:    SUBJECTIVE: Patient seen and examined at bedside.     OBJECTIVE:    VITAL SIGNS:  ICU Vital Signs Last 24 Hrs  T(C): 36.8 (09 Mar 2023 04:00), Max: 37.3 (08 Mar 2023 08:00)  T(F): 98.2 (09 Mar 2023 04:00), Max: 99.1 (08 Mar 2023 08:00)  HR: 71 (09 Mar 2023 06:00) (64 - 88)  BP: 144/67 (09 Mar 2023 06:00) (127/86 - 160/74)  BP(mean): 89 (09 Mar 2023 06:00) (83 - 99)  ABP: 160/66 (08 Mar 2023 15:00) (132/58 - 160/66)  ABP(mean): 104 (08 Mar 2023 15:00) (86 - 104)  RR: 20 (09 Mar 2023 06:00) (13 - 26)  SpO2: 100% (09 Mar 2023 06:00) (96% - 100%)    O2 Parameters below as of 09 Mar 2023 06:00  Patient On (Oxygen Delivery Method): nasal cannula  O2 Flow (L/min): 2        Mode: standby    03-08 @ 07:01  -  03-09 @ 07:00  --------------------------------------------------------  IN: 1450 mL / OUT: 2520 mL / NET: -1070 mL      CAPILLARY BLOOD GLUCOSE      POCT Blood Glucose.: 186 mg/dL (09 Mar 2023 06:02)      PHYSICAL EXAM:  GENERAL: NAD, well-groomed, well-developed  HEAD:  Atraumatic, Normocephalic  EYES: EOMI, PERRLA, conjunctiva and sclera clear  ENMT: No tonsillar erythema, exudates, or enlargement; Moist mucous membranes, Good dentition, No lesions  NECK: Supple, No JVD, Normal thyroid  CHEST/LUNG: Clear to auscultation bilaterally; No rales, rhonchi, wheezing, or rubs  HEART: Regular rate and rhythm; No murmurs, rubs, or gallops  ABDOMEN: Soft, Nontender, Nondistended; Bowel sounds present  VASCULAR:  2+ Peripheral Pulses, No clubbing, cyanosis, or edema  LYMPH: No lymphadenopathy noted  SKIN: No rashes or lesions  NERVOUS SYSTEM:  Alert & Oriented X3, Good concentration; Motor Strength 5/5 B/L upper and lower extremities; DTRs 2+ intact and symmetric    MEDICATIONS:  MEDICATIONS  (STANDING):  cefTRIAXone   IVPB 1000 milliGRAM(s) IV Intermittent every 24 hours  chlorhexidine 2% Cloths 1 Application(s) Topical <User Schedule>  coronavirus bivalent (EUA) Booster Vaccine (PFIZER) 0.3 milliLiter(s) IntraMuscular once  dextrose 5%. 1000 milliLiter(s) (50 mL/Hr) IV Continuous <Continuous>  dextrose 5%. 1000 milliLiter(s) (100 mL/Hr) IV Continuous <Continuous>  dextrose 50% Injectable 25 Gram(s) IV Push once  dextrose 50% Injectable 12.5 Gram(s) IV Push once  dextrose 50% Injectable 25 Gram(s) IV Push once  glucagon  Injectable 1 milliGRAM(s) IntraMuscular once  insulin lispro (ADMELOG) corrective regimen sliding scale   SubCutaneous every 6 hours  insulin NPH human recombinant 11 Unit(s) SubCutaneous every 6 hours  lactulose Syrup 10 Gram(s) Oral daily  levothyroxine Injectable 75 MICROGram(s) IV Push at bedtime  pantoprazole  Injectable 40 milliGRAM(s) IV Push two times a day  polyethylene glycol 3350 17 Gram(s) Oral <User Schedule>  rifAXIMin 550 milliGRAM(s) Oral two times a day  tenofovir disoproxil fumarate (VIREAD) 300 milliGRAM(s) Oral daily    MEDICATIONS  (PRN):  dextrose Oral Gel 15 Gram(s) Oral once PRN Blood Glucose LESS THAN 70 milliGRAM(s)/deciliter  petrolatum Ophthalmic Ointment 1 Application(s) Both EYES two times a day PRN dry eyes      ALLERGIES:  Allergies    [This allergen will not trigger allergy alert] &quot;lentils&quot;-&quot;itchiness&quot; (Other)  Beef (Other)  No Known Drug Allergies    Intolerances        LABS:                        10.2   8.51  )-----------( 109      ( 09 Mar 2023 01:35 )             32.4     03-09    139  |  109<H>  |  31<H>  ----------------------------<  181<H>  3.8   |  19<L>  |  0.81    Ca    8.3<L>      09 Mar 2023 01:35  Phos  2.4     03-09  Mg     2.20     03-09    TPro  5.7<L>  /  Alb  2.7<L>  /  TBili  1.7<H>  /  DBili  x   /  AST  63<H>  /  ALT  38  /  AlkPhos  404<H>  03-09    PT/INR - ( 09 Mar 2023 01:35 )   PT: 16.4 sec;   INR: 1.41 ratio         PTT - ( 09 Mar 2023 01:35 )  PTT:29.4 sec      RADIOLOGY & ADDITIONAL TESTS: Reviewed. INTERVAL HPI/OVERNIGHT EVENTS: NAEON. TF r/s in AM after confirmation of NG tube placement w/ radiology reading room.     SUBJECTIVE: Patient seen and examined at bedside. A&O x 0.     OBJECTIVE:    VITAL SIGNS:  ICU Vital Signs Last 24 Hrs  T(C): 36.8 (09 Mar 2023 04:00), Max: 37.3 (08 Mar 2023 08:00)  T(F): 98.2 (09 Mar 2023 04:00), Max: 99.1 (08 Mar 2023 08:00)  HR: 71 (09 Mar 2023 06:00) (64 - 88)  BP: 144/67 (09 Mar 2023 06:00) (127/86 - 160/74)  BP(mean): 89 (09 Mar 2023 06:00) (83 - 99)  ABP: 160/66 (08 Mar 2023 15:00) (132/58 - 160/66)  ABP(mean): 104 (08 Mar 2023 15:00) (86 - 104)  RR: 20 (09 Mar 2023 06:00) (13 - 26)  SpO2: 100% (09 Mar 2023 06:00) (96% - 100%)    O2 Parameters below as of 09 Mar 2023 06:00  Patient On (Oxygen Delivery Method): nasal cannula  O2 Flow (L/min): 2        Mode: standby    03-08 @ 07:01  -  03-09 @ 07:00  --------------------------------------------------------  IN: 1450 mL / OUT: 2520 mL / NET: -1070 mL      CAPILLARY BLOOD GLUCOSE      POCT Blood Glucose.: 186 mg/dL (09 Mar 2023 06:02)      PHYSICAL EXAM:  GENERAL: NAD, lying in bed, arousable / does open eyes or follow commands  HEAD: Atraumatic, normocephalic  EYES: PERRLA, conjunctiva and sclera clear, no nystagmus noted  ENT: Moist mucous membranes  CHEST/LUNG: Mildly rhonchorous throughout; no rales, wheezing, or rubs  HEART: Regular rate and rhythm; no murmurs, rubs, or gallops, normal S1/S2  ABDOMEN: Normal bowel sounds; soft, nontender, nondistended  EXTREMITIES: Extremities edematous; no clubbing or cyanosis  MSK: No gross deformities noted   NEURO: Unable to assess well; grossly nonfocal  SKIN: No rashes or lesions  PSYCH: Unable to assess    MEDICATIONS:  MEDICATIONS  (STANDING):  cefTRIAXone   IVPB 1000 milliGRAM(s) IV Intermittent every 24 hours  chlorhexidine 2% Cloths 1 Application(s) Topical <User Schedule>  coronavirus bivalent (EUA) Booster Vaccine (PFIZER) 0.3 milliLiter(s) IntraMuscular once  dextrose 5%. 1000 milliLiter(s) (50 mL/Hr) IV Continuous <Continuous>  dextrose 5%. 1000 milliLiter(s) (100 mL/Hr) IV Continuous <Continuous>  dextrose 50% Injectable 25 Gram(s) IV Push once  dextrose 50% Injectable 12.5 Gram(s) IV Push once  dextrose 50% Injectable 25 Gram(s) IV Push once  glucagon  Injectable 1 milliGRAM(s) IntraMuscular once  insulin lispro (ADMELOG) corrective regimen sliding scale   SubCutaneous every 6 hours  insulin NPH human recombinant 11 Unit(s) SubCutaneous every 6 hours  lactulose Syrup 10 Gram(s) Oral daily  levothyroxine Injectable 75 MICROGram(s) IV Push at bedtime  pantoprazole  Injectable 40 milliGRAM(s) IV Push two times a day  polyethylene glycol 3350 17 Gram(s) Oral <User Schedule>  rifAXIMin 550 milliGRAM(s) Oral two times a day  tenofovir disoproxil fumarate (VIREAD) 300 milliGRAM(s) Oral daily    MEDICATIONS  (PRN):  dextrose Oral Gel 15 Gram(s) Oral once PRN Blood Glucose LESS THAN 70 milliGRAM(s)/deciliter  petrolatum Ophthalmic Ointment 1 Application(s) Both EYES two times a day PRN dry eyes      ALLERGIES:  Allergies    [This allergen will not trigger allergy alert] &quot;lentils&quot;-&quot;itchiness&quot; (Other)  Beef (Other)  No Known Drug Allergies    Intolerances        LABS:                        10.2   8.51  )-----------( 109      ( 09 Mar 2023 01:35 )             32.4     03-09    139  |  109<H>  |  31<H>  ----------------------------<  181<H>  3.8   |  19<L>  |  0.81    Ca    8.3<L>      09 Mar 2023 01:35  Phos  2.4     03-09  Mg     2.20     03-09    TPro  5.7<L>  /  Alb  2.7<L>  /  TBili  1.7<H>  /  DBili  x   /  AST  63<H>  /  ALT  38  /  AlkPhos  404<H>  03-09    PT/INR - ( 09 Mar 2023 01:35 )   PT: 16.4 sec;   INR: 1.41 ratio         PTT - ( 09 Mar 2023 01:35 )  PTT:29.4 sec      RADIOLOGY & ADDITIONAL TESTS: Reviewed.

## 2023-03-09 NOTE — PROGRESS NOTE ADULT - ATTENDING COMMENTS
Transferred from MICU to CTICU, stable, but with persistent, severe hepatic encephalopathy. Sits in bed, stares, barely moves hands when asked. + asterixis. Increase lactulose. Agree with above note.

## 2023-03-09 NOTE — PROGRESS NOTE ADULT - ASSESSMENT
67M follows at [facility] Hx decompensated TANG cirrhosis (+ascites, +EV -- previously MELD Na 8), CAD s/p CABG s/p PCI (last in 2012 on ASA, now off plavix x2 weeks), newly diagnosed follicular lymphoma (on rituximab), HTN, DM, currently on tenofovir (HBV Core +) presented with right sided chest/abdominal pain with lizzette hematemesis + clots c/b hemorrhagic shock, s/p intubation in MICU, EGD showing large varices s/p banding, unable to completely clear stomach due to clot. Now w/ new fever and downtrending Hg    # AMS - Possible component of HE. Would continue with an aggressive laxative regimen.  #Hemorrhagic shock  #Hematemesis: s/p EGD 2/24/2023 s/p EVL x6, bleeding likely due to EV, however unable to completely visualize stomach given presence of clotted blood. H/H now stable.   #NANCI  #Decompensated TANG cirrhosis: follows at [facility], previously low meld Na 8  --MELD Na: 22  --Ascites: (+) Hx, on lasix 40, spironolactone 50 at home, holding here  --SBP; no Hx  --EV: (+) Hx, no EVB --> is on nadolol at home  --PVT: no Hx  --HCC: no Hx    Recommendations:  - No plans for repeat EGD at this time  - c/w Rifaximin and lactulose (10g TID), aim for 2-3 BMs per day  - Once CTX in completed, should be on ciprofloxacin 500mg daily for SBP prophylaxis given low ascitic protein  - c/w pantoprazole 40 BID for 14 days total, then PPI 40mg daily  - c/w tenofovir  - hold diuretics  - No need to trend ammonia  - repeat EGD in 4 weeks for variceal banding  - trend CMP, CBC, coags    Please note that the recommendations are not final until attested by an attending.    Thank you for involving us in the care of this patient. Please reach out if any further questions.    Ulices Ray, PGY-6  Gastroenterology/Hepatology Fellow    Available on [employer] Teams  After 5PM/Weekends, please contact the on-call GI fellow: [phone]     67M follows at [facility] Hx decompensated TANG cirrhosis (+ascites, +EV -- previously MELD Na 8), CAD s/p CABG s/p PCI (last in 2012 on ASA, now off plavix x2 weeks), newly diagnosed follicular lymphoma (on rituximab), HTN, DM, currently on tenofovir (HBV Core +) presented with right sided chest/abdominal pain with lizzette hematemesis + clots c/b hemorrhagic shock, s/p intubation in MICU, EGD showing large varices s/p banding, unable to completely clear stomach due to clot. Now w/ new fever and downtrending Hg    # AMS - Possible component of HE. Would continue with an aggressive laxative regimen.  #Hemorrhagic shock  #Hematemesis: s/p EGD 2/24/2023 s/p EVL x6, bleeding likely due to EV, however unable to completely visualize stomach given presence of clotted blood. H/H now stable.   #NANCI  #Decompensated TANG cirrhosis: follows at [facility], previously low meld Na 8  --MELD Na: 22  --Ascites: (+) Hx, on lasix 40, spironolactone 50 at home, holding here  --SBP; no Hx  --EV: (+) Hx, no EVB --> is on nadolol at home  --PVT: no Hx  --HCC: no Hx    Recommendations:  - No plans for repeat EGD at this time  - Please remove rectal tube  - c/w Rifaximin and Lactulose (10g TID), aim for 3-5 BMs per day  - Once CTX in completed, should be on ciprofloxacin 500mg daily for SBP prophylaxis given low ascitic protein  - c/w pantoprazole 40 BID for 14 days total, then PPI 40mg daily  - c/w tenofovir  - hold diuretics  - No need to trend ammonia  - repeat EGD in 4 weeks for variceal banding  - trend CMP, CBC, coags    Please note that the recommendations are not final until attested by an attending.    Thank you for involving us in the care of this patient. Please reach out if any further questions.    Ulices Ray, PGY-6  Gastroenterology/Hepatology Fellow    Available on [employer] Teams  After 5PM/Weekends, please contact the on-call GI fellow: [phone]

## 2023-03-09 NOTE — CHART NOTE - NSCHARTNOTEFT_GEN_A_CORE
ICU Transfer Note    Transfer from: ICU    Transfer to: ( x ) Medicine    (  ) Telemetry     (   ) RCU        (    ) Palliative         (   ) Stroke Unit          (   ) __________________    Accepting Physician:  Signout given to:     HPI/ICU COURSE:  66yo M w/ pmhx CAD s/p CABG, TANG cirrhosis, follicular lymphoma (on Rituximab infusions outpt), HTN, DM, hypothyroidism, currently on tenofovir (HBV Core +), presented to the ED w/ chest pain and constipation while in the ED patient w/ multiple episodes of hematemesis and hypotension; MICU consulted for hypotension, massive upper GI bleed s/p intubation, s/p 2/24 bedside scope with banding x6 of esophageal varices.     H/c while in ICU complicated by tracheal cultures (+) for pseudomonas resulting in septic shock. Continued on abx based on sensitivities & pressors. W/ clinical improvement, pt otherwise weaned off of pressors & sedation, w/ noted HDS stability & without further episodes of fever. Regarding UGIB, Pt maintained stable H/H without need for further GI intervention. Extubated without complications. MS remains A&O x 0, which has been consistent throughout entire hospital course. Ruled out AMS 2/2 sedation vs hepatic encep. Awaiting further neurologic & cognitive improvement.     ASSESSMENT & PLAN:     [ NEURO ]  #AMS   #r/o sedation vs hepatic encep  -sedation d/c on 3/1  -CTH (3/5): no IC abnormalities  -EEG: r/o seizure given continued AMS with occ tachy/HTN  -weaned precedex  > c/w rifaximin BID, miralax BID, lactulose 10mlqday    [ CARDIO ]  #Hypovolemic Shock 2/2 GIB vs vasoplegia iso cirrhosis vs sepsis  -s/p 6U PRBCs   -on Levophed at .5, wean as tolerated  -diagnostic paracentesis to r/o SBP given hx of ascites 2/25 - transudative with no evidence of SBP   -bedside POCUS with grossly normal LV systolic fx   -2/25 (TTE) - dilated LA   -cortisol normal    [ RESPIRATORY ]  #Airway Protection  -patient intubated for airway protection 2/2 hematemesis  -noted episodes of over-breathing vent, requiring limited pushes of sedation  > extubated    [ GASTRO ]  #UGIB  -pt presented for chest pain and constipation; during ED course patient with multiple episodes of massive hematemesis  -given Octreotide, PPI, CTX  -GI consulted; s/p 2/24 scope, erythromycin given prior to scope -> multiple large esophageal varices, six bands successfully placed with incomplete eradication of varices. Per GI bleeding likely due to EV, however unable to completely visualize stomach given presence of clotted blood.  -s/p Octreotide gtt (72 hours post procedure) and PPI 40mg IVP BID  -placed rectal tube; per hepatology no risk of rectal varices  -therapeutic para (3/5): removed 4.5L  -CT abdomen w/ triple phase (3/5): noted for cirrhosis  > consider diagnostic tap (paracentesis) if continued fever  > c/w rifaximin BID, miralax BID  > start ppx CTX as recommended by GI  > c/w TF @ 40 cc/hr via NG tube  > c/w PPI  > d/c reglan, no clear indication    [ RENAL/ ]  #Hypernatremia  -resolved  > c/w FWF 150q12  > 2.6L calculated deficit    [ INFECTIOUS ]  -Pt given 1 dose ceftriaxone given in ED for SBP prophylaxis  -s/p ceft 2/24-2/27; transitioned to zosyn (2/27) for broad sepsis coverage in light of fever  -2/27 (sputum culture): growing pseudomonas   -s/p Zosyn (2/27-3/5), 7 days  > resume ceftriaxone 1g daily for SBP ppx (3/7-; x 7 days)  > continue w/ tenofovir   > ctm fevers, wbc    [ HEME ]  #GI Bleed   -Hgb 6.7 on admission  -s/p 6 units pRBC, 1 FFP, 1 plts  > monitor CBC q8 for now, if stable consider CBC q12  > hold chemical DVT ppx given GIB  > maintain SCDs    [ENDO]  #DM  > NPO pending S/s  > will require NG tube if fails  > NPH 11u q6hrs, ISS    #Hypothyroidism  -home synthroid IV     [ ETHICS ]  -Patient is Full Code           FOR FOLLOW UP:  [ ] H/H, VBG qDay  [ ] c/w ceftriaxone SBP ppx for 7 days (3/7-)  [ ] f/u hepatology recs  [ ] f/u PT/OT recs  [ ] consider r/p S/S eval   [ ] ctm neuro status ICU Transfer Note    Transfer from: ICU    Transfer to: ( x ) Medicine    (  ) Telemetry     (   ) RCU        (    ) Palliative         (   ) Stroke Unit          (   ) __________________  Room: 305N    Accepting Physician: Dr. Husam Wilkerson  Signout given to:     HPI/ICU COURSE:  68yo M w/ pmhx CAD s/p CABG, TANG cirrhosis, follicular lymphoma (on Rituximab infusions outpt), HTN, DM, hypothyroidism, currently on tenofovir (HBV Core +), presented to the ED w/ chest pain and constipation while in the ED patient w/ multiple episodes of hematemesis and hypotension; MICU consulted for hypotension, massive upper GI bleed s/p intubation, s/p 2/24 bedside scope with banding x6 of esophageal varices.     H/c while in ICU complicated by tracheal cultures (+) for pseudomonas resulting in septic shock. Continued on abx based on sensitivities & pressors. W/ clinical improvement, pt otherwise weaned off of pressors & sedation, w/ noted HDS stability & without further episodes of fever. Regarding UGIB, Pt maintained stable H/H without need for further GI intervention. Extubated without complications. MS remains A&O x 0, which has been consistent throughout entire hospital course. Ruled out AMS 2/2 sedation vs hepatic encep. Awaiting further neurologic & cognitive improvement.     ASSESSMENT & PLAN:     [ NEURO ]  #AMS   #r/o sedation vs hepatic encep  -sedation d/c on 3/1  -CT (3/5): no IC abnormalities  -EEG: r/o seizure given continued AMS with occ tachy/HTN  -weaned precedex  > c/w rifaximin BID, miralax BID, lactulose 10mlqday    [ CARDIO ]  #Hypovolemic Shock 2/2 GIB vs vasoplegia iso cirrhosis vs sepsis  -s/p 6U PRBCs   -on Levophed at .5, wean as tolerated  -diagnostic paracentesis to r/o SBP given hx of ascites 2/25 - transudative with no evidence of SBP   -bedside POCUS with grossly normal LV systolic fx   -2/25 (TTE) - dilated LA   -cortisol normal    [ RESPIRATORY ]  #Airway Protection  -patient intubated for airway protection 2/2 hematemesis  -noted episodes of over-breathing vent, requiring limited pushes of sedation  > extubated    [ GASTRO ]  #UGIB  -pt presented for chest pain and constipation; during ED course patient with multiple episodes of massive hematemesis  -given Octreotide, PPI, CTX  -GI consulted; s/p 2/24 scope, erythromycin given prior to scope -> multiple large esophageal varices, six bands successfully placed with incomplete eradication of varices. Per GI bleeding likely due to EV, however unable to completely visualize stomach given presence of clotted blood.  -s/p Octreotide gtt (72 hours post procedure) and PPI 40mg IVP BID  -placed rectal tube; per hepatology no risk of rectal varices  -therapeutic para (3/5): removed 4.5L  -CT abdomen w/ triple phase (3/5): noted for cirrhosis  > consider diagnostic tap (paracentesis) if continued fever  > c/w rifaximin BID, miralax BID  > start ppx CTX as recommended by GI  > c/w TF @ 40 cc/hr via NG tube  > c/w PPI  > d/c reglan, no clear indication    [ RENAL/ ]  #Hypernatremia  -resolved  > c/w FWF 150q12  > 2.6L calculated deficit    [ INFECTIOUS ]  -Pt given 1 dose ceftriaxone given in ED for SBP prophylaxis  -s/p ceft 2/24-2/27; transitioned to zosyn (2/27) for broad sepsis coverage in light of fever  -2/27 (sputum culture): growing pseudomonas   -s/p Zosyn (2/27-3/5), 7 days  > resume ceftriaxone 1g daily for SBP ppx (3/7-; x 7 days)  > continue w/ tenofovir   > ctm fevers, wbc    [ HEME ]  #GI Bleed   -Hgb 6.7 on admission  -s/p 6 units pRBC, 1 FFP, 1 plts  > monitor CBC q8 for now, if stable consider CBC q12  > hold chemical DVT ppx given GIB  > maintain SCDs    [ENDO]  #DM  > NPO pending S/s  > will require NG tube if fails  > NPH 11u q6hrs, ISS    #Hypothyroidism  -home synthroid IV     [ ETHICS ]  -Patient is Full Code           FOR FOLLOW UP:  [ ] H/H, VBG qDay  [ ] c/w ceftriaxone SBP ppx for 7 days (3/7-)  [ ] f/u hepatology recs  [ ] f/u PT/OT recs  [ ] consider r/p S/S eval   [ ] ctm neuro status

## 2023-03-09 NOTE — PROGRESS NOTE ADULT - SUBJECTIVE AND OBJECTIVE BOX
Interval Events:   - Remains altered    Allergies:  [This allergen will not trigger allergy alert] &quot;lentils&quot;-&quot;itchiness&quot; (Other)  Beef (Other)  No Known Drug Allergies        Hospital Medications:  cefTRIAXone   IVPB 1000 milliGRAM(s) IV Intermittent every 24 hours  chlorhexidine 2% Cloths 1 Application(s) Topical <User Schedule>  coronavirus bivalent (EUA) Booster Vaccine (PFIZER) 0.3 milliLiter(s) IntraMuscular once  dextrose 5%. 1000 milliLiter(s) IV Continuous <Continuous>  dextrose 5%. 1000 milliLiter(s) IV Continuous <Continuous>  dextrose 50% Injectable 25 Gram(s) IV Push once  dextrose 50% Injectable 12.5 Gram(s) IV Push once  dextrose 50% Injectable 25 Gram(s) IV Push once  dextrose Oral Gel 15 Gram(s) Oral once PRN  glucagon  Injectable 1 milliGRAM(s) IntraMuscular once  insulin lispro (ADMELOG) corrective regimen sliding scale   SubCutaneous every 6 hours  insulin NPH human recombinant 11 Unit(s) SubCutaneous every 6 hours  lactulose Syrup 30 Gram(s) Oral every 8 hours  levothyroxine Injectable 75 MICROGram(s) IV Push at bedtime  pantoprazole  Injectable 40 milliGRAM(s) IV Push daily  petrolatum Ophthalmic Ointment 1 Application(s) Both EYES two times a day PRN  rifAXIMin 550 milliGRAM(s) Oral two times a day  tenofovir disoproxil fumarate (VIREAD) 300 milliGRAM(s) Oral daily      PMHX/PSHX:  CAD (coronary artery disease)    Diabetes    Lymphoma    Cirrhosis    S/P CABG x 1        Family history:  No pertinent family history in first degree relatives        ROS: As per HPI, otherwise 14-point ROS reviewed and negative.      PHYSICAL EXAM:   Vital Signs:  Vital Signs Last 24 Hrs  T(C): 36.2 (09 Mar 2023 12:00), Max: 37.2 (08 Mar 2023 16:00)  T(F): 97.1 (09 Mar 2023 12:00), Max: 98.9 (08 Mar 2023 16:00)  HR: 70 (09 Mar 2023 14:00) (64 - 82)  BP: 134/59 (09 Mar 2023 14:00) (127/86 - 160/74)  BP(mean): 80 (09 Mar 2023 14:00) (80 - 99)  RR: 20 (09 Mar 2023 14:00) (17 - 26)  SpO2: 100% (09 Mar 2023 14:00) (100% - 100%)    Parameters below as of 09 Mar 2023 14:00  Patient On (Oxygen Delivery Method): room air      Daily     Daily Weight in k.2 (09 Mar 2023 05:00)      23 @ 07:01  -  23 @ 07:00  --------------------------------------------------------  IN: 1490 mL / OUT: 2550 mL / NET: -1060 mL    23 @ 07:01  -  23 @ 15:26  --------------------------------------------------------  IN: 280 mL / OUT: 720 mL / NET: -440 mL        GENERAL:  NAD  HEENT:  NCAT, no scleral icterus  CHEST: intubated   HEART:  RRR  ABDOMEN:  Soft, non-tender, non-distended, normoactive bowel sounds, no masses  EXTREMITIES:  No cyanosis, clubbing, or edema  SKIN:  No rash/erythema/ecchymoses/petechiae/wounds/abscess/warm/dry  NEURO: A&O x 0, non focal    LABS:                        10.2   8.51  )-----------( 109      ( 09 Mar 2023 01:35 )             32.4     Mean Cell Volume: 92.0 fL (23 @ 01:35)    03    139  |  109<H>  |  31<H>  ----------------------------<  181<H>  3.8   |  19<L>  |  0.81    Ca    8.3<L>      09 Mar 2023 01:35  Phos  2.4       Mg     2.20         TPro  5.7<L>  /  Alb  2.7<L>  /  TBili  1.7<H>  /  DBili  x   /  AST  63<H>  /  ALT  38  /  AlkPhos  404<H>      LIVER FUNCTIONS - ( 09 Mar 2023 01:35 )  Alb: 2.7 g/dL / Pro: 5.7 g/dL / ALK PHOS: 404 U/L / ALT: 38 U/L / AST: 63 U/L / GGT: x           PT/INR - ( 09 Mar 2023 01:35 )   PT: 16.4 sec;   INR: 1.41 ratio         PTT - ( 09 Mar 2023 01:35 )  PTT:29.4 sec    Amylase Serum--      Lipase serum--       [id number]                          10.2   8.51  )-----------( 109      ( 09 Mar 2023 01:35 )             32.4                         10.2   9.69  )-----------( 118      ( 08 Mar 2023 00:46 )             31.9                         9.7    8.21  )-----------( 91       ( 07 Mar 2023 02:55 )             31.1       Imaging:           Interval Events:   - Remains altered      Allergies:  [This allergen will not trigger allergy alert] &quot;lentils&quot;-&quot;itchiness&quot; (Other)  Beef (Other)  No Known Drug Allergies        Hospital Medications:  cefTRIAXone   IVPB 1000 milliGRAM(s) IV Intermittent every 24 hours  chlorhexidine 2% Cloths 1 Application(s) Topical <User Schedule>  coronavirus bivalent (EUA) Booster Vaccine (PFIZER) 0.3 milliLiter(s) IntraMuscular once  dextrose 5%. 1000 milliLiter(s) IV Continuous <Continuous>  dextrose 5%. 1000 milliLiter(s) IV Continuous <Continuous>  dextrose 50% Injectable 25 Gram(s) IV Push once  dextrose 50% Injectable 12.5 Gram(s) IV Push once  dextrose 50% Injectable 25 Gram(s) IV Push once  dextrose Oral Gel 15 Gram(s) Oral once PRN  glucagon  Injectable 1 milliGRAM(s) IntraMuscular once  insulin lispro (ADMELOG) corrective regimen sliding scale   SubCutaneous every 6 hours  insulin NPH human recombinant 11 Unit(s) SubCutaneous every 6 hours  lactulose Syrup 30 Gram(s) Oral every 8 hours  levothyroxine Injectable 75 MICROGram(s) IV Push at bedtime  pantoprazole  Injectable 40 milliGRAM(s) IV Push daily  petrolatum Ophthalmic Ointment 1 Application(s) Both EYES two times a day PRN  rifAXIMin 550 milliGRAM(s) Oral two times a day  tenofovir disoproxil fumarate (VIREAD) 300 milliGRAM(s) Oral daily      PMHX/PSHX:  CAD (coronary artery disease)    Diabetes    Lymphoma    Cirrhosis    S/P CABG x 1        Family history:  No pertinent family history in first degree relatives        ROS: As per HPI, otherwise 14-point ROS reviewed and negative.      PHYSICAL EXAM:   Vital Signs:  Vital Signs Last 24 Hrs  T(C): 36.2 (09 Mar 2023 12:00), Max: 37.2 (08 Mar 2023 16:00)  T(F): 97.1 (09 Mar 2023 12:00), Max: 98.9 (08 Mar 2023 16:00)  HR: 70 (09 Mar 2023 14:00) (64 - 82)  BP: 134/59 (09 Mar 2023 14:00) (127/86 - 160/74)  BP(mean): 80 (09 Mar 2023 14:00) (80 - 99)  RR: 20 (09 Mar 2023 14:00) (17 - 26)  SpO2: 100% (09 Mar 2023 14:00) (100% - 100%)    Parameters below as of 09 Mar 2023 14:00  Patient On (Oxygen Delivery Method): room air      Daily     Daily Weight in k.2 (09 Mar 2023 05:00)      23 @ 07:01  -  23 @ 07:00  --------------------------------------------------------  IN: 1490 mL / OUT: 2550 mL / NET: -1060 mL    23 @ 07:01  -  23 @ 15:26  --------------------------------------------------------  IN: 280 mL / OUT: 720 mL / NET: -440 mL        GENERAL:  NAD  HEENT:  NCAT, no scleral icterus  CHEST: intubated   HEART:  RRR  ABDOMEN:  Soft, non-tender, non-distended, normoactive bowel sounds, no masses  EXTREMITIES:  No cyanosis, clubbing, or edema  SKIN:  No rash/erythema/ecchymoses/petechiae/wounds/abscess/warm/dry  NEURO: A&O x 0, non focal    LABS:                        10.2   8.51  )-----------( 109      ( 09 Mar 2023 01:35 )             32.4     Mean Cell Volume: 92.0 fL (23 @ 01:35)    03    139  |  109<H>  |  31<H>  ----------------------------<  181<H>  3.8   |  19<L>  |  0.81    Ca    8.3<L>      09 Mar 2023 01:35  Phos  2.4       Mg     2.20         TPro  5.7<L>  /  Alb  2.7<L>  /  TBili  1.7<H>  /  DBili  x   /  AST  63<H>  /  ALT  38  /  AlkPhos  404<H>      LIVER FUNCTIONS - ( 09 Mar 2023 01:35 )  Alb: 2.7 g/dL / Pro: 5.7 g/dL / ALK PHOS: 404 U/L / ALT: 38 U/L / AST: 63 U/L / GGT: x           PT/INR - ( 09 Mar 2023 01:35 )   PT: 16.4 sec;   INR: 1.41 ratio         PTT - ( 09 Mar 2023 01:35 )  PTT:29.4 sec    Amylase Serum--      Lipase serum--       [id number]                          10.2   8.51  )-----------( 109      ( 09 Mar 2023 01:35 )             32.4                         10.2   9.69  )-----------( 118      ( 08 Mar 2023 00:46 )             31.9                         9.7    8.21  )-----------( 91       ( 07 Mar 2023 02:55 )             31.1       Imaging:

## 2023-03-10 NOTE — PROGRESS NOTE ADULT - SUBJECTIVE AND OBJECTIVE BOX
Patient is a 67y old  Male who presents with a chief complaint of UGIB    SUBJECTIVE / OVERNIGHT EVENTS:    Patient with eyes opening, ill-appearing, jaudince, does not consistently track, just looks around aimlessly  son at bedside, states he only work up some yesterday, has not spoken, states he does not think he recognizes him  will not follow command, stares, withdraws to touch and grimaces    Son states speaks English, walks and lives with family prior to arrival, no dementia     ROS unobtainable due MS    MEDICATIONS  (STANDING):  cefTRIAXone   IVPB 1000 milliGRAM(s) IV Intermittent every 24 hours  chlorhexidine 2% Cloths 1 Application(s) Topical <User Schedule>  coronavirus bivalent (EUA) Booster Vaccine (PFIZER) 0.3 milliLiter(s) IntraMuscular once  glucagon  Injectable 1 milliGRAM(s) IntraMuscular once  insulin lispro (ADMELOG) corrective regimen sliding scale   SubCutaneous every 6 hours  insulin NPH human recombinant 11 Unit(s) SubCutaneous every 6 hours  lactulose Syrup 10 Gram(s) Oral every 4 hours  levothyroxine Injectable 75 MICROGram(s) IV Push at bedtime  pantoprazole  Injectable 40 milliGRAM(s) IV Push two times a day  potassium phosphate / sodium phosphate Powder (PHOS-NaK) 1 Packet(s) Oral three times a day  rifAXIMin 550 milliGRAM(s) Oral two times a day  tenofovir disoproxil fumarate (VIREAD) 300 milliGRAM(s) Oral daily    MEDICATIONS  (PRN):  dextrose Oral Gel 15 Gram(s) Oral once PRN Blood Glucose LESS THAN 70 milliGRAM(s)/deciliter  petrolatum Ophthalmic Ointment 1 Application(s) Both EYES two times a day PRN dry eyes    T(C): 36.6 (03-10-23 @ 10:09), Max: 36.6 (03-10-23 @ 10:09)  HR: 81 (03-10-23 @ 10:09) (70 - 81)  BP: 134/70 (03-10-23 @ 10:09) (134/59 - 142/66)  RR: 18 (03-10-23 @ 10:09) (18 - 20)  SpO2: 100% (03-10-23 @ 10:09) (99% - 100%)    CAPILLARY BLOOD GLUCOSE  POCT Blood Glucose.: 175 mg/dL (10 Mar 2023 11:37)  POCT Blood Glucose.: 155 mg/dL (10 Mar 2023 06:54)  POCT Blood Glucose.: 205 mg/dL (10 Mar 2023 00:33)  POCT Blood Glucose.: 154 mg/dL (09 Mar 2023 17:48)    I&O's Summary    09 Mar 2023 07:01  -  10 Mar 2023 07:00  --------------------------------------------------------  IN: 560 mL / OUT: 1770 mL / NET: -1210 mL    10 Mar 2023 07:01  -  10 Mar 2023 12:14  --------------------------------------------------------  IN: 160 mL / OUT: 450 mL / NET: -290 mL      PHYSICAL EXAM:  GENERAL: ill-appearing, thin  HEENT: MMM dry, +scleral icterus   CHEST/LUNG: Clear to auscultation bilaterally; No wheeze  HEART: Regular rate and rhythm; No murmurs, rubs, or gallops  ABDOMEN: Soft, Nontender, mild distended, no r/g; Bowel sounds present  EXTREMITIES:   SCD, thigh edema   PSYCH: eyes open, will not follow commands or speak  NEUROLOGY: passive ROM, pushes back with passive ROM of arms   +mcconnell  +NGT  rectal tube dc     LABS:                        11.7   7.91  )-----------( 139      ( 10 Mar 2023 06:10 )             37.3     03-10    139  |  108<H>  |  30<H>  ----------------------------<  169<H>  4.1   |  22  |  0.81    Ca    8.4      10 Mar 2023 06:10  Phos  2.4     03-10  Mg     2.40     03-10    TPro  6.2  /  Alb  2.8<L>  /  TBili  1.5<H>  /  DBili  x   /  AST  76<H>  /  ALT  44<H>  /  AlkPhos  461<H>  03-10    PT/INR - ( 09 Mar 2023 01:35 )   PT: 16.4 sec;   INR: 1.41 ratio    PTT - ( 09 Mar 2023 01:35 )  PTT:29.4 sec    Microbiology: Culture Results:   No growth (03-05 @ 18:34)    VBG 03-09 @ 05:00  pH: 7.35/pCO2: 40/pO2: 47/HCO3: 22/lactate: 2.0    Care Discussed with Consultants/Other Providers:   Patient is a 67y old  Male who presents with a chief complaint of UGIB    SUBJECTIVE / OVERNIGHT EVENTS:    Patient with eyes opening, ill-appearing, jaudince, does not consistently track, just looks around aimlessly  son at bedside, states he only work up some yesterday, has not spoken, states he does not think he recognizes him  will not follow command, stares, withdraws to touch and grimaces    Son states speaks English, walks and lives with family prior to arrival, no dementia     ROS unobtainable due MS    MEDICATIONS  (STANDING):  cefTRIAXone   IVPB 1000 milliGRAM(s) IV Intermittent every 24 hours  chlorhexidine 2% Cloths 1 Application(s) Topical <User Schedule>  coronavirus bivalent (EUA) Booster Vaccine (PFIZER) 0.3 milliLiter(s) IntraMuscular once  glucagon  Injectable 1 milliGRAM(s) IntraMuscular once  insulin lispro (ADMELOG) corrective regimen sliding scale   SubCutaneous every 6 hours  insulin NPH human recombinant 11 Unit(s) SubCutaneous every 6 hours  lactulose Syrup 10 Gram(s) Oral every 4 hours  levothyroxine Injectable 75 MICROGram(s) IV Push at bedtime  pantoprazole  Injectable 40 milliGRAM(s) IV Push two times a day  potassium phosphate / sodium phosphate Powder (PHOS-NaK) 1 Packet(s) Oral three times a day  rifAXIMin 550 milliGRAM(s) Oral two times a day  tenofovir disoproxil fumarate (VIREAD) 300 milliGRAM(s) Oral daily    MEDICATIONS  (PRN):  dextrose Oral Gel 15 Gram(s) Oral once PRN Blood Glucose LESS THAN 70 milliGRAM(s)/deciliter  petrolatum Ophthalmic Ointment 1 Application(s) Both EYES two times a day PRN dry eyes    T(C): 36.6 (03-10-23 @ 10:09), Max: 36.6 (03-10-23 @ 10:09)  HR: 81 (03-10-23 @ 10:09) (70 - 81)  BP: 134/70 (03-10-23 @ 10:09) (134/59 - 142/66)  RR: 18 (03-10-23 @ 10:09) (18 - 20)  SpO2: 100% (03-10-23 @ 10:09) (99% - 100%)    CAPILLARY BLOOD GLUCOSE  POCT Blood Glucose.: 175 mg/dL (10 Mar 2023 11:37)  POCT Blood Glucose.: 155 mg/dL (10 Mar 2023 06:54)  POCT Blood Glucose.: 205 mg/dL (10 Mar 2023 00:33)  POCT Blood Glucose.: 154 mg/dL (09 Mar 2023 17:48)    I&O's Summary    09 Mar 2023 07:01  -  10 Mar 2023 07:00  --------------------------------------------------------  IN: 560 mL / OUT: 1770 mL / NET: -1210 mL    10 Mar 2023 07:01  -  10 Mar 2023 12:14  --------------------------------------------------------  IN: 160 mL / OUT: 450 mL / NET: -290 mL      PHYSICAL EXAM:  GENERAL: ill-appearing, thin  HEENT: MMM dry, +scleral icterus   CHEST/LUNG: Clear to auscultation bilaterally; No wheeze  HEART: Regular rate and rhythm; No murmurs, rubs, or gallops  ABDOMEN: Soft, Nontender, mild distended, no r/g; Bowel sounds present  EXTREMITIES:   SCD, thigh edema   PSYCH: eyes open, will not follow commands or speak  NEUROLOGY: passive ROM, pushes back with passive ROM of arms   +mcconnell  +NGT  rectal tube dc     LABS:                        11.7   7.91  )-----------( 139      ( 10 Mar 2023 06:10 )             37.3     03-10    139  |  108<H>  |  30<H>  ----------------------------<  169<H>  4.1   |  22  |  0.81    Ca    8.4      10 Mar 2023 06:10  Phos  2.4     03-10  Mg     2.40     03-10    TPro  6.2  /  Alb  2.8<L>  /  TBili  1.5<H>  /  DBili  x   /  AST  76<H>  /  ALT  44<H>  /  AlkPhos  461<H>  03-10    PT/INR - ( 09 Mar 2023 01:35 )   PT: 16.4 sec;   INR: 1.41 ratio    PTT - ( 09 Mar 2023 01:35 )  PTT:29.4 sec    Microbiology: Culture Results:   No growth (03-05 @ 18:34)    VBG 03-09 @ 05:00  pH: 7.35/pCO2: 40/pO2: 47/HCO3: 22/lactate: 2.0    Care Discussed with Consultants/Other Providers: liver   Patient is a 67y old  Male who presents with a chief complaint of UGIB    SUBJECTIVE / OVERNIGHT EVENTS:    Patient with eyes opening, ill-appearing, jaudince, does not consistently track, just looks around aimlessly. Son at bedside, states he only woke up yesterday, has not spoken, states he does not think he recognizes him. will not follow command, stares, withdraws to touch and grimaces    Son states speaks English, walks and lives with family prior to arrival, no dementia     ROS unobtainable due MS    MEDICATIONS  (STANDING):  cefTRIAXone   IVPB 1000 milliGRAM(s) IV Intermittent every 24 hours  chlorhexidine 2% Cloths 1 Application(s) Topical <User Schedule>  coronavirus bivalent (EUA) Booster Vaccine (PFIZER) 0.3 milliLiter(s) IntraMuscular once  glucagon  Injectable 1 milliGRAM(s) IntraMuscular once  insulin lispro (ADMELOG) corrective regimen sliding scale   SubCutaneous every 6 hours  insulin NPH human recombinant 11 Unit(s) SubCutaneous every 6 hours  lactulose Syrup 10 Gram(s) Oral every 4 hours  levothyroxine Injectable 75 MICROGram(s) IV Push at bedtime  pantoprazole  Injectable 40 milliGRAM(s) IV Push two times a day  potassium phosphate / sodium phosphate Powder (PHOS-NaK) 1 Packet(s) Oral three times a day  rifAXIMin 550 milliGRAM(s) Oral two times a day  tenofovir disoproxil fumarate (VIREAD) 300 milliGRAM(s) Oral daily    MEDICATIONS  (PRN):  dextrose Oral Gel 15 Gram(s) Oral once PRN Blood Glucose LESS THAN 70 milliGRAM(s)/deciliter  petrolatum Ophthalmic Ointment 1 Application(s) Both EYES two times a day PRN dry eyes    T(C): 36.6 (03-10-23 @ 10:09), Max: 36.6 (03-10-23 @ 10:09)  HR: 81 (03-10-23 @ 10:09) (70 - 81)  BP: 134/70 (03-10-23 @ 10:09) (134/59 - 142/66)  RR: 18 (03-10-23 @ 10:09) (18 - 20)  SpO2: 100% (03-10-23 @ 10:09) (99% - 100%)    CAPILLARY BLOOD GLUCOSE  POCT Blood Glucose.: 175 mg/dL (10 Mar 2023 11:37)  POCT Blood Glucose.: 155 mg/dL (10 Mar 2023 06:54)  POCT Blood Glucose.: 205 mg/dL (10 Mar 2023 00:33)  POCT Blood Glucose.: 154 mg/dL (09 Mar 2023 17:48)    I&O's Summary    09 Mar 2023 07:01  -  10 Mar 2023 07:00  --------------------------------------------------------  IN: 560 mL / OUT: 1770 mL / NET: -1210 mL    10 Mar 2023 07:01  -  10 Mar 2023 12:14  --------------------------------------------------------  IN: 160 mL / OUT: 450 mL / NET: -290 mL      PHYSICAL EXAM:  GENERAL: ill-appearing, thin  HEENT: MMM dry, +scleral icterus   CHEST/LUNG: Clear to auscultation bilaterally; No wheeze  HEART: Regular rate and rhythm; No murmurs, rubs, or gallops  ABDOMEN: Soft, Nontender, mild distended, no r/g; Bowel sounds present  EXTREMITIES:   SCD, thigh edema   PSYCH: eyes open, will not follow commands or speak  NEUROLOGY: passive ROM, pushes back with passive ROM of arms   +mcconnell  +NGT  rectal tube dc     LABS:                        11.7   7.91  )-----------( 139      ( 10 Mar 2023 06:10 )             37.3     03-10    139  |  108<H>  |  30<H>  ----------------------------<  169<H>  4.1   |  22  |  0.81    Ca    8.4      10 Mar 2023 06:10  Phos  2.4     03-10  Mg     2.40     03-10    TPro  6.2  /  Alb  2.8<L>  /  TBili  1.5<H>  /  DBili  x   /  AST  76<H>  /  ALT  44<H>  /  AlkPhos  461<H>  03-10    PT/INR - ( 09 Mar 2023 01:35 )   PT: 16.4 sec;   INR: 1.41 ratio    PTT - ( 09 Mar 2023 01:35 )  PTT:29.4 sec    Microbiology: Culture Results:   No growth (03-05 @ 18:34)    VBG 03-09 @ 05:00  pH: 7.35/pCO2: 40/pO2: 47/HCO3: 22/lactate: 2.0    Care Discussed with Consultants/Other Providers: Hepatology Attending Dr. Lanza

## 2023-03-10 NOTE — PROGRESS NOTE ADULT - ASSESSMENT
67M HTN, DM2, hypothyroidism, CAD s/p CABG, TANG cirrhosis, follicular lymphoma (on Rituximab infusions outpt), chronic Hep B on tenofovir (HBV Core +), presented to the ED w/ chest pain and constipation while in ED developed hematemesis s/p ICU admission with intubated for airway protection extubated 3/8) s/p 2/24 bedside EGD with banding x6 of esophageal varices now with persistent encephalopathy.  67M HTN, DM2, hypothyroidism, CAD s/p CABG, TANG cirrhosis, follicular lymphoma (on Rituximab OP), chronic Hep B on tenofovir (HBV Core +), presented to the ED w/ chest pain and constipation while in ED developed hematemesis s/p MICU admission for acute blood loss anemia c/b hypovolemic shock  intubated for airway protection (extubated 3/8) s/p 2/24 bedside EGD with bandingf esophageal varices now with persistent encephalopathy.

## 2023-03-10 NOTE — SWALLOW BEDSIDE ASSESSMENT ADULT - SWALLOW EVAL: RECOMMENDED DIET
1. Oral nutrition/hydration is contraindicated at this time. Consider continuation of non-oral means of nutrition/hydration.

## 2023-03-10 NOTE — PROGRESS NOTE ADULT - PROBLEM SELECTOR PLAN 3
decompensated with ascites, with variceal bleed, with PSE  - s/p diagnostic para on 2/25 and therapeutic para (3/5) removed 4.5L  - c/w rifaximin BID, Miralax BID, lactulose q2h, monitor BMs   - liver following  - ceftriaxone 1g daily for SBP ppx x 5 days, 3/7, D3 decompensated with ascites, with variceal bleed, with PSE  - s/p diagnostic para on 2/25 and therapeutic para (3/5) removed 4.5L  - c/w rifaximin BID, Miralax BID, lactulose q2h, monitor BMs   - liver following  - ceftriaxone 1g daily for SBP ppx, d/w liver, can switch ppx, f/u QTc decompensated with ascites, with variceal bleed, with PSE  - s/p diagnostic para (2/25) and therapeutic para (3/5) removed 4.5L  - c/w rifaximin BID, lactulose q4h, monitor BMs   - liver following  - ceftriaxone 1g daily for SBP ppx, d/w liver, can switch cipro ppx, f/u QTc decompensated with ascites, with variceal bleed, with PSE; MELD 12 3/10/23  - s/p diagnostic para (2/25) and therapeutic para (3/5) removed 4.5L  - c/w rifaximin BID, lactulose q4h, monitor BMs   - ceftriaxone 1g daily for SBP ppx, d/w liver, can switch cipro ppx, f/u QTc  - per hepatology resume lasix 20 mg daily and spironolactone 50 mg daily  - hepatology following

## 2023-03-10 NOTE — PROGRESS NOTE ADULT - PROBLEM SELECTOR PLAN 5
HbA1c 6.1%  - c/w NPH 11u q6hrs HbA1c 6.1% likely underestimated due transfusions  - c/w NPH 11u q6hrs  - c/w TF Glucerna TSH 1.40 3/10/23  - c/w synthroid IV for now, would need to hold TF 1 hour pre and post PO/NGT levothyroxine

## 2023-03-10 NOTE — PROGRESS NOTE ADULT - PROBLEM SELECTOR PLAN 10
Confirmed full code with son at bedside  SCDs for now, can d/w liver re: if cleared for ppx use   will need PT when able to cooperate  functional quadriplegia c/w full care and turns, TF  will need TOV when more mobile/awake

## 2023-03-10 NOTE — PROGRESS NOTE ADULT - PROBLEM SELECTOR PLAN 7
- continue w/ tenofovir - continue w/ tenofovir  - check Hep B pcr HbA1c 6.1% likely underestimated due transfusions  - c/w NPH 11u q6hrs  - c/w TF Glucerna

## 2023-03-10 NOTE — PROGRESS NOTE ADULT - PROBLEM SELECTOR PLAN 8
- family reports follows with oncology at [facility] - continue w/ tenofovir  - check Hep B pcr

## 2023-03-10 NOTE — PROGRESS NOTE ADULT - ATTENDING COMMENTS
Please see yesterday's note.   Persistent, significant hepatic encephalopathy with asterixis, as he only got lactulose sara yesterday. Pulled out NGT, now has one again. Awake, but does not follow commands, does not track.    - increase lactulose to 30 g q4 hrs until he has significant bowel movements, then reduce, goal 3 soft BM/day  - PPI to once daily  - can switch CTX to PO ciprofloxacin or Bactrim  - agree with further assessment of EEG abnormality    D/w primary team, Dr. Mayorga

## 2023-03-10 NOTE — PROGRESS NOTE ADULT - PROBLEM SELECTOR PLAN 1
- off sedation  - CTH (3/4/23): no acute pathology  - EEG (3/5/23): Abnormal EEG study.  Potential epileptogenic focus in the left posterior head region. Structural of functional abnormality in the left posterior head region. Moderate nonspecific diffuse or multifocal cerebral dysfunction. No seizure seen.--will repeat now that off sedation  - c/w rifaximin 550 mg BID, increase lactulose per liver team, stool count  - diagnostic paracentesis neg for SBP  - failed S&S, c/w NGT feeds, aspiration precautions - off sedation  - CTH (3/4/23): no acute pathology  - EEG (3/5/23): Abnormal EEG study.  Potential epileptogenic focus in the left posterior head region. Structural of functional abnormality in the left posterior head region. Moderate nonspecific diffuse or multifocal cerebral dysfunction. No seizure seen.--will repeat now that off sedation  - c/w rifaximin 550 mg BID, increase lactulose 30g q4h per liver team as still believe this is PSE, stool count  - diagnostic paracentesis neg for SBP  - failed S&S, c/w NGT feeds, aspiration precautions - off sedation  - CTH (3/4/23): no acute pathology  - EEG (3/5/23): Abnormal EEG study.  Potential epileptogenic focus in the left posterior head region. Structural of functional abnormality in the left posterior head region. Moderate nonspecific diffuse or multifocal cerebral dysfunction. No seizure seen.--will repeat now that off sedation  - c/w rifaximin 550 mg BID, increase lactulose 30g q4h per liver team as still believe this is PSE, stool count  - paracentesis x 2 neg for SBP  - failed S&S, c/w NGT feeds, aspiration precautions  - TSH wnl  - trial of IV thiamine  - if no improvement with above will c/s neuro

## 2023-03-10 NOTE — PROGRESS NOTE ADULT - PROBLEM SELECTOR PLAN 2
acute blood loss anemia c/b hypovolemic shock   - 6.9 on admit now s/p 6 units of pRBC, 1 unit FFP, 1 unit of platelets all on 2/24/23  - s/p octreotide x 72h (2/24-2/27)   - PPI 40 IV BID, planned for 40 mg IV daily  - s/p EGD 2/24: large (> 5 mm) esophageal varices. Incompletely eradicated. Banded. Normal duodenal bulb, first portion of the duodenum and second portion of the duodenum.                 - s/p levophed in ICU, now HD stable  - f/u liver re: need for repeat EGD acute blood loss anemia c/b hypovolemic shock   - 6.9 on admit now s/p 6 units of pRBC, 1 unit FFP, 1 unit of platelets all on 2/24/23  - s/p octreotide x 72h (2/24-2/27)   - PPI 40 IV BID, planned for 40 mg IV daily  - s/p EGD 2/24: large (> 5 mm) esophageal varices. Incompletely eradicated. Banded. Normal duodenal bulb, first portion of the duodenum and second portion of the duodenum.                 - s/p levophed in ICU, now HD stable  - EGD in 4 wks acute blood loss anemia c/b hypovolemic shock   - 6.9 on admit now s/p 6 units of pRBC, 1 unit FFP, 1 unit of platelets all on 2/24/23  - s/p octreotide x 72h (2/24-2/27)   - PPI 40 IV BID-->daily, d/w liver   - s/p EGD 2/24: large (> 5 mm) esophageal varices. Incompletely eradicated. Banded. Normal duodenal bulb, first portion of the duodenum and second portion of the duodenum.                 - s/p levophed in ICU, now HD stable  - EGD in 4 wks acute blood loss anemia c/b hypovolemic shock s/p levophed in ED  - 6.9 on admit now s/p 6 units of pRBC, 1 unit FFP, 1 unit of platelets all on 2/24/23  - s/p octreotide x 72h (2/24-2/27)   - PPI 40 IV BID-->daily, d/w liver   - s/p EGD 2/24: large (> 5 mm) esophageal varices. Incompletely eradicated. Banded. Normal duodenal bulb, first portion of the duodenum and second portion of the duodenum.                 - EGD in 4 wks acute blood loss anemia c/b hypovolemic shock s/p levophed and ICU  - Hb 6.9 on admit now s/p 6 units of pRBC, 1 unit FFP, 1 unit of platelets all on 2/24/23  - s/p octreotide x 72h (2/24-2/27)   - s/p PPI 40 IV BID change to daily per d/w liver   - s/p EGD 2/24: large (> 5 mm) esophageal varices. Incompletely eradicated. Banded. Normal duodenal bulb, first portion of the duodenum and second portion of the duodenum.                 - EGD in 4 wks for likely repeat banding

## 2023-03-10 NOTE — PROGRESS NOTE ADULT - SUBJECTIVE AND OBJECTIVE BOX
Interval Events:   - No change in mental status  - Reportedly had 1 large BM with removal of rectal tube this AM  - No documented BMs overnight    Allergies:  [This allergen will not trigger allergy alert] &quot;lentils&quot;-&quot;itchiness&quot; (Other)  Beef (Other)  No Known Drug Allergies        Hospital Medications:  cefTRIAXone   IVPB 1000 milliGRAM(s) IV Intermittent every 24 hours  chlorhexidine 2% Cloths 1 Application(s) Topical <User Schedule>  coronavirus bivalent (EUA) Booster Vaccine (PFIZER) 0.3 milliLiter(s) IntraMuscular once  dextrose 5%. 1000 milliLiter(s) IV Continuous <Continuous>  dextrose 5%. 1000 milliLiter(s) IV Continuous <Continuous>  dextrose 50% Injectable 12.5 Gram(s) IV Push once  dextrose 50% Injectable 25 Gram(s) IV Push once  dextrose 50% Injectable 25 Gram(s) IV Push once  dextrose Oral Gel 15 Gram(s) Oral once PRN  glucagon  Injectable 1 milliGRAM(s) IntraMuscular once  insulin lispro (ADMELOG) corrective regimen sliding scale   SubCutaneous every 6 hours  insulin NPH human recombinant 11 Unit(s) SubCutaneous every 6 hours  lactulose Syrup 10 Gram(s) Oral every 4 hours  levothyroxine Injectable 75 MICROGram(s) IV Push at bedtime  pantoprazole  Injectable 40 milliGRAM(s) IV Push two times a day  petrolatum Ophthalmic Ointment 1 Application(s) Both EYES two times a day PRN  potassium phosphate / sodium phosphate Powder (PHOS-NaK) 1 Packet(s) Oral three times a day  rifAXIMin 550 milliGRAM(s) Oral two times a day  tenofovir disoproxil fumarate (VIREAD) 300 milliGRAM(s) Oral daily      PMHX/PSHX:  CAD (coronary artery disease)    Diabetes    Lymphoma    Cirrhosis    S/P CABG x 1        Family history:  No pertinent family history in first degree relatives        ROS: As per HPI, otherwise 14-point ROS reviewed and negative.      PHYSICAL EXAM:   Vital Signs:  Vital Signs Last 24 Hrs  T(C): 36.6 (10 Mar 2023 10:09), Max: 36.6 (10 Mar 2023 10:09)  T(F): 97.8 (10 Mar 2023 10:09), Max: 97.8 (10 Mar 2023 10:09)  HR: 81 (10 Mar 2023 10:09) (70 - 81)  BP: 134/70 (10 Mar 2023 10:09) (134/59 - 159/64)  BP(mean): 80 (09 Mar 2023 14:00) (80 - 91)  RR: 18 (10 Mar 2023 10:09) (17 - 21)  SpO2: 100% (10 Mar 2023 10:09) (99% - 100%)    Parameters below as of 09 Mar 2023 15:00  Patient On (Oxygen Delivery Method): room air      Daily     Daily       03-09-23 @ 07:01  -  03-10-23 @ 07:00  --------------------------------------------------------  IN: 560 mL / OUT: 1770 mL / NET: -1210 mL    03-10-23 @ 07:01  -  03-10-23 @ 10:52  --------------------------------------------------------  IN: 160 mL / OUT: 450 mL / NET: -290 mL      GENERAL:  NAD, NGT in place  HEENT:  NCAT, no scleral icterus  CHEST: No respiratory distress  HEART:  RRR  ABDOMEN:  Soft, non-tender, non-distended  EXTREMITIES:  No cyanosis, clubbing, or edema  SKIN:  No rash/erythema/ecchymoses/petechiae/wounds/abscess/warm/dry  NEURO: A&O x 0, non focal    LABS:                        11.7   7.91  )-----------( 139      ( 10 Mar 2023 06:10 )             37.3     Mean Cell Volume: 94.7 fL (03-10-23 @ 06:10)    03-10    139  |  108<H>  |  30<H>  ----------------------------<  169<H>  4.1   |  22  |  0.81    Ca    8.4      10 Mar 2023 06:10  Phos  2.4     03-10  Mg     2.40     03-10    TPro  6.2  /  Alb  2.8<L>  /  TBili  1.5<H>  /  DBili  x   /  AST  76<H>  /  ALT  44<H>  /  AlkPhos  461<H>  03-10    LIVER FUNCTIONS - ( 10 Mar 2023 06:10 )  Alb: 2.8 g/dL / Pro: 6.2 g/dL / ALK PHOS: 461 U/L / ALT: 44 U/L / AST: 76 U/L / GGT: x           PT/INR - ( 09 Mar 2023 01:35 )   PT: 16.4 sec;   INR: 1.41 ratio         PTT - ( 09 Mar 2023 01:35 )  PTT:29.4 sec                            11.7   7.91  )-----------( 139      ( 10 Mar 2023 06:10 )             37.3                         10.2   8.51  )-----------( 109      ( 09 Mar 2023 01:35 )             32.4                         10.2   9.69  )-----------( 118      ( 08 Mar 2023 00:46 )             31.9       Imaging:

## 2023-03-10 NOTE — PROGRESS NOTE ADULT - ASSESSMENT
67M follows at [facility] Hx decompensated TANG cirrhosis (+ascites, +EV -- previously MELD Na 8), CAD s/p CABG s/p PCI (last in 2012 on ASA, now off plavix x2 weeks), newly diagnosed follicular lymphoma (on rituximab), HTN, DM, currently on tenofovir (HBV Core +) presented with right sided chest/abdominal pain with lizzette hematemesis + clots c/b hemorrhagic shock, s/p intubation in MICU, EGD showing large varices s/p banding, unable to completely clear stomach due to clot. Now w/ new fever and downtrending Hg    # AMS - Possible component of HE. Would continue with an aggressive laxative regimen.  #Hematemesis: s/p EGD 2/24/2023 s/p EVL x6, bleeding likely due to EV, however unable to completely visualize stomach given presence of clotted blood. H/H now stable.   #NANCI  #Decompensated TANG cirrhosis: follows at [facility], previously low meld Na 8  --MELD Na: 22  --Ascites: (+) Hx, on lasix 40, spironolactone 50 at home, holding here  --SBP; no Hx  --EV: (+) Hx, no EVB --> is on nadolol at home  --PVT: no Hx  --HCC: no Hx    Recommendations:  - No plans for repeat EGD at this time  - Consider neurology followup re: mental status  - c/w Rifaximin and lactulose, please give 10g q4h until having 3 BMs, then can hold for the day - can use the total dose use divided over 3 times for tomorrow  - Once CTX in completed, should be on ciprofloxacin 500mg daily for SBP prophylaxis given low ascitic protein  - c/w pantoprazole 40 IV BID  - c/w tenofovir  - Can restart diuretics: Furosemide 20mg, spironolactone 50mg  - No need to trend ammonia  - repeat EGD in 4 weeks for variceal banding  - trend CMP, CBC, coags    Please note that the recommendations are not final until attested by an attending.    Thank you for involving us in the care of this patient. Please reach out if any further questions.    Ulices Ray, PGY-6  Gastroenterology/Hepatology Fellow    Available on [employer] Teams  After 5PM/Weekends, please contact the on-call GI fellow: [phone]   67M follows at [facility] Hx decompensated TANG cirrhosis (+ascites, +EV -- previously MELD Na 8), CAD s/p CABG s/p PCI (last in 2012 on ASA, now off plavix x2 weeks), newly diagnosed follicular lymphoma (on rituximab), HTN, DM, currently on tenofovir (HBV Core +) presented with right sided chest/abdominal pain with lizzette hematemesis + clots c/b hemorrhagic shock, s/p intubation in MICU, EGD showing large varices s/p banding, unable to completely clear stomach due to clot. Now w/ new fever and downtrending Hg    # AMS - Possible component of HE. Would continue with an aggressive laxative regimen.  #Hematemesis: s/p EGD 2/24/2023 s/p EVL x6, bleeding likely due to EV, however unable to completely visualize stomach given presence of clotted blood. H/H now stable.   #NANCI  #Decompensated TANG cirrhosis: follows at [facility], previously low meld Na 8  --MELD Na: 22  --Ascites: (+) Hx, on lasix 40, spironolactone 50 at home, holding here  --SBP; no Hx  --EV: (+) Hx, no EVB --> is on nadolol at home  --PVT: no Hx  --HCC: no Hx    Recommendations:  - No plans for repeat EGD at this time  - Consider neurology followup re: mental status if mental status not improving over the weekend with adequate bowel movement  - Please document BMs  - c/w Rifaximin and lactulose, please give 10g q4h until having 3 BMs, then can hold for the day - can use the total dose use divided over 3 times for tomorrow  - Once CTX in completed, should be on ciprofloxacin 500mg daily for SBP prophylaxis given low ascitic protein  - c/w pantoprazole 40 IV BID  - c/w tenofovir  - Can restart diuretics: Furosemide 20mg, spironolactone 50mg  - No need to trend ammonia  - repeat EGD in 4 weeks for variceal banding  - trend CMP, CBC, coags    Please note that the recommendations are not final until attested by an attending.    Thank you for involving us in the care of this patient. Please reach out if any further questions.    Ulices Ray, PGY-6  Gastroenterology/Hepatology Fellow    Available on [employer] Teams  After 5PM/Weekends, please contact the on-call GI fellow: [phone]

## 2023-03-10 NOTE — PROGRESS NOTE ADULT - PROBLEM SELECTOR PLAN 4
- 2/27 (sputum culture): growing pseudomonas   - s/p Zosyn (2/27-3/5), 7 days Max Cr 1.62  - downtrended  - mcconnell in place, unclear if had retention, will need eventual TOV

## 2023-03-10 NOTE — PROGRESS NOTE ADULT - PROBLEM SELECTOR PLAN 6
- c/w synthroid IV for now, would need to hold TF 1 hour pre and post PO/NGT levothyroxine - sputum culture 2/27: pseudomonas   - s/p Zosyn (2/27-3/5), 7 days

## 2023-03-10 NOTE — PROGRESS NOTE ADULT - PROBLEM SELECTOR PLAN 9
Confirmed full code with son at bedside  SCDs Confirmed full code with son at bedside  SCDs  will need PT when able to cooperate  functional quadriplegia c/w full care and turns, TF  will need TOV when more mobile Confirmed full code with son at bedside  SCDs for now, can d/w liver re: if cleared for ppx use   will need PT when able to cooperate  functional quadriplegia c/w full care and turns, TF  will need TOV when more mobile/awake - family reports follows with oncology at [facility]

## 2023-03-11 NOTE — PROGRESS NOTE ADULT - SUBJECTIVE AND OBJECTIVE BOX
Hepatology Progress Note    Interval Events:   Per son, mental status slowly improving although still not able to meaningfully respond to commands for me    Allergies:  [This allergen will not trigger allergy alert] &quot;lentils&quot;-&quot;itchiness&quot; (Other)  Beef (Other)  No Known Drug Allergies      Hospital Medications:  cefTRIAXone   IVPB 1000 milliGRAM(s) IV Intermittent every 24 hours  chlorhexidine 2% Cloths 1 Application(s) Topical <User Schedule>  coronavirus bivalent (EUA) Booster Vaccine (PFIZER) 0.3 milliLiter(s) IntraMuscular once  dextrose 50% Injectable 25 Gram(s) IV Push once  dextrose 50% Injectable 12.5 Gram(s) IV Push once  dextrose 50% Injectable 25 Gram(s) IV Push once  dextrose Oral Gel 15 Gram(s) Oral once PRN  furosemide    Tablet 20 milliGRAM(s) Oral daily  insulin lispro (ADMELOG) corrective regimen sliding scale   SubCutaneous every 6 hours  insulin NPH human recombinant 13 Unit(s) SubCutaneous every 6 hours  lactulose Syrup 30 Gram(s) Oral every 4 hours  levothyroxine Injectable 75 MICROGram(s) IV Push at bedtime  pantoprazole  Injectable 40 milliGRAM(s) IV Push daily  rifAXIMin 550 milliGRAM(s) Oral two times a day  spironolactone 50 milliGRAM(s) Oral daily  tenofovir disoproxil fumarate (VIREAD) 300 milliGRAM(s) Oral daily  thiamine IVPB 500 milliGRAM(s) IV Intermittent every 8 hours      ROS: 14 point ROS negative unless otherwise state in subjective    PHYSICAL EXAM:   Vital Signs:  Vital Signs Last 24 Hrs  T(C): 36.7 (11 Mar 2023 10:30), Max: 36.9 (10 Mar 2023 16:44)  T(F): 98.1 (11 Mar 2023 10:30), Max: 98.5 (10 Mar 2023 21:00)  HR: 97 (11 Mar 2023 10:30) (86 - 97)  BP: 143/72 (11 Mar 2023 10:30) (125/60 - 146/67)  BP(mean): --  RR: 18 (11 Mar 2023 10:30) (18 - 19)  SpO2: 95% (11 Mar 2023 10:30) (95% - 100%)    Parameters below as of 11 Mar 2023 05:40  Patient On (Oxygen Delivery Method): room air    GENERAL:  No acute distress  HEENT:  NCAT, no scleral icterus  CHEST: no resp distress  HEART:  RRR  ABDOMEN:  Soft, non-tender, distended, normoactive bowel sounds  EXTREMITIES:  No  edema  NEURO:  Alert and oriented x 0, no asterixisis     LABS:                        10.8   11.31 )-----------( 197      ( 11 Mar 2023 06:43 )             35.4     Mean Cell Volume: 95.2 fL (03-11-23 @ 06:43)    03-11    143  |  115<H>  |  30<H>  ----------------------------<  242<H>  3.9   |  18<L>  |  0.87    Ca    8.4      11 Mar 2023 06:43  Phos  2.9     03-11  Mg     2.40     03-10    TPro  5.8<L>  /  Alb  2.6<L>  /  TBili  1.6<H>  /  DBili  x   /  AST  119<H>  /  ALT  64<H>  /  AlkPhos  525<H>  03-11    LIVER FUNCTIONS - ( 11 Mar 2023 06:43 )  Alb: 2.6 g/dL / Pro: 5.8 g/dL / ALK PHOS: 525 U/L / ALT: 64 U/L / AST: 119 U/L / GGT: x             Imaging:  < from: US Abdomen Upper Quadrant Right (03.10.23 @ 18:18) >  Cirrhosis. Limited visualization of the liver.    Moderate amount of ascites.      < end of copied text >

## 2023-03-11 NOTE — PROGRESS NOTE ADULT - PROBLEM SELECTOR PLAN 3
decompensated with ascites, with variceal bleed, with PSE; MELD 12 3/10/23  - s/p diagnostic para (2/25) and therapeutic para (3/5) removed 4.5L  - c/w rifaximin BID, lactulose q4h, monitor BMs   - ceftriaxone 1g daily for SBP ppx, d/w liver, can switch cipro ppx, f/u QTc  - per hepatology resume lasix 20 mg daily and spironolactone 50 mg daily  - hepatology following  - LFT uptrending, RUQ w/o pathology on 3/10 decompensated with ascites, with variceal bleed, with PSE; MELD 12 3/10/23  - s/p diagnostic para (2/25) and therapeutic para (3/5) removed 4.5L  - c/w rifaximin BID, lactulose q4h, monitor BMs   - ceftriaxone 1g daily for SBP ppx, d/w liver, QTc prolonged, can change to bactrim ppx  - per hepatology resume Lasix 20 mg daily and Spironolactone 50 mg daily  - hepatology following  - LFT uptrending, RUQ w/o pathology on 3/10

## 2023-03-11 NOTE — PROGRESS NOTE ADULT - PROBLEM SELECTOR PLAN 10
Confirmed full code with son at bedside on 3/10  SCDs for now, can d/w liver re: if cleared for ppx use   will need PT when able to cooperate  functional quadriplegia c/w full care and turns, TF  will need TOV when more mobile/awake

## 2023-03-11 NOTE — PROGRESS NOTE ADULT - PROBLEM SELECTOR PLAN 5
TSH 1.40 3/10/23  - c/w synthroid IV for now, would need to hold TF 1 hour pre and post PO/NGT levothyroxine

## 2023-03-11 NOTE — PROGRESS NOTE ADULT - ASSESSMENT
67M follows at [facility] Hx decompensated TANG cirrhosis (+ascites, +EV -- previously MELD Na 8), CAD s/p CABG s/p PCI (last in 2012 on ASA, now off plavix x2 weeks), newly diagnosed follicular lymphoma (on rituximab), HTN, DM, currently on tenofovir (HBV Core +) presented with right sided chest/abdominal pain with lizzette hematemesis + clots c/b hemorrhagic shock, s/p intubation in MICU, EGD showing large varices s/p banding, unable to completely clear stomach due to clot now transferred to the floor for further care with hospital course c/b persistent AMS     # AMS - Possible component of HE vs. primary neurological issue. Abnormal EEG a couple days prior but confounded due to sedation. Per son, mental status slowly improving although still not able to meaningfully respond to commands for meWould continue with an aggressive laxative regimen. If no improvement by this PM would consult neurology     #Hematemesis: s/p EGD 2/24/2023 s/p EVL x6, bleeding likely due to EV, however unable to completely visualize stomach given presence of clotted blood. H/H now stable.   #NANCI  #Decompensated TANG cirrhosis: follows at [facility], previously low meld Na 8  --MELD Na: 22  --Ascites: (+) Hx,  continue with home diuretics.Furosemide 20mg, spironolactone 50mg. Noted to have ascited on U/S would attempt draiange.   --SBP; no Hx. WBC elevated today wih no fevers. Would preform dx para as noted above. Continue with ciprofloxacin 500mg daily for SBP prophylaxis given low ascitic protein  --EV: (+) Hx, no EVB --> is on nadolol at home  --PVT: no Hx  --HCC: no Hx    Recommendations:  - c/w Rifaximin and lactulose, please give 10g q4h until having 3 BMs, then can hold for the day - can use the total dose use divided over 3 times for tomorrow  - If no improvement in mental status this PM, consider neurology followup   - Repeat dx and tx paracentesis   - No plans for repeat EGD at this time  - Continue with ciprofloxacin 500mg daily for SBP prophylaxis given low ascitic protein  - c/w pantoprazole 40 QD  - c/w tenofovir- repeat EGD in 4 weeks for variceal banding  - trend CMP, CBC, coags    All recommendations are tentative until note is attested by attending.     Kashif Peña, PGY-4  Gastroenterology/Hepatology Fellow  Available on [employer] Teams   [phone] (Long Range Pager)  [phone] (Short Range Pager [facility])    After 5pm, please contact the on-call GI fellow. [phone]

## 2023-03-11 NOTE — PROGRESS NOTE ADULT - PROBLEM SELECTOR PLAN 1
- off sedation  - CTH (3/4/23): no acute pathology  - EEG (3/5/23): Abnormal EEG study.  Potential epileptogenic focus in the left posterior head region. Structural of functional abnormality in the left posterior head region. Moderate nonspecific diffuse or multifocal cerebral dysfunction. No seizure seen.--will repeat now that off sedation  - c/w rifaximin 550 mg BID, increase lactulose 30g q4h per liver team as still believe this is PSE, stool count (2 BM documented on 3/10)  - paracentesis x 2 neg for SBP  - failed S&S, c/w NGT feeds, aspiration precautions  - TSH wnl  - trial of IV thiamine  - if no improvement with above will c/s neuro

## 2023-03-11 NOTE — PROGRESS NOTE ADULT - ASSESSMENT
67M HTN, DM2, hypothyroidism, CAD s/p CABG, TANG cirrhosis, follicular lymphoma (on Rituximab OP), chronic Hep B on tenofovir (HBV Core +), presented to the ED w/ chest pain and constipation while in ED developed hematemesis s/p MICU admission for acute blood loss anemia c/b hypovolemic shock  intubated for airway protection (extubated 3/8) s/p 2/24 bedside EGD with bandingf esophageal varices now with persistent encephalopathy.

## 2023-03-11 NOTE — PROGRESS NOTE ADULT - SUBJECTIVE AND OBJECTIVE BOX
Patient is a 67y old  Male who presents with a chief complaint of UGIB    SUBJECTIVE / OVERNIGHT EVENTS:    unable to obtain ROS due to MS  appears more lethargic today, feels warm, temp wnl    MEDICATIONS  (STANDING):  cefTRIAXone   IVPB 1000 milliGRAM(s) IV Intermittent every 24 hours  chlorhexidine 2% Cloths 1 Application(s) Topical <User Schedule>  coronavirus bivalent (EUA) Booster Vaccine (PFIZER) 0.3 milliLiter(s) IntraMuscular once  dextrose 50% Injectable 25 Gram(s) IV Push once  dextrose 50% Injectable 25 Gram(s) IV Push once  dextrose 50% Injectable 12.5 Gram(s) IV Push once  furosemide    Tablet 20 milliGRAM(s) Oral daily  insulin lispro (ADMELOG) corrective regimen sliding scale   SubCutaneous every 6 hours  insulin NPH human recombinant 11 Unit(s) SubCutaneous every 6 hours  lactulose Syrup 30 Gram(s) Oral every 4 hours  levothyroxine Injectable 75 MICROGram(s) IV Push at bedtime  pantoprazole  Injectable 40 milliGRAM(s) IV Push daily  rifAXIMin 550 milliGRAM(s) Oral two times a day  spironolactone 50 milliGRAM(s) Oral daily  tenofovir disoproxil fumarate (VIREAD) 300 milliGRAM(s) Oral daily  thiamine IVPB 500 milliGRAM(s) IV Intermittent every 8 hours    MEDICATIONS  (PRN):  dextrose Oral Gel 15 Gram(s) Oral once PRN Blood Glucose LESS THAN 70 milliGRAM(s)/deciliter    T(C): 36.7 (03-11-23 @ 10:30), Max: 36.9 (03-10-23 @ 16:44)  HR: 97 (03-11-23 @ 10:30) (86 - 97)  BP: 143/72 (03-11-23 @ 10:30) (125/60 - 146/67)  RR: 18 (03-11-23 @ 10:30) (18 - 19)  SpO2: 95% (03-11-23 @ 10:30) (95% - 100%)    CAPILLARY BLOOD GLUCOSE  POCT Blood Glucose.: 208 mg/dL (11 Mar 2023 05:45)  POCT Blood Glucose.: 199 mg/dL (11 Mar 2023 00:13)  POCT Blood Glucose.: 169 mg/dL (10 Mar 2023 20:51)  POCT Blood Glucose.: 229 mg/dL (10 Mar 2023 18:39)  POCT Blood Glucose.: 147 mg/dL (10 Mar 2023 16:35)  POCT Blood Glucose.: 175 mg/dL (10 Mar 2023 11:37)    I&O's Summary    10 Mar 2023 07:01  -  11 Mar 2023 07:00  --------------------------------------------------------  IN: 1110 mL / OUT: 5350 mL / NET: -4240 mL    PHYSICAL EXAM:  GENERAL: ill-appearing, thin  HEENT: MMM dry, +scleral icterus   CHEST/LUNG: Clear to auscultation bilaterally; No wheeze  HEART: Regular rate and rhythm; No murmurs, rubs, or gallops  ABDOMEN: Soft, Nontender, mild distended, no r/g; Bowel sounds present, ? para site leaking with ostomy bag over  EXTREMITIES:   SCD, thigh edema   PSYCH: eyes open, will not follow commands or speak  NEUROLOGY: passive ROM, pushes back with passive ROM of arms   +mcconnell  +NGT    LABS:                        10.8   11.31 )-----------( 197      ( 11 Mar 2023 06:43 )             35.4     03-11    143  |  115<H>  |  30<H>  ----------------------------<  242<H>  3.9   |  18<L>  |  0.87    Ca    8.4      11 Mar 2023 06:43  Phos  2.9     03-11  Mg     2.40     03-10    TPro  5.8<L>  /  Alb  2.6<L>  /  TBili  1.6<H>  /  DBili  x   /  AST  119<H>  /  ALT  64<H>  /  AlkPhos  525<H>  03-11    Microbiology: Culture Results:   No growth at 5 days (03-05 @ 18:34)    Consultant(s) Notes Reviewed:    Care Discussed with Consultants/Other Providers:

## 2023-03-11 NOTE — PROGRESS NOTE ADULT - PROBLEM SELECTOR PLAN 2
acute blood loss anemia c/b hypovolemic shock s/p levophed and ICU  - Hb 6.9 on admit now s/p 6 units of pRBC, 1 unit FFP, 1 unit of platelets all on 2/24/23  - s/p octreotide x 72h (2/24-2/27)   - s/p PPI 40 IV BID change to daily per d/w liver   - s/p EGD 2/24: large (> 5 mm) esophageal varices. Incompletely eradicated. Banded. Normal duodenal bulb, first portion of the duodenum and second portion of the duodenum.                 - EGD in 4 wks for likely repeat banding

## 2023-03-12 NOTE — DISCHARGE NOTE PROVIDER - NSFOLLOWUPCLINICS_GEN_ALL_ED_FT
[facility]  Gastroenterology  [address] [place] [address]  Phone: [phone]  Fax:   Follow Up Time: 1 week     [facility] [place]  Gastroenterology  [address] [place] [address]  Phone: [phone]  Fax:   Follow Up Time: 1 week    [facility] [place]  Urology  [address] [place] [address]  Phone: [phone]  Fax:   Follow Up Time: 2 weeks

## 2023-03-12 NOTE — DISCHARGE NOTE PROVIDER - HOSPITAL COURSE
67M HTN, DM2, hypothyroidism, CAD s/p CABG, TANG cirrhosis, follicular lymphoma (on Rituximab OP), chronic Hep B on tenofovir (HBV Core +), presented to the ED w/ chest pain and constipation while in ED developed hematemesis s/p MICU admission for acute blood loss anemia c/b hypovolemic shock  intubated for airway protection (extubated 3/8) s/p 2/24 bedside EGD with banding esophageal varices now with persistent encephalopathy.     Metabolic encephalopathy: persistent, now off sedation. CTH (3/4/23): no acute pathology EEG (3/5/23): Abnormal EEG study.  Potential epileptogenic focus in the left posterior head region. Structural of functional abnormality in the left posterior head region. Moderate nonspecific diffuse or multifocal cerebral dysfunction. No seizure seen. Repeat EEG:     Treated with rifaximin 550 mg BID and lactulose. Paracentesis x 2 neg for SBP. Failed S&S, c/w NGT feeds, aspiration precautions.  - check MRI, will need to coordinate and hold TF prior  - if no improvement with above will c/s neuro.    Esophageal varices with bleeding c/b acute blood loss anemia c/b hypovolemic shock s/p levophed and ICU  - Hb 6.9 on admit now s/p 6 units of pRBC, 1 unit FFP, 1 unit of platelets all on 2/24/23, also s/p octreotide x 72h (2/24-2/27), PPI daily.   - s/p EGD 2/24: large (> 5 mm) esophageal varices. Incompletely eradicated. Banded. Normal duodenal bulb, first portion of the duodenum and second portion of the duodenum.        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    NANCI: Max Cr 1.62, mcconnell in place, unclear if had retention, will need eventual TOV.    Hypothyroidism: TSH 1.40 3/10/23  - c/w synthroid IV for now, would need to hold TF 1 hour pre and post PO/NGT levothyroxine.    Pneumonia due to Pseudomonas sputum culture 2/27: pseudomonas   - s/p Zosyn (2/27-3/5), 7 days.    DM: HbA1c 6.1% likely underestimated due transfusions  - c/w NPH 13u q6hrs  - c/w TF Glucerna.    Hepatitis B continue w/ tenofovir  - f/u Hep B pcr.    Follicular lymphoma.   ·  Plan: - family reports follows with oncology at [facility].      Functional Quadraplegia    Pt is a 66 yo M with PMH HTN, T2D, hypothyroidism, CAD (s/p CABG), TANG (c/b cirrhosis), follicular lymphoma (rituximab), and HBV (tenofovir) p/w CP and constipation with development of hematemesis. Admitted to MICU for hypovolemic shock requiring intubation/sedation for airway protection in setting of UGIB s/p protonix gtt, octreotide gtt, and midodrine (now off). Hepatology following with 2/24 EGD showing esophageal varices s/p banding. Course further c/b persistent encephalopathy, on rifaximin and lactulose; MR brain with c/f hypoxic-ischemic injury. Now on cipro for SBP ppx. s/p paracentesis 3/28 with procedure team, 4.9L output. Repeat S+S 3/29 with recommendation for pureed diet and moderately thick liquids. Planned for DC to home with hospice.      Problem/Plan - 1:  ·  Problem: Metabolic encephalopathy.   ·  Plan: - likely d/t hypoxia/anoxic brain ischemia in s/o hemorrhagic shock on admission/hypernatremia  - MRI brain 3/15 extensive cortical restricted diffusion throughout the cerebral hemispheres including the basal ganglia and insula b/l.   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 EEG (3/14) frequent left posterior quadrant periodic discharges, risk of seizure has decreased compared to EEG from 3/5. No seizures recorded.   - c/w rifaximin 550 mg BID, lactulose BID, repeat ammonia level 25; encephalopathy likely unrelated to HE  - re-evaluated by S+S 3/29 and now recommended for pureed diet with moderately thick liquids -- will monitor PO intake  - 3/24 Palliative care had GOC w/ son and daughter. Likely current scenario is related to anoxic brain injury; explained despite nutrition/correction of electrolytes clinically unimproved. d/w Hepatology in regards to PEG pt is at increased risk of peritonitis. As per hepatology no significant risk of bleeding with paracentesis in cirrhotic with elevated PT/INR and low platelets.   - procedure team following, s/p para 3/28 with 4.9L output.     Problem/Plan - 2:  ·  Problem: Liver cirrhosis secondary to TANG.   ·  Plan: decompensated with ascites, with variceal bleed, with PSE; MELD 12 3/10/23  - s/p diagnostic para (2/25) and therapeutic para (3/5) removed 4.5L  - s/p diag/therapeutic tap on 3/15, removed 3.6L, neg for SBP; Path neg for malignancy   - therapeutic para 3/28 with 4.9L removed  - c/w rifaximin BID, lactulose BID titrate 2 BM/day   - SBP ppx with Cipro due to low ascitic protein   - trend LFT, RUQ w/o pathology on 3/10  - MRI abdomen at [facility] 12/2022 no HCC  - off diuretic due to hypernatremia.     Problem/Plan - 3:  ·  Problem: Hypernatremia.   ·  Plan: - hold diuretics  - off d5W and FW -- will need to monitor PO intake   - improving with adequate PO intake  - will only obtain labs as clinically indicated.     Problem/Plan - 4:  ·  Problem: Diabetes.   ·  Plan: HbA1c 6.1% likely underestimated due transfusions given insulin needs  - now off TF and tolerating PO -- will need to monitor PO intake  - c/w lantus/lispro  - FSG currently appropriate.     Problem/Plan - 5:  ·  Problem: Thrombocytopenia.   ·  Plan: - suspect likely due to cirrhosis now recovering  - no overt evidence of GIB   -  slightly above normal not indicative of hemolysis, hapto above 20, HIT ab neg, blue top  37  - off heparin  - platelets improving  - s/p 1U plt 3/27 pre para with appropriate response  - SCDs for now, hold ppx with risk of bleed.     Problem/Plan - 6:  ·  Problem: Esophageal varices with bleeding.   ·  Plan: acute blood loss anemia c/b hypovolemic shock s/p levophed and ICU  - Hb 6.9 on admit now s/p 6 units of pRBC, 1 unit FFP, 1 unit of platelets all on 2/24/23, 1u prbc 3/18, 1U plt 3/27  - s/p octreotide x 72h (2/24-2/27)   - s/p EGD 2/24: large (> 5 mm) esophageal varices. Incompletely eradicated. Banded. Normal duodenal bulb, first portion of the duodenum and second portion of the duodenum.                 - repeat EGD in 4 wks for likely repeat banding  - SBP ppx cipro  - TTE with preserved LVEF 53%  - BP acceptable off midodrine   - hold diuretic for now.     Problem/Plan - 7:  ·  Problem: Hypothyroidism.   ·  Plan: TSH 1.40 3/10/23  - c/w synthroid     Problem/Plan - 8:  ·  Problem: Pneumonia due to Pseudomonas.   ·  Plan: - sputum culture 2/27: pseudomonas   - s/p Zosyn (2/27-3/5).     Problem/Plan - 9:  ·  Problem: Hepatitis B.   ·  Plan: - continue w/ tenofovir  - hep B pcr neg.     Problem/Plan - 10:  ·  Problem: Follicular lymphoma.   ·  Plan; - family reports follows with oncology at [facility]; outpt records in chart. Pt is a 66 yo M with PMH HTN, T2D, hypothyroidism, CAD (s/p CABG), TANG (c/b cirrhosis), follicular lymphoma (rituximab), and HBV (tenofovir) p/w CP and constipation with development of hematemesis. Admitted to MICU for hypovolemic shock requiring intubation/sedation for airway protection in setting of UGIB s/p protonix gtt, octreotide gtt, and midodrine (now off). Hepatology following with 2/24 EGD showing esophageal varices s/p banding. Course further c/b persistent encephalopathy, on rifaximin and lactulose; MR brain with c/f hypoxic-ischemic injury. Now on cipro for SBP ppx. s/p paracentesis 3/28 with procedure team, 4.9L output. Repeat S+S 3/29 with recommendation for pureed diet and moderately thick liquids. Planned for DC to home with hospice.      Problem/Plan - 1:  ·  Problem: Metabolic encephalopathy.   ·  Plan: - likely d/t hypoxia/anoxic brain ischemia in s/o hemorrhagic shock on admission/hypernatremia  - MRI brain 3/15 extensive cortical restricted diffusion throughout the cerebral hemispheres including the basal ganglia and insula b/l.   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 EEG (3/14) frequent left posterior quadrant periodic discharges, risk of seizure has decreased compared to EEG from 3/5. No seizures recorded.   - c/w rifaximin 550 mg BID, lactulose BID, repeat ammonia level 25; encephalopathy likely unrelated to HE  - re-evaluated by S+S 3/29 and now recommended for pureed diet with moderately thick liquids -- will monitor PO intake  - 3/24 Palliative care had GOC w/ son and daughter. Likely current scenario is related to anoxic brain injury; explained despite nutrition/correction of electrolytes clinically unimproved. d/w Hepatology in regards to PEG pt is at increased risk of peritonitis. As per hepatology no significant risk of bleeding with paracentesis in cirrhotic with elevated PT/INR and low platelets.   - procedure team following, s/p para 3/28 with 4.9L output.     Problem/Plan - 2:  ·  Problem: Liver cirrhosis secondary to TANG.   ·  Plan: decompensated with ascites, with variceal bleed, with PSE; MELD 12 3/10/23  - s/p diagnostic para (2/25) and therapeutic para (3/5) removed 4.5L  - s/p diag/therapeutic tap on 3/15, removed 3.6L, neg for SBP; Path neg for malignancy   - therapeutic para 3/28 with 4.9L removed  - c/w rifaximin BID, lactulose BID titrate 2 BM/day   - SBP ppx with Cipro due to low ascitic protein   - trend LFT, RUQ w/o pathology on 3/10  - MRI abdomen at [facility] 12/2022 no HCC  - off diuretic due to hypernatremia.  -attempted to place pleurex cath for drainage, but no clear window for placement     Problem/Plan - 3:  ·  Problem: Hypernatremia.   ·  Plan: - hold diuretics  - off d5W and FW -- will need to monitor PO intake   - improving with adequate PO intake  - will only obtain labs as clinically indicated.     Problem/Plan - 4:  ·  Problem: Diabetes.   ·  Plan: HbA1c 6.1% likely underestimated due transfusions given insulin needs  - now off TF and tolerating PO -- will need to monitor PO intake  - c/w lantus/lispro  - FSG currently appropriate.     Problem/Plan - 5:  ·  Problem: Thrombocytopenia.   ·  Plan: - suspect likely due to cirrhosis now recovering  - no overt evidence of GIB   -  slightly above normal not indicative of hemolysis, hapto above 20, HIT ab neg, blue top  37  - off heparin  - platelets improving  - s/p 1U plt 3/27 pre para with appropriate response  - SCDs for now, hold ppx with risk of bleed.     Problem/Plan - 6:  ·  Problem: Esophageal varices with bleeding.   ·  Plan: acute blood loss anemia c/b hypovolemic shock s/p levophed and ICU  - Hb 6.9 on admit now s/p 6 units of pRBC, 1 unit FFP, 1 unit of platelets all on 2/24/23, 1u prbc 3/18, 1U plt 3/27  - s/p octreotide x 72h (2/24-2/27)   - s/p EGD 2/24: large (> 5 mm) esophageal varices. Incompletely eradicated. Banded. Normal duodenal bulb, first portion of the duodenum and second portion of the duodenum.                 - repeat EGD in 4 wks for likely repeat banding  - SBP ppx cipro  - TTE with preserved LVEF 53%  - BP acceptable off midodrine   - hold diuretic for now.     Problem/Plan - 7:  ·  Problem: Hypothyroidism.   ·  Plan: TSH 1.40 3/10/23  - c/w synthroid     Problem/Plan - 8:  ·  Problem: Pneumonia due to Pseudomonas.   ·  Plan: - sputum culture 2/27: pseudomonas   - s/p Zosyn (2/27-3/5).     Problem/Plan - 9:  ·  Problem: Hepatitis B.   ·  Plan: - continue w/ tenofovir  - hep B pcr neg.     Problem/Plan - 10:  ·  Problem: Follicular lymphoma.   ·  Plan; - family reports follows with oncology at [facility]; outpt records in chart.    -Pt's family opted hospice, accepted to hospice, stable for dc home with hospice Pt is a 66 yo M with PMH HTN, T2D, hypothyroidism, CAD (s/p CABG), TANG (c/b cirrhosis), follicular lymphoma (rituximab), and HBV (tenofovir) p/w CP and constipation with development of hematemesis. Admitted to MICU for hypovolemic shock requiring intubation/sedation for airway protection in setting of UGIB s/p protonix gtt, octreotide gtt, and midodrine (now off). Hepatology following with 2/24 EGD showing esophageal varices s/p banding. Course further c/b persistent encephalopathy, on rifaximin and lactulose; MR brain with c/f hypoxic-ischemic injury. Now on cipro for SBP ppx. s/p paracentesis 3/28 with procedure team, 4.9L output. Repeat S+S 3/29 with recommendation for pureed diet and moderately thick liquids. Planned for DC to home with hospice.      Problem/Plan - 1:  ·  Problem: Metabolic encephalopathy.   ·  Plan: - likely d/t hypoxia/anoxic brain ischemia in s/o hemorrhagic shock on admission/hypernatremia  - MRI brain 3/15 extensive cortical restricted diffusion throughout the cerebral hemispheres including the basal ganglia and insula b/l.   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 EEG (3/14) frequent left posterior quadrant periodic discharges, risk of seizure has decreased compared to EEG from 3/5. No seizures recorded.   - c/w rifaximin 550 mg BID, lactulose BID, repeat ammonia level 25; encephalopathy likely unrelated to HE  - re-evaluated by S+S 3/29 and now recommended for pureed diet with moderately thick liquids -- will monitor PO intake  - 3/24 Palliative care had GOC w/ son and daughter. Likely current scenario is related to anoxic brain injury; explained despite nutrition/correction of electrolytes clinically unimproved. d/w Hepatology in regards to PEG pt is at increased risk of peritonitis. As per hepatology no significant risk of bleeding with paracentesis in cirrhotic with elevated PT/INR and low platelets.   - procedure team following, s/p para 3/28 with 4.9L output.     Problem/Plan - 2:  ·  Problem: Liver cirrhosis secondary to TANG.   ·  Plan: decompensated with ascites, with variceal bleed, with PSE; MELD 12 3/10/23  - s/p diagnostic para (2/25) and therapeutic para (3/5) removed 4.5L  - s/p diag/therapeutic tap on 3/15, removed 3.6L, neg for SBP; Path neg for malignancy   - therapeutic para 3/28 with 4.9L removed  - c/w rifaximin BID, lactulose BID titrate 2 BM/day   - SBP ppx with Cipro due to low ascitic protein   - trend LFT, RUQ w/o pathology on 3/10  - MRI abdomen at [facility] 12/2022 no HCC  - off diuretic due to hypernatremia.  -attempted to place pleurex cath for drainage, but no clear window for placement     Problem/Plan - 3:  ·  Problem: Hypernatremia.   ·  Plan: - hold diuretics  - off d5W and FW -- will need to monitor PO intake   - improving with adequate PO intake  - will only obtain labs as clinically indicated.     Problem/Plan - 4:  ·  Problem: Diabetes.   ·  Plan: HbA1c 6.1% likely underestimated due transfusions given insulin needs  - now off TF and tolerating PO -- will need to monitor PO intake  - c/w lantus/lispro  - FSG currently appropriate.     Problem/Plan - 5:  ·  Problem: Thrombocytopenia.   ·  Plan: - suspect likely due to cirrhosis now recovering  - no overt evidence of GIB   -  slightly above normal not indicative of hemolysis, hapto above 20, HIT ab neg, blue top  37  - off heparin  - platelets improving  - s/p 1U plt 3/27 pre para with appropriate response  - SCDs for now, hold ppx with risk of bleed.     Problem/Plan - 6:  ·  Problem: Esophageal varices with bleeding.   ·  Plan: acute blood loss anemia c/b hypovolemic shock s/p levophed and ICU  - Hb 6.9 on admit now s/p 6 units of pRBC, 1 unit FFP, 1 unit of platelets all on 2/24/23, 1u prbc 3/18, 1U plt 3/27  - s/p octreotide x 72h (2/24-2/27)   - s/p EGD 2/24: large (> 5 mm) esophageal varices. Incompletely eradicated. Banded. Normal duodenal bulb, first portion of the duodenum and second portion of the duodenum.                 - repeat EGD in 4 wks for likely repeat banding  - SBP ppx cipro  - TTE with preserved LVEF 53%  - started back on midodrine as pt is hypotensive  - hold diuretic for now.     Problem/Plan - 7:  ·  Problem: Hypothyroidism.   ·  Plan: TSH 1.40 3/10/23  - c/w synthroid     Problem/Plan - 8:  ·  Problem: Pneumonia due to Pseudomonas.   ·  Plan: - sputum culture 2/27: pseudomonas   - s/p Zosyn (2/27-3/5).     Problem/Plan - 9:  ·  Problem: Hepatitis B.   ·  Plan: - continue w/ tenofovir  - hep B pcr neg.     Problem/Plan - 10:  ·  Problem: Follicular lymphoma.   ·  Plan; - family reports follows with oncology at [facility]; outpt records in chart.    -Pt's family opted hospice, accepted to hospice, stable for dc home with hospice

## 2023-03-12 NOTE — DISCHARGE NOTE PROVIDER - NSDCMRMEDTOKEN_GEN_ALL_CORE_FT
aspirin 81 mg oral tablet: 1 tab(s) orally once a day  Atarax 25 mg oral tablet: 2 tab(s) orally once a day (at bedtime), As Needed  Flomax 0.4 mg oral capsule: 1 cap(s) orally 2 times a day  gabapentin 300 mg oral tablet: 1 tab(s) orally once a day  Januvia 50 mg oral tablet: 1 tab(s) orally once a day  Lasix 40 mg oral tablet: 1 tab(s) orally once a day  Multiple Vitamins oral tablet: 1 tab(s) orally once a day  nadolol 20 mg oral tablet: 1 tab(s) orally once a day  omeprazole 20 mg oral delayed release capsule: 1 cap(s) orally once a day  pioglitazone 15 mg oral tablet: 1 tab(s) orally once a day  rosuvastatin 40 mg oral tablet: 1 tab(s) orally once a day  simethicone 125 mg oral capsule: 1 tab(s) orally 2 times a day, As Needed  spironolactone 50 mg oral tablet: 1 tab(s) orally once a day  Synthroid 100 mcg (0.1 mg) oral tablet: 1 tab(s) orally once a day  tenofovir: 1 tab(s) orally every 48 hours  traZODone 100 mg oral tablet: 1 tab(s) orally once a day (at bedtime)  ursodiol 300 mg oral capsule: 1 cap(s) orally once a day   aspirin 81 mg oral tablet: 1 tab(s) orally once a day  Atarax 25 mg oral tablet: 2 tab(s) orally once a day (at bedtime), As Needed  ciprofloxacin 500 mg oral tablet: 1 tab(s) orally every 24 hours  Flomax 0.4 mg oral capsule: 1 cap(s) orally 2 times a day  gabapentin 300 mg oral tablet: 1 tab(s) orally once a day  Januvia 50 mg oral tablet: 1 tab(s) orally once a day  Lasix 40 mg oral tablet: 1 tab(s) orally once a day  Multiple Vitamins oral tablet: 1 tab(s) orally once a day  nadolol 20 mg oral tablet: 1 tab(s) orally once a day  omeprazole 20 mg oral delayed release capsule: 1 cap(s) orally once a day  pantoprazole 40 mg oral delayed release tablet: 1 tab(s) orally once a day (before a meal)  pioglitazone 15 mg oral tablet: 1 tab(s) orally once a day  rosuvastatin 40 mg oral tablet: 1 tab(s) orally once a day  simethicone 125 mg oral capsule: 1 tab(s) orally 2 times a day, As Needed  spironolactone 50 mg oral tablet: 1 tab(s) orally once a day  Synthroid 100 mcg (0.1 mg) oral tablet: 1 tab(s) orally once a day  tenofovir: 1 tab(s) orally every 48 hours  traZODone 100 mg oral tablet: 1 tab(s) orally once a day (at bedtime)  ursodiol 300 mg oral capsule: 1 cap(s) orally once a day   aspirin 81 mg oral tablet: 1 tab(s) orally once a day  Atarax 25 mg oral tablet: 2 tab(s) orally once a day (at bedtime), As Needed  ciprofloxacin 500 mg oral tablet: 1 tab(s) orally every 24 hours  Flomax 0.4 mg oral capsule: 1 cap(s) orally 2 times a day  gabapentin 300 mg oral tablet: 1 tab(s) orally once a day  Januvia 50 mg oral tablet: 1 tab(s) orally once a day  lactulose 10 g/15 mL oral syrup: 30 milliliter(s) orally 2 times a day  Lasix 40 mg oral tablet: 1 tab(s) orally once a day  midodrine 5 mg oral tablet: 1 tab(s) orally 3 times a day  Multiple Vitamins oral tablet: 1 tab(s) orally once a day  nadolol 20 mg oral tablet: 1 tab(s) orally once a day  omeprazole 20 mg oral delayed release capsule: 1 cap(s) orally once a day  pantoprazole 40 mg oral delayed release tablet: 1 tab(s) orally once a day (before a meal)  pioglitazone 15 mg oral tablet: 1 tab(s) orally once a day  rifAXIMin 550 mg oral tablet: 1 tab(s) orally 2 times a day  simethicone 125 mg oral capsule: 1 tab(s) orally 2 times a day, As Needed  spironolactone 50 mg oral tablet: 1 tab(s) orally once a day  Synthroid 100 mcg (0.1 mg) oral tablet: 1 tab(s) orally once a day  tenofovir: 1 tab(s) orally every 48 hours  tenofovir disoproxil fumarate 300 mg oral tablet: 1 tab(s) orally once a day  traZODone 100 mg oral tablet: 1 tab(s) orally once a day (at bedtime)  ursodiol 300 mg oral capsule: 1 cap(s) orally once a day   ciprofloxacin 500 mg oral tablet: 1 tab(s) orally every 24 hours  Januvia 50 mg oral tablet: 1 tab(s) orally once a day  lactulose 10 g/15 mL oral syrup: 30 milliliter(s) orally 2 times a day  midodrine 5 mg oral tablet: 1 tab(s) orally 3 times a day  Multiple Vitamins oral tablet: 1 tab(s) orally once a day  pantoprazole 40 mg oral delayed release tablet: 1 tab(s) orally once a day (before a meal)  rifAXIMin 550 mg oral tablet: 1 tab(s) orally 2 times a day  Synthroid 100 mcg (0.1 mg) oral tablet: 1 tab(s) orally once a day  tenofovir disoproxil fumarate 300 mg oral tablet: 1 tab(s) orally once a day

## 2023-03-12 NOTE — PROGRESS NOTE ADULT - SUBJECTIVE AND OBJECTIVE BOX
Patient is a 67y old  Male who presents with a chief complaint of UGIB    SUBJECTIVE / OVERNIGHT EVENTS:    Unable to report ROS 2/2 not speaking, MS  son bedside, states he was a little more awake last night, did not speak but gave him thumbs     MEDICATIONS  (STANDING):  chlorhexidine 2% Cloths 1 Application(s) Topical <User Schedule>  coronavirus bivalent (EUA) Booster Vaccine (PFIZER) 0.3 milliLiter(s) IntraMuscular once  dextrose 50% Injectable 25 Gram(s) IV Push once  dextrose 50% Injectable 12.5 Gram(s) IV Push once  dextrose 50% Injectable 25 Gram(s) IV Push once  furosemide    Tablet 20 milliGRAM(s) Oral daily  insulin lispro (ADMELOG) corrective regimen sliding scale   SubCutaneous every 6 hours  insulin NPH human recombinant 13 Unit(s) SubCutaneous every 6 hours  lactulose Syrup 10 Gram(s) Oral three times a day  levothyroxine Injectable 75 MICROGram(s) IV Push at bedtime  pantoprazole  Injectable 40 milliGRAM(s) IV Push daily  rifAXIMin 550 milliGRAM(s) Oral two times a day  spironolactone 50 milliGRAM(s) Oral daily  tenofovir disoproxil fumarate (VIREAD) 300 milliGRAM(s) Oral daily  thiamine IVPB 500 milliGRAM(s) IV Intermittent every 8 hours  trimethoprim  160 mG/sulfamethoxazole 800 mG 1 Tablet(s) Oral daily    MEDICATIONS  (PRN):  dextrose Oral Gel 15 Gram(s) Oral once PRN Blood Glucose LESS THAN 70 milliGRAM(s)/deciliter  LORazepam   Injectable 1.5 milliGRAM(s) IV Push once PRN pre-MRI    T(C): 37.1 (03-12-23 @ 10:39), Max: 37.3 (03-12-23 @ 06:40)  HR: 97 (03-12-23 @ 10:39) (90 - 98)  BP: 140/82 (03-12-23 @ 10:39) (138/84 - 150/69)  RR: 18 (03-12-23 @ 10:39) (18 - 18)  SpO2: 100% (03-12-23 @ 10:39) (98% - 100%)    CAPILLARY BLOOD GLUCOSE  POCT Blood Glucose.: 242 mg/dL (12 Mar 2023 11:44)  POCT Blood Glucose.: 229 mg/dL (12 Mar 2023 05:53)  POCT Blood Glucose.: 223 mg/dL (11 Mar 2023 22:48)  POCT Blood Glucose.: 240 mg/dL (11 Mar 2023 17:23)    I&O's Summary    11 Mar 2023 06:01  -  12 Mar 2023 07:00  --------------------------------------------------------  IN: 630 mL / OUT: 2800 mL / NET: -2170 mL    PHYSICAL EXAM:  GENERAL: ill-appearing, thin  HEENT: MMM dry, +scleral icterus   CHEST/LUNG: Clear to auscultation bilaterally; No wheeze  HEART: Regular rate and rhythm; No murmurs, rubs, or gallops  ABDOMEN: Soft, Nontender, mild distended, no r/g; Bowel sounds present, ? para site leaking with ostomy bag over  EXTREMITIES:   SCD, thigh edema   PSYCH: eyes open, will not follow commands or speak  NEUROLOGY: passive ROM, pushes back with passive ROM of arms   +mcconnell  +NGT    LABS:                        11.6   14.22 )-----------( 215      ( 12 Mar 2023 05:47 )             37.9     03-12    143  |  114<H>  |  36<H>  ----------------------------<  247<H>  3.4<L>   |  16<L>  |  0.92    Ca    8.8      12 Mar 2023 05:47  Phos  2.9     03-11    TPro  6.1  /  Alb  2.7<L>  /  TBili  1.5<H>  /  DBili  x   /  AST  76<H>  /  ALT  63<H>  /  AlkPhos  516<H>  03-12    Microbiology: Culture Results:   No growth at 5 days (03-05 @ 18:34)    Care Discussed with Consultants/Other Providers:   Patient is a 67y old  Male who presents with a chief complaint of UGIB    SUBJECTIVE / OVERNIGHT EVENTS:    Unable to report ROS 2/2 not speaking, MS  son bedside, states he was a little more awake last night, did not speak but gave him thumbs up, currently for me same as yesterday, looks when touched, smiles? but does not answer or follow commands     MEDICATIONS  (STANDING):  chlorhexidine 2% Cloths 1 Application(s) Topical <User Schedule>  coronavirus bivalent (EUA) Booster Vaccine (PFIZER) 0.3 milliLiter(s) IntraMuscular once  furosemide    Tablet 20 milliGRAM(s) Oral daily  insulin lispro (ADMELOG) corrective regimen sliding scale   SubCutaneous every 6 hours  insulin NPH human recombinant 13 Unit(s) SubCutaneous every 6 hours  lactulose Syrup 10 Gram(s) Oral three times a day  levothyroxine Injectable 75 MICROGram(s) IV Push at bedtime  pantoprazole  Injectable 40 milliGRAM(s) IV Push daily  rifAXIMin 550 milliGRAM(s) Oral two times a day  spironolactone 50 milliGRAM(s) Oral daily  tenofovir disoproxil fumarate (VIREAD) 300 milliGRAM(s) Oral daily  thiamine IVPB 500 milliGRAM(s) IV Intermittent every 8 hours  trimethoprim  160 mG/sulfamethoxazole 800 mG 1 Tablet(s) Oral daily    MEDICATIONS  (PRN):  dextrose Oral Gel 15 Gram(s) Oral once PRN Blood Glucose LESS THAN 70 milliGRAM(s)/deciliter  LORazepam   Injectable 1.5 milliGRAM(s) IV Push once PRN pre-MRI    T(C): 37.1 (03-12-23 @ 10:39), Max: 37.3 (03-12-23 @ 06:40)  HR: 97 (03-12-23 @ 10:39) (90 - 98)  BP: 140/82 (03-12-23 @ 10:39) (138/84 - 150/69)  RR: 18 (03-12-23 @ 10:39) (18 - 18)  SpO2: 100% (03-12-23 @ 10:39) (98% - 100%)    CAPILLARY BLOOD GLUCOSE  POCT Blood Glucose.: 242 mg/dL (12 Mar 2023 11:44)  POCT Blood Glucose.: 229 mg/dL (12 Mar 2023 05:53)  POCT Blood Glucose.: 223 mg/dL (11 Mar 2023 22:48)  POCT Blood Glucose.: 240 mg/dL (11 Mar 2023 17:23)    I&O's Summary    11 Mar 2023 06:01  -  12 Mar 2023 07:00  --------------------------------------------------------  IN: 630 mL / OUT: 2800 mL / NET: -2170 mL    PHYSICAL EXAM:  GENERAL: ill-appearing, thin  HEENT: MMM dry, +scleral icterus   CHEST/LUNG: Clear to auscultation bilaterally; No wheeze  HEART: Regular rate and rhythm; No murmurs, rubs, or gallops  ABDOMEN: Soft, Nontender, mild distended, no r/g; Bowel sounds present, ? para site leaking with ostomy bag over  EXTREMITIES:   SCD, thigh edema   PSYCH: eyes open, will not follow commands or speak  NEUROLOGY: passive ROM, pushes back with passive ROM of arms   +mcconnell  +NGT    LABS:                        11.6   14.22 )-----------( 215      ( 12 Mar 2023 05:47 )             37.9     03-12    143  |  114<H>  |  36<H>  ----------------------------<  247<H>  3.4<L>   |  16<L>  |  0.92    Ca    8.8      12 Mar 2023 05:47  Phos  2.9     03-11    TPro  6.1  /  Alb  2.7<L>  /  TBili  1.5<H>  /  DBili  x   /  AST  76<H>  /  ALT  63<H>  /  AlkPhos  516<H>  03-12    Microbiology: Culture Results:   No growth at 5 days (03-05 @ 18:34)    Care Discussed with Consultants/Other Providers:

## 2023-03-12 NOTE — DISCHARGE NOTE PROVIDER - NSDCFUADDAPPT_GEN_ALL_CORE_FT
Wound Care  Wound Care   Left hip: Cleanse with NS, pat dry. Cover with small silicone foam with border. Change every 3 days and PRN if soiled. Monitor for tissue type changes.     Sacrum to b/l buttocks: Cleanse with skin cleanser, pat dry. Apply a thin layer of TRIAD paste/hydrophilic dressing twice a day and PRN with incontinent episodes. With episodes of incontinence gently remove soiled layer of Triad, then reinforce with thin layer.       patient can follow up outpatient with Hepatology -  [phone] ([facility]-[address] [place] [address])

## 2023-03-12 NOTE — DISCHARGE NOTE PROVIDER - NSDCCPGOAL_GEN_ALL_CORE_FT
Patient called requesting a refill of Trulicity to be sent to [facility] in [place]. Patient states it was denied and he does not understand why because he has been taking it forever.   
To get better and follow your care plan as instructed.

## 2023-03-12 NOTE — PROGRESS NOTE ADULT - SUBJECTIVE AND OBJECTIVE BOX
Hepatology Progress Note    Interval Events:   Subjective still limited given underlying mentlal status. Pt with >8 BMs yesterday with no asterxisis on exam.     Allergies:  [This allergen will not trigger allergy alert] &quot;lentils&quot;-&quot;itchiness&quot; (Other)  Beef (Other)  No Known Drug Allergies      Hospital Medications:  chlorhexidine 2% Cloths 1 Application(s) Topical <User Schedule>  coronavirus bivalent (EUA) Booster Vaccine (PFIZER) 0.3 milliLiter(s) IntraMuscular once  dextrose 50% Injectable 25 Gram(s) IV Push once  dextrose 50% Injectable 12.5 Gram(s) IV Push once  dextrose 50% Injectable 25 Gram(s) IV Push once  dextrose Oral Gel 15 Gram(s) Oral once PRN  furosemide    Tablet 20 milliGRAM(s) Oral daily  insulin lispro (ADMELOG) corrective regimen sliding scale   SubCutaneous every 6 hours  insulin NPH human recombinant 13 Unit(s) SubCutaneous every 6 hours  lactulose Syrup 30 Gram(s) Oral every 4 hours  levothyroxine Injectable 75 MICROGram(s) IV Push at bedtime  LORazepam   Injectable 1.5 milliGRAM(s) IV Push once PRN  pantoprazole  Injectable 40 milliGRAM(s) IV Push daily  rifAXIMin 550 milliGRAM(s) Oral two times a day  spironolactone 50 milliGRAM(s) Oral daily  tenofovir disoproxil fumarate (VIREAD) 300 milliGRAM(s) Oral daily  thiamine IVPB 500 milliGRAM(s) IV Intermittent every 8 hours  trimethoprim  160 mG/sulfamethoxazole 800 mG 1 Tablet(s) Oral daily      ROS: 14 point ROS negative unless otherwise state in subjective    PHYSICAL EXAM:   Vital Signs:  Vital Signs Last 24 Hrs  T(C): 37.1 (12 Mar 2023 10:39), Max: 37.3 (12 Mar 2023 06:40)  T(F): 98.7 (12 Mar 2023 10:39), Max: 99.1 (12 Mar 2023 06:40)  HR: 97 (12 Mar 2023 10:39) (90 - 98)  BP: 140/82 (12 Mar 2023 10:39) (138/84 - 150/69)  BP(mean): --  RR: 18 (12 Mar 2023 10:39) (18 - 18)  SpO2: 100% (12 Mar 2023 10:39) (98% - 100%)    Parameters below as of 12 Mar 2023 10:39  Patient On (Oxygen Delivery Method): room air      GENERAL: Staring in space  HEENT:  NCAT, no scleral icterus  CHEST: no resp distress  HEART:  RRR  ABDOMEN:  Soft, non-tender, non-distended, normoactive bowel sounds,  EXTREMITIES:  Trace to 1+ edema b/l   NEURO:  Alert and oriented x 0, no astercsis     LABS:                        11.6   14.22 )-----------( 215      ( 12 Mar 2023 05:47 )             37.9     Mean Cell Volume: 95.2 fL (03-12-23 @ 05:47)    03-12    143  |  114<H>  |  36<H>  ----------------------------<  247<H>  3.4<L>   |  16<L>  |  0.92    Ca    8.8      12 Mar 2023 05:47  Phos  2.9     03-11    TPro  6.1  /  Alb  2.7<L>  /  TBili  1.5<H>  /  DBili  x   /  AST  76<H>  /  ALT  63<H>  /  AlkPhos  516<H>  03-12    LIVER FUNCTIONS - ( 12 Mar 2023 05:47 )  Alb: 2.7 g/dL / Pro: 6.1 g/dL / ALK PHOS: 516 U/L / ALT: 63 U/L / AST: 76 U/L / GGT: x             Imaging:  < from: Upper Endoscopy (02.24.23 @ 14:40) >                                                                                  Impression:          - Large (> 5 mm) esophageal varices. Incompletely                        eradicated. Banded.                       - Clotted blood in the stomach.                       - Normal duodenal bulb, first portion of the duodenum                        and second portion of the duodenum.                       - No specimens collected.                       - Suspect UGIB 2/2 EV  Recommendation:      - Oct gtt x 5 days                       - PPI ggt                       - CTX 1g daily                       - Trend CBC q8-12hr, transfuse Hb < 7                       - Active T&S                      - NPO                       - Please keep patient intubated today                       - Please do not insert OGT                       - If significant on-going GI bleeding with acute blood                        loss anemia / hemodynamic instability, may need IR                        consultation for further management of variceal bleeding    < end of copied text >  < from: US Abdomen Upper Quadrant Right (03.10.23 @ 18:18) >    IMPRESSION:  Cirrhosis. Limited visualization of the liver.    Moderate amount of ascites.    < end of copied text >

## 2023-03-12 NOTE — DISCHARGE NOTE PROVIDER - NSDCFUADDINST_GEN_ALL_CORE_FT
Topical recommendations:     Nasogastric Tube- Cleanse nare with NS. Pat dry. Apply Liquid barrier film to periwound skin. Apply Stat-lock NGT carcamo over center of nare, not touch any skin circumferentially. Change every three days or prn if soiled or compromised.     LLQ abdomen - Cleanse with NS, pat dry. Apply Liquid barrier film to periwound skin (allow to dry). Apply male urinary pouch to contain drainage from paracentesis site, change every 3 days and PRN if compromised.     Left hip, right posterior ankle/achilles - Cleanse with NS, pat dry. Cover with small silicone foam with border. Change every 3 days and PRN if soiled. Monitor for tissue type changes.     Sacrum - Cleanse with skin cleanser, pat dry. Apply Escobar moisture barrier cream twice a day and PRN with incontinent episodes.     Continue low air loss bed therapy, continue heel elevation, continue to turn & reposition per protocol, soft pillow between bony prominences, continue moisture management with barrier creams & single breathable pad, continue measures to decrease friction/shear/pressure.    Pureed Diet with moderately Thickened Liquid  Glucerna shake 1 can orally Two Times a day     Continue low air loss bed therapy, continue heel elevation, continue to turn & reposition per protocol, soft pillow between bony prominences, continue moisture management with barrier creams & single breathable pad, continue measures to decrease friction/shear/pressure.

## 2023-03-12 NOTE — DISCHARGE NOTE PROVIDER - NSDCCPCAREPLAN_GEN_ALL_CORE_FT
PRINCIPAL DISCHARGE DIAGNOSIS  Diagnosis: Upper gastrointestinal bleeding  Assessment and Plan of Treatment: You had a massive bleed due to rupture of esophageal varices.  You required 6 blood transfusion as was as platelet and plasma.  You had endoscopy and these vessles were banded.  You will need a repeat endoscopy in 4 weeks.      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    Diagnosis: NANCI (acute kidney injury)  Assessment and Plan of Treatment: Your kidney function was transiently elevated but it improved.    Diagnosis: Hypothyroidism  Assessment and Plan of Treatment: Continue to take your thyroid medications.  Follow-up with your primary care.    Diagnosis: Pneumonia due to Pseudomonas  Assessment and Plan of Treatment: You completed the antibiotics called zosyn for 7 days for this.    Diagnosis: Follicular lymphoma  Assessment and Plan of Treatment: Please follow-up with your oncologist as an outpatient.     PRINCIPAL DISCHARGE DIAGNOSIS  Diagnosis: Upper gastrointestinal bleeding  Assessment and Plan of Treatment: While in the hospital, you had an upper GI bleed and had a scope done by gastroenterology with findings of varices that were banded. You should follow up with GI on discharge. Please do not take medications like ibuprofen, blood thinners, or aspirin, as these all increase risk of bleeding.      SECONDARY DISCHARGE DIAGNOSES  Diagnosis: Metabolic encephalopathy  Assessment and Plan of Treatment: While in the hospital had altered mental status. Brain imaging showed areas of decreased blood flow, which likely caused your symptoms.    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    Diagnosis: Hypothyroidism  Assessment and Plan of Treatment: Continue to take your thyroid medications.  Follow-up with your primary care.    Diagnosis: Pneumonia due to Pseudomonas  Assessment and Plan of Treatment: You completed the antibiotics called zosyn for 7 days for this.    Diagnosis: Follicular lymphoma  Assessment and Plan of Treatment: Please follow-up with your oncologist as an outpatient.     PRINCIPAL DISCHARGE DIAGNOSIS  Diagnosis: Upper gastrointestinal bleeding  Assessment and Plan of Treatment: While in the hospital, you had an upper GI bleed and had a scope done by gastroenterology with findings of varices that were banded. You should follow up with GI on discharge. Please do not take medications like ibuprofen, blood thinners, or aspirin, as these all increase risk of bleeding.      SECONDARY DISCHARGE DIAGNOSES  Diagnosis: Metabolic encephalopathy  Assessment and Plan of Treatment: While in the hospital had altered mental status. Brain imaging showed areas of decreased blood flow, which likely caused your symptoms.    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  Please Schedule an appointment in 1 week, the hepatologist will determine and coordinate when you need more fluid removal/ pleurex Catheter    Diagnosis: Hypothyroidism  Assessment and Plan of Treatment: Continue to take your thyroid medications.  Follow-up with your primary care.    Diagnosis: Pneumonia due to Pseudomonas  Assessment and Plan of Treatment: You completed the antibiotics called zosyn for 7 days for this.    Diagnosis: Follicular lymphoma  Assessment and Plan of Treatment: Please follow-up with your oncologist as an outpatient.     PRINCIPAL DISCHARGE DIAGNOSIS  Diagnosis: Upper gastrointestinal bleeding  Assessment and Plan of Treatment: While in the hospital, you had an upper GI bleed and had a scope done by gastroenterology with findings of varices that were banded. You should follow up with GI on discharge. Please do not take medications like ibuprofen, blood thinners, or aspirin, as these all increase risk of bleeding.      SECONDARY DISCHARGE DIAGNOSES  Diagnosis: Metabolic encephalopathy  Assessment and Plan of Treatment: While in the hospital had altered mental status. Brain imaging showed areas of decreased blood flow, which likely caused your symptoms.    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  Please Schedule an appointment in 1 week, the hepatologist will determine and coordinate when you need more fluid removal/ pleurex Catheter    Diagnosis: Hypothyroidism  Assessment and Plan of Treatment: Continue to take your thyroid medications.  Follow-up with your primary care.    Diagnosis: Pneumonia due to Pseudomonas  Assessment and Plan of Treatment: You completed the antibiotics called zosyn for 7 days for this.    Diagnosis: Follicular lymphoma  Assessment and Plan of Treatment: Please follow-up with your oncologist as an outpatient.    Diagnosis: Urinary retention  Assessment and Plan of Treatment: You had a mcconnell placed due to Urinary Retention   Please Follow up at the [facility]   Please Call to schedule a follow up appointment in 2-4 weeks

## 2023-03-12 NOTE — PROGRESS NOTE ADULT - PROBLEM SELECTOR PLAN 7
HbA1c 6.1% likely underestimated due transfusions  - c/w NPH 11u q6hrs  - c/w TF Glucerna HbA1c 6.1% likely underestimated due transfusions  - c/w NPH 13 units q6h   - c/w TF Glucerna HbA1c 6.1% likely underestimated due transfusions given insulin needs  - BG above goal   - NPH 15 units q6h   - c/w TF Glucerna

## 2023-03-12 NOTE — PROGRESS NOTE ADULT - PROBLEM SELECTOR PLAN 1
- off sedation  - CTH (3/4/23): no acute pathology  - EEG (3/5/23): Abnormal EEG study.  Potential epileptogenic focus in the left posterior head region. Structural of functional abnormality in the left posterior head region. Moderate nonspecific diffuse or multifocal cerebral dysfunction. No seizure seen.--will repeat now that off sedation  - c/w rifaximin 550 mg BID, increase lactulose 30g q4h per liver team as still believe this is PSE, stool count (6 BM on 3/11 and 2 3/12)  - paracentesis x 2 neg for SBP  - failed S&S, c/w NGT feeds, aspiration precautions  - TSH wnl  - trial of IV thiamine  - check MRI, will need to coordinate and hold TF prior  - if no improvement with above will c/s neuro - off sedation  - CTH (3/4/23): no acute pathology  - EEG (3/5/23): Abnormal EEG study.  Potential epileptogenic focus in the left posterior head region. Structural of functional abnormality in the left posterior head region. Moderate nonspecific diffuse or multifocal cerebral dysfunction. No seizure seen.   Repeat EEG now that off sedation  - c/w rifaximin 550 mg BID, c/w lactulose titrate to 3-4 BMs, stool count (6 BM on 3/11 and 2 3/12)  - paracentesis x 2 neg for SBP  - failed S&S, c/w NGT feeds, aspiration precautions  - TSH wnl  - trial of IV thiamine  - check MRI, will need to coordinate and hold TF prior  - if no improvement with above will c/s neuro

## 2023-03-12 NOTE — PROGRESS NOTE ADULT - PROBLEM SELECTOR PLAN 2
acute blood loss anemia c/b hypovolemic shock s/p levophed and ICU  - Hb 6.9 on admit now s/p 6 units of pRBC, 1 unit FFP, 1 unit of platelets all on 2/24/23  - s/p octreotide x 72h (2/24-2/27)   - s/p PPI 40 IV BID change to daily per d/w liver   - s/p EGD 2/24: large (> 5 mm) esophageal varices. Incompletely eradicated. Banded. Normal duodenal bulb, first portion of the duodenum and second portion of the duodenum.                 - EGD in 4 wks for likely repeat banding acute blood loss anemia c/b hypovolemic shock s/p levophed and ICU  - Hb 6.9 on admit now s/p 6 units of pRBC, 1 unit FFP, 1 unit of platelets all on 2/24/23  - s/p octreotide x 72h (2/24-2/27)   - s/p PPI 40 IV BID change to daily per liver   - s/p EGD 2/24: large (> 5 mm) esophageal varices. Incompletely eradicated. Banded. Normal duodenal bulb, first portion of the duodenum and second portion of the duodenum.                 - EGD in 4 wks for likely repeat banding

## 2023-03-12 NOTE — DISCHARGE NOTE PROVIDER - DETAILS OF MALNUTRITION DIAGNOSIS/DIAGNOSES
This patient has been assessed with a concern for Malnutrition and was treated during this hospitalization for the following Nutrition diagnosis/diagnoses:     -  02/28/2023: Severe protein-calorie malnutrition

## 2023-03-12 NOTE — PROGRESS NOTE ADULT - PROBLEM SELECTOR PLAN 3
decompensated with ascites, with variceal bleed, with PSE; MELD 12 3/10/23  - s/p diagnostic para (2/25) and therapeutic para (3/5) removed 4.5L  - c/w rifaximin BID, lactulose q4h, monitor BMs   - ceftriaxone 1g daily for SBP ppx, d/w liver, QTc prolonged, can change to bactrim ppx  - per hepatology resume Lasix 20 mg daily and Spironolactone 50 mg daily  - hepatology following  - LFT uptrending, RUQ w/o pathology on 3/10  - MRI abdomen 12/2022 no HCC decompensated with ascites, with variceal bleed, with PSE; MELD 12 3/10/23  - s/p diagnostic para (2/25) and therapeutic para (3/5) removed 4.5L  - c/w rifaximin BID, lactulose q4h, monitor BMs   - SBP ppx with ppx  - per hepatology resume Lasix 20 mg daily and Spironolactone 50 mg daily  - hepatology following  - LFT uptrending, RUQ w/o pathology on 3/10  - MRI abdomen at [facility] 12/2022 no HCC  - liver requesting repeat diagnostic/therapuetic tap, IPT can screen 3/13

## 2023-03-12 NOTE — PROGRESS NOTE ADULT - ASSESSMENT
67M follows at [facility] Hx decompensated TANG cirrhosis (+ascites, +EV -- previously MELD Na 8), CAD s/p CABG s/p PCI (last in 2012 on ASA, now off plavix x2 weeks), newly diagnosed follicular lymphoma (on rituximab), HTN, DM, currently on tenofovir (HBV Core +) presented with right sided chest/abdominal pain with lizzette hematemesis + clots c/b hemorrhagic shock, s/p intubation in MICU, EGD showing large varices s/p banding, unable to completely clear stomach due to clot now transferred to the floor for further care with hospital course c/b persistent AMS     # AMS - Possible component of HE vs. primary neurological issue. Abnormal EEG a couple days prior but confounded due to sedation. Given that pt is still not able to meaningfully respond to commands despite agreesive bowel regimen would consult neurology to asses for any alternative etiolgies     #Hematemesis: s/p EGD 2/24/2023 s/p EVL x6, bleeding likely due to EV, however unable to completely visualize stomach given presence of clotted blood. H/H now stable.   #NANCI (resolved)  #Decompensated TANG cirrhosis: follows at [facility], previously low meld Na 8  --MELD Na: 22  --Ascites: (+) Hx,  continue with home diuretics.Furosemide 20mg, spironolactone 50mg. Noted to have ascites with on U/S would attempt draiange with IR.   --SBP; no Hx. WBC elevated today wih no fevers. Would preform dx para as noted above and repeat infectious workup including CXR, U/A and BCx. Continue with ciprofloxacin 500mg daily for SBP prophylaxis given low ascitic protein  --EV: (+) Hx, no EVB --> is on nadolol at home  --PVT: no Hx  --HCC: no Hx    Recommendations:  -Given that pt is still not able to meaningfully respond to commands despite agreesive bowel regimen would consult neurology to asses for any alternative etiologies   - Repeat dx and tx paracentesis including CXR, U/A and BCx giveing rising wbc count    - c/w Rifaximin and lactulose, please give 10g q4h until having 3 BMs, then can hold for the day - can use the total dose use divided over 3 times for tomorrow  - No plans for repeat EGD at this time  - Continue with ciprofloxacin 500mg daily for SBP prophylaxis given low ascitic protein  - c/w pantoprazole 40 QD  - c/w tenofovir- repeat EGD in 4 weeks for variceal banding  - trend CMP, CBC, coags    All recommendations are tentative until note is attested by attending.     Kashif Peña, PGY-4  Gastroenterology/Hepatology Fellow  Available on [employer] Teams   [phone] (Long Range Pager)  [phone] (Short Range Pager [facility])    After 5pm, please contact the on-call GI fellow. [phone]

## 2023-03-13 NOTE — PROGRESS NOTE ADULT - ASSESSMENT
67M HTN, DM2, hypothyroidism, CAD s/p CABG, TANG cirrhosis, follicular lymphoma (on Rituximab OP), chronic Hep B on tenofovir (HBV Core +), presented to the ED w/ chest pain and constipation while in ED developed hematemesis s/p MICU admission for acute blood loss anemia c/b hypovolemic shock  intubated for airway protection (extubated 3/8) s/p 2/24 bedside EGD with banding esophageal varices now with persistent encephalopathy.

## 2023-03-13 NOTE — PROGRESS NOTE ADULT - PROBLEM SELECTOR PLAN 1
- off sedation  - CTH (3/4/23): no acute pathology  - EEG (3/5/23): Abnormal EEG study.  Potential epileptogenic focus in the left posterior head region. Structural of functional abnormality in the left posterior head region. Moderate nonspecific diffuse or multifocal cerebral dysfunction. No seizure seen.   Repeat EEG now that off sedation  - c/w rifaximin 550 mg BID, c/w lactulose titrate to 3-4 BMs, stool count (6 BM on 3/11 and 2 3/12)  - paracentesis x 2 neg for SBP  - reconsult procedure team for diagnostic/therapeutic paracentesis   - failed S&S, c/w NGT feeds, aspiration precautions  - TSH wnl  - trial of IV thiamine  - pending EEG and MR brain, will need to coordinate and hold TF prior  - CXR no infiltrate, resume TF at 20cc/h, titrate prn  - if no improvement with above will c/s neuro

## 2023-03-13 NOTE — PROGRESS NOTE ADULT - ATTENDING COMMENTS
Patient with Lymphoma and prior HBV now on tenofovir. Patient with cirrhosis and bacteremia now post ICU stay and with abnormal mental status with inattention and lack of engagement.  Now on tube feeding.  Behavior is not typical for hepatic encephalopathy and suggest Neure reconsult to assess possibility of cortical brain injury related to prior severe illness.

## 2023-03-13 NOTE — PROGRESS NOTE ADULT - PROBLEM SELECTOR PLAN 3
decompensated with ascites, with variceal bleed, with PSE; MELD 12 3/10/23  - s/p diagnostic para (2/25) and therapeutic para (3/5) removed 4.5L  - c/w rifaximin BID, lactulose q4h, monitor BMs   - SBP ppx with Cipro  - per hepatology resume Lasix 20 mg daily and Spironolactone 50 mg daily  - hepatology following  - LFT uptrending, RUQ w/o pathology on 3/10  - MRI abdomen [facility] 12/2022 no HCC  - liver requesting repeat diagnostic/therapeutic tap, reconsult IPT 3/13

## 2023-03-13 NOTE — PROGRESS NOTE ADULT - SUBJECTIVE AND OBJECTIVE BOX
Dr. Paulette Carias  Pager [phone]    PROGRESS NOTE:     Patient is a 67y old  Male who presents with a chief complaint of hematemesis  (12 Mar 2023 15:26)      SUBJECTIVE / OVERNIGHT EVENTS: pt remains encephalopathic  ADDITIONAL REVIEW OF SYSTEMS: afebrile , not answering questions     MEDICATIONS  (STANDING):  chlorhexidine 2% Cloths 1 Application(s) Topical <User Schedule>  ciprofloxacin     Tablet 500 milliGRAM(s) Oral every 24 hours  coronavirus bivalent (EUA) Booster Vaccine (PFIZER) 0.3 milliLiter(s) IntraMuscular once  dextrose 50% Injectable 25 Gram(s) IV Push once  dextrose 50% Injectable 12.5 Gram(s) IV Push once  dextrose 50% Injectable 25 Gram(s) IV Push once  furosemide    Tablet 20 milliGRAM(s) Oral daily  insulin lispro (ADMELOG) corrective regimen sliding scale   SubCutaneous every 6 hours  insulin NPH human recombinant 15 Unit(s) SubCutaneous every 6 hours  lactulose Syrup 10 Gram(s) Oral three times a day  levothyroxine Injectable 75 MICROGram(s) IV Push at bedtime  pantoprazole  Injectable 40 milliGRAM(s) IV Push daily  rifAXIMin 550 milliGRAM(s) Oral two times a day  spironolactone 50 milliGRAM(s) Oral daily  tenofovir disoproxil fumarate (VIREAD) 300 milliGRAM(s) Oral daily  thiamine IVPB 500 milliGRAM(s) IV Intermittent every 8 hours    MEDICATIONS  (PRN):  dextrose Oral Gel 15 Gram(s) Oral once PRN Blood Glucose LESS THAN 70 milliGRAM(s)/deciliter  LORazepam   Injectable 1.5 milliGRAM(s) IV Push once PRN pre-MRI      CAPILLARY BLOOD GLUCOSE      POCT Blood Glucose.: 155 mg/dL (13 Mar 2023 11:59)  POCT Blood Glucose.: 204 mg/dL (13 Mar 2023 06:53)  POCT Blood Glucose.: 226 mg/dL (12 Mar 2023 23:14)  POCT Blood Glucose.: 203 mg/dL (12 Mar 2023 21:52)  POCT Blood Glucose.: 215 mg/dL (12 Mar 2023 18:01)  POCT Blood Glucose.: 218 mg/dL (12 Mar 2023 17:36)  POCT Blood Glucose.: 240 mg/dL (12 Mar 2023 16:16)    I&O's Summary    12 Mar 2023 07:01  -  13 Mar 2023 07:00  --------------------------------------------------------  IN: 150 mL / OUT: 950 mL / NET: -800 mL        PHYSICAL EXAM:  Vital Signs Last 24 Hrs  T(C): 36.3 (13 Mar 2023 10:00), Max: 37.1 (13 Mar 2023 07:04)  T(F): 97.4 (13 Mar 2023 10:00), Max: 98.7 (13 Mar 2023 07:04)  HR: 105 (13 Mar 2023 10:00) (99 - 109)  BP: 127/65 (13 Mar 2023 10:00) (120/77 - 141/76)  BP(mean): --  RR: 18 (13 Mar 2023 10:00) (18 - 18)  SpO2: 97% (13 Mar 2023 10:00) (95% - 99%)    Parameters below as of 13 Mar 2023 07:04  Patient On (Oxygen Delivery Method): room air      GENERAL: ill-appearing, thin  HEENT: MMM dry, +scleral icterus ., NGT in place  CHEST/LUNG: Clear to auscultation bilaterally; No wheeze  HEART: Regular rate and rhythm; systolic murmur  ABDOMEN: Soft, Nontender, mild distended, no r/g; Bowel sounds present, ? para site leaking with ostomy bag over  EXTREMITIES:   SCD, thigh edema  mcconnell in place   PSYCH: eyes open, will not follow commands or speak  NEUROLOGY: passive ROM, pushes back with passive ROM of arms     LABS:                        11.9   14.30 )-----------( 247      ( 13 Mar 2023 05:30 )             38.6     03-13    145  |  116<H>  |  44<H>  ----------------------------<  242<H>  3.6   |  16<L>  |  1.03    Ca    8.8      13 Mar 2023 05:30    TPro  6.0  /  Alb  2.6<L>  /  TBili  1.6<H>  /  DBili  x   /  AST  62<H>  /  ALT  54<H>  /  AlkPhos  487<H>  03-13    PT/INR - ( 13 Mar 2023 05:30 )   PT: 18.5 sec;   INR: 1.59 ratio         PTT - ( 13 Mar 2023 05:30 )  PTT:27.7 sec    RADIOLOGY & ADDITIONAL TESTS:  Results Reviewed:   Imaging Personally Reviewed:  < from: Xray Chest 1 View- PORTABLE-Urgent (Xray Chest 1 View- PORTABLE-Urgent .) (03.13.23 @ 03:39) >    IMPRESSION:  No focal consolidation.      Electrocardiogram Personally Reviewed:    COORDINATION OF CARE:  Care Discussed with Consultants/Other Providers [Y/N]: chandu lucas, procedure team to tap ascites, ua  Prior or Outpatient Records Reviewed [Y/N]:

## 2023-03-13 NOTE — CHART NOTE - NSCHARTNOTEFT_GEN_A_CORE
Contacted by RN due to patient's son being concerned of the patient's increased WOB and elevated heart rate of 105. Writer went to the bedside. Patient nonverbally responds to name. Shook head yes when asked if felt okay. Patient does not appear in acute distress. Lungs clear to auscultation. Advised RN to pause tube feeds and maintain aspiration precaution. CXR ordered to rule out aspiration. Will continue to monitor. Contacted by RN due to patient's son being concerned of the patient's increased WOB and elevated heart rate of 105.  Writer went to the bedside. VSS and HR palpated to be 90. Patient nonverbally responds to name. Shook head yes when asked if felt okay. Patient does not appear in acute distress. Lungs clear to auscultation. Advised RN to pause tube feeds and maintain aspiration precaution. CXR ordered to rule out aspiration. Will continue to monitor.

## 2023-03-13 NOTE — PROGRESS NOTE ADULT - ASSESSMENT
67M follows at [facility] Hx decompensated TANG cirrhosis (+ascites, +EV -- previously MELD Na 8), CAD s/p CABG s/p PCI (last in 2012 on ASA, now off plavix x2 weeks), newly diagnosed follicular lymphoma (on rituximab), HTN, DM, currently on tenofovir (HBV Core +) presented with right sided chest/abdominal pain with lizzette hematemesis + clots c/b hemorrhagic shock, s/p intubation in MICU, EGD showing large varices s/p banding, unable to completely clear stomach due to clot now transferred to the floor for further care with hospital course c/b persistent AMS     # AMS - Possible component of HE vs. primary neurological issue. Abnormal EEG a couple days prior but confounded due to sedation. Given that pt is still not able to meaningfully respond to commands despite aggressive bowel regimen would consult neurology to asses for any alternative etiologies  #Hematemesis: s/p EGD 2/24/2023 s/p EVL x6, bleeding likely due to EV, however unable to completely visualize stomach given presence of clotted blood. H/H now stable.   #NANCI (resolved)  #Decompensated TANG cirrhosis: follows at [facility], previously low meld Na 8  --MELD Na: 14  --Ascites: (+) Hx,  continue with home diuretics. Furosemide 20mg, spironolactone 50mg. Noted to have ascites with on U/S would attempt drainage with IR.   --SBP; no Hx. WBC elevated today wih no fevers. Would preform dx para as noted above and repeat infectious workup including CXR, U/A and BCx. Continue with ciprofloxacin 500mg daily for SBP prophylaxis given low ascitic protein  --EV: (+) Hx, no EVB --> is on nadolol at home  --PVT: no Hx  --HCC: no Hx  # HBV core positive - on Tenofovir    Recommendations:  - Given that pt is still not able to meaningfully respond to commands despite aggressive bowel regimen would consult neurology to asses for any alternative etiologies   - Repeat dx and tx paracentesis including CXR, U/A and BCx giving rising wbc count    - c/w Rifaximin and lactulose, titrate for 2-3 BMs per day  - No plans for repeat EGD at this time  - c/w pantoprazole 40 QD  - c/w tenofovir  - repeat EGD in 4 weeks for variceal banding  - trend CMP, CBC, coags    Please note that the recommendations are not final until attested by an attending.    Thank you for involving us in the care of this patient. Please reach out if any further questions.    Ulices Ray, PGY-6  Gastroenterology/Hepatology Fellow    Available on [employer] Teams  After 5PM/Weekends, please contact the on-call GI fellow: [phone]

## 2023-03-13 NOTE — PROGRESS NOTE ADULT - PROBLEM SELECTOR PLAN 7
HbA1c 6.1% likely underestimated due transfusions given insulin needs  - BG above goal   - NPH 15 units q6h   - c/w TF Glucerna

## 2023-03-13 NOTE — PROGRESS NOTE ADULT - SUBJECTIVE AND OBJECTIVE BOX
Interval Events:   - No significant change in mental status, 2 BMs overnight    Allergies:  [This allergen will not trigger allergy alert] &quot;lentils&quot;-&quot;itchiness&quot; (Other)  Beef (Other)  No Known Drug Allergies      Hospital Medications:  chlorhexidine 2% Cloths 1 Application(s) Topical <User Schedule>  coronavirus bivalent (EUA) Booster Vaccine (PFIZER) 0.3 milliLiter(s) IntraMuscular once  dextrose 50% Injectable 25 Gram(s) IV Push once  dextrose 50% Injectable 12.5 Gram(s) IV Push once  dextrose 50% Injectable 25 Gram(s) IV Push once  dextrose Oral Gel 15 Gram(s) Oral once PRN  furosemide    Tablet 20 milliGRAM(s) Oral daily  insulin lispro (ADMELOG) corrective regimen sliding scale   SubCutaneous every 6 hours  insulin NPH human recombinant 15 Unit(s) SubCutaneous every 6 hours  lactulose Syrup 10 Gram(s) Oral three times a day  levothyroxine Injectable 75 MICROGram(s) IV Push at bedtime  LORazepam   Injectable 1.5 milliGRAM(s) IV Push once PRN  pantoprazole  Injectable 40 milliGRAM(s) IV Push daily  rifAXIMin 550 milliGRAM(s) Oral two times a day  spironolactone 50 milliGRAM(s) Oral daily  tenofovir disoproxil fumarate (VIREAD) 300 milliGRAM(s) Oral daily  thiamine IVPB 500 milliGRAM(s) IV Intermittent every 8 hours  trimethoprim  160 mG/sulfamethoxazole 800 mG 1 Tablet(s) Oral daily      PMHX/PSHX:  CAD (coronary artery disease)    Diabetes    Lymphoma    Cirrhosis    S/P CABG x 1        Family history:  No pertinent family history in first degree relatives        ROS: As per HPI, otherwise 14-point ROS reviewed and negative.      PHYSICAL EXAM:   Vital Signs:  Vital Signs Last 24 Hrs  T(C): 37.1 (13 Mar 2023 07:04), Max: 37.1 (12 Mar 2023 10:39)  T(F): 98.7 (13 Mar 2023 07:04), Max: 98.7 (12 Mar 2023 10:39)  HR: 109 (13 Mar 2023 07:04) (97 - 109)  BP: 121/69 (13 Mar 2023 07:04) (120/77 - 141/76)  BP(mean): --  RR: 18 (13 Mar 2023 07:04) (18 - 18)  SpO2: 97% (13 Mar 2023 07:04) (95% - 100%)    Parameters below as of 13 Mar 2023 07:04  Patient On (Oxygen Delivery Method): room air      Daily     Daily       03-12-23 @ 07:01  -  03-13-23 @ 07:00  --------------------------------------------------------  IN: 150 mL / OUT: 950 mL / NET: -800 mL      GENERAL: Staring in space  HEENT:  NCAT, no scleral icterus  CHEST: no resp distress  HEART:  RRR  ABDOMEN:  Soft, non-tender, non-distended, normoactive bowel sounds,  EXTREMITIES:  Trace to 1+ edema b/l   NEURO:  Alert and oriented x 0, no asterixis     LABS:                        11.9   14.30 )-----------( 247      ( 13 Mar 2023 05:30 )             38.6     Mean Cell Volume: 95.8 fL (03-13-23 @ 05:30)    03-13    145  |  116<H>  |  44<H>  ----------------------------<  242<H>  3.6   |  16<L>  |  1.03    Ca    8.8      13 Mar 2023 05:30    TPro  6.0  /  Alb  2.6<L>  /  TBili  1.6<H>  /  DBili  x   /  AST  62<H>  /  ALT  54<H>  /  AlkPhos  487<H>  03-13    LIVER FUNCTIONS - ( 13 Mar 2023 05:30 )  Alb: 2.6 g/dL / Pro: 6.0 g/dL / ALK PHOS: 487 U/L / ALT: 54 U/L / AST: 62 U/L / GGT: x           PT/INR - ( 13 Mar 2023 05:30 )   PT: 18.5 sec;   INR: 1.59 ratio         PTT - ( 13 Mar 2023 05:30 )  PTT:27.7 sec                            11.9   14.30 )-----------( 247      ( 13 Mar 2023 05:30 )             38.6                         11.6   14.22 )-----------( 215      ( 12 Mar 2023 05:47 )             37.9                         10.8   11.31 )-----------( 197      ( 11 Mar 2023 06:43 )             35.4       Imaging:

## 2023-03-13 NOTE — CHART NOTE - NSCHARTNOTEFT_GEN_A_CORE
EEG preliminary read (not final) on the initial recording hour(s) = x 3    Left posterior quadrant LPDs, elevated risk of focal onset seizures from that region  Moderate slowing noted, nonspecific.  Final report to follow tomorrow morning after completion of study.    [facility] EEG Reading Room Ph#: [phone]  Epilepsy Answering Service after 5PM and before 8:30AM: Ph#: [phone]

## 2023-03-13 NOTE — PROGRESS NOTE ADULT - PROBLEM SELECTOR PLAN 2
acute blood loss anemia c/b hypovolemic shock s/p levophed and ICU  - Hb 6.9 on admit now s/p 6 units of pRBC, 1 unit FFP, 1 unit of platelets all on 2/24/23  - s/p octreotide x 72h (2/24-2/27)   - s/p PPI 40 IV BID change to daily per liver   - s/p EGD 2/24: large (> 5 mm) esophageal varices. Incompletely eradicated. Banded. Normal duodenal bulb, first portion of the duodenum and second portion of the duodenum.                 - EGD in 4 wks for likely repeat banding  -SBP ppx, change to Cipro

## 2023-03-14 NOTE — EEG REPORT - NS EEG TEXT BOX
ABBIE RODRIGUEZ [facility]-[id number]     Study Date: 03/13/23 17:08 - 03/14/23 08:00   Duration: 14 hr 50 min  --------------------------------------------------------------------------------------------------  History:  CC/ HPI Patient is a 67y old  Male who presents with a chief complaint of encephalopathy (13 Mar 2023 14:45)    MEDICATIONS  (STANDING):  chlorhexidine 2% Cloths 1 Application(s) Topical <User Schedule>  ciprofloxacin     Tablet 500 milliGRAM(s) Oral every 24 hours  coronavirus bivalent (EUA) Booster Vaccine (PFIZER) 0.3 milliLiter(s) IntraMuscular once  dextrose 50% Injectable 25 Gram(s) IV Push once  dextrose 50% Injectable 12.5 Gram(s) IV Push once  dextrose 50% Injectable 25 Gram(s) IV Push once  furosemide    Tablet 20 milliGRAM(s) Oral daily  insulin lispro (ADMELOG) corrective regimen sliding scale   SubCutaneous every 6 hours  insulin NPH human recombinant 10 Unit(s) SubCutaneous every 6 hours  lactulose Syrup 10 Gram(s) Oral three times a day  levothyroxine Injectable 75 MICROGram(s) IV Push at bedtime  pantoprazole  Injectable 40 milliGRAM(s) IV Push daily  rifAXIMin 550 milliGRAM(s) Oral two times a day  spironolactone 50 milliGRAM(s) Oral daily  tenofovir disoproxil fumarate (VIREAD) 300 milliGRAM(s) Oral daily    --------------------------------------------------------------------------------------------------  Study Interpretation:    [[[Abbreviation Key:  PDR=alpha rhythm/posterior dominant rhythm. A-P=anterior posterior.  Amplitude: ‘very low’:<20; ‘low’:20-49; ‘medium’:; ‘high’:>150uV.  Persistence for periodic/rhythmic patterns (% of epoch) ‘rare’:<1%; ‘occasional’:1-10%; ‘frequent’:10-50%; ‘abundant’:50-90%; ‘continuous’:>90%.  Persistence for sporadic discharges: ‘rare’:<1/hr; ‘occasional’:1/min-1/hr; ‘frequent’:>1/min; ‘abundant’:>1/10 sec.  RPP=rhythmic and periodic patterns; GRDA=generalized rhythmic delta activity; FIRDA=frontal intermittent GRDA; LRDA=lateralized rhythmic delta activity; TIRDA=temporal intermittent rhythmic delta activity;  LPD=PLED=lateralized periodic discharges; GPD=generalized periodic discharges; BIPDs =bilateral independent periodic discharges; Mf=multifocal; SIRPDs=stimulus induced rhythmic, periodic, or ictal appearing discharges; BIRDs=brief potentially ictal rhythmic discharges >4 Hz, lasting .5-10s; PFA (paroxysmal bursts >13 Hz or =8 Hz <10s).  Modifiers: +F=with fast component; +S=with spike component; +R=with rhythmic component.  S-B=burst suppression pattern.  Max=maximal. N1-drowsy; N2-stage II sleep; N3-slow wave sleep. SSS/BETS=small sharp spikes/benign epileptiform transients of sleep. HV=hyperventilation; PS=photic stimulation]]]    Daily EEG Visual Analysis    FINDINGS:      Background:  The predominant background in the most wakeful state consists of diffuse polymorphic theta and delta frequencies. A less wakeful state is characterized by slowing of the background frequencies. No normal sleep architecture is captured.    Background Slowing:  Generalized slowing: As above  Focal slowing: Paucity of faster frequencies in the left hemisphere    Sporadic Epileptiform Discharges and Rhythmic and Periodic Patterns (RPPs):  Frequent left posterior quadrant (P3/P7/O1) polyspike/sharp-wave discharges, frequently occurring as lateralized periodic discharges (LPDs) at 0.5 Hz.    Electrographic and Electroclinical seizures:  None    Other Clinical Events:  None    Activation Procedures:   Hyperventilation was not performed.    Photic stimulation was performed and did not elicit any abnormalities. There is symmetric photic driving at multiple flash frequencies.    Artifacts:  Intermittent myogenic and movement artifacts are present.    EKG:  Single-lead EKG shows mostly regular rhythm at  bpm, at times with irregular narrow complexes.    EEG Classification / Summary:  Abnormal EEG in a lethargic patient due to:  -Frequent left posterior quadrant lateralized periodic discharges (LPDs) at 0.5 Hz, decreased in abundance and in frequency compared to the routine EEG from 3/5/23.  -Left hemispheric focal slowing  -Moderate diffuse slowing    Clinical Impression:  -Frequent left posterior quadrant lateralized periodic discharges (LPDs) at 0.5 Hz indicate a potentially epileptogenic focus in this region with risk of focal-onset seizures. Risk of seizures has decreased compared to the routine EEG from 3/5/23.  -Left hemispheric focal cerebral dysfunction can be structural or functional in etiology.  -Moderate diffuse cerebral dysfunction is nonspecific in etiology/  -Single-lead EKG incidentally shows irregular narrow complexes at times.          -------------------------------------------------------------------------------------------------------  [facility] EEG Reading Room Ph#: [phone]  Epilepsy Answering Service after 5PM and before 8:30AM: Ph#: [phone]    Jenny Russo MD  Attending Physician, [facility]

## 2023-03-14 NOTE — PROGRESS NOTE ADULT - ATTENDING COMMENTS
Patient with recently diagnoses lymphoma treated with rituxan. Is HBcAb+ and on tenofovir. Has decompensated cirrhosis thought to be related to TANG.  High alk phos raises possibility of liver involvement with lymphoma. Has had GI bleed and is s/p banding of esophageal varices. Abnormal mental state is atypical for hepatic encephalopathy. Although treatment for hepatic encephalopathy continues Neurologic evaluation for other etiologies is underway.

## 2023-03-14 NOTE — PROGRESS NOTE ADULT - SUBJECTIVE AND OBJECTIVE BOX
Dr. Paulette Carias  Pager [phone]    PROGRESS NOTE:     Patient is a 67y old  Male who presents with a chief complaint of encephalopathy (13 Mar 2023 14:45)      SUBJECTIVE / OVERNIGHT EVENTS: denies chest pain or sob   ADDITIONAL REVIEW OF SYSTEMS: afebrile     MEDICATIONS  (STANDING):  chlorhexidine 2% Cloths 1 Application(s) Topical <User Schedule>  ciprofloxacin     Tablet 500 milliGRAM(s) Oral every 24 hours  coronavirus bivalent (EUA) Booster Vaccine (PFIZER) 0.3 milliLiter(s) IntraMuscular once  dextrose 50% Injectable 25 Gram(s) IV Push once  dextrose 50% Injectable 12.5 Gram(s) IV Push once  dextrose 50% Injectable 25 Gram(s) IV Push once  furosemide    Tablet 20 milliGRAM(s) Oral daily  insulin lispro (ADMELOG) corrective regimen sliding scale   SubCutaneous every 6 hours  insulin NPH human recombinant 10 Unit(s) SubCutaneous every 6 hours  lactulose Syrup 10 Gram(s) Oral three times a day  levothyroxine Injectable 75 MICROGram(s) IV Push at bedtime  pantoprazole  Injectable 40 milliGRAM(s) IV Push daily  rifAXIMin 550 milliGRAM(s) Oral two times a day  spironolactone 50 milliGRAM(s) Oral daily  tenofovir disoproxil fumarate (VIREAD) 300 milliGRAM(s) Oral daily    MEDICATIONS  (PRN):  dextrose Oral Gel 15 Gram(s) Oral once PRN Blood Glucose LESS THAN 70 milliGRAM(s)/deciliter  LORazepam   Injectable 1.5 milliGRAM(s) IV Push once PRN pre-MRI      CAPILLARY BLOOD GLUCOSE      POCT Blood Glucose.: 161 mg/dL (14 Mar 2023 11:38)  POCT Blood Glucose.: 139 mg/dL (14 Mar 2023 06:03)  POCT Blood Glucose.: 128 mg/dL (14 Mar 2023 00:06)  POCT Blood Glucose.: 158 mg/dL (13 Mar 2023 18:18)  POCT Blood Glucose.: 134 mg/dL (13 Mar 2023 15:32)    I&O's Summary    13 Mar 2023 07:01  -  14 Mar 2023 07:00  --------------------------------------------------------  IN: 455 mL / OUT: 1100 mL / NET: -645 mL        PHYSICAL EXAM:  Vital Signs Last 24 Hrs  T(C): 36.4 (14 Mar 2023 08:20), Max: 37 (13 Mar 2023 21:30)  T(F): 97.6 (14 Mar 2023 08:20), Max: 98.6 (13 Mar 2023 21:30)  HR: 95 (14 Mar 2023 08:20) (95 - 108)  BP: 135/66 (14 Mar 2023 08:20) (108/69 - 156/67)  BP(mean): --  RR: 27 (14 Mar 2023 08:20) (16 - 27)  SpO2: 99% (14 Mar 2023 08:20) (96% - 99%)    Parameters below as of 14 Mar 2023 08:20  Patient On (Oxygen Delivery Method): room air      GENERAL: ill-appearing, thin  HEENT: MMM dry, +scleral icterus ., NGT in place  CHEST/LUNG: Clear to auscultation bilaterally; No wheeze  HEART: Regular rate and rhythm; systolic murmur  ABDOMEN: Soft, Nontender, mild distended, no r/g; Bowel sounds present, ? para site leaking with ostomy bag over  EXTREMITIES:   SCD, thigh edema  mcconnell in place   PSYCH: eyes open, will not follow commands or speak  NEUROLOGY: passive ROM, pushes back with passive ROM of arms       LABS:                        11.4   12.17 )-----------( 235      ( 14 Mar 2023 04:55 )             36.5     03-14    147<H>  |  118<H>  |  55<H>  ----------------------------<  161<H>  3.7   |  17<L>  |  1.18    Ca    9.0      14 Mar 2023 04:55  Phos  3.4     03-14  Mg     2.60     03-14    TPro  5.7<L>  /  Alb  2.6<L>  /  TBili  2.1<H>  /  DBili  x   /  AST  72<H>  /  ALT  55<H>  /  AlkPhos  402<H>  03-14    PT/INR - ( 14 Mar 2023 04:55 )   PT: 21.9 sec;   INR: 1.88 ratio         PTT - ( 13 Mar 2023 05:30 )  PTT:27.7 sec          Culture - Blood (collected 13 Mar 2023 05:30)  Source: .Blood Blood  Preliminary Report (14 Mar 2023 12:01):    No growth to date.        RADIOLOGY & ADDITIONAL TESTS:  Results Reviewed:   Imaging Personally Reviewed:  Electrocardiogram Personally Reviewed:    COORDINATION OF CARE:  Care Discussed with Consultants/Other Providers [Y/N]:  Prior or Outpatient Records Reviewed [Y/N]:

## 2023-03-14 NOTE — PROGRESS NOTE ADULT - PROBLEM SELECTOR PLAN 1
- off sedation  - CTH (3/4/23): no acute pathology  - EEG (3/5/23): Abnormal EEG study.  Potential epileptogenic focus in the left posterior head region. Structural of functional abnormality in the left posterior head region. Moderate nonspecific diffuse or multifocal cerebral dysfunction. No seizure seen.     repeat EEG (3/14) frequent left posterior quadrant periodic discharges, risk of seizure has decreased compared to EEG from 3/5  - c/w rifaximin 550 mg BID, c/w lactulose titrate to 3-4 BMs, stool count (6 BM on 3/11 and 2 3/12)  - paracentesis x 2 neg for SBP, plan for repeat tap today (diagnostic/therapeutic)  - remains encephalopathic, failed S&S, c/w NGT feeds, aspiration precautions  - TSH wnl  - trial of IV thiamine  - pending MR brain, will need to coordinate and hold TF prior  - CXR no infiltrate, restarted on TF, titrate prn  - if no improvement with above will c/s neuro

## 2023-03-14 NOTE — PROGRESS NOTE ADULT - SUBJECTIVE AND OBJECTIVE BOX
Interval Events:   - No significant change in mental status    Allergies:  [This allergen will not trigger allergy alert] &quot;lentils&quot;-&quot;itchiness&quot; (Other)  Beef (Other)  No Known Drug Allergies      Hospital Medications:  chlorhexidine 2% Cloths 1 Application(s) Topical <User Schedule>  ciprofloxacin     Tablet 500 milliGRAM(s) Oral every 24 hours  coronavirus bivalent (EUA) Booster Vaccine (PFIZER) 0.3 milliLiter(s) IntraMuscular once  dextrose 50% Injectable 25 Gram(s) IV Push once  dextrose 50% Injectable 12.5 Gram(s) IV Push once  dextrose 50% Injectable 25 Gram(s) IV Push once  dextrose Oral Gel 15 Gram(s) Oral once PRN  furosemide    Tablet 20 milliGRAM(s) Oral daily  insulin lispro (ADMELOG) corrective regimen sliding scale   SubCutaneous every 6 hours  insulin NPH human recombinant 10 Unit(s) SubCutaneous every 6 hours  lactulose Syrup 10 Gram(s) Oral three times a day  levothyroxine Injectable 75 MICROGram(s) IV Push at bedtime  LORazepam   Injectable 1.5 milliGRAM(s) IV Push once PRN  pantoprazole  Injectable 40 milliGRAM(s) IV Push daily  rifAXIMin 550 milliGRAM(s) Oral two times a day  spironolactone 50 milliGRAM(s) Oral daily  tenofovir disoproxil fumarate (VIREAD) 300 milliGRAM(s) Oral daily      PMHX/PSHX:  CAD (coronary artery disease)    Diabetes    Lymphoma    Cirrhosis    S/P CABG x 1        Family history:  No pertinent family history in first degree relatives        ROS: As per HPI, otherwise 14-point ROS reviewed and negative.      PHYSICAL EXAM:   Vital Signs:  Vital Signs Last 24 Hrs  T(C): 36.4 (14 Mar 2023 08:20), Max: 37 (13 Mar 2023 21:30)  T(F): 97.6 (14 Mar 2023 08:20), Max: 98.6 (13 Mar 2023 21:30)  HR: 95 (14 Mar 2023 08:20) (95 - 108)  BP: 135/66 (14 Mar 2023 08:20) (108/69 - 156/67)  BP(mean): --  RR: 27 (14 Mar 2023 08:20) (16 - 27)  SpO2: 99% (14 Mar 2023 08:20) (96% - 99%)    Parameters below as of 14 Mar 2023 08:20  Patient On (Oxygen Delivery Method): room air      Daily     Daily Weight in k.9 (14 Mar 2023 05:25)      23 @ 07:01  -  23 @ 07:00  --------------------------------------------------------  IN: 455 mL / OUT: 1100 mL / NET: -645 mL    GENERAL: Staring in space  HEENT:  NCAT, no scleral icterus  CHEST: no resp distress  HEART:  RRR  ABDOMEN:  Soft, non-tender, non-distended, normoactive bowel sounds,  EXTREMITIES:  Trace to 1+ edema b/l   NEURO:  Alert and oriented x 0, no asterixis     LABS:                        11.4   12.17 )-----------( 235      ( 14 Mar 2023 04:55 )             36.5     Mean Cell Volume: 95.1 fL (23 @ 04:55)        147<H>  |  118<H>  |  55<H>  ----------------------------<  161<H>  3.7   |  17<L>  |  1.18    Ca    9.0      14 Mar 2023 04:55  Phos  3.4       Mg     2.60         TPro  5.7<L>  /  Alb  2.6<L>  /  TBili  2.1<H>  /  DBili  x   /  AST  72<H>  /  ALT  55<H>  /  AlkPhos  402<H>      LIVER FUNCTIONS - ( 14 Mar 2023 04:55 )  Alb: 2.6 g/dL / Pro: 5.7 g/dL / ALK PHOS: 402 U/L / ALT: 55 U/L / AST: 72 U/L / GGT: x           PT/INR - ( 14 Mar 2023 04:55 )   PT: 21.9 sec;   INR: 1.88 ratio         PTT - ( 13 Mar 2023 05:30 )  PTT:27.7 sec                            11.4   12.17 )-----------( 235      ( 14 Mar 2023 04:55 )             36.5                         11.9   14.30 )-----------( 247      ( 13 Mar 2023 05:30 )             38.6                         11.6   14.22 )-----------( 215      ( 12 Mar 2023 05:47 )             37.9       Imaging:

## 2023-03-14 NOTE — PROGRESS NOTE ADULT - PROBLEM SELECTOR PLAN 2
acute blood loss anemia c/b hypovolemic shock s/p levophed and ICU  - Hb 6.9 on admit now s/p 6 units of pRBC, 1 unit FFP, 1 unit of platelets all on 2/24/23  - s/p octreotide x 72h (2/24-2/27)   - s/p PPI 40 IV BID change to daily per liver   - s/p EGD 2/24: large (> 5 mm) esophageal varices. Incompletely eradicated. Banded. Normal duodenal bulb, first portion of the duodenum and second portion of the duodenum.                 - EGD in 4 wks for likely repeat banding  -SBP ppx cipro

## 2023-03-14 NOTE — PROGRESS NOTE ADULT - PROBLEM SELECTOR PLAN 7
HbA1c 6.1% likely underestimated due transfusions given insulin needs  - BG above goal   - NPH 10 units q6h   - c/w TF Glucerna

## 2023-03-14 NOTE — PROGRESS NOTE ADULT - ASSESSMENT
67M follows at [facility] Hx decompensated TANG cirrhosis (+ascites, +EV -- previously MELD Na 8), CAD s/p CABG s/p PCI (last in 2012 on ASA, now off plavix x2 weeks), newly diagnosed follicular lymphoma (on rituximab), HTN, DM, currently on tenofovir (HBV Core +) presented with right sided chest/abdominal pain with lizzette hematemesis + clots c/b hemorrhagic shock, s/p intubation in MICU, EGD showing large varices s/p banding, unable to completely clear stomach due to clot now transferred to the floor for further care with hospital course c/b persistent AMS     # AMS - Possible component of HE vs. primary neurological issue. Abnormal EEG a couple days prior but confounded due to sedation. Given that pt is still not able to meaningfully respond to commands despite aggressive bowel regimen would consult neurology to asses for any alternative etiologies  #Hematemesis: s/p EGD 2/24/2023 s/p EVL x6, bleeding likely due to EV, however unable to completely visualize stomach given presence of clotted blood. H/H now stable.   #NANCI (resolved)  #Decompensated TANG cirrhosis: follows at [facility], previously low meld Na 8  --MELD Na: 14  --Ascites: (+) Hx,  continue with home diuretics. Furosemide 20mg, spironolactone 50mg. Noted to have ascites with on U/S would attempt drainage with IR.   --SBP; no Hx. WBC elevated today wih no fevers. Would preform dx para as noted above and repeat infectious workup including CXR, U/A and BCx. Continue with ciprofloxacin 500mg daily for SBP prophylaxis given low ascitic protein  --EV: (+) Hx, no EVB --> is on nadolol at home  --PVT: no Hx  --HCC: no Hx  # HBV core positive - on Tenofovir    Recommendations:  - Given that pt is still not able to meaningfully respond to commands despite aggressive bowel regimen would consult neurology to asses for any alternative etiologies   - Repeat dx and tx paracentesis  - c/w Rifaximin and lactulose, titrate for 2-3 BMs per day  - No plans for repeat EGD at this time  - c/w pantoprazole 40 QD  - c/w tenofovir  - repeat EGD in 4 weeks for variceal banding  - trend CMP, CBC, coags    Please note that the recommendations are not final until attested by an attending.    Thank you for involving us in the care of this patient. Please reach out if any further questions.    Ulices Ray, PGY-6  Gastroenterology/Hepatology Fellow    Available on [employer] Teams  After 5PM/Weekends, please contact the on-call GI fellow: [phone]

## 2023-03-14 NOTE — PROGRESS NOTE ADULT - PROBLEM SELECTOR PLAN 3
decompensated with ascites, with variceal bleed, with PSE; MELD 12 3/10/23  - s/p diagnostic para (2/25) and therapeutic para (3/5) removed 4.5L  - c/w rifaximin BID, lactulose q4h, monitor BMs   - SBP ppx with Cipro  - per hepatology resume Lasix 20 mg daily and Spironolactone 50 mg daily  - hepatology following  - LFT uptrending, RUQ w/o pathology on 3/10  - MRI abdomen at [facility] 12/2022 no HCC  - pending repeat paracentesis today per hepatology recs

## 2023-03-15 NOTE — PROGRESS NOTE ADULT - PROBLEM SELECTOR PLAN 10
Confirmed full code with son at bedside on 3/10  SCDs for now, can d/w liver re: if cleared for ppx use   will need PT when able to cooperate  functional quadriplegia c/w full care and turns, TF  TOV today

## 2023-03-15 NOTE — PROGRESS NOTE ADULT - PROBLEM SELECTOR PLAN 1
- off sedation  - CTH (3/4/23): no acute pathology  - EEG (3/5/23): Abnormal EEG study.  Potential epileptogenic focus in the left posterior head region. Structural of functional abnormality in the left posterior head region. Moderate nonspecific diffuse or multifocal cerebral dysfunction. No seizure seen.     repeat EEG (3/14) frequent left posterior quadrant periodic discharges, risk of seizure has decreased compared to EEG from 3/5. No seizures recorded.   - c/w rifaximin 550 mg BID, c/w lactulose titrate to 3-4 BMs, stool count, repeat ammonia level  - paracentesis x 2 neg for SBP, plan for repeat tap today (diagnostic/therapeutic)  - remains encephalopathic, failed S&S, c/w NGT feeds, aspiration precautions  - TSH wnl  - trial of IV thiamine  - EXPEDITE MR brain today, will need to coordinate and hold TF prior  - CXR no infiltrate, restarted on TF, titrate prn  - if no improvement with above will c/s neuro

## 2023-03-15 NOTE — PROGRESS NOTE ADULT - SUBJECTIVE AND OBJECTIVE BOX
Dr. Paulette Carias  Pager [phone]    PROGRESS NOTE:     Patient is a 67y old  Male who presents with a chief complaint of encephalopathy (14 Mar 2023 12:59)      SUBJECTIVE / OVERNIGHT EVENTS: denies chest pain or sob  ADDITIONAL REVIEW OF SYSTEMS: afebrile     MEDICATIONS  (STANDING):  chlorhexidine 2% Cloths 1 Application(s) Topical <User Schedule>  ciprofloxacin     Tablet 500 milliGRAM(s) Oral every 24 hours  coronavirus bivalent (EUA) Booster Vaccine (PFIZER) 0.3 milliLiter(s) IntraMuscular once  dextrose 50% Injectable 25 Gram(s) IV Push once  dextrose 50% Injectable 12.5 Gram(s) IV Push once  dextrose 50% Injectable 25 Gram(s) IV Push once  furosemide    Tablet 20 milliGRAM(s) Oral daily  lactulose Syrup 10 Gram(s) Oral three times a day  levothyroxine Injectable 75 MICROGram(s) IV Push at bedtime  mupirocin 2% Ointment 1 Application(s) Both Nostrils two times a day  pantoprazole  Injectable 40 milliGRAM(s) IV Push daily  rifAXIMin 550 milliGRAM(s) Oral two times a day  spironolactone 50 milliGRAM(s) Oral daily  tenofovir disoproxil fumarate (VIREAD) 300 milliGRAM(s) Oral daily    MEDICATIONS  (PRN):  dextrose Oral Gel 15 Gram(s) Oral once PRN Blood Glucose LESS THAN 70 milliGRAM(s)/deciliter  LORazepam   Injectable 1.5 milliGRAM(s) IV Push once PRN pre-MRI      CAPILLARY BLOOD GLUCOSE      POCT Blood Glucose.: 191 mg/dL (15 Mar 2023 11:56)  POCT Blood Glucose.: 176 mg/dL (15 Mar 2023 05:56)  POCT Blood Glucose.: 131 mg/dL (15 Mar 2023 00:23)  POCT Blood Glucose.: 145 mg/dL (14 Mar 2023 17:36)    I&O's Summary    14 Mar 2023 07:01  -  15 Mar 2023 07:00  --------------------------------------------------------  IN: 1070 mL / OUT: 985 mL / NET: 85 mL        PHYSICAL EXAM:  Vital Signs Last 24 Hrs  T(C): 36.7 (15 Mar 2023 14:00), Max: 36.9 (14 Mar 2023 21:00)  T(F): 98 (15 Mar 2023 14:00), Max: 98.5 (14 Mar 2023 21:00)  HR: 88 (15 Mar 2023 14:00) (85 - 100)  BP: 118/62 (15 Mar 2023 14:00) (114/66 - 138/62)  BP(mean): --  RR: 17 (15 Mar 2023 14:00) (17 - 20)  SpO2: 98% (15 Mar 2023 14:00) (98% - 100%)    Parameters below as of 15 Mar 2023 14:00  Patient On (Oxygen Delivery Method): room air      GENERAL: ill-appearing, thin  HEENT: MMM dry, +scleral icterus ., NGT in place  CHEST/LUNG: Clear to auscultation bilaterally; No wheeze  HEART: Regular rate and rhythm; systolic murmur  ABDOMEN: Soft, Nontender, mild distended, no r/g; Bowel sounds present, ? para site leaking with ostomy bag over  EXTREMITIES:   SCD, thigh edema  mcconnell in place   PSYCH: eyes open, will not follow commands or speak  NEUROLOGY: passive ROM, pushes back with passive ROM of arms     LABS:                        11.8   10.84 )-----------( 188      ( 15 Mar 2023 04:47 )             40.1     03-15    147<H>  |  118<H>  |  64<H>  ----------------------------<  186<H>  4.2   |  18<L>  |  1.08    Ca    9.0      15 Mar 2023 04:47  Phos  3.1     03-15  Mg     2.80     03-15    TPro  5.9<L>  /  Alb  2.4<L>  /  TBili  1.8<H>  /  DBili  0.9<H>  /  AST  119<H>  /  ALT  70<H>  /  AlkPhos  462<H>  03-15    PT/INR - ( 14 Mar 2023 04:55 )   PT: 21.9 sec;   INR: 1.88 ratio                   Culture - Blood (collected 13 Mar 2023 05:30)  Source: .Blood Blood  Preliminary Report (14 Mar 2023 12:01):    No growth to date.        RADIOLOGY & ADDITIONAL TESTS:  Results Reviewed:   Imaging Personally Reviewed:  Electrocardiogram Personally Reviewed:    COORDINATION OF CARE:  Care Discussed with Consultants/Other Providers [Y/N]:  Prior or Outpatient Records Reviewed [Y/N]:

## 2023-03-15 NOTE — PROGRESS NOTE ADULT - ATTENDING COMMENTS
Patient with decompensated cirrhosis and history of esophageal variceal bleed treated with banding. Since ICU stay patient has been poorly responsive. Behavior is not typical for hepatic encephalopathy and await neurologic evaluation.

## 2023-03-15 NOTE — PROGRESS NOTE ADULT - ASSESSMENT
67M follows at [facility] Hx decompensated TANG cirrhosis (+ascites, +EV -- previously MELD Na 8), CAD s/p CABG s/p PCI (last in 2012 on ASA, now off plavix x2 weeks), newly diagnosed follicular lymphoma (on rituximab), HTN, DM, currently on tenofovir (HBV Core +) presented with right sided chest/abdominal pain with lizzette hematemesis + clots c/b hemorrhagic shock, s/p intubation in MICU, EGD showing large varices s/p banding, unable to completely clear stomach due to clot now transferred to the floor for further care with hospital course c/b persistent AMS     # AMS - Possible component of HE vs. primary neurological issue. Abnormal EEG a couple days prior but confounded due to sedation. Given that pt is still not able to meaningfully respond to commands despite aggressive bowel regimen would consult neurology to asses for any alternative etiologies  #Hematemesis: s/p EGD 2/24/2023 s/p EVL x6, bleeding likely due to EV, however unable to completely visualize stomach given presence of clotted blood. H/H now stable.   #NANCI (resolved)  #Decompensated TANG cirrhosis: follows at [facility], previously low meld Na 8  --MELD Na: 14  --Ascites: (+) Hx,  continue with home diuretics. Furosemide 20mg, spironolactone 50mg. Noted to have ascites with on U/S would attempt drainage with IR.   --SBP; no Hx. WBC elevated today wih no fevers. Would preform dx para as noted above and repeat infectious workup including CXR, U/A and BCx. Continue with ciprofloxacin 500mg daily for SBP prophylaxis given low ascitic protein  --EV: (+) Hx, no EVB --> is on nadolol at home  --PVT: no Hx  --HCC: no Hx  # HBV core positive - on Tenofovir    Recommendations:  - Given that pt is still not able to meaningfully respond to commands despite aggressive bowel regimen would consult neurology to asses for any alternative etiologies   - Repeat dx and tx paracentesis  - c/w Rifaximin and lactulose, titrate for 2-3 BMs per day  - No plans for repeat EGD at this time  - c/w pantoprazole 40 QD  - c/w tenofovir  - repeat EGD in 4 weeks for variceal banding  - trend CMP, CBC, coags    Note not finalized until signed by attending.    Onelia Cee PGY-6  Gastroenterology/Hepatology Fellow  Pager #[phone] (DEBBIE) or [phone] (NS)  Available on [employer] Teams.  Please contact on-call GI fellow via answering service ([phone]) after 5pm and before 8am, and on weekends.              67M follows at [facility] Hx decompensated TANG cirrhosis (+ascites, +EV -- previously MELD Na 8), CAD s/p CABG s/p PCI (last in 2012 on ASA, now off plavix x2 weeks), newly diagnosed follicular lymphoma (on rituximab), HTN, DM, currently on tenofovir (HBV Core +) presented with right sided chest/abdominal pain with lizzette hematemesis + clots c/b hemorrhagic shock, s/p intubation in MICU, EGD showing large varices s/p banding, unable to completely clear stomach due to clot now transferred to the floor for further care with hospital course c/b persistent AMS     # AMS - Possible component of HE vs. primary neurological issue. Abnormal EEG a couple days prior but confounded due to sedation. Given that pt is still not able to meaningfully respond to commands despite aggressive bowel regimen would consult neurology to asses for any alternative etiologies  #Hematemesis: s/p EGD 2/24/2023 s/p EVL x6, bleeding likely due to EV, however unable to completely visualize stomach given presence of clotted blood. H/H now stable.   #NANCI (resolved)  #Decompensated TANG cirrhosis: follows at [facility], previously low meld Na 8  --MELD Na: 14  --Ascites: (+) Hx,  continue with home diuretics. Furosemide 20mg, spironolactone 50mg. Noted to have ascites with on U/S would attempt drainage with IR.   --SBP; no Hx. WBC elevated today wih no fevers. Would preform dx para as noted above and repeat infectious workup including CXR, U/A and BCx. Continue with ciprofloxacin 500mg daily for SBP prophylaxis given low ascitic protein  --EV: (+) Hx, no EVB --> is on nadolol at home  --PVT: no Hx  --HCC: no Hx  # HBV core positive - on Tenofovir    Recommendations:  - f/u Neurology recommendations  - f/u para studies  - c/w Rifaximin and lactulose, titrate for 2-3 BMs per day  - No plans for repeat EGD at this time given mental status, can consider if patient's status improves  - c/w pantoprazole 40 QD  - c/w tenofovir  - trend CMP, CBC, coags    Note not finalized until signed by attending.    Onelia Cee PGY-6  Gastroenterology/Hepatology Fellow  Pager #[phone] (DEBBIE) or [phone] (NS)  Available on [employer] Teams.  Please contact on-call GI fellow via answering service ([phone]) after 5pm and before 8am, and on weekends.

## 2023-03-15 NOTE — ADVANCED PRACTICE NURSE CONSULT - RECOMMEDATIONS
Topical recommendations:     Nasogastric Tube- Cleanse nare with NS. Pat dry. Apply Liquid barrier film to periwound skin. Apply Stat-lock NGT carcamo over center of nare, not touch any skin circumferentially. Change every three days or prn if soiled or compromised.     LLQ abdomen - Cleanse with NS, pat dry. Apply Liquid barrier film to periwound skin (allow to dry). Apply male urinary pouch to contain drainage from paracentesis site, change every 3 days and PRN if compromised.     Left hip, right achilles - Cleanse with NS, pat dry. Cover with small silicone foam with border. Change every 3 days and PRN if soiled. Monitor for tissue type changes.     Sacrum - Cleanse with skin cleanser, pat dry. Apply Escobar moisture barrier cream twice a day and PRN with incontinent episodes.     Continue low air loss bed therapy, continue heel elevation, continue to turn & reposition per protocol, soft pillow between bony prominences, continue moisture management with barrier creams & single breathable pad, continue measures to decrease friction/shear/pressure.     Plan discussed with primary RN Migel Timmons at bedside.     Please contact Wound/Ostomy Care Service Line if we can be of further assistance (ext [phone]).    Continue f/u nutrition/dietary for optimization.     Topical recommendations:     Nasogastric Tube- Cleanse nare with NS. Pat dry. Apply Liquid barrier film to periwound skin. Apply Stat-lock NGT carcamo over center of nare, not touch any skin circumferentially. Change every three days or prn if soiled or compromised.     LLQ abdomen - Cleanse with NS, pat dry. Apply Liquid barrier film to periwound skin (allow to dry). Apply male urinary pouch to contain drainage from paracentesis site, change every 3 days and PRN if compromised.     Left hip, right achilles - Cleanse with NS, pat dry. Cover with small silicone foam with border. Change every 3 days and PRN if soiled. Monitor for tissue type changes.     Sacrum - Cleanse with skin cleanser, pat dry. Apply Escobar moisture barrier cream twice a day and PRN with incontinent episodes.     Continue low air loss bed therapy, continue heel elevation, continue to turn & reposition per protocol, soft pillow between bony prominences, continue moisture management with barrier creams & single breathable pad, continue measures to decrease friction/shear/pressure.     Plan discussed with primary RN Migel Timmons at bedside.     Please contact Wound/Ostomy Care Service Line if we can be of further assistance (ext [phone]).    Continue f/u nutrition/dietary for optimization.     Topical recommendations:     Nasogastric Tube- Cleanse nare with NS. Pat dry. Apply Liquid barrier film to periwound skin. Apply Stat-lock NGT carcamo over center of nare, not touch any skin circumferentially. Change every three days or prn if soiled or compromised.     LLQ abdomen - Cleanse with NS, pat dry. Apply Liquid barrier film to periwound skin (allow to dry). Apply male urinary pouch to contain drainage from paracentesis site, change every 3 days and PRN if compromised.     Left hip, right posterior ankle/achilles - Cleanse with NS, pat dry. Cover with small silicone foam with border. Change every 3 days and PRN if soiled. Monitor for tissue type changes.     Sacrum - Cleanse with skin cleanser, pat dry. Apply Escobar moisture barrier cream twice a day and PRN with incontinent episodes.     Continue low air loss bed therapy, continue heel elevation, continue to turn & reposition per protocol, soft pillow between bony prominences, continue moisture management with barrier creams & single breathable pad, continue measures to decrease friction/shear/pressure.     Plan discussed with primary RN Migel Timmons at bedside.     Please contact Wound/Ostomy Care Service Line if we can be of further assistance (ext [phone]).

## 2023-03-15 NOTE — PROGRESS NOTE ADULT - PROBLEM SELECTOR PLAN 3
decompensated with ascites, with variceal bleed, with PSE; MELD 12 3/10/23  - s/p diagnostic para (2/25) and therapeutic para (3/5) removed 4.5L  s/p diag/therapeutic tap on 3/15, removed 3.6L, neg for SBP  - c/w rifaximin BID, lactulose q4h, monitor BMs   - SBP ppx with Cipro  - per hepatology resume Lasix 20 mg daily and Spironolactone 50 mg daily  - hepatology following  - LFT uptrending, RUQ w/o pathology on 3/10  - MRI abdomen at [facility] 12/2022 no HCC

## 2023-03-15 NOTE — EEG REPORT - NS EEG TEXT BOX
ABBIE RODRIGUEZ [facility]-[id number]     Study Date: 		03-14-23 08:00 to 03-15-23 08:00  Duration x Hours: 24  --------------------------------------------------------------------------------------------------  History:  CC/ HPI Patient is a 67y old  Male who presents with a chief complaint of encephalopathy (14 Mar 2023 12:59)    MEDICATIONS  (STANDING):  chlorhexidine 2% Cloths 1 Application(s) Topical <User Schedule>  ciprofloxacin     Tablet 500 milliGRAM(s) Oral every 24 hours  coronavirus bivalent (EUA) Booster Vaccine (PFIZER) 0.3 milliLiter(s) IntraMuscular once  dextrose 50% Injectable 25 Gram(s) IV Push once  dextrose 50% Injectable 12.5 Gram(s) IV Push once  dextrose 50% Injectable 25 Gram(s) IV Push once  furosemide    Tablet 20 milliGRAM(s) Oral daily  insulin lispro (ADMELOG) corrective regimen sliding scale   SubCutaneous every 6 hours  insulin NPH human recombinant 10 Unit(s) SubCutaneous every 6 hours  lactulose Syrup 10 Gram(s) Oral three times a day  levothyroxine Injectable 75 MICROGram(s) IV Push at bedtime  pantoprazole  Injectable 40 milliGRAM(s) IV Push daily  rifAXIMin 550 milliGRAM(s) Oral two times a day  spironolactone 50 milliGRAM(s) Oral daily  tenofovir disoproxil fumarate (VIREAD) 300 milliGRAM(s) Oral daily    --------------------------------------------------------------------------------------------------  Study Interpretation:    [[[Abbreviation Key:  PDR=alpha rhythm/posterior dominant rhythm. A-P=anterior posterior.  Amplitude: ‘very low’:<20; ‘low’:20-49; ‘medium’:; ‘high’:>150uV.  Persistence for periodic/rhythmic patterns (% of epoch) ‘rare’:<1%; ‘occasional’:1-10%; ‘frequent’:10-50%; ‘abundant’:50-90%; ‘continuous’:>90%.  Persistence for sporadic discharges: ‘rare’:<1/hr; ‘occasional’:1/min-1/hr; ‘frequent’:>1/min; ‘abundant’:>1/10 sec.  RPP=rhythmic and periodic patterns; GRDA=generalized rhythmic delta activity; FIRDA=frontal intermittent GRDA; LRDA=lateralized rhythmic delta activity; TIRDA=temporal intermittent rhythmic delta activity;  LPD=PLED=lateralized periodic discharges; GPD=generalized periodic discharges; BIPDs =bilateral independent periodic discharges; Mf=multifocal; SIRPDs=stimulus induced rhythmic, periodic, or ictal appearing discharges; BIRDs=brief potentially ictal rhythmic discharges >4 Hz, lasting .5-10s; PFA (paroxysmal bursts >13 Hz or =8 Hz <10s).  Modifiers: +F=with fast component; +S=with spike component; +R=with rhythmic component.  S-B=burst suppression pattern.  Max=maximal. N1-drowsy; N2-stage II sleep; N3-slow wave sleep. SSS/BETS=small sharp spikes/benign epileptiform transients of sleep. HV=hyperventilation; PS=photic stimulation]]]    Daily EEG Visual Analysis    FINDINGS:      Background:  Continuous: continuous  Symmetry: symmetric  PDR: absent  Reactivity: present  Voltage: normal, mostly 20-150uV  Anterior Posterior Gradient: present  Other background findings: none  Breach: absent    Background Slowing:  Generalized slowing: Continuous diffuse delta and theta  Focal slowing: Continuous left hemispheric delta and theta, max posterior quadrant, with attenuation of fast frequency compared to right    State Changes:   -Drowsiness noted with increased slowing, attenuation of fast activity    Sporadic Epileptiform Discharges:    Abundant left posterior quadrant sharp wave discharges, max P3/O1    Rhythmic and Periodic Patterns (RPPs):  None     Electrographic and Electroclinical seizures:  None    Other Clinical Events:  None    Activation Procedures:   -Hyperventilation was not performed.    -Photic stimulation was not performed.    Artifacts:  Intermittent myogenic and movement artifacts were noted.    ECG:  The heart rate on single channel ECG was predominantly between  BPM.  Background slowing, generalized, moderate  EEG Classification / Summary:  Abnormal  EEG in the awake / drowsy / asleep state(s).    Abundant left posterior quadrant sharp wave discharges, max P3/O1  Continuous left hemispheric slowing, max left posterior quadrant  Background slowing, generalized, moderate    Clinical Impression:    Evidence for structural/functional lesion and increased risk for seizures from the left posterior quadrant  Moderate diffuse/multifocal cerebral dysfunction, not specific as to etiology  There were no seizures  recorded.      This is a prelim report only, pending review with attending prior to finalization.          -------------------------------------------------------------------------------------------------------  [facility] EEG Reading Room Ph#: [phone]  Epilepsy Answering Service after 5PM and before 8:30AM: Ph#: [phone]    Simone Rivera M.D.   Epilepsy fellow ABBIE RODRIGUEZ [facility]-[id number]     Study Date: 		03-14-23 08:00 to 03-15-23 11:11  Duration x Hours: 27  --------------------------------------------------------------------------------------------------  History:  CC/ HPI Patient is a 67y old  Male who presents with a chief complaint of encephalopathy (14 Mar 2023 12:59)    MEDICATIONS  (STANDING):  chlorhexidine 2% Cloths 1 Application(s) Topical <User Schedule>  ciprofloxacin     Tablet 500 milliGRAM(s) Oral every 24 hours  coronavirus bivalent (EUA) Booster Vaccine (PFIZER) 0.3 milliLiter(s) IntraMuscular once  dextrose 50% Injectable 25 Gram(s) IV Push once  dextrose 50% Injectable 12.5 Gram(s) IV Push once  dextrose 50% Injectable 25 Gram(s) IV Push once  furosemide    Tablet 20 milliGRAM(s) Oral daily  insulin lispro (ADMELOG) corrective regimen sliding scale   SubCutaneous every 6 hours  insulin NPH human recombinant 10 Unit(s) SubCutaneous every 6 hours  lactulose Syrup 10 Gram(s) Oral three times a day  levothyroxine Injectable 75 MICROGram(s) IV Push at bedtime  pantoprazole  Injectable 40 milliGRAM(s) IV Push daily  rifAXIMin 550 milliGRAM(s) Oral two times a day  spironolactone 50 milliGRAM(s) Oral daily  tenofovir disoproxil fumarate (VIREAD) 300 milliGRAM(s) Oral daily    --------------------------------------------------------------------------------------------------  Study Interpretation:    [[[Abbreviation Key:  PDR=alpha rhythm/posterior dominant rhythm. A-P=anterior posterior.  Amplitude: ‘very low’:<20; ‘low’:20-49; ‘medium’:; ‘high’:>150uV.  Persistence for periodic/rhythmic patterns (% of epoch) ‘rare’:<1%; ‘occasional’:1-10%; ‘frequent’:10-50%; ‘abundant’:50-90%; ‘continuous’:>90%.  Persistence for sporadic discharges: ‘rare’:<1/hr; ‘occasional’:1/min-1/hr; ‘frequent’:>1/min; ‘abundant’:>1/10 sec.  RPP=rhythmic and periodic patterns; GRDA=generalized rhythmic delta activity; FIRDA=frontal intermittent GRDA; LRDA=lateralized rhythmic delta activity; TIRDA=temporal intermittent rhythmic delta activity;  LPD=PLED=lateralized periodic discharges; GPD=generalized periodic discharges; BIPDs =bilateral independent periodic discharges; Mf=multifocal; SIRPDs=stimulus induced rhythmic, periodic, or ictal appearing discharges; BIRDs=brief potentially ictal rhythmic discharges >4 Hz, lasting .5-10s; PFA (paroxysmal bursts >13 Hz or =8 Hz <10s).  Modifiers: +F=with fast component; +S=with spike component; +R=with rhythmic component.  S-B=burst suppression pattern.  Max=maximal. N1-drowsy; N2-stage II sleep; N3-slow wave sleep. SSS/BETS=small sharp spikes/benign epileptiform transients of sleep. HV=hyperventilation; PS=photic stimulation]]]    Daily EEG Visual Analysis    FINDINGS:      Background:  Continuous: continuous  Symmetry: symmetric  PDR: absent  Reactivity: present  Voltage: normal, mostly 20-150uV  Anterior Posterior Gradient: present  Other background findings: none  Breach: absent    Background Slowing:  Generalized slowing: Continuous diffuse delta and theta  Focal slowing: Continuous left hemispheric delta and theta, max posterior quadrant, with attenuation of fast frequency compared to right    State Changes:   -Drowsiness noted with increased slowing, attenuation of fast activity    Sporadic Epileptiform Discharges:    Abundant left posterior quadrant sharp wave discharges, max P3/O1    Rhythmic and Periodic Patterns (RPPs):  None     Electrographic and Electroclinical seizures:  None    Other Clinical Events:  None    Activation Procedures:   -Hyperventilation was not performed.    -Photic stimulation was not performed.    Artifacts:  Intermittent myogenic and movement artifacts were noted.    ECG:  The heart rate on single channel ECG was predominantly between  BPM.  Background slowing, generalized, moderate  EEG Classification / Summary:  Abnormal  EEG in the awake / drowsy / asleep state(s).    Abundant left posterior quadrant sharp wave discharges, max P3/O1  Continuous left hemispheric slowing, max left posterior quadrant  Background slowing, generalized, moderate    Clinical Impression:    Evidence for structural/functional lesion and increased risk for seizures from the left posterior quadrant  Moderate diffuse/multifocal cerebral dysfunction, not specific as to etiology  There were no seizures  recorded.      This is a prelim report only, pending review with attending prior to finalization.          -------------------------------------------------------------------------------------------------------  [facility] EEG Reading Room Ph#: [phone]  Epilepsy Answering Service after 5PM and before 8:30AM: Ph#: [phone]    Simone Rivera M.D.   Epilepsy fellow ABBIE RODRIGUEZ [facility]-[id number]     Study Date: 		03-14-23 08:00 to 03-15-23 11:11  Duration x Hours: 27  --------------------------------------------------------------------------------------------------  History:  CC/ HPI Patient is a 67y old  Male who presents with a chief complaint of encephalopathy (14 Mar 2023 12:59)    MEDICATIONS  (STANDING):  chlorhexidine 2% Cloths 1 Application(s) Topical <User Schedule>  ciprofloxacin     Tablet 500 milliGRAM(s) Oral every 24 hours  coronavirus bivalent (EUA) Booster Vaccine (PFIZER) 0.3 milliLiter(s) IntraMuscular once  furosemide    Tablet 20 milliGRAM(s) Oral daily  insulin lispro (ADMELOG) corrective regimen sliding scale   SubCutaneous every 6 hours  insulin NPH human recombinant 10 Unit(s) SubCutaneous every 6 hours  lactulose Syrup 10 Gram(s) Oral three times a day  levothyroxine Injectable 75 MICROGram(s) IV Push at bedtime  pantoprazole  Injectable 40 milliGRAM(s) IV Push daily  rifAXIMin 550 milliGRAM(s) Oral two times a day  spironolactone 50 milliGRAM(s) Oral daily  tenofovir disoproxil fumarate (VIREAD) 300 milliGRAM(s) Oral daily    --------------------------------------------------------------------------------------------------  Study Interpretation:    [[[Abbreviation Key:  PDR=alpha rhythm/posterior dominant rhythm. A-P=anterior posterior.  Amplitude: ‘very low’:<20; ‘low’:20-49; ‘medium’:; ‘high’:>150uV.  Persistence for periodic/rhythmic patterns (% of epoch) ‘rare’:<1%; ‘occasional’:1-10%; ‘frequent’:10-50%; ‘abundant’:50-90%; ‘continuous’:>90%.  Persistence for sporadic discharges: ‘rare’:<1/hr; ‘occasional’:1/min-1/hr; ‘frequent’:>1/min; ‘abundant’:>1/10 sec.  RPP=rhythmic and periodic patterns; GRDA=generalized rhythmic delta activity; FIRDA=frontal intermittent GRDA; LRDA=lateralized rhythmic delta activity; TIRDA=temporal intermittent rhythmic delta activity;  LPD=PLED=lateralized periodic discharges; GPD=generalized periodic discharges; BIPDs =bilateral independent periodic discharges; Mf=multifocal; SIRPDs=stimulus induced rhythmic, periodic, or ictal appearing discharges; BIRDs=brief potentially ictal rhythmic discharges >4 Hz, lasting .5-10s; PFA (paroxysmal bursts >13 Hz or =8 Hz <10s).  Modifiers: +F=with fast component; +S=with spike component; +R=with rhythmic component.  S-B=burst suppression pattern.  Max=maximal. N1-drowsy; N2-stage II sleep; N3-slow wave sleep. SSS/BETS=small sharp spikes/benign epileptiform transients of sleep. HV=hyperventilation; PS=photic stimulation]]]    Daily EEG Visual Analysis    FINDINGS:      Background:  Continuous: continuous  Symmetry: symmetric  PDR: absent  Reactivity: present  Voltage: normal, mostly 20-150uV  Anterior Posterior Gradient: present  Other background findings: none  Breach: absent    Background Slowing:  Generalized slowing: Continuous diffuse delta and theta  Focal slowing: Continuous left hemispheric delta and theta, max posterior quadrant, with attenuation of fast frequency compared to right    State Changes:   -Drowsiness noted with increased slowing, attenuation of fast activity    Sporadic Epileptiform Discharges:    Abundant left posterior quadrant sharp wave discharges, max P3/O1    Rhythmic and Periodic Patterns (RPPs):  None     Electrographic and Electroclinical seizures:  None    Other Clinical Events:  None    Activation Procedures:   -Hyperventilation was not performed.    -Photic stimulation was not performed.    Artifacts:  Intermittent myogenic and movement artifacts were noted.    ECG:  The heart rate on single channel ECG was predominantly between  BPM.  Background slowing, generalized, moderate  EEG Classification / Summary:  Abnormal  EEG in the awake / drowsy / asleep state(s).    Abundant left posterior quadrant sharp wave discharges, max P3/O1  Continuous left hemispheric slowing, max left posterior quadrant  Background slowing, generalized, moderate    Clinical Impression:    Evidence for structural/functional lesion and increased risk for seizures from the left posterior quadrant  Moderate diffuse/multifocal cerebral dysfunction, not specific as to etiology  There were no seizures  recorded.      -------------------------------------------------------------------------------------------------------  [facility] [place] EEG Reading Room Ph#: [phone]  Epilepsy Answering Service after 5PM and before 8:30AM: Ph#: [phone]    Simone Rivera M.D.   Epilepsy fellow

## 2023-03-15 NOTE — PROGRESS NOTE ADULT - SUBJECTIVE AND OBJECTIVE BOX
Chief Complaint:  Patient is a 67y old  Male who presents with a chief complaint of encephalopathy (14 Mar 2023 12:59)      Interval Events: no acute events overnight  - mental status remains the same - opens eyes to verbal stimuli but does not follow commands or answer questions  - EEG monitor in place      Hospital Medications:  chlorhexidine 2% Cloths 1 Application(s) Topical <User Schedule>  ciprofloxacin     Tablet 500 milliGRAM(s) Oral every 24 hours  coronavirus bivalent (EUA) Booster Vaccine (PFIZER) 0.3 milliLiter(s) IntraMuscular once  dextrose 50% Injectable 25 Gram(s) IV Push once  dextrose 50% Injectable 12.5 Gram(s) IV Push once  dextrose 50% Injectable 25 Gram(s) IV Push once  dextrose Oral Gel 15 Gram(s) Oral once PRN  furosemide    Tablet 20 milliGRAM(s) Oral daily  insulin lispro (ADMELOG) corrective regimen sliding scale   SubCutaneous every 6 hours  insulin NPH human recombinant 10 Unit(s) SubCutaneous every 6 hours  lactulose Syrup 10 Gram(s) Oral three times a day  levothyroxine Injectable 75 MICROGram(s) IV Push at bedtime  LORazepam   Injectable 1.5 milliGRAM(s) IV Push once PRN  pantoprazole  Injectable 40 milliGRAM(s) IV Push daily  rifAXIMin 550 milliGRAM(s) Oral two times a day  spironolactone 50 milliGRAM(s) Oral daily  tenofovir disoproxil fumarate (VIREAD) 300 milliGRAM(s) Oral daily      PMHX/PSHX:  CAD (coronary artery disease)    Diabetes    Lymphoma    Cirrhosis    S/P CABG x 1            ROS: unable to obtain      PHYSICAL EXAM:     GENERAL:  arousable however no meaningful interaction  HEENT:  NC/AT,  conjunctivae clear, scleral icterus, NGT in place, EEG leads in place  CHEST:  Full & symmetric excursion, no increased effort w/ respirations  HEART:  Regular rhythm & rate  ABDOMEN:  Soft, non-tender, non-distended  EXTREMITIES:  no LE  edema  SKIN:  No rash/erythema  NEURO:  arousable however no meaningful interaction, AOx0    Vital Signs:  Vital Signs Last 24 Hrs  T(C): 36.9 (15 Mar 2023 05:15), Max: 36.9 (14 Mar 2023 21:00)  T(F): 98.5 (15 Mar 2023 05:15), Max: 98.5 (14 Mar 2023 21:00)  HR: 96 (15 Mar 2023 05:15) (85 - 96)  BP: 138/62 (15 Mar 2023 05:15) (119/63 - 138/62)  BP(mean): --  RR: 17 (15 Mar 2023 05:15) (17 - 22)  SpO2: 100% (15 Mar 2023 05:15) (98% - 100%)    Parameters below as of 15 Mar 2023 05:15  Patient On (Oxygen Delivery Method): room air      Daily     Daily Weight in k.6 (15 Mar 2023 03:37)    LABS:                        11.8   10.84 )-----------( 188      ( 15 Mar 2023 04:47 )             40.1     03-15    147<H>  |  118<H>  |  64<H>  ----------------------------<  186<H>  4.2   |  18<L>  |  1.08    Ca    9.0      15 Mar 2023 04:47  Phos  3.1     03-15  Mg     2.80     03-15    TPro  5.7<L>  /  Alb  2.6<L>  /  TBili  2.1<H>  /  DBili  x   /  AST  72<H>  /  ALT  55<H>  /  AlkPhos  402<H>  03-14    LIVER FUNCTIONS - ( 14 Mar 2023 04:55 )  Alb: 2.6 g/dL / Pro: 5.7 g/dL / ALK PHOS: 402 U/L / ALT: 55 U/L / AST: 72 U/L / GGT: x           PT/INR - ( 14 Mar 2023 04:55 )   PT: 21.9 sec;   INR: 1.88 ratio                 Imaging:

## 2023-03-15 NOTE — ADVANCED PRACTICE NURSE CONSULT - ASSESSMENT
General: Patient nonverbal, opens eyes spontaneously, bedbound, L NGT, peritubular skin intact, incontinent of soft semiformed stool, incontinence care provided during assessment. Henderson catheter in place. Skin warm, dry with increased moisture in intertriginous folds, adequate skin turgor, scattered stable scabs to L hand.      Left hip - Category III tear (complete loss of epidermis) measuring 4dmch9djr1.2cm exposing 100% pink moist dermis, irregular wound edges. Scant serous drainage, no odor. Periwound with no erythema, no increased warmth, no edema, no induration, no fluctuance no signs or symptoms of overt skin infection.  Goals of care: atraumatic dressing, offload pressure, protect from friction and shear, monitor for tissue type changes.     LLQ abdomen - previous site of paracentesis (confirmed by son at bedside), pinpoint opening draining moderate amount of serous fluid, previous pouching system removed, area cleansed, male urinary pouch placed for containment of drainage.     Right posterior lower leg/achilles - DTPI, area of purple maroon discoloration measuring 1cmx1.2fzo2io, no drainage. Periwound with no erythema, no increased warmth, no edema, no induration, no fluctuance no signs or symptoms of overt skin infection. Goals of care: offload pressure, protect from friction and shear, monitor for tissue type changes.  General: Patient nonverbal, opens eyes spontaneously, bedbound, L NGT, peritubular skin intact, incontinent of soft semiformed stool, incontinence care provided during assessment. Henderson catheter in place. Skin warm, dry with increased moisture in intertriginous folds, adequate skin turgor, scattered stable scabs to L hand.      Left hip - Category III tear (complete loss of epidermis) measuring 6vzu6qnc1.1cm exposing 100% pink moist dermis, irregular wound edges. Scant serous drainage, no odor. Periwound with no erythema, no increased warmth, no edema, no induration, no fluctuance no signs or symptoms of overt skin infection.  Goals of care: atraumatic dressing, offload pressure, protect from friction and shear, monitor for tissue type changes.     LLQ abdomen - previous site of paracentesis (confirmed by son at bedside), pinpoint opening draining moderate amount of serous fluid, previous pouching system removed, area cleansed, male urinary pouch placed for containment of drainage.     Right posterior lower leg/achilles - DTPI, area of purple maroon discoloration measuring 1cmx1.1jfm4th, no drainage. Periwound with no erythema, no increased warmth, no edema, no induration, no fluctuance no signs or symptoms of overt skin infection. Goals of care: offload pressure, protect from friction and shear, monitor for tissue type changes.  General: Patient nonverbal, opens eyes spontaneously, bedbound, L NGT, peritubular skin intact, incontinent of soft semiformed stool, incontinence care provided during assessment. Henderson catheter in place. Skin warm, dry with increased moisture in intertriginous folds, adequate skin turgor, scattered stable scabs to L hand.      Left hip - Category III tear (complete loss of epidermis) measuring 0tjx4izf3.1cm exposing 100% pink moist dermis, irregular wound edges. Scant serous drainage, no odor. Periwound with no erythema, no increased warmth, no edema, no induration, no fluctuance no signs or symptoms of overt skin infection.  Goals of care: atraumatic dressing, offload pressure, protect from friction and shear, monitor for tissue type changes.     LLQ abdomen - previous site of paracentesis (confirmed by son at bedside), pinpoint opening draining moderate amount of serous fluid, previous pouching system removed, area cleansed, male urinary pouch placed for containment of drainage.     Right posterior ankle/achilles - DTPI, area of purple maroon discoloration measuring 1cmx1.5xik6ns, no drainage. Periwound with no erythema, no increased warmth, no edema, no induration, no fluctuance no signs or symptoms of overt skin infection. Goals of care: offload pressure, protect from friction and shear, monitor for tissue type changes.  General: Patient nonverbal, opens eyes spontaneously, bedbound, L NGT, peritubular skin intact, incontinent of soft semiformed stool, incontinence care provided during assessment. Henderson catheter in place. Skin warm, dry with increased moisture in intertriginous folds, adequate skin turgor, scattered stable scabs to L hand.      Left hip - Category III tear (complete loss of epidermis) measuring 5mgv0veo6.1cm exposing 100% pink moist dermis, irregular wound edges. Scant serous drainage, no odor. Periwound with no erythema, no increased warmth, no edema, no induration, no fluctuance no signs or symptoms of overt skin infection.  Goals of care: atraumatic dressing, offload pressure, protect from friction and shear, monitor for tissue type changes.     LLQ abdomen - previous site of paracentesis (confirmed by son at bedside), pinpoint opening draining moderate amount of serous fluid, previous pouching system removed, area cleansed, male urinary pouch placed for containment of drainage.     Right posterior ankle/achilles - DTPI, area of purple maroon discoloration measuring 1cmx1.0wob0mm, no drainage. Periwound with no erythema, no increased warmth, no edema, no induration, no fluctuance no signs or symptoms of overt skin infection. Goals of care: offload pressure, protect from friction and shear, monitor for tissue type changes.     Patient continues to be at high risk for skin breakdown 2/2 poor nutrition, immobility. On assessment patient on a low air loss surface, heel offloading boots in place, Z-flow positioning pillow utilized, moisture management in place with use of one breathable incontinence pad. Turned and positioned per protocol.

## 2023-03-16 NOTE — PROGRESS NOTE ADULT - PROBLEM SELECTOR PLAN 2
acute blood loss anemia c/b hypovolemic shock s/p levophed and ICU  - Hb 6.9 on admit now s/p 6 units of pRBC, 1 unit FFP, 1 unit of platelets all on 2/24/23  - s/p octreotide x 72h (2/24-2/27)   - s/p PPI 40 IV BID change to daily per hepatology  - s/p EGD 2/24: large (> 5 mm) esophageal varices. Incompletely eradicated. Banded. Normal duodenal bulb, first portion of the duodenum and second portion of the duodenum.                 - EGD in 4 wks for likely repeat banding  -SBP ppx cipro

## 2023-03-16 NOTE — PROGRESS NOTE ADULT - ASSESSMENT
67M follows at [facility] Hx decompensated TANG cirrhosis (+ascites, +EV -- previously MELD Na 8), CAD s/p CABG s/p PCI (last in 2012 on ASA, now off plavix x2 weeks), newly diagnosed follicular lymphoma (on rituximab), HTN, DM, currently on tenofovir (HBV Core +) presented with right sided chest/abdominal pain with lizzette hematemesis + clots c/b hemorrhagic shock, s/p intubation in MICU, EGD showing large varices s/p banding, unable to completely clear stomach due to clot now transferred to the floor for further care with hospital course c/b persistent AMS     # AMS - likely 2/2 hypoxia/ischemia seen on MRI with extensive cortical restricted diffusion throughout the cerebral hemispheres including the basal ganglia and insula b/l.   #Hematemesis: s/p EGD 2/24/2023 s/p EVL x6, bleeding likely due to EV, however unable to completely visualize stomach given presence of clotted blood. H/H now stable.   #NANCI (resolved)  #Decompensated TANG cirrhosis: follows at [facility], previously low meld Na 8  --MELD Na: 14  --Ascites: (+) Hx,  was on Furosemide 20mg, spironolactone 50mg. Para 3/15 with 3.6L removed. Neg for SBP  --SBP; no Hx. WBC elevated today with no fevers. Would preform dx para as noted above and repeat infectious workup including CXR, U/A and BCx. Continue with ciprofloxacin 500mg daily for SBP prophylaxis given low ascitic protein  --EV: (+) Hx, no EVB --> is on nadolol at home  --PVT: no Hx  --HCC: no Hx  # HBV core positive - on Tenofovir    Recommendations:  - please consult Neurology  - c/w Rifaximin and lactulose, titrate for 2-3 BMs per day  - No plans for repeat EGD at this time given MRI findings and poor prognosis  - c/w pantoprazole 40 QD  - c/w tenofovir  - trend CMP, CBC, coags    Note not finalized until signed by attending.    Onelia Cee PGY-6  Gastroenterology/Hepatology Fellow  Pager #[phone] (DEBBIE) or [phone] (NS)  Available on [employer] Teams.  Please contact on-call GI fellow via answering service ([phone]) after 5pm and before 8am, and on weekends.  67M follows at [facility] Hx decompensated TANG cirrhosis (+ascites, +EV -- previously MELD Na 8), CAD s/p CABG s/p PCI (last in 2012 on ASA, now off plavix x2 weeks), newly diagnosed follicular lymphoma (on rituximab), HTN, DM, currently on tenofovir (HBV Core +) presented with right sided chest/abdominal pain with lizzette hematemesis + clots c/b hemorrhagic shock, s/p intubation in MICU, EGD showing large varices s/p banding, unable to completely clear stomach due to clot now transferred to the floor for further care with hospital course c/b persistent AMS     # AMS - likely 2/2 hypoxia/ischemia seen on MRI with extensive cortical restricted diffusion throughout the cerebral hemispheres including the basal ganglia and insula b/l.   #Hematemesis: s/p EGD 2/24/2023 s/p EVL x6, bleeding likely due to EV, however unable to completely visualize stomach given presence of clotted blood. H/H now stable.   #NANCI (resolved)  #Decompensated TANG cirrhosis: follows at [facility], previously low meld Na 8  --MELD Na: 14  --Ascites: (+) Hx,  was on Furosemide 20mg, spironolactone 50mg. Para 3/15 with 3.6L removed. Neg for SBP  --SBP; no Hx. WBC elevated today with no fevers. Would preform dx para as noted above and repeat infectious workup including CXR, U/A and BCx. Continue with ciprofloxacin 500mg daily for SBP prophylaxis given low ascitic protein  --EV: (+) Hx, no EVB --> is on nadolol at home  --PVT: no Hx  --HCC: no Hx  # HBV core positive - on Tenofovir    Recommendations:  - please consult Neurology  - c/w Rifaximin  - as encephalopathy is not due to HE, would limit lactulose to prevent diarrhea and skin breakdown - having 1-2 BM/day per I/Os  - No plans for repeat EGD at this time given MRI findings and poor prognosis however patient remains at risk for variceal hemorrhage as they were not completely eradicated  - c/w pantoprazole 40 QD  - c/w tenofovir  - GOC per primary team given poor prognosis  - trend CMP, CBC, coags    Note not finalized until signed by attending.    Onelia Cee PGY-6  Gastroenterology/Hepatology Fellow  Pager #[phone] (DEBBIE) or [phone] (NS)  Available on [employer] Teams.  Please contact on-call GI fellow via answering service ([phone]) after 5pm and before 8am, and on weekends.

## 2023-03-16 NOTE — PROGRESS NOTE ADULT - PROBLEM SELECTOR PLAN 7
HbA1c 6.1% likely underestimated due transfusions given insulin needs  - BG above goal   - Inc NPH 15 units q6h   - c/w TF Glucerna

## 2023-03-16 NOTE — CONSULT NOTE ADULT - ASSESSMENT
Assessment:  67y (1955) man with a PMHx significant for HTN, DM2, hypothyroidism, CAD s/p CABG, TANG cirrhosis, follicular lymphoma (on Rituximab OP), chronic Hep B on tenofovir (HBV Core +), presented to the ED w/ chest pain and constipation while in ED developed hematemesis s/p MICU admission for acute blood loss anemia c/b hypovolemic shock  intubated for airway protection (extubated 3/8) s/p 2/24 bedside EGD with banding esophageal varices now with persistent encephalopathy. Neurology consulted for persistent encephalopathy. Son at bedside. He reports that has been non verbal since the original incident during the beginning of his admission. Patient will nod at sons or people talking at him but will not speak. Son has not witnessed spontaneous movement of limbs. Prior to this admission son reports he was living independently and taking care of his own affairs. Exam concerning for no spontaneous movement of verbal ouput with some primitive reflexes & increased tone. MRI shows cortical and subcortical damage to brain likely related to anoxic brain injury due to hypoperfusion. Findings and possible outcomes were discussed with son, myself, and neurology attending. Damage from anoxic brain injury may be permanent or minimal recovery, difficulty to predict prognosis.     Impression: fluctuating but diminished mental status due to encephalopathic state 2/2 anxious brain injury due to hypoperfusion      Plan   [] PT/OT evalution  [] speech and swallow evaluation  [] hypernatremia & elevated LFTs per primary team   [] no further inpatient neurological work up    Case seen and examined at bedside with Neurology Attending, Dr. Marisela Robin     Assessment:  67y (1955) man with a PMHx significant for HTN, DM2, hypothyroidism, CAD s/p CABG, TANG cirrhosis, follicular lymphoma (on Rituximab OP), chronic Hep B on tenofovir (HBV Core +), presented to the ED w/ chest pain and constipation while in ED developed hematemesis s/p MICU admission for acute blood loss anemia c/b hypovolemic shock  intubated for airway protection (extubated 3/8) s/p 2/24 bedside EGD with banding esophageal varices now with persistent encephalopathy. Neurology consulted for persistent encephalopathy. Son at bedside. He reports that has been non verbal since the original incident during the beginning of his admission. Patient will nod at sons or people talking at him but will not speak. Son has not witnessed spontaneous movement of limbs. Prior to this admission son reports he was living independently and taking care of his own affairs. Exam concerning for no spontaneous movement of verbal ouput with some primitive reflexes & increased tone. MRI shows cortical and subcortical damage to brain likely related to anoxic ischemic brain injury due to hypoperfusion. Findings and possible outcomes were discussed with son, myself, and neurology attending. Damage from anoxic brain injury may be permanent or minimal recovery, difficulty to predict prognosis.     Impression: fluctuating but diminished mental status due to encephalopathic state 2/2 anoxic ischemic brain injury due to hypoperfusion      Plan   [] PT/OT evalution  [] speech and swallow evaluation  [] Rx for hypernatremia & elevated LFTs per primary team   [] no further inpatient neurological work up    Case seen and examined at bedside with Neurology Attending, Dr. Marisela Robin

## 2023-03-16 NOTE — PROGRESS NOTE ADULT - ATTENDING COMMENTS
Patient with abnormal mental status not typical for hepatic encephalopathy. Await neuro evaluation, but it appears that patient has had severe anoxic brain injury.  Patient does have cirrhosis with portal hypertension and is at risk for hepatic complications which will need to be addressed in the context of this patient's goals of care.   Encephalopathy: continue use of rifaximin and use lactulose ase needed but avoid toxicity such as diarrhea or excessive intestinal gas.  esophageal varices: patient at risk for repeat variceal bleeding, but would not recommend endoscopy now and further procedures will need to be planned in conjunction with GOC.  Ascites: Patient will likely have recurrent and persisting ascites. May require recurrent paracentesis or use of diuretics.  Currently serum sodium 152 and suggest renal consult or free water supplement. Monitor urine output. Patient with abnormal mental status not typical for hepatic encephalopathy. Await neuro evaluation, but it appears that patient has had severe anoxic brain injury.  Patient does have cirrhosis with portal hypertension and is at risk for hepatic complications which will need to be addressed in the context of this patient's goals of care. Significant elevation of alk phos may reflect the involvement of liver with lymphoma.  Ultrasound did not show intrahepatic bile duct dilation.  Encephalopathy: continue use of rifaximin and use lactulose ase needed but avoid toxicity such as diarrhea or excessive intestinal gas.  esophageal varices: patient at risk for repeat variceal bleeding, but would not recommend endoscopy now and further procedures will need to be planned in conjunction with GOC.  Ascites: Patient will likely have recurrent and persisting ascites. May require recurrent paracentesis or use of diuretics.  Currently serum sodium 152 and suggest renal consult or free water supplement. Monitor urine output.

## 2023-03-16 NOTE — PROGRESS NOTE ADULT - PROBLEM SELECTOR PLAN 3
decompensated with ascites, with variceal bleed, with PSE; MELD 12 3/10/23  - s/p diagnostic para (2/25) and therapeutic para (3/5) removed 4.5L  s/p diag/therapeutic tap on 3/15, removed 3.6L, neg for SBP  - c/w rifaximin BID, lactulose q4h, monitor BMs   - SBP ppx with Cipro  - per hepatology resume Lasix 20 mg daily and Spironolactone 50 mg daily  - hepatology following  - LFT uptrending, RUQ w/o pathology on 3/10  - MRI abdomen at [facility] 12/2022 no HCC decompensated with ascites, with variceal bleed, with PSE; MELD 12 3/10/23  - s/p diagnostic para (2/25) and therapeutic para (3/5) removed 4.5L  s/p diag/therapeutic tap on 3/15, removed 3.6L, neg for SBP  - c/w rifaximin BID, lactulose q4h, monitor BMs   - SBP ppx with Cipro  - per hepatology cw Spironolactone 50 mg daily, hold lasix  - hepatology following  - LFT uptrending, RUQ w/o pathology on 3/10  - MRI abdomen at [facility] 12/2022 no HCC decompensated with ascites, with variceal bleed, with PSE; MELD 12 3/10/23  - s/p diagnostic para (2/25) and therapeutic para (3/5) removed 4.5L  s/p diag/therapeutic tap on 3/15, removed 3.6L, neg for SBP  - c/w rifaximin BID, lactulose q4h, monitor BMs   - SBP ppx with Cipro  - per hepatology cw Spironolactone 50 mg daily, lasix 20 mg qd  - hepatology following  - LFT uptrending, RUQ w/o pathology on 3/10  - MRI abdomen at [facility] 12/2022 no HCC

## 2023-03-16 NOTE — PROGRESS NOTE ADULT - SUBJECTIVE AND OBJECTIVE BOX
Chief Complaint:  Patient is a 67y old  Male who presents with a chief complaint of encephalopathy (15 Mar 2023 14:25)      Interval Events:       Hospital Medications:  chlorhexidine 2% Cloths 1 Application(s) Topical <User Schedule>  ciprofloxacin     Tablet 500 milliGRAM(s) Oral every 24 hours  coronavirus bivalent (EUA) Booster Vaccine (PFIZER) 0.3 milliLiter(s) IntraMuscular once  dextrose 50% Injectable 25 Gram(s) IV Push once  dextrose 50% Injectable 12.5 Gram(s) IV Push once  dextrose 50% Injectable 25 Gram(s) IV Push once  dextrose Oral Gel 15 Gram(s) Oral once PRN  insulin lispro (ADMELOG) corrective regimen sliding scale   SubCutaneous every 6 hours  insulin NPH human recombinant 15 Unit(s) SubCutaneous every 6 hours  lactulose Syrup 10 Gram(s) Oral three times a day  levothyroxine Injectable 75 MICROGram(s) IV Push at bedtime  LORazepam   Injectable 1.5 milliGRAM(s) IV Push once PRN  mupirocin 2% Ointment 1 Application(s) Both Nostrils two times a day  pantoprazole  Injectable 40 milliGRAM(s) IV Push daily  rifAXIMin 550 milliGRAM(s) Oral two times a day  spironolactone 50 milliGRAM(s) Oral daily  tenofovir disoproxil fumarate (VIREAD) 300 milliGRAM(s) Oral daily      PMHX/PSHX:  CAD (coronary artery disease)    Diabetes    Lymphoma    Cirrhosis    S/P CABG x 1            ROS: unable to obtain      PHYSICAL EXAM:     GENERAL:  arousable however no meaningful interaction  HEENT:  NC/AT,  conjunctivae clear, scleral icterus, NGT in place, EEG leads in place  CHEST:  Full & symmetric excursion, no increased effort w/ respirations  HEART:  Regular rhythm & rate  ABDOMEN:  Soft, non-tender, non-distended  EXTREMITIES:  no LE  edema  SKIN:  No rash/erythema  NEURO:  arousable however no meaningful interaction, AOx0    Vital Signs:  Vital Signs Last 24 Hrs  T(C): 36.6 (16 Mar 2023 08:55), Max: 36.7 (15 Mar 2023 14:00)  T(F): 97.9 (16 Mar 2023 08:55), Max: 98.1 (16 Mar 2023 04:42)  HR: 93 (16 Mar 2023 08:55) (88 - 95)  BP: 113/63 (16 Mar 2023 08:55) (105/63 - 118/62)  BP(mean): --  RR: 18 (16 Mar 2023 08:55) (17 - 18)  SpO2: 100% (16 Mar 2023 08:55) (98% - 100%)    Parameters below as of 16 Mar 2023 08:55  Patient On (Oxygen Delivery Method): room air      Daily     Daily     LABS:                        12.8   7.86  )-----------( 191      ( 16 Mar 2023 07:56 )             43.2     03-16    152<H>  |  119<H>  |  75<H>  ----------------------------<  291<H>  5.1   |  21<L>  |  1.18    Ca    9.0      16 Mar 2023 07:56  Phos  3.1     03-16  Mg     3.10     03-16    TPro  6.0  /  Alb  2.5<L>  /  TBili  1.9<H>  /  DBili  1.0<H>  /  AST  94<H>  /  ALT  78<H>  /  AlkPhos  496<H>  03-16    LIVER FUNCTIONS - ( 16 Mar 2023 07:56 )  Alb: 2.5 g/dL / Pro: 6.0 g/dL / ALK PHOS: 496 U/L / ALT: 78 U/L / AST: 94 U/L / GGT: x           PT/INR - ( 16 Mar 2023 07:56 )   PT: 20.8 sec;   INR: 1.78 ratio         PTT - ( 16 Mar 2023 07:56 )  PTT:28.0 sec        Imaging:    < from: MR Head w/wo IV Cont (03.15.23 @ 19:40) >  FINDINGS:  There is extensive diffusion restriction involving the bilateral basal   ganglia, bilateral insulate,bilateral frontal lobes, bilateral cingulate   gyri, left greater than right parietal lobes. The cortex is predominantly   involved. No abnormal intracranial enhancement is identified.    No acute intracranial hemorrhage is identified.  No hydrocephalus. No extra-axial fluid collections. The skull base flow   voids are present.    The visualized intraorbital contents are normal. The imaged portions of   the paranasal sinuses are clear. Bilateral mastoid effusions are present.   The visualized osseous structures, soft tissues and partially visualized   parotid glands appear normal.    IMPRESSION:    Extensive cortical predominant restricted diffusion throughout the   cerebral hemispheres including involvement of the basal ganglia and   insula bilaterally. Given history of hematemesis with hypotension,   hypoxic-ischemic injury is favored. Differential also includes postictal   sequelae, Creutzfeldt-Gonzalez disease, encephalitis or lymphomatous   involvement. CSF sampling should be considered.    --- End of Report ---    < end of copied text >           Chief Complaint:  Patient is a 67y old  Male who presents with a chief complaint of encephalopathy (15 Mar 2023 14:25)      Interval Events: s/p para yesterday with 3.6L removed, neg for SBP  - MRI done with likely hypoxic injury  - remains unresponsive      Hospital Medications:  chlorhexidine 2% Cloths 1 Application(s) Topical <User Schedule>  ciprofloxacin     Tablet 500 milliGRAM(s) Oral every 24 hours  coronavirus bivalent (EUA) Booster Vaccine (PFIZER) 0.3 milliLiter(s) IntraMuscular once  dextrose 50% Injectable 25 Gram(s) IV Push once  dextrose 50% Injectable 12.5 Gram(s) IV Push once  dextrose 50% Injectable 25 Gram(s) IV Push once  dextrose Oral Gel 15 Gram(s) Oral once PRN  insulin lispro (ADMELOG) corrective regimen sliding scale   SubCutaneous every 6 hours  insulin NPH human recombinant 15 Unit(s) SubCutaneous every 6 hours  lactulose Syrup 10 Gram(s) Oral three times a day  levothyroxine Injectable 75 MICROGram(s) IV Push at bedtime  LORazepam   Injectable 1.5 milliGRAM(s) IV Push once PRN  mupirocin 2% Ointment 1 Application(s) Both Nostrils two times a day  pantoprazole  Injectable 40 milliGRAM(s) IV Push daily  rifAXIMin 550 milliGRAM(s) Oral two times a day  spironolactone 50 milliGRAM(s) Oral daily  tenofovir disoproxil fumarate (VIREAD) 300 milliGRAM(s) Oral daily      PMHX/PSHX:  CAD (coronary artery disease)    Diabetes    Lymphoma    Cirrhosis    S/P CABG x 1            ROS: unable to obtain      PHYSICAL EXAM:     GENERAL:  arousable however no meaningful interaction  HEENT:  NC/AT,  conjunctivae clear, scleral icterus, NGT in place, EEG leads in place  CHEST:  Full & symmetric excursion, no increased effort w/ respirations  HEART:  Regular rhythm & rate  ABDOMEN:  Soft, non-tender, non-distended  EXTREMITIES:  no LE  edema  SKIN:  No rash/erythema  NEURO:  arousable however no meaningful interaction, AOx0    Vital Signs:  Vital Signs Last 24 Hrs  T(C): 36.6 (16 Mar 2023 08:55), Max: 36.7 (15 Mar 2023 14:00)  T(F): 97.9 (16 Mar 2023 08:55), Max: 98.1 (16 Mar 2023 04:42)  HR: 93 (16 Mar 2023 08:55) (88 - 95)  BP: 113/63 (16 Mar 2023 08:55) (105/63 - 118/62)  BP(mean): --  RR: 18 (16 Mar 2023 08:55) (17 - 18)  SpO2: 100% (16 Mar 2023 08:55) (98% - 100%)    Parameters below as of 16 Mar 2023 08:55  Patient On (Oxygen Delivery Method): room air      Daily     Daily     LABS:                        12.8   7.86  )-----------( 191      ( 16 Mar 2023 07:56 )             43.2     03-16    152<H>  |  119<H>  |  75<H>  ----------------------------<  291<H>  5.1   |  21<L>  |  1.18    Ca    9.0      16 Mar 2023 07:56  Phos  3.1     03-16  Mg     3.10     03-16    TPro  6.0  /  Alb  2.5<L>  /  TBili  1.9<H>  /  DBili  1.0<H>  /  AST  94<H>  /  ALT  78<H>  /  AlkPhos  496<H>  03-16    LIVER FUNCTIONS - ( 16 Mar 2023 07:56 )  Alb: 2.5 g/dL / Pro: 6.0 g/dL / ALK PHOS: 496 U/L / ALT: 78 U/L / AST: 94 U/L / GGT: x           PT/INR - ( 16 Mar 2023 07:56 )   PT: 20.8 sec;   INR: 1.78 ratio         PTT - ( 16 Mar 2023 07:56 )  PTT:28.0 sec        Imaging:    < from: MR Head w/wo IV Cont (03.15.23 @ 19:40) >  FINDINGS:  There is extensive diffusion restriction involving the bilateral basal   ganglia, bilateral insulate,bilateral frontal lobes, bilateral cingulate   gyri, left greater than right parietal lobes. The cortex is predominantly   involved. No abnormal intracranial enhancement is identified.    No acute intracranial hemorrhage is identified.  No hydrocephalus. No extra-axial fluid collections. The skull base flow   voids are present.    The visualized intraorbital contents are normal. The imaged portions of   the paranasal sinuses are clear. Bilateral mastoid effusions are present.   The visualized osseous structures, soft tissues and partially visualized   parotid glands appear normal.    IMPRESSION:    Extensive cortical predominant restricted diffusion throughout the   cerebral hemispheres including involvement of the basal ganglia and   insula bilaterally. Given history of hematemesis with hypotension,   hypoxic-ischemic injury is favored. Differential also includes postictal   sequelae, Creutzfeldt-Gonzalez disease, encephalitis or lymphomatous   involvement. CSF sampling should be considered.    --- End of Report ---    < end of copied text >

## 2023-03-16 NOTE — PROGRESS NOTE ADULT - SUBJECTIVE AND OBJECTIVE BOX
Dr. Paulette Carias  Pager [phone]    PROGRESS NOTE:     Patient is a 67y old  Male who presents with a chief complaint of encephalopathy (15 Mar 2023 14:25)      SUBJECTIVE / OVERNIGHT EVENTS: remains encephalopathic  ADDITIONAL REVIEW OF SYSTEMS: afebrile     MEDICATIONS  (STANDING):  chlorhexidine 2% Cloths 1 Application(s) Topical <User Schedule>  ciprofloxacin     Tablet 500 milliGRAM(s) Oral every 24 hours  coronavirus bivalent (EUA) Booster Vaccine (PFIZER) 0.3 milliLiter(s) IntraMuscular once  dextrose 50% Injectable 25 Gram(s) IV Push once  dextrose 50% Injectable 12.5 Gram(s) IV Push once  dextrose 50% Injectable 25 Gram(s) IV Push once  insulin lispro (ADMELOG) corrective regimen sliding scale   SubCutaneous every 6 hours  insulin NPH human recombinant 15 Unit(s) SubCutaneous every 6 hours  lactulose Syrup 30 Gram(s) Oral three times a day  levothyroxine Injectable 75 MICROGram(s) IV Push at bedtime  mupirocin 2% Ointment 1 Application(s) Both Nostrils two times a day  pantoprazole  Injectable 40 milliGRAM(s) IV Push daily  rifAXIMin 550 milliGRAM(s) Oral two times a day  spironolactone 50 milliGRAM(s) Oral daily  tenofovir disoproxil fumarate (VIREAD) 300 milliGRAM(s) Oral daily    MEDICATIONS  (PRN):  dextrose Oral Gel 15 Gram(s) Oral once PRN Blood Glucose LESS THAN 70 milliGRAM(s)/deciliter  LORazepam   Injectable 1.5 milliGRAM(s) IV Push once PRN pre-MRI      CAPILLARY BLOOD GLUCOSE      POCT Blood Glucose.: 297 mg/dL (16 Mar 2023 11:57)  POCT Blood Glucose.: 253 mg/dL (16 Mar 2023 06:17)  POCT Blood Glucose.: 231 mg/dL (16 Mar 2023 00:30)  POCT Blood Glucose.: 183 mg/dL (15 Mar 2023 19:56)  POCT Blood Glucose.: 195 mg/dL (15 Mar 2023 17:59)    I&O's Summary    15 Mar 2023 07:01  -  16 Mar 2023 07:00  --------------------------------------------------------  IN: 150 mL / OUT: 420 mL / NET: -270 mL    16 Mar 2023 07:01  -  16 Mar 2023 12:44  --------------------------------------------------------  IN: 300 mL / OUT: 0 mL / NET: 300 mL        PHYSICAL EXAM:  Vital Signs Last 24 Hrs  T(C): 36.6 (16 Mar 2023 08:55), Max: 36.7 (15 Mar 2023 14:00)  T(F): 97.9 (16 Mar 2023 08:55), Max: 98.1 (16 Mar 2023 04:42)  HR: 93 (16 Mar 2023 08:55) (88 - 95)  BP: 113/63 (16 Mar 2023 08:55) (105/63 - 118/62)  BP(mean): --  RR: 18 (16 Mar 2023 08:55) (17 - 18)  SpO2: 100% (16 Mar 2023 08:55) (98% - 100%)    Parameters below as of 16 Mar 2023 08:55  Patient On (Oxygen Delivery Method): room air      GENERAL: ill-appearing, thin  HEENT: MMM dry, +scleral icterus ., NGT in place  CHEST/LUNG: Clear to auscultation bilaterally; No wheeze  HEART: Regular rate and rhythm; systolic murmur  ABDOMEN: Soft, Nontender, mild distended, no r/g; Bowel sounds present, ? para site leaking with ostomy bag over  EXTREMITIES:   SCD, thigh edema  mcconnell in place   PSYCH: eyes open, will not follow commands or speak  NEUROLOGY: passive ROM, pushes back with passive ROM of arms       LABS:                        12.8   7.86  )-----------( 191      ( 16 Mar 2023 07:56 )             43.2     03-16    152<H>  |  119<H>  |  75<H>  ----------------------------<  291<H>  5.1   |  21<L>  |  1.18    Ca    9.0      16 Mar 2023 07:56  Phos  3.1     03-16  Mg     3.10     03-16    TPro  6.0  /  Alb  2.5<L>  /  TBili  1.9<H>  /  DBili  1.0<H>  /  AST  94<H>  /  ALT  78<H>  /  AlkPhos  496<H>  03-16    PT/INR - ( 16 Mar 2023 07:56 )   PT: 20.8 sec;   INR: 1.78 ratio         PTT - ( 16 Mar 2023 07:56 )  PTT:28.0 sec          Culture - Fungal, Body Fluid (collected 15 Mar 2023 12:55)  Source: Peritoneal Peritoneal Fluid  Preliminary Report (16 Mar 2023 12:01):    Testing in progress    Culture - Body Fluid with Gram Stain (collected 15 Mar 2023 12:55)  Source: Peritoneal Peritoneal Fluid  Gram Stain (15 Mar 2023 17:32):    polymorphonuclear leukocytes seen    No organisms seen    by cytocentrifuge        RADIOLOGY & ADDITIONAL TESTS:  Results Reviewed:   Imaging Personally Reviewed:    Electrocardiogram Personally Reviewed:    COORDINATION OF CARE:  Care Discussed with Consultants/Other Providers [Y/N]: acp Jesse, increase nph to 15u q6h, neurology consult  Prior or Outpatient Records Reviewed [Y/N]:   Dr. Paulette Carias  Pager [phone]    PROGRESS NOTE:     Patient is a 67y old  Male who presents with a chief complaint of encephalopathy (15 Mar 2023 14:25)      SUBJECTIVE / OVERNIGHT EVENTS: remains encephalopathic  ADDITIONAL REVIEW OF SYSTEMS: afebrile     MEDICATIONS  (STANDING):  chlorhexidine 2% Cloths 1 Application(s) Topical <User Schedule>  ciprofloxacin     Tablet 500 milliGRAM(s) Oral every 24 hours  coronavirus bivalent (EUA) Booster Vaccine (PFIZER) 0.3 milliLiter(s) IntraMuscular once  dextrose 50% Injectable 25 Gram(s) IV Push once  dextrose 50% Injectable 12.5 Gram(s) IV Push once  dextrose 50% Injectable 25 Gram(s) IV Push once  insulin lispro (ADMELOG) corrective regimen sliding scale   SubCutaneous every 6 hours  insulin NPH human recombinant 15 Unit(s) SubCutaneous every 6 hours  lactulose Syrup 30 Gram(s) Oral three times a day  levothyroxine Injectable 75 MICROGram(s) IV Push at bedtime  mupirocin 2% Ointment 1 Application(s) Both Nostrils two times a day  pantoprazole  Injectable 40 milliGRAM(s) IV Push daily  rifAXIMin 550 milliGRAM(s) Oral two times a day  spironolactone 50 milliGRAM(s) Oral daily  tenofovir disoproxil fumarate (VIREAD) 300 milliGRAM(s) Oral daily    MEDICATIONS  (PRN):  dextrose Oral Gel 15 Gram(s) Oral once PRN Blood Glucose LESS THAN 70 milliGRAM(s)/deciliter  LORazepam   Injectable 1.5 milliGRAM(s) IV Push once PRN pre-MRI      CAPILLARY BLOOD GLUCOSE      POCT Blood Glucose.: 297 mg/dL (16 Mar 2023 11:57)  POCT Blood Glucose.: 253 mg/dL (16 Mar 2023 06:17)  POCT Blood Glucose.: 231 mg/dL (16 Mar 2023 00:30)  POCT Blood Glucose.: 183 mg/dL (15 Mar 2023 19:56)  POCT Blood Glucose.: 195 mg/dL (15 Mar 2023 17:59)    I&O's Summary    15 Mar 2023 07:01  -  16 Mar 2023 07:00  --------------------------------------------------------  IN: 150 mL / OUT: 420 mL / NET: -270 mL    16 Mar 2023 07:01  -  16 Mar 2023 12:44  --------------------------------------------------------  IN: 300 mL / OUT: 0 mL / NET: 300 mL        PHYSICAL EXAM:  Vital Signs Last 24 Hrs  T(C): 36.6 (16 Mar 2023 08:55), Max: 36.7 (15 Mar 2023 14:00)  T(F): 97.9 (16 Mar 2023 08:55), Max: 98.1 (16 Mar 2023 04:42)  HR: 93 (16 Mar 2023 08:55) (88 - 95)  BP: 113/63 (16 Mar 2023 08:55) (105/63 - 118/62)  BP(mean): --  RR: 18 (16 Mar 2023 08:55) (17 - 18)  SpO2: 100% (16 Mar 2023 08:55) (98% - 100%)    Parameters below as of 16 Mar 2023 08:55  Patient On (Oxygen Delivery Method): room air      GENERAL: ill-appearing, thin  HEENT: MMM dry, +scleral icterus ., NGT in place  CHEST/LUNG: Clear to auscultation bilaterally; No wheeze  HEART: Regular rate and rhythm; systolic murmur  ABDOMEN: Soft, Nontender, mild distended, no r/g; Bowel sounds present, ? para site leaking with ostomy bag over  EXTREMITIES:   SCD, thigh edema  mcconnell removed  PSYCH: eyes open, will not follow commands or speak  NEUROLOGY: passive ROM, pushes back with passive ROM of arms       LABS:                        12.8   7.86  )-----------( 191      ( 16 Mar 2023 07:56 )             43.2     03-16    152<H>  |  119<H>  |  75<H>  ----------------------------<  291<H>  5.1   |  21<L>  |  1.18    Ca    9.0      16 Mar 2023 07:56  Phos  3.1     03-16  Mg     3.10     03-16    TPro  6.0  /  Alb  2.5<L>  /  TBili  1.9<H>  /  DBili  1.0<H>  /  AST  94<H>  /  ALT  78<H>  /  AlkPhos  496<H>  03-16    PT/INR - ( 16 Mar 2023 07:56 )   PT: 20.8 sec;   INR: 1.78 ratio         PTT - ( 16 Mar 2023 07:56 )  PTT:28.0 sec          Culture - Fungal, Body Fluid (collected 15 Mar 2023 12:55)  Source: Peritoneal Peritoneal Fluid  Preliminary Report (16 Mar 2023 12:01):    Testing in progress    Culture - Body Fluid with Gram Stain (collected 15 Mar 2023 12:55)  Source: Peritoneal Peritoneal Fluid  Gram Stain (15 Mar 2023 17:32):    polymorphonuclear leukocytes seen    No organisms seen    by cytocentrifuge        RADIOLOGY & ADDITIONAL TESTS:  Results Reviewed:   Imaging Personally Reviewed:  < from: MR Head w/wo IV Cont (03.15.23 @ 19:40) >    Extensive cortical predominant restricted diffusion throughout the   cerebral hemispheres including involvement of the basal ganglia and   insula bilaterally. Given history of hematemesis with hypotension,   hypoxic-ischemic injury is favored. Differential also includes postictal   sequelae, Creutzfeldt-Gonzalez disease, encephalitis or lymphomatous   involvement. CSF sampling should be considered.    Electrocardiogram Personally Reviewed:    COORDINATION OF CARE:  Care Discussed with Consultants/Other Providers [Y/N]: acp Jesse, increase nph to 15u q6h, neurology consult  Prior or Outpatient Records Reviewed [Y/N]:

## 2023-03-16 NOTE — PROGRESS NOTE ADULT - PROBLEM SELECTOR PLAN 10
Confirmed full code with son at bedside on 3/10  prophylactic heparin  will need PT when able to cooperate  functional quadriplegia c/w full care and turns, TF

## 2023-03-16 NOTE — PROGRESS NOTE ADULT - PROBLEM SELECTOR PLAN 1
- likely d/t hypoxia/anoxic brain ischemia in s/o hemorrhagic shock on admission  - MRI brain 3/15 extensive cortical restricted diffusion throughout the cerebral hemispheres including the basal ganglia and insula b/l.    - CTH (3/4/23): no acute pathology  - correct hypernatremia with inc free water to 300ml q4h, trend Na, check urine lytes/osm  - I called neurology consult to Dr. Robin, f/u recs  - EEG (3/5/23): Abnormal EEG study.  Potential epileptogenic focus in the left posterior head region. Structural of functional abnormality in the left posterior head region. Moderate nonspecific diffuse or multifocal cerebral dysfunction. No seizure seen.     repeat EEG (3/14) frequent left posterior quadrant periodic discharges, risk of seizure has decreased compared to EEG from 3/5. No seizures recorded.   - c/w rifaximin 550 mg BID,  inc lactulose to 30g tid titrate to 3-4 BMs, stool count, repeat ammonia level 25  - paracentesis x 3 neg for SBP  - remains encephalopathic, failed S&S, c/w NGT feeds, aspiration precautions  - TSH wnl  - trial of IV thiamine  - CXR no infiltrate, c/w TF via NGT - likely d/t hypoxia/anoxic brain ischemia in s/o hemorrhagic shock on admission  - MRI brain 3/15 extensive cortical restricted diffusion throughout the cerebral hemispheres including the basal ganglia and insula b/l.    - CTH (3/4/23): no acute pathology  - correct hypernatremia with inc free water to 300ml q4h, trend Na, check urine lytes/osm  - I called neurology consult to Dr. Robin, f/u recs  - EEG (3/5/23): Abnormal EEG study.  Potential epileptogenic focus in the left posterior head region. Structural of functional abnormality in the left posterior head region. Moderate nonspecific diffuse or multifocal cerebral dysfunction. No seizure seen.     repeat EEG (3/14) frequent left posterior quadrant periodic discharges, risk of seizure has decreased compared to EEG from 3/5. No seizures recorded.   - c/w rifaximin 550 mg BID,  inc lactulose to 30g tid titrate to 3-4 BMs, stool count, repeat ammonia level 25  - paracentesis x 3 neg for SBP  - remains encephalopathic, failed S&S, c/w NGT feeds, aspiration precautions  - TSH wnl  - trial of IV thiamine  - CXR no infiltrate, c/w TF via NGT  - overall prognosis remains poor, remains full code, updated son on 3/16

## 2023-03-16 NOTE — CONSULT NOTE ADULT - SUBJECTIVE AND OBJECTIVE BOX
Neurology - Consult Note    -  Spectra: [phone] ([facility]), [phone] ([facility])  -    HPI: Patient ABBIE RODRIGUEZ is a 67y (1955) man with a PMHx significant for HTN, DM2, hypothyroidism, CAD s/p CABG, TANG cirrhosis, follicular lymphoma (on Rituximab OP), chronic Hep B on tenofovir (HBV Core +), presented to the ED w/ chest pain and constipation while in ED developed hematemesis s/p MICU admission for acute blood loss anemia c/b hypovolemic shock  intubated for airway protection (extubated 3/8) s/p 2/24 bedside EGD with banding esophageal varices now with persistent encephalopathy.     Review of Systems:     Allergies:  [This allergen will not trigger allergy alert] &quot;lentils&quot;-&quot;itchiness&quot; (Other)  Beef (Other)      PMHx/PSHx/Family Hx: As above, otherwise see below   CAD (coronary artery disease)  Diabetes  Lymphoma  Cirrhosis      Social Hx:  No current use of tobacco, alcohol, or illicit drugs      Medications:  MEDICATIONS  (STANDING):  chlorhexidine 2% Cloths 1 Application(s) Topical <User Schedule>  ciprofloxacin     Tablet 500 milliGRAM(s) Oral every 24 hours  coronavirus bivalent (EUA) Booster Vaccine (PFIZER) 0.3 milliLiter(s) IntraMuscular once  dextrose 50% Injectable 25 Gram(s) IV Push once  dextrose 50% Injectable 12.5 Gram(s) IV Push once  dextrose 50% Injectable 25 Gram(s) IV Push once  furosemide    Tablet 20 milliGRAM(s) Oral daily  heparin   Injectable 5000 Unit(s) SubCutaneous every 12 hours  insulin lispro (ADMELOG) corrective regimen sliding scale   SubCutaneous every 6 hours  insulin NPH human recombinant 15 Unit(s) SubCutaneous every 6 hours  lactulose Syrup 30 Gram(s) Oral three times a day  levothyroxine Injectable 75 MICROGram(s) IV Push at bedtime  mupirocin 2% Ointment 1 Application(s) Both Nostrils two times a day  pantoprazole  Injectable 40 milliGRAM(s) IV Push daily  rifAXIMin 550 milliGRAM(s) Oral two times a day  spironolactone 50 milliGRAM(s) Oral daily  tenofovir disoproxil fumarate (VIREAD) 300 milliGRAM(s) Oral daily    MEDICATIONS  (PRN):  dextrose Oral Gel 15 Gram(s) Oral once PRN Blood Glucose LESS THAN 70 milliGRAM(s)/deciliter  LORazepam   Injectable 1.5 milliGRAM(s) IV Push once PRN pre-MRI    Vitals:  T(C): 36.7 (03-16-23 @ 12:50), Max: 36.7 (03-15-23 @ 14:00)  HR: 92 (03-16-23 @ 12:50) (88 - 95)  BP: 100/58 (03-16-23 @ 12:50) (100/58 - 118/62)  RR: 18 (03-16-23 @ 12:50) (17 - 18)  SpO2: 98% (03-16-23 @ 12:50) (98% - 100%)      Neurologic Exam:  Mental status - Awake, Alert, Oriented to person, place, and time. Speech fluent, repetition and naming intact. Follows simple and complex commands. Attention/concentration, recent and remote memory (including registration and recall), and fund of knowledge intact    Cranial nerves - PERRLA, VFF, EOMI, face sensation (V1-V3) intact b/l, facial strength intact without asymmetry b/l, hearing intact b/l, palate with symmetric elevation, trapezius OR sternocleidomastiod 5/5 strength b/l, tongue midline on protrusion with full lateral movement    Motor - Normal bulk and tone throughout. No pronator drift.  Strength testing            Deltoid      Biceps      Triceps     Wrist Extension    Wrist Flexion     Interossei         R            5                 5               5                     5                              5                        5                 5  L             5                 5               5                     5                              5                        5                 5              Hip Flexion    Hip Extension    Knee Flexion    Knee Extension    Dorsiflexion    Plantar Flexion  R              5                           5                       5                           5                            5                          5  L              5                           5                        5                           5                            5                          5    Sensation - Light touch/temperature OR pain/vibration intact throughout    DTR's -             Biceps      Triceps     Brachioradialis      Patellar    Ankle    Toes/plantar response  R             2+             2+                  2+                       2+            2+                 Down  L              2+             2+                 2+                        2+           2+                 Down    Coordination - Finger to Nose intact b/l. No tremors appreciated    Gait and station - Normal casual gait. Romberg (-)    Labs:  ( 16 Mar 2023 07:56 )                        12.8   7.86  )-----------( 191                  43.2     152<H>  |  119<H>  |  75<H>  ----------------------------<  291<H>  5.1   |  21<L>  |  1.18    Ca    9.0      16 Mar 2023 07:56  Phos  3.1     03-16  Mg     3.10     03-16    TPro  6.0  /  Alb  2.5<L>  /  TBili  1.9<H>  /  DBili  1.0<H>  /  AST  94<H>  /  ALT  78<H>  /  AlkPhos  496<H>  03-16    POCT Blood Glucose.: 297 mg/dL (16 Mar 2023 11:57)    LIVER FUNCTIONS - ( 16 Mar 2023 07:56 )  Alb: 2.5 g/dL / Pro: 6.0 g/dL / ALK PHOS: 496 U/L / ALT: 78 U/L / AST: 94 U/L / GGT: x           ammonia 3/16 25 (wnl)    Culture - Fungal, Body Fluid (collected 15 Mar 2023 12:55)  Source: Peritoneal Fluid  Preliminary Report (16 Mar 2023 12:01):    Testing in progress    Culture - Body Fluid with Gram Stain (collected 15 Mar 2023 12:55)  Source: Peritoneal Peritoneal Fluid  Gram Stain (15 Mar 2023 17:32):    polymorphonuclear leukocytes seen    No organisms seen    by cytocentrifuge  Preliminary Report (16 Mar 2023 13:01):    No growth    PT/INR - ( 16 Mar 2023 07:56 )   PT: 20.8 sec;   INR: 1.78 ratio      PTT - ( 16 Mar 2023 07:56 )  PTT:28.0 sec    Peritoneal fluid - cloudy and yellow  380 total nucleated cells predominately monocytes followed by neutrophils      Imaging & EEGs  CT head non contrast 3/4/23  No CT evidence of acute intracranial pathology.  Trace mastoid effusions bilaterally.    MRI brain with and without 3/5/23  Extensive cortical predominant restricted diffusion throughout the cerebral hemispheres including involvement of the basal ganglia and insula bilaterally. Given history of hematemesis with hypotension, hypoxic-ischemic injury is favored. Differential also includes postictal sequelae, Creutzfeldt-Gonzalez disease, encephalitis or lymphomatous involvement. CSF sampling should be considered.    EEG  3/14  -Frequent left posterior quadrant lateralized periodic discharges (LPDs) at 0.5 Hz, decreased in abundance and in frequency compared to the routine EEG from 3/5/23.  -Left hemispheric focal slowing  -Moderate diffuse slowing    Clinical Impression:  -Frequent left posterior quadrant lateralized periodic discharges (LPDs) at 0.5 Hz indicate a potentially epileptogenic focus in this region with risk of focal-onset seizures. Risk of seizures has decreased compared to the routine EEG from 3/5/23.  -Left hemispheric focal cerebral dysfunction can be structural or functional in etiology.  -Moderate diffuse cerebral dysfunction is nonspecific in etiology/  -Single-lead EKG incidentally shows irregular narrow complexes at times.    3/15  Abundant left posterior quadrant sharp wave discharges, max P3/O1  Continuous left hemispheric slowing, max left posterior quadrant  Background slowing, generalized, moderate    Clinical Impression:  Evidence for structural/functional lesion and increased risk for seizures from the left posterior quadrant  Moderate diffuse/multifocal cerebral dysfunction, not specific as to etiology  There were no seizures  recorded.         Neurology - Consult Note  -  Spectra: [phone] ([facility]), [phone] ([facility])  -  HPI: Patient ABBIE RODRIGUEZ is a 67y (1955) man with a PMHx significant for HTN, DM2, hypothyroidism, CAD s/p CABG, TANG cirrhosis, follicular lymphoma (on Rituximab OP), chronic Hep B on tenofovir (HBV Core +), presented to the ED w/ chest pain and constipation while in ED developed hematemesis s/p MICU admission for acute blood loss anemia c/b hypovolemic shock  intubated for airway protection (extubated 3/8) s/p 2/24 bedside EGD with banding esophageal varices now with persistent encephalopathy. Neurology consulted for persistent encephalopathy. Son at bedside. He reports that has been non verbal since the original incident during the beginning of his admission. Patient will nod at sons or people talking at him but will not speak. Son has not witnessed spontaneous movement of limbs. Prior to this admission son reports he was living independently and taking care of his own affairs.     Review of Systems: non-verbal  Allergies: Beef (Other)    PMHx/PSHx/Family Hx: As above, otherwise see below   CAD (coronary artery disease)  Diabetes  Lymphoma  Cirrhosis    Social Hx:  No current use of tobacco, alcohol, or illicit drugs    Medications:  MEDICATIONS  (STANDING):  chlorhexidine 2% Cloths 1 Application(s) Topical <User Schedule>  ciprofloxacin     Tablet 500 milliGRAM(s) Oral every 24 hours  coronavirus bivalent (EUA) Booster Vaccine (PFIZER) 0.3 milliLiter(s) IntraMuscular once  dextrose 50% Injectable 25 Gram(s) IV Push once  dextrose 50% Injectable 12.5 Gram(s) IV Push once  dextrose 50% Injectable 25 Gram(s) IV Push once  furosemide    Tablet 20 milliGRAM(s) Oral daily  heparin   Injectable 5000 Unit(s) SubCutaneous every 12 hours  insulin lispro (ADMELOG) corrective regimen sliding scale   SubCutaneous every 6 hours  insulin NPH human recombinant 15 Unit(s) SubCutaneous every 6 hours  lactulose Syrup 30 Gram(s) Oral three times a day  levothyroxine Injectable 75 MICROGram(s) IV Push at bedtime  mupirocin 2% Ointment 1 Application(s) Both Nostrils two times a day  pantoprazole  Injectable 40 milliGRAM(s) IV Push daily  rifAXIMin 550 milliGRAM(s) Oral two times a day  spironolactone 50 milliGRAM(s) Oral daily  tenofovir disoproxil fumarate (VIREAD) 300 milliGRAM(s) Oral daily    MEDICATIONS  (PRN):  dextrose Oral Gel 15 Gram(s) Oral once PRN Blood Glucose LESS THAN 70 milliGRAM(s)/deciliter  LORazepam   Injectable 1.5 milliGRAM(s) IV Push once PRN pre-MRI    Vitals:  T(C): 36.7 (03-16-23 @ 12:50), Max: 36.7 (03-15-23 @ 14:00)  HR: 92 (03-16-23 @ 12:50) (88 - 95)  BP: 100/58 (03-16-23 @ 12:50) (100/58 - 118/62)  RR: 18 (03-16-23 @ 12:50) (17 - 18)  SpO2: 98% (03-16-23 @ 12:50) (98% - 100%)      Neurologic Exam:  Mental status - patient awakes to name, and will look at people in the room or the person speaking. He myself a head nod in acknowledgement. He does fall asleep within minutes but can be aroused by name. Does not follow any commands. No verbal output   Cranial nerves - PERRLA, VFF, EOMI, facial strength intact without asymmetry b/l  Motor - patient is cachetic, tone in b/l arms is increased in a cogwheel fashion, b/l LE has spasticity (increased tone), could not assess strength  Sensation - grimaces to pain in all 4 extremities. LLE & LUE slight flexion to noxious stimuli   Reflexes - absent DTR throughout, primitive reflexes present such has grasp (abnormal)   Coordination - could not assess  Gait and station - could not assess    Labs:  ( 16 Mar 2023 07:56 )                        12.8   7.86  )-----------( 191                  43.2     152<H>  |  119<H>  |  75<H>  ----------------------------<  291<H>  5.1   |  21<L>  |  1.18    Ca    9.0      16 Mar 2023 07:56  Phos  3.1     03-16  Mg     3.10     03-16    TPro  6.0  /  Alb  2.5<L>  /  TBili  1.9<H>  /  DBili  1.0<H>  /  AST  94<H>  /  ALT  78<H>  /  AlkPhos  496<H>  03-16    POCT Blood Glucose.: 297 mg/dL (16 Mar 2023 11:57)    LIVER FUNCTIONS - ( 16 Mar 2023 07:56 )  Alb: 2.5 g/dL / Pro: 6.0 g/dL / ALK PHOS: 496 U/L / ALT: 78 U/L / AST: 94 U/L / GGT: x           ammonia 3/16 25 (wnl)    Culture - Fungal, Body Fluid (collected 15 Mar 2023 12:55)  Source: Peritoneal Fluid  Preliminary Report (16 Mar 2023 12:01):    Testing in progress    Culture - Body Fluid with Gram Stain (collected 15 Mar 2023 12:55)  Source: Peritoneal Peritoneal Fluid  Gram Stain (15 Mar 2023 17:32):    polymorphonuclear leukocytes seen    No organisms seen    by cytocentrifuge  Preliminary Report (16 Mar 2023 13:01):    No growth    PT/INR - ( 16 Mar 2023 07:56 )   PT: 20.8 sec;   INR: 1.78 ratio      PTT - ( 16 Mar 2023 07:56 )  PTT:28.0 sec    Peritoneal fluid - cloudy and yellow  380 total nucleated cells predominately monocytes followed by neutrophils      Imaging & EEGs  CT head non contrast 3/4/23  No CT evidence of acute intracranial pathology.  Trace mastoid effusions bilaterally.    MRI brain with and without 3/5/23  Extensive cortical predominant restricted diffusion throughout the cerebral hemispheres including involvement of the basal ganglia and insula bilaterally. Given history of hematemesis with hypotension, hypoxic-ischemic injury is favored. Differential also includes postictal sequelae, Creutzfeldt-Gonzalez disease, encephalitis or lymphomatous involvement. CSF sampling should be considered.    EEG  3/14  -Frequent left posterior quadrant lateralized periodic discharges (LPDs) at 0.5 Hz, decreased in abundance and in frequency compared to the routine EEG from 3/5/23.  -Left hemispheric focal slowing  -Moderate diffuse slowing    Clinical Impression:  -Frequent left posterior quadrant lateralized periodic discharges (LPDs) at 0.5 Hz indicate a potentially epileptogenic focus in this region with risk of focal-onset seizures. Risk of seizures has decreased compared to the routine EEG from 3/5/23.  -Left hemispheric focal cerebral dysfunction can be structural or functional in etiology.  -Moderate diffuse cerebral dysfunction is nonspecific in etiology/  -Single-lead EKG incidentally shows irregular narrow complexes at times.    3/15  Abundant left posterior quadrant sharp wave discharges, max P3/O1  Continuous left hemispheric slowing, max left posterior quadrant  Background slowing, generalized, moderate    Clinical Impression:  Evidence for structural/functional lesion and increased risk for seizures from the left posterior quadrant  Moderate diffuse/multifocal cerebral dysfunction, not specific as to etiology  There were no seizures  recorded.         Neurology - Consult Note  -  Spectra: [phone] ([facility]), [phone] ([facility])  -  HPI: Patient ABBIE RODRIGUEZ is a 67y (1955) man with a PMHx significant for HTN, DM2, hypothyroidism, CAD s/p CABG, TANG cirrhosis, follicular lymphoma (on Rituximab OP), chronic Hep B on tenofovir (HBV Core +), presented to the ED w/ chest pain and constipation while in ED developed hematemesis s/p MICU admission for acute blood loss anemia c/b hypovolemic shock  intubated for airway protection (extubated 3/8) s/p 2/24 bedside EGD with banding esophageal varices now with persistent encephalopathy. Neurology consulted for persistent encephalopathy. Son at bedside. He reports that has been non verbal since the original incident during the beginning of his admission. Patient will nod at sons or people talking at him but will not speak. Son has not witnessed spontaneous movement of limbs. Prior to this admission son reports he was living independently and taking care of his own affairs.     Review of Systems: non-verbal  Allergies: Beef (Other)    PMHx/PSHx/Family Hx: As above, otherwise see below   CAD (coronary artery disease)  Diabetes  Lymphoma  Cirrhosis    Social Hx:  No current use of tobacco, alcohol, or illicit drugs    Medications:  MEDICATIONS  (STANDING):  chlorhexidine 2% Cloths 1 Application(s) Topical <User Schedule>  ciprofloxacin     Tablet 500 milliGRAM(s) Oral every 24 hours  coronavirus bivalent (EUA) Booster Vaccine (PFIZER) 0.3 milliLiter(s) IntraMuscular once  dextrose 50% Injectable 25 Gram(s) IV Push once  dextrose 50% Injectable 12.5 Gram(s) IV Push once  dextrose 50% Injectable 25 Gram(s) IV Push once  furosemide    Tablet 20 milliGRAM(s) Oral daily  heparin   Injectable 5000 Unit(s) SubCutaneous every 12 hours  insulin lispro (ADMELOG) corrective regimen sliding scale   SubCutaneous every 6 hours  insulin NPH human recombinant 15 Unit(s) SubCutaneous every 6 hours  lactulose Syrup 30 Gram(s) Oral three times a day  levothyroxine Injectable 75 MICROGram(s) IV Push at bedtime  mupirocin 2% Ointment 1 Application(s) Both Nostrils two times a day  pantoprazole  Injectable 40 milliGRAM(s) IV Push daily  rifAXIMin 550 milliGRAM(s) Oral two times a day  spironolactone 50 milliGRAM(s) Oral daily  tenofovir disoproxil fumarate (VIREAD) 300 milliGRAM(s) Oral daily    MEDICATIONS  (PRN):  dextrose Oral Gel 15 Gram(s) Oral once PRN Blood Glucose LESS THAN 70 milliGRAM(s)/deciliter  LORazepam   Injectable 1.5 milliGRAM(s) IV Push once PRN pre-MRI    Vitals:  T(C): 36.7 (03-16-23 @ 12:50), Max: 36.7 (03-15-23 @ 14:00)  HR: 92 (03-16-23 @ 12:50) (88 - 95)  BP: 100/58 (03-16-23 @ 12:50) (100/58 - 118/62)  RR: 18 (03-16-23 @ 12:50) (17 - 18)  SpO2: 98% (03-16-23 @ 12:50) (98% - 100%)      Neurologic Exam:  Mental status - patient awakes to name, and will look at people in the room or the person speaking. He gave myself a head nod in acknowledgement. He does fall asleep within minutes but can be aroused by name. Does not follow any commands. No verbal output   Cranial nerves - PERRLA, VFF, EOMI, facial strength intact without asymmetry b/l  Motor - patient is cachetic, tone in b/l arms is increased in a cogwheel fashion, b/l LE has spasticity (increased tone), could not assess strength  Sensation - grimaces to pain in all 4 extremities. LLE & LUE slight flexion to noxious stimuli   Reflexes - absent DTR throughout, primitive reflexes present such has grasp (abnormal)   Coordination - could not assess  Gait and station - could not assess    Labs:  ( 16 Mar 2023 07:56 )                        12.8   7.86  )-----------( 191                  43.2     152<H>  |  119<H>  |  75<H>  ----------------------------<  291<H>  5.1   |  21<L>  |  1.18    Ca    9.0      16 Mar 2023 07:56  Phos  3.1     03-16  Mg     3.10     03-16    TPro  6.0  /  Alb  2.5<L>  /  TBili  1.9<H>  /  DBili  1.0<H>  /  AST  94<H>  /  ALT  78<H>  /  AlkPhos  496<H>  03-16    POCT Blood Glucose.: 297 mg/dL (16 Mar 2023 11:57)    LIVER FUNCTIONS - ( 16 Mar 2023 07:56 )  Alb: 2.5 g/dL / Pro: 6.0 g/dL / ALK PHOS: 496 U/L / ALT: 78 U/L / AST: 94 U/L / GGT: x           ammonia 3/16 25 (wnl)    Culture - Fungal, Body Fluid (collected 15 Mar 2023 12:55)  Source: Peritoneal Fluid  Preliminary Report (16 Mar 2023 12:01):    Testing in progress    Culture - Body Fluid with Gram Stain (collected 15 Mar 2023 12:55)  Source: Peritoneal Peritoneal Fluid  Gram Stain (15 Mar 2023 17:32):    polymorphonuclear leukocytes seen    No organisms seen    by cytocentrifuge  Preliminary Report (16 Mar 2023 13:01):    No growth    PT/INR - ( 16 Mar 2023 07:56 )   PT: 20.8 sec;   INR: 1.78 ratio      PTT - ( 16 Mar 2023 07:56 )  PTT:28.0 sec    Peritoneal fluid - cloudy and yellow  380 total nucleated cells predominately monocytes followed by neutrophils      Imaging & EEGs  CT head non contrast 3/4/23  No CT evidence of acute intracranial pathology.  Trace mastoid effusions bilaterally.    MRI brain with and without 3/5/23  Extensive cortical predominant restricted diffusion throughout the cerebral hemispheres including involvement of the basal ganglia and insula bilaterally. Given history of hematemesis with hypotension, hypoxic-ischemic injury is favored. Differential also includes postictal sequelae, Creutzfeldt-Gonzalez disease, encephalitis or lymphomatous involvement. CSF sampling should be considered.    EEG  3/14  -Frequent left posterior quadrant lateralized periodic discharges (LPDs) at 0.5 Hz, decreased in abundance and in frequency compared to the routine EEG from 3/5/23.  -Left hemispheric focal slowing  -Moderate diffuse slowing    Clinical Impression:  -Frequent left posterior quadrant lateralized periodic discharges (LPDs) at 0.5 Hz indicate a potentially epileptogenic focus in this region with risk of focal-onset seizures. Risk of seizures has decreased compared to the routine EEG from 3/5/23.  -Left hemispheric focal cerebral dysfunction can be structural or functional in etiology.  -Moderate diffuse cerebral dysfunction is nonspecific in etiology/  -Single-lead EKG incidentally shows irregular narrow complexes at times.    3/15  Abundant left posterior quadrant sharp wave discharges, max P3/O1  Continuous left hemispheric slowing, max left posterior quadrant  Background slowing, generalized, moderate    Clinical Impression:  Evidence for structural/functional lesion and increased risk for seizures from the left posterior quadrant  Moderate diffuse/multifocal cerebral dysfunction, not specific as to etiology  There were no seizures  recorded.

## 2023-03-17 NOTE — SWALLOW BEDSIDE ASSESSMENT ADULT - SWALLOW EVAL: RECOMMENDED DIET
maintain NGT as primary means of nutrition/hydration/medication and consider GOC with the patient's caregivers to discuss nutritional plan of care. Maintain aspiration precautions and strict oral care.

## 2023-03-17 NOTE — CONSULT NOTE ADULT - ATTENDING COMMENTS
67 year old man with known TANG cirrhosis presented with hematemesis admitted to MICU for management of hemorrhagic shock most likely a variceal bleed  intubated for airway protection  on PPI and octreotide drip  Gi to see  will trend CBC and transfuse as needed    case discussed with family at bedside
Patient with suspected TANG cirrhosis with decompensation with ascites and esophageal varices.  Is s/p CABG and recently discontinued Plavix. Recent diagnosis of lymphoma treated with rituxan, tenofovir being given due to HBcAb positive. Now presents with hematemesis with hgb 10 to 6. gm%.  EGD today showed esophageal varices and recent bleed with clot in stomach. No active bleeding site identified and varices banded.   Suggest continue octreotide, antibiotic and PPI.  abd ultrasound to assess ascites. Monitor for recurrent bleed. Monitor renal function.
Mr. Barfield is a 66 yo man with hypoxic ischemic encephalopathy.   He has severe impairments at this time and significant evidence of hypoxic ischemic brain injury on MRI.  He has minimal motor movements and no verbalizations and no following commands. He does arouse to verbal stimulation and fix and follow with his eyes.  I explained to his son that his prognosis is uncertain - he may remain this way permanently but it is still early and he has a chance for some recovery.   He is NOT in a persistent vegetative state.  Rehabilitation medicine consult - would he be appropriate for a TBI unit?  PT/OT/Speech pathology consults.  Prognosis d/w son.  Neurology signing off. Please reconsult PRN or call [email or web address] [phone] with any questions.  Thank you
Agree with plan above with my edits.

## 2023-03-17 NOTE — CHART NOTE - NSCHARTNOTEFT_GEN_A_CORE
[facility] [employer]        BP 82/52 - manual 72/47 Albumin IVPG and Midodrine 10mg x 1ordered STAT  STAT CBC T+S ordered - monitor Hgb (pt with hx of hypotension 2/2 GIB) [facility] [employer]    Notified by RN that pt's BP 82/52 - manual 72/47. Baseline SBP 80s-90s. Pt seen at bedside, mental status unchanged from prior per RN, only opens eyes.   Pt noted to have cirrhosis and moderate ascites, s/p 3.6L drained by IPT for Dx and therapeutic paracentesis on 3/15, albumin low at 2.5.   Pt also with hx of Hematemesis/Variceal bleed.     Vital Signs Last 24 Hrs  T(C): 36.3 (17 Mar 2023 04:03), Max: 36.7 (16 Mar 2023 04:42)  T(F): 97.4 (17 Mar 2023 04:03), Max: 98.1 (16 Mar 2023 04:42)  HR: 94 (17 Mar 2023 04:03) (91 - 100)  BP: 83/44 (17 Mar 2023 04:03) (72/47 - 113/63)  BP(mean): 58 (17 Mar 2023 01:13) (58 - 58)  RR: 18 (17 Mar 2023 04:03) (17 - 18)  SpO2: 100% (17 Mar 2023 04:03) (98% - 100%)    Parameters below as of 17 Mar 2023 04:03  Patient On (Oxygen Delivery Method): room air    -Albumin x1 and Midodrine 10mg x 1ordered STAT via NGT  -STAT CBC T+S ordered to assess for any drop however no s/s of overt bleeding noted   -Reassess BP s/p midodrine and albumin  -Check rectal temp     Continue to monitor  JAZZ Washington PA-C

## 2023-03-17 NOTE — PROGRESS NOTE ADULT - PROBLEM SELECTOR PLAN 3
acute blood loss anemia c/b hypovolemic shock s/p levophed and ICU  - Hb 6.9 on admit now s/p 6 units of pRBC, 1 unit FFP, 1 unit of platelets all on 2/24/23  - s/p octreotide x 72h (2/24-2/27)   - in s/o hypotension, switch back to protonix gtt  - trend CBC, transfuse prn, hgb 10.8, 11.1  - s/p EGD 2/24: large (> 5 mm) esophageal varices. Incompletely eradicated. Banded. Normal duodenal bulb, first portion of the duodenum and second portion of the duodenum.                 - EGD in 4 wks for likely repeat banding  -SBP ppx cipro

## 2023-03-17 NOTE — GOALS OF CARE CONVERSATION - ADVANCED CARE PLANNING - CONVERSATION DETAILS
I met with patient's son Selina at bedside, updated him on patient's condition, advised him of poor overall prognosis with anoxic brain injury, hypotension/shock, GI bleeding and now Acute kidney injury. He understands that his father's prognosis is poor and that it's not safe to feed him at this time as he failed speech/swallow, remains lethargic and unable to communicate. He will discuss with his mother and sister about code status. He is aware that palliative care has been called to address goals of care. Patient continued to get treatments include albumin/ fluids and medications (midodrine) to help to raise his blood pressure and enteral feeds through his NGT. ICU consult has been called for hypotension.  Patient remains full code at this time.

## 2023-03-17 NOTE — PROGRESS NOTE ADULT - ATTENDING COMMENTS
Patient with severe neurologic injury. He does have cirrhosis and portal hypertension and complications of his liver disease will need to be addressed in the context of his goals of care for symptomatic care.

## 2023-03-17 NOTE — RAPID RESPONSE TEAM SUMMARY - NSOTHERINTERVENTIONSRRT_GEN_ALL_CORE
Labs reviewed;   Patient with new NANCI and hypernatremia   Given hx of cirrhosis, may be a component of pre-renal azotemia as well as oncoming of Type 1 Hepatorenal syndrome     Recommended to the team that they begin Midodrine 10mg q8   Would recommend discontinuing diuretics given persistent hypotension   Can continue tube feeds at this time   GOC discussion with patient's family and palliative care consult   MICU consult called by primary team     At the end of the rapid, patient's BP was 88/55, HR 92, SpO2 99%   1L bolus still infusing   Patient at baseline mental status

## 2023-03-17 NOTE — RAPID RESPONSE TEAM SUMMARY - NSADDTLFINDINGSRRT_GEN_ALL_CORE
refer to RRT sheet     Temp 98.7   Blood sugar 149  SpO2 99% RA     Exam: patient has weeping edema bilateral UE, likely anasarca

## 2023-03-17 NOTE — PROGRESS NOTE ADULT - PROBLEM SELECTOR PLAN 4
decompensated with ascites, with variceal bleed, with PSE; MELD 12 3/10/23  - s/p diagnostic para (2/25) and therapeutic para (3/5) removed 4.5L  s/p diag/therapeutic tap on 3/15, removed 3.6L, neg for SBP  - c/w rifaximin BID, lactulose q4h, monitor BMs   - SBP ppx with Cipro  - per hepatology cw Spironolactone 50 mg daily, lasix 20 mg qd  - hepatology following  - LFT uptrending, RUQ w/o pathology on 3/10  - MRI abdomen at [facility] 12/2022 no HCC

## 2023-03-17 NOTE — PROGRESS NOTE ADULT - PROBLEM SELECTOR PLAN 2
- likely d/t hypoxia/anoxic brain ischemia in s/o hemorrhagic shock on admission  - MRI brain 3/15 extensive cortical restricted diffusion throughout the cerebral hemispheres including the basal ganglia and insula b/l.    - CTH (3/4/23): no acute pathology  - correct hypernatremia with inc free water to 300ml q4h, trend Na, check urine lytes/osm  - neuro recs appreciated, encephalopathic state 2/2 anoxic ischemic brain injury due to hypoperfusion  - EEG (3/5/23): Abnormal EEG study.  Potential epileptogenic focus in the left posterior head region. Structural of functional abnormality in the left posterior head region. Moderate nonspecific diffuse or multifocal cerebral dysfunction. No seizure seen.     repeat EEG (3/14) frequent left posterior quadrant periodic discharges, risk of seizure has decreased compared to EEG from 3/5. No seizures recorded.   - c/w rifaximin 550 mg BID,  inc lactulose to 30g tid titrate to 3-4 BMs, stool count, repeat ammonia level 25  - paracentesis x 3 neg for SBP  - remains encephalopathic, failed S&S, c/w NGT feeds, aspiration precautions  - TSH wnl  - trial of IV thiamine  - CXR no infiltrate, c/w TF via NGT  - overall prognosis very poor, remains full code, met with son on 3/17, updated him on pt's condition, advised poor prognosis. He will speak to his mother and brother about code status.  palliative care consult to help with GOC discussions

## 2023-03-17 NOTE — CONSULT NOTE ADULT - SUBJECTIVE AND OBJECTIVE BOX
Patient is a 67y old  Male who presents with a chief complaint of encephalopathy (16 Mar 2023 12:43)      HPI:   67-year-old male with past medical history of lymphoma on chemo, diabetes, hypertension, CAD status post CABG, cirrhosis presenting with concern of R sided pain and constipation. Patient follows primarily at [facility]. He states the pain began this morning. Sharp, non radiating. No associated N/V/D, dysuria, trouble urinating, fever, chills, cough, dyspnea, sore throat. Pt states he has been constipated for 4d. Was told to take miralax by his oncologist. Pt has known TANG. (24 Feb 2023 11:27)      REVIEW OF SYSTEMS      PAST MEDICAL & SURGICAL HISTORY  CAD (coronary artery disease)    Diabetes    Lymphoma    Cirrhosis    S/P CABG x 1        SOCIAL HISTORY  Smoking - Denied  EtOH - Denied   Drugs - Denied    FUNCTIONAL HISTORY  Lives   Independent    CURRENT FUNCTIONAL STATUS  last seen by PT on 3/9 at which time patient not following commands    FAMILY HISTORY   No pertinent family history in first degree relatives        RECENT LABS/IMAGING  CBC Full  -  ( 17 Mar 2023 09:45 )  WBC Count : 11.43 K/uL  RBC Count : 3.75 M/uL  Hemoglobin : 11.1 g/dL  Hematocrit : 38.1 %  Platelet Count - Automated : 163 K/uL  Mean Cell Volume : 101.6 fL  Mean Cell Hemoglobin : 29.6 pg  Mean Cell Hemoglobin Concentration : 29.1 gm/dL  Auto Neutrophil # : x  Auto Lymphocyte # : x  Auto Monocyte # : x  Auto Eosinophil # : x  Auto Basophil # : x  Auto Neutrophil % : x  Auto Lymphocyte % : x  Auto Monocyte % : x  Auto Eosinophil % : x  Auto Basophil % : x    03-17    154<H>  |  119<H>  |  106<H>  ----------------------------<  163<H>  5.0   |  21<L>  |  1.71<H>    Ca    8.8      17 Mar 2023 09:45  Phos  4.5     03-17  Mg     3.30     03-17    TPro  5.4<L>  /  Alb  2.8<L>  /  TBili  2.2<H>  /  DBili  x   /  AST  130<H>  /  ALT  85<H>  /  AlkPhos  416<H>  03-17        VITALS  T(C): 37.4 (03-17-23 @ 08:30), Max: 37.4 (03-17-23 @ 08:30)  HR: 65 (03-17-23 @ 10:54) (60 - 100)  BP: 101/55 (03-17-23 @ 10:54) (72/47 - 118/49)  RR: 20 (03-17-23 @ 08:30) (18 - 20)  SpO2: 98% (03-17-23 @ 08:30) (98% - 100%)  Wt(kg): --    ALLERGIES  [This allergen will not trigger allergy alert] &quot;lentils&quot;-&quot;itchiness&quot; (Other)  Beef (Other)  No Known Drug Allergies      MEDICATIONS   albumin human 25% IVPB 250 milliLiter(s) IV Intermittent every 8 hours  chlorhexidine 2% Cloths 1 Application(s) Topical <User Schedule>  ciprofloxacin     Tablet 500 milliGRAM(s) Oral every 24 hours  coronavirus bivalent (EUA) Booster Vaccine (PFIZER) 0.3 milliLiter(s) IntraMuscular once  dextrose 5%. 1000 milliLiter(s) IV Continuous <Continuous>  dextrose 5%. 1000 milliLiter(s) IV Continuous <Continuous>  dextrose 5%. 1000 milliLiter(s) IV Continuous <Continuous>  dextrose 50% Injectable 25 Gram(s) IV Push once  dextrose 50% Injectable 12.5 Gram(s) IV Push once  dextrose 50% Injectable 25 Gram(s) IV Push once  dextrose Oral Gel 15 Gram(s) Oral once PRN  glucagon  Injectable 1 milliGRAM(s) IntraMuscular once  heparin   Injectable 5000 Unit(s) SubCutaneous every 12 hours  insulin lispro (ADMELOG) corrective regimen sliding scale   SubCutaneous every 6 hours  insulin NPH human recombinant 18 Unit(s) SubCutaneous every 6 hours  lactulose Syrup 30 Gram(s) Oral three times a day  levothyroxine Injectable 75 MICROGram(s) IV Push at bedtime  LORazepam   Injectable 1.5 milliGRAM(s) IV Push once PRN  midodrine 10 milliGRAM(s) Oral every 8 hours  midodrine. 10 milliGRAM(s) Oral once  mupirocin 2% Ointment 1 Application(s) Both Nostrils two times a day  pantoprazole  Injectable 40 milliGRAM(s) IV Push daily  pantoprazole Infusion 8 mG/Hr IV Continuous <Continuous>  rifAXIMin 550 milliGRAM(s) Oral two times a day  sodium chloride 0.65% Nasal 1 Spray(s) Both Nostrils three times a day PRN  tenofovir disoproxil fumarate (VIREAD) 300 milliGRAM(s) Oral daily      ----------------------------------------------------------------------------------------  PHYSICAL EXAM  Constitutional - NAD, Comfortable  HEENT - NCAT, EOMI  Neck - Supple, No limited ROM  Chest - CTA bilaterally, No wheeze, No rhonchi, No crackles  Cardiovascular - RRR, S1S2, No murmurs  Abdomen - BS+, Soft, NTND  Extremities - No C/C/E, No calf tenderness   Neurologic Exam -                    Cognitive - Awake, Alert, AAO to self, place, date, year, situation     Communication - Fluent, No dysarthria     Cranial Nerves - CN 2-12 intact     Motor - No focal deficits                    LEFT    UE - ShAB 5/5, EF 5/5, EE 5/5, WE 5/5,  5/5                    RIGHT UE - ShAB 5/5, EF 5/5, EE 5/5, WE 5/5,  5/5                    LEFT    LE - HF 5/5, KE 5/5, DF 5/5, PF 5/5                    RIGHT LE - HF 5/5, KE 5/5, DF 5/5, PF 5/5        Sensory - Intact to LT     Reflexes - DTR Intact, No primitive reflexive     Coordination - FTN intact     OculoVestibular - No saccades, No nystagmus, VOR         Balance - WNL Static  Psychiatric - Mood stable, Affect WNL  ----------------------------------------------------------------------------------------  ASSESSMENT/PLAN    Pain -  DVT PPX -   Rehab -     incomplete note, consult in progress Patient is a 67y old  Male who presents with a chief complaint of encephalopathy (16 Mar 2023 12:43)      HPI:   67-year-old male with past medical history of lymphoma on chemo, diabetes, hypertension, CAD status post CABG, cirrhosis presenting with concern of R sided pain and constipation. Patient follows primarily at [facility]. He states the pain began this morning. Sharp, non radiating. No associated N/V/D, dysuria, trouble urinating, fever, chills, cough, dyspnea, sore throat. Pt states he has been constipated for 4d. Was told to take miralax by his oncologist. Pt has known TANG. (24 Feb 2023 11:27)    unable to arouse, not following commands  RRT today for hypotension. receiving iv fluids  received midodrine    REVIEW OF SYSTEMS  unable to participate    PAST MEDICAL & SURGICAL HISTORY  CAD (coronary artery disease)    Diabetes    Lymphoma    Cirrhosis    S/P CABG x 1         CURRENT FUNCTIONAL STATUS  last seen by PT on 3/9 at which time patient not following commands    FAMILY HISTORY   No pertinent family history in first degree relatives        RECENT LABS/IMAGING  CBC Full  -  ( 17 Mar 2023 09:45 )  WBC Count : 11.43 K/uL  RBC Count : 3.75 M/uL  Hemoglobin : 11.1 g/dL  Hematocrit : 38.1 %  Platelet Count - Automated : 163 K/uL  Mean Cell Volume : 101.6 fL  Mean Cell Hemoglobin : 29.6 pg  Mean Cell Hemoglobin Concentration : 29.1 gm/dL  Auto Neutrophil # : x  Auto Lymphocyte # : x  Auto Monocyte # : x  Auto Eosinophil # : x  Auto Basophil # : x  Auto Neutrophil % : x  Auto Lymphocyte % : x  Auto Monocyte % : x  Auto Eosinophil % : x  Auto Basophil % : x    03-17    154<H>  |  119<H>  |  106<H>  ----------------------------<  163<H>  5.0   |  21<L>  |  1.71<H>    Ca    8.8      17 Mar 2023 09:45  Phos  4.5     03-17  Mg     3.30     03-17    TPro  5.4<L>  /  Alb  2.8<L>  /  TBili  2.2<H>  /  DBili  x   /  AST  130<H>  /  ALT  85<H>  /  AlkPhos  416<H>  03-17        VITALS  T(C): 37.4 (03-17-23 @ 08:30), Max: 37.4 (03-17-23 @ 08:30)  HR: 65 (03-17-23 @ 10:54) (60 - 100)  BP: 101/55 (03-17-23 @ 10:54) (72/47 - 118/49)  RR: 20 (03-17-23 @ 08:30) (18 - 20)  SpO2: 98% (03-17-23 @ 08:30) (98% - 100%)  Wt(kg): --    ALLERGIES  [This allergen will not trigger allergy alert] &quot;lentils&quot;-&quot;itchiness&quot; (Other)  Beef (Other)  No Known Drug Allergies      MEDICATIONS   albumin human 25% IVPB 250 milliLiter(s) IV Intermittent every 8 hours  chlorhexidine 2% Cloths 1 Application(s) Topical <User Schedule>  ciprofloxacin     Tablet 500 milliGRAM(s) Oral every 24 hours  coronavirus bivalent (EUA) Booster Vaccine (PFIZER) 0.3 milliLiter(s) IntraMuscular once  dextrose 5%. 1000 milliLiter(s) IV Continuous <Continuous>  dextrose 5%. 1000 milliLiter(s) IV Continuous <Continuous>  dextrose 5%. 1000 milliLiter(s) IV Continuous <Continuous>  dextrose 50% Injectable 25 Gram(s) IV Push once  dextrose 50% Injectable 12.5 Gram(s) IV Push once  dextrose 50% Injectable 25 Gram(s) IV Push once  dextrose Oral Gel 15 Gram(s) Oral once PRN  glucagon  Injectable 1 milliGRAM(s) IntraMuscular once  heparin   Injectable 5000 Unit(s) SubCutaneous every 12 hours  insulin lispro (ADMELOG) corrective regimen sliding scale   SubCutaneous every 6 hours  insulin NPH human recombinant 18 Unit(s) SubCutaneous every 6 hours  lactulose Syrup 30 Gram(s) Oral three times a day  levothyroxine Injectable 75 MICROGram(s) IV Push at bedtime  LORazepam   Injectable 1.5 milliGRAM(s) IV Push once PRN  midodrine 10 milliGRAM(s) Oral every 8 hours  midodrine. 10 milliGRAM(s) Oral once  mupirocin 2% Ointment 1 Application(s) Both Nostrils two times a day  pantoprazole  Injectable 40 milliGRAM(s) IV Push daily  pantoprazole Infusion 8 mG/Hr IV Continuous <Continuous>  rifAXIMin 550 milliGRAM(s) Oral two times a day  sodium chloride 0.65% Nasal 1 Spray(s) Both Nostrils three times a day PRN  tenofovir disoproxil fumarate (VIREAD) 300 milliGRAM(s) Oral daily      ----------------------------------------------------------------------------------------  PHYSICAL EXAM  Constitutional - NAD  HEENT -  + NG tube  Chest - no respiratory distress  Cardiovascular - RRR, S1S2   Abdomen -  Soft, NTND  Extremities - + b/l edema   Neurologic Exam -                    Cognitive - unable to arouse     Communication - Fluent, No dysarthria     Cranial Nerves - facial asymmetry     Motor - unable to assess      Balance - WNL Static  Psychiatric - Mood stable, Affect WNL  ----------------------------------------------------------------------------------------  ASSESSMENT/PLAN   68 yo m with TANG  npo with tube feeds  lactulose  rifaximin  cipro  viread   DVT PPX - heparin  Rehab - unable to follow commands at this time, not a rehab candidate  monitor for improvement  PT and OT if improves

## 2023-03-17 NOTE — PROGRESS NOTE ADULT - PROBLEM SELECTOR PLAN 5
Uptrending to .17  - mcconnell removed 3/15,   check bladder scan and straight cath/mcconnell prn if he's in retention  NANCI likely ATN in s/o shock  check bladder/renal US  I called nephrology consult, f/u recs  treat underlying shock/hypotension as below

## 2023-03-17 NOTE — CONSULT NOTE ADULT - SUBJECTIVE AND OBJECTIVE BOX
[facility] DIVISION OF KIDNEY DISEASES AND HYPERTENSION -- INITIAL CONSULT NOTE  --------------------------------------------------------------------------------  HPI: 67M PMH DM, HTN, hypothyroidism, CAD, TANG cirrhosis, follicular lymphoma on rituximab, Hep B. on tenofovir, status post CABG, initially presented to ED w/ chest pain and constipation, developed hematemesis, now s/p MICU admission for acute blood loss anemia c/b hypovolemic shock , intubated for airway protection (extubated 3/8), s/p 2/24 bedside EGD with banding esophageal varices now with persistent encephalopathy, possibly 2/2 hypoxia/anoxic brain ischemia in s/o hemorrhagic shock on admission. Nephrology consulted for NANCI and hypernatremia.    Patient seen with son at bedside. Not responding to questions when asked.    PAST HISTORY  --------------------------------------------------------------------------------  PAST MEDICAL & SURGICAL HISTORY:  CAD (coronary artery disease)      Diabetes      Lymphoma      Cirrhosis      S/P CABG x 1        FAMILY HISTORY:  No pertinent family history in first degree relatives      PAST SOCIAL HISTORY: No smoking, drugs or alcohol use     ALLERGIES & MEDICATIONS  --------------------------------------------------------------------------------  Allergies    [This allergen will not trigger allergy alert] &quot;lentils&quot;-&quot;itchiness&quot; (Other)  Beef (Other)  No Known Drug Allergies    Intolerances      Standing Inpatient Medications  albumin human 25% IVPB 250 milliLiter(s) IV Intermittent every 8 hours  chlorhexidine 2% Cloths 1 Application(s) Topical <User Schedule>  ciprofloxacin     Tablet 500 milliGRAM(s) Oral every 24 hours  coronavirus bivalent (EUA) Booster Vaccine (PFIZER) 0.3 milliLiter(s) IntraMuscular once  dextrose 5%. 1000 milliLiter(s) IV Continuous <Continuous>  dextrose 5%. 1000 milliLiter(s) IV Continuous <Continuous>  dextrose 5%. 1000 milliLiter(s) IV Continuous <Continuous>  dextrose 50% Injectable 25 Gram(s) IV Push once  dextrose 50% Injectable 12.5 Gram(s) IV Push once  dextrose 50% Injectable 25 Gram(s) IV Push once  glucagon  Injectable 1 milliGRAM(s) IntraMuscular once  heparin   Injectable 5000 Unit(s) SubCutaneous every 12 hours  insulin lispro (ADMELOG) corrective regimen sliding scale   SubCutaneous every 6 hours  insulin NPH human recombinant 20 Unit(s) SubCutaneous every 6 hours  lactulose Syrup 30 Gram(s) Oral three times a day  levothyroxine Injectable 75 MICROGram(s) IV Push at bedtime  midodrine 10 milliGRAM(s) Oral every 8 hours  mupirocin 2% Ointment 1 Application(s) Both Nostrils two times a day  pantoprazole  Injectable 40 milliGRAM(s) IV Push daily  pantoprazole Infusion 8 mG/Hr IV Continuous <Continuous>  rifAXIMin 550 milliGRAM(s) Oral two times a day  tenofovir disoproxil fumarate (VIREAD) 300 milliGRAM(s) Oral daily    PRN Inpatient Medications  dextrose Oral Gel 15 Gram(s) Oral once PRN  LORazepam   Injectable 1.5 milliGRAM(s) IV Push once PRN  sodium chloride 0.65% Nasal 1 Spray(s) Both Nostrils three times a day PRN      VITALS/PHYSICAL EXAM  --------------------------------------------------------------------------------  T(C): 37.1 (03-17-23 @ 16:20), Max: 37.4 (03-17-23 @ 08:30)  HR: 79 (03-17-23 @ 16:20) (60 - 100)  BP: 108/58 (03-17-23 @ 16:20) (72/47 - 118/49)  RR: 18 (03-17-23 @ 16:20) (18 - 20)  SpO2: 100% (03-17-23 @ 16:20) (98% - 100%)  Wt(kg): --        03-16-23 @ 07:01  -  03-17-23 @ 07:00  --------------------------------------------------------  IN: 2085 mL / OUT: 0 mL / NET: 2085 mL    03-17-23 @ 07:01  -  03-17-23 @ 17:51  --------------------------------------------------------  IN: 2927 mL / OUT: 0 mL / NET: 2927 mL      Physical Exam:  	Gen: NAD, lying in bed, not responding  	HEENT: supple neck  	Pulm: CTA B/L  	CV: RRR, S1S2; no rub  	Back: No spinal or CVA tenderness; no sacral edema  	Abd: +BS, soft, nontender/nondistended  	: No suprapubic tenderness  	UE: Warm, no clubbing, no edema  	LE: Warm, no clubbing, no edema, appears dehydrated  	Skin: Dry, without rashes  	    LABS/STUDIES  --------------------------------------------------------------------------------              11.1   11.43 >-----------<  163      [03-17-23 @ 09:45]              38.1     154  |  119  |  106  ----------------------------<  163      [03-17-23 @ 09:45]  5.0   |  21  |  1.71        Ca     8.8     [03-17-23 @ 09:45]      Mg     3.30     [03-17-23 @ 09:45]      Phos  4.5     [03-17-23 @ 09:45]    TPro  5.4  /  Alb  2.8  /  TBili  2.2  /  DBili  x   /  AST  130  /  ALT  85  /  AlkPhos  416  [03-17-23 @ 09:45]    PT/INR: PT 25.3 , INR 2.16       [03-17-23 @ 04:59]  PTT: 31.7       [03-17-23 @ 04:59]      Creatinine Trend:  SCr 1.71 [03-17 @ 09:45]  SCr 1.68 [03-17 @ 04:59]  SCr 1.18 [03-16 @ 07:56]  SCr 1.08 [03-15 @ 04:47]  SCr 1.18 [03-14 @ 04:55]    Urinalysis - [03-17-23 @ 13:27]      Color Yellow / Appearance Clear / SG 1.025 / pH 6.0      Gluc Negative / Ketone Trace  / Bili Negative / Urobili <2 mg/dL       Blood Negative / Protein Trace / Leuk Est Negative / Nitrite Negative      RBC 3 / WBC 3 / Hyaline 3-5 / Gran  / Sq Epi  / Non Sq Epi 3 / Bacteria Negative    Urine Creatinine 66      [03-17-23 @ 13:27]  Urine Protein 16      [03-17-23 @ 13:27]  Urine Sodium <20      [03-17-23 @ 13:27]  Urine Urea Nitrogen 1108.2      [03-17-23 @ 13:27]  Urine Potassium 62.6      [03-17-23 @ 13:27]  Urine Osmolality 613      [03-17-23 @ 13:27]    TSH 1.40      [03-10-23 @ 06:10]

## 2023-03-17 NOTE — CONSULT NOTE ADULT - SUBJECTIVE AND OBJECTIVE BOX
CHIEF COMPLAINT:  hematemesis    HPI:  67M PMH DM, HTN, hypothyroidism, CAD, TANG cirrhosis, follicular lymphoma on rituximab, Hep B. on tenofovir, status post CABG, initially presented to ED w/ chest pain and constipation, developed hematemesis, now s/p MICU admission for acute blood loss anemia c/b hypovolemic shock , intubated for airway protection (extubated 3/8), s/p  bedside EGD with banding esophageal varices now with persistent encephalopathy, possibly 2/2 hypoxia/anoxic brain ischemia in s/o hemorrhagic shock on admission.    MICU consulted for several episodes of hypotension while on the floors.    PAST MEDICAL & SURGICAL HISTORY:  CAD (coronary artery disease)    Diabetes    Lymphoma    Cirrhosis    S/P CABG x 1    FAMILY HISTORY:  No pertinent family history in first degree relatives    SOCIAL HISTORY:    Allergies    [This allergen will not trigger allergy alert] &quot;lentils&quot;-&quot;itchiness&quot; (Other)  Beef (Other)  No Known Drug Allergies    Intolerances    HOME MEDICATIONS:    REVIEW OF SYSTEMS:  Pt not participating in ROS    ICU Vital Signs Last 24 Hrs  T(C): 36.8 (17 Mar 2023 12:15), Max: 37.4 (17 Mar 2023 08:30)  T(F): 98.3 (17 Mar 2023 12:15), Max: 99.4 (17 Mar 2023 08:30)  HR: 85 (17 Mar 2023 12:15) (60 - 100)  BP: 109/48 (17 Mar 2023 12:15) (72/47 - 118/49)  BP(mean): 58 (17 Mar 2023 01:13) (58 - 58)  ABP: --  ABP(mean): --  RR: 20 (17 Mar 2023 08:30) (18 - 20)  SpO2: 98% (17 Mar 2023 08:30) (98% - 100%)    O2 Parameters below as of 17 Mar 2023 08:30  Patient On (Oxygen Delivery Method): room air              -16 @ 07:01  -  03-17 @ 07:00  --------------------------------------------------------  IN: 2085 mL / OUT: 0 mL / NET: 5 mL     @ 07:01  -   @ 14:51  --------------------------------------------------------  IN: 1547 mL / OUT: 0 mL / NET: 1547 mL      CAPILLARY BLOOD GLUCOSE      POCT Blood Glucose.: 200 mg/dL (17 Mar 2023 12:33)      PHYSICAL EXAM:  CONSTITUTIONAL: sleeping, withdraws from pain  RESPIRATORY: Normal respiratory effort; lungs are clear to auscultation bilaterally; No Crackles/Rhonchi/Wheezing  CARDIOVASCULAR: Regular rate and rhythm,   ABDOMEN: mild tenderness to palpation, no masses, BRBPR noted  MUSCULOSKELETAL: 1+ le edema  NEURO: withdraws from pain, not cooperative w/ questioning  PSYCH: non-verbal    HOSPITAL MEDICATIONS:  MEDICATIONS  (STANDING):  albumin human 25% IVPB 250 milliLiter(s) IV Intermittent every 8 hours  chlorhexidine 2% Cloths 1 Application(s) Topical <User Schedule>  ciprofloxacin     Tablet 500 milliGRAM(s) Oral every 24 hours  coronavirus bivalent (EUA) Booster Vaccine (PFIZER) 0.3 milliLiter(s) IntraMuscular once  dextrose 5%. 1000 milliLiter(s) (75 mL/Hr) IV Continuous <Continuous>  dextrose 5%. 1000 milliLiter(s) (50 mL/Hr) IV Continuous <Continuous>  dextrose 5%. 1000 milliLiter(s) (100 mL/Hr) IV Continuous <Continuous>  dextrose 50% Injectable 25 Gram(s) IV Push once  dextrose 50% Injectable 12.5 Gram(s) IV Push once  dextrose 50% Injectable 25 Gram(s) IV Push once  glucagon  Injectable 1 milliGRAM(s) IntraMuscular once  heparin   Injectable 5000 Unit(s) SubCutaneous every 12 hours  insulin lispro (ADMELOG) corrective regimen sliding scale   SubCutaneous every 6 hours  insulin NPH human recombinant 18 Unit(s) SubCutaneous every 6 hours  lactulose Syrup 30 Gram(s) Oral three times a day  levothyroxine Injectable 75 MICROGram(s) IV Push at bedtime  midodrine 10 milliGRAM(s) Oral every 8 hours  midodrine. 10 milliGRAM(s) Oral once  mupirocin 2% Ointment 1 Application(s) Both Nostrils two times a day  pantoprazole  Injectable 40 milliGRAM(s) IV Push daily  pantoprazole Infusion 8 mG/Hr (10 mL/Hr) IV Continuous <Continuous>  rifAXIMin 550 milliGRAM(s) Oral two times a day  tenofovir disoproxil fumarate (VIREAD) 300 milliGRAM(s) Oral daily    MEDICATIONS  (PRN):  dextrose Oral Gel 15 Gram(s) Oral once PRN Blood Glucose LESS THAN 70 milliGRAM(s)/deciliter  LORazepam   Injectable 1.5 milliGRAM(s) IV Push once PRN pre-MRI  sodium chloride 0.65% Nasal 1 Spray(s) Both Nostrils three times a day PRN Nasal Congestion      LABS:                        11.1   11.43 )-----------( 163      ( 17 Mar 2023 09:45 )             38.1         154<H>  |  119<H>  |  106<H>  ----------------------------<  163<H>  5.0   |  21<L>  |  1.71<H>    Ca    8.8      17 Mar 2023 09:45  Phos  4.5       Mg     3.30         TPro  5.4<L>  /  Alb  2.8<L>  /  TBili  2.2<H>  /  DBili  x   /  AST  130<H>  /  ALT  85<H>  /  AlkPhos  416<H>      PT/INR - ( 17 Mar 2023 04:59 )   PT: 25.3 sec;   INR: 2.16 ratio         PTT - ( 17 Mar 2023 04:59 )  PTT:31.7 sec  Urinalysis Basic - ( 17 Mar 2023 13:27 )    Color: Yellow / Appearance: Clear / S.025 / pH: x  Gluc: x / Ketone: Trace  / Bili: Negative / Urobili: <2 mg/dL   Blood: x / Protein: Trace / Nitrite: Negative   Leuk Esterase: Negative / RBC: 3 /HPF / WBC 3 /HPF   Sq Epi: x / Non Sq Epi: 3 /HPF / Bacteria: Negative        Venous Blood Gas:   @ 09:45  7.36/40/31/23/43.5  VBG Lactate: 4.1      MICROBIOLOGY:     RADIOLOGY:  [ ] Reviewed and interpreted by me    EKG: Reviewed      CHIEF COMPLAINT:  hematemesis    HPI:  67M PMH DM, HTN, hypothyroidism, CAD, TANG cirrhosis, follicular lymphoma on rituximab, Hep B. on tenofovir, status post CABG, initially presented to ED w/ chest pain and constipation, developed hematemesis, now s/p MICU admission for acute blood loss anemia c/b hypovolemic shock , intubated for airway protection (extubated 3/8), s/p  bedside EGD with banding esophageal varices now with persistent encephalopathy, possibly 2/2 hypoxia/anoxic brain ischemia in s/o hemorrhagic shock on admission.    MICU consulted for several episodes of hypotension while on the floors.    PAST MEDICAL & SURGICAL HISTORY:  CAD (coronary artery disease)    Diabetes    Lymphoma    Cirrhosis    S/P CABG x 1    FAMILY HISTORY:  No pertinent family history in first degree relatives    SOCIAL HISTORY:    Allergies    [This allergen will not trigger allergy alert] &quot;lentils&quot;-&quot;itchiness&quot; (Other)  Beef (Other)  No Known Drug Allergies    Intolerances    HOME MEDICATIONS:    REVIEW OF SYSTEMS:  Pt not participating in ROS    ICU Vital Signs Last 24 Hrs  T(C): 36.8 (17 Mar 2023 12:15), Max: 37.4 (17 Mar 2023 08:30)  T(F): 98.3 (17 Mar 2023 12:15), Max: 99.4 (17 Mar 2023 08:30)  HR: 85 (17 Mar 2023 12:15) (60 - 100)  BP: 109/48 (17 Mar 2023 12:15) (72/47 - 118/49)  BP(mean): 58 (17 Mar 2023 01:13) (58 - 58)  ABP: --  ABP(mean): --  RR: 20 (17 Mar 2023 08:30) (18 - 20)  SpO2: 98% (17 Mar 2023 08:30) (98% - 100%)    O2 Parameters below as of 17 Mar 2023 08:30  Patient On (Oxygen Delivery Method): room air              -16 @ 07:01  -  03-17 @ 07:00  --------------------------------------------------------  IN: 2085 mL / OUT: 0 mL / NET: 5 mL     @ 07:01  -   @ 14:51  --------------------------------------------------------  IN: 1547 mL / OUT: 0 mL / NET: 1547 mL      CAPILLARY BLOOD GLUCOSE      POCT Blood Glucose.: 200 mg/dL (17 Mar 2023 12:33)      PHYSICAL EXAM:  CONSTITUTIONAL: older gentleman sleeping, withdraws from pain  RESPIRATORY: Normal respiratory effort; lungs are clear to auscultation bilaterally; No Crackles/Rhonchi/Wheezing  CARDIOVASCULAR: Regular rate and rhythm,   ABDOMEN: mild tenderness to palpation, no masses, BRBPR noted  MUSCULOSKELETAL: 1+ le edema  NEURO: withdraws from pain, not cooperative w/ questioning  PSYCH: non-verbal    HOSPITAL MEDICATIONS:  MEDICATIONS  (STANDING):  albumin human 25% IVPB 250 milliLiter(s) IV Intermittent every 8 hours  chlorhexidine 2% Cloths 1 Application(s) Topical <User Schedule>  ciprofloxacin     Tablet 500 milliGRAM(s) Oral every 24 hours  coronavirus bivalent (EUA) Booster Vaccine (PFIZER) 0.3 milliLiter(s) IntraMuscular once  dextrose 5%. 1000 milliLiter(s) (75 mL/Hr) IV Continuous <Continuous>  dextrose 5%. 1000 milliLiter(s) (50 mL/Hr) IV Continuous <Continuous>  dextrose 5%. 1000 milliLiter(s) (100 mL/Hr) IV Continuous <Continuous>  dextrose 50% Injectable 25 Gram(s) IV Push once  dextrose 50% Injectable 12.5 Gram(s) IV Push once  dextrose 50% Injectable 25 Gram(s) IV Push once  glucagon  Injectable 1 milliGRAM(s) IntraMuscular once  heparin   Injectable 5000 Unit(s) SubCutaneous every 12 hours  insulin lispro (ADMELOG) corrective regimen sliding scale   SubCutaneous every 6 hours  insulin NPH human recombinant 18 Unit(s) SubCutaneous every 6 hours  lactulose Syrup 30 Gram(s) Oral three times a day  levothyroxine Injectable 75 MICROGram(s) IV Push at bedtime  midodrine 10 milliGRAM(s) Oral every 8 hours  midodrine. 10 milliGRAM(s) Oral once  mupirocin 2% Ointment 1 Application(s) Both Nostrils two times a day  pantoprazole  Injectable 40 milliGRAM(s) IV Push daily  pantoprazole Infusion 8 mG/Hr (10 mL/Hr) IV Continuous <Continuous>  rifAXIMin 550 milliGRAM(s) Oral two times a day  tenofovir disoproxil fumarate (VIREAD) 300 milliGRAM(s) Oral daily    MEDICATIONS  (PRN):  dextrose Oral Gel 15 Gram(s) Oral once PRN Blood Glucose LESS THAN 70 milliGRAM(s)/deciliter  LORazepam   Injectable 1.5 milliGRAM(s) IV Push once PRN pre-MRI  sodium chloride 0.65% Nasal 1 Spray(s) Both Nostrils three times a day PRN Nasal Congestion      LABS:                        11.1   11.43 )-----------( 163      ( 17 Mar 2023 09:45 )             38.1         154<H>  |  119<H>  |  106<H>  ----------------------------<  163<H>  5.0   |  21<L>  |  1.71<H>    Ca    8.8      17 Mar 2023 09:45  Phos  4.5       Mg     3.30         TPro  5.4<L>  /  Alb  2.8<L>  /  TBili  2.2<H>  /  DBili  x   /  AST  130<H>  /  ALT  85<H>  /  AlkPhos  416<H>      PT/INR - ( 17 Mar 2023 04:59 )   PT: 25.3 sec;   INR: 2.16 ratio         PTT - ( 17 Mar 2023 04:59 )  PTT:31.7 sec  Urinalysis Basic - ( 17 Mar 2023 13:27 )    Color: Yellow / Appearance: Clear / S.025 / pH: x  Gluc: x / Ketone: Trace  / Bili: Negative / Urobili: <2 mg/dL   Blood: x / Protein: Trace / Nitrite: Negative   Leuk Esterase: Negative / RBC: 3 /HPF / WBC 3 /HPF   Sq Epi: x / Non Sq Epi: 3 /HPF / Bacteria: Negative        Venous Blood Gas:   @ 09:45  7.36/40/31/23/43.5  VBG Lactate: 4.1      MICROBIOLOGY:     RADIOLOGY:  [ ] Reviewed and interpreted by me    EKG: Reviewed

## 2023-03-17 NOTE — CONSULT NOTE ADULT - PROBLEM SELECTOR RECOMMENDATION 9
Patient had a hypotensive episode this AM s/p RRT and eval by MICU. Cr 1.18 yesterday then 1.68 this morning and trended upwards to 1.71 later this morning. His Na has also been trending up and he appears very volume depleted on exam. Likely prerenal with component of ATN at this point given severe hypotensive episode this AM. Patient was receiving lactulose frequently and had volume loss seen on exam as well.     Would increase free water to 400 q 4hrs and consider given albumin and gently IV NS to help improve circulatory volume. No ascites appreciated on exam.    Will cont to monitor Is/Os and daily weights. Follow up goals of care.  Would not be a good dialysis candidate.    Please call with questions  Master Walsh DO  Nephrology Fellow  [facility] Pager [phone]

## 2023-03-17 NOTE — CHART NOTE - NSCHARTNOTEFT_GEN_A_CORE
Upon chart review and sign out from night providers patient was noted to be hypotensive requiring albumin and midodrine for BP support. Patient continued to return to hypotension in the early morning hours     RRT called for SBP 80/48. Patient Baseline BP per EMR have been fluctuating, 100s/60s. At baseline mental status per primary team. No evidence of bleeding from UGI tract or LGI tract at this time.    refer to RRT sheet     Temp 98.7   Blood sugar 149  SpO2 99% RA     Exam: patient has weeping edema bilateral UE, likely anasarca     No intervention  IV fluid bolus  1L LR   Labs reviewed;   Patient with new NANCI and hypernatremia   Given hx of cirrhosis, may be a component of pre-renal azotemia as well as oncoming of Type 1 Hepatorenal syndrome     Recommended to the team that they begin Midodrine 10mg q8   Would recommend discontinuing diuretics given persistent hypotension   Can continue tube feeds at this time   GOC discussion with patient's family and palliative care consult   MICU consult called by primary team     At the end of the rapid, patient's BP was 88/55, HR 92, SpO2 99%   1L bolus still infusing   Patient at baseline mental status Upon chart review and sign out from night providers patient was noted to be hypotensive requiring albumin and midodrine for BP support. Patient continued to return to hypotension in the early morning hours with labs noted with worsening NANCI and mild drop in HgB. Patient's MS at baseline, AAOx0. Patient remained hypotensive to 80/48 despite a 2nd administration of midodrine plus albumin. RRT called by me after discussion with medicine attending Dr. Carias. MICU consult already called by Dr. Carias at the start of the RRT. Patient Baseline BP's per EMR have been fluctuating, 100s/60s. No evidence of bleeding from UGI tract or LGI tract at this time.    Temp 98.7   Blood sugar 149  SpO2 99% RA     Plan:   IVF LR bolus x 1L followed by maintenance IVF D5W, particularly for hypernatremia  Given hx of cirrhosis, may be a component of pre-renal azotemia as well as oncoming of Type 1 Hepatorenal syndrome   Initiated standing Midodrine 10mg q8h   discontinued furosemide and spironolactone given persistent hypotension   c/w tube feeds per RRT team  GOC discussion in progress, son present at the bedside, pt remains full code for now   palliative care team consulted   MICU consulted, will f/u recs  Dr. Carias made aware of events    At the end of the rapid, patient's BP was 88/55, HR 92, SpO2 99%   1L bolus still infusing   Patient at baseline mental status    Vital Signs Last 24 Hrs  T(C): 37.1 (17 Mar 2023 16:20), Max: 37.4 (17 Mar 2023 08:30)  T(F): 98.8 (17 Mar 2023 16:20), Max: 99.4 (17 Mar 2023 08:30)  HR: 79 (17 Mar 2023 16:20) (60 - 100)  BP: 108/58 (17 Mar 2023 16:20) (72/47 - 118/49)  BP(mean): 58 (17 Mar 2023 01:13) (58 - 58)  RR: 18 (17 Mar 2023 16:20) (18 - 20)  SpO2: 100% (17 Mar 2023 16:20) (98% - 100%)    Parameters below as of 17 Mar 2023 16:20  Patient On (Oxygen Delivery Method): room air                          11.1   11.43 )-----------( 163      ( 17 Mar 2023 09:45 )             38.1   03-17    154<H>  |  119<H>  |  106<H>  ----------------------------<  163<H>  5.0   |  21<L>  |  1.71<H>    Ca    8.8      17 Mar 2023 09:45  Phos  4.5     03-17  Mg     3.30     03-17    TPro  5.4<L>  /  Alb  2.8<L>  /  TBili  2.2<H>  /  DBili  x   /  AST  130<H>  /  ALT  85<H>  /  AlkPhos  416<H>  03-17 Upon chart review and sign out from night providers patient was noted to be hypotensive requiring albumin and midodrine for BP support. Patient continued to return to hypotension in the early morning hours with labs noted with worsening NANCI and mild drop in HgB. Patient's MS at baseline, AAOx0. Patient remained hypotensive to 80/48 despite a 2nd administration of midodrine plus albumin. RRT called by me after discussion with medicine attending Dr. Carias. MICU consult already called by Dr. Carias at the start of the RRT. Patient Baseline BP's per EMR have been fluctuating, 100s/60s. No evidence of bleeding from UGI tract or LGI tract at this time.    Temp 98.7   Blood sugar 149  SpO2 99% RA     Plan:   IVF LR bolus x 1L followed by maintenance IVF D5W, particularly for hypernatremia  Given hx of cirrhosis, may be a component of pre-renal azotemia as well as oncoming of Type 1 Hepatorenal syndrome   Initiated standing Midodrine 10mg q8h   discontinued furosemide and spironolactone given persistent hypotension   c/w tube feeds per RRT team  GOC discussion in progress, son present at the bedside, pt remains full code for now   palliative care team consulted   MICU consulted, will f/u recs  STAT labs obtained, h/h stable  Dr. Carias made aware of events    At the end of the rapid, patient's BP was 88/55, HR 92, SpO2 99%   1L bolus still infusing   Patient at baseline mental status    Vital Signs Last 24 Hrs  T(C): 37.1 (17 Mar 2023 16:20), Max: 37.4 (17 Mar 2023 08:30)  T(F): 98.8 (17 Mar 2023 16:20), Max: 99.4 (17 Mar 2023 08:30)  HR: 79 (17 Mar 2023 16:20) (60 - 100)  BP: 108/58 (17 Mar 2023 16:20) (72/47 - 118/49)  BP(mean): 58 (17 Mar 2023 01:13) (58 - 58)  RR: 18 (17 Mar 2023 16:20) (18 - 20)  SpO2: 100% (17 Mar 2023 16:20) (98% - 100%)    Parameters below as of 17 Mar 2023 16:20  Patient On (Oxygen Delivery Method): room air                          11.1   11.43 )-----------( 163      ( 17 Mar 2023 09:45 )             38.1   03-17    154<H>  |  119<H>  |  106<H>  ----------------------------<  163<H>  5.0   |  21<L>  |  1.71<H>    Ca    8.8      17 Mar 2023 09:45  Phos  4.5     03-17  Mg     3.30     03-17    TPro  5.4<L>  /  Alb  2.8<L>  /  TBili  2.2<H>  /  DBili  x   /  AST  130<H>  /  ALT  85<H>  /  AlkPhos  416<H>  03-17

## 2023-03-17 NOTE — PROGRESS NOTE ADULT - SUBJECTIVE AND OBJECTIVE BOX
Chief Complaint:  Patient is a 67y old  Male who presents with a chief complaint of shock, gib (17 Mar 2023 12:35)      Interval Events: continues to be hypotensive, no overt bleeding, hgb stable  - mental status unchanged as well  - son at bedside this afternoon      Hospital Medications:  albumin human 25% IVPB 250 milliLiter(s) IV Intermittent every 8 hours  chlorhexidine 2% Cloths 1 Application(s) Topical <User Schedule>  ciprofloxacin     Tablet 500 milliGRAM(s) Oral every 24 hours  coronavirus bivalent (EUA) Booster Vaccine (PFIZER) 0.3 milliLiter(s) IntraMuscular once  dextrose 5%. 1000 milliLiter(s) IV Continuous <Continuous>  dextrose 5%. 1000 milliLiter(s) IV Continuous <Continuous>  dextrose 5%. 1000 milliLiter(s) IV Continuous <Continuous>  dextrose 50% Injectable 25 Gram(s) IV Push once  dextrose 50% Injectable 12.5 Gram(s) IV Push once  dextrose 50% Injectable 25 Gram(s) IV Push once  dextrose Oral Gel 15 Gram(s) Oral once PRN  glucagon  Injectable 1 milliGRAM(s) IntraMuscular once  heparin   Injectable 5000 Unit(s) SubCutaneous every 12 hours  insulin lispro (ADMELOG) corrective regimen sliding scale   SubCutaneous every 6 hours  insulin NPH human recombinant 18 Unit(s) SubCutaneous every 6 hours  lactulose Syrup 30 Gram(s) Oral three times a day  levothyroxine Injectable 75 MICROGram(s) IV Push at bedtime  LORazepam   Injectable 1.5 milliGRAM(s) IV Push once PRN  midodrine 10 milliGRAM(s) Oral every 8 hours  midodrine. 10 milliGRAM(s) Oral once  mupirocin 2% Ointment 1 Application(s) Both Nostrils two times a day  pantoprazole  Injectable 40 milliGRAM(s) IV Push daily  pantoprazole Infusion 8 mG/Hr IV Continuous <Continuous>  rifAXIMin 550 milliGRAM(s) Oral two times a day  sodium chloride 0.65% Nasal 1 Spray(s) Both Nostrils three times a day PRN  tenofovir disoproxil fumarate (VIREAD) 300 milliGRAM(s) Oral daily      PMHX/PSHX:  CAD (coronary artery disease)    Diabetes    Lymphoma    Cirrhosis    S/P CABG x 1            ROS: unable to obtain      PHYSICAL EXAM:     GENERAL:  arousable however no meaningful interaction  HEENT:  NC/AT,  conjunctivae clear, scleral icterus, NGT in place, EEG leads in place  CHEST:  Full & symmetric excursion, no increased effort w/ respirations  HEART:  Regular rhythm & rate  ABDOMEN:  Soft, non-tender, non-distended  EXTREMITIES:  no LE  edema  SKIN:  No rash/erythema  NEURO:  arousable however no meaningful interaction, AOx0    Vital Signs:  Vital Signs Last 24 Hrs  T(C): 36.8 (17 Mar 2023 12:15), Max: 37.4 (17 Mar 2023 08:30)  T(F): 98.3 (17 Mar 2023 12:15), Max: 99.4 (17 Mar 2023 08:30)  HR: 85 (17 Mar 2023 12:15) (60 - 100)  BP: 109/48 (17 Mar 2023 12:15) (72/47 - 118/49)  BP(mean): 58 (17 Mar 2023 01:13) (58 - 58)  RR: 20 (17 Mar 2023 08:30) (18 - 20)  SpO2: 98% (17 Mar 2023 08:30) (98% - 100%)    Parameters below as of 17 Mar 2023 08:30  Patient On (Oxygen Delivery Method): room air      Daily     Daily     LABS:                        11.1   11.43 )-----------( 163      ( 17 Mar 2023 09:45 )             38.1     03-17    154<H>  |  119<H>  |  106<H>  ----------------------------<  163<H>  5.0   |  21<L>  |  1.71<H>    Ca    8.8      17 Mar 2023 09:45  Phos  4.5     03-17  Mg     3.30     03-17    TPro  5.4<L>  /  Alb  2.8<L>  /  TBili  2.2<H>  /  DBili  x   /  AST  130<H>  /  ALT  85<H>  /  AlkPhos  416<H>  03-17    LIVER FUNCTIONS - ( 17 Mar 2023 09:45 )  Alb: 2.8 g/dL / Pro: 5.4 g/dL / ALK PHOS: 416 U/L / ALT: 85 U/L / AST: 130 U/L / GGT: x           PT/INR - ( 17 Mar 2023 04:59 )   PT: 25.3 sec;   INR: 2.16 ratio         PTT - ( 17 Mar 2023 04:59 )  PTT:31.7 sec        Imaging:             Chief Complaint:  Patient is a 67y old  Male who presents with a chief complaint of shock, gib (17 Mar 2023 12:35)      Interval Events: continues to be hypotensive, no overt bleeding, hgb stable  - mental status unchanged as well  - son at bedside this afternoon      Hospital Medications:  albumin human 25% IVPB 250 milliLiter(s) IV Intermittent every 8 hours  chlorhexidine 2% Cloths 1 Application(s) Topical <User Schedule>  ciprofloxacin     Tablet 500 milliGRAM(s) Oral every 24 hours  coronavirus bivalent (EUA) Booster Vaccine (PFIZER) 0.3 milliLiter(s) IntraMuscular once  dextrose 5%. 1000 milliLiter(s) IV Continuous <Continuous>  dextrose 5%. 1000 milliLiter(s) IV Continuous <Continuous>  dextrose 5%. 1000 milliLiter(s) IV Continuous <Continuous>  dextrose 50% Injectable 25 Gram(s) IV Push once  dextrose 50% Injectable 12.5 Gram(s) IV Push once  dextrose 50% Injectable 25 Gram(s) IV Push once  dextrose Oral Gel 15 Gram(s) Oral once PRN  glucagon  Injectable 1 milliGRAM(s) IntraMuscular once  heparin   Injectable 5000 Unit(s) SubCutaneous every 12 hours  insulin lispro (ADMELOG) corrective regimen sliding scale   SubCutaneous every 6 hours  insulin NPH human recombinant 18 Unit(s) SubCutaneous every 6 hours  lactulose Syrup 30 Gram(s) Oral three times a day  levothyroxine Injectable 75 MICROGram(s) IV Push at bedtime  LORazepam   Injectable 1.5 milliGRAM(s) IV Push once PRN  midodrine 10 milliGRAM(s) Oral every 8 hours  midodrine. 10 milliGRAM(s) Oral once  mupirocin 2% Ointment 1 Application(s) Both Nostrils two times a day  pantoprazole  Injectable 40 milliGRAM(s) IV Push daily  pantoprazole Infusion 8 mG/Hr IV Continuous <Continuous>  rifAXIMin 550 milliGRAM(s) Oral two times a day  sodium chloride 0.65% Nasal 1 Spray(s) Both Nostrils three times a day PRN  tenofovir disoproxil fumarate (VIREAD) 300 milliGRAM(s) Oral daily      PMHX/PSHX:  CAD (coronary artery disease)    Diabetes    Lymphoma    Cirrhosis    S/P CABG x 1            ROS: unable to obtain      PHYSICAL EXAM:     GENERAL:  arousable however no meaningful interaction  HEENT:  NC/AT,  conjunctivae clear, scleral icterus, NGT in place  CHEST:  Full & symmetric excursion, no increased effort w/ respirations  HEART:  Regular rhythm & rate  ABDOMEN:  Soft, non-tender, non-distended  EXTREMITIES:  no LE  edema  SKIN:  No rash/erythema  NEURO:  arousable however no meaningful interaction, AOx0    Vital Signs:  Vital Signs Last 24 Hrs  T(C): 36.8 (17 Mar 2023 12:15), Max: 37.4 (17 Mar 2023 08:30)  T(F): 98.3 (17 Mar 2023 12:15), Max: 99.4 (17 Mar 2023 08:30)  HR: 85 (17 Mar 2023 12:15) (60 - 100)  BP: 109/48 (17 Mar 2023 12:15) (72/47 - 118/49)  BP(mean): 58 (17 Mar 2023 01:13) (58 - 58)  RR: 20 (17 Mar 2023 08:30) (18 - 20)  SpO2: 98% (17 Mar 2023 08:30) (98% - 100%)    Parameters below as of 17 Mar 2023 08:30  Patient On (Oxygen Delivery Method): room air      Daily     Daily     LABS:                        11.1   11.43 )-----------( 163      ( 17 Mar 2023 09:45 )             38.1     03-17    154<H>  |  119<H>  |  106<H>  ----------------------------<  163<H>  5.0   |  21<L>  |  1.71<H>    Ca    8.8      17 Mar 2023 09:45  Phos  4.5     03-17  Mg     3.30     03-17    TPro  5.4<L>  /  Alb  2.8<L>  /  TBili  2.2<H>  /  DBili  x   /  AST  130<H>  /  ALT  85<H>  /  AlkPhos  416<H>  03-17    LIVER FUNCTIONS - ( 17 Mar 2023 09:45 )  Alb: 2.8 g/dL / Pro: 5.4 g/dL / ALK PHOS: 416 U/L / ALT: 85 U/L / AST: 130 U/L / GGT: x           PT/INR - ( 17 Mar 2023 04:59 )   PT: 25.3 sec;   INR: 2.16 ratio         PTT - ( 17 Mar 2023 04:59 )  PTT:31.7 sec        Imaging:

## 2023-03-17 NOTE — PROGRESS NOTE ADULT - PROBLEM SELECTOR PLAN 11
Confirmed full code with son at bedside on 3/17  prophylactic heparin  will need PT when able to cooperate  functional quadriplegia c/w full care and turns, TF

## 2023-03-17 NOTE — PROGRESS NOTE ADULT - PROBLEM SELECTOR PLAN 1
-r/o GI bleed, hypovolemic +/- hemorrhagic shock?  -I called ICU consult, follow up recs,   will need ICU admission if refractory shock  low threshold to reconsult ICU  -albumin 250ml q8, midodrine 10mg q8h, IVF LR 1 L bolus given  -hold lasix/aldactone  -lactic acidosis in s/o shock, lactate 4.7, worsening NANCI also bun/creat 106/1.71, hypernatremia 154  -free water 300ml q4 and start d5w at 75ml/h  -monitor BP  -echo with preserved LVEF 53%

## 2023-03-17 NOTE — CONSULT NOTE ADULT - TIME BILLING
dw1 reviewing chart and coordinating care with primary team/staff, as well as reviewing vitals, radiology, medication list, recent labs, and prior records.

## 2023-03-17 NOTE — RAPID RESPONSE TEAM SUMMARY - NSSITUATIONBACKGROUNDRRT_GEN_ALL_CORE
67M HTN, DM2, hypothyroidism, CAD s/p CABG, TANG cirrhosis, follicular lymphoma (on Rituximab OP), chronic Hep B on tenofovir (HBV Core +), presented to the ED w/ chest pain and constipation while in ED developed hematemesis s/p MICU admission for acute blood loss anemia c/b hypovolemic shock  intubated for airway protection (extubated 3/8) s/p 2/24 bedside EGD with banding esophageal varices now with persistent encephalopathy.     RRT called for SBP 80/48  67M HTN, DM2, hypothyroidism, CAD s/p CABG, TANG cirrhosis, follicular lymphoma (on Rituximab OP), chronic Hep B on tenofovir (HBV Core +), presented to the ED w/ chest pain and constipation while in ED developed hematemesis s/p MICU admission for acute blood loss anemia c/b hypovolemic shock  intubated for airway protection (extubated 3/8) s/p 2/24 bedside EGD with banding esophageal varices now with persistent encephalopathy.     RRT called for SBP 80/48. Patient Baseline BP per EMR have been fluctuating, 100s/60s. At baseline mental status per primary team. No evidence of bleeding from UGI tract or LGI tract at this time.

## 2023-03-17 NOTE — CONSULT NOTE ADULT - ASSESSMENT
#Hypotension   - W/ brbpr, GIB needs further investigation but hemoglobin, blood pressure, HR improved.  - C/w midodrine, NS bolus, DC diuretics per primary tea    #GI bleed  - Hemoglobin stable at this time but consider repeat CBC if bleeding continues.    Reccomendations:   - At this time patient is hemodynamically stable, and protecting their airway     Not a MICU Candidate at this time. Please Call MICU back with any questions/Concerns.    D/w Dr. Kern     #Hypotension   - W/ brbpr, GIB needs further investigation but hemoglobin, blood pressure, HR improved.  - C/w midodrine, NS bolus, DC diuretics per primary tea    #GI bleed  - Hemoglobin stable at this time but consider repeat CBC if bleeding continues.    #NANCI  -appreciate nephrology recs    Reccomendations:   - At this time patient is hemodynamically stable, and protecting their airway     Not a MICU Candidate at this time. Please Call MICU back with any questions/Concerns.    D/w Dr. Kern     67M PMH DM, HTN, hypothyroidism, CAD, TANG cirrhosis, follicular lymphoma on rituximab, Hep B. on tenofovir, status post CABG, initially presented to ED w/ chest pain and constipation, developed hematemesis, now s/p MICU admission for acute blood loss anemia c/b hypovolemic shock , intubated for airway protection (extubated 3/8), s/p 2/24 bedside EGD with banding esophageal varices now with persistent encephalopathy, possibly 2/2 hypoxia/anoxic brain ischemia in s/o hemorrhagic shock on admission.    #Hypotension   - W/ brbpr, GIB needs further investigation but hemoglobin, blood pressure, HR improved.  - C/w midodrine, NS bolus, DC diuretics     #GI bleed  -hEMATOCHEZIA  - Hemoglobin stable at this time but consider repeat CBC if bleeding continues.    #NANCI  -appreciate nephrology recs    Reccomendations:   - At this time patient is hemodynamically stable, and protecting their airway     Not a MICU Candidate at this time. Please Call MICU back with any questions/Concerns.    D/w Dr. Kern     Statement Selected

## 2023-03-17 NOTE — ADVANCED PRACTICE NURSE CONSULT - ASSESSMENT
Right arm cleansed with CHG. Under ultrasound guidance, placed Arrow Endurance Extended Dwell Peripheral Catheter System 20G / 6cm into Right Median Vein. Brisk  blood return, flushed with 20mls normal saline. Minimal blood loss. Patient tolerated procedure well. CHG dressing placed. All sharps accounted for.

## 2023-03-17 NOTE — PROGRESS NOTE ADULT - ASSESSMENT
67M follows at [facility] Hx decompensated TANG cirrhosis (+ascites, +EV -- previously MELD Na 8), CAD s/p CABG s/p PCI (last in 2012 on ASA, now off plavix x2 weeks), newly diagnosed follicular lymphoma (on rituximab), HTN, DM, currently on tenofovir (HBV Core +) presented with right sided chest/abdominal pain with lizzette hematemesis + clots c/b hemorrhagic shock, s/p intubation in MICU, EGD showing large varices s/p banding, unable to completely clear stomach due to clot now transferred to the floor for further care with hospital course c/b persistent AMS     # AMS - likely 2/2 hypoxia/ischemia seen on MRI with extensive cortical restricted diffusion throughout the cerebral hemispheres including the basal ganglia and insula b/l.   #Hematemesis: s/p EGD 2/24/2023 s/p EVL x6, bleeding likely due to EV, however unable to completely visualize stomach given presence of clotted blood. H/H now stable.   #NANCI (resolved)  #Decompensated TANG cirrhosis: follows at [facility], previously low meld Na 8  --MELD Na: 14  --Ascites: (+) Hx,  was on Furosemide 20mg, spironolactone 50mg. Para 3/15 with 3.6L removed. Neg for SBP  --SBP; no Hx. WBC elevated today with no fevers. Would preform dx para as noted above and repeat infectious workup including CXR, U/A and BCx. Continue with ciprofloxacin 500mg daily for SBP prophylaxis given low ascitic protein  --EV: (+) Hx, no EVB --> is on nadolol at home  --PVT: no Hx  --HCC: no Hx  # HBV core positive - on Tenofovir    Recommendations:  - trend CBC however no overt bleeding and stable hgb, less concern for hemorrhagic shock   - c/w Rifaximin  - as encephalopathy is not due to HE, would limit lactulose to prevent diarrhea and skin breakdown - having 1-2 BM/day per I/Os  - No plans for repeat EGD at this time given MRI findings and poor prognosis however patient remains at risk for variceal hemorrhage as they were not completely eradicated which was discussed with son - if patient has recurrent bleeding or clinical status improves; can re-evaluate for endoscopic procedure  - c/w pantoprazole 40 QD  - c/w tenofovir  - GOC per primary team given poor prognosis  - trend CMP, CBC, coags    Note not finalized until signed by attending.    Onelia Cee PGY-6  Gastroenterology/Hepatology Fellow  Pager #[phone] (DEBBIE) or [phone] (NS)  Available on [employer] Teams.  Please contact on-call GI fellow via answering service ([phone]) after 5pm and before 8am, and on weekends.

## 2023-03-17 NOTE — PROGRESS NOTE ADULT - SUBJECTIVE AND OBJECTIVE BOX
Dr. Paulette Carias  Pager [phone]    PROGRESS NOTE:     Patient is a 67y old  Male who presents with a chief complaint of encephalopathy (16 Mar 2023 12:43)      SUBJECTIVE / OVERNIGHT EVENTS: hypotensive overnight to 790-80's overnight, given albumin, hypotensive again this morning, RRT called, getting fluid bolus and albumin  ADDITIONAL REVIEW OF SYSTEMS: lethargic this morning     MEDICATIONS  (STANDING):  albumin human 25% IVPB 250 milliLiter(s) IV Intermittent every 8 hours  chlorhexidine 2% Cloths 1 Application(s) Topical <User Schedule>  ciprofloxacin     Tablet 500 milliGRAM(s) Oral every 24 hours  coronavirus bivalent (EUA) Booster Vaccine (PFIZER) 0.3 milliLiter(s) IntraMuscular once  dextrose 5%. 1000 milliLiter(s) (75 mL/Hr) IV Continuous <Continuous>  dextrose 5%. 1000 milliLiter(s) (50 mL/Hr) IV Continuous <Continuous>  dextrose 5%. 1000 milliLiter(s) (100 mL/Hr) IV Continuous <Continuous>  dextrose 50% Injectable 25 Gram(s) IV Push once  dextrose 50% Injectable 12.5 Gram(s) IV Push once  dextrose 50% Injectable 25 Gram(s) IV Push once  glucagon  Injectable 1 milliGRAM(s) IntraMuscular once  heparin   Injectable 5000 Unit(s) SubCutaneous every 12 hours  insulin lispro (ADMELOG) corrective regimen sliding scale   SubCutaneous every 6 hours  insulin NPH human recombinant 18 Unit(s) SubCutaneous every 6 hours  lactulose Syrup 30 Gram(s) Oral three times a day  levothyroxine Injectable 75 MICROGram(s) IV Push at bedtime  midodrine 10 milliGRAM(s) Oral every 8 hours  midodrine. 10 milliGRAM(s) Oral once  mupirocin 2% Ointment 1 Application(s) Both Nostrils two times a day  pantoprazole  Injectable 40 milliGRAM(s) IV Push daily  pantoprazole Infusion 8 mG/Hr (10 mL/Hr) IV Continuous <Continuous>  rifAXIMin 550 milliGRAM(s) Oral two times a day  tenofovir disoproxil fumarate (VIREAD) 300 milliGRAM(s) Oral daily    MEDICATIONS  (PRN):  dextrose Oral Gel 15 Gram(s) Oral once PRN Blood Glucose LESS THAN 70 milliGRAM(s)/deciliter  LORazepam   Injectable 1.5 milliGRAM(s) IV Push once PRN pre-MRI  sodium chloride 0.65% Nasal 1 Spray(s) Both Nostrils three times a day PRN Nasal Congestion      CAPILLARY BLOOD GLUCOSE      POCT Blood Glucose.: 200 mg/dL (17 Mar 2023 12:33)  POCT Blood Glucose.: 149 mg/dL (17 Mar 2023 09:22)  POCT Blood Glucose.: 169 mg/dL (17 Mar 2023 08:00)  POCT Blood Glucose.: 173 mg/dL (17 Mar 2023 06:13)  POCT Blood Glucose.: 189 mg/dL (17 Mar 2023 01:04)  POCT Blood Glucose.: 278 mg/dL (16 Mar 2023 17:18)    I&O's Summary    16 Mar 2023 07:01  -  17 Mar 2023 07:00  --------------------------------------------------------  IN: 2085 mL / OUT: 0 mL / NET: 2085 mL    17 Mar 2023 07:01  -  17 Mar 2023 12:35  --------------------------------------------------------  IN: 1547 mL / OUT: 0 mL / NET: 1547 mL        PHYSICAL EXAM:  Vital Signs Last 24 Hrs  T(C): 36.8 (17 Mar 2023 12:15), Max: 37.4 (17 Mar 2023 08:30)  T(F): 98.3 (17 Mar 2023 12:15), Max: 99.4 (17 Mar 2023 08:30)  HR: 85 (17 Mar 2023 12:15) (60 - 100)  BP: 109/48 (17 Mar 2023 12:15) (72/47 - 118/49)  BP(mean): 58 (17 Mar 2023 01:13) (58 - 58)  RR: 20 (17 Mar 2023 08:30) (18 - 20)  SpO2: 98% (17 Mar 2023 08:30) (98% - 100%)    Parameters below as of 17 Mar 2023 08:30  Patient On (Oxygen Delivery Method): room air      GENERAL: ill-appearing, cachectic with temporal wasting  HEENT: MMM dry, +scleral icterus ., NGT in place  CHEST/LUNG: Clear to auscultation bilaterally; No wheeze  HEART: Regular rate and rhythm; systolic murmur  ABDOMEN: Soft, Nontender, mild distended, no r/g; Bowel sounds present, ? para site leaking with ostomy bag over  EXTREMITIES:   SCD, thigh edema  mcconnell removed  Neuropsych: lethargic, a/o x0, does not follow commands or speak       LABS:                        11.1   11.43 )-----------( 163      ( 17 Mar 2023 09:45 )             38.1     03-17    154<H>  |  119<H>  |  106<H>  ----------------------------<  163<H>  5.0   |  21<L>  |  1.71<H>    Ca    8.8      17 Mar 2023 09:45  Phos  4.5     03-17  Mg     3.30     03-17    TPro  5.4<L>  /  Alb  2.8<L>  /  TBili  2.2<H>  /  DBili  x   /  AST  130<H>  /  ALT  85<H>  /  AlkPhos  416<H>  03-17    PT/INR - ( 17 Mar 2023 04:59 )   PT: 25.3 sec;   INR: 2.16 ratio         PTT - ( 17 Mar 2023 04:59 )  PTT:31.7 sec      Culture - Fungal, Body Fluid (collected 15 Mar 2023 12:55)  Source: Peritoneal Peritoneal Fluid  Preliminary Report (16 Mar 2023 12:01):    Testing in progress    Culture - Body Fluid with Gram Stain (collected 15 Mar 2023 12:55)  Source: Peritoneal Peritoneal Fluid  Gram Stain (15 Mar 2023 17:32):    polymorphonuclear leukocytes seen    No organisms seen    by cytocentrifuge  Preliminary Report (16 Mar 2023 13:01):    No growth    RADIOLOGY & ADDITIONAL TESTS:  Results Reviewed:   Imaging Personally Reviewed:    Electrocardiogram Personally Reviewed:    COORDINATION OF CARE:  Care Discussed with Consultants/Other Providers [Y/N]: ICU fellow, acp Jesse, renal fellow, nephrology consult, fluid bolus/albumin/midodrine, critical care consult, palliative care consult  Prior or Outpatient Records Reviewed [Y/N]:

## 2023-03-18 NOTE — PROGRESS NOTE ADULT - PROBLEM SELECTOR PLAN 11
Confirmed full code with daughter at bedside on 3/18  dc prophylactic heparin  will need PT when able to cooperate  functional quadriplegia c/w full care and turns, TF

## 2023-03-18 NOTE — CHART NOTE - NSCHARTNOTEFT_GEN_A_CORE
Upon review of labs, H/H noted to be 7.9/29.6 (previous 11.1/38.1) CBC repeated. H/H remains similar to prior.    Patient seen at bedside and assessed. Upon review of labs, H/H noted to be 7.9/29.6 (previous 11.1/38.1) CBC repeated. H/H remains similar to prior.    Patient seen at bedside and assessed. Patient is resting comfortably, in no acute distress. Patient awake and alert, not answering questions but is sleepy.  Patient nods to feeling well and then goes back to sleep. As per RN, patient had no episodes of bleeding overnight.     T(C): 36.2 (03-18-23 @ 03:08), Max: 37.4 (03-17-23 @ 08:30)  HR: 75 (03-18-23 @ 03:08) (60 - 100)  BP: 115/38 (03-18-23 @ 03:08) (79/54 - 118/49)  RR: 18 (03-18-23 @ 03:08) (18 - 20)  SpO2: 100% (03-18-23 @ 03:08) (98% - 100%)    CONSTITUTIONAL: No apparent distress  RESP: No respiratory distress, no use of accessory muscles; CTA b/l, no WRR  CV: RRR, +S1S2, no MRG; no JVD; no peripheral edema  GI: Soft, NT, ND, no rebound, no guarding; no palpable masses   PSYCH: Awake and alert    Patient is a 66yo M w/ pmhx CAD s/p CABG, TANG cirrhosis, follicular lymphoma (on Rituximab infusions outpt), HTN, DM, hypothyroidism, currently on tenofovir (HBV Core +), who presented to the ED w/ chest pain and constipation while in the ED patient w/ multiple episodes of hematemesis and hypotension s/p MICU admission for acute blood loss anemia c/b hypovolemic shock , intubated for airway protection (extubated 3/8), s/p 2/24 bedside EGD with banding esophageal varices now with persistent encephalopathy, possibly 2/2 hypoxia/anoxic brain ischemia in s/o hemorrhagic shock on admission. Patient hypotensive past few days s/p RRT 03/16/23 and started on midodrine 10mg TID.     #Acute drop in H/H likely 2/2 UGIB   Plan:  - VSS  - 1u PRBC ordered for transfusion given acute drop in hgb; consent in chart  - Tube feeds held   - D/w Dr. ALIA Echeverria ([facility]). Recommends starting Octreotide gtt along with patient's current pantoprazole gtt and transfuse patient. Patient currently with 2 arrows. Recommends to use one for PRBC transfusion and other to alternate between octreotide gtt and pantoprazole IVP untill 3rd access is available. Also recommends to hold off on further imaging such as repeat CT A/P scan since patient had no acute bleeding overnight and have day team consult GI to discuss further treatment options.  - GI c/s in AM  - Ordered octreotide gtt  - Will continue to monitor patient and relay events to day team for further continuity of care

## 2023-03-18 NOTE — PROGRESS NOTE ADULT - PROBLEM SELECTOR PLAN 5
Uptrended to 1.7, now downtrending  - mcconnell removed 3/15,   no retention on bladder scan, bladder scan prn  NANCI likely ATN in s/o shock  no hydronephrosis on renal US  avoid hypotension

## 2023-03-18 NOTE — OCCUPATIONAL THERAPY INITIAL EVALUATION ADULT - PERTINENT HX OF CURRENT PROBLEM, REHAB EVAL
67 year old male with history of HTN, DM2, hypothyroidism, CAD s/p CABG, TANG cirrhosis, follicular lymphoma, chronic Hep B, presented to the ED with chest pain and constipation. While in ED, developed hematemesis s/p MICU admission for acute blood loss anemia c/b hypovolemic shock. intubated for airway protection (extubated 3/8). S/p 2/24 bedside EGD with banding esophageal varices. Now with persistent encephalopathy. MRI brain revealing possible hypoxic-ischemic injury.

## 2023-03-18 NOTE — PROVIDER CONTACT NOTE (CRITICAL VALUE NOTIFICATION) - ASSESSMENT
Patient remain lethargic with b/p 93/50, HR 60.
pt a&ox4. no active signs of bleeding noted. pressure stable 130/66 HR 55

## 2023-03-18 NOTE — PROVIDER CONTACT NOTE (CRITICAL VALUE NOTIFICATION) - BACKGROUND
67 y.o male admitted on 2/24/23 for GI hemorrhage with PMH CAD status post CABG, TANG cirrhosis, follicular lymphoma, etc. Recent rapid response for hypotension.
67 y.o male admitted on 2/24/23 for GI hemorrhage with PMH CAD status post CABG, TANG cirrhosis, follicular lymphoma, etc. Recent rapid response for hypotension.

## 2023-03-18 NOTE — PROGRESS NOTE ADULT - PROBLEM SELECTOR PLAN 1
-r/o GI bleed, hypovolemic +/- hemorrhagic shock?  -hemodynamically improved  -transfuse 1 unit prbc today for hgb drop from 11.1 to 7.9, nurse reports no obvious dark BM  -GI f/u  -started on octreotide gtt, change protonix gtt to protonix 40 iv bid  - trend CBC, transfuse prn  - albumin 250ml q8x3 doses, midodrine 10mg q8h, given a liter LR on 3/17  -hold lasix/aldactone  -lactic acidosis in s/o shock, lactate 4.7, downtrended to 1.8 with appropriate resuscitation  NANCI bun/creat 106/1.71, hypernatremia 154, improving also   -free water increased to 400ml q4   -monitor BP  -echo with preserved LVEF 53% -r/o GI bleed, hypovolemic +/- hemorrhagic shock?  -hemodynamically improved  -transfuse 1 unit prbc today for hgb drop from 11.1 to 7.9, nurse reports no obvious dark BM  -GI f/u  -started on octreotide gtt, change protonix gtt to protonix 40 iv bid  - trend CBC, transfuse prn  - albumin 250ml q8x3 doses, midodrine 10mg q8h, given a liter LR on 3/17  -resume lasix/aldactone  -lactic acidosis in s/o shock, lactate 4.7, downtrended to 1.8 with appropriate resuscitation  NANCI bun/creat 106/1.71, hypernatremia 154, improving also   -free water increased to 400ml q4   -monitor BP  -echo with preserved LVEF 53%

## 2023-03-18 NOTE — PROGRESS NOTE ADULT - PROBLEM SELECTOR PLAN 3
acute blood loss anemia c/b hypovolemic shock s/p levophed and ICU  - Hb 6.9 on admit now s/p 6 units of pRBC, 1 unit FFP, 1 unit of platelets all on 2/24/23  - s/p octreotide x 72h (2/24-2/27)   - protonix bid , octreotide gtt as above  - trend CBC, transfuse prn, 1u prbc today  - s/p EGD 2/24: large (> 5 mm) esophageal varices. Incompletely eradicated. Banded. Normal duodenal bulb, first portion of the duodenum and second portion of the duodenum.                 - EGD in 4 wks for likely repeat banding  -SBP ppx cipro

## 2023-03-18 NOTE — OCCUPATIONAL THERAPY INITIAL EVALUATION ADULT - NSOTDISCHREC_GEN_A_CORE
Patient lethargic, unable to follow commands, unable to participate in OT sessions at this time. Patient will not be placed on OT program. Please reconsult this discipline if/when appropriate.

## 2023-03-18 NOTE — PROGRESS NOTE ADULT - SUBJECTIVE AND OBJECTIVE BOX
Dr. Paulette Carias  Pager [phone]    PROGRESS NOTE:     Patient is a 67y old  Male who presents with a chief complaint of shock, gib (17 Mar 2023 12:35)      SUBJECTIVE / OVERNIGHT EVENTS: more awake today., nurse reports no dark BM overnight  ADDITIONAL REVIEW OF SYSTEMS: afebrile     MEDICATIONS  (STANDING):  albumin human 25% IVPB 250 milliLiter(s) IV Intermittent every 8 hours  chlorhexidine 2% Cloths 1 Application(s) Topical <User Schedule>  ciprofloxacin     Tablet 500 milliGRAM(s) Oral every 24 hours  coronavirus bivalent (EUA) Booster Vaccine (PFIZER) 0.3 milliLiter(s) IntraMuscular once  dextrose 5%. 1000 milliLiter(s) (50 mL/Hr) IV Continuous <Continuous>  dextrose 5%. 1000 milliLiter(s) (100 mL/Hr) IV Continuous <Continuous>  dextrose 50% Injectable 25 Gram(s) IV Push once  dextrose 50% Injectable 12.5 Gram(s) IV Push once  dextrose 50% Injectable 25 Gram(s) IV Push once  glucagon  Injectable 1 milliGRAM(s) IntraMuscular once  insulin lispro (ADMELOG) corrective regimen sliding scale   SubCutaneous every 6 hours  insulin NPH human recombinant 20 Unit(s) SubCutaneous every 6 hours  lactulose Syrup 30 Gram(s) Oral three times a day  levothyroxine Injectable 75 MICROGram(s) IV Push at bedtime  midodrine 10 milliGRAM(s) Oral every 8 hours  mupirocin 2% Ointment 1 Application(s) Both Nostrils two times a day  octreotide  Infusion 50 MICROgram(s)/Hr (10 mL/Hr) IV Continuous <Continuous>  pantoprazole  Injectable 40 milliGRAM(s) IV Push every 12 hours  rifAXIMin 550 milliGRAM(s) Oral two times a day  sodium chloride 0.9%. 1000 milliLiter(s) (50 mL/Hr) IV Continuous <Continuous>  tenofovir disoproxil fumarate (VIREAD) 300 milliGRAM(s) Oral daily    MEDICATIONS  (PRN):  dextrose Oral Gel 15 Gram(s) Oral once PRN Blood Glucose LESS THAN 70 milliGRAM(s)/deciliter  LORazepam   Injectable 1.5 milliGRAM(s) IV Push once PRN pre-MRI  sodium chloride 0.65% Nasal 1 Spray(s) Both Nostrils three times a day PRN Nasal Congestion      CAPILLARY BLOOD GLUCOSE      POCT Blood Glucose.: 83 mg/dL (18 Mar 2023 08:10)  POCT Blood Glucose.: 96 mg/dL (18 Mar 2023 01:36)  POCT Blood Glucose.: 131 mg/dL (17 Mar 2023 20:31)  POCT Blood Glucose.: 166 mg/dL (17 Mar 2023 17:22)  POCT Blood Glucose.: 200 mg/dL (17 Mar 2023 12:33)    I&O's Summary    17 Mar 2023 07:01  -  18 Mar 2023 07:00  --------------------------------------------------------  IN: 3112 mL / OUT: 0 mL / NET: 3112 mL        PHYSICAL EXAM:  Vital Signs Last 24 Hrs  T(C): 36.9 (18 Mar 2023 09:07), Max: 37.1 (17 Mar 2023 16:20)  T(F): 98.5 (18 Mar 2023 09:07), Max: 98.8 (17 Mar 2023 16:20)  HR: 70 (18 Mar 2023 09:07) (66 - 85)  BP: 120/54 (18 Mar 2023 09:07) (94/46 - 122/55)  BP(mean): --  RR: 18 (18 Mar 2023 09:07) (18 - 19)  SpO2: 100% (18 Mar 2023 09:07) (99% - 100%)    Parameters below as of 18 Mar 2023 09:07  Patient On (Oxygen Delivery Method): room air        GENERAL: ill-appearing, cachectic with temporal wasting  HEENT: MMM dry, +scleral icterus ., NGT in place  CHEST/LUNG: Clear to auscultation bilaterally; No wheeze  HEART: Regular rate and rhythm; systolic murmur  ABDOMEN: Soft, Nontender, mild distended, no r/g; Bowel sounds present, ? para site leaking with ostomy bag over  EXTREMITIES:   SCD, thigh edema  mcconnell removed, condom catheter  Neuropsych: awake, does not follow commands or speak     LABS:                        7.9    5.15  )-----------( 70       ( 18 Mar 2023 02:45 )             26.3     03-18    150<H>  |  116<H>  |  100<H>  ----------------------------<  64<L>  4.2   |  19<L>  |  1.39<H>    Ca    8.7      18 Mar 2023 07:39  Phos  2.5     03-18  Mg     3.20     03-18    TPro  5.7<L>  /  Alb  3.8  /  TBili  1.8<H>  /  DBili  0.7<H>  /  AST  126<H>  /  ALT  60<H>  /  AlkPhos  256<H>  03-18    PT/INR - ( 18 Mar 2023 02:45 )   PT: 25.6 sec;   INR: 2.19 ratio         PTT - ( 18 Mar 2023 07:39 )  PTT:54.8 sec      Urinalysis Basic - ( 17 Mar 2023 13:27 )    Color: Yellow / Appearance: Clear / S.025 / pH: x  Gluc: x / Ketone: Trace  / Bili: Negative / Urobili: <2 mg/dL   Blood: x / Protein: Trace / Nitrite: Negative   Leuk Esterase: Negative / RBC: 3 /HPF / WBC 3 /HPF   Sq Epi: x / Non Sq Epi: 3 /HPF / Bacteria: Negative        Culture - Fungal, Body Fluid (collected 15 Mar 2023 12:55)  Source: Peritoneal Peritoneal Fluid  Preliminary Report (16 Mar 2023 12:01):    Testing in progress    Culture - Body Fluid with Gram Stain (collected 15 Mar 2023 12:55)  Source: Peritoneal Peritoneal Fluid  Gram Stain (15 Mar 2023 17:32):    polymorphonuclear leukocytes seen    No organisms seen    by cytocentrifuge  Preliminary Report (16 Mar 2023 13:01):    No growth        RADIOLOGY & ADDITIONAL TESTS:  Results Reviewed:   Imaging Personally Reviewed:  Electrocardiogram Personally Reviewed:    COORDINATION OF CARE:  Care Discussed with Consultants/Other Providers [Y/N]: acp anaar, protonix 40 bid, trend cbc  Prior or Outpatient Records Reviewed [Y/N]:   Dr. Paulette Carias  Pager [phone]    PROGRESS NOTE:     Patient is a 67y old  Male who presents with a chief complaint of shock, gib (17 Mar 2023 12:35)      SUBJECTIVE / OVERNIGHT EVENTS: more awake today., nurse reports no dark BM overnight  ADDITIONAL REVIEW OF SYSTEMS: afebrile     MEDICATIONS  (STANDING):  albumin human 25% IVPB 250 milliLiter(s) IV Intermittent every 8 hours  chlorhexidine 2% Cloths 1 Application(s) Topical <User Schedule>  ciprofloxacin     Tablet 500 milliGRAM(s) Oral every 24 hours  coronavirus bivalent (EUA) Booster Vaccine (PFIZER) 0.3 milliLiter(s) IntraMuscular once  dextrose 5%. 1000 milliLiter(s) (50 mL/Hr) IV Continuous <Continuous>  dextrose 5%. 1000 milliLiter(s) (100 mL/Hr) IV Continuous <Continuous>  dextrose 50% Injectable 25 Gram(s) IV Push once  dextrose 50% Injectable 12.5 Gram(s) IV Push once  dextrose 50% Injectable 25 Gram(s) IV Push once  glucagon  Injectable 1 milliGRAM(s) IntraMuscular once  insulin lispro (ADMELOG) corrective regimen sliding scale   SubCutaneous every 6 hours  insulin NPH human recombinant 20 Unit(s) SubCutaneous every 6 hours  lactulose Syrup 30 Gram(s) Oral three times a day  levothyroxine Injectable 75 MICROGram(s) IV Push at bedtime  midodrine 10 milliGRAM(s) Oral every 8 hours  mupirocin 2% Ointment 1 Application(s) Both Nostrils two times a day  octreotide  Infusion 50 MICROgram(s)/Hr (10 mL/Hr) IV Continuous <Continuous>  pantoprazole  Injectable 40 milliGRAM(s) IV Push every 12 hours  rifAXIMin 550 milliGRAM(s) Oral two times a day  sodium chloride 0.9%. 1000 milliLiter(s) (50 mL/Hr) IV Continuous <Continuous>  tenofovir disoproxil fumarate (VIREAD) 300 milliGRAM(s) Oral daily    MEDICATIONS  (PRN):  dextrose Oral Gel 15 Gram(s) Oral once PRN Blood Glucose LESS THAN 70 milliGRAM(s)/deciliter  LORazepam   Injectable 1.5 milliGRAM(s) IV Push once PRN pre-MRI  sodium chloride 0.65% Nasal 1 Spray(s) Both Nostrils three times a day PRN Nasal Congestion      CAPILLARY BLOOD GLUCOSE      POCT Blood Glucose.: 83 mg/dL (18 Mar 2023 08:10)  POCT Blood Glucose.: 96 mg/dL (18 Mar 2023 01:36)  POCT Blood Glucose.: 131 mg/dL (17 Mar 2023 20:31)  POCT Blood Glucose.: 166 mg/dL (17 Mar 2023 17:22)  POCT Blood Glucose.: 200 mg/dL (17 Mar 2023 12:33)    I&O's Summary    17 Mar 2023 07:01  -  18 Mar 2023 07:00  --------------------------------------------------------  IN: 3112 mL / OUT: 0 mL / NET: 3112 mL        PHYSICAL EXAM:  Vital Signs Last 24 Hrs  T(C): 36.9 (18 Mar 2023 09:07), Max: 37.1 (17 Mar 2023 16:20)  T(F): 98.5 (18 Mar 2023 09:07), Max: 98.8 (17 Mar 2023 16:20)  HR: 70 (18 Mar 2023 09:07) (66 - 85)  BP: 120/54 (18 Mar 2023 09:07) (94/46 - 122/55)  BP(mean): --  RR: 18 (18 Mar 2023 09:07) (18 - 19)  SpO2: 100% (18 Mar 2023 09:07) (99% - 100%)    Parameters below as of 18 Mar 2023 09:07  Patient On (Oxygen Delivery Method): room air        GENERAL: ill-appearing, cachectic with temporal wasting  HEENT: MMM dry, +scleral icterus ., NGT in place  CHEST/LUNG: Clear to auscultation bilaterally; No wheeze  HEART: Regular rate and rhythm; systolic murmur  ABDOMEN: Soft, Nontender, mild distended, no r/g; Bowel sounds present, ? para site leaking with ostomy bag over  EXTREMITIES:   SCD, thigh edema  mcconnell removed, condom catheter  Neuropsych: awake, does not follow commands or speak     LABS:                        7.9    5.15  )-----------( 70       ( 18 Mar 2023 02:45 )             26.3     03-18    150<H>  |  116<H>  |  100<H>  ----------------------------<  64<L>  4.2   |  19<L>  |  1.39<H>    Ca    8.7      18 Mar 2023 07:39  Phos  2.5     03-18  Mg     3.20     03-18    TPro  5.7<L>  /  Alb  3.8  /  TBili  1.8<H>  /  DBili  0.7<H>  /  AST  126<H>  /  ALT  60<H>  /  AlkPhos  256<H>  03-18    PT/INR - ( 18 Mar 2023 02:45 )   PT: 25.6 sec;   INR: 2.19 ratio         PTT - ( 18 Mar 2023 07:39 )  PTT:54.8 sec      Urinalysis Basic - ( 17 Mar 2023 13:27 )    Color: Yellow / Appearance: Clear / S.025 / pH: x  Gluc: x / Ketone: Trace  / Bili: Negative / Urobili: <2 mg/dL   Blood: x / Protein: Trace / Nitrite: Negative   Leuk Esterase: Negative / RBC: 3 /HPF / WBC 3 /HPF   Sq Epi: x / Non Sq Epi: 3 /HPF / Bacteria: Negative        Culture - Fungal, Body Fluid (collected 15 Mar 2023 12:55)  Source: Peritoneal Peritoneal Fluid  Preliminary Report (16 Mar 2023 12:01):    Testing in progress    Culture - Body Fluid with Gram Stain (collected 15 Mar 2023 12:55)  Source: Peritoneal Peritoneal Fluid  Gram Stain (15 Mar 2023 17:32):    polymorphonuclear leukocytes seen    No organisms seen    by cytocentrifuge  Preliminary Report (16 Mar 2023 13:01):    No growth        RADIOLOGY & ADDITIONAL TESTS:  Results Reviewed:   Imaging Personally Reviewed:  < from: US Kidney and Bladder (23 @ 14:13) >  IMPRESSION:  *  Both kidneys are small in size.  *  No hydronephrosis.        Electrocardiogram Personally Reviewed:    COORDINATION OF CARE:  Care Discussed with Consultants/Other Providers [Y/N]: acp anaar, protonix 40 bid, trend cbc  Prior or Outpatient Records Reviewed [Y/N]:

## 2023-03-18 NOTE — OCCUPATIONAL THERAPY INITIAL EVALUATION ADULT - ADDITIONAL COMMENTS
Daughter at bedside to provide social history. Reports patient lives in a private home with his daughter. Was independent with ADLs and functional mobility.

## 2023-03-18 NOTE — PROGRESS NOTE ADULT - PROBLEM SELECTOR PLAN 2
- likely d/t hypoxia/anoxic brain ischemia in s/o hemorrhagic shock on admission  - MRI brain 3/15 extensive cortical restricted diffusion throughout the cerebral hemispheres including the basal ganglia and insula b/l.    - CTH (3/4/23): no acute pathology  - correct hypernatremia with inc free water to 300ml q4h, trend Na, check urine lytes/osm  - neuro recs appreciated, encephalopathic state 2/2 anoxic ischemic brain injury due to hypoperfusion  - EEG (3/5/23): Abnormal EEG study.  Potential epileptogenic focus in the left posterior head region. Structural of functional abnormality in the left posterior head region. Moderate nonspecific diffuse or multifocal cerebral dysfunction. No seizure seen.     repeat EEG (3/14) frequent left posterior quadrant periodic discharges, risk of seizure has decreased compared to EEG from 3/5. No seizures recorded.   - c/w rifaximin 550 mg BID,  inc lactulose to 30g tid titrate to 3-4 BMs, stool count, repeat ammonia level 25  - paracentesis x 3 neg for SBP  - remains encephalopathic, failed S&S, c/w NGT feeds, aspiration precautions  - TSH wnl  - s/p IV thiamine  - CXR no infiltrate, c/w TF via NGT  - overall prognosis very poor, remains full code, met with son and daughter on 3/17-18, updated them on pt's condition, advised poor prognosis. They remains hopeful and wants a trial of oral feed next week if mental status improves.   palliative care consulted on 3/17 to help with GOC discussions, pending

## 2023-03-18 NOTE — PROGRESS NOTE ADULT - PROBLEM SELECTOR PLAN 4
decompensated with ascites, with variceal bleed, with PSE; MELD 12 3/10/23  - s/p diagnostic para (2/25) and therapeutic para (3/5) removed 4.5L  s/p diag/therapeutic tap on 3/15, removed 3.6L, neg for SBP  - c/w rifaximin BID, lactulose q4h, monitor BMs   - SBP ppx with Cipro  - resume lasix/aldactone   - hepatology following  - LFT uptrending, RUQ w/o pathology on 3/10  - MRI abdomen at [facility] 12/2022 no HCC decompensated with ascites, with variceal bleed, with PSE; MELD 12 3/10/23  - s/p diagnostic para (2/25) and therapeutic para (3/5) removed 4.5L  s/p diag/therapeutic tap on 3/15, removed 3.6L, neg for SBP  - c/w rifaximin BID, lactulose q4h, monitor BMs   - SBP ppx with Cipro  - resume lasix 20/aldactone 25 mg qd   - hepatology following  - LFT uptrending, RUQ w/o pathology on 3/10  - MRI abdomen at [facility] 12/2022 no HCC

## 2023-03-19 NOTE — PROGRESS NOTE ADULT - PROBLEM SELECTOR PLAN 5
Uptrended to 1.7, now downtrending  - mcconnell removed 3/15,  passed TOV  no retention on bladder scan, bladder scan prn  NANCI likely ATN in s/o shock  no hydronephrosis on renal US  avoid hypotension

## 2023-03-19 NOTE — PROGRESS NOTE ADULT - PROBLEM SELECTOR PLAN 2
- likely d/t hypoxia/anoxic brain ischemia in s/o hemorrhagic shock on admission  - MRI brain 3/15 extensive cortical restricted diffusion throughout the cerebral hemispheres including the basal ganglia and insula b/l.    - CTH (3/4/23): no acute pathology  - correct hypernatremia with inc free water to 300ml q4h, trend Na, check urine lytes/osm  - neuro recs appreciated, encephalopathic state 2/2 anoxic ischemic brain injury due to hypoperfusion  - EEG (3/5/23): Abnormal EEG study.  Potential epileptogenic focus in the left posterior head region. Structural of functional abnormality in the left posterior head region. Moderate nonspecific diffuse or multifocal cerebral dysfunction. No seizure seen.     repeat EEG (3/14) frequent left posterior quadrant periodic discharges, risk of seizure has decreased compared to EEG from 3/5. No seizures recorded.   - c/w rifaximin 550 mg BID,  inc lactulose to 30g tid titrate to 3-4 BMs, stool count, repeat ammonia level 25  - paracentesis x 3 neg for SBP  - remains encephalopathic, failed S&S, c/w NGT feeds, aspiration precautions  - TSH wnl  - s/p IV thiamine  - CXR no infiltrate, c/w TF via NGT  - overall prognosis very poor, remains full code, met with sons and daughter on 3/17-18-19, updated them on pt's condition, advised pt has severe encephalopathy from hypoxic brain injury, ESLD/cirrhosis with poor prognosis. They remain hopeful and want a trial of oral feed next week if mental status improves. They seem to have unrealistic expectations and want him to live as long as possible.  - Palliative care consulted on 3/17 to help with GOC discussions, still pending, f/u

## 2023-03-19 NOTE — PROGRESS NOTE ADULT - PROBLEM SELECTOR PLAN 2
Pt. with hypernatremia. SNa is elevated to 156 today. Calculated free water deficit is ~3.2L. Recommend to continue with IV D5, and free water flushes. Can increase free water volume to 400 cc q4h Monitor SNa.    If you have any questions, please feel free to contact me  Milka Mendez  Nephrology Fellow  [phone] / [employer] Teams(Preferred)  (After 5pm or on weekends please page the on-call fellow)

## 2023-03-19 NOTE — PROGRESS NOTE ADULT - PROBLEM SELECTOR PLAN 3
acute blood loss anemia c/b hypovolemic shock s/p levophed and ICU  - Hb 6.9 on admit now s/p 6 units of pRBC, 1 unit FFP, 1 unit of platelets all on 2/24/23, 1u prbc 3/18  - s/p octreotide x 72h (2/24-2/27)   - protonix 40 bid , octreotide gtt as above  - trend CBC, transfuse prn. today hgb 8.9  - s/p EGD 2/24: large (> 5 mm) esophageal varices. Incompletely eradicated. Banded. Normal duodenal bulb, first portion of the duodenum and second portion of the duodenum.                 - EGD in 4 wks for likely repeat banding  -SBP ppx cipro

## 2023-03-19 NOTE — PROGRESS NOTE ADULT - PROBLEM SELECTOR PLAN 11
Confirmed full code with son/daughter at bedside on multiple occasions, last 3/19  dc prophylactic heparin  will need PT when able to cooperate  functional quadriplegia c/w full care and turns, TF

## 2023-03-19 NOTE — PROGRESS NOTE ADULT - PROBLEM SELECTOR PLAN 10
- family reports follows with oncology at [facility] OB Provider reported that the vagina was inspected and no foreign body was found/Laps, needles and instrument count was correct

## 2023-03-19 NOTE — PROGRESS NOTE ADULT - PROBLEM SELECTOR PLAN 1
Pt. with NANCI in the setting of multiple hypotensive episodes, and anemia requiring blood transfusion. Upon review of [place], SCr was 1.18 on 3/16. Pt. had an RRT on 3/17 for hypotension. SCr subsequently increased to 1.68 on 3/17. Pt. received IV albumin and free water. SCr improved to 1.10 today. UOP is not documented. UA showed trace proteinuria and trace ketonuria. Spot urine TP/Cr ratio was wnl at 0.2. Urine sodium was low at <20. Renal US showed BL shrunken kidneys but no evidence of hydronephrosis. Pt. with likely pre-renal NANCI, improving. Recommend to continue with IV D5 and free water flushes, as below. Monitor labs and urine output. Avoid nephrotoxins. Dose medications as per eGFR.

## 2023-03-19 NOTE — PROGRESS NOTE ADULT - SUBJECTIVE AND OBJECTIVE BOX
[facility] Division of Kidney Diseases & Hypertension  FOLLOW UP NOTE  [phone]--------------------------------------------------------------------------------    HPI: 67M PMH DM, HTN, hypothyroidism, CAD, TANG cirrhosis, follicular lymphoma on rituximab, Hep B. on tenofovir, status post CABG, initially presented to ED w/ chest pain and constipation, developed hematemesis, now s/p MICU admission for acute blood loss anemia c/b hypovolemic shock , intubated for airway protection (extubated 3/8), s/p 2/24 bedside EGD with banding esophageal varices now with persistent encephalopathy, possibly 2/2 hypoxia/anoxic brain ischemia in s/o hemorrhagic shock on admission. Pt. being seen for NANCI and hypernatremia.    24 hour events/subjective: Pt. was seen and evaluated at bedside this morning. Pt. is a poor historian, unable to obtain ROS.      PAST HISTORY  --------------------------------------------------------------------------------  No significant changes to PMH, PSH, FHx, SHx, unless otherwise noted    ALLERGIES & MEDICATIONS  --------------------------------------------------------------------------------  Allergies    [This allergen will not trigger allergy alert] &quot;lentils&quot;-&quot;itchiness&quot; (Other)  Beef (Other)  No Known Drug Allergies    Intolerances      Standing Inpatient Medications  chlorhexidine 2% Cloths 1 Application(s) Topical <User Schedule>  ciprofloxacin     Tablet 500 milliGRAM(s) Oral every 24 hours  coronavirus bivalent (EUA) Booster Vaccine (PFIZER) 0.3 milliLiter(s) IntraMuscular once  dextrose 5%. 1000 milliLiter(s) IV Continuous <Continuous>  dextrose 5%. 1000 milliLiter(s) IV Continuous <Continuous>  dextrose 50% Injectable 25 Gram(s) IV Push once  dextrose 50% Injectable 12.5 Gram(s) IV Push once  dextrose 50% Injectable 25 Gram(s) IV Push once  furosemide   Injectable 40 milliGRAM(s) IV Push daily  glucagon  Injectable 1 milliGRAM(s) IntraMuscular once  insulin lispro (ADMELOG) corrective regimen sliding scale   SubCutaneous every 6 hours  insulin NPH human recombinant 15 Unit(s) SubCutaneous every 6 hours  lactulose Syrup 30 Gram(s) Oral three times a day  levothyroxine Injectable 75 MICROGram(s) IV Push at bedtime  midodrine 10 milliGRAM(s) Oral every 8 hours  mupirocin 2% Ointment 1 Application(s) Both Nostrils two times a day  octreotide  Infusion 50 MICROgram(s)/Hr IV Continuous <Continuous>  pantoprazole  Injectable 40 milliGRAM(s) IV Push every 12 hours  rifAXIMin 550 milliGRAM(s) Oral two times a day  spironolactone 50 milliGRAM(s) Oral daily  tenofovir disoproxil fumarate (VIREAD) 300 milliGRAM(s) Oral daily    PRN Inpatient Medications  dextrose Oral Gel 15 Gram(s) Oral once PRN  LORazepam   Injectable 1.5 milliGRAM(s) IV Push once PRN  sodium chloride 0.65% Nasal 1 Spray(s) Both Nostrils three times a day PRN      REVIEW OF SYSTEMS  --------------------------------------------------------------------------------  Unable to obtain ROS    VITALS/PHYSICAL EXAM  --------------------------------------------------------------------------------  T(C): 37.3 (03-19-23 @ 04:54), Max: 37.3 (03-19-23 @ 04:54)  HR: 83 (03-19-23 @ 04:54) (55 - 83)  BP: 135/50 (03-19-23 @ 04:54) (132/51 - 135/57)  RR: 18 (03-19-23 @ 04:54) (18 - 18)  SpO2: 95% (03-19-23 @ 04:54) (95% - 99%)  Wt(kg): --      03-18-23 @ 07:01  -  03-19-23 @ 07:00  --------------------------------------------------------  IN: 900 mL / OUT: 0 mL / NET: 900 mL    03-19-23 @ 07:01  -  03-19-23 @ 14:11  --------------------------------------------------------  IN: 400 mL / OUT: 0 mL / NET: 400 mL      Physical Exam:  Gen: NAD, lying in bed  HEENT: +JVD  Pulm: CTA B/L  CV: RRR, S1S2; no rub  Back: No spinal or CVA tenderness; no sacral edema  Abd: +BS, soft, +Distended with fluid wave  : No suprapubic tenderness  Skin: Dry, without rashes    LABS/STUDIES  --------------------------------------------------------------------------------              8.9    3.56  >-----------<  51       [03-19-23 @ 10:00]              29.6     156  |  122  |  80  ----------------------------<  231      [03-19-23 @ 06:49]  4.1   |  20  |  1.10        Ca     8.6     [03-19-23 @ 06:49]      Mg     3.30     [03-19-23 @ 06:49]      Phos  2.2     [03-19-23 @ 06:49]    TPro  5.8  /  Alb  4.5  /  TBili  2.0  /  DBili  0.5  /  AST  72  /  ALT  44  /  AlkPhos  173  [03-19-23 @ 06:49]    PT/INR: PT 25.5 , INR 2.18       [03-19-23 @ 06:49]  PTT: 51.2       [03-19-23 @ 06:49]    Creatinine Trend:  SCr 1.10 [03-19 @ 06:49]  SCr 1.39 [03-18 @ 07:39]  SCr 1.43 [03-18 @ 00:57]  SCr 1.71 [03-17 @ 09:45]  SCr 1.68 [03-17 @ 04:59]    Urinalysis - [03-17-23 @ 13:27]      Color Yellow / Appearance Clear / SG 1.025 / pH 6.0      Gluc Negative / Ketone Trace  / Bili Negative / Urobili <2 mg/dL       Blood Negative / Protein Trace / Leuk Est Negative / Nitrite Negative      RBC 3 / WBC 3 / Hyaline 3-5 / Gran  / Sq Epi  / Non Sq Epi 3 / Bacteria Negative    Urine Creatinine 66      [03-17-23 @ 13:27]  Urine Protein 16      [03-17-23 @ 13:27]  Urine Sodium <20      [03-17-23 @ 13:27]  Urine Urea Nitrogen 1108.2      [03-17-23 @ 13:27]  Urine Potassium 62.6      [03-17-23 @ 13:27]  Urine Osmolality 613      [03-17-23 @ 13:27]    Iron 26, TIBC 97, %sat 27      [03-19-23 @ 06:49]  Ferritin 56      [03-19-23 @ 06:49] [facility] Division of Kidney Diseases & Hypertension  FOLLOW UP NOTE  [phone]--------------------------------------------------------------------------------    HPI: 67M PMH DM, HTN, hypothyroidism, CAD, TANG cirrhosis, follicular lymphoma on rituximab, Hep B. on tenofovir, status post CABG, initially presented to ED w/ chest pain and constipation, developed hematemesis, now s/p MICU admission for acute blood loss anemia c/b hypovolemic shock , intubated for airway protection (extubated 3/8), s/p 2/24 bedside EGD with banding esophageal varices now with persistent encephalopathy, possibly 2/2 hypoxia/anoxic brain ischemia in s/o hemorrhagic shock on admission. Pt. being seen for NANCI and hypernatremia.    24 hour events/subjective: Pt. was seen and evaluated at bedside this morning. Pt. is a poor historian, unable to obtain ROS.    PAST HISTORY  --------------------------------------------------------------------------------  No significant changes to PMH, PSH, FHx, SHx, unless otherwise noted    ALLERGIES & MEDICATIONS  --------------------------------------------------------------------------------  Allergies    [This allergen will not trigger allergy alert] &quot;lentils&quot;-&quot;itchiness&quot; (Other)  Beef (Other)  No Known Drug Allergies    Intolerances      Standing Inpatient Medications  chlorhexidine 2% Cloths 1 Application(s) Topical <User Schedule>  ciprofloxacin     Tablet 500 milliGRAM(s) Oral every 24 hours  coronavirus bivalent (EUA) Booster Vaccine (PFIZER) 0.3 milliLiter(s) IntraMuscular once  dextrose 5%. 1000 milliLiter(s) IV Continuous <Continuous>  dextrose 5%. 1000 milliLiter(s) IV Continuous <Continuous>  dextrose 50% Injectable 25 Gram(s) IV Push once  dextrose 50% Injectable 12.5 Gram(s) IV Push once  dextrose 50% Injectable 25 Gram(s) IV Push once  furosemide   Injectable 40 milliGRAM(s) IV Push daily  glucagon  Injectable 1 milliGRAM(s) IntraMuscular once  insulin lispro (ADMELOG) corrective regimen sliding scale   SubCutaneous every 6 hours  insulin NPH human recombinant 15 Unit(s) SubCutaneous every 6 hours  lactulose Syrup 30 Gram(s) Oral three times a day  levothyroxine Injectable 75 MICROGram(s) IV Push at bedtime  midodrine 10 milliGRAM(s) Oral every 8 hours  mupirocin 2% Ointment 1 Application(s) Both Nostrils two times a day  octreotide  Infusion 50 MICROgram(s)/Hr IV Continuous <Continuous>  pantoprazole  Injectable 40 milliGRAM(s) IV Push every 12 hours  rifAXIMin 550 milliGRAM(s) Oral two times a day  spironolactone 50 milliGRAM(s) Oral daily  tenofovir disoproxil fumarate (VIREAD) 300 milliGRAM(s) Oral daily    PRN Inpatient Medications  dextrose Oral Gel 15 Gram(s) Oral once PRN  LORazepam   Injectable 1.5 milliGRAM(s) IV Push once PRN  sodium chloride 0.65% Nasal 1 Spray(s) Both Nostrils three times a day PRN      REVIEW OF SYSTEMS  --------------------------------------------------------------------------------  Unable to obtain ROS    VITALS/PHYSICAL EXAM  --------------------------------------------------------------------------------  T(C): 37.3 (03-19-23 @ 04:54), Max: 37.3 (03-19-23 @ 04:54)  HR: 83 (03-19-23 @ 04:54) (55 - 83)  BP: 135/50 (03-19-23 @ 04:54) (132/51 - 135/57)  RR: 18 (03-19-23 @ 04:54) (18 - 18)  SpO2: 95% (03-19-23 @ 04:54) (95% - 99%)  Wt(kg): --      03-18-23 @ 07:01  -  03-19-23 @ 07:00  --------------------------------------------------------  IN: 900 mL / OUT: 0 mL / NET: 900 mL    03-19-23 @ 07:01  -  03-19-23 @ 14:11  --------------------------------------------------------  IN: 400 mL / OUT: 0 mL / NET: 400 mL      Physical Exam:  Gen: NAD, lying in bed  HEENT: +JVD  Pulm: CTA B/L  CV: RRR, S1S2; no rub  Back: No spinal or CVA tenderness; no sacral edema  Abd: +BS, soft, +Distended with fluid wave  : No suprapubic tenderness  Skin: Dry, without rashes    LABS/STUDIES  --------------------------------------------------------------------------------              8.9    3.56  >-----------<  51       [03-19-23 @ 10:00]              29.6     156  |  122  |  80  ----------------------------<  231      [03-19-23 @ 06:49]  4.1   |  20  |  1.10        Ca     8.6     [03-19-23 @ 06:49]      Mg     3.30     [03-19-23 @ 06:49]      Phos  2.2     [03-19-23 @ 06:49]    TPro  5.8  /  Alb  4.5  /  TBili  2.0  /  DBili  0.5  /  AST  72  /  ALT  44  /  AlkPhos  173  [03-19-23 @ 06:49]    PT/INR: PT 25.5 , INR 2.18       [03-19-23 @ 06:49]  PTT: 51.2       [03-19-23 @ 06:49]    Creatinine Trend:  SCr 1.10 [03-19 @ 06:49]  SCr 1.39 [03-18 @ 07:39]  SCr 1.43 [03-18 @ 00:57]  SCr 1.71 [03-17 @ 09:45]  SCr 1.68 [03-17 @ 04:59]    Urinalysis - [03-17-23 @ 13:27]      Color Yellow / Appearance Clear / SG 1.025 / pH 6.0      Gluc Negative / Ketone Trace  / Bili Negative / Urobili <2 mg/dL       Blood Negative / Protein Trace / Leuk Est Negative / Nitrite Negative      RBC 3 / WBC 3 / Hyaline 3-5 / Gran  / Sq Epi  / Non Sq Epi 3 / Bacteria Negative    Urine Creatinine 66      [03-17-23 @ 13:27]  Urine Protein 16      [03-17-23 @ 13:27]  Urine Sodium <20      [03-17-23 @ 13:27]  Urine Urea Nitrogen 1108.2      [03-17-23 @ 13:27]  Urine Potassium 62.6      [03-17-23 @ 13:27]  Urine Osmolality 613      [03-17-23 @ 13:27]    Iron 26, TIBC 97, %sat 27      [03-19-23 @ 06:49]  Ferritin 56      [03-19-23 @ 06:49]

## 2023-03-19 NOTE — PROGRESS NOTE ADULT - SUBJECTIVE AND OBJECTIVE BOX
Dr. Paulette Carias  Pager [phone]    PROGRESS NOTE:     Patient is a 67y old  Male who presents with a chief complaint of gi bleed, encephalopathy (18 Mar 2023 11:53)      SUBJECTIVE / OVERNIGHT EVENTS: awake, nonverbal  ADDITIONAL REVIEW OF SYSTEMS: remains encephalopathic     MEDICATIONS  (STANDING):  chlorhexidine 2% Cloths 1 Application(s) Topical <User Schedule>  ciprofloxacin     Tablet 500 milliGRAM(s) Oral every 24 hours  coronavirus bivalent (EUA) Booster Vaccine (PFIZER) 0.3 milliLiter(s) IntraMuscular once  dextrose 5%. 1000 milliLiter(s) (50 mL/Hr) IV Continuous <Continuous>  dextrose 5%. 1000 milliLiter(s) (100 mL/Hr) IV Continuous <Continuous>  dextrose 50% Injectable 25 Gram(s) IV Push once  dextrose 50% Injectable 12.5 Gram(s) IV Push once  dextrose 50% Injectable 25 Gram(s) IV Push once  furosemide   Injectable 40 milliGRAM(s) IV Push daily  glucagon  Injectable 1 milliGRAM(s) IntraMuscular once  insulin lispro (ADMELOG) corrective regimen sliding scale   SubCutaneous every 6 hours  insulin NPH human recombinant 15 Unit(s) SubCutaneous every 6 hours  lactulose Syrup 30 Gram(s) Oral three times a day  levothyroxine Injectable 75 MICROGram(s) IV Push at bedtime  midodrine 10 milliGRAM(s) Oral every 8 hours  mupirocin 2% Ointment 1 Application(s) Both Nostrils two times a day  octreotide  Infusion 50 MICROgram(s)/Hr (10 mL/Hr) IV Continuous <Continuous>  pantoprazole  Injectable 40 milliGRAM(s) IV Push every 12 hours  rifAXIMin 550 milliGRAM(s) Oral two times a day  spironolactone 50 milliGRAM(s) Oral daily  tenofovir disoproxil fumarate (VIREAD) 300 milliGRAM(s) Oral daily    MEDICATIONS  (PRN):  dextrose Oral Gel 15 Gram(s) Oral once PRN Blood Glucose LESS THAN 70 milliGRAM(s)/deciliter  LORazepam   Injectable 1.5 milliGRAM(s) IV Push once PRN pre-MRI  sodium chloride 0.65% Nasal 1 Spray(s) Both Nostrils three times a day PRN Nasal Congestion      CAPILLARY BLOOD GLUCOSE      POCT Blood Glucose.: 234 mg/dL (19 Mar 2023 06:04)  POCT Blood Glucose.: 182 mg/dL (18 Mar 2023 23:48)  POCT Blood Glucose.: 190 mg/dL (18 Mar 2023 17:53)  POCT Blood Glucose.: 151 mg/dL (18 Mar 2023 12:34)    I&O's Summary    18 Mar 2023 07:01  -  19 Mar 2023 07:00  --------------------------------------------------------  IN: 900 mL / OUT: 0 mL / NET: 900 mL    19 Mar 2023 07:01  -  19 Mar 2023 12:15  --------------------------------------------------------  IN: 400 mL / OUT: 0 mL / NET: 400 mL        PHYSICAL EXAM:  Vital Signs Last 24 Hrs  T(C): 37.3 (19 Mar 2023 04:54), Max: 37.3 (19 Mar 2023 04:54)  T(F): 99.1 (19 Mar 2023 04:54), Max: 99.1 (19 Mar 2023 04:54)  HR: 83 (19 Mar 2023 04:54) (55 - 83)  BP: 135/50 (19 Mar 2023 04:54) (120/40 - 135/57)  BP(mean): --  RR: 18 (19 Mar 2023 04:54) (18 - 18)  SpO2: 95% (19 Mar 2023 04:54) (95% - 99%)    Parameters below as of 19 Mar 2023 04:54  Patient On (Oxygen Delivery Method): room air    GENERAL: ill-appearing, cachectic with temporal wasting  HEENT: MMM dry, +scleral icterus ., NGT in place  CHEST/LUNG: Clear to auscultation bilaterally; No wheeze  HEART: Regular rate and rhythm; systolic murmur  ABDOMEN: Soft, Nontender, distended,  Bowel sounds present,  EXTREMITIES:   SCD, thigh edema  gu: condom catheter  Neuropsych: awake, encephalopathic, does not follow commands or speak    LABS:                        8.9    3.56  )-----------( 51       ( 19 Mar 2023 10:00 )             29.6     03-19    156<H>  |  122<H>  |  80<H>  ----------------------------<  231<H>  4.1   |  20<L>  |  1.10    Ca    8.6      19 Mar 2023 06:49  Phos  2.2     -  Mg     3.30     -    TPro  5.8<L>  /  Alb  4.5  /  TBili  2.0<H>  /  DBili  0.5<H>  /  AST  72<H>  /  ALT  44<H>  /  AlkPhos  173<H>      PT/INR - ( 19 Mar 2023 06:49 )   PT: 25.5 sec;   INR: 2.18 ratio         PTT - ( 19 Mar 2023 06:49 )  PTT:51.2 sec      Urinalysis Basic - ( 17 Mar 2023 13:27 )    Color: Yellow / Appearance: Clear / S.025 / pH: x  Gluc: x / Ketone: Trace  / Bili: Negative / Urobili: <2 mg/dL   Blood: x / Protein: Trace / Nitrite: Negative   Leuk Esterase: Negative / RBC: 3 /HPF / WBC 3 /HPF   Sq Epi: x / Non Sq Epi: 3 /HPF / Bacteria: Negative          RADIOLOGY & ADDITIONAL TESTS:  Results Reviewed:   Imaging Personally Reviewed:  Electrocardiogram Personally Reviewed:    COORDINATION OF CARE:  Care Discussed with Consultants/Other Providers [Y/N]: chandu Joyce, contact procedure team Monday for paracentesis   Prior or Outpatient Records Reviewed [Y/N]:

## 2023-03-19 NOTE — PROGRESS NOTE ADULT - PROBLEM SELECTOR PLAN 1
-r/o GI bleed, hypovolemic +/- hemorrhagic shock?  -hemodynamically improved  -transfuse 1 unit prbc on 3/18 for hgb drop from 11.1 to 7.9, nurse reports no obvious dark BM  -GI f/u  -c/w octreotide gtt x3 days and iv protonix 40 bid  - trend CBC, transfuse prn  - completed albumin 250ml q8x3 doses, cw midodrine 10mg q8h, given 1L LR on 3/17 d/t shock  - pt is fluid overloaded with worsening ascites, call procedure team on Monday for repeat therapeutic paracentesis again as ascites is rapidly reaccumulating  change lasix to 40mg iv qd, inc aldactone to 50 mg qd   -lactic acidosis, ariel resolving, still hypernatremia, make sure pt is getting free water 400cc q4h through ngt, , trend lytes   -monitor BP  -echo with preserved LVEF 53%

## 2023-03-19 NOTE — PROGRESS NOTE ADULT - PROBLEM SELECTOR PLAN 4
decompensated with ascites, with variceal bleed, with PSE; MELD 12 3/10/23  - s/p diagnostic para (2/25) and therapeutic para (3/5) removed 4.5L  s/p diag/therapeutic tap on 3/15, removed 3.6L, neg for SBP  - ascites reaccumulating, consult procedure team on Monday for repeat paracentesis   - c/w rifaximin BID, lactulose q4h, monitor BMs   - SBP ppx with Cipro  - iv lasix 40 qd and aldactone 50 mg qd as above   - hepatology following   -trend LFT, RUQ w/o pathology on 3/10  - MRI abdomen at [facility] 12/2022 no HCC

## 2023-03-20 NOTE — CONSULT NOTE ADULT - PROBLEM SELECTOR RECOMMENDATION 9
- MRI showing anoxic brain injury   - suspected from initial hemorrhagic shock from massive hematemesis in ED   - per neuro, prognosis uncertain, early to predict prognosis, has a chance of recovery, suggest rehab eval   - per rehab not a candidate since he does not follow commands   - poor functional status PPS 10% - MRI showing anoxic brain injury   - suspected from initial hemorrhagic shock from hematemesis in ED   - per neuro, prognosis uncertain, early to predict prognosis, has a chance of recovery, suggest rehab eval   - per rehab not a candidate since he does not follow commands   - poor functional status PPS 10% - MRI showing anoxic brain injury   - suspected from initial hemorrhagic shock from hematemesis in ED   - per neuro, prognosis uncertain, early to predict prognosis, has a chance of recovery, suggest rehab eval   - per rehab not a candidate since he does not follow commands   - poor functional status PPS 10%, with NGT

## 2023-03-20 NOTE — PROGRESS NOTE ADULT - PROBLEM SELECTOR PLAN 1
- given 1L LR on 3/17 concern for variceal bleed  - transfuse 1 unit prbc on 3/18 for hgb drop from 11.1 to 7.9, nurse reports no obvious dark BM  - echo with preserved LVEF 53%  - restarted on 3/18 octreotide gtt x3 days and iv protonix 40 bid  - hemodynamically improved after octreotide restarted  - completed albumin 250ml q8x3 doses, cw midodrine 10mg q8h with hold parameters  - pt is fluid overloaded with worsening ascites lasix and aldactone restarted 3/19; procedure team for repeat therapeutic paracentesis again as ascites is rapidly reaccumulating  - lactic acidosis, ariel resolving, still hypernatremia, make sure pt is getting free water 400cc q4h through ngt, , trend lytes   - trend CBC, transfuse prn  - monitor BP - given 1L LR on 3/17 concern for variceal bleed  - transfuse 1 unit prbc on 3/18 for hgb drop from 11.1 to 7.9, nurse reports no obvious dark BM  - echo with preserved LVEF 53%  - restarted on 3/18 octreotide gtt x3 days and iv protonix 40 bid  - hemodynamically improved after octreotide restarted  - completed albumin 250ml q8x3 doses, c/w midodrine 10mg q8h with hold parameters  - lactic acidosis, ariel resolving, still hypernatremia, make sure pt is getting free water 600cc q4h through ngt, , trend lytes   - hold diuretic for now/ IVF   - trend CBC, transfuse prn  - monitor BP

## 2023-03-20 NOTE — CONSULT NOTE ADULT - SUBJECTIVE AND OBJECTIVE BOX
HPI:  Patient is a 67-year-old male with lymphoma on chemo, diabetes, hypertension, CAD status post CABG, TANG cirrhosis follows at [facility], presented with concern of R sided pain and constipation. In ED patient had massive hematemesis hemorraghic shock, was intubated and admitted to MICU. Patient had prolonged hospital course due to GIB s/p EGD with variceal banding, persistent shock, anemia requiring transfusions, reaccumulation fo ascites. Patient has poor mental status attributed to anoxic brain injury from initial hemorrhagic shock. Palliative consulted for complex medical decision making in the setting of serious illness.     Patient seen this AM with son Selina at bedside. Patient is mostly lethargic, sleeping, with cheyne-greenwood breathing pattern, wakes up on occasion and looks around, at son. NGT in place.       PERTINENT PM/SXH:   CAD (coronary artery disease)    Diabetes    Lymphoma    Cirrhosis      S/P CABG x 1    FAMILY HISTORY:  No pertinent family history in first degree relatives    ------------------------------------------------------------------------------------------------------------  ITEMS NOT CHECKED ARE NOT PRESENT    SOCIAL HISTORY:   Living Situation: [X ]Home  [ ]Long term care  [ ]Rehab [ ]Other  Support: Wife and children     Substance hx:  [ ]   Tobacco hx:  [ ]   Alcohol hx: [ ]   Family Hx substance abuse [ ]yes [ ]no    Muslim/Spirituality: Quaker   PCSSQ[Palliative Care Spiritual Screening Question]   Severity (0-10): 0  Score of 4 or > indicate consideration of Chaplaincy referral.  Chaplaincy Referral: [ ] yes [X ] refused [ ] following    Caregiver [place]? : [ ] yes [X ] no [ ] Deferred [ ] Declined               Social work referral [ ] Patient & Family Centered Care Referral [ ]    Anticipatory Grief present?:  [ ] yes [X ] no  [ ] Deferred                    Social work referral [ ] Patient & Family Centered Care Referral [ ]    ------------------------------------------------------------------------------------------------------------    PRESENT SYMPTOMS:  [ ] No     [X ] Unable to self-report      [ ] CPOT (ICU)     [ ] PAINADs      [X ] RDOS 0    [ ] Yes     Source if other than patient:  [ ]Family   [ ]Team     PAIN:   If blank, patient unable to specify   [ ]yes [ ]no  QOL impact-   Location -                    Aggravating factors -  Quality -  Radiation -  Timing-  Pain at most severe level (0-10 scale):  Pain at minimal acceptable level/Pain Goal (0-10 scale):     SYMPTOMS:   Dyspnea:                           [ ]Mild [ ]Moderate [ ]Severe  Anxiety:                             [ ]Mild [ ]Moderate [ ]Severe  Fatigue:                             [ ]Mild [ ]Moderate [ ]Severe  Nausea/Vomiting:              [ ]Mild [ ]Moderate [ ]Severe  Loss of appetite:                [ ]Mild [ ]Moderate [ ]Severe  Constipation:                     [ ]Mild [ ]Moderate [ ]Severe    Other Symptoms:  [X ]All other review of systems negative     Home Medications for symptoms if any:  I-Stop Reference No:    ------------------------------------------------------------------------------------------------------------    FUNCTIONAL STATUS:     Baseline ADL (prior to admission):  [X ] Independent  [ ] Moderate Assistance [ ] Dependent  Palliative Performance Score: 80%    [ ]PPSV2 < or = to 30%     NUTRITIONAL STATUS:     Protein Calorie Malnutrition Present:   [ ]mild   [ ]moderate   [ ]severe   [ ]poor nutritional intake   [ ]artificial nutrition      Weight:   [X ]underweight (BMI 18.5 or less)   [ ]morbid obesity (BMI 30 or higher)   [ ]anasarca  [ ]significant weight loss     Height (cm): 170.2 (02-24-23 @ 12:00)  Weight (kg): 55.4 (02-24-23 @ 12:00)  BMI (kg/m2): 19.1 (02-24-23 @ 12:00)    ------------------------------------------------------------------------------------------------------------    PRIOR ADVANCE DIRECTIVES:    [ ] DNR/MOLST    [ ] Living Will    [ ] Health Care Proxy(s)    DECISION MAKER(s):  [ ] Patient    [X ] Surrogate(s)- children   [ ] HCP   [ ] Guardian             ------------------------------------------------------------------------------------------------------------  PHYSICAL EXAM:  Vital Signs Last 24 Hrs  T(C): 36.7 (20 Mar 2023 05:52), Max: 37.1 (19 Mar 2023 13:00)  T(F): 98 (20 Mar 2023 05:52), Max: 98.8 (19 Mar 2023 13:00)  HR: 60 (20 Mar 2023 05:52) (57 - 68)  BP: 130/52 (20 Mar 2023 05:52) (121/55 - 134/59)  BP(mean): --  RR: 18 (20 Mar 2023 05:52) (17 - 20)  SpO2: 97% (20 Mar 2023 05:52) (97% - 98%)    Parameters below as of 20 Mar 2023 05:52  Patient On (Oxygen Delivery Method): room air     I&O's Summary    19 Mar 2023 07:01  -  20 Mar 2023 07:00  --------------------------------------------------------  IN: 2300 mL / OUT: 0 mL / NET: 2300 mL    GENERAL:  [X ]Cachexia  [X ] Frail  [ ]Awake  [ ]Oriented x   [X ]Lethargic  [ ]Unarousable  [ ]Verbal  [X ]Non-Verbal    BEHAVIORAL:   [ ] Anxiety  [ ] Delirium [ ] Agitation [ ] Other    HEENT:   [ ]Normal   [X ]Dry mouth   [ ]ET Tube/Trach  [ ]Oral lesions    PULMONARY:   [X ]Clear [ ]Tachypnea  [ ]Audible excessive secretions   [ ]Rhonchi        [ ]Right [ ]Left [ ]Bilateral  [ ]Crackles        [ ]Right [ ]Left [ ]Bilateral  [ ]Wheezing     [ ]Right [ ]Left [ ]Bilateral  [ ]Diminished breath sounds [ ]right [ ]left [ ]bilateral    CARDIOVASCULAR:    [X ]Regular [ ]Irregular [ ]Tachy  [ ]Rafal [ ]Murmur [ ]Other    GASTROINTESTINAL:  [ ]Soft  [X ]Distended   [X ]+BS  [ ]Non tender [ ]Tender  [ ]Other [ ]PEG [ ]OGT/ NGT      GENITOURINARY:  [ ]Normal [X ] Incontinent   [ ]Oliguria/Anuria   [ ]Henderson    MUSCULOSKELETAL:   [ ]Normal   [ ]Weakness  [X ]Bed/Wheelchair bound [ ]Edema    NEUROLOGIC:   [X ]No focal deficits  [ ]Cognitive impairment  [ ]Dysphagia [ ]Dysarthria [ ]Paresis [ ]Other     SKIN:   [X ]Normal  [ ]Rash  [ ]Other  [ ]Pressure ulcer(s)       Present on admission [ ]y [ ]n    ------------------------------------------------------------------------------------------------------------    LABS:                        11.2   5.89  )-----------( 48       ( 20 Mar 2023 07:40 )             37.7   03-20    156<H>  |  124<H>  |  70<H>  ----------------------------<  136<H>  4.2   |  21<L>  |  1.05    Ca    9.1      20 Mar 2023 05:45  Phos  1.8     03-20  Mg     2.90     03-20    TPro  5.9<L>  /  Alb  4.4  /  TBili  2.6<H>  /  DBili  0.7<H>  /  AST  59<H>  /  ALT  35  /  AlkPhos  143<H>  03-20  PT/INR - ( 20 Mar 2023 05:45 )   PT: 24.0 sec;   INR: 2.05 ratio         PTT - ( 20 Mar 2023 05:45 )  PTT:56.5 sec    ------------------------------------------------------------------------------------------------------------  RADIOLOGY & ADDITIONAL STUDIES:    < from: CT Head No Cont (03.04.23 @ 17:38) >    IMPRESSION:  No CT evidence of acute intracranial pathology.  Trace mastoid effusions bilaterally.    < end of copied text >    < from: US Abdomen Upper Quadrant Right (03.10.23 @ 18:18) >  IMPRESSION:  Cirrhosis. Limited visualization of the liver.    Moderate amount of ascites.    < end of copied text >    < from: MR Head w/wo IV Cont (03.15.23 @ 19:40) >  IMPRESSION:    Extensive cortical predominant restricted diffusion throughout the   cerebral hemispheres including involvement of the basal ganglia and   insula bilaterally. Given history of hematemesis with hypotension,   hypoxic-ischemic injury is favored. Differential also includes postictal   sequelae, Creutzfeldt-Gonzalez disease, encephalitis or lymphomatous   involvement. CSF sampling should be considered.    < end of copied text >    < from: US Kidney and Bladder (03.17.23 @ 14:13) >  IMPRESSION:  *  Both kidneys are small in size.  *  No hydronephrosis.    < end of copied text >    ------------------------------------------------------------------------------------------------------------    ALLERGIES:  Allergies    [This allergen will not trigger allergy alert] &quot;lentils&quot;-&quot;itchiness&quot; (Other)  Beef (Other)  No Known Drug Allergies    Intolerances    MEDICATIONS  (STANDING):  chlorhexidine 2% Cloths 1 Application(s) Topical <User Schedule>  ciprofloxacin     Tablet 500 milliGRAM(s) Oral every 24 hours  coronavirus bivalent (EUA) Booster Vaccine (PFIZER) 0.3 milliLiter(s) IntraMuscular once  dextrose 5%. 1000 milliLiter(s) (50 mL/Hr) IV Continuous <Continuous>  dextrose 5%. 1000 milliLiter(s) (100 mL/Hr) IV Continuous <Continuous>  dextrose 50% Injectable 25 Gram(s) IV Push once  dextrose 50% Injectable 12.5 Gram(s) IV Push once  dextrose 50% Injectable 25 Gram(s) IV Push once  furosemide   Injectable 40 milliGRAM(s) IV Push daily  glucagon  Injectable 1 milliGRAM(s) IntraMuscular once  insulin lispro (ADMELOG) corrective regimen sliding scale   SubCutaneous every 6 hours  insulin NPH human recombinant 15 Unit(s) SubCutaneous every 6 hours  lactulose Syrup 30 Gram(s) Oral three times a day  levothyroxine Injectable 75 MICROGram(s) IV Push at bedtime  midodrine 10 milliGRAM(s) Oral every 8 hours  mupirocin 2% Ointment 1 Application(s) Both Nostrils two times a day  octreotide  Infusion 50 MICROgram(s)/Hr (10 mL/Hr) IV Continuous <Continuous>  pantoprazole  Injectable 40 milliGRAM(s) IV Push every 12 hours  rifAXIMin 550 milliGRAM(s) Oral two times a day  spironolactone 50 milliGRAM(s) Oral daily  tenofovir disoproxil fumarate (VIREAD) 300 milliGRAM(s) Oral daily    MEDICATIONS  (PRN):  dextrose Oral Gel 15 Gram(s) Oral once PRN Blood Glucose LESS THAN 70 milliGRAM(s)/deciliter  LORazepam   Injectable 1.5 milliGRAM(s) IV Push once PRN pre-MRI  sodium chloride 0.65% Nasal 1 Spray(s) Both Nostrils three times a day PRN Nasal Congestion    ------------------------------------------------------------------------------------------------------------    CRITICAL CARE:  [ ]Shock Present  [ ]Septic [ ]Cardiogenic [ ]Neurologic [ ]Hypovolemic [ ] Undifferentiated/Mixed   [ ]Vasopressors [ ]Inotropes     [ ]Respiratory failure present: [ ]Acute  [ ]Chronic [ ]Hypoxic  [ ]Hypercarbic   [ ]Mechanical ventilation   [ ]Non-invasive ventilatory support   [ ]High flow    [ ]Non-rebreather/Venti     [ ]Other organ failure     Other REFERRALS:  [ ]Hospice  [ ]Child Life  [ ]Social Work  [ ]Case management [ ]Holistic Therapy    HPI:  Patient is a 67-year-old male with lymphoma on chemo, diabetes, hypertension, CAD status post CABG, TANG cirrhosis follows at [facility], presented with concern of R sided pain and constipation. In ED patient had massive hematemesis hemorraghic shock, was intubated and admitted to MICU. Patient had prolonged hospital course due to GIB s/p EGD with variceal banding, persistent shock, anemia requiring transfusions, reaccumulation fo ascites. Patient has poor mental status attributed to anoxic brain injury from initial hemorrhagic shock. Palliative consulted for complex medical decision making in the setting of serious illness.     Patient seen this AM with son Selina at bedside. Patient is mostly lethargic, sleeping, with cheyne-greenwood breathing pattern, wakes up on occasion and looks around, at son. NGT in place.       PERTINENT PM/SXH:   CAD (coronary artery disease)    Diabetes    Lymphoma    Cirrhosis      S/P CABG x 1    FAMILY HISTORY:  No pertinent family history in first degree relatives    ------------------------------------------------------------------------------------------------------------  ITEMS NOT CHECKED ARE NOT PRESENT    SOCIAL HISTORY:   Living Situation: [X ]Home  [ ]Long term care  [ ]Rehab [ ]Other  Support: Wife and children     Substance hx:  [ ]   Tobacco hx:  [ ]   Alcohol hx: [ ]   Family Hx substance abuse [ ]yes [ ]no    Buddhist/Spirituality: Orthodox   PCSSQ[Palliative Care Spiritual Screening Question]   Severity (0-10): 0  Score of 4 or > indicate consideration of Chaplaincy referral.  Chaplaincy Referral: [ ] yes [X ] refused [ ] following    Caregiver [place]? : [ ] yes [X ] no [ ] Deferred [ ] Declined               Social work referral [ ] Patient & Family Centered Care Referral [ ]    Anticipatory Grief present?:  [ ] yes [X ] no  [ ] Deferred                    Social work referral [ ] Patient & Family Centered Care Referral [ ]    ------------------------------------------------------------------------------------------------------------    PRESENT SYMPTOMS:  [ ] No     [X ] Unable to self-report      [ ] CPOT (ICU)     [ ] PAINADs      [X ] RDOS 0    [ ] Yes     Source if other than patient:  [ ]Family   [ ]Team     PAIN:   If blank, patient unable to specify   [ ]yes [ ]no  QOL impact-   Location -                    Aggravating factors -  Quality -  Radiation -  Timing-  Pain at most severe level (0-10 scale):  Pain at minimal acceptable level/Pain Goal (0-10 scale):     SYMPTOMS:   Dyspnea:                           [ ]Mild [ ]Moderate [ ]Severe  Anxiety:                             [ ]Mild [ ]Moderate [ ]Severe  Fatigue:                             [ ]Mild [ ]Moderate [ ]Severe  Nausea/Vomiting:              [ ]Mild [ ]Moderate [ ]Severe  Loss of appetite:                [ ]Mild [ ]Moderate [ ]Severe  Constipation:                     [ ]Mild [ ]Moderate [ ]Severe    Other Symptoms:  [X ]All other review of systems negative     Home Medications for symptoms if any:  I-Stop Reference No:    ------------------------------------------------------------------------------------------------------------    FUNCTIONAL STATUS:     Baseline ADL (prior to admission):  [X ] Independent  [ ] Moderate Assistance [ ] Dependent  Palliative Performance Score: 80%    [ ]PPSV2 < or = to 30%     NUTRITIONAL STATUS:     Protein Calorie Malnutrition Present:   [ ]mild   [ ]moderate   [ ]severe   [ ]poor nutritional intake   [ ]artificial nutrition      Weight:   [X ]underweight (BMI 18.5 or less)   [ ]morbid obesity (BMI 30 or higher)   [ ]anasarca  [ ]significant weight loss     Height (cm): 170.2 (02-24-23 @ 12:00)  Weight (kg): 55.4 (02-24-23 @ 12:00)  BMI (kg/m2): 19.1 (02-24-23 @ 12:00)    ------------------------------------------------------------------------------------------------------------    PRIOR ADVANCE DIRECTIVES:    [ ] DNR/MOLST    [ ] Living Will    [ ] Health Care Proxy(s)    DECISION MAKER(s):  [ ] Patient    [X ] Surrogate(s)- children   [ ] HCP   [ ] Guardian             ------------------------------------------------------------------------------------------------------------  PHYSICAL EXAM:  Vital Signs Last 24 Hrs  T(C): 36.7 (20 Mar 2023 05:52), Max: 37.1 (19 Mar 2023 13:00)  T(F): 98 (20 Mar 2023 05:52), Max: 98.8 (19 Mar 2023 13:00)  HR: 60 (20 Mar 2023 05:52) (57 - 68)  BP: 130/52 (20 Mar 2023 05:52) (121/55 - 134/59)  BP(mean): --  RR: 18 (20 Mar 2023 05:52) (17 - 20)  SpO2: 97% (20 Mar 2023 05:52) (97% - 98%)    Parameters below as of 20 Mar 2023 05:52  Patient On (Oxygen Delivery Method): room air     I&O's Summary    19 Mar 2023 07:01  -  20 Mar 2023 07:00  --------------------------------------------------------  IN: 2300 mL / OUT: 0 mL / NET: 2300 mL    GENERAL:  [X ]Cachexia  [X ] Frail  [ ]Awake  [ ]Oriented x   [X ]Lethargic  [ ]Unarousable  [ ]Verbal  [X ]Non-Verbal    BEHAVIORAL:   [ ] Anxiety  [ ] Delirium [ ] Agitation [ ] Other    HEENT:   [ ]Normal   [X ]Dry mouth   [ ]ET Tube/Trach  [ ]Oral lesions    PULMONARY:   [X ]Clear [ ]Tachypnea  [ ]Audible excessive secretions   [ ]Rhonchi        [ ]Right [ ]Left [ ]Bilateral  [ ]Crackles        [ ]Right [ ]Left [ ]Bilateral  [ ]Wheezing     [ ]Right [ ]Left [ ]Bilateral  [ ]Diminished breath sounds [ ]right [ ]left [ ]bilateral    CARDIOVASCULAR:    [X ]Regular [ ]Irregular [ ]Tachy  [ ]Rafal [ ]Murmur [ ]Other    GASTROINTESTINAL:  [ ]Soft  [X ]Distended   [X ]+BS  [ ]Non tender [ ]Tender  [ ]Other [ ]PEG [X ]OGT/ NGT      GENITOURINARY:  [ ]Normal [X ] Incontinent   [ ]Oliguria/Anuria   [ ]Henderson    MUSCULOSKELETAL:   [ ]Normal   [ ]Weakness  [X ]Bed/Wheelchair bound [ ]Edema    NEUROLOGIC:   [X ]No focal deficits  [ ]Cognitive impairment  [ ]Dysphagia [ ]Dysarthria [ ]Paresis [ ]Other     SKIN:   [ ]Normal  [ ]Rash  [ ]Other  [X ]Pressure ulcer(s)       Present on admission [ ]y [ ]n    ------------------------------------------------------------------------------------------------------------    LABS:                        11.2   5.89  )-----------( 48       ( 20 Mar 2023 07:40 )             37.7   03-20    156<H>  |  124<H>  |  70<H>  ----------------------------<  136<H>  4.2   |  21<L>  |  1.05    Ca    9.1      20 Mar 2023 05:45  Phos  1.8     03-20  Mg     2.90     03-20    TPro  5.9<L>  /  Alb  4.4  /  TBili  2.6<H>  /  DBili  0.7<H>  /  AST  59<H>  /  ALT  35  /  AlkPhos  143<H>  03-20  PT/INR - ( 20 Mar 2023 05:45 )   PT: 24.0 sec;   INR: 2.05 ratio         PTT - ( 20 Mar 2023 05:45 )  PTT:56.5 sec    ------------------------------------------------------------------------------------------------------------  RADIOLOGY & ADDITIONAL STUDIES:    < from: CT Head No Cont (03.04.23 @ 17:38) >    IMPRESSION:  No CT evidence of acute intracranial pathology.  Trace mastoid effusions bilaterally.    < end of copied text >    < from: US Abdomen Upper Quadrant Right (03.10.23 @ 18:18) >  IMPRESSION:  Cirrhosis. Limited visualization of the liver.    Moderate amount of ascites.    < end of copied text >    < from: MR Head w/wo IV Cont (03.15.23 @ 19:40) >  IMPRESSION:    Extensive cortical predominant restricted diffusion throughout the   cerebral hemispheres including involvement of the basal ganglia and   insula bilaterally. Given history of hematemesis with hypotension,   hypoxic-ischemic injury is favored. Differential also includes postictal   sequelae, Creutzfeldt-Gonzalez disease, encephalitis or lymphomatous   involvement. CSF sampling should be considered.    < end of copied text >    < from: US Kidney and Bladder (03.17.23 @ 14:13) >  IMPRESSION:  *  Both kidneys are small in size.  *  No hydronephrosis.    < end of copied text >    ------------------------------------------------------------------------------------------------------------    ALLERGIES:  Allergies    [This allergen will not trigger allergy alert] &quot;lentils&quot;-&quot;itchiness&quot; (Other)  Beef (Other)  No Known Drug Allergies    Intolerances    MEDICATIONS  (STANDING):  chlorhexidine 2% Cloths 1 Application(s) Topical <User Schedule>  ciprofloxacin     Tablet 500 milliGRAM(s) Oral every 24 hours  coronavirus bivalent (EUA) Booster Vaccine (PFIZER) 0.3 milliLiter(s) IntraMuscular once  dextrose 5%. 1000 milliLiter(s) (50 mL/Hr) IV Continuous <Continuous>  dextrose 5%. 1000 milliLiter(s) (100 mL/Hr) IV Continuous <Continuous>  dextrose 50% Injectable 25 Gram(s) IV Push once  dextrose 50% Injectable 12.5 Gram(s) IV Push once  dextrose 50% Injectable 25 Gram(s) IV Push once  furosemide   Injectable 40 milliGRAM(s) IV Push daily  glucagon  Injectable 1 milliGRAM(s) IntraMuscular once  insulin lispro (ADMELOG) corrective regimen sliding scale   SubCutaneous every 6 hours  insulin NPH human recombinant 15 Unit(s) SubCutaneous every 6 hours  lactulose Syrup 30 Gram(s) Oral three times a day  levothyroxine Injectable 75 MICROGram(s) IV Push at bedtime  midodrine 10 milliGRAM(s) Oral every 8 hours  mupirocin 2% Ointment 1 Application(s) Both Nostrils two times a day  octreotide  Infusion 50 MICROgram(s)/Hr (10 mL/Hr) IV Continuous <Continuous>  pantoprazole  Injectable 40 milliGRAM(s) IV Push every 12 hours  rifAXIMin 550 milliGRAM(s) Oral two times a day  spironolactone 50 milliGRAM(s) Oral daily  tenofovir disoproxil fumarate (VIREAD) 300 milliGRAM(s) Oral daily    MEDICATIONS  (PRN):  dextrose Oral Gel 15 Gram(s) Oral once PRN Blood Glucose LESS THAN 70 milliGRAM(s)/deciliter  LORazepam   Injectable 1.5 milliGRAM(s) IV Push once PRN pre-MRI  sodium chloride 0.65% Nasal 1 Spray(s) Both Nostrils three times a day PRN Nasal Congestion    ------------------------------------------------------------------------------------------------------------    CRITICAL CARE:  [ ]Shock Present  [ ]Septic [ ]Cardiogenic [ ]Neurologic [ ]Hypovolemic [ ] Undifferentiated/Mixed   [ ]Vasopressors [ ]Inotropes     [ ]Respiratory failure present: [ ]Acute  [ ]Chronic [ ]Hypoxic  [ ]Hypercarbic   [ ]Mechanical ventilation   [ ]Non-invasive ventilatory support   [ ]High flow    [ ]Non-rebreather/Venti     [ ]Other organ failure     Other REFERRALS:  [ ]Hospice  [ ]Child Life  [ ]Social Work  [ ]Case management [ ]Holistic Therapy    HPI:  Patient is a 67-year-old male with lymphoma on chemo, diabetes, hypertension, CAD status post CABG, TANG cirrhosis follows at [facility], presented with concern of R sided pain and constipation. In ED patient had massive hematemesis hemorraghic shock, was intubated and admitted to MICU. Patient had prolonged hospital course due to GIB s/p EGD with variceal banding, persistent shock, anemia requiring transfusions, reaccumulation fo ascites. Patient has poor mental status attributed to anoxic brain injury from initial hemorrhagic shock. Palliative consulted for complex medical decision making in the setting of serious illness.     Patient seen this AM with son Selina at bedside. Patient is mostly lethargic, sleeping, with cheyne-greenwood breathing pattern, wakes up on occasion and looks around, at son. NGT in place.   See below for [facility] meeting with children.       PERTINENT PM/SXH:   CAD (coronary artery disease)    Diabetes    Lymphoma    Cirrhosis      S/P CABG x 1    FAMILY HISTORY:  No pertinent family history in first degree relatives    ------------------------------------------------------------------------------------------------------------  ITEMS NOT CHECKED ARE NOT PRESENT    SOCIAL HISTORY:   Living Situation: [X ]Home  [ ]Long term care  [ ]Rehab [ ]Other  Support: Wife and children - jayadest Romana, youngest Selina. Other middle brother Demarcus. Other sister in [place]     Substance hx:  [ ]   Tobacco hx:  [ ]   Alcohol hx: [ ]   Family Hx substance abuse [ ]yes [ ]no    Jehovah's witness/Spirituality: Presybeterian   PCSSQ[Palliative Care Spiritual Screening Question]   Severity (0-10): 0  Score of 4 or > indicate consideration of Chaplaincy referral.  Chaplaincy Referral: [ ] yes [X ] refused [ ] following    Caregiver [place]? : [ ] yes [X ] no [ ] Deferred [ ] Declined               Social work referral [ ] Patient & Family Centered Care Referral [ ]    Anticipatory Grief present?:  [ ] yes [X ] no  [ ] Deferred                    Social work referral [ ] Patient & Family Centered Care Referral [ ]    ------------------------------------------------------------------------------------------------------------    PRESENT SYMPTOMS:  [ ] No     [X ] Unable to self-report      [ ] CPOT (ICU)     [ ] PAINADs      [X ] RDOS 0    [ ] Yes     Source if other than patient:  [ ]Family   [ ]Team     PAIN:   If blank, patient unable to specify   [ ]yes [ ]no  QOL impact-   Location -                    Aggravating factors -  Quality -  Radiation -  Timing-  Pain at most severe level (0-10 scale):  Pain at minimal acceptable level/Pain Goal (0-10 scale):     SYMPTOMS:   Dyspnea:                           [ ]Mild [ ]Moderate [ ]Severe  Anxiety:                             [ ]Mild [ ]Moderate [ ]Severe  Fatigue:                             [ ]Mild [ ]Moderate [ ]Severe  Nausea/Vomiting:              [ ]Mild [ ]Moderate [ ]Severe  Loss of appetite:                [ ]Mild [ ]Moderate [ ]Severe  Constipation:                     [ ]Mild [ ]Moderate [ ]Severe    Other Symptoms:  [X ]All other review of systems negative     Home Medications for symptoms if any:  I-Stop Reference No:    ------------------------------------------------------------------------------------------------------------    FUNCTIONAL STATUS:     Baseline ADL (prior to admission):  [X ] Independent  [ ] Moderate Assistance [ ] Dependent  Palliative Performance Score: 80%     [ ]PPSV2 < or = to 30%     NUTRITIONAL STATUS:     Protein Calorie Malnutrition Present:   [ ]mild   [ ]moderate   [ ]severe   [ ]poor nutritional intake   [ ]artificial nutrition      Weight:   [X ]underweight (BMI 18.5 or less)   [ ]morbid obesity (BMI 30 or higher)   [ ]anasarca  [ ]significant weight loss     Height (cm): 170.2 (02-24-23 @ 12:00)  Weight (kg): 55.4 (02-24-23 @ 12:00)  BMI (kg/m2): 19.1 (02-24-23 @ 12:00)    ------------------------------------------------------------------------------------------------------------    PRIOR ADVANCE DIRECTIVES:    [ ] DNR/MOLST    [ ] Living Will    [ ] Health Care Proxy(s)    DECISION MAKER(s):  [ ] Patient    [X ] Surrogate(s)- children   [ ] HCP   [ ] Guardian             ------------------------------------------------------------------------------------------------------------  PHYSICAL EXAM:  Vital Signs Last 24 Hrs  T(C): 36.7 (20 Mar 2023 05:52), Max: 37.1 (19 Mar 2023 13:00)  T(F): 98 (20 Mar 2023 05:52), Max: 98.8 (19 Mar 2023 13:00)  HR: 60 (20 Mar 2023 05:52) (57 - 68)  BP: 130/52 (20 Mar 2023 05:52) (121/55 - 134/59)  BP(mean): --  RR: 18 (20 Mar 2023 05:52) (17 - 20)  SpO2: 97% (20 Mar 2023 05:52) (97% - 98%)    Parameters below as of 20 Mar 2023 05:52  Patient On (Oxygen Delivery Method): room air     I&O's Summary    19 Mar 2023 07:01  -  20 Mar 2023 07:00  --------------------------------------------------------  IN: 2300 mL / OUT: 0 mL / NET: 2300 mL    GENERAL:  [X ]Cachexia  [X ] Frail  [ ]Awake  [ ]Oriented x   [X ]Lethargic  [ ]Unarousable  [ ]Verbal  [X ]Non-Verbal    BEHAVIORAL:   [ ] Anxiety  [ ] Delirium [ ] Agitation [ ] Other    HEENT:   [ ]Normal   [X ]Dry mouth   [ ]ET Tube/Trach  [ ]Oral lesions    PULMONARY:   [X ]Clear [ ]Tachypnea  [ ]Audible excessive secretions   [ ]Rhonchi        [ ]Right [ ]Left [ ]Bilateral  [ ]Crackles        [ ]Right [ ]Left [ ]Bilateral  [ ]Wheezing     [ ]Right [ ]Left [ ]Bilateral  [ ]Diminished breath sounds [ ]right [ ]left [ ]bilateral    CARDIOVASCULAR:    [X ]Regular [ ]Irregular [ ]Tachy  [ ]Rafal [ ]Murmur [ ]Other    GASTROINTESTINAL:  [ ]Soft  [X ]Distended   [X ]+BS  [ ]Non tender [ ]Tender  [ ]Other [ ]PEG [X ]OGT/ NGT      GENITOURINARY:  [ ]Normal [X ] Incontinent   [ ]Oliguria/Anuria   [ ]Hendersno    MUSCULOSKELETAL:   [ ]Normal   [ ]Weakness  [X ]Bed/Wheelchair bound [ ]Edema    NEUROLOGIC:   [X ]No focal deficits  [ ]Cognitive impairment  [ ]Dysphagia [ ]Dysarthria [ ]Paresis [ ]Other     SKIN:   [ ]Normal  [ ]Rash  [ ]Other  [X ]Pressure ulcer(s)       Present on admission [ ]y [ ]n    ------------------------------------------------------------------------------------------------------------    LABS:                        11.2   5.89  )-----------( 48       ( 20 Mar 2023 07:40 )             37.7   03-20    156<H>  |  124<H>  |  70<H>  ----------------------------<  136<H>  4.2   |  21<L>  |  1.05    Ca    9.1      20 Mar 2023 05:45  Phos  1.8     03-20  Mg     2.90     03-20    TPro  5.9<L>  /  Alb  4.4  /  TBili  2.6<H>  /  DBili  0.7<H>  /  AST  59<H>  /  ALT  35  /  AlkPhos  143<H>  03-20  PT/INR - ( 20 Mar 2023 05:45 )   PT: 24.0 sec;   INR: 2.05 ratio         PTT - ( 20 Mar 2023 05:45 )  PTT:56.5 sec    ------------------------------------------------------------------------------------------------------------  RADIOLOGY & ADDITIONAL STUDIES:    < from: CT Head No Cont (03.04.23 @ 17:38) >    IMPRESSION:  No CT evidence of acute intracranial pathology.  Trace mastoid effusions bilaterally.    < end of copied text >    < from: US Abdomen Upper Quadrant Right (03.10.23 @ 18:18) >  IMPRESSION:  Cirrhosis. Limited visualization of the liver.    Moderate amount of ascites.    < end of copied text >    < from: MR Head w/wo IV Cont (03.15.23 @ 19:40) >  IMPRESSION:    Extensive cortical predominant restricted diffusion throughout the   cerebral hemispheres including involvement of the basal ganglia and   insula bilaterally. Given history of hematemesis with hypotension,   hypoxic-ischemic injury is favored. Differential also includes postictal   sequelae, Creutzfeldt-Gonzalez disease, encephalitis or lymphomatous   involvement. CSF sampling should be considered.    < end of copied text >    < from: US Kidney and Bladder (03.17.23 @ 14:13) >  IMPRESSION:  *  Both kidneys are small in size.  *  No hydronephrosis.    < end of copied text >    ------------------------------------------------------------------------------------------------------------    ALLERGIES:  Allergies    [This allergen will not trigger allergy alert] &quot;lentils&quot;-&quot;itchiness&quot; (Other)  Beef (Other)  No Known Drug Allergies    Intolerances    MEDICATIONS  (STANDING):  chlorhexidine 2% Cloths 1 Application(s) Topical <User Schedule>  ciprofloxacin     Tablet 500 milliGRAM(s) Oral every 24 hours  coronavirus bivalent (EUA) Booster Vaccine (PFIZER) 0.3 milliLiter(s) IntraMuscular once  dextrose 5%. 1000 milliLiter(s) (50 mL/Hr) IV Continuous <Continuous>  dextrose 5%. 1000 milliLiter(s) (100 mL/Hr) IV Continuous <Continuous>  dextrose 50% Injectable 25 Gram(s) IV Push once  dextrose 50% Injectable 12.5 Gram(s) IV Push once  dextrose 50% Injectable 25 Gram(s) IV Push once  furosemide   Injectable 40 milliGRAM(s) IV Push daily  glucagon  Injectable 1 milliGRAM(s) IntraMuscular once  insulin lispro (ADMELOG) corrective regimen sliding scale   SubCutaneous every 6 hours  insulin NPH human recombinant 15 Unit(s) SubCutaneous every 6 hours  lactulose Syrup 30 Gram(s) Oral three times a day  levothyroxine Injectable 75 MICROGram(s) IV Push at bedtime  midodrine 10 milliGRAM(s) Oral every 8 hours  mupirocin 2% Ointment 1 Application(s) Both Nostrils two times a day  octreotide  Infusion 50 MICROgram(s)/Hr (10 mL/Hr) IV Continuous <Continuous>  pantoprazole  Injectable 40 milliGRAM(s) IV Push every 12 hours  rifAXIMin 550 milliGRAM(s) Oral two times a day  spironolactone 50 milliGRAM(s) Oral daily  tenofovir disoproxil fumarate (VIREAD) 300 milliGRAM(s) Oral daily    MEDICATIONS  (PRN):  dextrose Oral Gel 15 Gram(s) Oral once PRN Blood Glucose LESS THAN 70 milliGRAM(s)/deciliter  LORazepam   Injectable 1.5 milliGRAM(s) IV Push once PRN pre-MRI  sodium chloride 0.65% Nasal 1 Spray(s) Both Nostrils three times a day PRN Nasal Congestion    ------------------------------------------------------------------------------------------------------------    CRITICAL CARE:  [ ]Shock Present  [ ]Septic [ ]Cardiogenic [ ]Neurologic [ ]Hypovolemic [ ] Undifferentiated/Mixed   [ ]Vasopressors [ ]Inotropes     [ ]Respiratory failure present: [ ]Acute  [ ]Chronic [ ]Hypoxic  [ ]Hypercarbic   [ ]Mechanical ventilation   [ ]Non-invasive ventilatory support   [ ]High flow    [ ]Non-rebreather/Venti     [ ]Other organ failure     Other REFERRALS:  [ ]Hospice  [ ]Child Life  [ ]Social Work  [ ]Case management [ ]Holistic Therapy    HPI:  Patient is a 67-year-old male with lymphoma on chemo, diabetes, hypertension, CAD status post CABG, TANG cirrhosis follows at [facility], presented with concern of R sided pain and constipation. In ED patient had massive hematemesis hemorraghic shock, was intubated and admitted to MICU. Patient had prolonged hospital course due to GIB s/p EGD with variceal banding, persistent shock, anemia requiring transfusions, reaccumulation fo ascites. Patient has poor mental status attributed to anoxic brain injury from initial hemorrhagic shock. Palliative consulted for complex medical decision making in the setting of serious illness.     Patient seen this AM with son Selina at bedside. Patient is mostly lethargic, sleeping, with cheyne-greenwood breathing pattern, wakes up on occasion and looks around, at son. NGT in place.   See below for [facility] meeting with children.       PERTINENT PM/SXH:   CAD (coronary artery disease)    Diabetes    Lymphoma    Cirrhosis      S/P CABG x 1    FAMILY HISTORY:  No pertinent family history in first degree relatives    ------------------------------------------------------------------------------------------------------------  ITEMS NOT CHECKED ARE NOT PRESENT    SOCIAL HISTORY:   Living Situation: [X ]Home  [ ]Long term care  [ ]Rehab [ ]Other  Support: Wife and children - eldest Romana, youngest Selina. Other middle brother Demarcus. Other sister in [place]   Norwegian     Substance hx:  [ ]   Tobacco hx:  [ ]   Alcohol hx: [ ]   Family Hx substance abuse [ ]yes [ ]no    Voodoo/Spirituality: Jew   PCSSQ[Palliative Care Spiritual Screening Question]   Severity (0-10): 0  Score of 4 or > indicate consideration of Chaplaincy referral.  Chaplaincy Referral: [ ] yes [X ] refused [ ] following    Caregiver Enochs? : [ ] yes [X ] no [ ] Deferred [ ] Declined               Social work referral [ ] Patient & Family Centered Care Referral [ ]    Anticipatory Grief present?:  [ ] yes [X ] no  [ ] Deferred                    Social work referral [ ] Patient & Family Centered Care Referral [ ]    ------------------------------------------------------------------------------------------------------------    PRESENT SYMPTOMS:  [ ] No     [X ] Unable to self-report      [ ] CPOT (ICU)     [ ] PAINADs      [X ] RDOS 0    [ ] Yes     Source if other than patient:  [ ]Family   [ ]Team     PAIN:   If blank, patient unable to specify   [ ]yes [ ]no  QOL impact-   Location -                    Aggravating factors -  Quality -  Radiation -  Timing-  Pain at most severe level (0-10 scale):  Pain at minimal acceptable level/Pain Goal (0-10 scale):     SYMPTOMS:   Dyspnea:                           [ ]Mild [ ]Moderate [ ]Severe  Anxiety:                             [ ]Mild [ ]Moderate [ ]Severe  Fatigue:                             [ ]Mild [ ]Moderate [ ]Severe  Nausea/Vomiting:              [ ]Mild [ ]Moderate [ ]Severe  Loss of appetite:                [ ]Mild [ ]Moderate [ ]Severe  Constipation:                     [ ]Mild [ ]Moderate [ ]Severe    Other Symptoms:  [X ]All other review of systems negative     Home Medications for symptoms if any:  I-Stop Reference No:    ------------------------------------------------------------------------------------------------------------    FUNCTIONAL STATUS:     Baseline ADL (prior to admission):  [X ] Independent  [ ] Moderate Assistance [ ] Dependent  Palliative Performance Score: 80%     [ ]PPSV2 < or = to 30%     NUTRITIONAL STATUS:     Protein Calorie Malnutrition Present:   [ ]mild   [ ]moderate   [ ]severe   [ ]poor nutritional intake   [ ]artificial nutrition      Weight:   [X ]underweight (BMI 18.5 or less)   [ ]morbid obesity (BMI 30 or higher)   [ ]anasarca  [ ]significant weight loss     Height (cm): 170.2 (02-24-23 @ 12:00)  Weight (kg): 55.4 (02-24-23 @ 12:00)  BMI (kg/m2): 19.1 (02-24-23 @ 12:00)    ------------------------------------------------------------------------------------------------------------    PRIOR ADVANCE DIRECTIVES:    [ ] DNR/MOLST    [ ] Living Will    [ ] Health Care Proxy(s)    DECISION MAKER(s):  [ ] Patient    [X ] Surrogate(s)- children   [ ] HCP   [ ] Guardian             ------------------------------------------------------------------------------------------------------------  PHYSICAL EXAM:  Vital Signs Last 24 Hrs  T(C): 36.7 (20 Mar 2023 05:52), Max: 37.1 (19 Mar 2023 13:00)  T(F): 98 (20 Mar 2023 05:52), Max: 98.8 (19 Mar 2023 13:00)  HR: 60 (20 Mar 2023 05:52) (57 - 68)  BP: 130/52 (20 Mar 2023 05:52) (121/55 - 134/59)  BP(mean): --  RR: 18 (20 Mar 2023 05:52) (17 - 20)  SpO2: 97% (20 Mar 2023 05:52) (97% - 98%)    Parameters below as of 20 Mar 2023 05:52  Patient On (Oxygen Delivery Method): room air     I&O's Summary    19 Mar 2023 07:01  -  20 Mar 2023 07:00  --------------------------------------------------------  IN: 2300 mL / OUT: 0 mL / NET: 2300 mL    GENERAL:  [X ]Cachexia  [X ] Frail  [ ]Awake  [ ]Oriented x   [X ]Lethargic  [ ]Unarousable  [ ]Verbal  [X ]Non-Verbal    BEHAVIORAL:   [ ] Anxiety  [ ] Delirium [ ] Agitation [ ] Other    HEENT:   [ ]Normal   [X ]Dry mouth   [ ]ET Tube/Trach  [ ]Oral lesions    PULMONARY:   [X ]Clear [ ]Tachypnea  [ ]Audible excessive secretions   [ ]Rhonchi        [ ]Right [ ]Left [ ]Bilateral  [ ]Crackles        [ ]Right [ ]Left [ ]Bilateral  [ ]Wheezing     [ ]Right [ ]Left [ ]Bilateral  [ ]Diminished breath sounds [ ]right [ ]left [ ]bilateral    CARDIOVASCULAR:    [X ]Regular [ ]Irregular [ ]Tachy  [ ]Rafal [ ]Murmur [ ]Other    GASTROINTESTINAL:  [ ]Soft  [X ]Distended   [X ]+BS  [ ]Non tender [ ]Tender  [ ]Other [ ]PEG [X ]OGT/ NGT      GENITOURINARY:  [ ]Normal [X ] Incontinent   [ ]Oliguria/Anuria   [ ]Henderson    MUSCULOSKELETAL:   [ ]Normal   [ ]Weakness  [X ]Bed/Wheelchair bound [ ]Edema    NEUROLOGIC:   [X ]No focal deficits  [ ]Cognitive impairment  [ ]Dysphagia [ ]Dysarthria [ ]Paresis [ ]Other     SKIN:   [ ]Normal  [ ]Rash  [ ]Other  [X ]Pressure ulcer(s)       Present on admission [ ]y [ ]n    ------------------------------------------------------------------------------------------------------------    LABS:                        11.2   5.89  )-----------( 48       ( 20 Mar 2023 07:40 )             37.7   03-20    156<H>  |  124<H>  |  70<H>  ----------------------------<  136<H>  4.2   |  21<L>  |  1.05    Ca    9.1      20 Mar 2023 05:45  Phos  1.8     03-20  Mg     2.90     03-20    TPro  5.9<L>  /  Alb  4.4  /  TBili  2.6<H>  /  DBili  0.7<H>  /  AST  59<H>  /  ALT  35  /  AlkPhos  143<H>  03-20  PT/INR - ( 20 Mar 2023 05:45 )   PT: 24.0 sec;   INR: 2.05 ratio         PTT - ( 20 Mar 2023 05:45 )  PTT:56.5 sec    ------------------------------------------------------------------------------------------------------------  RADIOLOGY & ADDITIONAL STUDIES:    < from: CT Head No Cont (03.04.23 @ 17:38) >    IMPRESSION:  No CT evidence of acute intracranial pathology.  Trace mastoid effusions bilaterally.    < end of copied text >    < from: US Abdomen Upper Quadrant Right (03.10.23 @ 18:18) >  IMPRESSION:  Cirrhosis. Limited visualization of the liver.    Moderate amount of ascites.    < end of copied text >    < from: MR Head w/wo IV Cont (03.15.23 @ 19:40) >  IMPRESSION:    Extensive cortical predominant restricted diffusion throughout the   cerebral hemispheres including involvement of the basal ganglia and   insula bilaterally. Given history of hematemesis with hypotension,   hypoxic-ischemic injury is favored. Differential also includes postictal   sequelae, Creutzfeldt-Gonzalez disease, encephalitis or lymphomatous   involvement. CSF sampling should be considered.    < end of copied text >    < from: US Kidney and Bladder (03.17.23 @ 14:13) >  IMPRESSION:  *  Both kidneys are small in size.  *  No hydronephrosis.    < end of copied text >      ------------------------------------------------------------------------------------------------------------    ALLERGIES:  Allergies    [This allergen will not trigger allergy alert] &quot;lentils&quot;-&quot;itchiness&quot; (Other)  Beef (Other)  No Known Drug Allergies    Intolerances    MEDICATIONS  (STANDING):  chlorhexidine 2% Cloths 1 Application(s) Topical <User Schedule>  ciprofloxacin     Tablet 500 milliGRAM(s) Oral every 24 hours  coronavirus bivalent (EUA) Booster Vaccine (PFIZER) 0.3 milliLiter(s) IntraMuscular once  dextrose 5%. 1000 milliLiter(s) (50 mL/Hr) IV Continuous <Continuous>  dextrose 5%. 1000 milliLiter(s) (100 mL/Hr) IV Continuous <Continuous>  dextrose 50% Injectable 25 Gram(s) IV Push once  dextrose 50% Injectable 12.5 Gram(s) IV Push once  dextrose 50% Injectable 25 Gram(s) IV Push once  furosemide   Injectable 40 milliGRAM(s) IV Push daily  glucagon  Injectable 1 milliGRAM(s) IntraMuscular once  insulin lispro (ADMELOG) corrective regimen sliding scale   SubCutaneous every 6 hours  insulin NPH human recombinant 15 Unit(s) SubCutaneous every 6 hours  lactulose Syrup 30 Gram(s) Oral three times a day  levothyroxine Injectable 75 MICROGram(s) IV Push at bedtime  midodrine 10 milliGRAM(s) Oral every 8 hours  mupirocin 2% Ointment 1 Application(s) Both Nostrils two times a day  octreotide  Infusion 50 MICROgram(s)/Hr (10 mL/Hr) IV Continuous <Continuous>  pantoprazole  Injectable 40 milliGRAM(s) IV Push every 12 hours  rifAXIMin 550 milliGRAM(s) Oral two times a day  spironolactone 50 milliGRAM(s) Oral daily  tenofovir disoproxil fumarate (VIREAD) 300 milliGRAM(s) Oral daily    MEDICATIONS  (PRN):  dextrose Oral Gel 15 Gram(s) Oral once PRN Blood Glucose LESS THAN 70 milliGRAM(s)/deciliter  LORazepam   Injectable 1.5 milliGRAM(s) IV Push once PRN pre-MRI  sodium chloride 0.65% Nasal 1 Spray(s) Both Nostrils three times a day PRN Nasal Congestion    ------------------------------------------------------------------------------------------------------------    CRITICAL CARE:  [ ]Shock Present  [ ]Septic [ ]Cardiogenic [ ]Neurologic [ ]Hypovolemic [ ] Undifferentiated/Mixed   [ ]Vasopressors [ ]Inotropes     [ ]Respiratory failure present: [ ]Acute  [ ]Chronic [ ]Hypoxic  [ ]Hypercarbic   [ ]Mechanical ventilation   [ ]Non-invasive ventilatory support   [ ]High flow    [ ]Non-rebreather/Venti     [ ]Other organ failure     Other REFERRALS:  [ ]Hospice  [ ]Child Life  [ ]Social Work  [ ]Case management [ ]Holistic Therapy    HPI:  Patient is a 67-year-old male with lymphoma on chemo, diabetes, hypertension, CAD status post CABG, TANG cirrhosis follows at [facility], presented with concern of R sided pain and constipation. In ED patient had massive hematemesis hemorraghic shock, was intubated and admitted to MICU. Patient had prolonged hospital course due to GIB s/p EGD with variceal banding, persistent shock, anemia requiring transfusions, reaccumulation fo ascites. Patient has poor mental status attributed to anoxic brain injury from initial hemorrhagic shock. Palliative consulted for complex medical decision making in the setting of serious illness.     Patient seen this AM with son Selina at bedside. Patient is mostly lethargic, sleeping, with cheyne-greenwood breathing pattern, wakes up on occasion and looks around, at son. NGT in place.   See below for [facility] meeting with children.     PERTINENT PM/SXH:   CAD (coronary artery disease)    Diabetes    Lymphoma    Cirrhosis      S/P CABG x 1    FAMILY HISTORY:  No pertinent family history in first degree relatives    ------------------------------------------------------------------------------------------------------------  ITEMS NOT CHECKED ARE NOT PRESENT    SOCIAL HISTORY:   Living Situation: [X ]Home  [ ]Long term care  [ ]Rehab [ ]Other  Support: Wife and children - eldest Romana, youngest Selina. Other middle brother Demarcus. Other sister in [place]   Yemeni     Substance hx:  [ ]   Tobacco hx:  [ ]   Alcohol hx: [ ]   Family Hx substance abuse [ ]yes [ ]no    Taoism/Spirituality: Caodaism   PCSSQ[Palliative Care Spiritual Screening Question]   Severity (0-10): 0  Score of 4 or > indicate consideration of Chaplaincy referral.  Chaplaincy Referral: [ ] yes [X ] refused [ ] following    Caregiver Benoit? : [ ] yes [X ] no [ ] Deferred [ ] Declined               Social work referral [ ] Patient & Family Centered Care Referral [ ]    Anticipatory Grief present?:  [ ] yes [X ] no  [ ] Deferred                    Social work referral [ ] Patient & Family Centered Care Referral [ ]    ------------------------------------------------------------------------------------------------------------    PRESENT SYMPTOMS:  [ ] No     [X ] Unable to self-report      [ ] CPOT (ICU)     [ ] PAINADs      [X ] RDOS 0    [ ] Yes     Source if other than patient:  [ ]Family   [ ]Team     PAIN:   If blank, patient unable to specify   [ ]yes [ ]no  QOL impact-   Location -                    Aggravating factors -  Quality -  Radiation -  Timing-  Pain at most severe level (0-10 scale):  Pain at minimal acceptable level/Pain Goal (0-10 scale):     SYMPTOMS:   Dyspnea:                           [ ]Mild [ ]Moderate [ ]Severe  Anxiety:                             [ ]Mild [ ]Moderate [ ]Severe  Fatigue:                             [ ]Mild [ ]Moderate [ ]Severe  Nausea/Vomiting:              [ ]Mild [ ]Moderate [ ]Severe  Loss of appetite:                [ ]Mild [ ]Moderate [ ]Severe  Constipation:                     [ ]Mild [ ]Moderate [ ]Severe    Other Symptoms:  [X ]All other review of systems negative     Home Medications for symptoms if any:  I-Stop Reference No:    ------------------------------------------------------------------------------------------------------------    FUNCTIONAL STATUS:     Baseline ADL (prior to admission):  [X ] Independent  [ ] Moderate Assistance [ ] Dependent  Palliative Performance Score: 80%     [ ]PPSV2 < or = to 30%     NUTRITIONAL STATUS:     Protein Calorie Malnutrition Present:   [ ]mild   [ ]moderate   [ ]severe   [ ]poor nutritional intake   [ ]artificial nutrition      Weight:   [X ]underweight (BMI 18.5 or less)   [ ]morbid obesity (BMI 30 or higher)   [ ]anasarca  [ ]significant weight loss     Height (cm): 170.2 (02-24-23 @ 12:00)  Weight (kg): 55.4 (02-24-23 @ 12:00)  BMI (kg/m2): 19.1 (02-24-23 @ 12:00)    ------------------------------------------------------------------------------------------------------------    PRIOR ADVANCE DIRECTIVES:    [ ] DNR/MOLST    [ ] Living Will    [ ] Health Care Proxy(s)    DECISION MAKER(s):  [ ] Patient    [X ] Surrogate(s)- children   [ ] HCP   [ ] Guardian             ------------------------------------------------------------------------------------------------------------  PHYSICAL EXAM:  Vital Signs Last 24 Hrs  T(C): 36.7 (20 Mar 2023 05:52), Max: 37.1 (19 Mar 2023 13:00)  T(F): 98 (20 Mar 2023 05:52), Max: 98.8 (19 Mar 2023 13:00)  HR: 60 (20 Mar 2023 05:52) (57 - 68)  BP: 130/52 (20 Mar 2023 05:52) (121/55 - 134/59)  BP(mean): --  RR: 18 (20 Mar 2023 05:52) (17 - 20)  SpO2: 97% (20 Mar 2023 05:52) (97% - 98%)    Parameters below as of 20 Mar 2023 05:52  Patient On (Oxygen Delivery Method): room air     I&O's Summary    19 Mar 2023 07:01  -  20 Mar 2023 07:00  --------------------------------------------------------  IN: 2300 mL / OUT: 0 mL / NET: 2300 mL    GENERAL:  [X ]Cachexia  [X ] Frail  [ ]Awake  [ ]Oriented x   [X ]Lethargic  [ ]Unarousable  [ ]Verbal  [X ]Non-Verbal    BEHAVIORAL:   [ ] Anxiety  [ ] Delirium [ ] Agitation [ ] Other    HEENT:   [ ]Normal   [X ]Dry mouth   [ ]ET Tube/Trach  [ ]Oral lesions    PULMONARY:   [X ]Clear [ ]Tachypnea  [ ]Audible excessive secretions   [ ]Rhonchi        [ ]Right [ ]Left [ ]Bilateral  [ ]Crackles        [ ]Right [ ]Left [ ]Bilateral  [ ]Wheezing     [ ]Right [ ]Left [ ]Bilateral  [ ]Diminished breath sounds [ ]right [ ]left [ ]bilateral    CARDIOVASCULAR:    [X ]Regular [ ]Irregular [ ]Tachy  [ ]Rafal [ ]Murmur [ ]Other    GASTROINTESTINAL:  [ ]Soft  [X ]Distended   [X ]+BS  [ ]Non tender [ ]Tender  [ ]Other [ ]PEG [X ]OGT/ NGT      GENITOURINARY:  [ ]Normal [X ] Incontinent   [ ]Oliguria/Anuria   [ ]Henderson    MUSCULOSKELETAL:   [ ]Normal   [ ]Weakness  [X ]Bed/Wheelchair bound [ ]Edema    NEUROLOGIC:   [X ]No focal deficits  [ ]Cognitive impairment  [ ]Dysphagia [ ]Dysarthria [ ]Paresis [ ]Other     SKIN:   [ ]Normal  [ ]Rash  [ ]Other  [X ]Pressure ulcer(s)       Present on admission [ ]y [ ]n    ------------------------------------------------------------------------------------------------------------    LABS:                        11.2   5.89  )-----------( 48       ( 20 Mar 2023 07:40 )             37.7   03-20    156<H>  |  124<H>  |  70<H>  ----------------------------<  136<H>  4.2   |  21<L>  |  1.05    Ca    9.1      20 Mar 2023 05:45  Phos  1.8     03-20  Mg     2.90     03-20    TPro  5.9<L>  /  Alb  4.4  /  TBili  2.6<H>  /  DBili  0.7<H>  /  AST  59<H>  /  ALT  35  /  AlkPhos  143<H>  03-20  PT/INR - ( 20 Mar 2023 05:45 )   PT: 24.0 sec;   INR: 2.05 ratio         PTT - ( 20 Mar 2023 05:45 )  PTT:56.5 sec    ------------------------------------------------------------------------------------------------------------  RADIOLOGY & ADDITIONAL STUDIES:    < from: CT Head No Cont (03.04.23 @ 17:38) >    IMPRESSION:  No CT evidence of acute intracranial pathology.  Trace mastoid effusions bilaterally.    < end of copied text >    < from: US Abdomen Upper Quadrant Right (03.10.23 @ 18:18) >  IMPRESSION:  Cirrhosis. Limited visualization of the liver.    Moderate amount of ascites.    < end of copied text >    < from: MR Head w/wo IV Cont (03.15.23 @ 19:40) >  IMPRESSION:    Extensive cortical predominant restricted diffusion throughout the   cerebral hemispheres including involvement of the basal ganglia and   insula bilaterally. Given history of hematemesis with hypotension,   hypoxic-ischemic injury is favored. Differential also includes postictal   sequelae, Creutzfeldt-Gonzalez disease, encephalitis or lymphomatous   involvement. CSF sampling should be considered.    < end of copied text >    < from: US Kidney and Bladder (03.17.23 @ 14:13) >  IMPRESSION:  *  Both kidneys are small in size.  *  No hydronephrosis.    < end of copied text >      ------------------------------------------------------------------------------------------------------------    ALLERGIES:  Allergies    [This allergen will not trigger allergy alert] &quot;lentils&quot;-&quot;itchiness&quot; (Other)  Beef (Other)  No Known Drug Allergies    Intolerances    MEDICATIONS  (STANDING):  chlorhexidine 2% Cloths 1 Application(s) Topical <User Schedule>  ciprofloxacin     Tablet 500 milliGRAM(s) Oral every 24 hours  coronavirus bivalent (EUA) Booster Vaccine (PFIZER) 0.3 milliLiter(s) IntraMuscular once  dextrose 5%. 1000 milliLiter(s) (50 mL/Hr) IV Continuous <Continuous>  dextrose 5%. 1000 milliLiter(s) (100 mL/Hr) IV Continuous <Continuous>  dextrose 50% Injectable 25 Gram(s) IV Push once  dextrose 50% Injectable 12.5 Gram(s) IV Push once  dextrose 50% Injectable 25 Gram(s) IV Push once  furosemide   Injectable 40 milliGRAM(s) IV Push daily  glucagon  Injectable 1 milliGRAM(s) IntraMuscular once  insulin lispro (ADMELOG) corrective regimen sliding scale   SubCutaneous every 6 hours  insulin NPH human recombinant 15 Unit(s) SubCutaneous every 6 hours  lactulose Syrup 30 Gram(s) Oral three times a day  levothyroxine Injectable 75 MICROGram(s) IV Push at bedtime  midodrine 10 milliGRAM(s) Oral every 8 hours  mupirocin 2% Ointment 1 Application(s) Both Nostrils two times a day  octreotide  Infusion 50 MICROgram(s)/Hr (10 mL/Hr) IV Continuous <Continuous>  pantoprazole  Injectable 40 milliGRAM(s) IV Push every 12 hours  rifAXIMin 550 milliGRAM(s) Oral two times a day  spironolactone 50 milliGRAM(s) Oral daily  tenofovir disoproxil fumarate (VIREAD) 300 milliGRAM(s) Oral daily    MEDICATIONS  (PRN):  dextrose Oral Gel 15 Gram(s) Oral once PRN Blood Glucose LESS THAN 70 milliGRAM(s)/deciliter  LORazepam   Injectable 1.5 milliGRAM(s) IV Push once PRN pre-MRI  sodium chloride 0.65% Nasal 1 Spray(s) Both Nostrils three times a day PRN Nasal Congestion    ------------------------------------------------------------------------------------------------------------    CRITICAL CARE:  [ ]Shock Present  [ ]Septic [ ]Cardiogenic [ ]Neurologic [ ]Hypovolemic [ ] Undifferentiated/Mixed   [ ]Vasopressors [ ]Inotropes     [ ]Respiratory failure present: [ ]Acute  [ ]Chronic [ ]Hypoxic  [ ]Hypercarbic   [ ]Mechanical ventilation   [ ]Non-invasive ventilatory support   [ ]High flow    [ ]Non-rebreather/Venti     [ ]Other organ failure     Other REFERRALS:  [ ]Hospice  [ ]Child Life  [ ]Social Work  [ ]Case management [ ]Holistic Therapy

## 2023-03-20 NOTE — CONSULT NOTE ADULT - ASSESSMENT
Patient is a 67-year-old male with lymphoma on chemo, diabetes, hypertension, CAD status post CABG, TANG cirrhosis follows at [facility], presented with concern of R sided pain and constipation. In ED patient had massive hematemesis hemorraghic shock, was intubated and admitted to MICU. Patient had prolonged hospital course due to GIB s/p EGD with variceal banding, persistent shock, anemia requiring transfusions, reaccumulation fo ascites. Patient has poor mental status attributed to anoxic brain injury from initial hemorrhagic shock. Palliative consulted for complex medical decision making in the setting of serious illness.

## 2023-03-20 NOTE — CONSULT NOTE ADULT - PROBLEM SELECTOR RECOMMENDATION 4
- on rituxan outpatient, follows at [facility]   - recommend to obtain collateral on state of lymphoma

## 2023-03-20 NOTE — PROGRESS NOTE ADULT - PROBLEM SELECTOR PLAN 10
- family reports follows with oncology at [facility] - continue w/ tenofovir  - hep b pcr neg

## 2023-03-20 NOTE — PROGRESS NOTE ADULT - PROBLEM SELECTOR PLAN 4
decompensated with ascites, with variceal bleed, with PSE; MELD 12 3/10/23  - s/p diagnostic para (2/25) and therapeutic para (3/5) removed 4.5L  s/p diag/therapeutic tap on 3/15, removed 3.6L, neg for SBP  - ascites reaccumulating, consult procedure team on Monday for repeat paracentesis   - c/w rifaximin BID, lactulose q4h, monitor BMs   - SBP ppx with Cipro  - iv lasix 40 qd and aldactone 50 mg qd as above   - hepatology following   -trend LFT, RUQ w/o pathology on 3/10  - MRI abdomen at [facility] 12/2022 no HCC acute blood loss anemia c/b hypovolemic shock s/p levophed and ICU  - Hb 6.9 on admit now s/p 6 units of pRBC, 1 unit FFP, 1 unit of platelets all on 2/24/23, 1u prbc 3/18  - s/p octreotide x 72h (2/24-2/27)   - protonix 40 bid , octreotide gtt as above  - trend CBC, transfuse prn. today hgb 8.9  - s/p EGD 2/24: large (> 5 mm) esophageal varices. Incompletely eradicated. Banded. Normal duodenal bulb, first portion of the duodenum and second portion of the duodenum.                 - EGD in 4 wks for likely repeat banding  -SBP ppx cipro

## 2023-03-20 NOTE — PROGRESS NOTE ADULT - PROBLEM SELECTOR PLAN 9
- continue w/ tenofovir  - hep b pcr neg HbA1c 6.1% likely underestimated due transfusions given insulin needs  - BG above goal   - Inc NPH 15 units q6h   - c/w TF Glucerna HbA1c 6.1% likely underestimated due transfusions given insulin needs  -  NPH 15 units q6h   - c/w TF Glucerna

## 2023-03-20 NOTE — PROGRESS NOTE ADULT - PROBLEM SELECTOR PLAN 5
- Uptrended to 1.7  - mcconnell removed 3/15,  passed TOV  - no retention on bladder scan, bladder scan prn  - NANCI likely ATN in s/o shock  - no hydronephrosis on renal US  - avoid hypotension  - Cr now normal 1.05 decompensated with ascites, with variceal bleed, with PSE; MELD 12 3/10/23  - s/p diagnostic para (2/25) and therapeutic para (3/5) removed 4.5L  - s/p diag/therapeutic tap on 3/15, removed 3.6L, neg for SBP  - c/w rifaximin BID, lactulose   - SBP ppx with Cipro due to low ascitic protein   - trend LFT, RUQ w/o pathology on 3/10  - MRI abdomen at [facility] 12/2022 no HCC  - procedure team for paracentesis   - hepatology following

## 2023-03-20 NOTE — PROGRESS NOTE ADULT - PROBLEM SELECTOR PLAN 7
- sputum culture 2/27: pseudomonas   - s/p Zosyn (2/27-3/5), 7 days TSH 1.40 3/10/23  - c/w synthroid IV for now, would need to hold TF 1 hour pre and post PO/NGT levothyroxine

## 2023-03-20 NOTE — PROGRESS NOTE ADULT - ASSESSMENT
67M follows at [facility] Hx decompensated TANG cirrhosis (+ascites, +EV -- previously MELD Na 8), CAD s/p CABG s/p PCI (last in 2012 on ASA, now off plavix x2 weeks), newly diagnosed follicular lymphoma (on rituximab), HTN, DM, currently on tenofovir (HBV Core +) presented with right sided chest/abdominal pain with lizzette hematemesis + clots c/b hemorrhagic shock, s/p intubation in MICU, EGD showing large varices s/p banding, unable to completely clear stomach due to clot now transferred to the floor for further care with hospital course c/b persistent AMS     # AMS - likely 2/2 hypoxia/ischemia seen on MRI with extensive cortical restricted diffusion throughout the cerebral hemispheres including the basal ganglia and insula b/l.   #Hematemesis: s/p EGD 2/24/2023 s/p EVL x6, bleeding likely due to EV, however unable to completely visualize stomach given presence of clotted blood. H/H now stable.   #NANCI (resolved)  #Decompensated TANG cirrhosis: follows at [facility], previously low meld Na 8  --MELD Na: 14  --Ascites: (+) Hx,  was on Furosemide 20mg, spironolactone 50mg. Para 3/15 with 3.6L removed. Neg for SBP  --SBP; no Hx. WBC elevated today with no fevers. Would preform dx para as noted above and repeat infectious workup including CXR, U/A and BCx. Continue with ciprofloxacin 500mg daily for SBP prophylaxis given low ascitic protein  --EV: (+) Hx, no EVB --> is on nadolol at home  --PVT: no Hx  --HCC: no Hx  # HBV core positive - on Tenofovir    Recommendations:  - pt had drop in hgb over weekend without reported overt bleeding and no bleeding on exam today - yellow soft stool on exam. Hgb overcorrected with 1U PRBCs. Given this as well as clinical status and electrolyte abn, no role for EGD at this time  - c/w Rifaximin  - as encephalopathy is not due to HE, would limit lactulose to prevent diarrhea and skin breakdown   - c/w pantoprazole 40 QD  - c/w tenofovir  - GOC per primary team given poor prognosis  - trend CMP, CBC, coags    Note not finalized until signed by attending.    Onelia Cee PGY-6  Gastroenterology/Hepatology Fellow  Pager #[phone] (DEBBIE) or [phone] (NS)  Available on [employer] Teams.  Please contact on-call GI fellow via answering service ([phone]) after 5pm and before 8am, and on weekends.  67M follows at [facility] Hx decompensated TANG cirrhosis (+ascites, +EV -- previously MELD Na 8), CAD s/p CABG s/p PCI (last in 2012 on ASA, now off plavix x2 weeks), newly diagnosed follicular lymphoma (on rituximab), HTN, DM, currently on tenofovir (HBV Core +) presented with right sided chest/abdominal pain with lizzette hematemesis + clots c/b hemorrhagic shock, s/p intubation in MICU, EGD showing large varices s/p banding, unable to completely clear stomach due to clot now transferred to the floor for further care with hospital course c/b persistent AMS     # AMS - likely 2/2 hypoxia/ischemia seen on MRI with extensive cortical restricted diffusion throughout the cerebral hemispheres including the basal ganglia and insula b/l.   #Hematemesis: s/p EGD 2/24/2023 s/p EVL x6, bleeding likely due to EV, however unable to completely visualize stomach given presence of clotted blood. H/H now stable.   #NANCI (resolved)  #Decompensated TANG cirrhosis: follows at [facility], previously low meld Na 8  --MELD Na: 14  --Ascites: (+) Hx,  was on Furosemide 20mg, spironolactone 50mg. Para 3/15 with 3.6L removed. Neg for SBP  --SBP; no Hx. WBC elevated today with no fevers. Would preform dx para as noted above and repeat infectious workup including CXR, U/A and BCx. Continue with ciprofloxacin 500mg daily for SBP prophylaxis given low ascitic protein  --EV: (+) Hx, no EVB --> is on nadolol at home  --PVT: no Hx  --HCC: no Hx  # HBV core positive - on Tenofovir    Recommendations:  - pt had drop in hgb over weekend without reported overt bleeding and no bleeding on exam today - yellow soft stool on exam. Hgb overcorrected with 1U PRBCs. Given this as well as clinical status and electrolyte abn, no role for EGD at this time  - can d/c octreotide gtt  - can change PPI to daily  - c/w Rifaximin  - as encephalopathy is not due to HE, would limit lactulose to prevent diarrhea and skin breakdown   - c/w pantoprazole 40 QD  - c/w tenofovir  - GOC per primary team given poor prognosis  - trend CMP, CBC, coags    Note not finalized until signed by attending.    Onelia Cee PGY-6  Gastroenterology/Hepatology Fellow  Pager #[phone] (DEBBIE) or [phone] (NS)  Available on [employer] Teams.  Please contact on-call GI fellow via answering service ([phone]) after 5pm and before 8am, and on weekends.

## 2023-03-20 NOTE — PROGRESS NOTE ADULT - PROBLEM SELECTOR PLAN 6
TSH 1.40 3/10/23  - c/w synthroid IV for now, would need to hold TF 1 hour pre and post PO/NGT levothyroxine - Uptrended to 1.7  - mcconnell removed 3/15,  passed TOV  - no retention on bladder scan, bladder scan prn  - NANCI likely ATN in s/o shock  - no hydronephrosis on renal US  - avoid hypotension  - Cr now normal 1.05  - improved

## 2023-03-20 NOTE — PROGRESS NOTE ADULT - ATTENDING COMMENTS
67M, adm. on 2/24 with EV bleed, found anoxic brain injury on MRI 3/14, with extensive areas of abnormal diffusion.    # Week-end hypotensive Hb dropped form 11 to 8, go 1U now Hb 11  - stool yellow. Last paracentesis 3/15. No SBP  # anoxic brain injury: eyes open, stares. Moves his head upon painful stimuli, but into random directions. Per nephrology note, damage may not be completely irreversible. Has dysphagia due to brain injury, NG tube in place.  # encephalopathy may be contributed to by  his hypernatremia and hepatic encephalopathy  # hypernatremia in setting of tube feeds  # mild ascites, no leg edema. Creatinine normal.    # alcoholic cirrhosis with ascites, MELD 19    - increase free water boluses  - mild ascites, can repeat LVP at some point  - continue current medications

## 2023-03-20 NOTE — PROGRESS NOTE ADULT - SUBJECTIVE AND OBJECTIVE BOX
[facility] DIVISION OF KIDNEY DISEASES AND HYPERTENSION   FOLLOW UP NOTE  --------------------------------------------------------------------------------  HPI: 67M PMH DM, HTN, hypothyroidism, CAD, TANG cirrhosis, follicular lymphoma on rituximab, Hep B. on tenofovir, status post CABG, initially presented to ED w/ chest pain and constipation, developed hematemesis, now s/p MICU admission for acute blood loss anemia c/b hypovolemic shock , intubated for airway protection (extubated 3/8), s/p 2/24 bedside EGD with banding esophageal varices now with persistent encephalopathy, possibly 2/2 hypoxia/anoxic brain ischemia in s/o hemorrhagic shock on admission. Pt. being seen for NANCI and hypernatremia.    24 hour events/subjective: Pt. was seen and evaluated at bedside this morning. He remains altered, non-verbal, not following commands.     PAST HISTORY  --------------------------------------------------------------------------------  No significant changes to PMH, PSH, FHx, SHx, unless otherwise noted    ALLERGIES & MEDICATIONS  --------------------------------------------------------------------------------  Allergies  [This allergen will not trigger allergy alert] (Other)  Beef (Other)  No Known Drug Allergies    Intolerances    Standing Inpatient Medications  chlorhexidine 2% Cloths 1 Application(s) Topical <User Schedule>  ciprofloxacin     Tablet 500 milliGRAM(s) Oral every 24 hours  coronavirus bivalent (EUA) Booster Vaccine (PFIZER) 0.3 milliLiter(s) IntraMuscular once  dextrose 5%. 1000 milliLiter(s) IV Continuous <Continuous>  dextrose 5%. 1000 milliLiter(s) IV Continuous <Continuous>  dextrose 50% Injectable 25 Gram(s) IV Push once  dextrose 50% Injectable 12.5 Gram(s) IV Push once  dextrose 50% Injectable 25 Gram(s) IV Push once  glucagon  Injectable 1 milliGRAM(s) IntraMuscular once  insulin lispro (ADMELOG) corrective regimen sliding scale   SubCutaneous every 6 hours  insulin NPH human recombinant 15 Unit(s) SubCutaneous every 6 hours  lactulose Syrup 30 Gram(s) Oral three times a day  levothyroxine Injectable 75 MICROGram(s) IV Push at bedtime  midodrine 10 milliGRAM(s) Oral every 8 hours  mupirocin 2% Ointment 1 Application(s) Both Nostrils two times a day  octreotide  Infusion 50 MICROgram(s)/Hr IV Continuous <Continuous>  pantoprazole  Injectable 40 milliGRAM(s) IV Push every 12 hours  phytonadione  IVPB 5 milliGRAM(s) IV Intermittent once  rifAXIMin 550 milliGRAM(s) Oral two times a day  tenofovir disoproxil fumarate (VIREAD) 300 milliGRAM(s) Oral daily    PRN Inpatient Medications  dextrose Oral Gel 15 Gram(s) Oral once PRN  LORazepam   Injectable 1.5 milliGRAM(s) IV Push once PRN  sodium chloride 0.65% Nasal 1 Spray(s) Both Nostrils three times a day PRN    REVIEW OF SYSTEMS  --------------------------------------------------------------------------------  Unable to obtain ROS     VITALS/PHYSICAL EXAM  --------------------------------------------------------------------------------  T(C): 36.7 (03-20-23 @ 15:10), Max: 36.7 (03-20-23 @ 03:58)  HR: 60 (03-20-23 @ 15:10) (57 - 60)  BP: 112/50 (03-20-23 @ 15:10) (112/50 - 134/59)  RR: 18 (03-20-23 @ 15:10) (17 - 18)  SpO2: 100% (03-20-23 @ 15:10) (97% - 100%)  Wt(kg): --    03-19-23 @ 07:01  -  03-20-23 @ 07:00  --------------------------------------------------------  IN: 2300 mL / OUT: 0 mL / NET: 2300 mL    Physical Exam:  Gen: Ill appearing, sitting up in bed, not following, appears to haev a "blank stare"   HEENT: Jaundiced  Pulm: CTA B/L  CV: RRR, S1S2; no rub  Back: No spinal or CVA tenderness; no sacral edema  Abd: +BS, soft, +Distended with fluid wave  : No suprapubic tenderness  Skin: Dry, without rashes    LABS/STUDIES  --------------------------------------------------------------------------------              11.2   5.89  >-----------<  48       [03-20-23 @ 07:40]              37.7     156  |  124  |  70  ----------------------------<  136      [03-20-23 @ 05:45]  4.2   |  21  |  1.05        Ca     9.1     [03-20-23 @ 05:45]      Mg     2.90     [03-20-23 @ 05:45]      Phos  1.8     [03-20-23 @ 05:45]    TPro  5.9  /  Alb  4.4  /  TBili  2.6  /  DBili  0.7  /  AST  59  /  ALT  35  /  AlkPhos  143  [03-20-23 @ 05:45]    PT/INR: PT 24.0 , INR 2.05       [03-20-23 @ 05:45]  PTT: 56.5       [03-20-23 @ 05:45]    Creatinine Trend:  SCr 1.05 [03-20 @ 05:45]  SCr 1.04 [03-19 @ 19:05]  SCr 1.10 [03-19 @ 06:49]  SCr 1.39 [03-18 @ 07:39]  SCr 1.43 [03-18 @ 00:57]

## 2023-03-20 NOTE — PROGRESS NOTE ADULT - PROBLEM SELECTOR PLAN 11
- Confirmed full code with son/daughter at bedside on multiple occasions, last 3/19  - dc prophylactic heparin  - will need PT when able to cooperate  - functional quadriplegia c/w full care and turns, TF - family reports follows with oncology at [facility]

## 2023-03-20 NOTE — PROGRESS NOTE ADULT - PROBLEM SELECTOR PLAN 8
HbA1c 6.1% likely underestimated due transfusions given insulin needs  - BG above goal   - Inc NPH 15 units q6h   - c/w TF Glucerna - sputum culture 2/27: pseudomonas   - s/p Zosyn (2/27-3/5), 7 days

## 2023-03-20 NOTE — PROGRESS NOTE ADULT - SUBJECTIVE AND OBJECTIVE BOX
[facility] of [facility] Medicine  Nik Braden MD  Pager (M-F, 8N-5P): [phone]  Other Times:  [phone]    Patient is a 67y old  Male who presents with a chief complaint of gib, encephalopathy (19 Mar 2023 12:15)      SUBJECTIVE / OVERNIGHT EVENTS: 2 BM yesterday   ADDITIONAL REVIEW OF SYSTEMS:    MEDICATIONS  (STANDING):  chlorhexidine 2% Cloths 1 Application(s) Topical <User Schedule>  ciprofloxacin     Tablet 500 milliGRAM(s) Oral every 24 hours  coronavirus bivalent (EUA) Booster Vaccine (PFIZER) 0.3 milliLiter(s) IntraMuscular once  dextrose 5%. 1000 milliLiter(s) (50 mL/Hr) IV Continuous <Continuous>  dextrose 5%. 1000 milliLiter(s) (100 mL/Hr) IV Continuous <Continuous>  dextrose 50% Injectable 25 Gram(s) IV Push once  dextrose 50% Injectable 12.5 Gram(s) IV Push once  dextrose 50% Injectable 25 Gram(s) IV Push once  furosemide   Injectable 40 milliGRAM(s) IV Push daily  glucagon  Injectable 1 milliGRAM(s) IntraMuscular once  insulin lispro (ADMELOG) corrective regimen sliding scale   SubCutaneous every 6 hours  insulin NPH human recombinant 15 Unit(s) SubCutaneous every 6 hours  lactulose Syrup 30 Gram(s) Oral three times a day  levothyroxine Injectable 75 MICROGram(s) IV Push at bedtime  midodrine 10 milliGRAM(s) Oral every 8 hours  mupirocin 2% Ointment 1 Application(s) Both Nostrils two times a day  octreotide  Infusion 50 MICROgram(s)/Hr (10 mL/Hr) IV Continuous <Continuous>  pantoprazole  Injectable 40 milliGRAM(s) IV Push every 12 hours  rifAXIMin 550 milliGRAM(s) Oral two times a day  spironolactone 50 milliGRAM(s) Oral daily  tenofovir disoproxil fumarate (VIREAD) 300 milliGRAM(s) Oral daily    MEDICATIONS  (PRN):  dextrose Oral Gel 15 Gram(s) Oral once PRN Blood Glucose LESS THAN 70 milliGRAM(s)/deciliter  LORazepam   Injectable 1.5 milliGRAM(s) IV Push once PRN pre-MRI  sodium chloride 0.65% Nasal 1 Spray(s) Both Nostrils three times a day PRN Nasal Congestion      CAPILLARY BLOOD GLUCOSE      POCT Blood Glucose.: 192 mg/dL (20 Mar 2023 08:51)  POCT Blood Glucose.: 132 mg/dL (20 Mar 2023 05:49)  POCT Blood Glucose.: 92 mg/dL (20 Mar 2023 01:59)  POCT Blood Glucose.: 71 mg/dL (19 Mar 2023 23:36)  POCT Blood Glucose.: 154 mg/dL (19 Mar 2023 18:12)    I&O's Summary    19 Mar 2023 07:01  -  20 Mar 2023 07:00  --------------------------------------------------------  IN: 2300 mL / OUT: 0 mL / NET: 2300 mL        PHYSICAL EXAM:  Vital Signs Last 24 Hrs  T(C): 36.7 (20 Mar 2023 05:52), Max: 37.1 (19 Mar 2023 13:00)  T(F): 98 (20 Mar 2023 05:52), Max: 98.8 (19 Mar 2023 13:00)  HR: 60 (20 Mar 2023 05:52) (57 - 68)  BP: 130/52 (20 Mar 2023 05:52) (121/55 - 134/59)  BP(mean): --  RR: 18 (20 Mar 2023 05:52) (17 - 20)  SpO2: 97% (20 Mar 2023 05:52) (97% - 98%)    Parameters below as of 20 Mar 2023 05:52  Patient On (Oxygen Delivery Method): room air      CONSTITUTIONAL: NAD, well-developed, well-groomed  EYES: PERRLA; conjunctiva and sclera clear  ENMT: Moist oral mucosa, no pharyngeal injection or exudates; normal dentition  NECK: Supple, no palpable masses; no thyromegaly  RESPIRATORY: Normal respiratory effort; lungs are clear to auscultation bilaterally  CARDIOVASCULAR: Regular rate and rhythm, normal S1 and S2, no murmur/rub/gallop; No lower extremity edema; Peripheral pulses are 2+ bilaterally  ABDOMEN: Nontender to palpation, normoactive bowel sounds, no rebound/guarding; No hepatosplenomegaly  MUSCULOSKELETAL:  Normal gait; no clubbing or cyanosis of digits; no joint swelling or tenderness to palpation  PSYCH: A+O to person, place, and time; affect appropriate  NEUROLOGY: CN 2-12 are intact and symmetric; no gross sensory deficits   SKIN: No rashes; no palpable lesions    LABS:                        11.2   5.89  )-----------( 48       ( 20 Mar 2023 07:40 )             37.7     03-20    156<H>  |  124<H>  |  70<H>  ----------------------------<  136<H>  4.2   |  21<L>  |  1.05    Ca    9.1      20 Mar 2023 05:45  Phos  1.8     03-20  Mg     2.90     03-20    TPro  5.9<L>  /  Alb  4.4  /  TBili  2.6<H>  /  DBili  0.7<H>  /  AST  59<H>  /  ALT  35  /  AlkPhos  143<H>  03-20    PT/INR - ( 20 Mar 2023 05:45 )   PT: 24.0 sec;   INR: 2.05 ratio         PTT - ( 20 Mar 2023 05:45 )  PTT:56.5 sec            RADIOLOGY & ADDITIONAL TESTS:  Results Reviewed:   Imaging Personally Reviewed:  Electrocardiogram Personally Reviewed:    COORDINATION OF CARE:  Care Discussed with Consultants/Other Providers [Y/N]:  Prior or Outpatient Records Reviewed [Y/N]:   [facility]  Nik Braden MD  Pager (M-F, 0H-5P): [phone]  Other Times:  [phone]    Patient is a 67y old  Male who presents with a chief complaint of gib, encephalopathy (19 Mar 2023 12:15)      SUBJECTIVE / OVERNIGHT EVENTS: 2 BM yesterday son at bedside; Pt opens eyes but non verbal; tracks to verbal stimuli     MEDICATIONS  (STANDING):  chlorhexidine 2% Cloths 1 Application(s) Topical <User Schedule>  ciprofloxacin     Tablet 500 milliGRAM(s) Oral every 24 hours  coronavirus bivalent (EUA) Booster Vaccine (PFIZER) 0.3 milliLiter(s) IntraMuscular once  dextrose 5%. 1000 milliLiter(s) (50 mL/Hr) IV Continuous <Continuous>  dextrose 5%. 1000 milliLiter(s) (100 mL/Hr) IV Continuous <Continuous>  dextrose 50% Injectable 25 Gram(s) IV Push once  dextrose 50% Injectable 12.5 Gram(s) IV Push once  dextrose 50% Injectable 25 Gram(s) IV Push once  furosemide   Injectable 40 milliGRAM(s) IV Push daily  glucagon  Injectable 1 milliGRAM(s) IntraMuscular once  insulin lispro (ADMELOG) corrective regimen sliding scale   SubCutaneous every 6 hours  insulin NPH human recombinant 15 Unit(s) SubCutaneous every 6 hours  lactulose Syrup 30 Gram(s) Oral three times a day  levothyroxine Injectable 75 MICROGram(s) IV Push at bedtime  midodrine 10 milliGRAM(s) Oral every 8 hours  mupirocin 2% Ointment 1 Application(s) Both Nostrils two times a day  octreotide  Infusion 50 MICROgram(s)/Hr (10 mL/Hr) IV Continuous <Continuous>  pantoprazole  Injectable 40 milliGRAM(s) IV Push every 12 hours  rifAXIMin 550 milliGRAM(s) Oral two times a day  spironolactone 50 milliGRAM(s) Oral daily  tenofovir disoproxil fumarate (VIREAD) 300 milliGRAM(s) Oral daily    MEDICATIONS  (PRN):  dextrose Oral Gel 15 Gram(s) Oral once PRN Blood Glucose LESS THAN 70 milliGRAM(s)/deciliter  LORazepam   Injectable 1.5 milliGRAM(s) IV Push once PRN pre-MRI  sodium chloride 0.65% Nasal 1 Spray(s) Both Nostrils three times a day PRN Nasal Congestion      CAPILLARY BLOOD GLUCOSE      POCT Blood Glucose.: 192 mg/dL (20 Mar 2023 08:51)  POCT Blood Glucose.: 132 mg/dL (20 Mar 2023 05:49)  POCT Blood Glucose.: 92 mg/dL (20 Mar 2023 01:59)  POCT Blood Glucose.: 71 mg/dL (19 Mar 2023 23:36)  POCT Blood Glucose.: 154 mg/dL (19 Mar 2023 18:12)    I&O's Summary    19 Mar 2023 07:01  -  20 Mar 2023 07:00  --------------------------------------------------------  IN: 2300 mL / OUT: 0 mL / NET: 2300 mL        PHYSICAL EXAM:  Vital Signs Last 24 Hrs  T(C): 36.7 (20 Mar 2023 05:52), Max: 37.1 (19 Mar 2023 13:00)  T(F): 98 (20 Mar 2023 05:52), Max: 98.8 (19 Mar 2023 13:00)  HR: 60 (20 Mar 2023 05:52) (57 - 68)  BP: 130/52 (20 Mar 2023 05:52) (121/55 - 134/59)  BP(mean): --  RR: 18 (20 Mar 2023 05:52) (17 - 20)  SpO2: 97% (20 Mar 2023 05:52) (97% - 98%)    Parameters below as of 20 Mar 2023 05:52  Patient On (Oxygen Delivery Method): room air      CONSTITUTIONAL: NAD,   EYES: conjunctiva and sclera clear  ENMT: NGT   NECK: Supple, no palpable masses; no thyromegaly  RESPIRATORY: Normal respiratory effort; lungs are clear to auscultation bilaterally  CARDIOVASCULAR: Regular rate and rhythm, normal S1 and S2, no murmur/rub/gallop; No lower extremity edema; Peripheral pulses are 2+ bilaterally  ABDOMEN: Nontender to palpation, normoactive bowel sounds, + fluid wave   MUSCULOSKELETAL:  moves upper ext spontaneously  PSYCH: awake and alert traces to verbal stimuli  NEUROLOGY: does not follow commands       LABS:                        11.2   5.89  )-----------( 48       ( 20 Mar 2023 07:40 )             37.7     03-20    156<H>  |  124<H>  |  70<H>  ----------------------------<  136<H>  4.2   |  21<L>  |  1.05    Ca    9.1      20 Mar 2023 05:45  Phos  1.8     03-20  Mg     2.90     03-20    TPro  5.9<L>  /  Alb  4.4  /  TBili  2.6<H>  /  DBili  0.7<H>  /  AST  59<H>  /  ALT  35  /  AlkPhos  143<H>  03-20    PT/INR - ( 20 Mar 2023 05:45 )   PT: 24.0 sec;   INR: 2.05 ratio         PTT - ( 20 Mar 2023 05:45 )  PTT:56.5 sec            RADIOLOGY & ADDITIONAL TESTS:  Results Reviewed:   Imaging Personally Reviewed:  Electrocardiogram Personally Reviewed:    COORDINATION OF CARE:  Care Discussed with Consultants/Other Providers [Y/N]:  Prior or Outpatient Records Reviewed [Y/N]:   [facility]  Nik Braden MD  Pager (M-F, 5R-1R): [phone]  Other Times:  [phone]    Patient is a 67y old  Male who presents with a chief complaint of gib, encephalopathy (19 Mar 2023 12:15)      SUBJECTIVE / OVERNIGHT EVENTS: 2 BM yesterday son at bedside; Pt opens eyes but non verbal; tracks to verbal stimuli; Son at bedside discussed current clinical situation    MEDICATIONS  (STANDING):  chlorhexidine 2% Cloths 1 Application(s) Topical <User Schedule>  ciprofloxacin     Tablet 500 milliGRAM(s) Oral every 24 hours  coronavirus bivalent (EUA) Booster Vaccine (PFIZER) 0.3 milliLiter(s) IntraMuscular once  dextrose 5%. 1000 milliLiter(s) (50 mL/Hr) IV Continuous <Continuous>  dextrose 5%. 1000 milliLiter(s) (100 mL/Hr) IV Continuous <Continuous>  dextrose 50% Injectable 25 Gram(s) IV Push once  dextrose 50% Injectable 12.5 Gram(s) IV Push once  dextrose 50% Injectable 25 Gram(s) IV Push once  furosemide   Injectable 40 milliGRAM(s) IV Push daily  glucagon  Injectable 1 milliGRAM(s) IntraMuscular once  insulin lispro (ADMELOG) corrective regimen sliding scale   SubCutaneous every 6 hours  insulin NPH human recombinant 15 Unit(s) SubCutaneous every 6 hours  lactulose Syrup 30 Gram(s) Oral three times a day  levothyroxine Injectable 75 MICROGram(s) IV Push at bedtime  midodrine 10 milliGRAM(s) Oral every 8 hours  mupirocin 2% Ointment 1 Application(s) Both Nostrils two times a day  octreotide  Infusion 50 MICROgram(s)/Hr (10 mL/Hr) IV Continuous <Continuous>  pantoprazole  Injectable 40 milliGRAM(s) IV Push every 12 hours  rifAXIMin 550 milliGRAM(s) Oral two times a day  spironolactone 50 milliGRAM(s) Oral daily  tenofovir disoproxil fumarate (VIREAD) 300 milliGRAM(s) Oral daily    MEDICATIONS  (PRN):  dextrose Oral Gel 15 Gram(s) Oral once PRN Blood Glucose LESS THAN 70 milliGRAM(s)/deciliter  LORazepam   Injectable 1.5 milliGRAM(s) IV Push once PRN pre-MRI  sodium chloride 0.65% Nasal 1 Spray(s) Both Nostrils three times a day PRN Nasal Congestion      CAPILLARY BLOOD GLUCOSE      POCT Blood Glucose.: 192 mg/dL (20 Mar 2023 08:51)  POCT Blood Glucose.: 132 mg/dL (20 Mar 2023 05:49)  POCT Blood Glucose.: 92 mg/dL (20 Mar 2023 01:59)  POCT Blood Glucose.: 71 mg/dL (19 Mar 2023 23:36)  POCT Blood Glucose.: 154 mg/dL (19 Mar 2023 18:12)    I&O's Summary    19 Mar 2023 07:01  -  20 Mar 2023 07:00  --------------------------------------------------------  IN: 2300 mL / OUT: 0 mL / NET: 2300 mL        PHYSICAL EXAM:  Vital Signs Last 24 Hrs  T(C): 36.7 (20 Mar 2023 05:52), Max: 37.1 (19 Mar 2023 13:00)  T(F): 98 (20 Mar 2023 05:52), Max: 98.8 (19 Mar 2023 13:00)  HR: 60 (20 Mar 2023 05:52) (57 - 68)  BP: 130/52 (20 Mar 2023 05:52) (121/55 - 134/59)  BP(mean): --  RR: 18 (20 Mar 2023 05:52) (17 - 20)  SpO2: 97% (20 Mar 2023 05:52) (97% - 98%)    Parameters below as of 20 Mar 2023 05:52  Patient On (Oxygen Delivery Method): room air      CONSTITUTIONAL: NAD,   EYES: conjunctiva and sclera clear  ENMT: NGT   NECK: Supple, no palpable masses; no thyromegaly  RESPIRATORY: Normal respiratory effort; lungs are clear to auscultation bilaterally  CARDIOVASCULAR: Regular rate and rhythm, normal S1 and S2, no murmur/rub/gallop; No lower extremity edema; Peripheral pulses are 2+ bilaterally  ABDOMEN: Nontender to palpation, normoactive bowel sounds, + fluid wave   MUSCULOSKELETAL:  moves upper ext spontaneously  PSYCH: awake and alert traces to verbal stimuli  NEUROLOGY: does not follow commands       LABS:                        11.2   5.89  )-----------( 48       ( 20 Mar 2023 07:40 )             37.7     03-20    156<H>  |  124<H>  |  70<H>  ----------------------------<  136<H>  4.2   |  21<L>  |  1.05    Ca    9.1      20 Mar 2023 05:45  Phos  1.8     03-20  Mg     2.90     03-20    TPro  5.9<L>  /  Alb  4.4  /  TBili  2.6<H>  /  DBili  0.7<H>  /  AST  59<H>  /  ALT  35  /  AlkPhos  143<H>  03-20    PT/INR - ( 20 Mar 2023 05:45 )   PT: 24.0 sec;   INR: 2.05 ratio         PTT - ( 20 Mar 2023 05:45 )  PTT:56.5 sec            RADIOLOGY & ADDITIONAL TESTS:  Results Reviewed:   Imaging Personally Reviewed:  Electrocardiogram Personally Reviewed:    COORDINATION OF CARE:  Care Discussed with Consultants/Other Providers [Y/N]:  Prior or Outpatient Records Reviewed [Y/N]:

## 2023-03-20 NOTE — PROGRESS NOTE ADULT - PROBLEM SELECTOR PLAN 1
Pt. with NANCI in the setting of multiple hypotensive episodes, and anemia requiring blood transfusion. Upon review of [facility], SCr was 1.18 on 3/16. Pt. had an RRT on 3/17 for hypotension. SCr subsequently increased to 1.68 on 3/17. Pt. received IV albumin and free water. SCr improved to 1.10 today. UOP is not documented. UA showed trace proteinuria and trace ketonuria. Spot urine TP/Cr ratio was wnl at 0.2. Urine sodium was low at <20. Renal US showed BL shrunken kidneys but no evidence of hydronephrosis. Pt. with likely pre-renal NANCI, improving. Recommend to continue with IV D5 and free water flushes, as below. Monitor labs and urine output. Avoid nephrotoxins. Dose medications as per eGFR.

## 2023-03-20 NOTE — CONSULT NOTE ADULT - NS ATTEST RISK PROBLEM GEN_ALL_CORE FT
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

## 2023-03-20 NOTE — CONSULT NOTE ADULT - PROBLEM SELECTOR RECOMMENDATION 5
Palliative consulted for complex medical decision making in the setting of serious illness. -See [facility] note. I spent 30 minutes addressing advanced care planning with patient and/or decision maker(s)   -Decision makers: patient has four children and a wife, they make decisions as a unit.   -Spoke to eldest daughter Romana and youngest son Selina. Family have hope for a recovery.   -Recommended DNR/DNI, family will think about it

## 2023-03-20 NOTE — PROGRESS NOTE ADULT - PROBLEM SELECTOR PLAN 3
acute blood loss anemia c/b hypovolemic shock s/p levophed and ICU  - Hb 6.9 on admit now s/p 6 units of pRBC, 1 unit FFP, 1 unit of platelets all on 2/24/23, 1u prbc 3/18  - s/p octreotide x 72h (2/24-2/27)   - protonix 40 bid , octreotide gtt as above  - trend CBC, transfuse prn. today hgb 8.9  - s/p EGD 2/24: large (> 5 mm) esophageal varices. Incompletely eradicated. Banded. Normal duodenal bulb, first portion of the duodenum and second portion of the duodenum.                 - EGD in 4 wks for likely repeat banding  -SBP ppx cipro - Na 156   - given ascites will not add D5W  - hold diuretics  - increase free water 600 q4   - monitor Na

## 2023-03-20 NOTE — PROGRESS NOTE ADULT - PROBLEM SELECTOR PLAN 2
- likely d/t hypoxia/anoxic brain ischemia in s/o hemorrhagic shock on admission  - MRI brain 3/15 extensive cortical restricted diffusion throughout the cerebral hemispheres including the basal ganglia and insula b/l.    - CTH (3/4/23): no acute pathology  - correct hypernatremia with inc free water to 300ml q4h, trend Na, check urine lytes/osm  - neuro recs appreciated, encephalopathic state 2/2 anoxic ischemic brain injury due to hypoperfusion  - EEG (3/5/23): Abnormal EEG study.  Potential epileptogenic focus in the left posterior head region. Structural of functional abnormality in the left posterior head region. Moderate nonspecific diffuse or multifocal cerebral dysfunction. No seizure seen.     repeat EEG (3/14) frequent left posterior quadrant periodic discharges, risk of seizure has decreased compared to EEG from 3/5. No seizures recorded.   - c/w rifaximin 550 mg BID,  inc lactulose to 30g tid titrate to 3-4 BMs, stool count, repeat ammonia level 25  - paracentesis x 3 neg for SBP  - remains encephalopathic, failed S&S, c/w NGT feeds, aspiration precautions  - TSH wnl  - s/p IV thiamine  - CXR no infiltrate, c/w TF via NGT  - overall prognosis very poor, remains full code, met with sons and daughter on 3/17-18-19, updated them on pt's condition, advised pt has severe encephalopathy from hypoxic brain injury, ESLD/cirrhosis with poor prognosis. They remain hopeful and want a trial of oral feed next week if mental status improves. They seem to have unrealistic expectations and want him to live as long as possible.  - Palliative care consulted on 3/17 to help with GOC discussions, still pending, f/u - likely d/t hypoxia/anoxic brain ischemia in s/o hemorrhagic shock on admission/hypernatremia  - MRI brain 3/15 extensive cortical restricted diffusion throughout the cerebral hemispheres including the basal ganglia and insula b/l.    - CTH (3/4/23): no acute pathology  - neuro recs appreciated, encephalopathic state 2/2 anoxic ischemic brain injury due to hypoperfusion  - EEG (3/5/23): Abnormal EEG study.  Potential epileptogenic focus in the left posterior head region. Structural of functional abnormality in the left posterior head region. Moderate nonspecific diffuse or multifocal cerebral dysfunction. No seizure seen.     repeat EEG (3/14) frequent left posterior quadrant periodic discharges, risk of seizure has decreased compared to EEG from 3/5. No seizures recorded.   - c/w rifaximin 550 mg BID, lactulose TID, repeat ammonia level 25  - paracentesis x 3 neg for SBP  - remains encephalopathic, failed S&S, c/w NGT feeds, aspiration precautions  - TSH wnl  - s/p IV thiamine  - CXR no infiltrate, c/w TF via NGT  - overall prognosis very poor, remains full code, met with sons and daughter on 3/17-18-19, updated them on pt's condition, advised pt has severe encephalopathy from hypoxic brain injury, ESLD/cirrhosis with poor prognosis. They remain hopeful and want a trial of oral feed next week if mental status improves. They seem to have unrealistic expectations and want him to live as long as possible.  - repeat S/S consult son insistent on re-eval despite not being able to follow commands  - Palliative care consulted to help with GOC discussions

## 2023-03-20 NOTE — CONSULT NOTE ADULT - PROBLEM SELECTOR RECOMMENDATION 3
- was following at [facility], previously low MELD 8   - 3/20- MELD Na 19, 3-4% estimated 90 day mortality   - Had 3 perlita inpatient, pending repeat  - also with Hep B, on tenofovir

## 2023-03-20 NOTE — CONSULT NOTE ADULT - CONVERSATION DETAILS
Spoke to patient's daughter Romana and son Selina about patient's deteriorating condition. Family reports that medical staff is telling them patient has no hope for recovery. However family feel that he is improving. They would like him to eat food despite his poor mental status. They are requesting repeat para and swallow ree-jose.     Discussed code status, recommended DNR/DNI as CPR/intubation have poor outcomes in a patient with such serious illness and add more burdens at end of life. Spoke to patient's daughter Romana and son Selina about patient's deteriorating condition. Family reports that medical staff is telling them patient has no hope for recovery. However family feel that he is improving. They would like him to eat food despite his poor mental status. They are requesting repeat para and swallow re-evaluation. They state they want to continue all treatments. 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 Instead they share they are being pressure  "to make a decision" but were unsure what decision exactly. They also feel pressure to end treatments so patient can be discharged soon. Provided emotional support and explained nature of hospital care as it is their first time experiencing a seriously ill loved one in the hospital.     Discussed there were a few decisions that would have to made eventually. Explained first would be code status, recommended DNR/DNI as CPR/intubation have poor outcomes in a patient with such serious illness and add more burdens at end of life. Also explained if they wanted patient to try to eat/drink, that there was risk of aspiration which could lead to respiratory failure.   Explained the alternative of allowing for comfort at end of life and natural death.     Family is concerned given patient's course thus far, that if they make decision of DNR/DNI that will stop all other treatments. Assured that DNR/DNI is only specifically for respiratory and cardiac arrest, and do not include other medical interventions such as paracentesis and feeding.   Suggested family decide on code status with follow up tomorrow on decision.

## 2023-03-20 NOTE — CONSULT NOTE ADULT - PROBLEM SELECTOR RECOMMENDATION 2
- s/p EGD - Multiple large (> 5 mm) varices were found in the mid to distal esophagus. Six bands were successfully placed with incomplete eradication of varices  - s/p MICU course intubated, now extubated on medicine floors  - anemic, H/H being monitored - s/p EGD - Multiple large (> 5 mm) varices were found in the mid to distal esophagus. Six bands were successfully placed with incomplete eradication of varices  - s/p MICU course intubated, now extubated on medicine floors  - anemic, H/H being monitored  - hypotensive, on midodrine

## 2023-03-20 NOTE — PROGRESS NOTE ADULT - SUBJECTIVE AND OBJECTIVE BOX
Chief Complaint:  Patient is a 67y old  Male who presents with a chief complaint of gib, encephalopathy (19 Mar 2023 12:15)      Interval Events: had drop in hgb requiring 1U PRBCs on 3/18 with appropriate response (overall 7.9 -> 8.9)  - also continues to be hypernatremic and hyperchloremic - patient is on IV lasix and free water      Hospital Medications:  chlorhexidine 2% Cloths 1 Application(s) Topical <User Schedule>  ciprofloxacin     Tablet 500 milliGRAM(s) Oral every 24 hours  coronavirus bivalent (EUA) Booster Vaccine (PFIZER) 0.3 milliLiter(s) IntraMuscular once  dextrose 5%. 1000 milliLiter(s) IV Continuous <Continuous>  dextrose 5%. 1000 milliLiter(s) IV Continuous <Continuous>  dextrose 50% Injectable 25 Gram(s) IV Push once  dextrose 50% Injectable 12.5 Gram(s) IV Push once  dextrose 50% Injectable 25 Gram(s) IV Push once  dextrose Oral Gel 15 Gram(s) Oral once PRN  furosemide   Injectable 40 milliGRAM(s) IV Push daily  glucagon  Injectable 1 milliGRAM(s) IntraMuscular once  insulin lispro (ADMELOG) corrective regimen sliding scale   SubCutaneous every 6 hours  insulin NPH human recombinant 15 Unit(s) SubCutaneous every 6 hours  lactulose Syrup 30 Gram(s) Oral three times a day  levothyroxine Injectable 75 MICROGram(s) IV Push at bedtime  LORazepam   Injectable 1.5 milliGRAM(s) IV Push once PRN  midodrine 10 milliGRAM(s) Oral every 8 hours  mupirocin 2% Ointment 1 Application(s) Both Nostrils two times a day  octreotide  Infusion 50 MICROgram(s)/Hr IV Continuous <Continuous>  pantoprazole  Injectable 40 milliGRAM(s) IV Push every 12 hours  rifAXIMin 550 milliGRAM(s) Oral two times a day  sodium chloride 0.65% Nasal 1 Spray(s) Both Nostrils three times a day PRN  spironolactone 50 milliGRAM(s) Oral daily  tenofovir disoproxil fumarate (VIREAD) 300 milliGRAM(s) Oral daily      PMHX/PSHX:  CAD (coronary artery disease)    Diabetes    Lymphoma    Cirrhosis    S/P CABG x 1            ROS:     General:  No weight loss, fevers, chills, night sweats, fatigue   Eyes:  No vision changes  ENT:  No sore throat, pain, runny nose  CV:  No chest pain, palpitations, dizziness   Resp:  No SOB, cough, wheezing  GI:  See HPI  :  No burning with urination, hematuria  Muscle:  No pain, weakness  Neuro:  No weakness/tingling, memory problems  Psych:  No fatigue, insomnia, mood problems, depression  Heme:  No easy bruisability  Skin:  No rash, edema      PHYSICAL EXAM:     GENERAL:  Well developed, no distress  HEENT:  NC/AT,  conjunctivae clear, sclera anicteric  CHEST:  Full & symmetric excursion, no increased effort w/ respirations  HEART:  Regular rhythm & rate  ABDOMEN:  Soft, non-tender, non-distended  EXTREMITIES:  no LE  edema  SKIN:  No rash/erythema/ecchymoses/petechiae/wounds/jaundice  NEURO:  Alert, oriented    Vital Signs:  Vital Signs Last 24 Hrs  T(C): 36.7 (20 Mar 2023 05:52), Max: 37.1 (19 Mar 2023 13:00)  T(F): 98 (20 Mar 2023 05:52), Max: 98.8 (19 Mar 2023 13:00)  HR: 60 (20 Mar 2023 05:52) (57 - 68)  BP: 130/52 (20 Mar 2023 05:52) (121/55 - 134/59)  BP(mean): --  RR: 18 (20 Mar 2023 05:52) (17 - 20)  SpO2: 97% (20 Mar 2023 05:52) (97% - 98%)    Parameters below as of 20 Mar 2023 05:52  Patient On (Oxygen Delivery Method): room air      Daily     Daily     LABS:                        8.9    3.56  )-----------( 51       ( 19 Mar 2023 10:00 )             29.6     03-20    156<H>  |  124<H>  |  70<H>  ----------------------------<  136<H>  4.2   |  21<L>  |  1.05    Ca    9.1      20 Mar 2023 05:45  Phos  1.8     03-20  Mg     2.90     03-20    TPro  5.9<L>  /  Alb  4.4  /  TBili  2.6<H>  /  DBili  0.7<H>  /  AST  59<H>  /  ALT  35  /  AlkPhos  143<H>  03-20    LIVER FUNCTIONS - ( 20 Mar 2023 05:45 )  Alb: 4.4 g/dL / Pro: 5.9 g/dL / ALK PHOS: 143 U/L / ALT: 35 U/L / AST: 59 U/L / GGT: x           PT/INR - ( 20 Mar 2023 05:45 )   PT: 24.0 sec;   INR: 2.05 ratio         PTT - ( 20 Mar 2023 05:45 )  PTT:56.5 sec        Imaging:             Chief Complaint:  Patient is a 67y old  Male who presents with a chief complaint of gib, encephalopathy (19 Mar 2023 12:15)      Interval Events: had drop in hgb requiring 1U PRBCs on 3/18 with appropriate response (overall 7.9 -> 8.9)  - also continues to be hypernatremic and hyperchloremic - patient is on IV lasix and free water  - mental status remains the same - opens eyes but unable to interact, communicate or follow commands      Hospital Medications:  chlorhexidine 2% Cloths 1 Application(s) Topical <User Schedule>  ciprofloxacin     Tablet 500 milliGRAM(s) Oral every 24 hours  coronavirus bivalent (EUA) Booster Vaccine (PFIZER) 0.3 milliLiter(s) IntraMuscular once  dextrose 5%. 1000 milliLiter(s) IV Continuous <Continuous>  dextrose 5%. 1000 milliLiter(s) IV Continuous <Continuous>  dextrose 50% Injectable 25 Gram(s) IV Push once  dextrose 50% Injectable 12.5 Gram(s) IV Push once  dextrose 50% Injectable 25 Gram(s) IV Push once  dextrose Oral Gel 15 Gram(s) Oral once PRN  furosemide   Injectable 40 milliGRAM(s) IV Push daily  glucagon  Injectable 1 milliGRAM(s) IntraMuscular once  insulin lispro (ADMELOG) corrective regimen sliding scale   SubCutaneous every 6 hours  insulin NPH human recombinant 15 Unit(s) SubCutaneous every 6 hours  lactulose Syrup 30 Gram(s) Oral three times a day  levothyroxine Injectable 75 MICROGram(s) IV Push at bedtime  LORazepam   Injectable 1.5 milliGRAM(s) IV Push once PRN  midodrine 10 milliGRAM(s) Oral every 8 hours  mupirocin 2% Ointment 1 Application(s) Both Nostrils two times a day  octreotide  Infusion 50 MICROgram(s)/Hr IV Continuous <Continuous>  pantoprazole  Injectable 40 milliGRAM(s) IV Push every 12 hours  rifAXIMin 550 milliGRAM(s) Oral two times a day  sodium chloride 0.65% Nasal 1 Spray(s) Both Nostrils three times a day PRN  spironolactone 50 milliGRAM(s) Oral daily  tenofovir disoproxil fumarate (VIREAD) 300 milliGRAM(s) Oral daily      PMHX/PSHX:  CAD (coronary artery disease)    Diabetes    Lymphoma    Cirrhosis    S/P CABG x 1            ROS: unable to obtain      PHYSICAL EXAM:     GENERAL:  arousable however no meaningful interaction  HEENT:  NC/AT,  conjunctivae clear, scleral icterus, NGT in place  CHEST:  Full & symmetric excursion, no increased effort w/ respirations  HEART:  Regular rhythm & rate  ABDOMEN:  Soft, non-tender, non-distended  RECTAL: yellow soft/liquid stool  EXTREMITIES:  no LE  edema  SKIN:  No rash/erythema  NEURO:  arousable however no meaningful interaction, AOx0    Vital Signs:  Vital Signs Last 24 Hrs  T(C): 36.7 (20 Mar 2023 05:52), Max: 37.1 (19 Mar 2023 13:00)  T(F): 98 (20 Mar 2023 05:52), Max: 98.8 (19 Mar 2023 13:00)  HR: 60 (20 Mar 2023 05:52) (57 - 68)  BP: 130/52 (20 Mar 2023 05:52) (121/55 - 134/59)  BP(mean): --  RR: 18 (20 Mar 2023 05:52) (17 - 20)  SpO2: 97% (20 Mar 2023 05:52) (97% - 98%)    Parameters below as of 20 Mar 2023 05:52  Patient On (Oxygen Delivery Method): room air      Daily     Daily     LABS:                        8.9    3.56  )-----------( 51       ( 19 Mar 2023 10:00 )             29.6     03-20    156<H>  |  124<H>  |  70<H>  ----------------------------<  136<H>  4.2   |  21<L>  |  1.05    Ca    9.1      20 Mar 2023 05:45  Phos  1.8     03-20  Mg     2.90     03-20    TPro  5.9<L>  /  Alb  4.4  /  TBili  2.6<H>  /  DBili  0.7<H>  /  AST  59<H>  /  ALT  35  /  AlkPhos  143<H>  03-20    LIVER FUNCTIONS - ( 20 Mar 2023 05:45 )  Alb: 4.4 g/dL / Pro: 5.9 g/dL / ALK PHOS: 143 U/L / ALT: 35 U/L / AST: 59 U/L / GGT: x           PT/INR - ( 20 Mar 2023 05:45 )   PT: 24.0 sec;   INR: 2.05 ratio         PTT - ( 20 Mar 2023 05:45 )  PTT:56.5 sec        Imaging:

## 2023-03-20 NOTE — PROGRESS NOTE ADULT - PROBLEM SELECTOR PLAN 2
Pt. with hypernatremia. SNa remains elevated at 156. Calculated free water deficit is ~3.2L. Recommend to continue with IV D5, and free water flushes. Can increase free water volume to 400 cc q4h (if not done so already). Monitor SNa.

## 2023-03-21 NOTE — PROGRESS NOTE ADULT - PROBLEM SELECTOR PLAN 2
- likely d/t hypoxia/anoxic brain ischemia in s/o hemorrhagic shock on admission/hypernatremia  - MRI brain 3/15 extensive cortical restricted diffusion throughout the cerebral hemispheres including the basal ganglia and insula b/l.    - CTH (3/4/23): no acute pathology  - neuro recs appreciated, encephalopathic state 2/2 anoxic ischemic brain injury due to hypoperfusion  - EEG (3/5/23): Abnormal EEG study.  Potential epileptogenic focus in the left posterior head region. Structural of functional abnormality in the left posterior head region. Moderate nonspecific diffuse or multifocal cerebral dysfunction. No seizure seen.     repeat EEG (3/14) frequent left posterior quadrant periodic discharges, risk of seizure has decreased compared to EEG from 3/5. No seizures recorded.   - paracentesis x 3 neg for SBP  - c/w rifaximin 550 mg BID, lactulose TID, repeat ammonia level 25; encephalopathy likely unrelated to HE  - remains encephalopathic, failed S&S, c/w NGT feeds, aspiration precautions  - TSH wnl  - s/p IV thiamine  - CXR no infiltrate, c/w TF via NGT  - overall prognosis very poor, severe encephalopathy from hypoxic brain injury, ESLD/cirrhosis with poor prognosis.   - repeat S/S consult son insistent on re-eval despite not being able to follow commands  - Palliative care consulted to help with GOC discussions on going with family

## 2023-03-21 NOTE — PROGRESS NOTE ADULT - PROBLEM SELECTOR PLAN 7
- Uptrended to 1.7  - mcconnell removed 3/15,  passed TOV  - no retention on bladder scan, bladder scan prn  - NANCI likely ATN in s/o shock  - no hydronephrosis on renal US  - avoid hypotension  - Cr now normal 1.05  - improved

## 2023-03-21 NOTE — PROGRESS NOTE ADULT - PROBLEM SELECTOR PLAN 3
- Na 156--159  - hold diuretics  - free water 600 q4   - monitor Na  - restart low D5W at 50 ml hr   - f/u renal recs

## 2023-03-21 NOTE — PROVIDER CONTACT NOTE (HYPOGLYCEMIA EVENT) - NS PROVIDER CONTACT BACKGROUND-HYPO
Age: 67y    Gender: Male    POCT Blood Glucose:  151 mg/dL (03-18-23 @ 12:34)  63 mg/dL (03-18-23 @ 11:59)  62 mg/dL (03-18-23 @ 11:57)  83 mg/dL (03-18-23 @ 08:10)  96 mg/dL (03-18-23 @ 01:36)  131 mg/dL (03-17-23 @ 20:31)  166 mg/dL (03-17-23 @ 17:22)      eMAR:dextrose 50% Injectable   12.5 Gram(s) IV Push (03-18-23 @ 12:06)    insulin lispro (ADMELOG) corrective regimen sliding scale   2 Unit(s) SubCutaneous (03-17-23 @ 17:30)    insulin NPH human recombinant   2 Unit(s) SubCutaneous (03-17-23 @ 20:33)    insulin NPH human recombinant   10 Unit(s) SubCutaneous (03-18-23 @ 02:20)    insulin NPH human recombinant   18 Unit(s) SubCutaneous (03-17-23 @ 17:31)    levothyroxine Injectable   75 MICROGram(s) IV Push (03-18-23 @ 00:20)    octreotide  Infusion   10 mL/Hr IV Continuous (03-18-23 @ 04:40)    octreotide  Injectable   50 MICROGram(s) IV Push (03-18-23 @ 09:01)    
Age: 67y    Gender: Male    POCT Blood Glucose:  104 mg/dL (03-21-23 @ 00:37)  126 mg/dL (03-21-23 @ 00:18)  57 mg/dL (03-20-23 @ 23:35)  63 mg/dL (03-20-23 @ 23:21)  151 mg/dL (03-20-23 @ 17:32)  125 mg/dL (03-20-23 @ 13:00)  192 mg/dL (03-20-23 @ 08:51)  132 mg/dL (03-20-23 @ 05:49)      eMAR:dextrose 50% Injectable   12.5 Gram(s) IV Push (03-20-23 @ 23:46)    insulin lispro (ADMELOG) corrective regimen sliding scale   2 Unit(s) SubCutaneous (03-20-23 @ 18:02)    insulin NPH human recombinant   15 Unit(s) SubCutaneous (03-20-23 @ 18:02)   15 Unit(s) SubCutaneous (03-20-23 @ 13:07)   15 Unit(s) SubCutaneous (03-20-23 @ 06:06)    levothyroxine Injectable   75 MICROGram(s) IV Push (03-20-23 @ 23:46)    octreotide  Infusion   10 mL/Hr IV Continuous (03-20-23 @ 16:07)

## 2023-03-21 NOTE — PROGRESS NOTE ADULT - PROBLEM SELECTOR PLAN 1
Pt. with NANCI in the setting of multiple hypotensive episodes, and anemia requiring blood transfusion. Upon review of [facility], SCr was 1.18 on 3/16. Pt. had an RRT on 3/17 for hypotension. SCr subsequently increased to 1.68 on 3/17. UA showed trace proteinuria and trace ketonuria. Spot urine TP/Cr ratio was wnl at 0.2. Urine sodium was low at <20. Renal US showed BL shrunken kidneys but no evidence of hydronephrosis. Pt. with likely pre-renal NANCI, improving/resolved with a Scr of 0.88 today. Recommend to continue with IV D5 and free water flushes, as below. Monitor labs and urine output. Avoid nephrotoxins. Dose medications as per eGFR.

## 2023-03-21 NOTE — PROGRESS NOTE ADULT - PROBLEM SELECTOR PLAN 5
-See [facility] note. I spent 30 minutes addressing advanced care planning with patient and/or decision maker(s)   -Decision makers: patient has four children and a wife, they make decisions as a unit.   -Spoke to eldest daughter Romana and youngest son Selina. Family have hope for a recovery.   -Recommended DNR/DNI, family will think about it. -See [facility] note. I spent 30 minutes addressing advanced care planning with patient and/or decision maker(s)   -Decision makers: patient has four children and a wife, they make decisions as a unit.   -3/20- Spoke to eldest daughter Romana and youngest son Selina. Family have hope for a recovery. Recommended DNR/DNI, family will think about it.  -3/21-No decision on code status, waiting to speak to "family doctors" but seems like not willing to make decision anytime soon.   - Family has poor insight and poor health literacy, want all available medical interventions despite poor prognosis

## 2023-03-21 NOTE — PROGRESS NOTE ADULT - PROBLEM SELECTOR PLAN 5
decompensated with ascites, with variceal bleed, with PSE; MELD 12 3/10/23  - s/p diagnostic para (2/25) and therapeutic para (3/5) removed 4.5L  - s/p diag/therapeutic tap on 3/15, removed 3.6L, neg for SBP; Path neg for malignancy   - c/w rifaximin BID, lactulose   - SBP ppx with Cipro due to low ascitic protein   - trend LFT, RUQ w/o pathology on 3/10  - MRI abdomen at [facility] 12/2022 no HCC  - off diuretic due to hypernatremia   - procedure team for paracentesis   - hepatology following

## 2023-03-21 NOTE — PROGRESS NOTE ADULT - SUBJECTIVE AND OBJECTIVE BOX
[facility] DIVISION OF KIDNEY DISEASES AND HYPERTENSION   FOLLOW UP NOTE  --------------------------------------------------------------------------------  HPI: 67M PMH DM, HTN, hypothyroidism, CAD, TANG cirrhosis, follicular lymphoma on rituximab, Hep B. on tenofovir, status post CABG, initially presented to ED w/ chest pain and constipation, developed hematemesis, now s/p MICU admission for acute blood loss anemia c/b hypovolemic shock , intubated for airway protection (extubated 3/8), s/p 2/24 bedside EGD with banding esophageal varices now with persistent encephalopathy, possibly 2/2 hypoxia/anoxic brain ischemia in s/o hemorrhagic shock on admission. Pt. being seen for NANCI and hypernatremia.    24 hour events/subjective: Pt. was seen and evaluated at bedside this morning. Remains somnolent, not answering or following commands.     PAST HISTORY  --------------------------------------------------------------------------------  No significant changes to PMH, PSH, FHx, SHx, unless otherwise noted    ALLERGIES & MEDICATIONS  --------------------------------------------------------------------------------  Allergies  [This allergen will not trigger allergy alert] Beef (Other)  No Known Drug Allergies    Standing Inpatient Medications  chlorhexidine 2% Cloths 1 Application(s) Topical <User Schedule>  ciprofloxacin     Tablet 500 milliGRAM(s) Oral every 24 hours  coronavirus bivalent (EUA) Booster Vaccine (PFIZER) 0.3 milliLiter(s) IntraMuscular once  dextrose 5%. 1000 milliLiter(s) IV Continuous <Continuous>  dextrose 5%. 1000 milliLiter(s) IV Continuous <Continuous>  dextrose 5%. 1000 milliLiter(s) IV Continuous <Continuous>  dextrose 50% Injectable 25 Gram(s) IV Push once  dextrose 50% Injectable 12.5 Gram(s) IV Push once  dextrose 50% Injectable 25 Gram(s) IV Push once  glucagon  Injectable 1 milliGRAM(s) IntraMuscular once  insulin lispro (ADMELOG) corrective regimen sliding scale   SubCutaneous every 6 hours  insulin NPH human recombinant 12 Unit(s) SubCutaneous every 6 hours  lactulose Syrup 30 Gram(s) Oral three times a day  levothyroxine Injectable 75 MICROGram(s) IV Push at bedtime  mupirocin 2% Ointment 1 Application(s) Both Nostrils two times a day  pantoprazole  Injectable 40 milliGRAM(s) IV Push daily  rifAXIMin 550 milliGRAM(s) Oral two times a day  tenofovir disoproxil fumarate (VIREAD) 300 milliGRAM(s) Oral daily    PRN Inpatient Medications  dextrose Oral Gel 15 Gram(s) Oral once PRN  LORazepam   Injectable 1.5 milliGRAM(s) IV Push once PRN  sodium chloride 0.65% Nasal 1 Spray(s) Both Nostrils three times a day PRN    REVIEW OF SYSTEMS  --------------------------------------------------------------------------------  Unable to obtain ROS     VITALS/PHYSICAL EXAM  --------------------------------------------------------------------------------  T(C): 36.7 (03-21-23 @ 12:30), Max: 36.9 (03-21-23 @ 04:30)  HR: 55 (03-21-23 @ 12:30) (55 - 98)  BP: 118/46 (03-21-23 @ 12:30) (112/50 - 127/53)  RR: 18 (03-21-23 @ 12:30) (18 - 18)  SpO2: 99% (03-21-23 @ 12:30) (98% - 100%)  Wt(kg): --    03-20-23 @ 07:01  -  03-21-23 @ 07:00  --------------------------------------------------------  IN: 1520 mL / OUT: 0 mL / NET: 1520 mL    Physical Exam:  Gen: Ill appearing, sitting up in bed  HEENT: Jaundiced  Pulm: CTA B/L  CV: RRR, S1S2; no rub  Abd: +BS, soft,   : No suprapubic tenderness  Skin: Dry, without rashes    LABS/STUDIES  --------------------------------------------------------------------------------              9.1    3.33  >-----------<  26       [03-21-23 @ 06:30]              30.2     159  |  124  |  60  ----------------------------<  184      [03-21-23 @ 06:30]  4.5   |  21  |  0.88        Ca     8.9     [03-21-23 @ 06:30]      Mg     2.70     [03-21-23 @ 06:30]      Phos  2.2     [03-21-23 @ 06:30]    TPro  5.6  /  Alb  4.0  /  TBili  3.9  /  DBili  x   /  AST  64  /  ALT  36  /  AlkPhos  140  [03-21-23 @ 06:30]    PT/INR: PT 21.6 , INR 1.85       [03-21-23 @ 06:30]  PTT: 56.5       [03-20-23 @ 05:45]          [03-21-23 @ 10:49]    Creatinine Trend:  SCr 0.88 [03-21 @ 06:30]  SCr 1.05 [03-20 @ 05:45]  SCr 1.04 [03-19 @ 19:05]  SCr 1.10 [03-19 @ 06:49]  SCr 1.39 [03-18 @ 07:39]

## 2023-03-21 NOTE — PROGRESS NOTE ADULT - PROBLEM SELECTOR PLAN 4
- on rituxan outpatient, follows at [facility]   - recommend to obtain collateral on state of lymphoma

## 2023-03-21 NOTE — CHART NOTE - NSCHARTNOTEFT_GEN_A_CORE
Pt seen for Severe malnutrition follow up    Medical Course: 67 year old male HTN, DM2, hypothyroidism, CAD s/p CABG, TANG cirrhosis, follicular lymphoma (on Rituximab OP), chronic Hep B on tenofovir (HBV Core +), presented to the ED w/ chest pain and constipation while in ED developed hematemesis s/p MICU admission for acute blood loss anemia c/b hypovolemic shock  intubated for airway protection (extubated 3/8) s/p 2/24 bedside EGD with banding esophageal varices now with persistent encephalopathy.     Nutrition Course: Unable to conduct nutrition interview, Pt A&Ox0. Collateral obtained from RN, son at bedside, and comprehensive chart review. Currently NPO receiving all nutrition/hydration from TF. Glucerna1.5 running at goal rate @45ml/hr x24 hrs to provide 1080ml total formula, 1620kcal, 89g pro, 908ml free water in formula. Current order for 600ml free water flush q4h, will recommend to re-evaluate free water flush to prevent over-hydration. Pt was previously receiving 300ml free water flush q4h, and nephrology note 3/20 recommendations to increase free water flush to 400ml q4h 2/2 hypernatremia. Discussed with RN, pt had episode of hypoglycemia yesterday, dextrose given, TF was not held. Usual POCT 120-240mg/dL. Discussed with son who states that pt had also experienced low blood sugars when he took (furosemide and spironolactone)? at home, question accuracy of this statement as diuretics more likely to cause elevated blood sugars. Current formula appropriate to aid in blood sugar stabilization. No GI distress noted, last BM 3/20 per RN flowsheets. Pt receiving lactulose 3x/day.     Diet Prescription: Diet, NPO with Tube Feed:   Tube Feeding Modality: Orogastric  Glucerna 1.5 Nelson (GLUCERNA1.5RTH)  Total Volume for 24 Hours (mL): 1080  Continuous  Starting Tube Feed Rate {mL per Hour}: 45  Until Goal Tube Feed Rate (mL per Hour): 45  Tube Feed Duration (in Hours): 24  Tube Feed Start Time: 17:00  Free Water Flush  Bolus   Total Volume per Flush (mL): 600   Frequency: Every 4 Hours (03-20-23 @ 15:47)    Pertinent Medications: MEDICATIONS  (STANDING):  chlorhexidine 2% Cloths 1 Application(s) Topical <User Schedule>  ciprofloxacin     Tablet 500 milliGRAM(s) Oral every 24 hours  coronavirus bivalent (EUA) Booster Vaccine (PFIZER) 0.3 milliLiter(s) IntraMuscular once  dextrose 5%. 1000 milliLiter(s) (50 mL/Hr) IV Continuous <Continuous>  dextrose 5%. 1000 milliLiter(s) (50 mL/Hr) IV Continuous <Continuous>  dextrose 5%. 1000 milliLiter(s) (100 mL/Hr) IV Continuous <Continuous>  dextrose 50% Injectable 25 Gram(s) IV Push once  dextrose 50% Injectable 12.5 Gram(s) IV Push once  dextrose 50% Injectable 25 Gram(s) IV Push once  glucagon  Injectable 1 milliGRAM(s) IntraMuscular once  insulin lispro (ADMELOG) corrective regimen sliding scale   SubCutaneous every 6 hours  insulin NPH human recombinant 12 Unit(s) SubCutaneous every 6 hours  lactulose Syrup 30 Gram(s) Oral three times a day  levothyroxine Injectable 75 MICROGram(s) IV Push at bedtime  mupirocin 2% Ointment 1 Application(s) Both Nostrils two times a day  pantoprazole  Injectable 40 milliGRAM(s) IV Push daily  rifAXIMin 550 milliGRAM(s) Oral two times a day  tenofovir disoproxil fumarate (VIREAD) 300 milliGRAM(s) Oral daily    MEDICATIONS  (PRN):  dextrose Oral Gel 15 Gram(s) Oral once PRN Blood Glucose LESS THAN 70 milliGRAM(s)/deciliter  LORazepam   Injectable 1.5 milliGRAM(s) IV Push once PRN pre-MRI  sodium chloride 0.65% Nasal 1 Spray(s) Both Nostrils three times a day PRN Nasal Congestion    Pertinent Labs: 03-21 Na159 mmol/L<H> Glu 184 mg/dL<H> K+ 4.5 mmol/L Cr  0.88 mg/dL BUN 60 mg/dL<H> 03-21 Phos 2.2 mg/dL<L> 03-21 Alb 4.0 g/dL     CAPILLARY BLOOD GLUCOSE  POCT Blood Glucose.: 243 mg/dL (21 Mar 2023 12:32)  POCT Blood Glucose.: 197 mg/dL (21 Mar 2023 05:28)  POCT Blood Glucose.: 125 mg/dL (21 Mar 2023 02:53)  POCT Blood Glucose.: 104 mg/dL (21 Mar 2023 00:37)  POCT Blood Glucose.: 126 mg/dL (21 Mar 2023 00:18)  POCT Blood Glucose.: 57 mg/dL (20 Mar 2023 23:35)  POCT Blood Glucose.: 63 mg/dL (20 Mar 2023 23:21)  POCT Blood Glucose.: 151 mg/dL (20 Mar 2023 17:32)      Weight: Height (cm): 170.2 (02-24 @ 12:00)  Weight (kg): 55.4 (02-24 @ 12:00)  BMI (kg/m2): 19.1 (02-24 @ 12:00)  Weight Assessment: 3/15: 49.6kg (-9.9% weight loss x3 weeks. Likely due to fluid shifts from edema).        Physical Assessment, per flowsheets:  Edema: +1 generalized, +2 left hand, wrist, arm.   Skin: DTPI left achilles per wound note 3/15.       Estimated Needs:   [X] No change since previous assessment, based on IBW  148#/ 67 kg  2071-1254 kcal daily @ 25-30kcal/kg  80-100gm protein daily @ 1.2-1.5gm/kg    Previous Nutrition Diagnosis: severe protein calorie malnutrition   Nutrition Diagnosis is [x ] ongoing    New Nutrition Diagnosis: [x ] not applicable     Interventions:   1) Continue current TF Glucerna1.5 @45ml/hr x24 hrs.   2) Recommend re-evaluate current free water flush to avoid over-hydration. Current order for 600ml Q4h (3600ml total free water daily), nephro recommendations for 400ml q4h.  3) Obtain weekly weights weight  4) RD available prn.    Monitor & Evaluate:  PO intake, tolerance to diet/supplement, nutrition related lab values, weight trends, BMs/GI distress, hydration status, skin integrity.    [place]| [id number] (Also available on TEAMS)

## 2023-03-21 NOTE — PROGRESS NOTE ADULT - SUBJECTIVE AND OBJECTIVE BOX
Indication of Geriatrics and Palliative Medicine Services:  [X  ] Complex Medical Decision Making   [  ] Symptom/Pain management     DNR on chart: No       INTERVAL EVENTS:     -------------------------------------------------------------------------------------------------------    PRESENT SYMPTOMS:     (from initial)     -------------------------------------------------------------------------------------------------------    ITEMS UNCHECKED ARE NOT PRESENT    PHYSICAL:  Vital Signs Last 24 Hrs  T(C): 36.9 (21 Mar 2023 04:30), Max: 36.9 (21 Mar 2023 04:30)  T(F): 98.4 (21 Mar 2023 04:30), Max: 98.4 (21 Mar 2023 04:30)  HR: 55 (21 Mar 2023 04:30) (55 - 98)  BP: 120/43 (21 Mar 2023 04:30) (112/50 - 127/53)  BP(mean): --  RR: 18 (21 Mar 2023 04:30) (18 - 18)  SpO2: 98% (21 Mar 2023 04:30) (98% - 100%)    Parameters below as of 21 Mar 2023 04:30  Patient On (Oxygen Delivery Method): room air     I&O's Summary    20 Mar 2023 07:01  -  21 Mar 2023 07:00  --------------------------------------------------------  IN: 1520 mL / OUT: 0 mL / NET: 1520 mL    GENERAL:  [X ]Cachexia  [X ] Frail  [ ]Awake  [ ]Oriented x   [X ]Lethargic  [ ]Unarousable  [ ]Verbal  [X ]Non-Verbal    BEHAVIORAL:   [ ] Anxiety  [ ] Delirium [ ] Agitation [ ] Other    HEENT:   [ ]Normal   [X ]Dry mouth   [ ]ET Tube/Trach  [ ]Oral lesions    PULMONARY:   [X ]Clear [ ]Tachypnea  [ ]Audible excessive secretions   [ ]Rhonchi        [ ]Right [ ]Left [ ]Bilateral  [ ]Crackles        [ ]Right [ ]Left [ ]Bilateral  [ ]Wheezing     [ ]Right [ ]Left [ ]Bilateral  [ ]Diminished breath sounds [ ]right [ ]left [ ]bilateral    CARDIOVASCULAR:    [X ]Regular [ ]Irregular [ ]Tachy  [ ]Rafal [ ]Murmur [ ]Other    GASTROINTESTINAL:  [ ]Soft  [X ]Distended   [X ]+BS  [ ]Non tender [ ]Tender  [ ]Other [ ]PEG [X ]OGT/ NGT      GENITOURINARY:  [ ]Normal [X ] Incontinent   [ ]Oliguria/Anuria   [ ]Henderson    MUSCULOSKELETAL:   [ ]Normal   [ ]Weakness  [X ]Bed/Wheelchair bound [ ]Edema    NEUROLOGIC:   [X ]No focal deficits  [ ]Cognitive impairment  [ ]Dysphagia [ ]Dysarthria [ ]Paresis [ ]Other     SKIN:   [ ]Normal  [ ]Rash  [ ]Other  [X ]Pressure ulcer(s)       Present on admission [ ]y [ ]n    -------------------------------------------------------------------------------------------------------    LABS:                        9.1    3.33  )-----------( 26       ( 21 Mar 2023 06:30 )             30.2   03-21    159<H>  |  124<H>  |  60<H>  ----------------------------<  184<H>  4.5   |  21<L>  |  0.88    Ca    8.9      21 Mar 2023 06:30  Phos  2.2     03-21  Mg     2.70     03-21    TPro  5.6<L>  /  Alb  4.0  /  TBili  3.9<H>  /  DBili  x   /  AST  64<H>  /  ALT  36  /  AlkPhos  140<H>  03-21  PT/INR - ( 21 Mar 2023 06:30 )   PT: 21.6 sec;   INR: 1.85 ratio      PTT - ( 20 Mar 2023 05:45 )  PTT:56.5 sec    Ferritin, Serum: 56 ng/mL (03-19-23 @ 06:49)    -------------------------------------------------------------------------------------------------------  RADIOLOGY & ADDITIONAL STUDIES:     < from: CT Head No Cont (03.04.23 @ 17:38) >    IMPRESSION:  No CT evidence of acute intracranial pathology.  Trace mastoid effusions bilaterally.    < end of copied text >    < from: US Abdomen Upper Quadrant Right (03.10.23 @ 18:18) >  IMPRESSION:  Cirrhosis. Limited visualization of the liver.    Moderate amount of ascites.    < end of copied text >    < from: MR Head w/wo IV Cont (03.15.23 @ 19:40) >  IMPRESSION:    Extensive cortical predominant restricted diffusion throughout the   cerebral hemispheres including involvement of the basal ganglia and   insula bilaterally. Given history of hematemesis with hypotension,   hypoxic-ischemic injury is favored. Differential also includes postictal   sequelae, Creutzfeldt-Gonzalez disease, encephalitis or lymphomatous   involvement. CSF sampling should be considered.    < end of copied text >    < from: US Kidney and Bladder (03.17.23 @ 14:13) >  IMPRESSION:  *  Both kidneys are small in size.  *  No hydronephrosis.    < end of copied text >    -------------------------------------------------------------------------------------------------------  MEDICATIONS:     MEDICATIONS  (STANDING):  chlorhexidine 2% Cloths 1 Application(s) Topical <User Schedule>  ciprofloxacin     Tablet 500 milliGRAM(s) Oral every 24 hours  coronavirus bivalent (EUA) Booster Vaccine (PFIZER) 0.3 milliLiter(s) IntraMuscular once  dextrose 5%. 1000 milliLiter(s) (50 mL/Hr) IV Continuous <Continuous>  dextrose 5%. 1000 milliLiter(s) (50 mL/Hr) IV Continuous <Continuous>  dextrose 5%. 1000 milliLiter(s) (100 mL/Hr) IV Continuous <Continuous>  dextrose 50% Injectable 25 Gram(s) IV Push once  dextrose 50% Injectable 12.5 Gram(s) IV Push once  dextrose 50% Injectable 25 Gram(s) IV Push once  glucagon  Injectable 1 milliGRAM(s) IntraMuscular once  insulin lispro (ADMELOG) corrective regimen sliding scale   SubCutaneous every 6 hours  insulin NPH human recombinant 12 Unit(s) SubCutaneous every 6 hours  lactulose Syrup 30 Gram(s) Oral three times a day  levothyroxine Injectable 75 MICROGram(s) IV Push at bedtime  mupirocin 2% Ointment 1 Application(s) Both Nostrils two times a day  pantoprazole  Injectable 40 milliGRAM(s) IV Push daily  rifAXIMin 550 milliGRAM(s) Oral two times a day  tenofovir disoproxil fumarate (VIREAD) 300 milliGRAM(s) Oral daily    MEDICATIONS  (PRN):  dextrose Oral Gel 15 Gram(s) Oral once PRN Blood Glucose LESS THAN 70 milliGRAM(s)/deciliter  LORazepam   Injectable 1.5 milliGRAM(s) IV Push once PRN pre-MRI  sodium chloride 0.65% Nasal 1 Spray(s) Both Nostrils three times a day PRN Nasal Congestion    -------------------------------------------------------------------------------------------------------    CRITICAL CARE:  [ ]Shock Present  [ ]Septic [ ]Cardiogenic [ ]Neurologic [ ]Hypovolemic [ ]Undifferentiated    [ ]Vasopressors [ ]Inotropes    [ ]Respiratory failure present   [ ]Acute  [ ]Chronic [ ]Hypoxic  [ ]Hypercarbic [ ]Mixed   [ ]Mechanical Ventilation [ ]Non-invasive ventilatory support [ ]High-Flow     [ ]Other organ failure     -------------------------------------------------------------------------------------------------------  REFERRALS:   [ ]Chaplaincy  [ ]Hospice  [ ]Child Life  [ ]Social Work  [ ]Case management [ ]Holistic Therapy    Indication of Geriatrics and Palliative Medicine Services:  [X  ] Complex Medical Decision Making   [  ] Symptom/Pain management     DNR on chart: No     INTERVAL EVENTS: Patient seen this PM, not following commands. Spoke to patient's daughter Romana on code status decision. She says they are discussing with "family doctors." She is not sure when she will talk to them and does not understand the "rush." She is still waiting for paracentesis and swallow eval.     -------------------------------------------------------------------------------------------------------    PRESENT SYMPTOMS:     [ ] No     [X ] Unable to self-report      [ ] CPOT (ICU)     [ ] PAINADs      [X ] RDOS 0    [ ] Yes     Source if other than patient:  [ ]Family   [ ]Team     PAIN:   If blank, patient unable to specify   [ ]yes [ ]no  QOL impact-   Location -                    Aggravating factors -  Quality -  Radiation -  Timing-  Pain at most severe level (0-10 scale):  Pain at minimal acceptable level/Pain Goal (0-10 scale):     SYMPTOMS:   Dyspnea:                           [ ]Mild [ ]Moderate [ ]Severe  Anxiety:                             [ ]Mild [ ]Moderate [ ]Severe  Fatigue:                             [ ]Mild [ ]Moderate [ ]Severe  Nausea/Vomiting:              [ ]Mild [ ]Moderate [ ]Severe  Loss of appetite:                [ ]Mild [ ]Moderate [ ]Severe  Constipation:                     [ ]Mild [ ]Moderate [ ]Severe    Other Symptoms:  [X ]All other review of systems negative     Home Medications for symptoms if any:  I-Stop Reference No:(from initial)     -------------------------------------------------------------------------------------------------------    ITEMS UNCHECKED ARE NOT PRESENT    PHYSICAL:  Vital Signs Last 24 Hrs  T(C): 36.9 (21 Mar 2023 04:30), Max: 36.9 (21 Mar 2023 04:30)  T(F): 98.4 (21 Mar 2023 04:30), Max: 98.4 (21 Mar 2023 04:30)  HR: 55 (21 Mar 2023 04:30) (55 - 98)  BP: 120/43 (21 Mar 2023 04:30) (112/50 - 127/53)  BP(mean): --  RR: 18 (21 Mar 2023 04:30) (18 - 18)  SpO2: 98% (21 Mar 2023 04:30) (98% - 100%)    Parameters below as of 21 Mar 2023 04:30  Patient On (Oxygen Delivery Method): room air     I&O's Summary    20 Mar 2023 07:01  -  21 Mar 2023 07:00  --------------------------------------------------------  IN: 1520 mL / OUT: 0 mL / NET: 1520 mL    GENERAL:  [X ]Cachexia  [X ] Frail  [ ]Awake  [ ]Oriented x   [X ]Lethargic  [ ]Unarousable  [ ]Verbal  [X ]Non-Verbal    BEHAVIORAL:   [ ] Anxiety  [ ] Delirium [ ] Agitation [ ] Other    HEENT:   [ ]Normal   [X ]Dry mouth   [ ]ET Tube/Trach  [ ]Oral lesions    PULMONARY:   [X ]Clear [ ]Tachypnea  [ ]Audible excessive secretions   [ ]Rhonchi        [ ]Right [ ]Left [ ]Bilateral  [ ]Crackles        [ ]Right [ ]Left [ ]Bilateral  [ ]Wheezing     [ ]Right [ ]Left [ ]Bilateral  [ ]Diminished breath sounds [ ]right [ ]left [ ]bilateral    CARDIOVASCULAR:    [X ]Regular [ ]Irregular [ ]Tachy  [ ]Rafal [ ]Murmur [ ]Other    GASTROINTESTINAL:  [ ]Soft  [X ]Distended   [X ]+BS  [ ]Non tender [ ]Tender  [ ]Other [ ]PEG [X ]OGT/ NGT      GENITOURINARY:  [ ]Normal [X ] Incontinent   [ ]Oliguria/Anuria   [ ]Henderson    MUSCULOSKELETAL:   [ ]Normal   [ ]Weakness  [X ]Bed/Wheelchair bound [ ]Edema    NEUROLOGIC:   [X ]No focal deficits  [ ]Cognitive impairment  [ ]Dysphagia [ ]Dysarthria [ ]Paresis [ ]Other     SKIN:   [ ]Normal  [ ]Rash  [ ]Other  [X ]Pressure ulcer(s)       Present on admission [ ]y [ ]n    -------------------------------------------------------------------------------------------------------    LABS:                        9.1    3.33  )-----------( 26       ( 21 Mar 2023 06:30 )             30.2   03-21    159<H>  |  124<H>  |  60<H>  ----------------------------<  184<H>  4.5   |  21<L>  |  0.88    Ca    8.9      21 Mar 2023 06:30  Phos  2.2     03-21  Mg     2.70     03-21    TPro  5.6<L>  /  Alb  4.0  /  TBili  3.9<H>  /  DBili  x   /  AST  64<H>  /  ALT  36  /  AlkPhos  140<H>  03-21  PT/INR - ( 21 Mar 2023 06:30 )   PT: 21.6 sec;   INR: 1.85 ratio      PTT - ( 20 Mar 2023 05:45 )  PTT:56.5 sec    Ferritin, Serum: 56 ng/mL (03-19-23 @ 06:49)    -------------------------------------------------------------------------------------------------------  RADIOLOGY & ADDITIONAL STUDIES:     < from: CT Head No Cont (03.04.23 @ 17:38) >    IMPRESSION:  No CT evidence of acute intracranial pathology.  Trace mastoid effusions bilaterally.    < end of copied text >    < from: US Abdomen Upper Quadrant Right (03.10.23 @ 18:18) >  IMPRESSION:  Cirrhosis. Limited visualization of the liver.    Moderate amount of ascites.    < end of copied text >    < from: MR Head w/wo IV Cont (03.15.23 @ 19:40) >  IMPRESSION:    Extensive cortical predominant restricted diffusion throughout the   cerebral hemispheres including involvement of the basal ganglia and   insula bilaterally. Given history of hematemesis with hypotension,   hypoxic-ischemic injury is favored. Differential also includes postictal   sequelae, Creutzfeldt-Gonzalez disease, encephalitis or lymphomatous   involvement. CSF sampling should be considered.    < end of copied text >    < from: US Kidney and Bladder (03.17.23 @ 14:13) >  IMPRESSION:  *  Both kidneys are small in size.  *  No hydronephrosis.    < end of copied text >    -------------------------------------------------------------------------------------------------------  MEDICATIONS:     MEDICATIONS  (STANDING):  chlorhexidine 2% Cloths 1 Application(s) Topical <User Schedule>  ciprofloxacin     Tablet 500 milliGRAM(s) Oral every 24 hours  coronavirus bivalent (EUA) Booster Vaccine (PFIZER) 0.3 milliLiter(s) IntraMuscular once  dextrose 5%. 1000 milliLiter(s) (50 mL/Hr) IV Continuous <Continuous>  dextrose 5%. 1000 milliLiter(s) (50 mL/Hr) IV Continuous <Continuous>  dextrose 5%. 1000 milliLiter(s) (100 mL/Hr) IV Continuous <Continuous>  dextrose 50% Injectable 25 Gram(s) IV Push once  dextrose 50% Injectable 12.5 Gram(s) IV Push once  dextrose 50% Injectable 25 Gram(s) IV Push once  glucagon  Injectable 1 milliGRAM(s) IntraMuscular once  insulin lispro (ADMELOG) corrective regimen sliding scale   SubCutaneous every 6 hours  insulin NPH human recombinant 12 Unit(s) SubCutaneous every 6 hours  lactulose Syrup 30 Gram(s) Oral three times a day  levothyroxine Injectable 75 MICROGram(s) IV Push at bedtime  mupirocin 2% Ointment 1 Application(s) Both Nostrils two times a day  pantoprazole  Injectable 40 milliGRAM(s) IV Push daily  rifAXIMin 550 milliGRAM(s) Oral two times a day  tenofovir disoproxil fumarate (VIREAD) 300 milliGRAM(s) Oral daily    MEDICATIONS  (PRN):  dextrose Oral Gel 15 Gram(s) Oral once PRN Blood Glucose LESS THAN 70 milliGRAM(s)/deciliter  LORazepam   Injectable 1.5 milliGRAM(s) IV Push once PRN pre-MRI  sodium chloride 0.65% Nasal 1 Spray(s) Both Nostrils three times a day PRN Nasal Congestion    -------------------------------------------------------------------------------------------------------    CRITICAL CARE:  [ ]Shock Present  [ ]Septic [ ]Cardiogenic [ ]Neurologic [ ]Hypovolemic [ ]Undifferentiated    [ ]Vasopressors [ ]Inotropes    [ ]Respiratory failure present   [ ]Acute  [ ]Chronic [ ]Hypoxic  [ ]Hypercarbic [ ]Mixed   [ ]Mechanical Ventilation [ ]Non-invasive ventilatory support [ ]High-Flow     [ ]Other organ failure     -------------------------------------------------------------------------------------------------------  REFERRALS:   [ ]Chaplaincy  [ ]Hospice  [ ]Child Life  [ ]Social Work  [ ]Case management [ ]Holistic Therapy    Indication of Geriatrics and Palliative Medicine Services:  [X  ] Complex Medical Decision Making   [  ] Symptom/Pain management     DNR on chart: No     INTERVAL EVENTS: Patient seen this PM, not following commands. Spoke to patient's daughter Romana on code status decision. She says they are discussing with "family doctors." She is not sure when she will talk to them and does not understand the "rush." She is still waiting for paracentesis and swallow eval.   Patient later failed S&S.     -------------------------------------------------------------------------------------------------------    PRESENT SYMPTOMS:     [ ] No     [X ] Unable to self-report      [ ] CPOT (ICU)     [ ] PAINADs      [X ] RDOS 0    [ ] Yes     Source if other than patient:  [ ]Family   [ ]Team     PAIN:   If blank, patient unable to specify   [ ]yes [ ]no  QOL impact-   Location -                    Aggravating factors -  Quality -  Radiation -  Timing-  Pain at most severe level (0-10 scale):  Pain at minimal acceptable level/Pain Goal (0-10 scale):     SYMPTOMS:   Dyspnea:                           [ ]Mild [ ]Moderate [ ]Severe  Anxiety:                             [ ]Mild [ ]Moderate [ ]Severe  Fatigue:                             [ ]Mild [ ]Moderate [ ]Severe  Nausea/Vomiting:              [ ]Mild [ ]Moderate [ ]Severe  Loss of appetite:                [ ]Mild [ ]Moderate [ ]Severe  Constipation:                     [ ]Mild [ ]Moderate [ ]Severe    Other Symptoms:  [X ]All other review of systems negative     Home Medications for symptoms if any:  I-Stop Reference No:(from initial)     -------------------------------------------------------------------------------------------------------    ITEMS UNCHECKED ARE NOT PRESENT    PHYSICAL:  Vital Signs Last 24 Hrs  T(C): 36.9 (21 Mar 2023 04:30), Max: 36.9 (21 Mar 2023 04:30)  T(F): 98.4 (21 Mar 2023 04:30), Max: 98.4 (21 Mar 2023 04:30)  HR: 55 (21 Mar 2023 04:30) (55 - 98)  BP: 120/43 (21 Mar 2023 04:30) (112/50 - 127/53)  BP(mean): --  RR: 18 (21 Mar 2023 04:30) (18 - 18)  SpO2: 98% (21 Mar 2023 04:30) (98% - 100%)    Parameters below as of 21 Mar 2023 04:30  Patient On (Oxygen Delivery Method): room air     I&O's Summary    20 Mar 2023 07:01  -  21 Mar 2023 07:00  --------------------------------------------------------  IN: 1520 mL / OUT: 0 mL / NET: 1520 mL    GENERAL:  [X ]Cachexia  [X ] Frail  [ ]Awake  [ ]Oriented x   [X ]Lethargic  [ ]Unarousable  [ ]Verbal  [X ]Non-Verbal    BEHAVIORAL:   [ ] Anxiety  [ ] Delirium [ ] Agitation [ ] Other    HEENT:   [ ]Normal   [X ]Dry mouth   [ ]ET Tube/Trach  [ ]Oral lesions    PULMONARY:   [X ]Clear [ ]Tachypnea  [ ]Audible excessive secretions   [ ]Rhonchi        [ ]Right [ ]Left [ ]Bilateral  [ ]Crackles        [ ]Right [ ]Left [ ]Bilateral  [ ]Wheezing     [ ]Right [ ]Left [ ]Bilateral  [ ]Diminished breath sounds [ ]right [ ]left [ ]bilateral    CARDIOVASCULAR:    [X ]Regular [ ]Irregular [ ]Tachy  [ ]Rafal [ ]Murmur [ ]Other    GASTROINTESTINAL:  [ ]Soft  [X ]Distended   [X ]+BS  [ ]Non tender [ ]Tender  [ ]Other [ ]PEG [X ]OGT/ NGT      GENITOURINARY:  [ ]Normal [X ] Incontinent   [ ]Oliguria/Anuria   [ ]Henderson    MUSCULOSKELETAL:   [ ]Normal   [ ]Weakness  [X ]Bed/Wheelchair bound [ ]Edema    NEUROLOGIC:   [X ]No focal deficits  [ ]Cognitive impairment  [ ]Dysphagia [ ]Dysarthria [ ]Paresis [ ]Other     SKIN:   [ ]Normal  [ ]Rash  [ ]Other  [X ]Pressure ulcer(s)       Present on admission [ ]y [ ]n    -------------------------------------------------------------------------------------------------------    LABS:                        9.1    3.33  )-----------( 26       ( 21 Mar 2023 06:30 )             30.2   03-21    159<H>  |  124<H>  |  60<H>  ----------------------------<  184<H>  4.5   |  21<L>  |  0.88    Ca    8.9      21 Mar 2023 06:30  Phos  2.2     03-21  Mg     2.70     03-21    TPro  5.6<L>  /  Alb  4.0  /  TBili  3.9<H>  /  DBili  x   /  AST  64<H>  /  ALT  36  /  AlkPhos  140<H>  03-21  PT/INR - ( 21 Mar 2023 06:30 )   PT: 21.6 sec;   INR: 1.85 ratio      PTT - ( 20 Mar 2023 05:45 )  PTT:56.5 sec    Ferritin, Serum: 56 ng/mL (03-19-23 @ 06:49)    -------------------------------------------------------------------------------------------------------  RADIOLOGY & ADDITIONAL STUDIES:     < from: CT Head No Cont (03.04.23 @ 17:38) >    IMPRESSION:  No CT evidence of acute intracranial pathology.  Trace mastoid effusions bilaterally.    < end of copied text >    < from: US Abdomen Upper Quadrant Right (03.10.23 @ 18:18) >  IMPRESSION:  Cirrhosis. Limited visualization of the liver.    Moderate amount of ascites.    < end of copied text >    < from: MR Head w/wo IV Cont (03.15.23 @ 19:40) >  IMPRESSION:    Extensive cortical predominant restricted diffusion throughout the   cerebral hemispheres including involvement of the basal ganglia and   insula bilaterally. Given history of hematemesis with hypotension,   hypoxic-ischemic injury is favored. Differential also includes postictal   sequelae, Creutzfeldt-Gonzalez disease, encephalitis or lymphomatous   involvement. CSF sampling should be considered.    < end of copied text >    < from: US Kidney and Bladder (03.17.23 @ 14:13) >  IMPRESSION:  *  Both kidneys are small in size.  *  No hydronephrosis.    < end of copied text >    -------------------------------------------------------------------------------------------------------  MEDICATIONS:     MEDICATIONS  (STANDING):  chlorhexidine 2% Cloths 1 Application(s) Topical <User Schedule>  ciprofloxacin     Tablet 500 milliGRAM(s) Oral every 24 hours  coronavirus bivalent (EUA) Booster Vaccine (PFIZER) 0.3 milliLiter(s) IntraMuscular once  dextrose 5%. 1000 milliLiter(s) (50 mL/Hr) IV Continuous <Continuous>  dextrose 5%. 1000 milliLiter(s) (50 mL/Hr) IV Continuous <Continuous>  dextrose 5%. 1000 milliLiter(s) (100 mL/Hr) IV Continuous <Continuous>  dextrose 50% Injectable 25 Gram(s) IV Push once  dextrose 50% Injectable 12.5 Gram(s) IV Push once  dextrose 50% Injectable 25 Gram(s) IV Push once  glucagon  Injectable 1 milliGRAM(s) IntraMuscular once  insulin lispro (ADMELOG) corrective regimen sliding scale   SubCutaneous every 6 hours  insulin NPH human recombinant 12 Unit(s) SubCutaneous every 6 hours  lactulose Syrup 30 Gram(s) Oral three times a day  levothyroxine Injectable 75 MICROGram(s) IV Push at bedtime  mupirocin 2% Ointment 1 Application(s) Both Nostrils two times a day  pantoprazole  Injectable 40 milliGRAM(s) IV Push daily  rifAXIMin 550 milliGRAM(s) Oral two times a day  tenofovir disoproxil fumarate (VIREAD) 300 milliGRAM(s) Oral daily    MEDICATIONS  (PRN):  dextrose Oral Gel 15 Gram(s) Oral once PRN Blood Glucose LESS THAN 70 milliGRAM(s)/deciliter  LORazepam   Injectable 1.5 milliGRAM(s) IV Push once PRN pre-MRI  sodium chloride 0.65% Nasal 1 Spray(s) Both Nostrils three times a day PRN Nasal Congestion    -------------------------------------------------------------------------------------------------------    CRITICAL CARE:  [ ]Shock Present  [ ]Septic [ ]Cardiogenic [ ]Neurologic [ ]Hypovolemic [ ]Undifferentiated    [ ]Vasopressors [ ]Inotropes    [ ]Respiratory failure present   [ ]Acute  [ ]Chronic [ ]Hypoxic  [ ]Hypercarbic [ ]Mixed   [ ]Mechanical Ventilation [ ]Non-invasive ventilatory support [ ]High-Flow     [ ]Other organ failure     -------------------------------------------------------------------------------------------------------  REFERRALS:   [ ]Chaplaincy  [ ]Hospice  [ ]Child Life  [ ]Social Work  [ ]Case management [ ]Holistic Therapy

## 2023-03-21 NOTE — PROGRESS NOTE ADULT - PROBLEM SELECTOR PLAN 1
- MRI showing anoxic brain injury   - suspected from initial hemorrhagic shock from hematemesis in ED   - per neuro, prognosis uncertain, early to predict prognosis, has a chance of recovery, suggest rehab eval   - per rehab not a candidate since he does not follow commands   - poor functional status PPS 10%, with NGT - MRI showing anoxic brain injury   - suspected from initial hemorrhagic shock from hematemesis in ED   - per neuro, prognosis uncertain, early to predict prognosis, has a chance of recovery, suggest rehab eval   - per rehab not a candidate since he does not follow commands   - poor functional status PPS 10%, with NGT  - Failed S&S

## 2023-03-21 NOTE — PROGRESS NOTE ADULT - SUBJECTIVE AND OBJECTIVE BOX
[facility] Division of Hospital Medicine  Nik Braden MD  Pager (M-F, 9C-5P): [phone]  Other Times:  [phone]    Patient is a 67y old  Male who presents with a chief complaint of gib, encephalopathy (19 Mar 2023 12:15)      SUBJECTIVE / OVERNIGHT EVENTS: hypoglycemia noted 63. given d 50. s/p 2 BM light brown   ADDITIONAL REVIEW OF SYSTEMS:    MEDICATIONS  (STANDING):  chlorhexidine 2% Cloths 1 Application(s) Topical <User Schedule>  ciprofloxacin     Tablet 500 milliGRAM(s) Oral every 24 hours  coronavirus bivalent (EUA) Booster Vaccine (PFIZER) 0.3 milliLiter(s) IntraMuscular once  dextrose 5%. 1000 milliLiter(s) (50 mL/Hr) IV Continuous <Continuous>  dextrose 5%. 1000 milliLiter(s) (100 mL/Hr) IV Continuous <Continuous>  dextrose 50% Injectable 25 Gram(s) IV Push once  dextrose 50% Injectable 12.5 Gram(s) IV Push once  dextrose 50% Injectable 25 Gram(s) IV Push once  glucagon  Injectable 1 milliGRAM(s) IntraMuscular once  insulin lispro (ADMELOG) corrective regimen sliding scale   SubCutaneous every 6 hours  insulin NPH human recombinant 15 Unit(s) SubCutaneous every 6 hours  lactulose Syrup 30 Gram(s) Oral three times a day  levothyroxine Injectable 75 MICROGram(s) IV Push at bedtime  midodrine 10 milliGRAM(s) Oral every 8 hours  mupirocin 2% Ointment 1 Application(s) Both Nostrils two times a day  octreotide  Infusion 50 MICROgram(s)/Hr (10 mL/Hr) IV Continuous <Continuous>  pantoprazole  Injectable 40 milliGRAM(s) IV Push every 12 hours  rifAXIMin 550 milliGRAM(s) Oral two times a day  tenofovir disoproxil fumarate (VIREAD) 300 milliGRAM(s) Oral daily    MEDICATIONS  (PRN):  dextrose Oral Gel 15 Gram(s) Oral once PRN Blood Glucose LESS THAN 70 milliGRAM(s)/deciliter  LORazepam   Injectable 1.5 milliGRAM(s) IV Push once PRN pre-MRI  sodium chloride 0.65% Nasal 1 Spray(s) Both Nostrils three times a day PRN Nasal Congestion      CAPILLARY BLOOD GLUCOSE      POCT Blood Glucose.: 197 mg/dL (21 Mar 2023 05:28)  POCT Blood Glucose.: 125 mg/dL (21 Mar 2023 02:53)  POCT Blood Glucose.: 104 mg/dL (21 Mar 2023 00:37)  POCT Blood Glucose.: 126 mg/dL (21 Mar 2023 00:18)  POCT Blood Glucose.: 57 mg/dL (20 Mar 2023 23:35)  POCT Blood Glucose.: 63 mg/dL (20 Mar 2023 23:21)  POCT Blood Glucose.: 151 mg/dL (20 Mar 2023 17:32)  POCT Blood Glucose.: 125 mg/dL (20 Mar 2023 13:00)  POCT Blood Glucose.: 192 mg/dL (20 Mar 2023 08:51)    I&O's Summary    20 Mar 2023 07:01  -  21 Mar 2023 07:00  --------------------------------------------------------  IN: 1520 mL / OUT: 0 mL / NET: 1520 mL        PHYSICAL EXAM:  Vital Signs Last 24 Hrs  T(C): 36.9 (21 Mar 2023 04:30), Max: 36.9 (21 Mar 2023 04:30)  T(F): 98.4 (21 Mar 2023 04:30), Max: 98.4 (21 Mar 2023 04:30)  HR: 55 (21 Mar 2023 04:30) (55 - 98)  BP: 120/43 (21 Mar 2023 04:30) (112/50 - 127/53)  BP(mean): --  RR: 18 (21 Mar 2023 04:30) (18 - 18)  SpO2: 98% (21 Mar 2023 04:30) (98% - 100%)    Parameters below as of 21 Mar 2023 04:30  Patient On (Oxygen Delivery Method): room air    CONSTITUTIONAL: NAD,   EYES: conjunctiva and sclera clear  ENMT: NGT   NECK: Supple, no palpable masses; no thyromegaly  RESPIRATORY: Normal respiratory effort; lungs are clear to auscultation bilaterally  CARDIOVASCULAR: Regular rate and rhythm, normal S1 and S2, no murmur/rub/gallop; No lower extremity edema; Peripheral pulses are 2+ bilaterally  ABDOMEN: Nontender to palpation, normoactive bowel sounds, + fluid wave   MUSCULOSKELETAL:  moves upper ext spontaneously  PSYCH: awake and alert traces to verbal stimuli  NEUROLOGY: does not follow commands       LABS:                        9.1    3.33  )-----------( 26       ( 21 Mar 2023 06:30 )             30.2     03-21    159<H>  |  124<H>  |  60<H>  ----------------------------<  184<H>  4.5   |  21<L>  |  0.88    Ca    8.9      21 Mar 2023 06:30  Phos  2.2     03-21  Mg     2.70     03-21    TPro  5.6<L>  /  Alb  4.0  /  TBili  3.9<H>  /  DBili  x   /  AST  64<H>  /  ALT  36  /  AlkPhos  140<H>  03-21    PT/INR - ( 21 Mar 2023 06:30 )   PT: 21.6 sec;   INR: 1.85 ratio         PTT - ( 20 Mar 2023 05:45 )  PTT:56.5 sec            RADIOLOGY & ADDITIONAL TESTS:  Results Reviewed:   Imaging Personally Reviewed:  Electrocardiogram Personally Reviewed:    COORDINATION OF CARE:  Care Discussed with Consultants/Other Providers [Y/N]:  Prior or Outpatient Records Reviewed [Y/N]:   [facility]  Nik Braden MD  Pager (M-F, 6K-5P): [phone]  Other Times:  [phone]    Patient is a 67y old  Male who presents with a chief complaint of gib, encephalopathy (19 Mar 2023 12:15)      SUBJECTIVE / OVERNIGHT EVENTS: hypoglycemia noted 63. given d 50. s/p 2 BM light brown       MEDICATIONS  (STANDING):  chlorhexidine 2% Cloths 1 Application(s) Topical <User Schedule>  ciprofloxacin     Tablet 500 milliGRAM(s) Oral every 24 hours  coronavirus bivalent (EUA) Booster Vaccine (PFIZER) 0.3 milliLiter(s) IntraMuscular once  dextrose 5%. 1000 milliLiter(s) (50 mL/Hr) IV Continuous <Continuous>  dextrose 5%. 1000 milliLiter(s) (100 mL/Hr) IV Continuous <Continuous>  dextrose 50% Injectable 25 Gram(s) IV Push once  dextrose 50% Injectable 12.5 Gram(s) IV Push once  dextrose 50% Injectable 25 Gram(s) IV Push once  glucagon  Injectable 1 milliGRAM(s) IntraMuscular once  insulin lispro (ADMELOG) corrective regimen sliding scale   SubCutaneous every 6 hours  insulin NPH human recombinant 15 Unit(s) SubCutaneous every 6 hours  lactulose Syrup 30 Gram(s) Oral three times a day  levothyroxine Injectable 75 MICROGram(s) IV Push at bedtime  midodrine 10 milliGRAM(s) Oral every 8 hours  mupirocin 2% Ointment 1 Application(s) Both Nostrils two times a day  octreotide  Infusion 50 MICROgram(s)/Hr (10 mL/Hr) IV Continuous <Continuous>  pantoprazole  Injectable 40 milliGRAM(s) IV Push every 12 hours  rifAXIMin 550 milliGRAM(s) Oral two times a day  tenofovir disoproxil fumarate (VIREAD) 300 milliGRAM(s) Oral daily    MEDICATIONS  (PRN):  dextrose Oral Gel 15 Gram(s) Oral once PRN Blood Glucose LESS THAN 70 milliGRAM(s)/deciliter  LORazepam   Injectable 1.5 milliGRAM(s) IV Push once PRN pre-MRI  sodium chloride 0.65% Nasal 1 Spray(s) Both Nostrils three times a day PRN Nasal Congestion      CAPILLARY BLOOD GLUCOSE      POCT Blood Glucose.: 197 mg/dL (21 Mar 2023 05:28)  POCT Blood Glucose.: 125 mg/dL (21 Mar 2023 02:53)  POCT Blood Glucose.: 104 mg/dL (21 Mar 2023 00:37)  POCT Blood Glucose.: 126 mg/dL (21 Mar 2023 00:18)  POCT Blood Glucose.: 57 mg/dL (20 Mar 2023 23:35)  POCT Blood Glucose.: 63 mg/dL (20 Mar 2023 23:21)  POCT Blood Glucose.: 151 mg/dL (20 Mar 2023 17:32)  POCT Blood Glucose.: 125 mg/dL (20 Mar 2023 13:00)  POCT Blood Glucose.: 192 mg/dL (20 Mar 2023 08:51)    I&O's Summary    20 Mar 2023 07:01  -  21 Mar 2023 07:00  --------------------------------------------------------  IN: 1520 mL / OUT: 0 mL / NET: 1520 mL        PHYSICAL EXAM:  Vital Signs Last 24 Hrs  T(C): 36.9 (21 Mar 2023 04:30), Max: 36.9 (21 Mar 2023 04:30)  T(F): 98.4 (21 Mar 2023 04:30), Max: 98.4 (21 Mar 2023 04:30)  HR: 55 (21 Mar 2023 04:30) (55 - 98)  BP: 120/43 (21 Mar 2023 04:30) (112/50 - 127/53)  BP(mean): --  RR: 18 (21 Mar 2023 04:30) (18 - 18)  SpO2: 98% (21 Mar 2023 04:30) (98% - 100%)    Parameters below as of 21 Mar 2023 04:30  Patient On (Oxygen Delivery Method): room air    CONSTITUTIONAL: NAD,   EYES: conjunctiva and sclera clear  ENMT: NGT   NECK: Supple, no palpable masses; no thyromegaly  RESPIRATORY: Normal respiratory effort; lungs are clear to auscultation bilaterally  CARDIOVASCULAR: Regular rate and rhythm, normal S1 and S2, no murmur/rub/gallop; No lower extremity edema; Peripheral pulses are 2+ bilaterally  ABDOMEN: Nontender to palpation, normoactive bowel sounds, + fluid wave   MUSCULOSKELETAL:  moves upper ext spontaneously  PSYCH: awake and alert traces to verbal stimuli  NEUROLOGY: does not follow commands       LABS:                        9.1    3.33  )-----------( 26       ( 21 Mar 2023 06:30 )             30.2     03-21    159<H>  |  124<H>  |  60<H>  ----------------------------<  184<H>  4.5   |  21<L>  |  0.88    Ca    8.9      21 Mar 2023 06:30  Phos  2.2     03-21  Mg     2.70     03-21    TPro  5.6<L>  /  Alb  4.0  /  TBili  3.9<H>  /  DBili  x   /  AST  64<H>  /  ALT  36  /  AlkPhos  140<H>  03-21    PT/INR - ( 21 Mar 2023 06:30 )   PT: 21.6 sec;   INR: 1.85 ratio         PTT - ( 20 Mar 2023 05:45 )  PTT:56.5 sec            RADIOLOGY & ADDITIONAL TESTS:  Results Reviewed:   Imaging Personally Reviewed:  Electrocardiogram Personally Reviewed:    COORDINATION OF CARE:  Care Discussed with Consultants/Other Providers [Y/N]:  Prior or Outpatient Records Reviewed [Y/N]:   [facility]  Nik Braden MD  Pager (M-F, 4K-5P): [phone]  Other Times:  [phone]    Patient is a 67y old  Male who presents with a chief complaint of gib, encephalopathy (19 Mar 2023 12:15)      SUBJECTIVE / OVERNIGHT EVENTS: hypoglycemia noted 63. given d 50. s/p 2 BM light brown. d/w son at bedside aware that platelets are downtrending paracentesis on hold       MEDICATIONS  (STANDING):  chlorhexidine 2% Cloths 1 Application(s) Topical <User Schedule>  ciprofloxacin     Tablet 500 milliGRAM(s) Oral every 24 hours  coronavirus bivalent (EUA) Booster Vaccine (PFIZER) 0.3 milliLiter(s) IntraMuscular once  dextrose 5%. 1000 milliLiter(s) (50 mL/Hr) IV Continuous <Continuous>  dextrose 5%. 1000 milliLiter(s) (100 mL/Hr) IV Continuous <Continuous>  dextrose 50% Injectable 25 Gram(s) IV Push once  dextrose 50% Injectable 12.5 Gram(s) IV Push once  dextrose 50% Injectable 25 Gram(s) IV Push once  glucagon  Injectable 1 milliGRAM(s) IntraMuscular once  insulin lispro (ADMELOG) corrective regimen sliding scale   SubCutaneous every 6 hours  insulin NPH human recombinant 15 Unit(s) SubCutaneous every 6 hours  lactulose Syrup 30 Gram(s) Oral three times a day  levothyroxine Injectable 75 MICROGram(s) IV Push at bedtime  midodrine 10 milliGRAM(s) Oral every 8 hours  mupirocin 2% Ointment 1 Application(s) Both Nostrils two times a day  octreotide  Infusion 50 MICROgram(s)/Hr (10 mL/Hr) IV Continuous <Continuous>  pantoprazole  Injectable 40 milliGRAM(s) IV Push every 12 hours  rifAXIMin 550 milliGRAM(s) Oral two times a day  tenofovir disoproxil fumarate (VIREAD) 300 milliGRAM(s) Oral daily    MEDICATIONS  (PRN):  dextrose Oral Gel 15 Gram(s) Oral once PRN Blood Glucose LESS THAN 70 milliGRAM(s)/deciliter  LORazepam   Injectable 1.5 milliGRAM(s) IV Push once PRN pre-MRI  sodium chloride 0.65% Nasal 1 Spray(s) Both Nostrils three times a day PRN Nasal Congestion      CAPILLARY BLOOD GLUCOSE      POCT Blood Glucose.: 197 mg/dL (21 Mar 2023 05:28)  POCT Blood Glucose.: 125 mg/dL (21 Mar 2023 02:53)  POCT Blood Glucose.: 104 mg/dL (21 Mar 2023 00:37)  POCT Blood Glucose.: 126 mg/dL (21 Mar 2023 00:18)  POCT Blood Glucose.: 57 mg/dL (20 Mar 2023 23:35)  POCT Blood Glucose.: 63 mg/dL (20 Mar 2023 23:21)  POCT Blood Glucose.: 151 mg/dL (20 Mar 2023 17:32)  POCT Blood Glucose.: 125 mg/dL (20 Mar 2023 13:00)  POCT Blood Glucose.: 192 mg/dL (20 Mar 2023 08:51)    I&O's Summary    20 Mar 2023 07:01  -  21 Mar 2023 07:00  --------------------------------------------------------  IN: 1520 mL / OUT: 0 mL / NET: 1520 mL        PHYSICAL EXAM:  Vital Signs Last 24 Hrs  T(C): 36.9 (21 Mar 2023 04:30), Max: 36.9 (21 Mar 2023 04:30)  T(F): 98.4 (21 Mar 2023 04:30), Max: 98.4 (21 Mar 2023 04:30)  HR: 55 (21 Mar 2023 04:30) (55 - 98)  BP: 120/43 (21 Mar 2023 04:30) (112/50 - 127/53)  BP(mean): --  RR: 18 (21 Mar 2023 04:30) (18 - 18)  SpO2: 98% (21 Mar 2023 04:30) (98% - 100%)    Parameters below as of 21 Mar 2023 04:30  Patient On (Oxygen Delivery Method): room air    CONSTITUTIONAL: NAD,   EYES: conjunctiva and sclera clear  ENMT: NGT   NECK: Supple, no palpable masses; no thyromegaly  RESPIRATORY: Normal respiratory effort; lungs are clear to auscultation bilaterally  CARDIOVASCULAR: Regular rate and rhythm, normal S1 and S2, no murmur/rub/gallop; No lower extremity edema; Peripheral pulses are 2+ bilaterally  ABDOMEN: Nontender to palpation, normoactive bowel sounds, + fluid wave   MUSCULOSKELETAL:  moves upper ext spontaneously  PSYCH: awake and alert traces to verbal stimuli  NEUROLOGY: does not follow commands       LABS:                        9.1    3.33  )-----------( 26       ( 21 Mar 2023 06:30 )             30.2     03-21    159<H>  |  124<H>  |  60<H>  ----------------------------<  184<H>  4.5   |  21<L>  |  0.88    Ca    8.9      21 Mar 2023 06:30  Phos  2.2     03-21  Mg     2.70     03-21    TPro  5.6<L>  /  Alb  4.0  /  TBili  3.9<H>  /  DBili  x   /  AST  64<H>  /  ALT  36  /  AlkPhos  140<H>  03-21    PT/INR - ( 21 Mar 2023 06:30 )   PT: 21.6 sec;   INR: 1.85 ratio         PTT - ( 20 Mar 2023 05:45 )  PTT:56.5 sec            RADIOLOGY & ADDITIONAL TESTS:  Results Reviewed:   Imaging Personally Reviewed:  Electrocardiogram Personally Reviewed:    COORDINATION OF CARE:  Care Discussed with Consultants/Other Providers [Y/N]:  Prior or Outpatient Records Reviewed [Y/N]:

## 2023-03-21 NOTE — PROGRESS NOTE ADULT - PROBLEM SELECTOR PLAN 4
HbA1c 6.1% likely underestimated due transfusions given insulin needs  - c/w TF Glucerna  - noted hypoglycemia 3/20 will change NPH to 12 U q6

## 2023-03-21 NOTE — PROGRESS NOTE ADULT - PROBLEM SELECTOR PLAN 3
- was following at [facility], previously low MELD 8   - 3/20- MELD Na 19, 3-4% estimated 90 day mortality   - Had 3 perlita inpatient, pending repeat  - also with Hep B, on tenofovir

## 2023-03-21 NOTE — PROVIDER CONTACT NOTE (HYPOGLYCEMIA EVENT) - NS PROVIDER CONTACT CONTRIBUTING FACTORS OF EPISODE
Tube feeding
tube feeds were held from 4 am till 9am./Previous finger stick less than 100 mg/dL/Other (Specify)

## 2023-03-21 NOTE — PROGRESS NOTE ADULT - PROBLEM SELECTOR PLAN 2
Pt. with hypernatremia. SNa remains elevated at 159. Recommend to continue with IV D5, and free water flushes. Free water flushes increased to 600cc q4hrs. Monitor SNa.

## 2023-03-21 NOTE — PROGRESS NOTE ADULT - PROBLEM SELECTOR PLAN 2
- s/p EGD - Multiple large (> 5 mm) varices were found in the mid to distal esophagus. Six bands were successfully placed with incomplete eradication of varices  - s/p MICU course intubated, now extubated on medicine floors  - anemic, H/H being monitored  - hypotensive, on midodrine

## 2023-03-21 NOTE — PROGRESS NOTE ADULT - PROBLEM SELECTOR PLAN 6
acute blood loss anemia c/b hypovolemic shock s/p levophed and ICU  - Hb 6.9 on admit now s/p 6 units of pRBC, 1 unit FFP, 1 unit of platelets all on 2/24/23, 1u prbc 3/18  - s/p octreotide x 72h (2/24-2/27)   - trend CBC, transfuse prn.  - s/p EGD 2/24: large (> 5 mm) esophageal varices. Incompletely eradicated. Banded. Normal duodenal bulb, first portion of the duodenum and second portion of the duodenum.                 - EGD in 4 wks for likely repeat banding  - SBP ppx cipro  - BP in 90's given 1L LR on 3/17 concern for variceal bleed  - transfuse 1 unit prbc on 3/18 for hgb drop from 11.1 to 7.9, nurse reports no obvious dark BM  - echo with preserved LVEF 53%  - restarted on 3/18 octreotide gtt and iv protonix 40 bid; Hepatology recommend to stop octreotide drip and change to PPI to qd  - completed albumin 250ml q8x3 doses,   - has not received midodrine last 2 doses; will hold midodrine monitor off   - hold diuretic for now  - trend CBC, transfuse prn  - monitor BP

## 2023-03-21 NOTE — PROGRESS NOTE ADULT - PROBLEM SELECTOR PLAN 1
- no overt evidence of GIB   - check LDH, hapto, HIT ab, blue top    - off heparin  - monitor Platelets

## 2023-03-21 NOTE — PROVIDER CONTACT NOTE (HYPOGLYCEMIA EVENT) - NS PROVIDER CONTACT RECOMMEND-HYPO
Sliding and NPH held as per provider. Dextrose 50% 12.5 grams IV push given, FS rechecked, two readings >100.  Will recheck at 3am

## 2023-03-21 NOTE — SWALLOW BEDSIDE ASSESSMENT ADULT - SWALLOW EVAL: RECOMMENDED DIET
1) Continue NG tube feedings 2) Suggest discussion with patient/family regarding goal of care for nutritional goals of care Continue NG tube feedings

## 2023-03-22 NOTE — PROGRESS NOTE ADULT - PROBLEM SELECTOR PLAN 2
Pt. with hypernatremia. SNa remains elevated but improved to 151 (160 to 153 when corrected for hyperglycemia). Recommend to continue with IV D5, and free water flushes. Free water flushes increased to 600cc q4hrs. Monitor SNa q12.

## 2023-03-22 NOTE — PROGRESS NOTE ADULT - PROBLEM SELECTOR PLAN 5
decompensated with ascites, with variceal bleed, with PSE; MELD 12 3/10/23  - s/p diagnostic para (2/25) and therapeutic para (3/5) removed 4.5L  - s/p diag/therapeutic tap on 3/15, removed 3.6L, neg for SBP; Path neg for malignancy   - c/w rifaximin BID, lactulose   - SBP ppx with Cipro due to low ascitic protein   - trend LFT, RUQ w/o pathology on 3/10  - MRI abdomen at [facility] 12/2022 no HCC  - off diuretic due to hypernatremia   - procedure team for paracentesis   - hepatology following

## 2023-03-22 NOTE — PROGRESS NOTE ADULT - PROBLEM SELECTOR PLAN 7
- Uptrended to 1.7  - mcconnell removed 3/15,  passed TOV  - no retention on bladder scan, bladder scan prn  - NANCI likely ATN in s/o shock  - no hydronephrosis on renal US  - avoid hypotension  - Cr now normal   - improved

## 2023-03-22 NOTE — PROGRESS NOTE ADULT - PROBLEM SELECTOR PLAN 6
acute blood loss anemia c/b hypovolemic shock s/p levophed and ICU  - Hb 6.9 on admit now s/p 6 units of pRBC, 1 unit FFP, 1 unit of platelets all on 2/24/23, 1u prbc 3/18  - s/p octreotide x 72h (2/24-2/27)   - trend CBC, transfuse prn.  - s/p EGD 2/24: large (> 5 mm) esophageal varices. Incompletely eradicated. Banded. Normal duodenal bulb, first portion of the duodenum and second portion of the duodenum.                 - EGD in 4 wks for likely repeat banding  - SBP ppx cipro  - BP in 90's given 1L LR on 3/17 concern for variceal bleed  - transfuse 1 unit prbc on 3/18 for hgb drop from 11.1 to 7.9, nurse reports no obvious dark BM  - echo with preserved LVEF 53%  - restarted on 3/18 octreotide gtt and iv protonix 40 bid; Hepatology recommend to stop octreotide drip and change to PPI to qd  - completed albumin 250ml q8x3 doses,   - BP acceptable off midodrine   - hold diuretic for now  - trend CBC, transfuse prn  - monitor BP

## 2023-03-22 NOTE — PROGRESS NOTE ADULT - PROBLEM SELECTOR PLAN 2
- likely d/t hypoxia/anoxic brain ischemia in s/o hemorrhagic shock on admission/hypernatremia  - MRI brain 3/15 extensive cortical restricted diffusion throughout the cerebral hemispheres including the basal ganglia and insula b/l.    - CTH (3/4/23): no acute pathology  - neuro recs appreciated, encephalopathic state 2/2 anoxic ischemic brain injury due to hypoperfusion  - EEG (3/5/23): Abnormal EEG study.  Potential epileptogenic focus in the left posterior head region. Structural of functional abnormality in the left posterior head region. Moderate nonspecific diffuse or multifocal cerebral dysfunction. No seizure seen.     repeat EEG (3/14) frequent left posterior quadrant periodic discharges, risk of seizure has decreased compared to EEG from 3/5. No seizures recorded.   - paracentesis x 3 neg for SBP  - c/w rifaximin 550 mg BID, lactulose TID, repeat ammonia level 25; encephalopathy likely unrelated to HE  - remains encephalopathic, failed S&S, c/w NGT feeds, aspiration precautions  - TSH wnl  - s/p IV thiamine  - CXR no infiltrate, c/w TF via NGT  - overall prognosis very poor, severe encephalopathy from hypoxic brain injury, ESLD/cirrhosis with poor prognosis.   - repeat S/S consult coughing across trials suggestive of impaired airway protection  - Palliative care consulted to help with GOC discussions on going with family

## 2023-03-22 NOTE — PROGRESS NOTE ADULT - SUBJECTIVE AND OBJECTIVE BOX
[facility] Division of Hospital Medicine  Nik Braden MD  Pager (M-F, 5X-5P): [phone]  Other Times:  [phone]    Patient is a 67y old  Male who presents with a chief complaint of gib, encephalopathy (19 Mar 2023 12:15)      SUBJECTIVE / OVERNIGHT EVENTS: failed bedside swallow yesterday with family at bedside S/S of  with coughing across trials suggestive of impaired airway protection. Son at bedside feels that NGT is preventing him from swallowing wants NGT removed. Advised that not safe as concern for aspiration and recent varices with low platelets. FS slightly above goal.       MEDICATIONS  (STANDING):  chlorhexidine 2% Cloths 1 Application(s) Topical <User Schedule>  ciprofloxacin     Tablet 500 milliGRAM(s) Oral every 24 hours  coronavirus bivalent (EUA) Booster Vaccine (PFIZER) 0.3 milliLiter(s) IntraMuscular once  dextrose 5%. 1000 milliLiter(s) (50 mL/Hr) IV Continuous <Continuous>  dextrose 5%. 1000 milliLiter(s) (50 mL/Hr) IV Continuous <Continuous>  dextrose 5%. 1000 milliLiter(s) (100 mL/Hr) IV Continuous <Continuous>  dextrose 50% Injectable 25 Gram(s) IV Push once  dextrose 50% Injectable 12.5 Gram(s) IV Push once  dextrose 50% Injectable 25 Gram(s) IV Push once  glucagon  Injectable 1 milliGRAM(s) IntraMuscular once  insulin lispro (ADMELOG) corrective regimen sliding scale   SubCutaneous every 6 hours  insulin NPH human recombinant 12 Unit(s) SubCutaneous every 6 hours  lactulose Syrup 30 Gram(s) Oral three times a day  levothyroxine Injectable 75 MICROGram(s) IV Push at bedtime  mupirocin 2% Ointment 1 Application(s) Both Nostrils two times a day  pantoprazole  Injectable 40 milliGRAM(s) IV Push daily  rifAXIMin 550 milliGRAM(s) Oral two times a day  tenofovir disoproxil fumarate (VIREAD) 300 milliGRAM(s) Oral daily    MEDICATIONS  (PRN):  dextrose Oral Gel 15 Gram(s) Oral once PRN Blood Glucose LESS THAN 70 milliGRAM(s)/deciliter  LORazepam   Injectable 1.5 milliGRAM(s) IV Push once PRN pre-MRI  sodium chloride 0.65% Nasal 1 Spray(s) Both Nostrils three times a day PRN Nasal Congestion      CAPILLARY BLOOD GLUCOSE      POCT Blood Glucose.: 205 mg/dL (22 Mar 2023 06:07)  POCT Blood Glucose.: 120 mg/dL (22 Mar 2023 00:23)  POCT Blood Glucose.: 214 mg/dL (21 Mar 2023 18:17)  POCT Blood Glucose.: 243 mg/dL (21 Mar 2023 12:32)    I&O's Summary    21 Mar 2023 07:01  -  22 Mar 2023 07:00  --------------------------------------------------------  IN: 1800 mL / OUT: 0 mL / NET: 1800 mL        PHYSICAL EXAM:  Vital Signs Last 24 Hrs  T(C): 36.8 (22 Mar 2023 04:47), Max: 36.8 (22 Mar 2023 04:47)  T(F): 98.3 (22 Mar 2023 04:47), Max: 98.3 (22 Mar 2023 04:47)  HR: 55 (22 Mar 2023 04:47) (55 - 57)  BP: 122/81 (22 Mar 2023 04:47) (108/47 - 122/81)  BP(mean): --  RR: 18 (22 Mar 2023 04:47) (18 - 18)  SpO2: 98% (22 Mar 2023 04:47) (98% - 99%)    Parameters below as of 22 Mar 2023 04:47  Patient On (Oxygen Delivery Method): room air    CONSTITUTIONAL: NAD,   EYES: conjunctiva and sclera clear  ENMT: NGT   NECK: Supple, no palpable masses; no thyromegaly  RESPIRATORY: Normal respiratory effort; lungs are clear to auscultation bilaterally  CARDIOVASCULAR: Regular rate and rhythm, normal S1 and S2, no murmur/rub/gallop; No lower extremity edema; Peripheral pulses are 2+ bilaterally  ABDOMEN: Nontender to palpation, normoactive bowel sounds, + fluid wave   MUSCULOSKELETAL:  moves upper ext spontaneously  PSYCH: awake and alert traces to verbal stimuli  NEUROLOGY: does not follow commands       LABS:                        9.5    3.48  )-----------( 32       ( 22 Mar 2023 06:10 )             31.0     03-22    151<H>  |  119<H>  |  46<H>  ----------------------------<  231<H>  4.3   |  22  |  0.74    Ca    8.6      22 Mar 2023 06:10  Phos  1.8     03-22  Mg     2.20     03-22    TPro  5.6<L>  /  Alb  4.0  /  TBili  3.9<H>  /  DBili  x   /  AST  64<H>  /  ALT  36  /  AlkPhos  140<H>  03-21    PT/INR - ( 22 Mar 2023 06:10 )   PT: 20.1 sec;   INR: 1.72 ratio                     RADIOLOGY & ADDITIONAL TESTS:  Results Reviewed:   Imaging Personally Reviewed:  Electrocardiogram Personally Reviewed:    COORDINATION OF CARE:  Care Discussed with Consultants/Other Providers [Y/N]:  Prior or Outpatient Records Reviewed [Y/N]:

## 2023-03-22 NOTE — PROGRESS NOTE ADULT - PROBLEM SELECTOR PLAN 1
- no overt evidence of GIB   - check LDH in 243 slightly above normal not indicative of hemolysis, hapto above 20 , HIT ab neg , blue top  37  - off heparin  - platelets uptrend 26--32  - SCD for now   - suspect likely due to cirrhosis now recovering

## 2023-03-22 NOTE — PROGRESS NOTE ADULT - SUBJECTIVE AND OBJECTIVE BOX
[facility] DIVISION OF KIDNEY DISEASES AND HYPERTENSION   FOLLOW UP NOTE  --------------------------------------------------------------------------------  HPI: 67M PMH DM, HTN, hypothyroidism, CAD, TANG cirrhosis, follicular lymphoma on rituximab, Hep B. on tenofovir, status post CABG, initially presented to ED w/ chest pain and constipation, developed hematemesis, now s/p MICU admission for acute blood loss anemia c/b hypovolemic shock , intubated for airway protection (extubated 3/8), s/p 2/24 bedside EGD with banding esophageal varices now with persistent encephalopathy, possibly 2/2 hypoxia/anoxic brain ischemia in s/o hemorrhagic shock on admission. Pt. being seen for NANCI and hypernatremia.    24 hour events/subjective: Pt. was seen and evaluated at bedside this morning. Remains somnolent, not answering or following commands. Did not appear in distress.     PAST HISTORY  --------------------------------------------------------------------------------  No significant changes to PMH, PSH, FHx, SHx, unless otherwise noted    ALLERGIES & MEDICATIONS  --------------------------------------------------------------------------------  Allergies  [This allergen will not trigger allergy alert] Beef (Other)  No Known Drug Allergies    Standing Inpatient Medications  chlorhexidine 2% Cloths 1 Application(s) Topical <User Schedule>  ciprofloxacin     Tablet 500 milliGRAM(s) Oral every 24 hours  coronavirus bivalent (EUA) Booster Vaccine (PFIZER) 0.3 milliLiter(s) IntraMuscular once  dextrose 5%. 1000 milliLiter(s) IV Continuous <Continuous>  dextrose 5%. 1000 milliLiter(s) IV Continuous <Continuous>  dextrose 5%. 1000 milliLiter(s) IV Continuous <Continuous>  dextrose 50% Injectable 25 Gram(s) IV Push once  dextrose 50% Injectable 12.5 Gram(s) IV Push once  dextrose 50% Injectable 25 Gram(s) IV Push once  glucagon  Injectable 1 milliGRAM(s) IntraMuscular once  insulin lispro (ADMELOG) corrective regimen sliding scale   SubCutaneous every 6 hours  insulin NPH human recombinant 12 Unit(s) SubCutaneous every 6 hours  lactulose Syrup 30 Gram(s) Oral three times a day  levothyroxine Injectable 75 MICROGram(s) IV Push at bedtime  mupirocin 2% Ointment 1 Application(s) Both Nostrils two times a day  pantoprazole  Injectable 40 milliGRAM(s) IV Push daily  rifAXIMin 550 milliGRAM(s) Oral two times a day  tenofovir disoproxil fumarate (VIREAD) 300 milliGRAM(s) Oral daily    PRN Inpatient Medications  dextrose Oral Gel 15 Gram(s) Oral once PRN  LORazepam   Injectable 1.5 milliGRAM(s) IV Push once PRN  sodium chloride 0.65% Nasal 1 Spray(s) Both Nostrils three times a day PRN    REVIEW OF SYSTEMS  --------------------------------------------------------------------------------  Unable to obtain ROS     VITALS/PHYSICAL EXAM  --------------------------------------------------------------------------------  T(C): 36.8 (03-22-23 @ 04:47), Max: 36.8 (03-22-23 @ 04:47)  HR: 55 (03-22-23 @ 04:47) (55 - 57)  BP: 122/81 (03-22-23 @ 04:47) (108/47 - 122/81)  RR: 18 (03-22-23 @ 04:47) (18 - 18)  SpO2: 98% (03-22-23 @ 04:47) (98% - 99%)  Wt(kg): --    03-21-23 @ 07:01  -  03-22-23 @ 07:00  --------------------------------------------------------  IN: 1800 mL / OUT: 0 mL / NET: 1800 mL    Physical Exam:  Gen: Ill appearing, sitting up in bed  HEENT: Jaundiced  Pulm: CTA B/L  CV: RRR, S1S2; no rub  Abd: +BS, soft,   : No suprapubic tenderness  Neuro: Somnolent, not answering questions or following commands   Skin: Dry, without rashes    LABS/STUDIES  --------------------------------------------------------------------------------              9.5    3.48  >-----------<  32       [03-22-23 @ 06:10]              31.0     151  |  119  |  46  ----------------------------<  231      [03-22-23 @ 06:10]  4.3   |  22  |  0.74        Ca     8.6     [03-22-23 @ 06:10]      Mg     2.20     [03-22-23 @ 06:10]      Phos  1.8     [03-22-23 @ 06:10]    TPro  5.6  /  Alb  4.0  /  TBili  3.9  /  DBili  x   /  AST  64  /  ALT  36  /  AlkPhos  140  [03-21-23 @ 06:30]    PT/INR: PT 20.1 , INR 1.72       [03-22-23 @ 06:10]          [03-21-23 @ 10:49]    Creatinine Trend:  SCr 0.74 [03-22 @ 06:10]  SCr 0.88 [03-21 @ 06:30]  SCr 1.05 [03-20 @ 05:45]  SCr 1.04 [03-19 @ 19:05]  SCr 1.10 [03-19 @ 06:49]

## 2023-03-22 NOTE — PROGRESS NOTE ADULT - PROBLEM SELECTOR PLAN 3
- Na 156--159--151  - hold diuretics  - free water 600 q4   - monitor Na  - c/w D5W at 50 ml hr   - f/u renal recs

## 2023-03-22 NOTE — PROGRESS NOTE ADULT - PROBLEM SELECTOR PLAN 1
Pt. with NANCI in the setting of multiple hypotensive episodes, and anemia requiring blood transfusion. Upon review of [place], SCr was 1.18 on 3/16. Pt. had an RRT on 3/17 for hypotension. SCr subsequently increased to 1.68 on 3/17. UA showed trace proteinuria and trace ketonuria. Spot urine TP/Cr ratio was wnl at 0.2. Urine sodium was low at <20. Renal US showed BL shrunken kidneys but no evidence of hydronephrosis. Pt. with likely pre-renal NANCI that as resolved. Recommend to continue with IV D5 and free water flushes, as below. Monitor labs and urine output. Avoid nephrotoxins. Dose medications as per eGFR.

## 2023-03-23 NOTE — PROGRESS NOTE ADULT - PROBLEM SELECTOR PLAN 1
- suspect likely due to cirrhosis now recovering  - no overt evidence of GIB   - check LDH in 243 slightly above normal not indicative of hemolysis, hapto above 20, HIT ab neg, blue top  37  - off heparin  - platelets uptrend 26--32--37  - SCD for now

## 2023-03-23 NOTE — PROGRESS NOTE ADULT - ATTENDING SUPERVISION STATEMENT
Fellow
Resident
Fellow
Resident
Fellow
Resident
Resident/Fellow
Fellow
Resident

## 2023-03-23 NOTE — PROGRESS NOTE ADULT - PROBLEM SELECTOR PLAN 2
- likely d/t hypoxia/anoxic brain ischemia in s/o hemorrhagic shock on admission/hypernatremia  - MRI brain 3/15 extensive cortical restricted diffusion throughout the cerebral hemispheres including the basal ganglia and insula b/l.    - CTH (3/4/23): no acute pathology  - neuro recs appreciated, encephalopathic state 2/2 anoxic ischemic brain injury due to hypoperfusion  - EEG (3/5/23): Abnormal EEG study.  Potential epileptogenic focus in the left posterior head region. Structural of functional abnormality in the left posterior head region. Moderate nonspecific diffuse or multifocal cerebral dysfunction. No seizure seen.     repeat EEG (3/14) frequent left posterior quadrant periodic discharges, risk of seizure has decreased compared to EEG from 3/5. No seizures recorded.   - paracentesis x 3 neg for SBP  - c/w rifaximin 550 mg BID, lactulose BID, repeat ammonia level 25; encephalopathy likely unrelated to HE  - remains encephalopathic, failed S&S, c/w NGT feeds, aspiration precautions  - TSH wnl  - s/p IV thiamine  - CXR no infiltrate, c/w TF via NGT  - overall prognosis very poor, severe encephalopathy from hypoxic brain injury, ESLD/cirrhosis with poor prognosis.   - repeat S/S consult coughing across trials suggestive of impaired airway protection  - Palliative care consulted to help with GOC discussions on going with family

## 2023-03-23 NOTE — PROGRESS NOTE ADULT - SUBJECTIVE AND OBJECTIVE BOX
[facility] Division of Hospital Medicine  Nik Braden MD  Pager (M-F, 9D-8G): [phone]  Other Times:  [phone]    Patient is a 67y old  Male who presents with a chief complaint of gib, encephalopathy (19 Mar 2023 12:15)      SUBJECTIVE / OVERNIGHT EVENTS: No acute events ON; FS elevated after restarting D5     MEDICATIONS  (STANDING):  chlorhexidine 2% Cloths 1 Application(s) Topical <User Schedule>  ciprofloxacin     Tablet 500 milliGRAM(s) Oral every 24 hours  coronavirus bivalent (EUA) Booster Vaccine (PFIZER) 0.3 milliLiter(s) IntraMuscular once  dextrose 5%. 1000 milliLiter(s) (100 mL/Hr) IV Continuous <Continuous>  dextrose 5%. 1000 milliLiter(s) (50 mL/Hr) IV Continuous <Continuous>  dextrose 5%. 1000 milliLiter(s) (50 mL/Hr) IV Continuous <Continuous>  dextrose 50% Injectable 25 Gram(s) IV Push once  dextrose 50% Injectable 12.5 Gram(s) IV Push once  dextrose 50% Injectable 25 Gram(s) IV Push once  glucagon  Injectable 1 milliGRAM(s) IntraMuscular once  insulin lispro (ADMELOG) corrective regimen sliding scale   SubCutaneous every 6 hours  insulin NPH human recombinant 15 Unit(s) SubCutaneous every 6 hours  lactulose Syrup 30 Gram(s) Oral three times a day  levothyroxine Injectable 75 MICROGram(s) IV Push at bedtime  mupirocin 2% Ointment 1 Application(s) Both Nostrils two times a day  pantoprazole  Injectable 40 milliGRAM(s) IV Push daily  potassium phosphate IVPB 15 milliMole(s) IV Intermittent once  rifAXIMin 550 milliGRAM(s) Oral two times a day  tenofovir disoproxil fumarate (VIREAD) 300 milliGRAM(s) Oral daily    MEDICATIONS  (PRN):  dextrose Oral Gel 15 Gram(s) Oral once PRN Blood Glucose LESS THAN 70 milliGRAM(s)/deciliter  sodium chloride 0.65% Nasal 1 Spray(s) Both Nostrils three times a day PRN Nasal Congestion      CAPILLARY BLOOD GLUCOSE      POCT Blood Glucose.: 160 mg/dL (23 Mar 2023 12:22)  POCT Blood Glucose.: 218 mg/dL (23 Mar 2023 06:09)  POCT Blood Glucose.: 189 mg/dL (22 Mar 2023 23:38)  POCT Blood Glucose.: 213 mg/dL (22 Mar 2023 18:06)    I&O's Summary      PHYSICAL EXAM:  Vital Signs Last 24 Hrs  T(C): 36.6 (23 Mar 2023 12:25), Max: 36.9 (22 Mar 2023 20:34)  T(F): 97.9 (23 Mar 2023 12:25), Max: 98.5 (22 Mar 2023 20:34)  HR: 58 (23 Mar 2023 12:25) (50 - 68)  BP: 106/57 (23 Mar 2023 12:25) (106/57 - 126/63)  BP(mean): --  RR: 17 (23 Mar 2023 12:25) (17 - 18)  SpO2: 100% (23 Mar 2023 12:25) (100% - 100%)    Parameters below as of 23 Mar 2023 12:25  Patient On (Oxygen Delivery Method): room air    CONSTITUTIONAL: sleepy, cachetic   EYES: icteric sclera   ENMT: NGT   RESPIRATORY: Normal respiratory effort; lungs are clear to auscultation bilaterally  CARDIOVASCULAR: Regular rate and rhythm, normal S1 and S2,   ABDOMEN: Nontender to palpation, normoactive bowel sounds, + fluid wave   PSYCH: awakens to verbal stimuli but doesn't participate         LABS:                        10.1   4.69  )-----------( 37       ( 23 Mar 2023 06:20 )             33.5     03-23    147<H>  |  116<H>  |  39<H>  ----------------------------<  225<H>  4.4   |  20<L>  |  0.66    Ca    8.5      23 Mar 2023 06:20  Phos  2.3     03-23  Mg     2.40     03-23      PT/INR - ( 23 Mar 2023 06:20 )   PT: 19.1 sec;   INR: 1.64 ratio                     RADIOLOGY & ADDITIONAL TESTS:  Results Reviewed:   Imaging Personally Reviewed:  Electrocardiogram Personally Reviewed:    COORDINATION OF CARE:  Care Discussed with Consultants/Other Providers [Y/N]:  Prior or Outpatient Records Reviewed [Y/N]:

## 2023-03-23 NOTE — PROGRESS NOTE ADULT - PROBLEM SELECTOR PLAN 5
decompensated with ascites, with variceal bleed, with PSE; MELD 12 3/10/23  - s/p diagnostic para (2/25) and therapeutic para (3/5) removed 4.5L  - s/p diag/therapeutic tap on 3/15, removed 3.6L, neg for SBP; Path neg for malignancy   - c/w rifaximin BID, lactulose   - SBP ppx with Cipro due to low ascitic protein   - trend LFT, RUQ w/o pathology on 3/10  - MRI abdomen at [facility] 12/2022 no HCC  - off diuretic due to hypernatremia   - procedure team for paracentesis. Team d/w would prefer plts >50K   - hepatology following

## 2023-03-23 NOTE — PROGRESS NOTE ADULT - ATTENDING COMMENTS
NANCI  Hypernatremia    Cont free water and D5W, Na better- cont to monitor    Will sign off for now, please call with questions

## 2023-03-23 NOTE — CHART NOTE - NSCHARTNOTEFT_GEN_A_CORE
Patient with anoxic brain injury with poor prognosis. Stable from hepatology stand point and no further w/u needed at this time.  - c/w PPI daily   - c/w lactulose and rifaximin - goal 2-3 BMs/day. Avoid overdosing to cause diarrhea as this will just cause skin breakdown and encephalopathy is not due to HE at this time  - c/w tenofovir  - pending GOC and dispo; patient can follow up outpatient with Hepatology -  [phone] ([facility] at [address] [place] [address])    Hepatology will sign off. Please call back with questions.    Onelia Cee PGY-6  Gastroenterology/Hepatology Fellow  Pager #[phone] ([facility]) or [phone] ([facility])  Available on [employer] Teams.  Please contact on-call GI fellow via answering service ([phone]) after 5pm and before 8am, and on weekends.

## 2023-03-23 NOTE — PROGRESS NOTE ADULT - PROBLEM SELECTOR PLAN 3
- Na 156--159--151--147  - hold diuretics  - free water 600 q4   - monitor Na  - c/w D5W at 50 ml hr   - f/u renal recs

## 2023-03-23 NOTE — PROGRESS NOTE ADULT - PROBLEM SELECTOR PLAN 2
Pt. with hypernatremia. SNa remains elevated but improved to 149 (when corrected for hyperglycemia). Recommend to continue with IV D5, and free water flushes. Monitor SNa q12.

## 2023-03-23 NOTE — PROGRESS NOTE ADULT - PROBLEM SELECTOR PLAN 1
Pt. with NANCI in the setting of multiple hypotensive episodes, and anemia requiring blood transfusion. Upon review of [place], SCr was 1.18 on 3/16. Pt. had an RRT on 3/17 for hypotension. SCr subsequently increased to 1.68 on 3/17. UA showed trace proteinuria and trace ketonuria. Spot urine TP/Cr ratio was wnl at 0.2. Urine sodium was low at <20. Renal US showed BL shrunken kidneys but no evidence of hydronephrosis. Pt. with likely pre-renal NANCI that as resolved. Recommend to continue with IV D5 and free water flushes, as below. Monitor labs and urine output. Avoid nephrotoxins. Dose medications as per eGFR.

## 2023-03-23 NOTE — PROGRESS NOTE ADULT - PROBLEM SELECTOR PROBLEM 8
Hypothyroidism Ear Star Wedge Flap Text: The defect edges were debeveled with a #15 blade scalpel.  Given the location of the defect and the proximity to free margins (helical rim) an ear star wedge flap was deemed most appropriate.  Using a sterile surgical marker, the appropriate flap was drawn incorporating the defect and placing the expected incisions between the helical rim and antihelix where possible.  The area thus outlined was incised through and through with a #15 scalpel blade.

## 2023-03-23 NOTE — PROGRESS NOTE ADULT - PROBLEM SELECTOR PLAN 4
HbA1c 6.1% likely underestimated due transfusions given insulin needs  - c/w TF Glucerna  - noted hypoglycemia 3/20 resolved   - noted mildly elevated FS on D5 will change NPH to 15 U q6

## 2023-03-23 NOTE — PROGRESS NOTE ADULT - PROBLEM SELECTOR PLAN 6
acute blood loss anemia c/b hypovolemic shock s/p levophed and ICU  - Hb 6.9 on admit now s/p 6 units of pRBC, 1 unit FFP, 1 unit of platelets all on 2/24/23, 1u prbc 3/18  - s/p octreotide x 72h (2/24-2/27)   - trend CBC, transfuse prn.  - s/p EGD 2/24: large (> 5 mm) esophageal varices. Incompletely eradicated. Banded. Normal duodenal bulb, first portion of the duodenum and second portion of the duodenum.                 - EGD in 4 wks for likely repeat banding  - SBP ppx cipro  - BP in 90's given 1L LR on 3/17 concern for variceal bleed  - transfuse 1 unit prbc on 3/18 for hgb drop from 11.1 to 7.9, nurse reports no obvious dark BM  - TTE with preserved LVEF 53%  - restarted on 3/18 octreotide gtt and iv protonix 40 bid; Hepatology recommend to stop octreotide drip and change to PPI to qd  - completed albumin 250ml q8x3 doses,   - BP acceptable off midodrine   - hold diuretic for now  - trend CBC, transfuse prn  - monitor BP

## 2023-03-23 NOTE — PROGRESS NOTE ADULT - SUBJECTIVE AND OBJECTIVE BOX
[facility] DIVISION OF KIDNEY DISEASES AND HYPERTENSION   FOLLOW UP NOTE  --------------------------------------------------------------------------------  HPI: 67M PMH DM, HTN, hypothyroidism, CAD, TANG cirrhosis, follicular lymphoma on rituximab, Hep B. on tenofovir, status post CABG, initially presented to ED w/ chest pain and constipation, developed hematemesis, now s/p MICU admission for acute blood loss anemia c/b hypovolemic shock , intubated for airway protection (extubated 3/8), s/p 2/24 bedside EGD with banding esophageal varices now with persistent encephalopathy, possibly 2/2 hypoxia/anoxic brain ischemia in s/o hemorrhagic shock on admission. Pt. being seen for NANCI and hypernatremia.    24 hour events/subjective: Pt. was seen and evaluated at bedside this morning. Remains somnolent, not answering or following commands. Did not appear in distress. Remains on IVFs.     PAST HISTORY  --------------------------------------------------------------------------------  No significant changes to PMH, PSH, FHx, SHx, unless otherwise noted    ALLERGIES & MEDICATIONS  --------------------------------------------------------------------------------  Allergies  [This allergen will not trigger allergy alert] &quot;lentils&quot;-&quot;itchiness&quot; (Other)  Beef (Other)  No Known Drug Allergies    Standing Inpatient Medications  chlorhexidine 2% Cloths 1 Application(s) Topical <User Schedule>  ciprofloxacin     Tablet 500 milliGRAM(s) Oral every 24 hours  coronavirus bivalent (EUA) Booster Vaccine (PFIZER) 0.3 milliLiter(s) IntraMuscular once  dextrose 5%. 1000 milliLiter(s) IV Continuous <Continuous>  dextrose 5%. 1000 milliLiter(s) IV Continuous <Continuous>  dextrose 5%. 1000 milliLiter(s) IV Continuous <Continuous>  dextrose 50% Injectable 25 Gram(s) IV Push once  dextrose 50% Injectable 12.5 Gram(s) IV Push once  dextrose 50% Injectable 25 Gram(s) IV Push once  glucagon  Injectable 1 milliGRAM(s) IntraMuscular once  insulin lispro (ADMELOG) corrective regimen sliding scale   SubCutaneous every 6 hours  insulin NPH human recombinant 13 Unit(s) SubCutaneous every 6 hours  lactulose Syrup 30 Gram(s) Oral three times a day  levothyroxine Injectable 75 MICROGram(s) IV Push at bedtime  mupirocin 2% Ointment 1 Application(s) Both Nostrils two times a day  pantoprazole  Injectable 40 milliGRAM(s) IV Push daily  potassium phosphate IVPB 15 milliMole(s) IV Intermittent once  rifAXIMin 550 milliGRAM(s) Oral two times a day  tenofovir disoproxil fumarate (VIREAD) 300 milliGRAM(s) Oral daily    PRN Inpatient Medications  dextrose Oral Gel 15 Gram(s) Oral once PRN  LORazepam   Injectable 1.5 milliGRAM(s) IV Push once PRN  sodium chloride 0.65% Nasal 1 Spray(s) Both Nostrils three times a day PRN    REVIEW OF SYSTEMS  --------------------------------------------------------------------------------  Unable to obtain ROS     VITALS/PHYSICAL EXAM  --------------------------------------------------------------------------------  T(C): 36.4 (03-23-23 @ 04:34), Max: 36.9 (03-22-23 @ 20:34)  HR: 68 (03-23-23 @ 04:34) (50 - 68)  BP: 126/63 (03-23-23 @ 04:34) (116/53 - 126/63)  RR: 18 (03-23-23 @ 04:34) (18 - 18)  SpO2: 100% (03-23-23 @ 04:34) (100% - 100%)  Wt(kg): --    Physical Exam:  Gen: Ill appearing, sitting up in bed  HEENT: Jaundiced  Pulm: CTA B/L  CV: RRR, S1S2; no rub  Abd: +BS, soft,   : No suprapubic tenderness  Neuro: Somnolent, not answering questions or following commands   Skin: Dry, without rashes    LABS/STUDIES  --------------------------------------------------------------------------------              10.1   4.69  >-----------<  37       [03-23-23 @ 06:20]              33.5     147  |  116  |  39  ----------------------------<  225      [03-23-23 @ 06:20]  4.4   |  20  |  0.66        Ca     8.5     [03-23-23 @ 06:20]      Mg     2.40     [03-23-23 @ 06:20]      Phos  2.3     [03-23-23 @ 06:20]      PT/INR: PT 19.1 , INR 1.64       [03-23-23 @ 06:20]          [03-21-23 @ 10:49]    Creatinine Trend:  SCr 0.66 [03-23 @ 06:20]  SCr 0.74 [03-22 @ 06:10]  SCr 0.88 [03-21 @ 06:30]  SCr 1.05 [03-20 @ 05:45]  SCr 1.04 [03-19 @ 19:05]

## 2023-03-24 NOTE — PROGRESS NOTE ADULT - PROBLEM SELECTOR PLAN 2
- hold diuretics  - free water 600 q4   - monitor Na  - Na 156--159--151--147--148  - increase D5W at 70 ml hr

## 2023-03-24 NOTE — PROGRESS NOTE ADULT - PROBLEM SELECTOR PLAN 1
- MRI showing anoxic brain injury   - suspected from initial hemorrhagic shock from hematemesis in ED   - per neuro, prognosis uncertain, early to predict prognosis, has a chance of recovery, suggest rehab eval   - per rehab not a candidate since he does not follow commands   - poor functional status PPS 10%, with NGT  - Failed S&S

## 2023-03-24 NOTE — PROGRESS NOTE ADULT - PROBLEM SELECTOR PLAN 4
decompensated with ascites, with variceal bleed, with PSE; MELD 12 3/10/23  - s/p diagnostic para (2/25) and therapeutic para (3/5) removed 4.5L  - s/p diag/therapeutic tap on 3/15, removed 3.6L, neg for SBP; Path neg for malignancy   - c/w rifaximin BID, lactulose   - SBP ppx with Cipro due to low ascitic protein   - trend LFT, RUQ w/o pathology on 3/10  - MRI abdomen at [facility] 12/2022 no HCC  - off diuretic due to hypernatremia   - procedure team for paracentesis. Team d/w would prefer plts >50K; re- consult procedure team 3/27 for paracentesis

## 2023-03-24 NOTE — PROGRESS NOTE ADULT - PROBLEM SELECTOR PLAN 3
HbA1c 6.1% likely underestimated due transfusions given insulin needs  - c/w TF Glucerna  - noted hypoglycemia 3/20 resolved   - noted mildly elevated FS on D5 will change NPH to 16 U q6

## 2023-03-24 NOTE — PROGRESS NOTE ADULT - PROBLEM SELECTOR PLAN 5
-See [facility] note. I spent 30 minutes addressing advanced care planning with patient and/or decision maker(s)   -Decision makers: patient has four children and a wife, they make decisions as a unit.   -3/20- Spoke to eldest daughter Romana and youngest son Selina. Family have hope for a recovery. Recommended DNR/DNI, family will think about it.  -3/21-No decision on code status, waiting to speak to "family doctors" but seems like not willing to make decision anytime soon.   - Family has poor insight and poor health literacy, want all available medical interventions despite poor prognosis -See [facility] note. I spent 60 minutes addressing advanced care planning with patient and/or decision maker(s)   -Decision makers: daughter Romana and son Selina  -3/20- Spoke to eldest daughter Romana and youngest son Selina. Family have hope for a recovery. Recommended DNR/DNI, family will think about it.  -3/21-No decision on code status, waiting to speak to "family doctors" but seems like not willing to make decision anytime soon.   - Family has poor insight and poor health literacy, want all available medical interventions despite poor prognosis -See [facility] note. I spent 60 minutes addressing advanced care planning with patient and/or decision maker(s)   -Decision makers: daughter Romana and son Selina  - 3/20- Spoke to eldest daughter Romana and youngest son Selina. Family have hope for a recovery. Recommended DNR/DNI, family will think about it.  - 3/21-No decision on code status, waiting to speak to "family doctors" but seems like not willing to make decision anytime soon.   - 3/24- second family meeting, family still not accepting of end of life and want all available medical interventions   - Family has poor insight and poor health literacy, want all available medical interventions despite poor prognosis

## 2023-03-24 NOTE — PROGRESS NOTE ADULT - CONVERSATION DETAILS
Spoke to patient's daughter Romana and son Selina about patient's deteriorating condition. Medical course was reviewed. Discussed that due his devastating neurologic injury along with end stage cirrhosis, his condition was irreversible and he has approached end of life. Explained that patient was a hospice candidate for end of life care, discussed hospice philosophy and services. Family initially seemed interested in taking him home and acknowledged he has limited time. However they are having difficulty with accepting the idea of not bringing him back for further hospitalizations. They are also very much focused again on a repeat paracentesis and another swallow evaluation without NGT. It was discussed that paracentesis was not indicated at this time since patient is not having symptoms of distress. However son disagreed and would still like it to be pursued with platelet transfusion. They feel strongly to have this procedure done. Also addressed that NGT should be taken out prior to discharge. Family also not accepting of this as they think he will die quickly without food or water. Explained the natural process of dying. However family is not accepting of this as well. Son also seemed interested in a PEG tube if needed. Pointed out that patient has been receiving feeds through NGT during hospitalization and he has not improved. Son would also like another [id number] without NGT because he believes patient is scared to swallow with an NGT. Despite extensive education of natural process of dying, and that focus should be on deescalation of care, family is insisting on pursuing medical interventions. Son listed out interventions he would like: platelet transfusions to help obtain a repeat paracentesis, and then NGT removal for a swallow re-eval. After these interventions they are willing to take him home. Son also adds that patient thinks the hospital is home. Spoke to patient's daughter Romana and son Selina about patient's deteriorating condition. Medical course was reviewed. Discussed that due to his devastating neurologic injury along with end stage cirrhosis, his condition was irreversible and he has approached end of life. Explained that patient was a hospice candidate for end of life care, discussed hospice philosophy and services. Family initially seemed interested in taking him home and acknowledged he has limited time. However they are having difficulty with accepting the idea of not bringing him back for further hospitalizations.     They are also very much focused again on a repeat paracentesis and another swallow evaluation without NGT. It was discussed that paracentesis was not indicated at this time since patient is not having symptoms of distress. However son disagreed and would still like it to be pursued with platelet transfusion. They feel strongly to have this procedure done. Also addressed that NGT should be taken out prior to discharge. Family also not accepting of this as they think he will die quickly without food or water. Explained the natural process of dying. However family is not accepting of this as well.     Son also seemed interested in a PEG tube if needed. Pointed out that patient has been receiving feeds through NGT during hospitalization and he has not improved. Son would also like another [id number] without NGT because he believes patient is scared to swallow with an NGT. Pointed out doubtful patient is cognizant of what is happening, however son again believes strongly about this intervention. Sons believe not pursuing such measures is "unethical." Family adds that patient has been "mistreated."     Despite extensive education of natural process of dying, and that focus should be on deescalation of care, family is insisting on pursuing medical interventions. Son listed out interventions he would like: platelet transfusions to help obtain a repeat paracentesis, and then NGT removal for a swallow re-eval. After these interventions they are willing to take him home. Son also adds that patient thinks the hospital is home.

## 2023-03-24 NOTE — PROGRESS NOTE ADULT - PROBLEM SELECTOR PLAN 11
- family reports follows with oncology at [facility]; outpt records in chart

## 2023-03-24 NOTE — PROGRESS NOTE ADULT - PROBLEM SELECTOR PLAN 3
- was following at [facility], previously low MELD 8   - 3/20- MELD Na 19, 3-4% estimated 90 day mortality   - Had 3 perlita inpatient, pending repeat  - also with Hep B, on tenofovir

## 2023-03-24 NOTE — PROGRESS NOTE ADULT - PROBLEM SELECTOR PLAN 4
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

## 2023-03-24 NOTE — PROGRESS NOTE ADULT - PROBLEM SELECTOR PLAN 6
Palliative consulted for complex medical decision making in the setting of serious illness. Palliative consulted for complex medical decision making in the setting of serious illness.  After multiple discussions, family have poor insight and not accepting of patient's poor prognosis.   Palliative signing off

## 2023-03-24 NOTE — PROGRESS NOTE ADULT - PROBLEM SELECTOR PLAN 1
- likely d/t hypoxia/anoxic brain ischemia in s/o hemorrhagic shock on admission/hypernatremia  - MRI brain 3/15 extensive cortical restricted diffusion throughout the cerebral hemispheres including the basal ganglia and insula b/l.    - CTH (3/4/23): no acute pathology  - neuro recs appreciated, encephalopathic state 2/2 anoxic ischemic brain injury due to hypoperfusion  - EEG (3/5/23): Abnormal EEG study.  Potential epileptogenic focus in the left posterior head region. Structural of functional abnormality in the left posterior head region. Moderate nonspecific diffuse or multifocal cerebral dysfunction. No seizure seen.     repeat EEG (3/14) frequent left posterior quadrant periodic discharges, risk of seizure has decreased compared to EEG from 3/5. No seizures recorded.   - paracentesis x 3 neg for SBP  - c/w rifaximin 550 mg BID, lactulose BID, repeat ammonia level 25; encephalopathy likely unrelated to HE  - remains encephalopathic, failed S&S, c/w NGT feeds, aspiration precautions  - TSH wnl  - s/p IV thiamine  - c/w TF via NGT.   - repeat S/S consult coughing across trials suggestive of impaired airway protection  - Palliative care and myself had GOC this AM with son and daughter. Likely current scenario is related to anoxic brain injury; explained despite nutrition clinically unimproved. d/w Hepatology in regards to PEG pt is at increased risk of peritonitis. Also discussed paracentesis at this time asymptomatic no urgent need for paracentesis. low suspicion of infection. As per hepatology no significant risk of bleeding with paracentesis in cirrhotic with elevated PT/INR and low platelets.

## 2023-03-24 NOTE — PROGRESS NOTE ADULT - PROBLEM SELECTOR PLAN 5
- suspect likely due to cirrhosis now recovering  - no overt evidence of GIB   - check LDH in 243 slightly above normal not indicative of hemolysis, hapto above 20, HIT ab neg, blue top  37  - off heparin  - platelets uptrend 26--32--37--38  - SCD for now

## 2023-03-24 NOTE — PROGRESS NOTE ADULT - SUBJECTIVE AND OBJECTIVE BOX
Indication of Geriatrics and Palliative Medicine Services:  [X  ] Complex Medical Decision Making   [  ] Symptom/Pain management     DNR on chart: No     INTERVAL EVENTS:         -------------------------------------------------------------------------------------------------------    PRESENT SYMPTOMS:     [ ] No     [X ] Unable to self-report      [ ] CPOT (ICU)     [ ] PAINADs      [X ] RDOS 0    [ ] Yes     Source if other than patient:  [ ]Family   [ ]Team     PAIN:   If blank, patient unable to specify   [ ]yes [ ]no  QOL impact-   Location -                    Aggravating factors -  Quality -  Radiation -  Timing-  Pain at most severe level (0-10 scale):  Pain at minimal acceptable level/Pain Goal (0-10 scale):     SYMPTOMS:   Dyspnea:                           [ ]Mild [ ]Moderate [ ]Severe  Anxiety:                             [ ]Mild [ ]Moderate [ ]Severe  Fatigue:                             [ ]Mild [ ]Moderate [ ]Severe  Nausea/Vomiting:              [ ]Mild [ ]Moderate [ ]Severe  Loss of appetite:                [ ]Mild [ ]Moderate [ ]Severe  Constipation:                     [ ]Mild [ ]Moderate [ ]Severe    Other Symptoms:  [X ]All other review of systems negative     Home Medications for symptoms if any:  I-Stop Reference No:(from initial)     -------------------------------------------------------------------------------------------------------    ITEMS UNCHECKED ARE NOT PRESENT    PHYSICAL:  Vital Signs Last 24 Hrs  T(C): 36.9 (21 Mar 2023 04:30), Max: 36.9 (21 Mar 2023 04:30)  T(F): 98.4 (21 Mar 2023 04:30), Max: 98.4 (21 Mar 2023 04:30)  HR: 55 (21 Mar 2023 04:30) (55 - 98)  BP: 120/43 (21 Mar 2023 04:30) (112/50 - 127/53)  BP(mean): --  RR: 18 (21 Mar 2023 04:30) (18 - 18)  SpO2: 98% (21 Mar 2023 04:30) (98% - 100%)    Parameters below as of 21 Mar 2023 04:30  Patient On (Oxygen Delivery Method): room air     I&O's Summary    20 Mar 2023 07:01  -  21 Mar 2023 07:00  --------------------------------------------------------  IN: 1520 mL / OUT: 0 mL / NET: 1520 mL    GENERAL:  [X ]Cachexia  [X ] Frail  [ ]Awake  [ ]Oriented x   [X ]Lethargic  [ ]Unarousable  [ ]Verbal  [X ]Non-Verbal    BEHAVIORAL:   [ ] Anxiety  [ ] Delirium [ ] Agitation [ ] Other    HEENT:   [ ]Normal   [X ]Dry mouth   [ ]ET Tube/Trach  [ ]Oral lesions    PULMONARY:   [X ]Clear [ ]Tachypnea  [ ]Audible excessive secretions   [ ]Rhonchi        [ ]Right [ ]Left [ ]Bilateral  [ ]Crackles        [ ]Right [ ]Left [ ]Bilateral  [ ]Wheezing     [ ]Right [ ]Left [ ]Bilateral  [ ]Diminished breath sounds [ ]right [ ]left [ ]bilateral    CARDIOVASCULAR:    [X ]Regular [ ]Irregular [ ]Tachy  [ ]Rafal [ ]Murmur [ ]Other    GASTROINTESTINAL:  [ ]Soft  [X ]Distended   [X ]+BS  [ ]Non tender [ ]Tender  [ ]Other [ ]PEG [X ]OGT/ NGT      GENITOURINARY:  [ ]Normal [X ] Incontinent   [ ]Oliguria/Anuria   [ ]Henderson    MUSCULOSKELETAL:   [ ]Normal   [ ]Weakness  [X ]Bed/Wheelchair bound [ ]Edema    NEUROLOGIC:   [X ]No focal deficits  [ ]Cognitive impairment  [ ]Dysphagia [ ]Dysarthria [ ]Paresis [ ]Other     SKIN:   [ ]Normal  [ ]Rash  [ ]Other  [X ]Pressure ulcer(s)       Present on admission [ ]y [ ]n    -------------------------------------------------------------------------------------------------------    LABS:                        9.1    3.33  )-----------( 26       ( 21 Mar 2023 06:30 )             30.2   03-21    159<H>  |  124<H>  |  60<H>  ----------------------------<  184<H>  4.5   |  21<L>  |  0.88    Ca    8.9      21 Mar 2023 06:30  Phos  2.2     03-21  Mg     2.70     03-21    TPro  5.6<L>  /  Alb  4.0  /  TBili  3.9<H>  /  DBili  x   /  AST  64<H>  /  ALT  36  /  AlkPhos  140<H>  03-21  PT/INR - ( 21 Mar 2023 06:30 )   PT: 21.6 sec;   INR: 1.85 ratio      PTT - ( 20 Mar 2023 05:45 )  PTT:56.5 sec    Ferritin, Serum: 56 ng/mL (03-19-23 @ 06:49)    -------------------------------------------------------------------------------------------------------  RADIOLOGY & ADDITIONAL STUDIES:     < from: CT Head No Cont (03.04.23 @ 17:38) >    IMPRESSION:  No CT evidence of acute intracranial pathology.  Trace mastoid effusions bilaterally.    < end of copied text >    < from: US Abdomen Upper Quadrant Right (03.10.23 @ 18:18) >  IMPRESSION:  Cirrhosis. Limited visualization of the liver.    Moderate amount of ascites.    < end of copied text >    < from: MR Head w/wo IV Cont (03.15.23 @ 19:40) >  IMPRESSION:    Extensive cortical predominant restricted diffusion throughout the   cerebral hemispheres including involvement of the basal ganglia and   insula bilaterally. Given history of hematemesis with hypotension,   hypoxic-ischemic injury is favored. Differential also includes postictal   sequelae, Creutzfeldt-Gonzalez disease, encephalitis or lymphomatous   involvement. CSF sampling should be considered.    < end of copied text >    < from: US Kidney and Bladder (03.17.23 @ 14:13) >  IMPRESSION:  *  Both kidneys are small in size.  *  No hydronephrosis.    < end of copied text >    -------------------------------------------------------------------------------------------------------  MEDICATIONS:     MEDICATIONS  (STANDING):  chlorhexidine 2% Cloths 1 Application(s) Topical <User Schedule>  ciprofloxacin     Tablet 500 milliGRAM(s) Oral every 24 hours  coronavirus bivalent (EUA) Booster Vaccine (PFIZER) 0.3 milliLiter(s) IntraMuscular once  dextrose 5%. 1000 milliLiter(s) (50 mL/Hr) IV Continuous <Continuous>  dextrose 5%. 1000 milliLiter(s) (50 mL/Hr) IV Continuous <Continuous>  dextrose 5%. 1000 milliLiter(s) (100 mL/Hr) IV Continuous <Continuous>  dextrose 50% Injectable 25 Gram(s) IV Push once  dextrose 50% Injectable 12.5 Gram(s) IV Push once  dextrose 50% Injectable 25 Gram(s) IV Push once  glucagon  Injectable 1 milliGRAM(s) IntraMuscular once  insulin lispro (ADMELOG) corrective regimen sliding scale   SubCutaneous every 6 hours  insulin NPH human recombinant 12 Unit(s) SubCutaneous every 6 hours  lactulose Syrup 30 Gram(s) Oral three times a day  levothyroxine Injectable 75 MICROGram(s) IV Push at bedtime  mupirocin 2% Ointment 1 Application(s) Both Nostrils two times a day  pantoprazole  Injectable 40 milliGRAM(s) IV Push daily  rifAXIMin 550 milliGRAM(s) Oral two times a day  tenofovir disoproxil fumarate (VIREAD) 300 milliGRAM(s) Oral daily    MEDICATIONS  (PRN):  dextrose Oral Gel 15 Gram(s) Oral once PRN Blood Glucose LESS THAN 70 milliGRAM(s)/deciliter  LORazepam   Injectable 1.5 milliGRAM(s) IV Push once PRN pre-MRI  sodium chloride 0.65% Nasal 1 Spray(s) Both Nostrils three times a day PRN Nasal Congestion    -------------------------------------------------------------------------------------------------------    CRITICAL CARE:  [ ]Shock Present  [ ]Septic [ ]Cardiogenic [ ]Neurologic [ ]Hypovolemic [ ]Undifferentiated    [ ]Vasopressors [ ]Inotropes    [ ]Respiratory failure present   [ ]Acute  [ ]Chronic [ ]Hypoxic  [ ]Hypercarbic [ ]Mixed   [ ]Mechanical Ventilation [ ]Non-invasive ventilatory support [ ]High-Flow     [ ]Other organ failure     -------------------------------------------------------------------------------------------------------  REFERRALS:   [ ]Chaplaincy  [ ]Hospice  [ ]Child Life  [ ]Social Work  [ ]Case management [ ]Holistic Therapy    Indication of Geriatrics and Palliative Medicine Services:  [X  ] Complex Medical Decision Making   [  ] Symptom/Pain management     DNR on chart: No     INTERVAL EVENTS: Patient seen this AM, unarousable. See below for GOC.     -------------------------------------------------------------------------------------------------------    PRESENT SYMPTOMS:     [ ] No     [X ] Unable to self-report      [ ] CPOT (ICU)     [ ] PAINADs      [X ] RDOS 0    [ ] Yes     Source if other than patient:  [ ]Family   [ ]Team     PAIN:   If blank, patient unable to specify   [ ]yes [ ]no  QOL impact-   Location -                    Aggravating factors -  Quality -  Radiation -  Timing-  Pain at most severe level (0-10 scale):  Pain at minimal acceptable level/Pain Goal (0-10 scale):     SYMPTOMS:   Dyspnea:                           [ ]Mild [ ]Moderate [ ]Severe  Anxiety:                             [ ]Mild [ ]Moderate [ ]Severe  Fatigue:                             [ ]Mild [ ]Moderate [ ]Severe  Nausea/Vomiting:              [ ]Mild [ ]Moderate [ ]Severe  Loss of appetite:                [ ]Mild [ ]Moderate [ ]Severe  Constipation:                     [ ]Mild [ ]Moderate [ ]Severe    Other Symptoms:  [X ]All other review of systems negative     Home Medications for symptoms if any:  I-Stop Reference No:(from initial)     -------------------------------------------------------------------------------------------------------    ITEMS UNCHECKED ARE NOT PRESENT    PHYSICAL:  Vital Signs Last 24 Hrs  T(C): 36.2 (24 Mar 2023 12:24), Max: 36.6 (23 Mar 2023 20:20)  T(F): 97.2 (24 Mar 2023 12:24), Max: 97.8 (23 Mar 2023 20:20)  HR: 62 (24 Mar 2023 12:24) (57 - 63)  BP: 139/57 (24 Mar 2023 12:24) (123/61 - 148/75)  BP(mean): --  RR: 20 (24 Mar 2023 12:24) (17 - 20)  SpO2: 100% (24 Mar 2023 12:24) (96% - 100%)    Parameters below as of 24 Mar 2023 12:24  Patient On (Oxygen Delivery Method): room air      GENERAL:  [X ]Cachexia  [X ] Frail  [ ]Awake  [ ]Oriented x   [X ]Lethargic  [ ]Unarousable  [ ]Verbal  [X ]Non-Verbal    BEHAVIORAL:   [ ] Anxiety  [ ] Delirium [ ] Agitation [ ] Other    HEENT:   [ ]Normal   [X ]Dry mouth   [ ]ET Tube/Trach  [ ]Oral lesions    PULMONARY:   [X ]Clear [ ]Tachypnea  [ ]Audible excessive secretions   [ ]Rhonchi        [ ]Right [ ]Left [ ]Bilateral  [ ]Crackles        [ ]Right [ ]Left [ ]Bilateral  [ ]Wheezing     [ ]Right [ ]Left [ ]Bilateral  [ ]Diminished breath sounds [ ]right [ ]left [ ]bilateral    CARDIOVASCULAR:    [X ]Regular [ ]Irregular [ ]Tachy  [ ]Rafal [ ]Murmur [ ]Other    GASTROINTESTINAL:  [ ]Soft  [X ]Distended   [X ]+BS  [ ]Non tender [ ]Tender  [ ]Other [ ]PEG [X ]OGT/ NGT      GENITOURINARY:  [ ]Normal [X ] Incontinent   [ ]Oliguria/Anuria   [ ]Henderson    MUSCULOSKELETAL:   [ ]Normal   [ ]Weakness  [X ]Bed/Wheelchair bound [ ]Edema    NEUROLOGIC:   [X ]No focal deficits  [ ]Cognitive impairment  [ ]Dysphagia [ ]Dysarthria [ ]Paresis [ ]Other     SKIN:   [ ]Normal  [ ]Rash  [ ]Other  [X ]Pressure ulcer(s)       Present on admission [ ]y [ ]n    -------------------------------------------------------------------------------------------------------    LABS:                                   10.2   4.80  )-----------( 38       ( 24 Mar 2023 05:41 )             34.1     03-24    148<H>  |  118<H>  |  37<H>  ----------------------------<  224<H>  5.0   |  20<L>  |  0.57    Ca    8.4      24 Mar 2023 05:41  Phos  2.3     03-24  Mg     2.40     03-24      -------------------------------------------------------------------------------------------------------  RADIOLOGY & ADDITIONAL STUDIES:     < from: CT Head No Cont (03.04.23 @ 17:38) >    IMPRESSION:  No CT evidence of acute intracranial pathology.  Trace mastoid effusions bilaterally.    < end of copied text >    < from: US Abdomen Upper Quadrant Right (03.10.23 @ 18:18) >  IMPRESSION:  Cirrhosis. Limited visualization of the liver.    Moderate amount of ascites.    < end of copied text >    < from: MR Head w/wo IV Cont (03.15.23 @ 19:40) >  IMPRESSION:    Extensive cortical predominant restricted diffusion throughout the   cerebral hemispheres including involvement of the basal ganglia and   insula bilaterally. Given history of hematemesis with hypotension,   hypoxic-ischemic injury is favored. Differential also includes postictal   sequelae, Creutzfeldt-Gonzalez disease, encephalitis or lymphomatous   involvement. CSF sampling should be considered.    < end of copied text >    < from: US Kidney and Bladder (03.17.23 @ 14:13) >  IMPRESSION:  *  Both kidneys are small in size.  *  No hydronephrosis.    < end of copied text >    -------------------------------------------------------------------------------------------------------  MEDICATIONS:     MEDICATIONS  (STANDING):  chlorhexidine 2% Cloths 1 Application(s) Topical <User Schedule>  ciprofloxacin     Tablet 500 milliGRAM(s) Oral every 24 hours  coronavirus bivalent (EUA) Booster Vaccine (PFIZER) 0.3 milliLiter(s) IntraMuscular once  dextrose 5%. 1000 milliLiter(s) (100 mL/Hr) IV Continuous <Continuous>  dextrose 5%. 1000 milliLiter(s) (50 mL/Hr) IV Continuous <Continuous>  dextrose 5%. 1000 milliLiter(s) (50 mL/Hr) IV Continuous <Continuous>  dextrose 50% Injectable 25 Gram(s) IV Push once  dextrose 50% Injectable 12.5 Gram(s) IV Push once  dextrose 50% Injectable 25 Gram(s) IV Push once  glucagon  Injectable 1 milliGRAM(s) IntraMuscular once  insulin lispro (ADMELOG) corrective regimen sliding scale   SubCutaneous every 6 hours  insulin NPH human recombinant 15 Unit(s) SubCutaneous every 6 hours  lactulose Syrup 20 Gram(s) Oral two times a day  levothyroxine Injectable 75 MICROGram(s) IV Push at bedtime  mupirocin 2% Ointment 1 Application(s) Both Nostrils two times a day  pantoprazole  Injectable 40 milliGRAM(s) IV Push daily  rifAXIMin 550 milliGRAM(s) Oral two times a day  tenofovir disoproxil fumarate (VIREAD) 300 milliGRAM(s) Oral daily    MEDICATIONS  (PRN):  dextrose Oral Gel 15 Gram(s) Oral once PRN Blood Glucose LESS THAN 70 milliGRAM(s)/deciliter  sodium chloride 0.65% Nasal 1 Spray(s) Both Nostrils three times a day PRN Nasal Congestion    -------------------------------------------------------------------------------------------------------    CRITICAL CARE:  [ ]Shock Present  [ ]Septic [ ]Cardiogenic [ ]Neurologic [ ]Hypovolemic [ ]Undifferentiated    [ ]Vasopressors [ ]Inotropes    [ ]Respiratory failure present   [ ]Acute  [ ]Chronic [ ]Hypoxic  [ ]Hypercarbic [ ]Mixed   [ ]Mechanical Ventilation [ ]Non-invasive ventilatory support [ ]High-Flow     [ ]Other organ failure     -------------------------------------------------------------------------------------------------------  REFERRALS:   [ ]Chaplaincy  [ ]Hospice  [ ]Child Life  [ ]Social Work  [ ]Case management [ ]Holistic Therapy

## 2023-03-24 NOTE — PROGRESS NOTE ADULT - SUBJECTIVE AND OBJECTIVE BOX
[facility] Division of Hospital Medicine  Nik Braden MD  Pager (M-F, 5P-3Z): [phone]  Other Times:  [phone]    Patient is a 67y old  Male who presents with a chief complaint of gib, encephalopathy (19 Mar 2023 12:15)      SUBJECTIVE / OVERNIGHT EVENTS: Pt in NAD getting cleaned by staff report does not follow commands; d/w RN ensure free water being given       MEDICATIONS  (STANDING):  chlorhexidine 2% Cloths 1 Application(s) Topical <User Schedule>  ciprofloxacin     Tablet 500 milliGRAM(s) Oral every 24 hours  coronavirus bivalent (EUA) Booster Vaccine (PFIZER) 0.3 milliLiter(s) IntraMuscular once  dextrose 5%. 1000 milliLiter(s) (100 mL/Hr) IV Continuous <Continuous>  dextrose 5%. 1000 milliLiter(s) (50 mL/Hr) IV Continuous <Continuous>  dextrose 5%. 1000 milliLiter(s) (50 mL/Hr) IV Continuous <Continuous>  dextrose 50% Injectable 25 Gram(s) IV Push once  dextrose 50% Injectable 12.5 Gram(s) IV Push once  dextrose 50% Injectable 25 Gram(s) IV Push once  glucagon  Injectable 1 milliGRAM(s) IntraMuscular once  insulin lispro (ADMELOG) corrective regimen sliding scale   SubCutaneous every 6 hours  insulin NPH human recombinant 15 Unit(s) SubCutaneous every 6 hours  lactulose Syrup 20 Gram(s) Oral two times a day  levothyroxine Injectable 75 MICROGram(s) IV Push at bedtime  mupirocin 2% Ointment 1 Application(s) Both Nostrils two times a day  pantoprazole  Injectable 40 milliGRAM(s) IV Push daily  rifAXIMin 550 milliGRAM(s) Oral two times a day  tenofovir disoproxil fumarate (VIREAD) 300 milliGRAM(s) Oral daily    MEDICATIONS  (PRN):  dextrose Oral Gel 15 Gram(s) Oral once PRN Blood Glucose LESS THAN 70 milliGRAM(s)/deciliter  sodium chloride 0.65% Nasal 1 Spray(s) Both Nostrils three times a day PRN Nasal Congestion      CAPILLARY BLOOD GLUCOSE      POCT Blood Glucose.: 196 mg/dL (24 Mar 2023 12:15)  POCT Blood Glucose.: 206 mg/dL (24 Mar 2023 05:53)  POCT Blood Glucose.: 213 mg/dL (24 Mar 2023 00:09)  POCT Blood Glucose.: 119 mg/dL (23 Mar 2023 18:08)    I&O's Summary      PHYSICAL EXAM:  Vital Signs Last 24 Hrs  T(C): 36.2 (24 Mar 2023 12:24), Max: 36.6 (23 Mar 2023 20:20)  T(F): 97.2 (24 Mar 2023 12:24), Max: 97.8 (23 Mar 2023 20:20)  HR: 62 (24 Mar 2023 12:24) (57 - 63)  BP: 139/57 (24 Mar 2023 12:24) (123/61 - 148/75)  BP(mean): --  RR: 20 (24 Mar 2023 12:24) (17 - 20)  SpO2: 100% (24 Mar 2023 12:24) (96% - 100%)    Parameters below as of 24 Mar 2023 12:24  Patient On (Oxygen Delivery Method): room air    CONSTITUTIONAL: cachetic   EYES: icteric sclera   ENMT: NGT   RESPIRATORY: Normal respiratory effort; lungs are clear to auscultation bilaterally  CARDIOVASCULAR: Regular rate and rhythm, normal S1 and S2,   ABDOMEN: Nontender to palpation, normoactive bowel sounds, + fluid wave   PSYCH: awakens to verbal stimuli but doesn't participate     LABS:                        10.2   4.80  )-----------( 38       ( 24 Mar 2023 05:41 )             34.1     03-24    148<H>  |  118<H>  |  37<H>  ----------------------------<  224<H>  5.0   |  20<L>  |  0.57    Ca    8.4      24 Mar 2023 05:41  Phos  2.3     03-24  Mg     2.40     03-24      PT/INR - ( 24 Mar 2023 05:41 )   PT: 19.3 sec;   INR: 1.66 ratio                     RADIOLOGY & ADDITIONAL TESTS:  Results Reviewed:   Imaging Personally Reviewed:  Electrocardiogram Personally Reviewed:    COORDINATION OF CARE:  Care Discussed with Consultants/Other Providers [Y/N]:  Prior or Outpatient Records Reviewed [Y/N]:

## 2023-03-25 NOTE — PROGRESS NOTE ADULT - PROBLEM SELECTOR PLAN 3
- hold diuretics  - free water 600 q4   - monitor Na  - Na 156--159--151--147--148--137   - now normal without change in MS   - stop d5W and decreased free water 100 q6  - monitor Na

## 2023-03-25 NOTE — PROGRESS NOTE ADULT - PROBLEM SELECTOR PLAN 2
- likely d/t hypoxia/anoxic brain ischemia in s/o hemorrhagic shock on admission/hypernatremia  - MRI brain 3/15 extensive cortical restricted diffusion throughout the cerebral hemispheres including the basal ganglia and insula b/l.    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  Potential epileptogenic focus in the left posterior head region. Structural of functional abnormality in the left posterior head region. Moderate nonspecific diffuse or multifocal cerebral dysfunction. No seizure seen. EEG (3/14) frequent left posterior quadrant periodic discharges, risk of seizure has decreased compared to EEG from 3/5. No seizures recorded.   - c/w rifaximin 550 mg BID, lactulose BID, repeat ammonia level 25; encephalopathy likely unrelated to HE  - failed S&S multiple trials last 3/21 coughing across trials suggestive of impaired airway protection  - 3/24 Palliative care and myself had GOC w/ son and daughter. Likely current scenario is related to anoxic brain injury; explained despite nutrition clinically unimproved. d/w Hepatology in regards to PEG pt is at increased risk of peritonitis. Also discussed paracentesis at this time asymptomatic no urgent need for paracentesis. low suspicion of infection. As per hepatology no significant risk of bleeding with paracentesis in cirrhotic with elevated PT/INR and low platelets. d/w Son at bedside 3/25 will consult procedure team on Monday for paracentesis no urgent need for platelets explained that platelet infusion are short lived and no acute bleeding or urgent procedure planned with hold off on transfusion. Will need discussion on Monday w/ procedure team on Monday if platelets are required for procedure. Son also insistent on repeat Swallow evaluation without NG discussed understands increased risk of bleeding w/ NGT reinsertion.

## 2023-03-25 NOTE — PROGRESS NOTE ADULT - PROBLEM SELECTOR PLAN 7
acute blood loss anemia c/b hypovolemic shock s/p levophed and ICU  - Hb 6.9 on admit now s/p 6 units of pRBC, 1 unit FFP, 1 unit of platelets all on 2/24/23, 1u prbc 3/18  - s/p octreotide x 72h (2/24-2/27)   - trend CBC, transfuse prn.  - s/p EGD 2/24: large (> 5 mm) esophageal varices. Incompletely eradicated. Banded. Normal duodenal bulb, first portion of the duodenum and second portion of the duodenum.                 - EGD in 4 wks for likely repeat banding  - SBP ppx cipro  - BP in 90's given 1L LR on 3/17 concern for variceal bleed  - transfuse 1 unit prbc on 3/18 for hgb drop from 11.1 to 7.9, nurse reports no obvious dark BM - restarted on 3/18 octreotide gtt and iv protonix 40 bid; Hepatology recommend to stop octreotide drip and change to PPI to qd  - completed albumin 250ml q8x3 doses,   - TTE with preserved LVEF 53%  - BP acceptable off midodrine   - hold diuretic for now  - trend CBC, transfuse prn

## 2023-03-25 NOTE — PROGRESS NOTE ADULT - PROBLEM SELECTOR PLAN 6
- suspect likely due to cirrhosis now recovering  - no overt evidence of GIB   - check LDH in 243 slightly above normal not indicative of hemolysis, hapto above 20, HIT ab neg, blue top  37  - off heparin  - platelets 26--32--37--38--34  - SCD for now

## 2023-03-25 NOTE — PROGRESS NOTE ADULT - SUBJECTIVE AND OBJECTIVE BOX
[facility] Division of Hospital Medicine  Nik Braden MD  Pager (M-F, 2Z-5P): [phone]  Other Times:  [phone]    Patient is a 67y old  Male who presents with a chief complaint of gib, encephalopathy (19 Mar 2023 12:15)      SUBJECTIVE / OVERNIGHT EVENTS:       MEDICATIONS  (STANDING):  chlorhexidine 2% Cloths 1 Application(s) Topical <User Schedule>  ciprofloxacin     Tablet 500 milliGRAM(s) Oral every 24 hours  coronavirus bivalent (EUA) Booster Vaccine (PFIZER) 0.3 milliLiter(s) IntraMuscular once  dextrose 5%. 1000 milliLiter(s) (100 mL/Hr) IV Continuous <Continuous>  dextrose 5%. 1000 milliLiter(s) (50 mL/Hr) IV Continuous <Continuous>  dextrose 50% Injectable 25 Gram(s) IV Push once  dextrose 50% Injectable 12.5 Gram(s) IV Push once  dextrose 50% Injectable 25 Gram(s) IV Push once  glucagon  Injectable 1 milliGRAM(s) IntraMuscular once  insulin lispro (ADMELOG) corrective regimen sliding scale   SubCutaneous every 6 hours  insulin NPH human recombinant 16 Unit(s) SubCutaneous every 6 hours  lactulose Syrup 20 Gram(s) Oral two times a day  levothyroxine Injectable 75 MICROGram(s) IV Push at bedtime  mupirocin 2% Ointment 1 Application(s) Topical two times a day  pantoprazole  Injectable 40 milliGRAM(s) IV Push daily  rifAXIMin 550 milliGRAM(s) Oral two times a day  tenofovir disoproxil fumarate (VIREAD) 300 milliGRAM(s) Oral daily    MEDICATIONS  (PRN):  dextrose Oral Gel 15 Gram(s) Oral once PRN Blood Glucose LESS THAN 70 milliGRAM(s)/deciliter  ondansetron Injectable 4 milliGRAM(s) IV Push every 6 hours PRN Vomiting  sodium chloride 0.65% Nasal 1 Spray(s) Both Nostrils three times a day PRN Nasal Congestion      CAPILLARY BLOOD GLUCOSE      POCT Blood Glucose.: 199 mg/dL (25 Mar 2023 06:19)  POCT Blood Glucose.: 140 mg/dL (24 Mar 2023 23:15)  POCT Blood Glucose.: 183 mg/dL (24 Mar 2023 17:59)  POCT Blood Glucose.: 196 mg/dL (24 Mar 2023 12:15)    I&O's Summary    24 Mar 2023 07:01  -  25 Mar 2023 07:00  --------------------------------------------------------  IN: 1920 mL / OUT: 0 mL / NET: 1920 mL        PHYSICAL EXAM:  Vital Signs Last 24 Hrs  T(C): 36.4 (24 Mar 2023 21:10), Max: 36.4 (24 Mar 2023 21:10)  T(F): 97.6 (24 Mar 2023 21:10), Max: 97.6 (24 Mar 2023 21:10)  HR: 60 (24 Mar 2023 21:10) (60 - 62)  BP: 125/62 (24 Mar 2023 21:10) (125/62 - 139/57)  BP(mean): --  RR: 18 (24 Mar 2023 21:10) (18 - 20)  SpO2: 100% (24 Mar 2023 21:10) (100% - 100%)    Parameters below as of 24 Mar 2023 21:10  Patient On (Oxygen Delivery Method): room air          LABS:                        9.6    4.40  )-----------( 34       ( 25 Mar 2023 06:20 )             29.8     03-25    137  |  109<H>  |  31<H>  ----------------------------<  224<H>  4.4   |  20<L>  |  0.52    Ca    8.1<L>      25 Mar 2023 06:20  Phos  1.8     03-25  Mg     2.10     03-25      PT/INR - ( 24 Mar 2023 05:41 )   PT: 19.3 sec;   INR: 1.66 ratio                     RADIOLOGY & ADDITIONAL TESTS:  Results Reviewed:   Imaging Personally Reviewed:  Electrocardiogram Personally Reviewed:    COORDINATION OF CARE:  Care Discussed with Consultants/Other Providers [Y/N]:  Prior or Outpatient Records Reviewed [Y/N]:   [facility]  Nik Braden MD  Pager (M-F, 6A-5P): [phone]  Other Times:  [phone]    Patient is a 67y old  Male who presents with a chief complaint of gib, encephalopathy (19 Mar 2023 12:15)      SUBJECTIVE / OVERNIGHT EVENTS: Pt seen at bedside with CONSTANCE santiago and son. GOC discussion yesterday family wished to persue all intervention despite poor prognosis. noted episode of vomiting this AM. 1 episode of liquid stool yellow/brown       MEDICATIONS  (STANDING):  chlorhexidine 2% Cloths 1 Application(s) Topical <User Schedule>  ciprofloxacin     Tablet 500 milliGRAM(s) Oral every 24 hours  coronavirus bivalent (EUA) Booster Vaccine (PFIZER) 0.3 milliLiter(s) IntraMuscular once  dextrose 5%. 1000 milliLiter(s) (100 mL/Hr) IV Continuous <Continuous>  dextrose 5%. 1000 milliLiter(s) (50 mL/Hr) IV Continuous <Continuous>  dextrose 50% Injectable 25 Gram(s) IV Push once  dextrose 50% Injectable 12.5 Gram(s) IV Push once  dextrose 50% Injectable 25 Gram(s) IV Push once  glucagon  Injectable 1 milliGRAM(s) IntraMuscular once  insulin lispro (ADMELOG) corrective regimen sliding scale   SubCutaneous every 6 hours  insulin NPH human recombinant 16 Unit(s) SubCutaneous every 6 hours  lactulose Syrup 20 Gram(s) Oral two times a day  levothyroxine Injectable 75 MICROGram(s) IV Push at bedtime  mupirocin 2% Ointment 1 Application(s) Topical two times a day  pantoprazole  Injectable 40 milliGRAM(s) IV Push daily  rifAXIMin 550 milliGRAM(s) Oral two times a day  tenofovir disoproxil fumarate (VIREAD) 300 milliGRAM(s) Oral daily    MEDICATIONS  (PRN):  dextrose Oral Gel 15 Gram(s) Oral once PRN Blood Glucose LESS THAN 70 milliGRAM(s)/deciliter  ondansetron Injectable 4 milliGRAM(s) IV Push every 6 hours PRN Vomiting  sodium chloride 0.65% Nasal 1 Spray(s) Both Nostrils three times a day PRN Nasal Congestion      CAPILLARY BLOOD GLUCOSE      POCT Blood Glucose.: 199 mg/dL (25 Mar 2023 06:19)  POCT Blood Glucose.: 140 mg/dL (24 Mar 2023 23:15)  POCT Blood Glucose.: 183 mg/dL (24 Mar 2023 17:59)  POCT Blood Glucose.: 196 mg/dL (24 Mar 2023 12:15)    I&O's Summary    24 Mar 2023 07:01  -  25 Mar 2023 07:00  --------------------------------------------------------  IN: 1920 mL / OUT: 0 mL / NET: 1920 mL        PHYSICAL EXAM:  Vital Signs Last 24 Hrs  T(C): 36.4 (24 Mar 2023 21:10), Max: 36.4 (24 Mar 2023 21:10)  T(F): 97.6 (24 Mar 2023 21:10), Max: 97.6 (24 Mar 2023 21:10)  HR: 60 (24 Mar 2023 21:10) (60 - 62)  BP: 125/62 (24 Mar 2023 21:10) (125/62 - 139/57)  BP(mean): --  RR: 18 (24 Mar 2023 21:10) (18 - 20)  SpO2: 100% (24 Mar 2023 21:10) (100% - 100%)    Parameters below as of 24 Mar 2023 21:10  Patient On (Oxygen Delivery Method): room air    CONSTITUTIONAL: cachetic   EYES: icteric sclera   ENMT: NGT   RESPIRATORY: Normal respiratory effort; lungs are clear to auscultation bilaterally  CARDIOVASCULAR: Regular rate and rhythm, normal S1 and S2,   ABDOMEN: Nontender to palpation, normoactive bowel sounds, + fluid wave   PSYCH: awakens to verbal stimuli but doesn't participate       LABS:                        9.6    4.40  )-----------( 34       ( 25 Mar 2023 06:20 )             29.8     03-25    137  |  109<H>  |  31<H>  ----------------------------<  224<H>  4.4   |  20<L>  |  0.52    Ca    8.1<L>      25 Mar 2023 06:20  Phos  1.8     03-25  Mg     2.10     03-25      PT/INR - ( 24 Mar 2023 05:41 )   PT: 19.3 sec;   INR: 1.66 ratio                     RADIOLOGY & ADDITIONAL TESTS:  Results Reviewed:   Imaging Personally Reviewed:  Electrocardiogram Personally Reviewed:    COORDINATION OF CARE:  Care Discussed with Consultants/Other Providers [Y/N]:  Prior or Outpatient Records Reviewed [Y/N]:

## 2023-03-25 NOTE — CHART NOTE - NSCHARTNOTEFT_GEN_A_CORE
Notified by RN that pt had one episode of vomiting  Feeds held, will Likely resume at a Lower rate  will also discuss possibly decreasing rate of Free water flushes.

## 2023-03-25 NOTE — PROGRESS NOTE ADULT - PROBLEM SELECTOR PLAN 1
- 1 episode vomiting and feeds held  - check AXR and CXR   - if no obstruction can restart feeds   - if febrile would pancx  - monitor off antibiotics

## 2023-03-25 NOTE — PROGRESS NOTE ADULT - PROBLEM SELECTOR PLAN 4
HbA1c 6.1% likely underestimated due transfusions given insulin needs  - c/w TF Glucerna  - noted hypoglycemia 3/20 resolved   - off D5 will change NPH to 14 U q6 when feeds restarted

## 2023-03-25 NOTE — PROGRESS NOTE ADULT - PROBLEM SELECTOR PLAN 5
decompensated with ascites, with variceal bleed, with PSE; MELD 12 3/10/23  - s/p diagnostic para (2/25) and therapeutic para (3/5) removed 4.5L  - s/p diag/therapeutic tap on 3/15, removed 3.6L, neg for SBP; Path neg for malignancy   - c/w rifaximin BID, lactulose   - SBP ppx with Cipro due to low ascitic protein   - trend LFT, RUQ w/o pathology on 3/10  - MRI abdomen at [facility] 12/2022 no HCC  - off diuretic due to hypernatremia   - procedure team for paracentesis. Team d/w would prefer plts >50K; re- consult procedure team 3/27 for paracentesis

## 2023-03-25 NOTE — PROGRESS NOTE ADULT - PROBLEM SELECTOR PLAN 8
- Uptrended to 1.7  - mcconnell removed 3/15,  passed TOV  - no retention on bladder scan, bladder scan prn  - NANCI likely ATN in s/o shock  - no hydronephrosis on renal US  - avoid hypotension  - Cr now normal   - improved/resolved

## 2023-03-26 NOTE — PROGRESS NOTE ADULT - PROBLEM SELECTOR PLAN 4
- hold diuretics  - monitor Na  - Na 156--159--151--147--148--137--137  - now normal without change in MS   - off d5W and c/w free water 100 q6  - monitor Na

## 2023-03-26 NOTE — PROGRESS NOTE ADULT - PROBLEM SELECTOR PLAN 6
- suspect likely due to cirrhosis now recovering  - no overt evidence of GIB   - check LDH in 243 slightly above normal not indicative of hemolysis, hapto above 20, HIT ab neg, blue top  37  - off heparin  - platelets 26--32--37--38--34--40  - SCD for now

## 2023-03-26 NOTE — PROGRESS NOTE ADULT - PROBLEM SELECTOR PLAN 3
decompensated with ascites, with variceal bleed, with PSE; MELD 12 3/10/23  - s/p diagnostic para (2/25) and therapeutic para (3/5) removed 4.5L  - s/p diag/therapeutic tap on 3/15, removed 3.6L, neg for SBP; Path neg for malignancy   - c/w rifaximin BID, lactulose BID titrate 2 BM/day   - SBP ppx with Cipro due to low ascitic protein   - trend LFT, RUQ w/o pathology on 3/10  - MRI abdomen at [facility] 12/2022 no HCC  - off diuretic due to hypernatremia   - procedure team for paracentesis. Team d/w would prefer plts >50K; re- consult procedure team 3/27 for paracentesis

## 2023-03-26 NOTE — PROGRESS NOTE ADULT - PROBLEM SELECTOR PLAN 5
HbA1c 6.1% likely underestimated due transfusions given insulin needs  - c/w TF Glucerna  - noted hypoglycemia 3/20 resolved   - off D5 will   - NPH to 14 U q6

## 2023-03-26 NOTE — PROGRESS NOTE ADULT - PROBLEM SELECTOR PLAN 1
- 1 episode vomiting and feeds held  -  AXR and CXR - no bowel obstruction or evidence of aspiration   - feeds restarted monitor for N/V  - monitor off antibiotics

## 2023-03-26 NOTE — PROGRESS NOTE ADULT - SUBJECTIVE AND OBJECTIVE BOX
[facility] Division of Hospital Medicine  Nik Braden MD  Pager (M-F, 6F-5P): [phone]  Other Times:  [phone]    Patient is a 67y old  Male who presents with a chief complaint of gib, encephalopathy (19 Mar 2023 12:15)      SUBJECTIVE / OVERNIGHT EVENTS: Feeds restarted       MEDICATIONS  (STANDING):  chlorhexidine 2% Cloths 1 Application(s) Topical <User Schedule>  ciprofloxacin     Tablet 500 milliGRAM(s) Oral every 24 hours  coronavirus bivalent (EUA) Booster Vaccine (PFIZER) 0.3 milliLiter(s) IntraMuscular once  dextrose 5%. 1000 milliLiter(s) (100 mL/Hr) IV Continuous <Continuous>  dextrose 5%. 1000 milliLiter(s) (50 mL/Hr) IV Continuous <Continuous>  dextrose 50% Injectable 25 Gram(s) IV Push once  dextrose 50% Injectable 12.5 Gram(s) IV Push once  dextrose 50% Injectable 25 Gram(s) IV Push once  glucagon  Injectable 1 milliGRAM(s) IntraMuscular once  insulin lispro (ADMELOG) corrective regimen sliding scale   SubCutaneous every 6 hours  insulin NPH human recombinant 14 Unit(s) SubCutaneous every 6 hours  lactulose Syrup 20 Gram(s) Oral two times a day  levothyroxine Injectable 75 MICROGram(s) IV Push at bedtime  mupirocin 2% Ointment 1 Application(s) Topical two times a day  pantoprazole  Injectable 40 milliGRAM(s) IV Push daily  rifAXIMin 550 milliGRAM(s) Oral two times a day  tenofovir disoproxil fumarate (VIREAD) 300 milliGRAM(s) Oral daily    MEDICATIONS  (PRN):  dextrose Oral Gel 15 Gram(s) Oral once PRN Blood Glucose LESS THAN 70 milliGRAM(s)/deciliter  ondansetron Injectable 4 milliGRAM(s) IV Push every 6 hours PRN Vomiting  sodium chloride 0.65% Nasal 1 Spray(s) Both Nostrils three times a day PRN Nasal Congestion      CAPILLARY BLOOD GLUCOSE      POCT Blood Glucose.: 96 mg/dL (26 Mar 2023 06:25)  POCT Blood Glucose.: 352 mg/dL (25 Mar 2023 22:08)  POCT Blood Glucose.: 124 mg/dL (25 Mar 2023 18:03)  POCT Blood Glucose.: 136 mg/dL (25 Mar 2023 12:11)    I&O's Summary    25 Mar 2023 07:01  -  26 Mar 2023 07:00  --------------------------------------------------------  IN: 3735 mL / OUT: 0 mL / NET: 3735 mL        PHYSICAL EXAM:  Vital Signs Last 24 Hrs  T(C): 36.7 (26 Mar 2023 06:14), Max: 36.7 (26 Mar 2023 06:14)  T(F): 98 (26 Mar 2023 06:14), Max: 98 (26 Mar 2023 06:14)  HR: 66 (26 Mar 2023 06:14) (66 - 71)  BP: 122/64 (26 Mar 2023 06:14) (122/64 - 130/58)  BP(mean): --  RR: 18 (25 Mar 2023 20:23) (18 - 18)  SpO2: 100% (25 Mar 2023 20:23) (100% - 100%)    Parameters below as of 25 Mar 2023 20:23  Patient On (Oxygen Delivery Method): room air      CONSTITUTIONAL: cachetic   EYES: icteric sclera   ENMT: NGT   RESPIRATORY: Normal respiratory effort; lungs are clear to auscultation bilaterally  CARDIOVASCULAR: Regular rate and rhythm, normal S1 and S2,   ABDOMEN: Nontender to palpation, normoactive bowel sounds, + fluid wave   PSYCH: awakens to verbal stimuli but doesn't participate    LABS:                        9.6    6.42  )-----------( 40       ( 26 Mar 2023 07:07 )             30.2     03-26    137  |  107  |  30<H>  ----------------------------<  100<H>  4.2   |  17<L>  |  0.59    Ca    8.4      26 Mar 2023 07:07  Phos  1.8     03-26  Mg     2.20     03-26                  RADIOLOGY & ADDITIONAL TESTS:  Results Reviewed: xr< from: Xray Chest 1 View- PORTABLE-Urgent (Xray Chest 1 View- PORTABLE-Urgent .) (03.25.23 @ 13:13) >    Findings/  Impression: Status post CABG. NG tip in the stomach. The heart is   unremarkable. The lungs are clear. No acute osseous abnormality.    --- End of Report ---    < end of copied text >  < from: Xray Abdomen 2 Views (03.25.23 @ 13:13) >    Findings/  Impression: NG tube in the stomach Nonobstructive bowel gas pattern. No   free air. Likely ascites    --- End of Report ---    < end of copied text >    Imaging Personally Reviewed:  Electrocardiogram Personally Reviewed:    COORDINATION OF CARE:  Care Discussed with Consultants/Other Providers [Y/N]:  Prior or Outpatient Records Reviewed [Y/N]:   [facility]  Nik Braden MD  Pager (M-F, 8V-5P): [phone]  Other Times:  [phone]    Patient is a 67y old  Male who presents with a chief complaint of gib, encephalopathy (19 Mar 2023 12:15)      SUBJECTIVE / OVERNIGHT EVENTS: Feeds restarted; no Acute events ON. Son at bedside discussed current plan of repeat swallow eval and re-consult procedure team in AM       MEDICATIONS  (STANDING):  chlorhexidine 2% Cloths 1 Application(s) Topical <User Schedule>  ciprofloxacin     Tablet 500 milliGRAM(s) Oral every 24 hours  coronavirus bivalent (EUA) Booster Vaccine (PFIZER) 0.3 milliLiter(s) IntraMuscular once  dextrose 5%. 1000 milliLiter(s) (100 mL/Hr) IV Continuous <Continuous>  dextrose 5%. 1000 milliLiter(s) (50 mL/Hr) IV Continuous <Continuous>  dextrose 50% Injectable 25 Gram(s) IV Push once  dextrose 50% Injectable 12.5 Gram(s) IV Push once  dextrose 50% Injectable 25 Gram(s) IV Push once  glucagon  Injectable 1 milliGRAM(s) IntraMuscular once  insulin lispro (ADMELOG) corrective regimen sliding scale   SubCutaneous every 6 hours  insulin NPH human recombinant 14 Unit(s) SubCutaneous every 6 hours  lactulose Syrup 20 Gram(s) Oral two times a day  levothyroxine Injectable 75 MICROGram(s) IV Push at bedtime  mupirocin 2% Ointment 1 Application(s) Topical two times a day  pantoprazole  Injectable 40 milliGRAM(s) IV Push daily  rifAXIMin 550 milliGRAM(s) Oral two times a day  tenofovir disoproxil fumarate (VIREAD) 300 milliGRAM(s) Oral daily    MEDICATIONS  (PRN):  dextrose Oral Gel 15 Gram(s) Oral once PRN Blood Glucose LESS THAN 70 milliGRAM(s)/deciliter  ondansetron Injectable 4 milliGRAM(s) IV Push every 6 hours PRN Vomiting  sodium chloride 0.65% Nasal 1 Spray(s) Both Nostrils three times a day PRN Nasal Congestion      CAPILLARY BLOOD GLUCOSE      POCT Blood Glucose.: 96 mg/dL (26 Mar 2023 06:25)  POCT Blood Glucose.: 352 mg/dL (25 Mar 2023 22:08)  POCT Blood Glucose.: 124 mg/dL (25 Mar 2023 18:03)  POCT Blood Glucose.: 136 mg/dL (25 Mar 2023 12:11)    I&O's Summary    25 Mar 2023 07:01  -  26 Mar 2023 07:00  --------------------------------------------------------  IN: 3735 mL / OUT: 0 mL / NET: 3735 mL        PHYSICAL EXAM:  Vital Signs Last 24 Hrs  T(C): 36.7 (26 Mar 2023 06:14), Max: 36.7 (26 Mar 2023 06:14)  T(F): 98 (26 Mar 2023 06:14), Max: 98 (26 Mar 2023 06:14)  HR: 66 (26 Mar 2023 06:14) (66 - 71)  BP: 122/64 (26 Mar 2023 06:14) (122/64 - 130/58)  BP(mean): --  RR: 18 (25 Mar 2023 20:23) (18 - 18)  SpO2: 100% (25 Mar 2023 20:23) (100% - 100%)    Parameters below as of 25 Mar 2023 20:23  Patient On (Oxygen Delivery Method): room air      CONSTITUTIONAL: cachetic   EYES: icteric sclera   ENMT: NGT   RESPIRATORY: Normal respiratory effort; lungs are clear to auscultation bilaterally  CARDIOVASCULAR: Regular rate and rhythm, normal S1 and S2,   ABDOMEN: Nontender to palpation, normoactive bowel sounds, + fluid wave   PSYCH: awakens to verbal stimuli but doesn't participate    LABS:                        9.6    6.42  )-----------( 40       ( 26 Mar 2023 07:07 )             30.2     03-26    137  |  107  |  30<H>  ----------------------------<  100<H>  4.2   |  17<L>  |  0.59    Ca    8.4      26 Mar 2023 07:07  Phos  1.8     03-26  Mg     2.20     03-26                  RADIOLOGY & ADDITIONAL TESTS:  Results Reviewed: xr< from: Xray Chest 1 View- PORTABLE-Urgent (Xray Chest 1 View- PORTABLE-Urgent .) (03.25.23 @ 13:13) >    Findings/  Impression: Status post CABG. NG tip in the stomach. The heart is   unremarkable. The lungs are clear. No acute osseous abnormality.    --- End of Report ---    < end of copied text >  < from: Xray Abdomen 2 Views (03.25.23 @ 13:13) >    Findings/  Impression: NG tube in the stomach Nonobstructive bowel gas pattern. No   free air. Likely ascites    --- End of Report ---    < end of copied text >    Imaging Personally Reviewed:  Electrocardiogram Personally Reviewed:    COORDINATION OF CARE:  Care Discussed with Consultants/Other Providers [Y/N]:  Prior or Outpatient Records Reviewed [Y/N]:

## 2023-03-26 NOTE — PROGRESS NOTE ADULT - PROBLEM SELECTOR PLAN 2
- likely d/t hypoxia/anoxic brain ischemia in s/o hemorrhagic shock on admission/hypernatremia  - MRI brain 3/15 extensive cortical restricted diffusion throughout the cerebral hemispheres including the basal ganglia and insula b/l.    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 EEG (3/14) frequent left posterior quadrant periodic discharges, risk of seizure has decreased compared to EEG from 3/5. No seizures recorded.   - c/w rifaximin 550 mg BID, lactulose BID, repeat ammonia level 25; encephalopathy likely unrelated to HE  - failed S&S multiple trials last 3/21 coughing across trials suggestive of impaired airway protection  - 3/24 Palliative care and myself had GOC w/ son and daughter. Likely current scenario is related to anoxic brain injury; explained despite nutrition/correction of elctrolytes clinically unimproved. d/w Hepatology in regards to PEG pt is at increased risk of peritonitis. Also discussed paracentesis at this time asymptomatic no urgent need for paracentesis. low suspicion of infection. As per hepatology no significant risk of bleeding with paracentesis in cirrhotic with elevated PT/INR and low platelets. d/w Son at bedside 3/25 will consult procedure team on Monday for paracentesis no urgent need for platelets explained that platelet infusion are short lived and no acute bleeding or urgent procedure planned with hold off on transfusion. Will need discussion on Monday w/ procedure team on Monday if platelets are required for procedure. Son also insistent on repeat Swallow evaluation without NG discussed understands increased risk of bleeding w/ NGT reinsertion.

## 2023-03-27 NOTE — PROGRESS NOTE ADULT - PROBLEM SELECTOR PLAN 3
decompensated with ascites, with variceal bleed, with PSE; MELD 12 3/10/23  - s/p diagnostic para (2/25) and therapeutic para (3/5) removed 4.5L  - s/p diag/therapeutic tap on 3/15, removed 3.6L, neg for SBP; Path neg for malignancy   - c/w rifaximin BID, lactulose BID titrate 2 BM/day   - SBP ppx with Cipro due to low ascitic protein   - trend LFT, RUQ w/o pathology on 3/10  - [facility] no HCC  - off diuretic due to hypernatremia   - procedure team for paracentesis 3/28; will give plt as above

## 2023-03-27 NOTE — PROGRESS NOTE ADULT - SUBJECTIVE AND OBJECTIVE BOX
[facility] Department of Hospital Medicine  Yandy Vasquez DO  Available on MS Teams  Pager: [phone]    Patient is a 67y old  Male who presents with a chief complaint of gib, encephalopathy (19 Mar 2023 12:15)    Subjective:  Pt seen and examined at bedside with son present. Pt non-participatory with exam, not answering questions. Son requesting information on procedure to transfer patient to different hospital for 2nd opinion. Explained that he would need an accepting physician to start the process. Is overwhelmed by the rapid decline of his father. Wants to remove the NGT in the morning and have S+S re-eval pt in the afternoon. Inquired about PEG tube placement but explained that pt is high risk for procedures, especially with significant thrombocytopenia. Amenable to para tomorrow.     ROS: limited by lack of pt participation     VITAL SIGNS:  T(C): 36.7 (03-27-23 @ 16:30), Max: 36.9 (03-27-23 @ 03:14)  T(F): 98.1 (03-27-23 @ 16:30), Max: 98.5 (03-27-23 @ 03:14)  HR: 70 (03-27-23 @ 16:30) (67 - 87)  BP: 131/71 (03-27-23 @ 16:30) (126/77 - 140/76)  BP(mean): --  RR: 19 (03-27-23 @ 16:30) (18 - 20)  SpO2: 100% (03-27-23 @ 16:30) (99% - 100%)  Wt(kg): --    PHYSICAL EXAM:  Constitutional: cachectic elderly frail M; resting comfortably in bed  Head: NC/AT  Eyes: PERRL, anicteric sclera  ENT: no nasal discharge; MMM; NGT in place with feeds running  Neck: supple; no JVD  Respiratory: CTA B/L; no W/R/R; on RA without respiratory distress  Cardiac: +S1/S2; RRR; no M/R/G  Gastrointestinal: firm, NT/ND; no rebound or guarding; +BSx4; +fluid wave  Extremities: WWP, no clubbing or cyanosis; no peripheral edema  Musculoskeletal: moves all extremities spontaneously  Vascular: 2+ radial, DP/PT pulses B/L  Dermatologic: skin warm, dry and intact; no rashes, wounds, or scars  Neurologic: AAOx0; CNII-XII grossly intact; no focal deficits  Psychiatric: withdrawn, non-participatory with exam    LABS:                        10.3   6.44  )-----------( 45       ( 27 Mar 2023 07:00 )             32.7     03-27    141  |  110<H>  |  31<H>  ----------------------------<  124<H>  3.8   |  18<L>  |  0.64    Ca    8.5      27 Mar 2023 07:00  Phos  2.3     03-27  Mg     2.20     03-27      PT/INR - ( 27 Mar 2023 09:58 )   PT: 18.3 sec;   INR: 1.57 ratio         PTT - ( 27 Mar 2023 09:58 )  PTT:32.7 sec    CAPILLARY BLOOD GLUCOSE  89 (27 Mar 2023 12:49)      POCT Blood Glucose.: 112 mg/dL (27 Mar 2023 17:34)      RADIOLOGY & ADDITIONAL TESTS: Reviewed.

## 2023-03-27 NOTE — PROGRESS NOTE ADULT - PROBLEM SELECTOR PLAN 5
HbA1c 6.1% likely underestimated due transfusions given insulin needs  - c/w TF Glucerna  - NPH 14 U q6h

## 2023-03-27 NOTE — CHART NOTE - NSCHARTNOTEFT_GEN_A_CORE
Pt seen for SEVERE MALNUTRITION FOLLOW UP     Medical Course: 66 y/o male HTN, DM2, hypothyroidism, CAD s/p CABG, TANG cirrhosis, follicular lymphoma (on Rituximab OP), chronic Hep B on tenofovir (HBV Core +), presented to the ED w/ chest pain and constipation while in ED developed hematemesis s/p MICU admission for acute blood loss anemia c/b hypovolemic shock  intubated for airway protection (extubated 3/8) s/p 2/24 bedside EGD with banding esophageal varices now with persistent encephalopathy.       Nutrition Course: Nutrition interview conducted with Pt's son (present at bedside). Collaborative information obtained from comprehensive chart review and per RN today: : No recent episodes of nausea, vomiting, diarrhea or constipation, BM noted on 3/27 per RN flowsheets. Pt remains NPO TF only as sole source of nutrition and hydration; Pt noted with TF ordered for 40ml/hr, however previously recommended by RD to run @ 45ml/hr x24hrs. Will recommend to change from 40 to 45ml/hr. Which will provide: 1080ml total volume, 1620kcal, 89 gm protein, and 821ml free water from formula. Pt noted also receiving 100ml free water q6hrs (400ml). Total free water/d= 1221ml (21ml/kg). Pt noted with ascites/paracentesis, low fluid provisions may be warranted at this time. Continue to monitor electrolytes and adjust flushes as needed.     Pt noted with swallow eval note today: team will hold off repeat swallow eval until 3/29 when family can be present.     Diet Prescription: Diet, NPO with Tube Feed:   Tube Feeding Modality: Nasogastric  Glucerna 1.5 Nelson ([id number])  Total Volume for 24 Hours (mL): 960  Continuous  Starting Tube Feed Rate {mL per Hour}: 40  Until Goal Tube Feed Rate (mL per Hour): 40  Tube Feed Duration (in Hours): 24  Tube Feed Start Time: 17:00  Free Water Flush  Bolus   Total Volume per Flush (mL): 100   Frequency: Every 6 Hours (03-25-23 @ 15:11)    Pertinent Medications: MEDICATIONS  (STANDING):  chlorhexidine 2% Cloths 1 Application(s) Topical <User Schedule>  ciprofloxacin     Tablet 500 milliGRAM(s) Oral every 24 hours  coronavirus bivalent (EUA) Booster Vaccine (PFIZER) 0.3 milliLiter(s) IntraMuscular once  dextrose 5%. 1000 milliLiter(s) (50 mL/Hr) IV Continuous <Continuous>  dextrose 5%. 1000 milliLiter(s) (100 mL/Hr) IV Continuous <Continuous>  dextrose 50% Injectable 25 Gram(s) IV Push once  dextrose 50% Injectable 12.5 Gram(s) IV Push once  dextrose 50% Injectable 25 Gram(s) IV Push once  glucagon  Injectable 1 milliGRAM(s) IntraMuscular once  insulin lispro (ADMELOG) corrective regimen sliding scale   SubCutaneous every 6 hours  insulin NPH human recombinant 14 Unit(s) SubCutaneous every 6 hours  lactulose Syrup 20 Gram(s) Oral two times a day  levothyroxine Injectable 75 MICROGram(s) IV Push at bedtime  mupirocin 2% Ointment 1 Application(s) Topical two times a day  pantoprazole  Injectable 40 milliGRAM(s) IV Push daily  rifAXIMin 550 milliGRAM(s) Oral two times a day  tenofovir disoproxil fumarate (VIREAD) 300 milliGRAM(s) Oral daily    MEDICATIONS  (PRN):  dextrose Oral Gel 15 Gram(s) Oral once PRN Blood Glucose LESS THAN 70 milliGRAM(s)/deciliter  ondansetron Injectable 4 milliGRAM(s) IV Push every 6 hours PRN Vomiting  sodium chloride 0.65% Nasal 1 Spray(s) Both Nostrils three times a day PRN Nasal Congestion    Pertinent Labs: 03-27 Na141 mmol/L Glu 124 mg/dL<H> K+ 3.8 mmol/L Cr  0.64 mg/dL BUN 31 mg/dL<H> 03-27 Phos 2.3 mg/dL<L> 03-21 Alb 4.0 g/dL      POCT Blood Glucose.: 89 mg/dL (27 Mar 2023 12:14)  POCT Blood Glucose.: 100 mg/dL (27 Mar 2023 05:42)  POCT Blood Glucose.: 114 mg/dL (26 Mar 2023 23:26)  POCT Blood Glucose.: 133 mg/dL (26 Mar 2023 18:07)      Weight: Height (cm): 170.2 (02-24 @ 12:00)  Weight (kg): 59 (03-23), 55.4 (02-24 @ 12:00)  BMI (kg/m2): 19.1 (02-24 @ 12:00)  Weight Assessment: Pt noted with significant wt gain x1 month (+6.5%), possibly r/t fluid retention     Physical Assessment, per flowsheets:  Edema: generalized 1+  Pressure Injury: Rt posterior ankle/achilles - wound care note 3/15      Estimated Needs:   [X] No change since previous assessment, based on IBW  148#/ 67 kg  8087-1476 kcal daily @ 25-30kcal/kg  80-100gm protein daily @ 1.2-1.5gm/kg    Previous Nutrition Diagnosis: severe protein calorie malnutrition   Nutrition Diagnosis is [x ] ongoing    New Nutrition Diagnosis: [x ] not applicable       Interventions:   1) Change TF to reflect recommended 45ml/hr x24hr.   2) Continue water flushes per team, monitor electrolytes and make adjustments prn  3) Obtain weekly wts  4) Reconsult RD when new swallow eval completed if updated recommendations needed    Monitor & Evaluate:  TF tolerance, nutrition related lab values, weight trends, BMs/GI distress, hydration status, skin integrity.    Naida Zamora MS, RDN (Pager #[phone]) | Also available on TEAMS

## 2023-03-27 NOTE — PROGRESS NOTE ADULT - PROBLEM SELECTOR PROBLEM 12
Follicular lymphoma
Follicular lymphoma
Nutrition, metabolism, and development symptoms
Prophylactic measure

## 2023-03-27 NOTE — PROGRESS NOTE ADULT - PROBLEM SELECTOR PLAN 6
- suspect likely due to cirrhosis now recovering  - no overt evidence of GIB   -  slightly above normal not indicative of hemolysis, hapto above 20, HIT ab neg, blue top  37  - off heparin  - platelets improving  - will give 1U plt today in anticipation of para tomorrow, goal >50  - SCDs for now, hold ppx with risk of bleed

## 2023-03-27 NOTE — PROGRESS NOTE ADULT - ASSESSMENT
Pt is a 66 yo M with PMH HTN, T2D, hypothyroidism, CAD (s/p CABG), TANG (c/b cirrhosis), follicular lymphoma (rituximab), and HBV (tenofovir) p/w CP and constipation with development of hematemesis. Admitted to MICU for hypovolemic shock requiring intubation/sedation for airway protection in setting of UGIB s/p protonix gtt, octreotide gtt, and midodrine (now off). Hepatology following with 2/24 EGD showing esophageal varices s/p banding. Course further c/b persistent encephalopathy, on rifaximin and lactulose; MR brain with c/f hypoxic-ischemic injury. Now on cipro for SBP ppx with procedure team planning paracentesis 3/28. Has failed S+S eval multiple times requiring NGT and tube feeds, pending re-eval 3/28.

## 2023-03-27 NOTE — PROGRESS NOTE ADULT - PROBLEM SELECTOR PLAN 1
- 1 episode vomiting and feeds held 3/26; now improved and feeds restarted, tolerating  -  AXR and CXR - no bowel obstruction or evidence of aspiration   - monitor off antibiotics

## 2023-03-27 NOTE — PROGRESS NOTE ADULT - PROBLEM SELECTOR PLAN 4
- hold diuretics  - monitor Na  - improving and stable  - now normal without change in MS   - off d5W and c/w free water 100 q6  - monitor Na

## 2023-03-27 NOTE — PROGRESS NOTE ADULT - PROBLEM SELECTOR PLAN 11
- family reports follows with oncology at [facility]; outpt records in chart

## 2023-03-27 NOTE — PROGRESS NOTE ADULT - PROBLEM SELECTOR PLAN 12
- family reports follows with oncology at [facility]; outpt records in chart
- family reports follows with oncology at [facility]; outpt records in chart
- F: none  - E: replete K<4, Mg<2  - N: NPO with tube feeds  - D: hold with risk of bleed; SCDs  - G: protonix daily    code: full  dispo: pending medical optimization; prognosis guarded
- Confirmed full code with son/daughter at bedside on multiple occasions, last 3/19  - dc prophylactic heparin  - will need PT when able to cooperate  - functional quadriplegia c/w full care and turns, TF

## 2023-03-27 NOTE — CHART NOTE - NSCHARTNOTEFT_GEN_A_CORE
Spoke with procedure team this AM regarding paracentesis - per procedure team, recommended coags and platelets to be hung before 11AM in order to get paracentesis done today otherwise paracentesis would have to be done tomorrow. Informed procedure team that T&S was sent out but needs to result prior to being able to hang platelets which will not be possible by 11AM therefore plan for paracentesis tomorrow AM. Recommended labs 5AM (Ordered) and if plt <50 then to give 1 u platelet 730AM prior to paracentesis. Son aware and in agreement.    Also spoke w/ son regarding NGT. Based on GOC note from Friday, son wanted NGT out for 5 hours prior to S&s eval. Confirmed this with son who stated he is aware NGT does not need to be removed per S&S for evaluation however he is requesting removal since he believes patient will be able to better participate with the evaluation without NGT being more comfortable. He is requesting paracentesis prior to NGT removal therefore requested for paracentesis to be done first Tuesday as planned and then S&s evaluation Wednesday so that patient does not have too many procedures planned in one day. Son states he will be at the bedside all Wednesday and requested the S&S evaluation to be done at 1pm and NGT removed at 8AM. S&S made aware and new order placed for evaluation to be done Wednesday at 1pm with son at bedside.

## 2023-03-27 NOTE — PROGRESS NOTE ADULT - PROVIDER SPECIALTY LIST ADULT
Hospitalist lab results/need for outpatient follow-up/radiology results/return to ED if symptoms worsen, persist or questions arise

## 2023-03-27 NOTE — PROGRESS NOTE ADULT - PROBLEM SELECTOR PLAN 2
- likely d/t hypoxia/anoxic brain ischemia in s/o hemorrhagic shock on admission/hypernatremia  - MRI brain 3/15 extensive cortical restricted diffusion throughout the cerebral hemispheres including the basal ganglia and insula b/l.   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 EEG (3/14) frequent left posterior quadrant periodic discharges, risk of seizure has decreased compared to EEG from 3/5. No seizures recorded.   - c/w rifaximin 550 mg BID, lactulose BID, repeat ammonia level 25; encephalopathy likely unrelated to HE  - failed S&S multiple trials last 3/21 coughing across trials suggestive of impaired airway protection --- plan for next re-eval 3/28 with NGT removal in AM (per son's request) and evaluation in afternoon  - 3/24 Palliative care had GOC w/ son and daughter. Likely current scenario is related to anoxic brain injury; explained despite nutrition/correction of electrolytes clinically unimproved. d/w Hepatology in regards to PEG pt is at increased risk of peritonitis. As per hepatology no significant risk of bleeding with paracentesis in cirrhotic with elevated PT/INR and low platelets.   - procedure team following, plan for para 3/28 with goal plt >50 (will give 1U plt today)  - discussed with son again today regarding risk of bleeding with NGT removal/replacement, demonstrated understanding and still would like NGT removed in AM to allow for barrier free S+S eval in the afternoon; if fails eval is amenable to replacement of NGT

## 2023-03-27 NOTE — PROGRESS NOTE ADULT - PROBLEM SELECTOR PLAN 7
acute blood loss anemia c/b hypovolemic shock s/p levophed and ICU  - Hb 6.9 on admit now s/p 6 units of pRBC, 1 unit FFP, 1 unit of platelets all on 2/24/23, 1u prbc 3/18, 1U plt 3/27  - s/p octreotide x 72h (2/24-2/27)   - s/p EGD 2/24: large (> 5 mm) esophageal varices. Incompletely eradicated. Banded. Normal duodenal bulb, first portion of the duodenum and second portion of the duodenum.                 - repeat EGD in 4 wks for likely repeat banding  - SBP ppx cipro  - TTE with preserved LVEF 53%  - BP acceptable off midodrine   - hold diuretic for now

## 2023-03-28 NOTE — PROGRESS NOTE ADULT - PROBLEM SELECTOR PLAN 5
- suspect likely due to cirrhosis now recovering  - no overt evidence of GIB   -  slightly above normal not indicative of hemolysis, hapto above 20, HIT ab neg, blue top  37  - off heparin  - platelets improving  - s/p 1U plt 3/27 pre para with appropriate response  - SCDs for now, hold ppx with risk of bleed

## 2023-03-28 NOTE — PROCEDURE NOTE - ADDITIONAL PROCEDURE DETAILS
Patient currently being sedated with Precedex drip.   US guided.     --Follow up cell count and culture / gram stain.  --Large volume paracentesis. Would supplement 8 grams / Liter of albumin.
Invasive procedure team was consulted for therapeutic paracentesis due to abdominal discomfort. Explained risks (including bleeding, infection, and bowel perforation) and benefits to patient's son and they expressed understanding and consented. Ultrasound was utilized and visualized >10cm Ascitic fluid pocket identified w/ no epigastric vessels visualized. No rash or vessels overlying skin site. Pts plts 53, INR 1.5, DVT PPx held over night, not on NOACs or coumadin. Sterile technique was used and 3mL of 1% lidocaine was used for local anesthesia. Small incision with scalpel done and 18g Arrow Paracentesis catheter used. 4.9L of clear yellow ascitic fluid drained. Catheter removed and dressed. Pt tolerated procedure well and no complications. Communicated to primary team.
Invasive procedure team was consulted for diagnostic/therapeutic paracentesis due to abdominal discomfort. Explained risks (including bleeding, infection, and bowel perforation) and benefits to patient and patient expressed understanding and consented. Ultrasound was utilized and visualized 6 cm  ascitic fluid pocket identified w/ no epigastric vessels visualized. No rash or vessels overlying skin site. Pts plts, INR reviewed prior to procedure, appropriate to proceed with procedure, DVT PPx held over night, not on NOACs or coumadin. Sterile technique was used and 5 mL of 1% lidocaine was used for local anesthesia. Small incision with scalpel done and 18 Fr Arrow Paracentesis catheter used.  3.6L of clear yellow ascitic fluid drained. Catheter removed and dressed. Pt tolerated procedure well and no complications. Samples for lab analysis at bedside. Communicated to primary team.     Given amount of fluid drained during procedure, no albumin recommended, however will leave decision to discretion of primary team.

## 2023-03-28 NOTE — PROGRESS NOTE ADULT - ASSESSMENT
Pt is a 68 yo M with PMH HTN, T2D, hypothyroidism, CAD (s/p CABG), TANG (c/b cirrhosis), follicular lymphoma (rituximab), and HBV (tenofovir) p/w CP and constipation with development of hematemesis. Admitted to MICU for hypovolemic shock requiring intubation/sedation for airway protection in setting of UGIB s/p protonix gtt, octreotide gtt, and midodrine (now off). Hepatology following with 2/24 EGD showing esophageal varices s/p banding. Course further c/b persistent encephalopathy, on rifaximin and lactulose; MR brain with c/f hypoxic-ischemic injury. Now on cipro for SBP ppx with procedure team planning paracentesis 3/28. Has failed S+S eval multiple times requiring NGT and tube feeds, pending re-eval 3/29.

## 2023-03-28 NOTE — PROGRESS NOTE ADULT - SUBJECTIVE AND OBJECTIVE BOX
[facility] Department of Hospital Medicine  Yandy Vasquez DO  Available on MS Teams  Pager: [phone]    Patient is a 67y old  Male who presents with a chief complaint of gib, encephalopathy (19 Mar 2023 12:15)    Subjective:  Pt seen and examined at bedside after paracentesis. Appears comfortable. NGT in place with feeds running. Non-participatory with exam.     ROS: limited by lack of pt participation     Vital Signs Last 24 Hrs  T(C): 37 (28 Mar 2023 11:45), Max: 37 (28 Mar 2023 11:45)  T(F): 98.6 (28 Mar 2023 11:45), Max: 98.6 (28 Mar 2023 11:45)  HR: 65 (28 Mar 2023 11:45) (65 - 70)  BP: 131/58 (28 Mar 2023 11:45) (121/48 - 131/71)  BP(mean): --  RR: 17 (28 Mar 2023 11:45) (16 - 19)  SpO2: 100% (28 Mar 2023 11:45) (100% - 100%)    Parameters below as of 28 Mar 2023 11:45  Patient On (Oxygen Delivery Method): room air    PHYSICAL EXAM:  Constitutional: cachectic elderly frail M; resting comfortably in bed  Head: NC/AT  Eyes: PERRL, anicteric sclera  ENT: no nasal discharge; MMM; NGT in place with feeds running  Neck: supple; no JVD  Respiratory: CTA B/L; no W/R/R; on RA without respiratory distress  Cardiac: +S1/S2; RRR; no M/R/G  Gastrointestinal: less firm, NT/ND; no rebound or guarding; +BSx4; no fluid wave; recent para bandage site c/d/i  Extremities: WWP, no clubbing or cyanosis; no peripheral edema  Musculoskeletal: moves all extremities spontaneously  Vascular: 2+ radial, DP/PT pulses B/L  Dermatologic: skin warm, dry and intact; no rashes, wounds, or scars  Neurologic: AAOx0; CNII-XII grossly intact; no focal deficits  Psychiatric: withdrawn, non-participatory with exam    LABS:                        9.6    4.52  )-----------( 53       ( 28 Mar 2023 04:53 )             31.1     03-28    141  |  111<H>  |  34<H>  ----------------------------<  138<H>  4.2   |  19<L>  |  0.63    Ca    8.3<L>      28 Mar 2023 04:53  Phos  1.9     03-28  Mg     2.20     03-28    TPro  5.4<L>  /  Alb  3.3  /  TBili  5.4<H>  /  DBili  x   /  AST  119<H>  /  ALT  82<H>  /  AlkPhos  554<H>  03-28    PT/INR - ( 28 Mar 2023 04:53 )   PT: 18.1 sec;   INR: 1.55 ratio         PTT - ( 28 Mar 2023 04:53 )  PTT:33.5 sec    CAPILLARY BLOOD GLUCOSE  89 (27 Mar 2023 12:49)      POCT Blood Glucose.: 166 mg/dL (28 Mar 2023 12:51)      RADIOLOGY & ADDITIONAL TESTS: Reviewed.

## 2023-03-28 NOTE — PROGRESS NOTE ADULT - PROBLEM SELECTOR PLAN 10
- family reports follows with oncology at [facility]; outpt records in chart

## 2023-03-28 NOTE — PROGRESS NOTE ADULT - PROBLEM SELECTOR PLAN 11
- F: none  - E: replete K<4, Mg<2  - N: NPO with tube feeds  - D: hold with risk of bleed; SCDs  - G: protonix daily    code: full  dispo: pending medical optimization; prognosis guarded

## 2023-03-28 NOTE — PROGRESS NOTE ADULT - PROBLEM SELECTOR PLAN 2
decompensated with ascites, with variceal bleed, with PSE; MELD 12 3/10/23  - s/p diagnostic para (2/25) and therapeutic para (3/5) removed 4.5L  - s/p diag/therapeutic tap on 3/15, removed 3.6L, neg for SBP; Path neg for malignancy   - therapeutic para 3/28 with 4.9L removed  - c/w rifaximin BID, lactulose BID titrate 2 BM/day   - SBP ppx with Cipro due to low ascitic protein   - trend LFT, RUQ w/o pathology on 3/10  - MRI abdomen at [facility] 12/2022 no HCC  - off diuretic due to hypernatremia

## 2023-03-28 NOTE — PROGRESS NOTE ADULT - PROBLEM SELECTOR PLAN 1
- likely d/t hypoxia/anoxic brain ischemia in s/o hemorrhagic shock on admission/hypernatremia  - MRI brain 3/15 extensive cortical restricted diffusion throughout the cerebral hemispheres including the basal ganglia and insula b/l.   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 EEG (3/14) frequent left posterior quadrant periodic discharges, risk of seizure has decreased compared to EEG from 3/5. No seizures recorded.   - c/w rifaximin 550 mg BID, lactulose BID, repeat ammonia level 25; encephalopathy likely unrelated to HE  - failed S&S multiple trials last 3/21 coughing across trials suggestive of impaired airway protection --- plan for next re-eval 3/29 with NGT removal in AM (per son's request) and evaluation in afternoon  - 3/24 Palliative care had GOC w/ son and daughter. Likely current scenario is related to anoxic brain injury; explained despite nutrition/correction of electrolytes clinically unimproved. d/w Hepatology in regards to PEG pt is at increased risk of peritonitis. As per hepatology no significant risk of bleeding with paracentesis in cirrhotic with elevated PT/INR and low platelets.   - procedure team following, s/p para 3/28 with 4.9L output  - discussed with son again 3/27 regarding risk of bleeding with NGT removal/replacement, demonstrated understanding and still would like NGT removed tomorrow AM to allow for barrier free S+S eval in the afternoon; if fails eval is amenable to replacement of NGT

## 2023-03-29 NOTE — SWALLOW BEDSIDE ASSESSMENT ADULT - ASR SWALLOW RECOMMEND DIAG
Objective testing NOT warranted given patient exhibits overt signs of impaired airway protection with mildly thick liquids and thin liquids (coughing)
Objective testing NOT warranted given severity of oral stage deficits and clinical presentation
Objective testing is NOT indicated given severity of oral phase deficits.
Objective testing NOT warranted given patient exhibits overt signs of impaired airway protection across PO trials (coughing)
objective testing not indicated due to lethargy and poor secretion management

## 2023-03-29 NOTE — SWALLOW BEDSIDE ASSESSMENT ADULT - SWALLOW EVAL: DIAGNOSIS
Patient's son provided feeding assistance. 1. Moderate oral dysphagia for puree, moderately thick liquids, pudding and thin liquids marked by reduced stripping off teaspoon with patient reluctant to accept PO trials, however, accepted limited trials when provided with encouragement from son with prolonged volitional bolus holding and suspected premature spillage for thin liquids. 2. Severe pharyngeal dysphagia for the aforementioned consistences marked by a suspected delayed pharyngeal swallow trigger with reduced hyolaryngeal elevation noted upon digital palpation with coughing across trials suggestive of impaired airway protection.
Patient given ice chips at which time patient exhibited opening of mouth to retrieve/lick ice chips. Patient given Puree via teaspoon with adequate oral acceptance and retrieval from teaspoon, however, patient made no attempt to manipulate and/or transfer bolus to elicit swallow despite verbal encouragement from SLP. SLP utilized Yankauer Suction to manually remove bolus trials from oral cavity with adequate clearance. Pharyngeal phase unable to be adequately assessed due to severity of oral stage deficits.
Clinician provided ice chips for increased oral stimulation/awareness. Patient with adequate acceptance of ice chip, however absent bolus manipulation, absent oral transit and absent trigger of pharyngeal swallow despite verbal and tactile cues. RN provided suctioning via yankauer to remove bolus from oral cavity. Oral and pharyngeal phases of the swallow could not be adequately assessed due to severit of oral phase deficits.
Patient presented with lethargy and was unable to maintain adequate alertness to safely participate in PO trials. SLP presented patient with ice chip to labial surface as means of oral stimulation, at which time patient presented with absent manipulation suggestive of reduced awareness. Unable to assess oropharyngeal phases at this time. Recommend maintain NGT as primary means of nutrition/hydration/medication and consider GOC with the patient's caregivers to discuss nutritional plan of care. Maintain aspiration precautions and strict oral care.
Patient's daughter provided feeding assistance. 1. Mild oral dysphagia for puree, moderately thick liquids, mildly thick liquids and thin liquids marked by reduced oral aperture for teaspoon acceptance, mild volitional bolus holding (~5 seconds each trial) with eventual transfer. 2. Functional pharyngeal phase for puree and moderately thick liquids marked by a present pharyngeal swallow trigger with hyolaryngeal elevation noted upon digital palpation with evidence of impaired airway protection. 3. Moderate-severe pharyngeal dysphagia for mildly thick liquids and thin liquids marked by a suspected delayed pharyngeal swallow trigger with hyolaryngeal elevation upon digital palpation with patient throat clearing and coughing suggestive of impaired airway protection

## 2023-03-29 NOTE — PROGRESS NOTE ADULT - PROBLEM SELECTOR PLAN 2
decompensated with ascites, with variceal bleed, with PSE; MELD 12 3/10/23  - s/p diagnostic para (2/25) and therapeutic para (3/5) removed 4.5L  - s/p diag/therapeutic tap on 3/15, removed 3.6L, neg for SBP; Path neg for malignancy   - therapeutic para 3/28 with 4.9L removed  - c/w rifaximin BID, lactulose BID titrate 2 BM/day   - SBP ppx with Cipro due to low ascitic protein   - trend LFT, RUQ w/o pathology on 3/10  - MRI abdomen at [facility] 12/2022 no HCC  - off diuretic due to hypernatremia

## 2023-03-29 NOTE — PROGRESS NOTE ADULT - PROBLEM SELECTOR PLAN 3
- hold diuretics  - now normal without improved MS   - off d5W and FW -- will need to monitor PO intake   - monitor Na

## 2023-03-29 NOTE — PROGRESS NOTE ADULT - SUBJECTIVE AND OBJECTIVE BOX
[facility] Department of Hospital Medicine  Yandy Vasquez DO  Available on MS Teams  Pager: [phone]    Patient is a 67y old  Male who presents with a chief complaint of gib, encephalopathy (19 Mar 2023 12:15)    Subjective:  Pt seen and examined at bedside in no acute distress. Appears more awake/alert. Looking around room. NGT removed this AM. Non-participatory with exam.    ROS: limited by lack of pt participation     Vital Signs Last 24 Hrs  T(C): 36.6 (29 Mar 2023 12:50), Max: 36.9 (28 Mar 2023 17:45)  T(F): 97.9 (29 Mar 2023 12:50), Max: 98.4 (28 Mar 2023 17:45)  HR: 77 (29 Mar 2023 12:50) (61 - 93)  BP: 123/65 (29 Mar 2023 12:50) (123/65 - 128/77)  BP(mean): --  RR: 20 (29 Mar 2023 12:50) (17 - 20)  SpO2: 100% (29 Mar 2023 12:50) (98% - 100%)    Parameters below as of 29 Mar 2023 12:50  Patient On (Oxygen Delivery Method): room air    PHYSICAL EXAM:  Constitutional: cachectic elderly frail M; resting comfortably in bed  Head: NC/AT  Eyes: PERRL, anicteric sclera  ENT: no nasal discharge; MMM; NGT in place with feeds running  Neck: supple; no JVD  Respiratory: CTA B/L; no W/R/R; on RA without respiratory distress  Cardiac: +S1/S2; RRR; no M/R/G  Gastrointestinal: less firm, NT/ND; no rebound or guarding; +BSx4; no fluid wave; recent para bandage site c/d/i  Extremities: WWP, no clubbing or cyanosis; no peripheral edema  Musculoskeletal: moves all extremities spontaneously  Vascular: 2+ radial, DP/PT pulses B/L  Dermatologic: skin warm, dry and intact; no rashes, wounds, or scars  Neurologic: AAOx0; CNII-XII grossly intact; no focal deficits  Psychiatric: withdrawn, non-participatory with exam    MEDICATIONS  (STANDING):  chlorhexidine 2% Cloths 1 Application(s) Topical <User Schedule>  ciprofloxacin     Tablet 500 milliGRAM(s) Oral every 24 hours  coronavirus bivalent (EUA) Booster Vaccine (PFIZER) 0.3 milliLiter(s) IntraMuscular once  dextrose 5%. 1000 milliLiter(s) (50 mL/Hr) IV Continuous <Continuous>  dextrose 5%. 1000 milliLiter(s) (100 mL/Hr) IV Continuous <Continuous>  dextrose 50% Injectable 25 Gram(s) IV Push once  dextrose 50% Injectable 12.5 Gram(s) IV Push once  dextrose 50% Injectable 25 Gram(s) IV Push once  glucagon  Injectable 1 milliGRAM(s) IntraMuscular once  insulin lispro (ADMELOG) corrective regimen sliding scale   SubCutaneous at bedtime  insulin lispro (ADMELOG) corrective regimen sliding scale   SubCutaneous three times a day before meals  lactulose Syrup 20 Gram(s) Oral two times a day  levothyroxine Injectable 75 MICROGram(s) IV Push at bedtime  mupirocin 2% Ointment 1 Application(s) Topical two times a day  pantoprazole  Injectable 40 milliGRAM(s) IV Push daily  rifAXIMin 550 milliGRAM(s) Oral two times a day  tenofovir disoproxil fumarate (VIREAD) 300 milliGRAM(s) Oral daily    MEDICATIONS  (PRN):  dextrose Oral Gel 15 Gram(s) Oral once PRN Blood Glucose LESS THAN 70 milliGRAM(s)/deciliter  sodium chloride 0.65% Nasal 1 Spray(s) Both Nostrils three times a day PRN Nasal Congestion    LABS:                        10.4   6.23  )-----------( 58       ( 29 Mar 2023 06:20 )             33.5     03-29    143  |  113<H>  |  38<H>  ----------------------------<  234<H>  4.4   |  20<L>  |  0.60    Ca    8.6      29 Mar 2023 06:20  Phos  2.5     03-29  Mg     2.40     03-29    TPro  5.8<L>  /  Alb  3.4  /  TBili  4.8<H>  /  DBili  x   /  AST  121<H>  /  ALT  93<H>  /  AlkPhos  667<H>  03-29    PT/INR - ( 29 Mar 2023 06:20 )   PT: 18.2 sec;   INR: 1.56 ratio         PTT - ( 29 Mar 2023 06:20 )  PTT:31.4 sec    CAPILLARY BLOOD GLUCOSE      POCT Blood Glucose.: 106 mg/dL (29 Mar 2023 12:27)      RADIOLOGY & ADDITIONAL TESTS: Reviewed.

## 2023-03-29 NOTE — SWALLOW BEDSIDE ASSESSMENT ADULT - SPECIFY REASON(S)
To reassess swallow function
to assess swallow function
To reassess swallow function
Assess swallow function
To reassess swallow function
to re-assess swallow function, as per MD order

## 2023-03-29 NOTE — SWALLOW BEDSIDE ASSESSMENT ADULT - ORAL PREPARATORY PHASE
Absent bolus manipulation and oral transit
reduced acceptance into oral cavity and required verbal encouragement from son to accept full teaspoon trials
Within functional limits
Reduced oral grading

## 2023-03-29 NOTE — SWALLOW BEDSIDE ASSESSMENT ADULT - SWALLOW EVAL: CURRENT DIET
NPO with NG tube in place, per MD order
NPO with Nasogastric Tube Feeding
NPO with NGT in place, as per MD hernandez
NPO with NGT
NPO with NG tube feedings, per MD order
NPO per MD order

## 2023-03-29 NOTE — PROGRESS NOTE ADULT - PROBLEM SELECTOR PLAN 1
- likely d/t hypoxia/anoxic brain ischemia in s/o hemorrhagic shock on admission/hypernatremia  - MRI brain 3/15 extensive cortical restricted diffusion throughout the cerebral hemispheres including the basal ganglia and insula b/l.   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 EEG (3/14) frequent left posterior quadrant periodic discharges, risk of seizure has decreased compared to EEG from 3/5. No seizures recorded.   - c/w rifaximin 550 mg BID, lactulose BID, repeat ammonia level 25; encephalopathy likely unrelated to HE  - re-evaluated by S+S 3/29 and now recommended for pureed diet with moderately thick liquids -- will monitor PO intake  - 3/24 Palliative care had GOC w/ son and daughter. Likely current scenario is related to anoxic brain injury; explained despite nutrition/correction of electrolytes clinically unimproved. d/w Hepatology in regards to PEG pt is at increased risk of peritonitis. As per hepatology no significant risk of bleeding with paracentesis in cirrhotic with elevated PT/INR and low platelets.   - procedure team following, s/p para 3/28 with 4.9L output

## 2023-03-29 NOTE — PROGRESS NOTE ADULT - PROBLEM SELECTOR PLAN 4
HbA1c 6.1% likely underestimated due transfusions given insulin needs  - now off TF and tolerating PO -- will need to monitor PO intake  - dc NPH and start low insulin sliding scale -- monitor requirements and consider lantus/lispro accordingly

## 2023-03-29 NOTE — PROGRESS NOTE ADULT - ASSESSMENT
Pt is a 68 yo M with PMH HTN, T2D, hypothyroidism, CAD (s/p CABG), TANG (c/b cirrhosis), follicular lymphoma (rituximab), and HBV (tenofovir) p/w CP and constipation with development of hematemesis. Admitted to MICU for hypovolemic shock requiring intubation/sedation for airway protection in setting of UGIB s/p protonix gtt, octreotide gtt, and midodrine (now off). Hepatology following with 2/24 EGD showing esophageal varices s/p banding. Course further c/b persistent encephalopathy, on rifaximin and lactulose; MR brain with c/f hypoxic-ischemic injury. Now on cipro for SBP ppx. s/p paracentesis 3/28 with procedure team, 4.9L output. Repeat S+S 3/29 with recommendation for pureed diet and moderately thick liquids.

## 2023-03-29 NOTE — SWALLOW BEDSIDE ASSESSMENT ADULT - ADDITIONAL RECOMMENDATIONS
Patient seen for multiple bedside swallow assessments (3/10, 3/11, and 3/17)  and continues to present with lethargy and poor secretion management. Please reconsult this department if the patient's overall alertness/mentation status improves, as deemed medically appropriate/pending GOC.
Medical team advised to reconsult this service when patient becomes medically optimized to reassess swallow function.
1. This service to follow up as schedule permits for diet tolerance. 2. Medical team advised to reconsult this service if change in medical status and/or patient's tolerance to recommended PO diet.
2. This service to follow-up as schedule permits to determine candidacy for oral intake. 3. Medical team further advised to reconsult this department with any change in medical status and/or as patient becomes medically optimized.
1. This service to follow up to reassess swallow for candidacy of oral diet as schedule permits. 2. Medical team advised to reconsult this service when patient is medically optimized.

## 2023-03-29 NOTE — SWALLOW BEDSIDE ASSESSMENT ADULT - ORAL PHASE
Delayed oral transit time
No attempt to transfer bolus requiring SLP to manually remove bolus via Yankauer Suctioning with adequate clearance
unable to assess
prolonged volitional bolus holding

## 2023-03-29 NOTE — SWALLOW BEDSIDE ASSESSMENT ADULT - NS ASR SWALLOW FINDINGS DISCUS
Physician/Nursing/Patient
BALDO Vargas/Nursing/Family
ACP [id number]/Nursing
ACP Fred [id number]/Nursing/Family
Nursing

## 2023-03-29 NOTE — SWALLOW BEDSIDE ASSESSMENT ADULT - ASR SWALLOW REFERRAL
Continued RD services to ensure adequate daily caloric nutritional intake
RD consult at MD's discretion given patient's daughter requesting education regarding caloric/hydration intake.

## 2023-03-29 NOTE — SWALLOW BEDSIDE ASSESSMENT ADULT - PHARYNGEAL PHASE
Within functional limits Delayed pharyngeal swallow/Cough post oral intake/Throat clear post oral intake

## 2023-03-29 NOTE — SWALLOW BEDSIDE ASSESSMENT ADULT - COMMENTS
Per discussion with CONSTANCE Clemons, swallow evaluation will be deferred to 3/29/23 at 1PM with family present at bedside given patient is pending a paracentesis. This department to follow up as schedule permits.
As per MICU note dated 3/9/23 "66yo M w/ pmhx CAD s/p CABG, TANG cirrhosis, follicular lymphoma (on Rituximab infusions outpt), HTN, DM, hypothyroidism, currently on tenofovir (HBV Core +), presented to the ED w/ chest pain and constipation while in the ED patient w/ multiple episodes of hematemesis and hypotension; MICU consulted for hypotension, massive upper GI bleed s/p intubation, s/p 2/24 bedside scope with banding x6 of esophageal varices."    CXR 3/8/23 "FINDINGS: Slightly elevated right hemidiaphragm. Enteric tube with tip looped in the proximal stomach. Minimal right basilar atelectasis. There is no pleural effusion or pneumothorax. The heart is normal in size Sternotomy wires."    Patient visited at bedside for clinical swallow evaluation with son present. Patient presents as awake and alert, however not following basic commands, no response to simple yes/no questions and no verbalizations produced. Patient observed with poor secretion management evidenced by copious thick wet secretions in oral cavity; RN informed and provided oral care and suctioning via yankauer.
Hospitalist Note 3/21 "67M HTN, DM2, hypothyroidism, CAD s/p CABG, TANG cirrhosis, follicular lymphoma (on Rituximab OP), chronic Hep B on tenofovir (HBV Core +), presented to the ED w/ chest pain and constipation while in ED developed hematemesis s/p MICU admission for acute blood loss anemia c/b hypovolemic shock  intubated for airway protection (extubated 3/8) s/p 2/24 bedside EGD with banding esophageal varices now with persistent encephalopathy."    Of note: Patient is known to this service as patient was seen for multiple clinical swallow assessments 3/10, 3/11, and 3/17 with recommendations of NPO and to continue short-term non-oral means (please see reports).    Patient seen at bedside this afternoon for a reassessment of the swallow function, at which time patient was alert. Patient with NG tube in place. Patient does not follow directives and intermittently head nods "yes". Patient's son present at bedside and provided feeding assistance.
Hospitalist note 3/10 "67M HTN, DM2, hypothyroidism, CAD s/p CABG, TANG cirrhosis, follicular lymphoma (on Rituximab OP), chronic Hep B on tenofovir (HBV Core +), presented to the ED w/ chest pain and constipation while in ED developed hematemesis s/p MICU admission for acute blood loss anemia c/b hypovolemic shock  intubated for airway protection (extubated 3/8) s/p 2/24 bedside EGD with bandingf esophageal varices now with persistent encephalopathy. "    Of Note: Patient is known to this service as patient was seen for an initial clinical swallow assessment on 3/10 at which time NPO with consideration for short-term non-oral means of nutrition was recommended (please see full report for details).    Patient seen at bedside this AM for a reassessment of the swallow function, at which time patient was asleep upon arrival, though easily aroused with tactile stimuli. Patient with NG tube in place upon arrival. patient did not attempt to verbalize wants/needs or follow directives. Patient did not appear in distress or discomfort. Patient exhibited wet baseline coughing prior to initiation of PO trials.
Consult received and chart reviewed. Patient seen at bedside this AM for re-assessment of swallow function, as per MD request. Patient is familiar to this department and was seen on 3/10 and 3/11, at which times NPO was recommended (see reports for details). Patient is currently NPO with NGT in place. Upon SLP arrival, patient was asleep. Patient was arousable by noxious stimulation provided by SLP and RN who was at bedside; however patient unable to maintain adequate level of alertness at this time. Patient also noted with wet white tinged secretionsin oral cavity; which RN reported has been the patient's baseline. Attempted to provide oral care; however patient with tight labial seal and lethargy persisted so unable to provide at this time.      Per charting, the patient is a "67M HTN, DM2, hypothyroidism, CAD s/p CABG, TANG cirrhosis, follicular lymphoma (on Rituximab OP), chronic Hep B on tenofovir (HBV Core +), presented to the ED w/ chest pain and constipation while in ED developed hematemesis s/p MICU admission for acute blood loss anemia c/b hypovolemic shock  intubated for airway protection (extubated 3/8) s/p 2/24 bedside EGD with banding esophageal varices now with persistent encephalopathy. "    WBC WFL.  CXR 3/13/23 revealed "No focal consolidation."  MR Head 3/13/23 revealed "Extensive cortical predominant restricted diffusion throughout the cerebral hemispheres including involvement of the basal ganglia and insula bilaterally. Given history of hematemesis with hypotension, hypoxic-ischemic injury is favored. Differential also includes postictal sequelae, Creutzfeldt-Gonzalez disease, encephalitis or lymphomatous involvement. CSF sampling should be considered."    Discussed results and recommendations with patient, RN, and called out to ACP.
Hospitalist note "66 yo M with PMH HTN, T2D, hypothyroidism, CAD (s/p CABG), TANG (c/b cirrhosis), follicular lymphoma (rituximab), and HBV (tenofovir) p/w CP and constipation with development of hematemesis. Admitted to MICU for hypovolemic shock requiring intubation/sedation for airway protection in setting of UGIB s/p protonix gtt, octreotide gtt, and midodrine (now off). Hepatology following with 2/24 EGD showing esophageal varices s/p banding. Course further c/b persistent encephalopathy, on rifaximin and lactulose; MR brain with c/f hypoxic-ischemic injury. Now on cipro for SBP ppx with procedure team planning paracentesis 3/28. Has failed S+S eval multiple times requiring NGT and tube feeds, pending re-eval 3/29."  Of note: Patient is known to this service as patient was seen for clinical swallow assessments on 3/10, 3/11, 3/17, and 3/25 (please see notes).    Patient seen at bedside this afternoon for a reassessment of the swallow function, at which time patient's daughter was present during assessment. patient's daughter provided feeding assistance for PO trials. Patient had NG tube removed this AM prior to swallow assessment.

## 2023-03-29 NOTE — PROGRESS NOTE ADULT - PROBLEM SELECTOR PLAN 11
- F: none  - E: replete K<4, Mg<2  - N: pureed diet with moderately thick liquids   - D: hold with risk of bleed; SCDs  - G: protonix daily    code: full  dispo: pending medical optimization; prognosis guarded

## 2023-03-30 NOTE — ADVANCED PRACTICE NURSE CONSULT - ASSESSMENT
General: Patient nonverbal, opens eyes spontaneously, bedbound, incontinent of urine and stool. Incontinence care provided during assessment for soft brown bowel movement. Skin warm, dry with increased moisture in intertriginous folds, scattered areas of hyperpigmentation and hypopigmentation, scattered areas of ecchymosis without hematoma on bilateral upper extremities.     Left hip: Category III tear (complete loss of epidermis) measuring 0.5cmx0.5cmx0.1cm (previously measuring [id number]) exposing 100% pink moist dermis with re-epithelization on wound edges, irregular wound edges. Scant serous drainage, no odor. Periwound with no erythema, no increased warmth, no edema, no induration, no fluctuance no signs or symptoms of overt skin infection.  Goals of care: atraumatic dressing, offload pressure, protect from friction and shear, monitor for tissue type changes.     LLQ abdomen - previous site of paracentesis (confirmed by son at bedside), pinpoint opening draining moderate amount of serous fluid, previous pouching system removed, area cleansed, male urinary pouch placed for containment of drainage.     Right posterior ankle/achilles - DTPI, area of purple maroon discoloration measuring 1cmx1.9csl6eb, no drainage. Periwound with no erythema, no increased warmth, no edema, no induration, no fluctuance no signs or symptoms of overt skin infection. Goals of care: offload pressure, protect from friction and shear, monitor for tissue type changes.     Patient continues to be at high risk for skin breakdown 2/2 poor nutrition, immobility. On assessment patient on a low air loss surface, heel offloading boots in place, Z-flow positioning pillow utilized, moisture management in place with use of one breathable incontinence pad. Turned and positioned per protocol.  General: Patient nonverbal, opens eyes spontaneously, bedbound, incontinent of urine and stool. Incontinence care provided during assessment for soft brown bowel movement. Skin warm, dry with increased moisture in intertriginous folds, scattered areas of hyperpigmentation and hypopigmentation, scattered areas of ecchymosis without hematoma on bilateral upper extremities. Dried serosanguinous scabbing noted to b/l lower extremities. +3 edema to b/l hands and forearms. Right scapula with hypopigmentation noted.     Left hip: Category III tear (complete loss of epidermis) measuring 0.5cmx0.5cmx0.1cm (previously measuring [id number]) exposing 100% pink moist dermis with re-epithelization on wound edges, irregular wound edges. Scant serous drainage, no odor. Periwound with no erythema, no increased warmth, no edema, no induration, no fluctuance no signs or symptoms of overt skin infection.  Goals of care: atraumatic dressing, offload pressure, protect from friction and shear, monitor for tissue type changes.     LLQ abdomen - previous site of paracentesis. Dry and intact. No drainage noted.     Left posterior ankle/achilles - Previously DTPI, area of purple maroon discoloration measuring 1cmx1.1ors3wx. Now presenting as hyperpigmentation 1cmx1.0yxy6vu. Irregularly bordered. No drainage. Periwound intact. No increased warmth, no erythema, no edema, no induration, no fluctuance no signs or symptoms of overt skin infection. Goals of care: offload pressure, protect from friction and shear.    Sacrum to b/l buttocks: Mixed etiology of incontinence, moisture, pressure, and frictional wound. Measuring 6.0yna2ppc7.2cm. Presenting as mixed tissue type with irregularly bordered. Tissue type includes 30% moist darkened dermis, 20% fibrin, and 50% moist pink/red dermis. Periwound with maceration, hypopigmentation, and hyperpigmentation. Scant serosanguinous drainage noted, no odor. No increased warmth, no fluctuance, no erythema, no induration, no edema, no overt signs of infection noted at this time. Goals of care: monitor for tissue type changes and continue measures to decrease friction/shear/pressure.     Patient continues to be at high risk for skin breakdown 2/2 poor nutrition, immobility. On assessment patient on a low air loss surface, heel offloading boots in place, Z-flow positioning pillow utilized, moisture management in place with use of one breathable incontinence pad. Turned and positioned per protocol.

## 2023-03-30 NOTE — CHART NOTE - NSCHARTNOTEFT_GEN_A_CORE
Pt seen for Consult - assessment / SEVERE MALNUTRITION FOLLOW UP     Medical Course:  66 y/o male with PMH HTN, T2D, hypothyroidism, CAD (s/p CABG), TANG (c/b cirrhosis), follicular lymphoma (rituximab), and HBV (tenofovir) p/w CP and constipation with development of hematemesis. Admitted to MICU for hypovolemic shock requiring intubation/sedation for airway protection in setting of UGIB s/p protonix gtt, octreotide gtt, and midodrine (now off). Hepatology following with 2/24 EGD showing esophageal varices s/p banding. Course further c/b persistent encephalopathy, on rifaximin and lactulose; MR brain with c/f hypoxic-ischemic injury. Now on cipro for SBP ppx. s/p paracentesis 3/28 with procedure team, 4.9L output. Repeat S+S 3/29 with recommendation for pureed diet and moderately thick liquids.       Nutrition Course: Nutrition interview conducted with Pt's son present at bedside today: No recent episodes of nausea, vomiting, diarrhea or constipation, BM noted on 3/28 per RN flowsheets. No reports any chewing/swallowing difficulties. Pt with upgraded diet to Pureed, moderately thick liquids per SLP recommendations 3/29. Food preferences explored with son and noted. Intake is 25% as observed today. Feeding skills: set up help required. Pt amenable to Vital Health Shake 1x daily (520 telly and 22 gm protein) (supplement must be thickened to moderately thick consistency).     Diet Prescription: Diet, Pureed:   Consistent Carbohydrate {No Snacks} (CSTCHO)  DASH/TLC {Sodium & Cholesterol Restricted} (DASH)  Moderately Thick Liquids (MODTHICKLIQS) (03-29-23 @ 13:08)    Pertinent Medications: MEDICATIONS  (STANDING):  chlorhexidine 2% Cloths 1 Application(s) Topical <User Schedule>  ciprofloxacin     Tablet 500 milliGRAM(s) Oral every 24 hours  coronavirus bivalent (EUA) Booster Vaccine (PFIZER) 0.3 milliLiter(s) IntraMuscular once  dextrose 5%. 1000 milliLiter(s) (50 mL/Hr) IV Continuous <Continuous>  dextrose 5%. 1000 milliLiter(s) (100 mL/Hr) IV Continuous <Continuous>  dextrose 50% Injectable 25 Gram(s) IV Push once  dextrose 50% Injectable 12.5 Gram(s) IV Push once  dextrose 50% Injectable 25 Gram(s) IV Push once  glucagon  Injectable 1 milliGRAM(s) IntraMuscular once  insulin lispro (ADMELOG) corrective regimen sliding scale   SubCutaneous at bedtime  insulin lispro (ADMELOG) corrective regimen sliding scale   SubCutaneous three times a day before meals  lactulose Syrup 20 Gram(s) Oral two times a day  levothyroxine Injectable 75 MICROGram(s) IV Push at bedtime  mupirocin 2% Ointment 1 Application(s) Topical two times a day  pantoprazole  Injectable 40 milliGRAM(s) IV Push daily  rifAXIMin 550 milliGRAM(s) Oral two times a day  tenofovir disoproxil fumarate (VIREAD) 300 milliGRAM(s) Oral daily    MEDICATIONS  (PRN):  dextrose Oral Gel 15 Gram(s) Oral once PRN Blood Glucose LESS THAN 70 milliGRAM(s)/deciliter  sodium chloride 0.65% Nasal 1 Spray(s) Both Nostrils three times a day PRN Nasal Congestion    Pertinent Labs: 03-30 Na146 mmol/L<H> Glu 123 mg/dL<H> K+ 4.2 mmol/L Cr  0.67 mg/dL BUN 33 mg/dL<H> 03-30 Phos 2.8 mg/dL 03-30 Alb 3.2 g/dL<L>  POCT Blood Glucose.: 166 mg/dL (30 Mar 2023 12:28)  POCT Blood Glucose.: 114 mg/dL (30 Mar 2023 09:47)  POCT Blood Glucose.: 198 mg/dL (29 Mar 2023 21:23)  POCT Blood Glucose.: 128 mg/dL (29 Mar 2023 17:39)      Weight: Height (cm): 170.2 (02-24 @ 12:00)  Weight (kg): 59 (03-23), 55.4 (02-24 @ 12:00)  BMI (kg/m2): 19.1 (02-24 @ 12:00)  Weight Assessment: Pt with significant wt gain x 1 month (+6.5%), pt noted with edema, wt gain may be attributed to fluid retention       Physical Assessment, per flowsheets:  Edema: left arm   Pressure Injury: Rt posterior ankle DTI- wound care 3/15      Estimated Needs:   [X] No change since previous assessment, based on IBW  148#/ 67 kg  8943-0220 kcal daily @ 25-30kcal/kg  80-100gm protein daily @ 1.2-1.5gm/kg    Previous Nutrition Diagnosis: severe protein calorie malnutrition   Nutrition Diagnosis is [x ] ongoing    New Nutrition Diagnosis: [x ] not applicable     Interventions:   1) Continue current diet rx: DASH/TLC, CSTCHO, Pureed, moderately thick- texture per SLP recs  2)  dept to provide Vital Health Shake 1x daily (520 telly and 22 gm protein)   3) Encourage PO intake and honor food preferences as able, provide necessary assistance with meals  4) Obtain weekly wts  5) RD to f/u prn     Monitor & Evaluate:  PO intake, tolerance to diet/supplement, nutrition related lab values, weight trends, BMs/GI distress, hydration status, skin integrity.    Naida Zamora MS, RDN (Pager #[phone]) | Also available on TEAMS

## 2023-03-30 NOTE — ADVANCED PRACTICE NURSE CONSULT - RECOMMEDATIONS
Continue f/u nutrition/dietary for optimization.     Topical recommendations:     Left hip, right posterior ankle/achilles - Cleanse with NS, pat dry. Cover with small silicone foam with border. Change every 3 days and PRN if soiled. Monitor for tissue type changes.     Sacrum - Cleanse with skin cleanser, pat dry. Apply Escobar moisture barrier cream twice a day and PRN with incontinent episodes.     Continue low air loss bed therapy, continue heel elevation, continue to turn & reposition per protocol, soft pillow between bony prominences, continue moisture management with barrier creams & single breathable pad, continue measures to decrease friction/shear/pressure.     Plan discussed with primary RN Migel Timmons at bedside.     Please contact Wound/Ostomy Care Service Line if we can be of further assistance (ext [phone]).  Continue f/u nutrition/dietary for optimization.     Topical recommendations:     Left hip: Cleanse with NS, pat dry. Cover with small silicone foam with border. Change every 3 days and PRN if soiled. Monitor for tissue type changes.     Sacrum to b/l buttocks: Cleanse with skin cleanser, pat dry. Apply a thin layer of TRIAD paste/hydrophilic dressing twice a day and PRN with incontinent episodes. With episodes of incontinence gently remove soiled layer of Triad, then reinforce with thin layer.     Continue low air loss bed therapy, continue heel elevation, continue to turn & reposition as per protocol, soft pillow between bony prominences, continue moisture management with barrier creams & single breathable pad, continue measures to decrease friction/shear/pressure. Continue with nutritional support as per dietary/orders.     Plan of care discussed with Primary RN Julian and BALDO Carias.     Please contact Wound/Ostomy Care Service Line if we can be of further assistance (ext [phone]).

## 2023-03-30 NOTE — CHART NOTE - NSCHARTNOTEFT_GEN_A_CORE
Pt will require a semi electric hospital bed due to aspiration precaution. Head of bed must be 30 degrees or more. Due to aspiration precaution, pt require frequent body change in body position not feasible in regular bed. Gel overlay is needed to prevent skin breakdown. Pt is incontinent of stool and urine

## 2023-03-30 NOTE — PROGRESS NOTE ADULT - PROBLEM SELECTOR PLAN 4
HbA1c 6.1% likely underestimated due transfusions given insulin needs  - now off TF and tolerating PO -- will need to monitor PO intake  - c/w low insulin sliding scale -- monitor requirements and consider lantus/lispro accordingly  - FSG currently appropriate

## 2023-03-30 NOTE — ADVANCED PRACTICE NURSE CONSULT - REASON FOR CONSULT
Patient known to [facility] service line last seen on 3/15/23. Patient seen on skin care rounds after wound care referral received for assessment of skin impairment and recommendations of topical management. As per H&P, patient is a 67M PMHx HTN, DM2, hypothyroidism, CAD s/p CABG, TANG cirrhosis, follicular lymphoma (on Rituximab OP), chronic Hep B on tenofovir (HBV Core +) p/w chest pain and constipation while in ED developed hematemesis w/ hypotension c/f hemorrhagic shock s/p MICU admission intubated for airway protection (extubated 3/8) s/p 2/24 bedside EGD with banding esophageal varices. H/c while in ICU c/b tracheal cultures (+) for pseudomonas resulting in septic shock s/p abx. Now w/ persistent encephalopathy.   Critical events since 3/15/23 include Rapid Response Team on 3/17 for hypotension. Patient had hemoglobin at 5.6 on 3/18 requiring 1 unit of PRBC to be transfused. Patient had two episodes of hypoglycemia; one on 3/18 and 3/20. Chart reviewed: WBC 3.87, H/H 10.1/33.3, INR 1.60, Platelets 53, hA1c 6.1, BMI 19.1, Angel Luis 12.

## 2023-03-30 NOTE — PROGRESS NOTE ADULT - ASSESSMENT
Pt is a 66 yo M with PMH HTN, T2D, hypothyroidism, CAD (s/p CABG), ATNG (c/b cirrhosis), follicular lymphoma (rituximab), and HBV (tenofovir) p/w CP and constipation with development of hematemesis. Admitted to MICU for hypovolemic shock requiring intubation/sedation for airway protection in setting of UGIB s/p protonix gtt, octreotide gtt, and midodrine (now off). Hepatology following with 2/24 EGD showing esophageal varices s/p banding. Course further c/b persistent encephalopathy, on rifaximin and lactulose; MR brain with c/f hypoxic-ischemic injury. Now on cipro for SBP ppx. s/p paracentesis 3/28 with procedure team, 4.9L output. Repeat S+S 3/29 with recommendation for pureed diet and moderately thick liquids.

## 2023-03-30 NOTE — PROGRESS NOTE ADULT - SUBJECTIVE AND OBJECTIVE BOX
[facility] Department of Hospital Medicine  Yandy Vasquez DO  Available on MS Teams  Pager: [phone]    Patient is a 67y old  Male who presents with a chief complaint of gib, encephalopathy (19 Mar 2023 12:15)    Subjective:  Pt seen and examined at bedside in no acute distress. Appears more awake/alert. Smiling and looking around room. Son bedside and said he just fed pt some apple sauces and water. Tolerating PO intake, feels that he's eating adequate amounts. Amenable to discharge planning. Would like commode and hospital bed at home. Want to speak to CM for assistance with home care etc. Feel that pt is slowly improving. Understanding that pt is approaching being medically ready for discharge.     ROS: limited by lack of pt participation     Vital Signs Last 24 Hrs  T(C): 36.3 (30 Mar 2023 05:26), Max: 36.6 (29 Mar 2023 20:15)  T(F): 97.4 (30 Mar 2023 05:26), Max: 97.9 (29 Mar 2023 20:15)  HR: 55 (30 Mar 2023 05:26) (55 - 99)  BP: 133/59 (30 Mar 2023 05:26) (133/59 - 133/76)  BP(mean): --  RR: 17 (30 Mar 2023 05:26) (17 - 18)  SpO2: 100% (30 Mar 2023 05:26) (99% - 100%)    Parameters below as of 30 Mar 2023 05:26  Patient On (Oxygen Delivery Method): room air    PHYSICAL EXAM:  Constitutional: cachectic elderly frail M; resting comfortably in bed  Head: NC/AT  Eyes: PERRL, anicteric sclera  ENT: no nasal discharge; MMM; NGT in place with feeds running  Neck: supple; no JVD  Respiratory: CTA B/L; no W/R/R; on RA without respiratory distress  Cardiac: +S1/S2; RRR; no M/R/G  Gastrointestinal: less firm, NT/ND; no rebound or guarding; +BSx4; no fluid wave; recent para bandage site c/d/i  Extremities: WWP, no clubbing or cyanosis; no peripheral edema  Musculoskeletal: moves all extremities spontaneously  Vascular: 2+ radial, DP/PT pulses B/L  Dermatologic: skin warm, dry and intact; no rashes, wounds, or scars  Neurologic: AAOx0; CNII-XII grossly intact; no focal deficits  Psychiatric: withdrawn, non-participatory with exam    MEDICATIONS  (STANDING):  chlorhexidine 2% Cloths 1 Application(s) Topical <User Schedule>  ciprofloxacin     Tablet 500 milliGRAM(s) Oral every 24 hours  coronavirus bivalent (EUA) Booster Vaccine (PFIZER) 0.3 milliLiter(s) IntraMuscular once  dextrose 5%. 1000 milliLiter(s) (50 mL/Hr) IV Continuous <Continuous>  dextrose 5%. 1000 milliLiter(s) (100 mL/Hr) IV Continuous <Continuous>  dextrose 50% Injectable 25 Gram(s) IV Push once  dextrose 50% Injectable 12.5 Gram(s) IV Push once  dextrose 50% Injectable 25 Gram(s) IV Push once  glucagon  Injectable 1 milliGRAM(s) IntraMuscular once  insulin lispro (ADMELOG) corrective regimen sliding scale   SubCutaneous at bedtime  insulin lispro (ADMELOG) corrective regimen sliding scale   SubCutaneous three times a day before meals  lactulose Syrup 20 Gram(s) Oral two times a day  levothyroxine Injectable 75 MICROGram(s) IV Push at bedtime  mupirocin 2% Ointment 1 Application(s) Topical two times a day  pantoprazole  Injectable 40 milliGRAM(s) IV Push daily  rifAXIMin 550 milliGRAM(s) Oral two times a day  tenofovir disoproxil fumarate (VIREAD) 300 milliGRAM(s) Oral daily    MEDICATIONS  (PRN):  dextrose Oral Gel 15 Gram(s) Oral once PRN Blood Glucose LESS THAN 70 milliGRAM(s)/deciliter  sodium chloride 0.65% Nasal 1 Spray(s) Both Nostrils three times a day PRN Nasal Congestion    LABS:                        10.1   3.87  )-----------( 53       ( 30 Mar 2023 06:00 )             33.3     03-30    146<H>  |  113<H>  |  33<H>  ----------------------------<  123<H>  4.2   |  24  |  0.67    Ca    8.6      30 Mar 2023 06:00  Phos  2.8     03-30  Mg     2.20     03-30    TPro  5.5<L>  /  Alb  3.2<L>  /  TBili  5.2<H>  /  DBili  x   /  AST  108<H>  /  ALT  92<H>  /  AlkPhos  551<H>  03-30    PT/INR - ( 30 Mar 2023 06:00 )   PT: 18.7 sec;   INR: 1.60 ratio         PTT - ( 30 Mar 2023 06:00 )  PTT:31.6 sec    CAPILLARY BLOOD GLUCOSE      POCT Blood Glucose.: 166 mg/dL (30 Mar 2023 12:28)      RADIOLOGY & ADDITIONAL TESTS: Reviewed.

## 2023-03-30 NOTE — PROGRESS NOTE ADULT - PROBLEM SELECTOR PLAN 2
decompensated with ascites, with variceal bleed, with PSE; MELD 12 3/10/23  - s/p diagnostic para (2/25) and therapeutic para (3/5) removed 4.5L  - s/p diag/therapeutic tap on 3/15, removed 3.6L, neg for SBP; Path neg for malignancy   - therapeutic para 3/28 with 4.9L removed  - c/w rifaximin BID, lactulose BID titrate 2 BM/day   - SBP ppx with Cipro due to low ascitic protein   - trend LFT, RUQ w/o pathology on 3/10  - MRI abdomen at [facility] 12/2022 no HCC  - off diuretic due to hypernatremia

## 2023-03-31 NOTE — PHYSICAL THERAPY INITIAL EVALUATION ADULT - NSPTDISCHREC_GEN_A_CORE
Patient will be placed on trial, please continue to follow for pending recommendations.
Pt unable to follow commands, not appropriate for skilled PT services at this time, will not be placed on PT program. Please reconsult if indicated.

## 2023-03-31 NOTE — PROGRESS NOTE ADULT - PROBLEM SELECTOR PLAN 2
decompensated with ascites, with variceal bleed, with PSE; MELD 12 3/10/23  - s/p diagnostic para (2/25) and therapeutic para (3/5) removed 4.5L  - s/p diag/therapeutic tap on 3/15, removed 3.6L, neg for SBP; Path neg for malignancy   - therapeutic para 3/28 with 4.9L removed  - c/w rifaximin BID, lactulose BID titrate 2 BM/day   - SBP ppx with Cipro due to low ascitic protein   - trend LFT, RUQ w/o pathology on 3/10  - MRI abdomen at [facility] 12/2022 no HCC  - off diuretic due to hypernatremia

## 2023-03-31 NOTE — PROGRESS NOTE ADULT - SUBJECTIVE AND OBJECTIVE BOX
[facility] Department of Hospital Medicine  Yandy Vasquez DO  Available on MS Teams  Pager: [phone]    Patient is a 67y old  Male who presents with a chief complaint of gib, encephalopathy (19 Mar 2023 12:15)    Subjective:  Pt seen and examined at bedside in no acute distress. Awake/alert, looking around room.     ROS: limited by lack of pt participation     Vital Signs Last 24 Hrs  T(C): 36.7 (31 Mar 2023 06:09), Max: 36.7 (30 Mar 2023 20:01)  T(F): 98 (31 Mar 2023 06:09), Max: 98 (30 Mar 2023 20:01)  HR: 61 (31 Mar 2023 06:09) (58 - 61)  BP: 138/67 (31 Mar 2023 06:09) (118/60 - 139/70)  BP(mean): --  RR: 18 (31 Mar 2023 06:09) (18 - 18)  SpO2: 100% (31 Mar 2023 06:09) (100% - 100%)    Parameters below as of 31 Mar 2023 06:09  Patient On (Oxygen Delivery Method): room air    PHYSICAL EXAM:  Constitutional: cachectic elderly frail M; resting comfortably in bed  Head: NC/AT  Eyes: PERRL, anicteric sclera  ENT: no nasal discharge; MMM; NGT in place with feeds running  Neck: supple; no JVD  Respiratory: CTA B/L; no W/R/R; on RA without respiratory distress  Cardiac: +S1/S2; RRR; no M/R/G  Gastrointestinal: less firm, NT/ND; no rebound or guarding; +BSx4; no fluid wave; recent para bandage site c/d/i  Extremities: WWP, no clubbing or cyanosis; no peripheral edema  Musculoskeletal: moves all extremities spontaneously  Vascular: 2+ radial, DP/PT pulses B/L  Dermatologic: skin warm, dry and intact; no rashes, wounds, or scars  Neurologic: AAOx0; CNII-XII grossly intact; no focal deficits  Psychiatric: withdrawn, non-participatory with exam    MEDICATIONS  (STANDING):  chlorhexidine 2% Cloths 1 Application(s) Topical <User Schedule>  ciprofloxacin     Tablet 500 milliGRAM(s) Oral every 24 hours  coronavirus bivalent (EUA) Booster Vaccine (PFIZER) 0.3 milliLiter(s) IntraMuscular once  dextrose 5%. 1000 milliLiter(s) (50 mL/Hr) IV Continuous <Continuous>  dextrose 5%. 1000 milliLiter(s) (100 mL/Hr) IV Continuous <Continuous>  dextrose 50% Injectable 25 Gram(s) IV Push once  dextrose 50% Injectable 12.5 Gram(s) IV Push once  dextrose 50% Injectable 25 Gram(s) IV Push once  glucagon  Injectable 1 milliGRAM(s) IntraMuscular once  insulin lispro (ADMELOG) corrective regimen sliding scale   SubCutaneous at bedtime  insulin lispro (ADMELOG) corrective regimen sliding scale   SubCutaneous three times a day before meals  lactulose Syrup 20 Gram(s) Oral two times a day  levothyroxine Injectable 75 MICROGram(s) IV Push at bedtime  mupirocin 2% Ointment 1 Application(s) Topical two times a day  pantoprazole  Injectable 40 milliGRAM(s) IV Push daily  rifAXIMin 550 milliGRAM(s) Oral two times a day  tenofovir disoproxil fumarate (VIREAD) 300 milliGRAM(s) Oral daily    MEDICATIONS  (PRN):  dextrose Oral Gel 15 Gram(s) Oral once PRN Blood Glucose LESS THAN 70 milliGRAM(s)/deciliter  sodium chloride 0.65% Nasal 1 Spray(s) Both Nostrils three times a day PRN Nasal Congestion    LABS:                        10.1   3.87  )-----------( 53       ( 30 Mar 2023 06:00 )             33.3     03-31    147<H>  |  116<H>  |  35<H>  ----------------------------<  139<H>  3.6   |  21<L>  |  0.63    Ca    8.4      31 Mar 2023 05:27  Phos  2.6     03-31  Mg     2.00     03-31    TPro  5.5<L>  /  Alb  3.2<L>  /  TBili  5.2<H>  /  DBili  x   /  AST  108<H>  /  ALT  92<H>  /  AlkPhos  551<H>  03-30    PT/INR - ( 31 Mar 2023 05:27 )   PT: 18.3 sec;   INR: 1.57 ratio         PTT - ( 30 Mar 2023 06:00 )  PTT:31.6 sec    CAPILLARY BLOOD GLUCOSE      POCT Blood Glucose.: 136 mg/dL (31 Mar 2023 08:52)      RADIOLOGY & ADDITIONAL TESTS: Reviewed.

## 2023-03-31 NOTE — PROGRESS NOTE ADULT - SUBJECTIVE AND OBJECTIVE BOX
Patient is a 67y old  Male who presents with a chief complaint of GIB (31 Mar 2023 11:29)      HPI:   67-year-old male with past medical history of lymphoma on chemo, diabetes, hypertension, CAD status post CABG, cirrhosis presenting with concern of R sided pain and constipation. Patient follows primarily at [facility]. He states the pain began this morning. Sharp, non radiating. No associated N/V/D, dysuria, trouble urinating, fever, chills, cough, dyspnea, sore throat. Pt states he has been constipated for 4d. Was told to take miralax by his oncologist. Pt has known TANG. (24 Feb 2023 11:27)      REVIEW OF SYSTEMS  limited participation    PAST MEDICAL & SURGICAL HISTORY  CAD (coronary artery disease)    Diabetes    Lymphoma    Cirrhosis    S/P CABG x 1       CURRENT FUNCTIONAL STATUS  3/31  Bed Mobility: Rolling/Turning:     · Level of [place]	dependent (less than 25% patients effort); Pt unable to follow commands    Bed Mobility: Sit to Supine:     · Level of [place]	unable to perform    Bed Mobility: Supine to Sit:     · Level of [place]	unable to perform    Transfer: Sit to Stand:     · Level of [place]	unable to perform    Transfer: Stand to Sit:     · Level of [place]	unable to perform    Gait Skills:     · Level of [place]	unable to perform    FAMILY HISTORY   No pertinent family history in first degree relatives        RECENT LABS/IMAGING  CBC Full  -  ( 30 Mar 2023 06:00 )  WBC Count : 3.87 K/uL  RBC Count : 3.49 M/uL  Hemoglobin : 10.1 g/dL  Hematocrit : 33.3 %  Platelet Count - Automated : 53 K/uL  Mean Cell Volume : 95.4 fL  Mean Cell Hemoglobin : 28.9 pg  Mean Cell Hemoglobin Concentration : 30.3 gm/dL  Auto Neutrophil # : 2.40 K/uL  Auto Lymphocyte # : 0.93 K/uL  Auto Monocyte # : 0.42 K/uL  Auto Eosinophil # : 0.08 K/uL  Auto Basophil # : 0.02 K/uL  Auto Neutrophil % : 62.0 %  Auto Lymphocyte % : 24.0 %  Auto Monocyte % : 10.9 %  Auto Eosinophil % : 2.1 %  Auto Basophil % : 0.5 %    03-31    147<H>  |  116<H>  |  35<H>  ----------------------------<  139<H>  3.6   |  21<L>  |  0.63    Ca    8.4      31 Mar 2023 05:27  Phos  2.6     03-31  Mg     2.00     03-31    TPro  5.5<L>  /  Alb  3.2<L>  /  TBili  5.2<H>  /  DBili  x   /  AST  108<H>  /  ALT  92<H>  /  AlkPhos  551<H>  03-30        VITALS  T(C): 36.7 (03-31-23 @ 06:09), Max: 36.7 (03-30-23 @ 20:01)  HR: 61 (03-31-23 @ 06:09) (58 - 61)  BP: 138/67 (03-31-23 @ 06:09) (118/60 - 139/70)  RR: 18 (03-31-23 @ 06:09) (18 - 18)  SpO2: 100% (03-31-23 @ 06:09) (100% - 100%)  Wt(kg): --    ALLERGIES   Beef (Other)  No Known Drug Allergies      MEDICATIONS   atorvastatin 80 milliGRAM(s) Oral at bedtime  chlorhexidine 2% Cloths 1 Application(s) Topical <User Schedule>  ciprofloxacin     Tablet 500 milliGRAM(s) Oral every 24 hours  coronavirus bivalent (EUA) Booster Vaccine (PFIZER) 0.3 milliLiter(s) IntraMuscular once  dextrose 5%. 1000 milliLiter(s) IV Continuous <Continuous>  dextrose 5%. 1000 milliLiter(s) IV Continuous <Continuous>  dextrose 50% Injectable 25 Gram(s) IV Push once  dextrose 50% Injectable 12.5 Gram(s) IV Push once  dextrose 50% Injectable 25 Gram(s) IV Push once  dextrose Oral Gel 15 Gram(s) Oral once PRN  glucagon  Injectable 1 milliGRAM(s) IntraMuscular once  insulin lispro (ADMELOG) corrective regimen sliding scale   SubCutaneous at bedtime  insulin lispro (ADMELOG) corrective regimen sliding scale   SubCutaneous three times a day before meals  lactulose Syrup 20 Gram(s) Oral two times a day  levothyroxine 100 MICROGram(s) Oral daily  multivitamin 1 Tablet(s) Oral daily  pantoprazole    Tablet 40 milliGRAM(s) Oral before breakfast  rifAXIMin 550 milliGRAM(s) Oral two times a day  sodium chloride 0.65% Nasal 1 Spray(s) Both Nostrils three times a day PRN  tamsulosin 0.8 milliGRAM(s) Oral at bedtime  tenofovir disoproxil fumarate (VIREAD) 300 milliGRAM(s) Oral daily      ----------------------------------------------------------------------------------------   PHYSICAL EXAM  Constitutional - NAD  HEENT - on room air  Chest - no respiratory distress  Cardiovascular - RRR, S1S2   Abdomen -  Soft, NTND  Neurologic Exam -                    Cognitive - unable to arouse     Communication - Fluent, No dysarthria      Motor - unable to assess, not following commands     Balance - WNL Static   ----------------------------------------------------------------------------------------  ASSESSMENT/PLAN   68 yo m with TANG  lactulose  rifaximin  cipro  viread    Rehab - unable to follow commands at this time, not a rehab candidate

## 2023-03-31 NOTE — PHYSICAL THERAPY INITIAL EVALUATION ADULT - ADDITIONAL COMMENTS
Pt by bedside to provide history.  Pt lives with daughter in a house +stairs to enter. Pt's bedroom is on the 2nd floor, however, it's possible to remain on the main level. Prior to admission, pt was independent with ADLs and ambulation.  Post PT evaluation, pt left semi-supine, alarm on, call bell and remote within reach, all precautions maintained, NAD. AKSHAT Euceda aware.
Patient lives with wife in private home, 5 steps to enter and flight to bedroom/bathroom. Patient was independent in all ADL prior to admission. Patient was not using an assistive device prior to admission.

## 2023-03-31 NOTE — PHYSICAL THERAPY INITIAL EVALUATION ADULT - LEVEL OF INDEPENDENCE: SUPINE/SIT, REHAB EVAL
unable to perform
due to patient not following commands or participating in evaluation at this time, patient just extubated this AM./unable to perform

## 2023-03-31 NOTE — PHYSICAL THERAPY INITIAL EVALUATION ADULT - LEVEL OF INDEPENDENCE: SIT/STAND, REHAB EVAL
unable to perform
due to patient not following commands or participating in evaluation at this time, patient just extubated this AM.

## 2023-03-31 NOTE — PHYSICAL THERAPY INITIAL EVALUATION ADULT - PERTINENT HX OF CURRENT PROBLEM, REHAB EVAL
67-year-old male with past medical history of lymphoma on chemo, diabetes, hypertension, CAD status post CABG, cirrhosis presenting with concern of R sided pain and constipation. Patient follows primarily at [facility]. He states the pain began this morning. Sharp, non radiating. No associated N/V/D, dysuria, trouble urinating, fever, chills, cough, dyspnea, sore throat. Pt states he has been constipated for 4d. Was told to take miralax by his oncologist. Pt has known TANG.
67-year-old male with past medical history of lymphoma on chemo, diabetes, hypertension, CAD status post CABG, cirrhosis presenting with concern of R sided pain and constipation. Patient follows primarily at [facility]. He states the pain began this morning. Sharp, non radiating. No associated N/V/D, dysuria, trouble urinating, fever, chills, cough, dyspnea, sore throat. Pt states he has been constipated for 4d. Was told to take miralax by his oncologist. Pt has known TANG.

## 2023-03-31 NOTE — PHYSICAL THERAPY INITIAL EVALUATION ADULT - GENERAL OBSERVATIONS, REHAB EVAL
Pt received semi-supine in bed, with all lines intact, NAD, all precautions maintained. Son by bedside
Patient received semi-supine in bed, all lines intact, NAD, HR 86, O2 saturation 100% and /65.
Detail Level: Zone

## 2023-04-01 NOTE — PROGRESS NOTE ADULT - PROBLEM SELECTOR PLAN 11
- F: none  - E: replete K<4, Mg<2  - N: pureed diet with moderately thick liquids   - D: hold with risk of bleed; SCDs  - G: protonix daily    code: full  dispo: pending medical optimization; prognosis guarded; plan for home with home hospice

## 2023-04-01 NOTE — PROGRESS NOTE ADULT - PROBLEM SELECTOR PLAN 2
decompensated with ascites, with variceal bleed, with PSE; MELD 12 3/10/23  - s/p diagnostic para (2/25) and therapeutic para (3/5) removed 4.5L  - s/p diag/therapeutic tap on 3/15, removed 3.6L, neg for SBP; Path neg for malignancy   - therapeutic para 3/28 with 4.9L removed  - c/w rifaximin BID, lactulose BID titrate 2 BM/day   - SBP ppx with Cipro due to low ascitic protein   - trend LFT, RUQ w/o pathology on 3/10  - MRI abdomen at [facility] 12/2022 no HCC  - off diuretic due to hypernatremia

## 2023-04-01 NOTE — PROGRESS NOTE ADULT - PROBLEM SELECTOR PLAN 3
- hold diuretics  - off d5W and FW -- will need to monitor PO intake   - improving; Na 144 today  - will only obtain labs as clinically indicated

## 2023-04-01 NOTE — PROGRESS NOTE ADULT - ASSESSMENT
Pt is a 66 yo M with PMH HTN, T2D, hypothyroidism, CAD (s/p CABG), TANG (c/b cirrhosis), follicular lymphoma (rituximab), and HBV (tenofovir) p/w CP and constipation with development of hematemesis. Admitted to MICU for hypovolemic shock requiring intubation/sedation for airway protection in setting of UGIB s/p protonix gtt, octreotide gtt, and midodrine (now off). Hepatology following with 2/24 EGD showing esophageal varices s/p banding. Course further c/b persistent encephalopathy, on rifaximin and lactulose; MR brain with c/f hypoxic-ischemic injury. Now on cipro for SBP ppx. s/p paracentesis 3/28 with procedure team, 4.9L output. Repeat S+S 3/29 with recommendation for pureed diet and moderately thick liquids.

## 2023-04-01 NOTE — PROGRESS NOTE ADULT - SUBJECTIVE AND OBJECTIVE BOX
[facility] of [facility] Medicine  Yandy Vasquez DO  Available on MS Teams  Pager: [phone]    Patient is a 67y old  Male who presents with a chief complaint of gib, encephalopathy (19 Mar 2023 12:15)    Subjective:  Overnight with 1x urinary retention requiring straight cath. Pt seen and examined at bedside in no acute distress. Awake/alert, looking around room.     ROS: limited by lack of pt participation     Vital Signs Last 24 Hrs  T(C): 36.7 (01 Apr 2023 12:27), Max: 36.7 (31 Mar 2023 20:09)  T(F): 98 (01 Apr 2023 12:27), Max: 98.1 (31 Mar 2023 20:09)  HR: 108 (01 Apr 2023 12:27) (66 - 108)  BP: 131/75 (01 Apr 2023 12:27) (113/70 - 148/73)  BP(mean): --  RR: 20 (01 Apr 2023 12:27) (18 - 20)  SpO2: 98% (01 Apr 2023 12:27) (98% - 100%)    Parameters below as of 01 Apr 2023 12:27  Patient On (Oxygen Delivery Method): room air    PHYSICAL EXAM:  Constitutional: cachectic elderly frail M; resting comfortably in bed  Head: NC/AT  Eyes: PERRL, anicteric sclera  ENT: no nasal discharge; MMM; NGT in place with feeds running  Neck: supple; no JVD  Respiratory: CTA B/L; no W/R/R; on RA without respiratory distress  Cardiac: +S1/S2; RRR; no M/R/G  Gastrointestinal: less firm, NT/ND; no rebound or guarding; +BSx4; no fluid wave; recent para bandage site c/d/i  Extremities: WWP, no clubbing or cyanosis; no peripheral edema  Musculoskeletal: moves all extremities spontaneously  Vascular: 2+ radial, DP/PT pulses B/L  Dermatologic: skin warm, dry and intact; no rashes, wounds, or scars  Neurologic: AAOx0; CNII-XII grossly intact; no focal deficits  Psychiatric: withdrawn, non-participatory with exam    MEDICATIONS  (STANDING):  atorvastatin 80 milliGRAM(s) Oral at bedtime  chlorhexidine 2% Cloths 1 Application(s) Topical <User Schedule>  ciprofloxacin     Tablet 500 milliGRAM(s) Oral every 24 hours  coronavirus bivalent (EUA) Booster Vaccine (PFIZER) 0.3 milliLiter(s) IntraMuscular once  dextrose 5%. 1000 milliLiter(s) (50 mL/Hr) IV Continuous <Continuous>  dextrose 5%. 1000 milliLiter(s) (100 mL/Hr) IV Continuous <Continuous>  dextrose 50% Injectable 25 Gram(s) IV Push once  dextrose 50% Injectable 12.5 Gram(s) IV Push once  dextrose 50% Injectable 25 Gram(s) IV Push once  glucagon  Injectable 1 milliGRAM(s) IntraMuscular once  insulin lispro (ADMELOG) corrective regimen sliding scale   SubCutaneous at bedtime  insulin lispro (ADMELOG) corrective regimen sliding scale   SubCutaneous three times a day before meals  lactulose Syrup 20 Gram(s) Oral two times a day  levothyroxine 100 MICROGram(s) Oral daily  multivitamin 1 Tablet(s) Oral daily  pantoprazole    Tablet 40 milliGRAM(s) Oral before breakfast  rifAXIMin 550 milliGRAM(s) Oral two times a day  tamsulosin 0.8 milliGRAM(s) Oral at bedtime  tenofovir disoproxil fumarate (VIREAD) 300 milliGRAM(s) Oral daily    MEDICATIONS  (PRN):  dextrose Oral Gel 15 Gram(s) Oral once PRN Blood Glucose LESS THAN 70 milliGRAM(s)/deciliter  sodium chloride 0.65% Nasal 1 Spray(s) Both Nostrils three times a day PRN Nasal Congestion    LABS:    04-01    144  |  114<H>  |  35<H>  ----------------------------<  187<H>  3.2<L>   |  18<L>  |  0.72    Ca    8.6      01 Apr 2023 05:19  Phos  2.6     03-31  Mg     2.00     03-31      PT/INR - ( 31 Mar 2023 05:27 )   PT: 18.3 sec;   INR: 1.57 ratio             CAPILLARY BLOOD GLUCOSE      POCT Blood Glucose.: 257 mg/dL (01 Apr 2023 11:57)      RADIOLOGY & ADDITIONAL TESTS: Reviewed.             [facility]  Yandy Vasquez DO  Available on MS Teams  Pager: [phone]    Patient is a 67y old  Male who presents with a chief complaint of gib, encephalopathy (19 Mar 2023 12:15)    Subjective:  Overnight with 1x urinary retention requiring straight cath. Pt seen and examined at bedside in no acute distress. Awake/alert, looking around room. Spoke with daughter bedside, who appreciated updates. Inquiring about possible future paracenteses -- understands will need to be discussed with PCP as hospice does not facilitate procedures like this at home. Feels that pt is eating well and is encouraged by PO intake in the last day. Wants to take pt home but slightly nervous. Plans to hire private aid for a week for assistance if unable to obtain insurance covered help. Also wants to make sure all things are set up at home and medical supplies delivered prior to taking pt home. Agreeable to obtain labs as clinically indicated from now on.     ROS: limited by lack of pt participation     Vital Signs Last 24 Hrs  T(C): 36.7 (01 Apr 2023 12:27), Max: 36.7 (31 Mar 2023 20:09)  T(F): 98 (01 Apr 2023 12:27), Max: 98.1 (31 Mar 2023 20:09)  HR: 108 (01 Apr 2023 12:27) (66 - 108)  BP: 131/75 (01 Apr 2023 12:27) (113/70 - 148/73)  BP(mean): --  RR: 20 (01 Apr 2023 12:27) (18 - 20)  SpO2: 98% (01 Apr 2023 12:27) (98% - 100%)    Parameters below as of 01 Apr 2023 12:27  Patient On (Oxygen Delivery Method): room air    PHYSICAL EXAM:  Constitutional: cachectic elderly frail M; resting comfortably in bed  Head: NC/AT  Eyes: PERRL, anicteric sclera  ENT: no nasal discharge; MMM; NGT in place with feeds running  Neck: supple; no JVD  Respiratory: CTA B/L; no W/R/R; on RA without respiratory distress  Cardiac: +S1/S2; RRR; no M/R/G  Gastrointestinal: less firm, NT/ND; no rebound or guarding; +BSx4; no fluid wave; recent para bandage site c/d/i  Extremities: WWP, no clubbing or cyanosis; no peripheral edema  Musculoskeletal: moves all extremities spontaneously  Vascular: 2+ radial, DP/PT pulses B/L  Dermatologic: skin warm, dry and intact; no rashes, wounds, or scars  Neurologic: AAOx0; CNII-XII grossly intact; no focal deficits  Psychiatric: withdrawn, non-participatory with exam    MEDICATIONS  (STANDING):  atorvastatin 80 milliGRAM(s) Oral at bedtime  chlorhexidine 2% Cloths 1 Application(s) Topical <User Schedule>  ciprofloxacin     Tablet 500 milliGRAM(s) Oral every 24 hours  coronavirus bivalent (EUA) Booster Vaccine (PFIZER) 0.3 milliLiter(s) IntraMuscular once  dextrose 5%. 1000 milliLiter(s) (50 mL/Hr) IV Continuous <Continuous>  dextrose 5%. 1000 milliLiter(s) (100 mL/Hr) IV Continuous <Continuous>  dextrose 50% Injectable 25 Gram(s) IV Push once  dextrose 50% Injectable 12.5 Gram(s) IV Push once  dextrose 50% Injectable 25 Gram(s) IV Push once  glucagon  Injectable 1 milliGRAM(s) IntraMuscular once  insulin lispro (ADMELOG) corrective regimen sliding scale   SubCutaneous at bedtime  insulin lispro (ADMELOG) corrective regimen sliding scale   SubCutaneous three times a day before meals  lactulose Syrup 20 Gram(s) Oral two times a day  levothyroxine 100 MICROGram(s) Oral daily  multivitamin 1 Tablet(s) Oral daily  pantoprazole    Tablet 40 milliGRAM(s) Oral before breakfast  rifAXIMin 550 milliGRAM(s) Oral two times a day  tamsulosin 0.8 milliGRAM(s) Oral at bedtime  tenofovir disoproxil fumarate (VIREAD) 300 milliGRAM(s) Oral daily    MEDICATIONS  (PRN):  dextrose Oral Gel 15 Gram(s) Oral once PRN Blood Glucose LESS THAN 70 milliGRAM(s)/deciliter  sodium chloride 0.65% Nasal 1 Spray(s) Both Nostrils three times a day PRN Nasal Congestion    LABS:    04-01    144  |  114<H>  |  35<H>  ----------------------------<  187<H>  3.2<L>   |  18<L>  |  0.72    Ca    8.6      01 Apr 2023 05:19  Phos  2.6     03-31  Mg     2.00     03-31      PT/INR - ( 31 Mar 2023 05:27 )   PT: 18.3 sec;   INR: 1.57 ratio             CAPILLARY BLOOD GLUCOSE      POCT Blood Glucose.: 257 mg/dL (01 Apr 2023 11:57)      RADIOLOGY & ADDITIONAL TESTS: Reviewed.

## 2023-04-02 NOTE — PROGRESS NOTE ADULT - PROBLEM SELECTOR PLAN 2
decompensated with ascites, with variceal bleed, with PSE; MELD 12 3/10/23  - s/p diagnostic para (2/25) and therapeutic para (3/5) removed 4.5L  - s/p diag/therapeutic tap on 3/15, removed 3.6L, neg for SBP; Path neg for malignancy   - therapeutic para 3/28 with 4.9L removed  - c/w rifaximin BID, lactulose BID titrate 2 BM/day   - SBP ppx with Cipro due to low ascitic protein   - trend LFT, RUQ w/o pathology on 3/10  - MRI abdomen at [facility] 12/2022 no HCC  - off diuretic due to hypernatremia

## 2023-04-02 NOTE — PROGRESS NOTE ADULT - REASON FOR ADMISSION
GI bleeding
GIB
encephalopathy
UGIB
shock, gib
encephalopathy
GIB
encephalopathy
gi bleed, encephalopathy
GIB
encephalopathy
GIB
gib, encephalopathy
GIB

## 2023-04-02 NOTE — PROGRESS NOTE ADULT - SUBJECTIVE AND OBJECTIVE BOX
[facility] of Hospital Medicine  Yandy Vasquez DO  Available on MS Teams  Pager: [phone]    Patient is a 67y old  Male who presents with a chief complaint of gib, encephalopathy (19 Mar 2023 12:15)    Subjective:  Overnight with improved spontaneous voiding. Pt seen and examined at bedside in no acute distress, son present. Feels that pt is improving, eating more and slightly more interactive.     ROS: limited by lack of pt participation     Vital Signs Last 24 Hrs  T(C): 36.7 (02 Apr 2023 12:04), Max: 37.3 (01 Apr 2023 16:00)  T(F): 98 (02 Apr 2023 12:04), Max: 99.1 (01 Apr 2023 16:00)  HR: 98 (02 Apr 2023 12:04) (98 - 103)  BP: 129/69 (02 Apr 2023 12:04) (101/51 - 150/67)  BP(mean): --  RR: 18 (02 Apr 2023 12:04) (17 - 18)  SpO2: 100% (02 Apr 2023 12:04) (99% - 100%)    Parameters below as of 02 Apr 2023 12:04  Patient On (Oxygen Delivery Method): room air    PHYSICAL EXAM:  Constitutional: cachectic elderly frail M; resting comfortably in bed  Head: NC/AT  Eyes: PERRL, anicteric sclera  ENT: no nasal discharge; MMM; NGT in place with feeds running  Neck: supple; no JVD  Respiratory: CTA B/L; no W/R/R; on RA without respiratory distress  Cardiac: +S1/S2; RRR; no M/R/G  Gastrointestinal: less firm, NT/ND; no rebound or guarding; +BSx4; no fluid wave; recent para bandage site c/d/i  Extremities: WWP, no clubbing or cyanosis; no peripheral edema  Musculoskeletal: moves all extremities spontaneously  Vascular: 2+ radial, DP/PT pulses B/L  Dermatologic: skin warm, dry and intact; no rashes, wounds, or scars  Neurologic: AAOx0; CNII-XII grossly intact; no focal deficits  Psychiatric: withdrawn, non-participatory with exam    MEDICATIONS  (STANDING):  atorvastatin 80 milliGRAM(s) Oral at bedtime  chlorhexidine 2% Cloths 1 Application(s) Topical <User Schedule>  ciprofloxacin     Tablet 500 milliGRAM(s) Oral every 24 hours  coronavirus bivalent (EUA) Booster Vaccine (PFIZER) 0.3 milliLiter(s) IntraMuscular once  dextrose 5%. 1000 milliLiter(s) (50 mL/Hr) IV Continuous <Continuous>  dextrose 5%. 1000 milliLiter(s) (100 mL/Hr) IV Continuous <Continuous>  dextrose 50% Injectable 25 Gram(s) IV Push once  dextrose 50% Injectable 12.5 Gram(s) IV Push once  dextrose 50% Injectable 25 Gram(s) IV Push once  glucagon  Injectable 1 milliGRAM(s) IntraMuscular once  insulin lispro (ADMELOG) corrective regimen sliding scale   SubCutaneous at bedtime  insulin lispro (ADMELOG) corrective regimen sliding scale   SubCutaneous three times a day before meals  lactulose Syrup 20 Gram(s) Oral two times a day  levothyroxine 100 MICROGram(s) Oral daily  multivitamin 1 Tablet(s) Oral daily  pantoprazole    Tablet 40 milliGRAM(s) Oral before breakfast  rifAXIMin 550 milliGRAM(s) Oral two times a day  tamsulosin 0.8 milliGRAM(s) Oral at bedtime  tenofovir disoproxil fumarate (VIREAD) 300 milliGRAM(s) Oral daily    MEDICATIONS  (PRN):  dextrose Oral Gel 15 Gram(s) Oral once PRN Blood Glucose LESS THAN 70 milliGRAM(s)/deciliter  sodium chloride 0.65% Nasal 1 Spray(s) Both Nostrils three times a day PRN Nasal Congestion    LABS:    Labs as clinically indicated

## 2023-04-03 NOTE — PROGRESS NOTE ADULT - PROBLEM SELECTOR PLAN 2
decompensated with ascites, with variceal bleed, with PSE; MELD 12 3/10/23  - s/p diagnostic para (2/25) and therapeutic para (3/5) removed 4.5L  - s/p diag/therapeutic tap on 3/15, removed 3.6L, neg for SBP; Path neg for malignancy   - therapeutic para 3/28 with 4.9L removed  - c/w rifaximin BID, lactulose BID titrate 2 BM/day   - SBP ppx with Cipro due to low ascitic protein   - trend LFT, RUQ w/o pathology on 3/10  - MRI abdomen at [facility] 12/2022 no HCC  - off diuretic due to hypernatremia

## 2023-04-03 NOTE — PROGRESS NOTE ADULT - PROBLEM SELECTOR PLAN 11
- F: none  - E: replete K<4, Mg<2  - N: pureed diet with moderately thick liquids   - D: hold with risk of bleed; SCDs  - G: protonix daily    code: full  dispo: prognosis guarded; plan for home with home hospice  -Urinary retention- jayesh as pt is planned for dc with home hospice, dc today, dc planning time spent in coordination 40mts ( preparing dc summary and plan)

## 2023-04-04 NOTE — CONSULT NOTE ADULT - CONSULT REQUESTED DATE/TIME
20-Mar-2023 11:14
24-Feb-2023 00:00
04-Apr-2023
16-Mar-2023 13:27
17-Mar-2023 12:01
24-Feb-2023 08:30
04-Apr-2023 15:18
17-Mar-2023 17:50
17-Mar-2023 14:46

## 2023-04-04 NOTE — CHART NOTE - NSCHARTNOTEFT_GEN_A_CORE
Pt seen for SEVERE MALNUTRITION FOLLOW UP     Medical Course:  68 y/o male with PMH HTN, T2D, hypothyroidism, CAD (s/p CABG), TANG (c/b cirrhosis), follicular lymphoma (rituximab), and HBV (tenofovir) p/w CP and constipation with development of hematemesis. Admitted to MICU for hypovolemic shock requiring intubation/sedation for airway protection in setting of UGIB s/p protonix gtt, octreotide gtt, and midodrine (now off). Hepatology following with 2/24 EGD showing esophageal varices s/p banding. Course further c/b persistent encephalopathy, on rifaximin and lactulose; MR brain with c/f hypoxic-ischemic injury. Now on cipro for SBP ppx. s/p paracentesis 3/28 with procedure team, 4.9L output. Repeat S+S 3/29 with recommendation for pureed diet and moderately thick liquids.       Nutrition Course: Unable to conduct nutrition interview 2/2 decreased cognition. Information obtained from comprehensive chart review and per Pt's son present at bedside today: No recent episodes of nausea, vomiting, diarrhea or constipation, BM noted on 4/3 per RN flowsheets. Denies any chewing/swallowing difficulties. Food preferences explored and noted. Intake is 0-25% per RN flowsheets. Pt's son reports ~25-50% intake today. Feeding skills: total assistance required. Pt's son requested change to glucerna supplement instead of vital. Will recommend Glucerna  3x daily (660kcals, 30g protein) to promote optimal PO intake. Pt son reports Pt with possible allergy to beans- updated in [facility].     Diet Prescription: Diet, Pureed:   Moderately Thick Liquids (MODTHICKLIQS)  Jesus (04-01-23 @ 10:00)    Pertinent Medications: MEDICATIONS  (STANDING):  atorvastatin 80 milliGRAM(s) Oral at bedtime  chlorhexidine 2% Cloths 1 Application(s) Topical <User Schedule>  ciprofloxacin     Tablet 500 milliGRAM(s) Oral every 24 hours  coronavirus bivalent (EUA) Booster Vaccine (PFIZER) 0.3 milliLiter(s) IntraMuscular once  dextrose 5%. 1000 milliLiter(s) (50 mL/Hr) IV Continuous <Continuous>  dextrose 5%. 1000 milliLiter(s) (100 mL/Hr) IV Continuous <Continuous>  dextrose 50% Injectable 25 Gram(s) IV Push once  dextrose 50% Injectable 12.5 Gram(s) IV Push once  dextrose 50% Injectable 25 Gram(s) IV Push once  glucagon  Injectable 1 milliGRAM(s) IntraMuscular once  insulin glargine Injectable (LANTUS) 8 Unit(s) SubCutaneous at bedtime  insulin lispro (ADMELOG) corrective regimen sliding scale   SubCutaneous at bedtime  insulin lispro (ADMELOG) corrective regimen sliding scale   SubCutaneous three times a day before meals  insulin lispro Injectable (ADMELOG) 5 Unit(s) SubCutaneous three times a day before meals  lactulose Syrup 20 Gram(s) Oral two times a day  levothyroxine 100 MICROGram(s) Oral daily  multivitamin 1 Tablet(s) Oral daily  pantoprazole    Tablet 40 milliGRAM(s) Oral before breakfast  rifAXIMin 550 milliGRAM(s) Oral two times a day  tamsulosin 0.8 milliGRAM(s) Oral at bedtime  tenofovir disoproxil fumarate (VIREAD) 300 milliGRAM(s) Oral daily    MEDICATIONS  (PRN):  dextrose Oral Gel 15 Gram(s) Oral once PRN Blood Glucose LESS THAN 70 milliGRAM(s)/deciliter  sodium chloride 0.65% Nasal 1 Spray(s) Both Nostrils three times a day PRN Nasal Congestion    Pertinent Labs:  03-31 Phos 2.6 mg/dL 03-30 Alb 3.2 g/dL<L>  POCT Blood Glucose.: 315 mg/dL (04 Apr 2023 12:15)  POCT Blood Glucose.: 265 mg/dL (04 Apr 2023 08:48)  POCT Blood Glucose.: 276 mg/dL (03 Apr 2023 21:14)  POCT Blood Glucose.: 287 mg/dL (03 Apr 2023 17:41)      Weight: Height (cm): 170.2 (02-24 @ 12:00)  Weight (kg): 59 (03-23)   BMI (kg/m2): 19.1 (02-24 @ 12:00)  Weight Assessment: No new weight to assess.      Physical Assessment, per flowsheets:  Edema: B/l hands  Pressure Injury: sacral to b/l buttocks- wound care note 3/30      Estimated Needs:   [X] No change since previous assessment, based on IBW  148#/ 67 kg  7387-5389 kcal daily @ 25-30kcal/kg  80-100gm protein daily @ 1.2-1.5gm/kg      Previous Nutrition Diagnosis: severe protein calorie malnutrition   Nutrition Diagnosis is [x ] ongoing    New Nutrition Diagnosis: [x ] not applicable       Education:  [  ] Given today        Type of education provided:   [  ] Given on previous assessment by RD   [x  ] Not applicable 2/2 cognitive deficit  [  ] Pt refusal of education offered   [  ] Not applicable 2/2 current prognosis  [  ] Not warranted at present      Interventions:   1) Continue current diet rx:  Pureed, moderately thick- texture per SLP recs  2) Recommend Glucerna  3x daily (660kcals, 30g protein)   3) Encourage PO intake and honor food preferences as able, provide necessary assistance with meals  4) Obtain weekly wts  5) RD to f/u prn       Monitor & Evaluate:  PO intake, tolerance to diet/supplement, nutrition related lab values, weight trends, BMs/GI distress, hydration status, skin integrity.    Naida Zamora MS, RDN (Pager #[phone]) | Also available on TEAMS

## 2023-04-04 NOTE — CONSULT NOTE ADULT - ASSESSMENT
Interventional Radiology    Evaluate for Procedure:     HPI: 67y Male with PMH HTN, T2D, hypothyroidism, CAD (s/p CABG), TANG (c/b cirrhosis), follicular lymphoma (rituximab), and HBV (tenofovir) p/w CP and constipation with development of hematemesis. Admitted to MICU for hypovolemic shock requiring intubation/sedation for airway protection in setting of UGIB s/p protonix gtt, octreotide gtt, and midodrine (now off). Hepatology following with 2/24 EGD showing esophageal varices s/p banding. Course further c/b persistent encephalopathy, on rifaximin and lactulose; MR brain with c/f hypoxic-ischemic injury. Now on cipro for SBP ppx. s/p paracentesis 3/28 with procedure team, 4.9L output. Repeat S+S 3/29 with recommendation for pureed diet and moderately thick liquids. IR consulted for pleurex drainage catheter for discharge on 4/6.     Allergies: No Known Drug Allergies    Medications (Abx/Cardiac/Anticoagulation/Blood Products)    ciprofloxacin     Tablet: 500 milliGRAM(s) Oral (04-03 @ 18:31)  rifAXIMin: 550 milliGRAM(s) Oral (04-04 @ 09:30)  tenofovir disoproxil fumarate (VIREAD): 300 milliGRAM(s) Oral (04-03 @ 13:18)    Data:    T(C): 37.1  HR: 103  BP: 128/68  RR: 19  SpO2: 100%    -WBC 3.87 / HgB 10.1 / Hct 33.3 / Plt 53  -Na 144 / Cl 114 / BUN 35 / Glucose 187  -K 3.2 / CO2 18 / Cr 0.72  -ALT -- / Alk Phos -- / T.Bili --  -INR 1.57 / PTT --      Radiology:     Assessment/Plan:     -- IR will plan to perform abdominal pleurex drainage catheter on 4/5   -- NPO at midnight on 4/4  -- hold a.m. anticoagulation on 4/5  -- please complete IR pre-procedure note  -- please place IR procedure request order under Dr. Be    --  Giselle Proctor, PGY-2  Vascular and Interventional Radiology   Available on [employer] Teams    - Non-emergent consults: Place IR consult order in [place]  - Emergent issues (pager): [facility] [phone]; [facility] [phone]; [phone]  - Scheduling questions: [facility] [phone]; [facility] [phone]  - Clinic/outpatient booking: [facility] [phone]; [facility] [phone]

## 2023-04-04 NOTE — PROGRESS NOTE ADULT - PROBLEM SELECTOR PLAN 3
- hold diuretics  - off d5W and FW -- will need to monitor PO intake   - improving  - will only obtain labs as clinically indicated

## 2023-04-04 NOTE — PROGRESS NOTE ADULT - PROBLEM SELECTOR PLAN 11
- F: none  - E: replete K<4, Mg<2  - N: pureed diet with moderately thick liquids   - D: hold with risk of bleed; SCDs  - G: protonix daily    code: full  dispo: prognosis guarded; plan for home with home hospice  -Urinary retention- mcconnell as pt is planned for dc with home hospice  - Updated son Selina on 4/4 at bedside

## 2023-04-04 NOTE — CHART NOTE - NSCHARTNOTEFT_GEN_A_CORE
IR Pre-Procedure Note    Patient Age:   67y    Patient Gender:   Male    Procedure (including site / side if known): Abdominal PleurX Drainage catheter    Diagnosis / Indication: Patient is a 67y old  Male who presents with a chief complaint of right sided chest pain (04 Apr 2023 11:14). Has reaccumulating ascites    Interventional Radiology Attending Physician: Dr. Be    Ordering Attending Physician: Dr. Ortega    PAST MEDICAL & SURGICAL HISTORY:  CAD (coronary artery disease)      Diabetes      Lymphoma      Cirrhosis      S/P CABG x 1           Pertinent Labs:               Patient / Family aware of procedure:   [ X ] Y

## 2023-04-04 NOTE — PROGRESS NOTE ADULT - PROBLEM SELECTOR PLAN 1
- likely d/t hypoxia/anoxic brain ischemia in s/o hemorrhagic shock on admission/hypernatremia  - MRI brain 3/15 extensive cortical restricted diffusion throughout the cerebral hemispheres including the basal ganglia and insula b/l.   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 EEG (3/14) frequent left posterior quadrant periodic discharges, risk of seizure has decreased compared to EEG from 3/5. No seizures recorded.   - c/w rifaximin 550 mg BID, lactulose BID, repeat ammonia level 25; encephalopathy likely unrelated to HE  - re-evaluated by S+S 3/29 and now recommended for pureed diet with moderately thick liquids -- will monitor PO intake  - 3/24 Palliative care had GOC w/ son and daughter. Likely current scenario is related to anoxic brain injury; explained despite nutrition/correction of electrolytes clinically unimproved. d/w Hepatology in regards to PEG pt is at increased risk of peritonitis. As per hepatology no significant risk of bleeding with paracentesis in cirrhotic with elevated PT/INR and low platelets.   - procedure team following, s/p para 3/28 with 4.9L output  -will get IR consult for pleurex placement prior to dc

## 2023-04-04 NOTE — CONSULT NOTE ADULT - SUBJECTIVE AND OBJECTIVE BOX
Wound SURGERY CONSULT NOTE    FROM:   FOR:   Reason for Consult:    HPI:   67-year-old male with past medical history of lymphoma on chemo, diabetes, hypertension, CAD status post CABG, cirrhosis presenting with concern of R sided pain and constipation. Patient follows primarily at [facility]. He states the pain began this morning. Sharp, non radiating. No associated N/V/D, dysuria, trouble urinating, fever, chills, cough, dyspnea, sore throat. Pt states he has been constipated for 4d. Was told to take miralax by his oncologist. Pt has known TANG. (24 Feb 2023 11:27)  S/P variceal bleed with s/p banding of esophageal varices    Requested to evaluate sacral decubitus      PAST MEDICAL & SURGICAL HISTORY:  CAD (coronary artery disease)      Diabetes      Lymphoma      Cirrhosis      S/P CABG x 1          REVIEW OF SYSTEMS      General: Thin male at bedrest. Awake, not responsive to questions. Able to respond to commands issued by his son	      MEDICATIONS  (STANDING):  atorvastatin 80 milliGRAM(s) Oral at bedtime  chlorhexidine 2% Cloths 1 Application(s) Topical <User Schedule>  ciprofloxacin     Tablet 500 milliGRAM(s) Oral every 24 hours  coronavirus bivalent (EUA) Booster Vaccine (PFIZER) 0.3 milliLiter(s) IntraMuscular once  dextrose 5%. 1000 milliLiter(s) (50 mL/Hr) IV Continuous <Continuous>  dextrose 5%. 1000 milliLiter(s) (100 mL/Hr) IV Continuous <Continuous>  dextrose 50% Injectable 25 Gram(s) IV Push once  dextrose 50% Injectable 12.5 Gram(s) IV Push once  dextrose 50% Injectable 25 Gram(s) IV Push once  glucagon  Injectable 1 milliGRAM(s) IntraMuscular once  insulin glargine Injectable (LANTUS) 8 Unit(s) SubCutaneous at bedtime  insulin lispro (ADMELOG) corrective regimen sliding scale   SubCutaneous at bedtime  insulin lispro (ADMELOG) corrective regimen sliding scale   SubCutaneous three times a day before meals  insulin lispro Injectable (ADMELOG) 5 Unit(s) SubCutaneous three times a day before meals  lactulose Syrup 20 Gram(s) Oral two times a day  levothyroxine 100 MICROGram(s) Oral daily  multivitamin 1 Tablet(s) Oral daily  pantoprazole    Tablet 40 milliGRAM(s) Oral before breakfast  rifAXIMin 550 milliGRAM(s) Oral two times a day  tamsulosin 0.8 milliGRAM(s) Oral at bedtime  tenofovir disoproxil fumarate (VIREAD) 300 milliGRAM(s) Oral daily    MEDICATIONS  (PRN):  dextrose Oral Gel 15 Gram(s) Oral once PRN Blood Glucose LESS THAN 70 milliGRAM(s)/deciliter  sodium chloride 0.65% Nasal 1 Spray(s) Both Nostrils three times a day PRN Nasal Congestion      Allergies    [This allergen will not trigger allergy alert] &quot;lentils&quot;-&quot;itchiness&quot; (Other)  Beef (Other)  No Known Drug Allergies    Intolerances        SOCIAL HISTORY: non smoker    FAMILY HISTORY:  No pertinent family history in first degree relatives        Vital Signs Last 24 Hrs  T(C): 37.2 (03 Apr 2023 22:05), Max: 37.2 (03 Apr 2023 13:00)  T(F): 98.9 (03 Apr 2023 22:05), Max: 98.9 (03 Apr 2023 13:00)  HR: 88 (03 Apr 2023 22:05) (80 - 93)  BP: 128/68 (04 Apr 2023 08:00) (128/68 - 170/91)  BP(mean): --  RR: 19 (04 Apr 2023 08:00) (18 - 20)  SpO2: 100% (04 Apr 2023 08:00) (98% - 100%)    Parameters below as of 04 Apr 2023 08:00  Patient On (Oxygen Delivery Method): room air        PHYSICAL EXAM:        Constitutional: Icteric sclera, acholic stool, no melena  Not ambulatory    US decubitus of sacrum, 8 X 4 X .2 cm, with Escobar ointment densely adherent  No fluctuance   No cellulitis   No crepitus  Incontinent of acholic stool    No palpable pulses , but feet are warm complete cair boots replaced    Thin, borderline cachexia    Advised to son to offload, perform routine perineal care, apply long acting Cavilon , every other day to sacral decubitus  Contact info provided re: outpatient F/U    LABS:                        Albumin, Serum: 3.2 g/dL (03-30 @ 06:00)  Albumin, Serum: 3.4 g/dL (03-29 @ 06:20)

## 2023-04-04 NOTE — CONSULT NOTE ADULT - PROVIDER SPECIALTY LIST ADULT
Intervent Radiology
Rehab Medicine
Hepatology
MICU
Neurology
MICU
Nephrology
Wound Care
Palliative Care

## 2023-04-04 NOTE — PROGRESS NOTE ADULT - SUBJECTIVE AND OBJECTIVE BOX
Patient is a 67y old  Male who presents with a chief complaint of right sided chest pain (04 Apr 2023 11:14)      SUBJECTIVE / OVERNIGHT EVENTS: pt seen and examined at 11:20am, no overnight events, pt is unable to give any history due to his mental status, son at bedside requesting for pig tail cath for future draining of the ascitic fluid, no other new issues reported.      MEDICATIONS  (STANDING):  atorvastatin 80 milliGRAM(s) Oral at bedtime  chlorhexidine 2% Cloths 1 Application(s) Topical <User Schedule>  ciprofloxacin     Tablet 500 milliGRAM(s) Oral every 24 hours  coronavirus bivalent (EUA) Booster Vaccine (PFIZER) 0.3 milliLiter(s) IntraMuscular once  dextrose 5%. 1000 milliLiter(s) (50 mL/Hr) IV Continuous <Continuous>  dextrose 5%. 1000 milliLiter(s) (100 mL/Hr) IV Continuous <Continuous>  dextrose 50% Injectable 25 Gram(s) IV Push once  dextrose 50% Injectable 12.5 Gram(s) IV Push once  dextrose 50% Injectable 25 Gram(s) IV Push once  glucagon  Injectable 1 milliGRAM(s) IntraMuscular once  insulin glargine Injectable (LANTUS) 8 Unit(s) SubCutaneous at bedtime  insulin lispro (ADMELOG) corrective regimen sliding scale   SubCutaneous at bedtime  insulin lispro (ADMELOG) corrective regimen sliding scale   SubCutaneous three times a day before meals  insulin lispro Injectable (ADMELOG) 5 Unit(s) SubCutaneous three times a day before meals  lactulose Syrup 20 Gram(s) Oral two times a day  levothyroxine 100 MICROGram(s) Oral daily  multivitamin 1 Tablet(s) Oral daily  pantoprazole    Tablet 40 milliGRAM(s) Oral before breakfast  rifAXIMin 550 milliGRAM(s) Oral two times a day  tamsulosin 0.8 milliGRAM(s) Oral at bedtime  tenofovir disoproxil fumarate (VIREAD) 300 milliGRAM(s) Oral daily    MEDICATIONS  (PRN):  dextrose Oral Gel 15 Gram(s) Oral once PRN Blood Glucose LESS THAN 70 milliGRAM(s)/deciliter  sodium chloride 0.65% Nasal 1 Spray(s) Both Nostrils three times a day PRN Nasal Congestion      Vital Signs Last 24 Hrs  T(C): 37.1 (04 Apr 2023 08:00), Max: 37.2 (03 Apr 2023 22:05)  T(F): 98.7 (04 Apr 2023 08:00), Max: 98.9 (03 Apr 2023 22:05)  HR: 103 (04 Apr 2023 08:00) (88 - 103)  BP: 128/68 (04 Apr 2023 08:00) (128/68 - 134/70)  BP(mean): --  RR: 19 (04 Apr 2023 08:00) (18 - 19)  SpO2: 100% (04 Apr 2023 08:00) (100% - 100%)    Parameters below as of 04 Apr 2023 08:00  Patient On (Oxygen Delivery Method): room air      CAPILLARY BLOOD GLUCOSE      POCT Blood Glucose.: 315 mg/dL (04 Apr 2023 12:15)  POCT Blood Glucose.: 265 mg/dL (04 Apr 2023 08:48)  POCT Blood Glucose.: 276 mg/dL (03 Apr 2023 21:14)  POCT Blood Glucose.: 287 mg/dL (03 Apr 2023 17:41)    I&O's Summary    03 Apr 2023 07:01  -  04 Apr 2023 07:00  --------------------------------------------------------  IN: 0 mL / OUT: 775 mL / NET: -775 mL        PHYSICAL EXAM:  GENERAL: NAD, frail appearing male  CHEST/LUNG: Clear to auscultation bilaterally; No wheeze  HEART: Regular rate and rhythm  ABDOMEN: Soft, Nontender, +distention  EXTREMITIES: no LE edema  PSYCH: Calm  NEUROLOGY: alert, awake, nonverbal  SKIN: No rashes or lesions    LABS:                    RADIOLOGY & ADDITIONAL TESTS:    Imaging Personally Reviewed:    Consultant(s) Notes Reviewed:      Care Discussed with Consultants/Other Providers:

## 2023-04-04 NOTE — CONSULT NOTE ADULT - CONSULT REASON
AMS
Hematemesis and hypotension
Hypotension
eval for rehab
sacral decubitus
hematemesis
pleurex drainage catheter
Hypernatremia, NANCI
complex medical decision making in the setting of serious illness

## 2023-04-04 NOTE — PROGRESS NOTE ADULT - PROBLEM SELECTOR PLAN 2
decompensated with ascites, with variceal bleed, with PSE; MELD 12 3/10/23  - s/p diagnostic para (2/25) and therapeutic para (3/5) removed 4.5L  - s/p diag/therapeutic tap on 3/15, removed 3.6L, neg for SBP; Path neg for malignancy   - therapeutic para 3/28 with 4.9L removed  - c/w rifaximin BID, lactulose BID titrate 2 BM/day   - SBP ppx with Cipro due to low ascitic protein   - trend LFT, RUQ w/o pathology on 3/10  - MRI abdomen at [facility] 12/2022 no HCC  - off diuretic due to hypernatremia

## 2023-04-05 NOTE — PROGRESS NOTE ADULT - PROBLEM SELECTOR PLAN 3
- was following at [facility], previously low MELD 8, now with high MELD   - s/p multiple perlita  - also with Hep B, on tenofovir

## 2023-04-05 NOTE — PROGRESS NOTE ADULT - NS ATTEST RISK PROBLEM GEN_ALL_CORE FT
Patient is seriously ill with TANG cirrhosis with esophageal varices, with hemorrhagic shock from bleeding varices s/p MICU course, now with anoxic brain injury, poor functional status PPS 10%   High risk of morbidity and mortality.
Patient is seriously ill with TANG cirrhosis with esophagea varices, with hemorrhagic shock from bleeding varices s/p MICU course, now with anoxic brain injury, poor functional status PPS 10% with NGT, episodes of hypotension of midodrine   High risk of morbidity and mortality.
Patient is seriously ill with TANG cirrhosis with esophagea varices, with hemorrhagic shock from bleeding varices s/p MICU course, now with anoxic brain injury, poor functional status PPS 10% with NGT, episodes of hypotension of midodrine   High risk of morbidity and mortality.

## 2023-04-05 NOTE — PROGRESS NOTE ADULT - CONVERSATION DETAILS
Spoke to patient's son Demarcus at bedside with primary attending. Son is asking for ascites to be removed again. Discussed that his siblings wanted hospice services however they recommended a pleurex catheter. Discussed pleurex would increase chance of infection, which is reasonable risk to take if family understands patient is at end of life, agrees to keeping patient comfortable at home and not return back for further medical interventions. Alternative would be frequent outpatient paracentesis which would be burdensome to patient given he is bedbound. Son just repeats request for fluid removal.

## 2023-04-05 NOTE — PROGRESS NOTE ADULT - PROBLEM SELECTOR PLAN 2
- s/p EGD - Multiple large (> 5 mm) varices were found in the mid to distal esophagus. Six bands were successfully placed with incomplete eradication of varices  - s/p MICU course intubated, now extubated on medicine floors  - anemic, H/H being monitored

## 2023-04-05 NOTE — PROGRESS NOTE ADULT - PROBLEM SELECTOR PLAN 1
- likely d/t hypoxia/anoxic brain ischemia in s/o hemorrhagic shock on admission/hypernatremia  - MRI brain 3/15 extensive cortical restricted diffusion throughout the cerebral hemispheres including the basal ganglia and insula b/l.   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 EEG (3/14) frequent left posterior quadrant periodic discharges, risk of seizure has decreased compared to EEG from 3/5. No seizures recorded.   - c/w rifaximin 550 mg BID, lactulose BID, repeat ammonia level 25; encephalopathy likely unrelated to HE  - re-evaluated by S+S 3/29 and now recommended for pureed diet with moderately thick liquids -- will monitor PO intake  - 3/24 Palliative care had GOC w/ son and daughter. Likely current scenario is related to anoxic brain injury; explained despite nutrition/correction of electrolytes clinically unimproved. d/w Hepatology in regards to PEG pt is at increased risk of peritonitis. As per hepatology no significant risk of bleeding with paracentesis in cirrhotic with elevated PT/INR and low platelets.   - procedure team following, s/p para 3/28 with 4.9L output  -Discussed with son Demarcus this am at bedside, he is unsure of the risks associated with pleurex however requesting for ascitic fluid removal, Procedure team unable to remove fluid as last had therapeutic drainage on 3/28, spoke to daughter Romana and another son Selina later in the day  -nancy Marks is aware of the risk of infection with pleurex placement and understands that it is for comfort and is agreeable for hospice.  -No further intervention planned for patient as of now, pt will go home with hospice once pleurex is placed.  -will get IR consult for pleurex placement prior to dc

## 2023-04-05 NOTE — PROGRESS NOTE ADULT - PROBLEM SELECTOR PLAN 2
decompensated with ascites, with variceal bleed, with PSE; MELD 12 3/10/23  - s/p diagnostic para (2/25) and therapeutic para (3/5) removed 4.5L  - s/p diag/therapeutic tap on 3/15, removed 3.6L, neg for SBP; Path neg for malignancy   - therapeutic para 3/28 with 4.9L removed  - c/w rifaximin BID, lactulose BID titrate 2 BM/day   - SBP ppx with Cipro due to low ascitic protein   - trend LFT, RUQ w/o pathology on 3/10  - MRI abdomen at [facility] 12/2022 no HCC  - off diuretic due to hypernatremia

## 2023-04-05 NOTE — PROGRESS NOTE ADULT - SUBJECTIVE AND OBJECTIVE BOX
Patient is a 67y old  Male who presents with a chief complaint of right sided chest pain (04 Apr 2023 11:14)      SUBJECTIVE / OVERNIGHT EVENTS: pt seen and examined at 11am, no overnight events, pt is unable to give any history due to his mental status, son Demarcus at bedside thinks that his father is getting better, eating now, unsure of the pleurex cath but asking for ascitic fluid to be removed, no other new issues reported.    MEDICATIONS  (STANDING):  atorvastatin 80 milliGRAM(s) Oral at bedtime  chlorhexidine 2% Cloths 1 Application(s) Topical <User Schedule>  ciprofloxacin     Tablet 500 milliGRAM(s) Oral every 24 hours  coronavirus bivalent (EUA) Booster Vaccine (PFIZER) 0.3 milliLiter(s) IntraMuscular once  dextrose 5%. 1000 milliLiter(s) (50 mL/Hr) IV Continuous <Continuous>  dextrose 5%. 1000 milliLiter(s) (100 mL/Hr) IV Continuous <Continuous>  dextrose 50% Injectable 25 Gram(s) IV Push once  dextrose 50% Injectable 12.5 Gram(s) IV Push once  dextrose 50% Injectable 25 Gram(s) IV Push once  glucagon  Injectable 1 milliGRAM(s) IntraMuscular once  insulin glargine Injectable (LANTUS) 8 Unit(s) SubCutaneous at bedtime  insulin lispro (ADMELOG) corrective regimen sliding scale   SubCutaneous at bedtime  insulin lispro (ADMELOG) corrective regimen sliding scale   SubCutaneous three times a day before meals  insulin lispro Injectable (ADMELOG) 5 Unit(s) SubCutaneous three times a day before meals  lactulose Syrup 20 Gram(s) Oral two times a day  levothyroxine 100 MICROGram(s) Oral daily  multivitamin 1 Tablet(s) Oral daily  pantoprazole    Tablet 40 milliGRAM(s) Oral before breakfast  rifAXIMin 550 milliGRAM(s) Oral two times a day  tamsulosin 0.8 milliGRAM(s) Oral at bedtime  tenofovir disoproxil fumarate (VIREAD) 300 milliGRAM(s) Oral daily    MEDICATIONS  (PRN):  dextrose Oral Gel 15 Gram(s) Oral once PRN Blood Glucose LESS THAN 70 milliGRAM(s)/deciliter  sodium chloride 0.65% Nasal 1 Spray(s) Both Nostrils three times a day PRN Nasal Congestion      Vital Signs Last 24 Hrs  T(C): 36.2 (05 Apr 2023 13:00), Max: 36.9 (05 Apr 2023 04:45)  T(F): 97.2 (05 Apr 2023 13:00), Max: 98.4 (05 Apr 2023 04:45)  HR: 96 (05 Apr 2023 13:00) (96 - 109)  BP: 95/54 (05 Apr 2023 13:00) (92/56 - 136/70)  BP(mean): 70 (05 Apr 2023 04:45) (70 - 70)  RR: 18 (05 Apr 2023 13:00) (17 - 18)  SpO2: 100% (05 Apr 2023 13:00) (100% - 100%)    Parameters below as of 05 Apr 2023 13:00  Patient On (Oxygen Delivery Method): room air      CAPILLARY BLOOD GLUCOSE      POCT Blood Glucose.: 204 mg/dL (05 Apr 2023 12:13)  POCT Blood Glucose.: 220 mg/dL (05 Apr 2023 08:36)  POCT Blood Glucose.: 325 mg/dL (04 Apr 2023 22:54)  POCT Blood Glucose.: 323 mg/dL (04 Apr 2023 20:59)  POCT Blood Glucose.: 269 mg/dL (04 Apr 2023 17:18)    I&O's Summary    04 Apr 2023 07:01  -  05 Apr 2023 07:00  --------------------------------------------------------  IN: 0 mL / OUT: 0 mL / NET: 0 mL        PHYSICAL EXAM:    LABS:                        10.6   6.96  )-----------( 91       ( 05 Apr 2023 05:30 )             35.6     04-05    147<H>  |  116<H>  |  36<H>  ----------------------------<  267<H>  3.7   |  19<L>  |  0.82    Ca    8.4      05 Apr 2023 05:30  Phos  2.1     04-05  Mg     2.20     04-05    TPro  5.3<L>  /  Alb  2.9<L>  /  TBili  4.0<H>  /  DBili  x   /  AST  118<H>  /  ALT  105<H>  /  AlkPhos  592<H>  04-05    PT/INR - ( 05 Apr 2023 05:30 )   PT: 21.2 sec;   INR: 1.82 ratio         PTT - ( 05 Apr 2023 05:30 )  PTT:38.4 sec          RADIOLOGY & ADDITIONAL TESTS:    Imaging Personally Reviewed:    Consultant(s) Notes Reviewed:      Care Discussed with Consultants/Other Providers:   Patient is a 67y old  Male who presents with a chief complaint of right sided chest pain (04 Apr 2023 11:14)      SUBJECTIVE / OVERNIGHT EVENTS: pt seen and examined at 11am, no overnight events, pt is unable to give any history due to his mental status, son Demarcus at bedside thinks that his father is getting better, eating now, unsure of the pleurex cath but asking for ascitic fluid to be removed, no other new issues reported.    MEDICATIONS  (STANDING):  atorvastatin 80 milliGRAM(s) Oral at bedtime  chlorhexidine 2% Cloths 1 Application(s) Topical <User Schedule>  ciprofloxacin     Tablet 500 milliGRAM(s) Oral every 24 hours  coronavirus bivalent (EUA) Booster Vaccine (PFIZER) 0.3 milliLiter(s) IntraMuscular once  dextrose 5%. 1000 milliLiter(s) (50 mL/Hr) IV Continuous <Continuous>  dextrose 5%. 1000 milliLiter(s) (100 mL/Hr) IV Continuous <Continuous>  dextrose 50% Injectable 25 Gram(s) IV Push once  dextrose 50% Injectable 12.5 Gram(s) IV Push once  dextrose 50% Injectable 25 Gram(s) IV Push once  glucagon  Injectable 1 milliGRAM(s) IntraMuscular once  insulin glargine Injectable (LANTUS) 8 Unit(s) SubCutaneous at bedtime  insulin lispro (ADMELOG) corrective regimen sliding scale   SubCutaneous at bedtime  insulin lispro (ADMELOG) corrective regimen sliding scale   SubCutaneous three times a day before meals  insulin lispro Injectable (ADMELOG) 5 Unit(s) SubCutaneous three times a day before meals  lactulose Syrup 20 Gram(s) Oral two times a day  levothyroxine 100 MICROGram(s) Oral daily  multivitamin 1 Tablet(s) Oral daily  pantoprazole    Tablet 40 milliGRAM(s) Oral before breakfast  rifAXIMin 550 milliGRAM(s) Oral two times a day  tamsulosin 0.8 milliGRAM(s) Oral at bedtime  tenofovir disoproxil fumarate (VIREAD) 300 milliGRAM(s) Oral daily    MEDICATIONS  (PRN):  dextrose Oral Gel 15 Gram(s) Oral once PRN Blood Glucose LESS THAN 70 milliGRAM(s)/deciliter  sodium chloride 0.65% Nasal 1 Spray(s) Both Nostrils three times a day PRN Nasal Congestion      Vital Signs Last 24 Hrs  T(C): 36.2 (05 Apr 2023 13:00), Max: 36.9 (05 Apr 2023 04:45)  T(F): 97.2 (05 Apr 2023 13:00), Max: 98.4 (05 Apr 2023 04:45)  HR: 96 (05 Apr 2023 13:00) (96 - 109)  BP: 95/54 (05 Apr 2023 13:00) (92/56 - 136/70)  BP(mean): 70 (05 Apr 2023 04:45) (70 - 70)  RR: 18 (05 Apr 2023 13:00) (17 - 18)  SpO2: 100% (05 Apr 2023 13:00) (100% - 100%)    Parameters below as of 05 Apr 2023 13:00  Patient On (Oxygen Delivery Method): room air      CAPILLARY BLOOD GLUCOSE      POCT Blood Glucose.: 204 mg/dL (05 Apr 2023 12:13)  POCT Blood Glucose.: 220 mg/dL (05 Apr 2023 08:36)  POCT Blood Glucose.: 325 mg/dL (04 Apr 2023 22:54)  POCT Blood Glucose.: 323 mg/dL (04 Apr 2023 20:59)  POCT Blood Glucose.: 269 mg/dL (04 Apr 2023 17:18)    I&O's Summary    04 Apr 2023 07:01  -  05 Apr 2023 07:00  --------------------------------------------------------  IN: 0 mL / OUT: 0 mL / NET: 0 mL        PHYSICAL EXAM:  GENERAL: NAD, frail appearing male  CHEST/LUNG: Clear to auscultation bilaterally; No wheeze  HEART: Regular rate and rhythm  ABDOMEN: Soft, Nontender, +distention  EXTREMITIES: no LE edema  PSYCH: Calm  NEUROLOGY: alert, awake, nonverbal  SKIN: No rashes or lesions    LABS:                        10.6   6.96  )-----------( 91       ( 05 Apr 2023 05:30 )             35.6     04-05    147<H>  |  116<H>  |  36<H>  ----------------------------<  267<H>  3.7   |  19<L>  |  0.82    Ca    8.4      05 Apr 2023 05:30  Phos  2.1     04-05  Mg     2.20     04-05    TPro  5.3<L>  /  Alb  2.9<L>  /  TBili  4.0<H>  /  DBili  x   /  AST  118<H>  /  ALT  105<H>  /  AlkPhos  592<H>  04-05    PT/INR - ( 05 Apr 2023 05:30 )   PT: 21.2 sec;   INR: 1.82 ratio         PTT - ( 05 Apr 2023 05:30 )  PTT:38.4 sec          RADIOLOGY & ADDITIONAL TESTS:    Imaging Personally Reviewed:    Consultant(s) Notes Reviewed:      Care Discussed with Consultants/Other Providers:

## 2023-04-05 NOTE — PROGRESS NOTE ADULT - PROBLEM SELECTOR PLAN 6
Palliative consulted for complex medical decision making in the setting of serious illness.    Discussed case with [facility]. Pleurex recommended since he is going home under hospice care. Palliative consulted for complex medical decision making in the setting of serious illness.

## 2023-04-05 NOTE — PROGRESS NOTE ADULT - PROBLEM SELECTOR PLAN 5
Group Topic: BH Monitoring Safety and Relapse    Date: 1/20/2020  Start Time:  5:30 PM  End Time:  8:30 PM  Facilitators: Lena Nichole LCSW; Tamela Wong    Focus: Safety and Relapse Monitoring  Number in attendance: 6    Licensed Provider Directing Treatment: Lena TIERNEY    Documentation Completed By (Under Supervision of Licensed Provider): Kayla Wong Clinical Psychology Student    I was present and agree with the content of the note.     NIALL Wisdom    Types of Safety Concerns: None reported.    - suspect likely due to cirrhosis now recovering  - no overt evidence of GIB   -  slightly above normal not indicative of hemolysis, hapto above 20, HIT ab neg, blue top  37  - off heparin  - platelets improving  - s/p 1U plt 3/27 pre para with appropriate response  - SCDs for now, hold ppx with risk of bleed

## 2023-04-05 NOTE — PROGRESS NOTE ADULT - PROBLEM SELECTOR PLAN 1
- MRI showing anoxic brain injury   - suspected from initial hemorrhagic shock from hematemesis in ED   - per neuro, prognosis uncertain, early to predict prognosis, has a chance of recovery, suggest rehab eval   - per rehab not a candidate since he does not follow commands   - poor functional status PPS 10%, with NGT  - Has dysphagia diet, poor PO intake per son - MRI showing anoxic brain injury   - suspected from initial hemorrhagic shock from hematemesis in ED   - per neuro, prognosis uncertain, early to predict prognosis, has a chance of recovery, suggest rehab eval   - per rehab not a candidate since he does not follow commands   - poor functional status PPS 10%  - poor PO intake

## 2023-04-05 NOTE — PROGRESS NOTE ADULT - SUBJECTIVE AND OBJECTIVE BOX
Indication of Geriatrics and Palliative Medicine Services:  [X  ] Complex Medical Decision Making   [  ] Symptom/Pain management     DNR on chart: No     INTERVAL EVENTS: Palliative reconsulted. Patient underwent para recently. Family agreed to home hospice services. Hospice recommended pleurx for ascites.   Patient seen this AM, awake but not following commands. Frederick Tracy at bedside. See below for GOC.     -------------------------------------------------------------------------------------------------------    PRESENT SYMPTOMS:     [ ] No     [X ] Unable to self-report      [ ] CPOT (ICU)     [ ] PAINADs      [X ] RDOS 0    [ ] Yes     Source if other than patient:  [ ]Family   [ ]Team     PAIN:   If blank, patient unable to specify   [ ]yes [ ]no  QOL impact-   Location -                    Aggravating factors -  Quality -  Radiation -  Timing-  Pain at most severe level (0-10 scale):  Pain at minimal acceptable level/Pain Goal (0-10 scale):     SYMPTOMS:   Dyspnea:                           [ ]Mild [ ]Moderate [ ]Severe  Anxiety:                             [ ]Mild [ ]Moderate [ ]Severe  Fatigue:                             [ ]Mild [ ]Moderate [ ]Severe  Nausea/Vomiting:              [ ]Mild [ ]Moderate [ ]Severe  Loss of appetite:                [ ]Mild [ ]Moderate [ ]Severe  Constipation:                     [ ]Mild [ ]Moderate [ ]Severe    Other Symptoms:  [X ]All other review of systems negative     Home Medications for symptoms if any:  I-Stop Reference No:(from initial)     -------------------------------------------------------------------------------------------------------    ITEMS UNCHECKED ARE NOT PRESENT    PHYSICAL:  Vital Signs Last 24 Hrs  T(C): 36.2 (05 Apr 2023 13:00), Max: 36.9 (05 Apr 2023 04:45)  T(F): 97.2 (05 Apr 2023 13:00), Max: 98.4 (05 Apr 2023 04:45)  HR: 96 (05 Apr 2023 13:00) (96 - 109)  BP: 95/54 (05 Apr 2023 13:00) (92/56 - 136/70)  BP(mean): 70 (05 Apr 2023 04:45) (70 - 70)  RR: 18 (05 Apr 2023 13:00) (17 - 18)  SpO2: 100% (05 Apr 2023 13:00) (100% - 100%)    Parameters below as of 05 Apr 2023 13:00  Patient On (Oxygen Delivery Method): room air    GENERAL:  [X ]Cachexia  [X ] Frail  [ ]Awake  [ ]Oriented x   [X ]Lethargic  [ ]Unarousable  [ ]Verbal  [X ]Non-Verbal    BEHAVIORAL:   [ ] Anxiety  [ ] Delirium [ ] Agitation [ ] Other    HEENT:   [ ]Normal   [X ]Dry mouth   [ ]ET Tube/Trach  [ ]Oral lesions    PULMONARY:   [X ]Clear [ ]Tachypnea  [ ]Audible excessive secretions   [ ]Rhonchi        [ ]Right [ ]Left [ ]Bilateral  [ ]Crackles        [ ]Right [ ]Left [ ]Bilateral  [ ]Wheezing     [ ]Right [ ]Left [ ]Bilateral  [ ]Diminished breath sounds [ ]right [ ]left [ ]bilateral    CARDIOVASCULAR:    [X ]Regular [ ]Irregular [ ]Tachy  [ ]Rafal [ ]Murmur [ ]Other    GASTROINTESTINAL:  [ ]Soft  [X ]Distended   [X ]+BS  [ ]Non tender [ ]Tender  [ ]Other [ ]PEG [X ]OGT/ NGT      GENITOURINARY:  [ ]Normal [X ] Incontinent   [ ]Oliguria/Anuria   [ ]Henderson    MUSCULOSKELETAL:   [ ]Normal   [ ]Weakness  [X ]Bed/Wheelchair bound [ ]Edema    NEUROLOGIC:   [X ]No focal deficits  [ ]Cognitive impairment  [ ]Dysphagia [ ]Dysarthria [ ]Paresis [ ]Other     SKIN:   [ ]Normal  [ ]Rash  [ ]Other  [X ]Pressure ulcer(s)       Present on admission [ ]y [ ]n    -------------------------------------------------------------------------------------------------------    LABS:                                  10.6   6.96  )-----------( 91       ( 05 Apr 2023 05:30 )             35.6     04-05    147<H>  |  116<H>  |  36<H>  ----------------------------<  267<H>  3.7   |  19<L>  |  0.82    Ca    8.4      05 Apr 2023 05:30  Phos  2.1     04-05  Mg     2.20     04-05    TPro  5.3<L>  /  Alb  2.9<L>  /  TBili  4.0<H>  /  DBili  x   /  AST  118<H>  /  ALT  105<H>  /  AlkPhos  592<H>  04-05       -------------------------------------------------------------------------------------------------------  RADIOLOGY & ADDITIONAL STUDIES:     < from: CT Head No Cont (03.04.23 @ 17:38) >    IMPRESSION:  No CT evidence of acute intracranial pathology.  Trace mastoid effusions bilaterally.    < end of copied text >    < from: US Abdomen Upper Quadrant Right (03.10.23 @ 18:18) >  IMPRESSION:  Cirrhosis. Limited visualization of the liver.    Moderate amount of ascites.    < end of copied text >    < from: MR Head w/wo IV Cont (03.15.23 @ 19:40) >  IMPRESSION:    Extensive cortical predominant restricted diffusion throughout the   cerebral hemispheres including involvement of the basal ganglia and   insula bilaterally. Given history of hematemesis with hypotension,   hypoxic-ischemic injury is favored. Differential also includes postictal   sequelae, Creutzfeldt-Gonzalez disease, encephalitis or lymphomatous   involvement. CSF sampling should be considered.    < end of copied text >    < from:  Kidney and Bladder (03.17.23 @ 14:13) >  IMPRESSION:  *  Both kidneys are small in size.  *  No hydronephrosis.    < end of copied text >    -------------------------------------------------------------------------------------------------------  MEDICATIONS:     MEDICATIONS  (STANDING):  atorvastatin 80 milliGRAM(s) Oral at bedtime  chlorhexidine 2% Cloths 1 Application(s) Topical <User Schedule>  ciprofloxacin     Tablet 500 milliGRAM(s) Oral every 24 hours  coronavirus bivalent (EUA) Booster Vaccine (PFIZER) 0.3 milliLiter(s) IntraMuscular once  dextrose 5%. 1000 milliLiter(s) (50 mL/Hr) IV Continuous <Continuous>  dextrose 5%. 1000 milliLiter(s) (100 mL/Hr) IV Continuous <Continuous>  dextrose 50% Injectable 25 Gram(s) IV Push once  dextrose 50% Injectable 12.5 Gram(s) IV Push once  dextrose 50% Injectable 25 Gram(s) IV Push once  glucagon  Injectable 1 milliGRAM(s) IntraMuscular once  insulin glargine Injectable (LANTUS) 6 Unit(s) SubCutaneous at bedtime  insulin lispro (ADMELOG) corrective regimen sliding scale   SubCutaneous at bedtime  insulin lispro (ADMELOG) corrective regimen sliding scale   SubCutaneous three times a day before meals  insulin lispro Injectable (ADMELOG) 5 Unit(s) SubCutaneous three times a day before meals  lactulose Syrup 20 Gram(s) Oral two times a day  levothyroxine 100 MICROGram(s) Oral daily  multivitamin 1 Tablet(s) Oral daily  pantoprazole    Tablet 40 milliGRAM(s) Oral before breakfast  rifAXIMin 550 milliGRAM(s) Oral two times a day  tamsulosin 0.8 milliGRAM(s) Oral at bedtime  tenofovir disoproxil fumarate (VIREAD) 300 milliGRAM(s) Oral daily    MEDICATIONS  (PRN):  dextrose Oral Gel 15 Gram(s) Oral once PRN Blood Glucose LESS THAN 70 milliGRAM(s)/deciliter  sodium chloride 0.65% Nasal 1 Spray(s) Both Nostrils three times a day PRN Nasal Congestion    -------------------------------------------------------------------------------------------------------    CRITICAL CARE:  [ ]Shock Present  [ ]Septic [ ]Cardiogenic [ ]Neurologic [ ]Hypovolemic [ ]Undifferentiated    [ ]Vasopressors [ ]Inotropes    [ ]Respiratory failure present   [ ]Acute  [ ]Chronic [ ]Hypoxic  [ ]Hypercarbic [ ]Mixed   [ ]Mechanical Ventilation [ ]Non-invasive ventilatory support [ ]High-Flow     [ ]Other organ failure     -------------------------------------------------------------------------------------------------------  REFERRALS:   [ ]Chaplaincy  [X ]Hospice  [ ]Child Life  [ ]Social Work  [ ]Case management [ ]Holistic Therapy

## 2023-04-05 NOTE — PROGRESS NOTE ADULT - PROBLEM SELECTOR PLAN 4
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

## 2023-04-05 NOTE — PROGRESS NOTE ADULT - PROBLEM SELECTOR PLAN 5
-Decision makers: daughter Romana and son Selina  - 3/20- Spoke to eldest daughter Romana and youngest son Selina. Family have hope for a recovery. Recommended DNR/DNI, family will think about it.  - 3/21-No decision on code status, waiting to speak to "family doctors" but seems like not willing to make decision anytime soon.   - 3/24- second family meeting, family still not accepting of end of life and want all available medical interventions   - Family has poor insight and poor health literacy, want all available medical interventions despite poor prognosis  - 4/5- See [facility] note. I spent 20 minutes addressing advanced care planning with patient and/or decision maker(s) Discussed with another son Demarcus about accepting risks of pleurex, son unsure. Primary team following up with other children -Decision makers: daughter Romana and son Selina  - 3/20- Spoke to eldest daughter Romana and youngest son Selina. Family have hope for a recovery. Recommended DNR/DNI, family will think about it.  - 3/21-No decision on code status, waiting to speak to "family doctors" but seems like not willing to make decision anytime soon.   - 3/24- second family meeting, family still not accepting of end of life and want all available medical interventions   - 4/5 -See [facility] note. I spent 20 minutes addressing advanced care planning with patient and/or decision maker(s). Discussed risk of pleurex, but risk is permissible if family commits to hospice plan.

## 2023-04-05 NOTE — CHART NOTE - NSCHARTNOTEFT_GEN_A_CORE
Procedure team consulted for therapeutic paracentesis for reaccumulated ascites. Patient had a therapeutic para performed by procedure team on 3/28 where 4.9L was removed. Prior to this, patient had para on 3/15 with 3.6L removed. IR recently consulted for PleurX placement but per primary team, family requesting continuing aggressive management, therefore IR declining PleurX placement. Patient assessed at bedside: abdomen soft, nontender with minimal distention. Patient would not benefit from a therapeutic para at this time, likely too soon from previous procedure. Patient may benefit from repeat para in 2-4 weeks in outpatient setting. This was explained to patient's son at bedside. Patient should be placed on diuretics for ascites mobilization: usual starting dose at 100mg spironolactone and 40mg lasix. Discussed with attending Dr. Los Slaughter MD  EM/IM PGY-1 Procedure team consulted for therapeutic paracentesis for reaccumulated ascites. Patient had a therapeutic para performed by procedure team on 3/28 where 4.9L was removed. Prior to this, patient had para on 3/15 with 3.6L removed. IR recently consulted for PleurX placement but per primary team, family requesting continuing aggressive management, therefore IR declining PleurX placement. Patient assessed at bedside: abdomen soft, nontender with minimal distention. Patient would not benefit from a therapeutic para at this time, likely too soon from previous procedure. Patient may benefit from repeat para in 2-4 weeks in outpatient setting. This was explained to patient's son at bedside. Patient should be placed on diuretics for ascites mobilization: usual starting dose at 100mg spironolactone and 40mg lasix. Discussed with attending Dr. Madison.    -Josef Slaughter MD  EM/IM PGY-1      Evaluated patient at bedside, discussed with family and communicated with primary team, recommend outpatient paracentesis in several weeks given rate of accumulation, which may be modified by initiation of diuretics (don + lasix in 50/20 ratio).    -Reed Varela M.D.   PGY-4 EM/IM   Pager #[phone]

## 2023-04-06 NOTE — PROGRESS NOTE ADULT - SUBJECTIVE AND OBJECTIVE BOX
Patient is a 67y old  Male who presents with a chief complaint of right sided chest pain (04 Apr 2023 11:14)      SUBJECTIVE / OVERNIGHT EVENTS: pt seen and examined at 10:55am, no overnight events, pt is unable to give any history due to his mental status, son Selina at bedside, getting FFP, waiting for pleurex cath today, no other new issues reported.  Addendum: reported to have hypotension at the IR suite, was also noted to have no clear window for pleurex placement, hence pleurex was not placed, pt was given ivf bolus and was brought back to the floor.         MEDICATIONS  (STANDING):  atorvastatin 80 milliGRAM(s) Oral at bedtime  chlorhexidine 2% Cloths 1 Application(s) Topical <User Schedule>  ciprofloxacin     Tablet 500 milliGRAM(s) Oral every 24 hours  coronavirus bivalent (EUA) Booster Vaccine (PFIZER) 0.3 milliLiter(s) IntraMuscular once  dextrose 5%. 1000 milliLiter(s) (50 mL/Hr) IV Continuous <Continuous>  dextrose 5%. 1000 milliLiter(s) (100 mL/Hr) IV Continuous <Continuous>  dextrose 50% Injectable 25 Gram(s) IV Push once  dextrose 50% Injectable 12.5 Gram(s) IV Push once  dextrose 50% Injectable 25 Gram(s) IV Push once  glucagon  Injectable 1 milliGRAM(s) IntraMuscular once  insulin glargine Injectable (LANTUS) 8 Unit(s) SubCutaneous at bedtime  insulin lispro (ADMELOG) corrective regimen sliding scale   SubCutaneous at bedtime  insulin lispro (ADMELOG) corrective regimen sliding scale   SubCutaneous three times a day before meals  insulin lispro Injectable (ADMELOG) 5 Unit(s) SubCutaneous three times a day before meals  lactulose Syrup 20 Gram(s) Oral two times a day  levothyroxine 100 MICROGram(s) Oral daily  multivitamin 1 Tablet(s) Oral daily  pantoprazole    Tablet 40 milliGRAM(s) Oral before breakfast  rifAXIMin 550 milliGRAM(s) Oral two times a day  sodium chloride 0.9%. 1000 milliLiter(s) (75 mL/Hr) IV Continuous <Continuous>  tamsulosin 0.8 milliGRAM(s) Oral at bedtime  tenofovir disoproxil fumarate (VIREAD) 300 milliGRAM(s) Oral daily    MEDICATIONS  (PRN):  dextrose Oral Gel 15 Gram(s) Oral once PRN Blood Glucose LESS THAN 70 milliGRAM(s)/deciliter  sodium chloride 0.65% Nasal 1 Spray(s) Both Nostrils three times a day PRN Nasal Congestion      Vital Signs Last 24 Hrs  T(C): 36.2 (06 Apr 2023 10:40), Max: 36.8 (05 Apr 2023 20:39)  T(F): 97.2 (06 Apr 2023 10:40), Max: 98.2 (05 Apr 2023 20:39)  HR: 103 (06 Apr 2023 10:40) (99 - 105)  BP: 119/54 (06 Apr 2023 10:40) (90/43 - 119/59)  BP(mean): 58 (06 Apr 2023 10:20) (58 - 58)  RR: 18 (06 Apr 2023 10:40) (18 - 19)  SpO2: 99% (06 Apr 2023 10:40) (99% - 100%)    Parameters below as of 06 Apr 2023 10:40  Patient On (Oxygen Delivery Method): room air      CAPILLARY BLOOD GLUCOSE      POCT Blood Glucose.: 138 mg/dL (06 Apr 2023 13:09)  POCT Blood Glucose.: 149 mg/dL (06 Apr 2023 08:35)  POCT Blood Glucose.: 176 mg/dL (05 Apr 2023 21:14)  POCT Blood Glucose.: 197 mg/dL (05 Apr 2023 18:04)    I&O's Summary    05 Apr 2023 07:01  -  06 Apr 2023 07:00  --------------------------------------------------------  IN: 100 mL / OUT: 320 mL / NET: -220 mL        PHYSICAL EXAM:  GENERAL: NAD, frail appearing male  CHEST/LUNG: Clear to auscultation bilaterally; No wheeze  HEART: Regular rate and rhythm  ABDOMEN: Soft, Nontender, +distention  EXTREMITIES: no LE edema  PSYCH: Calm  NEUROLOGY: alert, awake, nonverbal  SKIN: No rashes or lesions      LABS:                        10.1   8.27  )-----------( 113      ( 06 Apr 2023 05:20 )             32.7     04-06    149<H>  |  118<H>  |  44<H>  ----------------------------<  151<H>  4.1   |  18<L>  |  1.07    Ca    7.8<L>      06 Apr 2023 05:20  Phos  4.2     04-06  Mg     2.40     04-06    TPro  5.3<L>  /  Alb  2.9<L>  /  TBili  4.0<H>  /  DBili  x   /  AST  118<H>  /  ALT  105<H>  /  AlkPhos  592<H>  04-05    PT/INR - ( 06 Apr 2023 05:20 )   PT: 20.3 sec;   INR: 1.74 ratio         PTT - ( 05 Apr 2023 05:30 )  PTT:38.4 sec          RADIOLOGY & ADDITIONAL TESTS:    Imaging Personally Reviewed:    Consultant(s) Notes Reviewed:      Care Discussed with Consultants/Other Providers:

## 2023-04-06 NOTE — PROGRESS NOTE ADULT - PROBLEM SELECTOR PLAN 1
- likely d/t hypoxia/anoxic brain ischemia in s/o hemorrhagic shock on admission/hypernatremia  - MRI brain 3/15 extensive cortical restricted diffusion throughout the cerebral hemispheres including the basal ganglia and insula b/l.   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 EEG (3/14) frequent left posterior quadrant periodic discharges, risk of seizure has decreased compared to EEG from 3/5. No seizures recorded.   - c/w rifaximin 550 mg BID, lactulose BID, repeat ammonia level 25; encephalopathy likely unrelated to HE  - re-evaluated by S+S 3/29 and now recommended for pureed diet with moderately thick liquids -- will monitor PO intake  - 3/24 Palliative care had GOC w/ son and daughter. Likely current scenario is related to anoxic brain injury; explained despite nutrition/correction of electrolytes clinically unimproved. d/w Hepatology in regards to PEG pt is at increased risk of peritonitis. As per hepatology no significant risk of bleeding with paracentesis in cirrhotic with elevated PT/INR and low platelets.   - procedure team following, s/p para 3/28 with 4.9L output  -son Selina is aware of the risk of infection with pleurex placement and understands that it is for comfort and is agreeable for hospice.  -No further intervention planned for patient as of now, pt will go home with hospice once pleurex is placed.  -will get IR consult for pleurex placement prior to dc  - Unable to place pleurex as no clear window for placement per IR, pt also had hypotension and was given ivf bolus,  bp improved, cont to monitor closely

## 2023-04-06 NOTE — CHART NOTE - NSCHARTNOTEFT_GEN_A_CORE
Notified by IR that the plan for pleurex catheter was canceled as no clear window was available. Pt was also noted with hypotension  with Blood Pressure of 70/50, Pt found on stretcher, No distress noted  vitals: 70/40,  HR 90,  temp 97,    Normal Saline Bolus of 500 Given, Now on maintenance Fluid   Repeat blood pressure of 90/50   will Continue with IVF and Monitor Closely   attending and family aware of above event

## 2023-04-06 NOTE — PROGRESS NOTE ADULT - PROBLEM SELECTOR PLAN 11
- F: none  - E: replete K<4, Mg<2  - N: pureed diet with moderately thick liquids   - D: hold with risk of bleed; SCDs  - G: protonix daily    code: full  dispo: prognosis guarded; plan for home with home hospice  -Urinary retention- mcconnell as pt is planned for dc with home hospice    - Updated son Selina on 4/6

## 2023-04-06 NOTE — PROGRESS NOTE ADULT - PROBLEM SELECTOR PLAN 2
decompensated with ascites, with variceal bleed, with PSE; MELD 12 3/10/23  - s/p diagnostic para (2/25) and therapeutic para (3/5) removed 4.5L  - s/p diag/therapeutic tap on 3/15, removed 3.6L, neg for SBP; Path neg for malignancy   - therapeutic para 3/28 with 4.9L removed  - c/w rifaximin BID, lactulose BID titrate 2 BM/day   - SBP ppx with Cipro due to low ascitic protein   - trend LFT, RUQ w/o pathology on 3/10  - MRI abdomen at [facility] 12/2022 no HCC  - off diuretic due to hypernatremia  - Unable to place pleurex as no clear window for placement per IR, pt also had hypotension and was given ivf bolus,  bp improved, cont to monitor closely

## 2023-04-06 NOTE — PROGRESS NOTE ADULT - PROBLEM SELECTOR PLAN 3
- hold diuretics  - off d5W and FW -- will need to monitor PO intake   - possible secondary to poor po intake, Na at 149 today, will cont to monitor  - will only obtain labs as clinically indicated

## 2023-04-06 NOTE — PROGRESS NOTE ADULT - PROBLEM SELECTOR PLAN 5
- suspect likely due to cirrhosis now recovering  - no overt evidence of GIB   -  slightly above normal not indicative of hemolysis, hapto above 20, HIT ab neg, blue top  37  - off heparin  - platelets improving  - s/p 1U plt 3/27 pre para with appropriate response  - SCDs for now, hold ppx with risk of bleed  -INR 1.7 received 1 unit plasma on 4/6

## 2023-04-07 NOTE — CHART NOTE - NSCHARTNOTEFT_GEN_A_CORE
MICU Accept Note    CHIEF COMPLAINT: decompensated cirrhosis    HPI / INTERVAL HISTORY: 68 yo M with PMH HTN, T2D, hypothyroidism, CAD (s/p CABG), TANG (c/b cirrhosis), follicular lymphoma (rituximab), and HBV (tenofovir) p/w CP and constipation with development of hematemesis. Admitted to MICU for hypovolemic shock requiring intubation/sedation for airway protection in setting of UGIB s/p protonix gtt, octreotide gtt, and midodrine. Hepatology following with 2/24 EGD showing esophageal varices s/p banding. Course further c/b persistent encephalopathy, on rifaximin and lactulose; MR brain with c/f hypoxic-ischemic injury. Now on cipro for SBP ppx. s/p paracentesis 3/28 with procedure team, 4.9L output. Repeat S+S 3/29 with recommendation for pureed diet and moderately thick liquids. Per last [facility] documentation, patient was planned to go home with hospice once palliative pleurex is placed, however, IR unable to place pleurex as no clear window for placement per IR, pt also had hypotension s/p ivf bolus with some improvement. However, patient cont to have hypotension without improvement with midodrine increase to 10 tid and bolus of NS. Patient started on levo and transferred to MICU.       PAST MEDICAL & SURGICAL HISTORY:  CAD (coronary artery disease)      Diabetes      Lymphoma      Cirrhosis      S/P CABG x 1      FAMILY HISTORY:  No pertinent family history in first degree relatives    HOME MEDICATIONS:      Allergies    Beef (Other)  No Known Drug Allergies    Intolerances        OBJECTIVE:      04-06 @ 07:01  -  04-07 @ 07:00  --------------------------------------------------------  IN: 507 mL / OUT: 320 mL / NET: 187 mL      CAPILLARY BLOOD GLUCOSE      POCT Blood Glucose.: 102 mg/dL (07 Apr 2023 20:18)      LINES:     HOSPITAL MEDICATIONS:  MEDICATIONS  (STANDING):  albumin human  5% IVPB 250 milliLiter(s) IV Intermittent once  atorvastatin 80 milliGRAM(s) Oral at bedtime  chlorhexidine 2% Cloths 1 Application(s) Topical <User Schedule>  ciprofloxacin     Tablet 500 milliGRAM(s) Oral every 24 hours  coronavirus bivalent (EUA) Booster Vaccine (PFIZER) 0.3 milliLiter(s) IntraMuscular once  dextrose 5%. 1000 milliLiter(s) (100 mL/Hr) IV Continuous <Continuous>  dextrose 5%. 1000 milliLiter(s) (50 mL/Hr) IV Continuous <Continuous>  dextrose 50% Injectable 25 Gram(s) IV Push once  dextrose 50% Injectable 12.5 Gram(s) IV Push once  dextrose 50% Injectable 25 Gram(s) IV Push once  glucagon  Injectable 1 milliGRAM(s) IntraMuscular once  insulin glargine Injectable (LANTUS) 8 Unit(s) SubCutaneous at bedtime  insulin lispro (ADMELOG) corrective regimen sliding scale   SubCutaneous at bedtime  insulin lispro (ADMELOG) corrective regimen sliding scale   SubCutaneous three times a day before meals  insulin lispro Injectable (ADMELOG) 5 Unit(s) SubCutaneous three times a day before meals  lactulose Syrup 20 Gram(s) Oral two times a day  levothyroxine 100 MICROGram(s) Oral daily  midodrine. 10 milliGRAM(s) Oral three times a day  multivitamin 1 Tablet(s) Oral daily  pantoprazole    Tablet 40 milliGRAM(s) Oral before breakfast  piperacillin/tazobactam IVPB. 3.375 Gram(s) IV Intermittent once  rifAXIMin 550 milliGRAM(s) Oral two times a day  sodium chloride 0.9%. 1000 milliLiter(s) (100 mL/Hr) IV Continuous <Continuous>  tenofovir disoproxil fumarate (VIREAD) 300 milliGRAM(s) Oral daily  vancomycin  IVPB 1000 milliGRAM(s) IV Intermittent once    MEDICATIONS  (PRN):  dextrose Oral Gel 15 Gram(s) Oral once PRN Blood Glucose LESS THAN 70 milliGRAM(s)/deciliter  sodium chloride 0.65% Nasal 1 Spray(s) Both Nostrils three times a day PRN Nasal Congestion      LABS:                        11.7   12.73 )-----------( 106      ( 07 Apr 2023 18:56 )             39.2     Hgb Trend: 11.7<--, 10.1<--, 10.6<--  04-07    150<H>  |  123<H>  |  x   ----------------------------<  x   3.6   |  x   |  x     Ca    7.8<L>      06 Apr 2023 05:20  Phos  4.2     04-06  Mg     2.30     04-07      Creatinine Trend: 1.07<--, 0.82<--, 0.72<--, 0.63<--, 0.67<--, 0.60<--  PT/INR - ( 06 Apr 2023 05:20 )   PT: 20.3 sec;   INR: 1.74 ratio         Venous Blood Gas:  04-07 @ 20:35  7.20/34/33/13/43.3  VBG Lactate: 6.0          ASSESSMENT AND PLAN:  67-year-old male with lymphoma on chemo, diabetes, hypertension, CAD status post CABG, TANG cirrhosis admitted for massive hematemesis hemorraghic shock, was intubated and admitted to MICU. Patient had prolonged hospital course due to GIB s/p EGD with variceal banding, persistent shock, anemia requiring transfusions, reaccumulation fo ascites 2/2 decompensated cirrhosis. Patient has poor mental status attributed to anoxic brain injury from initial hemorrhagic shock. Patient pending palliative pleurex but unable to perform due to hypotension. Now back on levo transferred back to MICU.    #Neuro    #Cardiovascular    #Respiratory    #GI/Nutrition    #/Renal    #Skin    #ID    #Endocrine    #Hematologic/DVT ppx    #Ethics  Last GOC with pal care stating that family wants patient to be comfortable at home; plan for home with hospice, plan for palliative pleurex if pt can tolerate prior to discharge     Leta Chauhan  PGY1, Internal Medicine MICU Accept Note    CHIEF COMPLAINT: decompensated cirrhosis    HPI / INTERVAL HISTORY: 66 yo M with PMH HTN, T2D, hypothyroidism, CAD (s/p CABG), TANG (c/b cirrhosis), follicular lymphoma (rituximab), and HBV (tenofovir) p/w CP and constipation with development of hematemesis. Admitted to MICU for hypovolemic shock requiring intubation/sedation for airway protection in setting of UGIB s/p protonix gtt, octreotide gtt, and midodrine. Hepatology following with 2/24 EGD showing esophageal varices s/p banding. Course further c/b persistent encephalopathy, on rifaximin and lactulose; MR brain with c/f hypoxic-ischemic injury. Now on cipro for SBP ppx. s/p paracentesis 3/28 with procedure team, 4.9L output. Repeat S+S 3/29 with recommendation for pureed diet and moderately thick liquids. Per last [facility] documentation, patient was planned to go home with hospice once palliative pleurex is placed, however, IR unable to place pleurex as no clear window for placement per IR, pt also had hypotension s/p ivf bolus with some improvement. However, patient cont to have hypotension without improvement with midodrine increase to 10 tid and bolus of NS. Patient started on levo and transferred to MICU. Per GOC conversation bdetween family and PA on 4/7 at time of arrival to MICU-- Patient made DNR/DNI, MOLST filled out       PAST MEDICAL & SURGICAL HISTORY:  CAD (coronary artery disease)      Diabetes      Lymphoma      Cirrhosis      S/P CABG x 1      FAMILY HISTORY:  No pertinent family history in first degree relatives    HOME MEDICATIONS:      Allergies    Beef (Other)  No Known Drug Allergies    Intolerances        OBJECTIVE:      04-06 @ 07:01  -  04-07 @ 07:00  --------------------------------------------------------  IN: 507 mL / OUT: 320 mL / NET: 187 mL      CAPILLARY BLOOD GLUCOSE      POCT Blood Glucose.: 102 mg/dL (07 Apr 2023 20:18)          LINES:     HOSPITAL MEDICATIONS:  MEDICATIONS  (STANDING):  albumin human  5% IVPB 250 milliLiter(s) IV Intermittent once  atorvastatin 80 milliGRAM(s) Oral at bedtime  chlorhexidine 2% Cloths 1 Application(s) Topical <User Schedule>  ciprofloxacin     Tablet 500 milliGRAM(s) Oral every 24 hours  coronavirus bivalent (EUA) Booster Vaccine (PFIZER) 0.3 milliLiter(s) IntraMuscular once  dextrose 5%. 1000 milliLiter(s) (100 mL/Hr) IV Continuous <Continuous>  dextrose 5%. 1000 milliLiter(s) (50 mL/Hr) IV Continuous <Continuous>  dextrose 50% Injectable 25 Gram(s) IV Push once  dextrose 50% Injectable 12.5 Gram(s) IV Push once  dextrose 50% Injectable 25 Gram(s) IV Push once  glucagon  Injectable 1 milliGRAM(s) IntraMuscular once  insulin glargine Injectable (LANTUS) 8 Unit(s) SubCutaneous at bedtime  insulin lispro (ADMELOG) corrective regimen sliding scale   SubCutaneous at bedtime  insulin lispro (ADMELOG) corrective regimen sliding scale   SubCutaneous three times a day before meals  insulin lispro Injectable (ADMELOG) 5 Unit(s) SubCutaneous three times a day before meals  lactulose Syrup 20 Gram(s) Oral two times a day  levothyroxine 100 MICROGram(s) Oral daily  midodrine. 10 milliGRAM(s) Oral three times a day  multivitamin 1 Tablet(s) Oral daily  pantoprazole    Tablet 40 milliGRAM(s) Oral before breakfast  piperacillin/tazobactam IVPB. 3.375 Gram(s) IV Intermittent once  rifAXIMin 550 milliGRAM(s) Oral two times a day  sodium chloride 0.9%. 1000 milliLiter(s) (100 mL/Hr) IV Continuous <Continuous>  tenofovir disoproxil fumarate (VIREAD) 300 milliGRAM(s) Oral daily  vancomycin  IVPB 1000 milliGRAM(s) IV Intermittent once    MEDICATIONS  (PRN):  dextrose Oral Gel 15 Gram(s) Oral once PRN Blood Glucose LESS THAN 70 milliGRAM(s)/deciliter  sodium chloride 0.65% Nasal 1 Spray(s) Both Nostrils three times a day PRN Nasal Congestion    PHYSICAL EXAM:  T(C): 35 (04-07-23 @ 21:30), Max: 36.7 (04-07-23 @ 06:50)  HR: 89 (04-07-23 @ 21:30) (89 - 96)  BP: 103/58 (04-07-23 @ 22:15) (70/46 - 114/54)  RR: 25 (04-07-23 @ 21:30) (16 - 25)  SpO2: 100% (04-07-23 @ 21:30) (98% - 100%)    GENERAL: NAD, thin   HEAD:  Atraumatic, Normocephalic  EYES: EOMI, PERRLA, conjunctiva and sclera icterus   NECK: Supple, No JVD  CHEST/LUNG: Clear to auscultation bilaterally; No wheeze  HEART: Regular rate and rhythm; No murmurs, rubs, or gallops  ABDOMEN: Soft, Nontender, distended; Bowel sounds present, + fluid wave   EXTREMITIES:  2+ Peripheral Pulses, No clubbing, cyanosis, 2+ pitting edema in LE and 1+ in UE   PSYCH: AAOx1, follows commands   NEUROLOGY: non-focal  SKIN: No rashes     LABS:                        11.7   12.73 )-----------( 106      ( 07 Apr 2023 18:56 )             39.2     Hgb Trend: 11.7<--, 10.1<--, 10.6<--  04-07    150<H>  |  123<H>  |  x   ----------------------------<  x   3.6   |  x   |  x     Ca    7.8<L>      06 Apr 2023 05:20  Phos  4.2     04-06  Mg     2.30     04-07      Creatinine Trend: 1.07<--, 0.82<--, 0.72<--, 0.63<--, 0.67<--, 0.60<--  PT/INR - ( 06 Apr 2023 05:20 )   PT: 20.3 sec;   INR: 1.74 ratio         Venous Blood Gas:  04-07 @ 20:35  7.20/34/33/13/43.3  VBG Lactate: 6.0        ASSESSMENT AND PLAN:  67-year-old male with lymphoma on chemo, diabetes, hypertension, CAD status post CABG, TANG cirrhosis admitted for massive hematemesis hemorraghic shock, was intubated and admitted to MICU. Patient had prolonged hospital course due to GIB s/p EGD with variceal banding, persistent shock, anemia requiring transfusions, reaccumulation fo ascites 2/2 decompensated cirrhosis. Patient has poor mental status attributed to anoxic brain injury from initial hemorrhagic shock. Patient pending palliative pleurex but unable to perform due to hypotension. Now back on levo transferred back to MICU.    #Neuro  A&Ox1, follows commands- may have some component of anoxic brain injury/ ?HE  - continue with lactulose and rifaxamin for HE     #Cardiovascular  shock 2/2 distributive vs sepsis  - c/w midodrine 10mg tid, uptitrate   - on levo, cont to wean as tolerated     #Respiratory  100 on 2L NC, wean     #GI/Nutrition  Decompensated cirrhosis 2/2 TANG  - ascites--> will need pleurex vs paracentesis prior to discharge for palliative measures/ for comfort; unable to start diuretics given hypotension   - SBP ppx--> cipro-- now on zosyn; should be on ciprofloxacin 500mg daily for SBP prophylaxis given low ascitic protein   - HE--> c/w rifaxamin and lactulose   - no active HCC   - EV, banded and without issues- hbg stable    HBV core +  - c/w tenofovir    Diet  - pureed, halal, mod thick liq    #/Renal  NANCI- likely ATN vs HRS  - trend Cr  - c/w midodrine   - albumin as needed  - strict I and O   - can further workup with urine lytes     hypernatremia  - will need free water as tolerates     metabolic acidosis   - lactate of 6, bicarb of 12--> may need bicarb drip if within GOC  - infectious workup as below     #ID  SIRS; increasing WBC and Hr with hypothermia   - f/u BCx  - obtain UA/UCx and CXR   - started on vanc/zosyn     #Endocrine  Hypotension and hypothermia  - check AM cortisol-- on admision >1 month ago cortisol appropriately elevated     DM2  - ISS and FS's   - 8U lantus and 5U admelog     Hypothyroid   - c/w synthroid   - can assess TSH /free T4    #Hematologic/DVT ppx  DVT ppx  -heparin sq for DVT ppx     #Ethics  Last GOC with pal care stating that family wants patient to be comfortable at home; plan for home with hospice, plan for palliative pleurex if pt can tolerate prior to discharge; Per GOC conversation between family and PA on 4/7 at time of arrival to MICU-- Patient made DNR/DNI, MELANIA filled out     Leta Chauhan  PGY1, Internal Medicine MICU Accept Note    CHIEF COMPLAINT: decompensated cirrhosis    HPI / INTERVAL HISTORY: 66 yo M with PMH HTN, T2D, hypothyroidism, CAD (s/p CABG), TANG (c/b cirrhosis), follicular lymphoma (rituximab), and HBV (tenofovir) p/w CP and constipation with development of hematemesis. Admitted to MICU for hypovolemic shock requiring intubation/sedation for airway protection in setting of UGIB s/p protonix gtt, octreotide gtt, and midodrine. Hepatology following with 2/24 EGD showing esophageal varices s/p banding. Course further c/b persistent encephalopathy, on rifaximin and lactulose; MR brain with c/f hypoxic-ischemic injury. Now on cipro for SBP ppx. s/p paracentesis 3/28 with procedure team, 4.9L output. Repeat S+S 3/29 with recommendation for pureed diet and moderately thick liquids. Per last [facility] documentation, patient was planned to go home with hospice once palliative pleurex is placed, however, IR unable to place pleurex as no clear window for placement per IR, pt also had hypotension s/p ivf bolus with some improvement. However, patient cont to have hypotension without improvement with midodrine increase to 10 tid and bolus of NS. Patient started on levo and transferred to MICU. Per GOC conversation bdetween family and PA on 4/7 at time of arrival to MICU-- Patient made DNR/DNI, MOLST filled out       PAST MEDICAL & SURGICAL HISTORY:  CAD (coronary artery disease)      Diabetes      Lymphoma      Cirrhosis      S/P CABG x 1      FAMILY HISTORY:  No pertinent family history in first degree relatives    HOME MEDICATIONS:      Allergies    Beef (Other)  No Known Drug Allergies    Intolerances        OBJECTIVE:      04-06 @ 07:01  -  04-07 @ 07:00  --------------------------------------------------------  IN: 507 mL / OUT: 320 mL / NET: 187 mL      CAPILLARY BLOOD GLUCOSE      POCT Blood Glucose.: 102 mg/dL (07 Apr 2023 20:18)          LINES:     HOSPITAL MEDICATIONS:  MEDICATIONS  (STANDING):  albumin human  5% IVPB 250 milliLiter(s) IV Intermittent once  atorvastatin 80 milliGRAM(s) Oral at bedtime  chlorhexidine 2% Cloths 1 Application(s) Topical <User Schedule>  ciprofloxacin     Tablet 500 milliGRAM(s) Oral every 24 hours  coronavirus bivalent (EUA) Booster Vaccine (PFIZER) 0.3 milliLiter(s) IntraMuscular once  dextrose 5%. 1000 milliLiter(s) (100 mL/Hr) IV Continuous <Continuous>  dextrose 5%. 1000 milliLiter(s) (50 mL/Hr) IV Continuous <Continuous>  dextrose 50% Injectable 25 Gram(s) IV Push once  dextrose 50% Injectable 12.5 Gram(s) IV Push once  dextrose 50% Injectable 25 Gram(s) IV Push once  glucagon  Injectable 1 milliGRAM(s) IntraMuscular once  insulin glargine Injectable (LANTUS) 8 Unit(s) SubCutaneous at bedtime  insulin lispro (ADMELOG) corrective regimen sliding scale   SubCutaneous at bedtime  insulin lispro (ADMELOG) corrective regimen sliding scale   SubCutaneous three times a day before meals  insulin lispro Injectable (ADMELOG) 5 Unit(s) SubCutaneous three times a day before meals  lactulose Syrup 20 Gram(s) Oral two times a day  levothyroxine 100 MICROGram(s) Oral daily  midodrine. 10 milliGRAM(s) Oral three times a day  multivitamin 1 Tablet(s) Oral daily  pantoprazole    Tablet 40 milliGRAM(s) Oral before breakfast  piperacillin/tazobactam IVPB. 3.375 Gram(s) IV Intermittent once  rifAXIMin 550 milliGRAM(s) Oral two times a day  sodium chloride 0.9%. 1000 milliLiter(s) (100 mL/Hr) IV Continuous <Continuous>  tenofovir disoproxil fumarate (VIREAD) 300 milliGRAM(s) Oral daily  vancomycin  IVPB 1000 milliGRAM(s) IV Intermittent once    MEDICATIONS  (PRN):  dextrose Oral Gel 15 Gram(s) Oral once PRN Blood Glucose LESS THAN 70 milliGRAM(s)/deciliter  sodium chloride 0.65% Nasal 1 Spray(s) Both Nostrils three times a day PRN Nasal Congestion    PHYSICAL EXAM:  T(C): 35 (04-07-23 @ 21:30), Max: 36.7 (04-07-23 @ 06:50)  HR: 89 (04-07-23 @ 21:30) (89 - 96)  BP: 103/58 (04-07-23 @ 22:15) (70/46 - 114/54)  RR: 25 (04-07-23 @ 21:30) (16 - 25)  SpO2: 100% (04-07-23 @ 21:30) (98% - 100%)    GENERAL: NAD, thin   HEAD:  Atraumatic, Normocephalic  EYES: EOMI, PERRLA, conjunctiva and sclera icterus   NECK: Supple, No JVD  CHEST/LUNG: Clear to auscultation bilaterally; No wheeze  HEART: Regular rate and rhythm; No murmurs, rubs, or gallops  ABDOMEN: Soft, Nontender, distended; Bowel sounds present, + fluid wave   EXTREMITIES:  2+ Peripheral Pulses, No clubbing, cyanosis, 2+ pitting edema in LE and 1+ in UE   PSYCH: AAOx1, follows commands   NEUROLOGY: non-focal  SKIN: No rashes     LABS:                        11.7   12.73 )-----------( 106      ( 07 Apr 2023 18:56 )             39.2     Hgb Trend: 11.7<--, 10.1<--, 10.6<--  04-07    150<H>  |  123<H>  |  x   ----------------------------<  x   3.6   |  x   |  x     Ca    7.8<L>      06 Apr 2023 05:20  Phos  4.2     04-06  Mg     2.30     04-07      Creatinine Trend: 1.07<--, 0.82<--, 0.72<--, 0.63<--, 0.67<--, 0.60<--  PT/INR - ( 06 Apr 2023 05:20 )   PT: 20.3 sec;   INR: 1.74 ratio         Venous Blood Gas:  04-07 @ 20:35  7.20/34/33/13/43.3  VBG Lactate: 6.0        ASSESSMENT AND PLAN:  67-year-old male with lymphoma on chemo, diabetes, hypertension, CAD status post CABG, TANG cirrhosis admitted for massive hematemesis hemorraghic shock, was intubated and admitted to MICU. Patient had prolonged hospital course due to GIB s/p EGD with variceal banding, persistent shock, anemia requiring transfusions, reaccumulation fo ascites 2/2 decompensated cirrhosis. Patient has poor mental status attributed to anoxic brain injury from initial hemorrhagic shock. Patient pending palliative pleurex but unable to perform due to hypotension. Now back on levo transferred back to MICU.    #Neuro  A&Ox1, follows commands- may have some component of anoxic brain injury/ ?HE  - continue with lactulose and rifaxamin for HE     #Cardiovascular  shock 2/2 distributive vs sepsis  - c/w midodrine 10mg tid, uptitrate   - on levo, cont to wean as tolerated     #Respiratory  100 on 2L NC, wean     #GI/Nutrition  Decompensated cirrhosis 2/2 TANG  - ascites--> will need pleurex vs paracentesis prior to discharge for palliative measures/ for comfort; unable to start diuretics given hypotension   - SBP ppx--> cipro-- now on zosyn; should be on ciprofloxacin 500mg daily for SBP prophylaxis given low ascitic protein   - HE--> c/w rifaxamin and lactulose   - no active HCC   - EV, banded and without issues- hbg stable    HBV core +  - c/w tenofovir    Diet  - pureed, halal, mod thick liq    #/Renal  NANCI- likely ATN vs HRS  - trend Cr  - c/w midodrine   - albumin as needed  - strict I and O   - can further workup with urine lytes     hypernatremia  - will need free water as tolerates     metabolic acidosis   - lactate of 6, bicarb of 12--> may need bicarb drip if within GOC  - infectious workup as below     #ID  SIRS; increasing WBC and Hr with hypothermia   - f/u BCx  - obtain UA/UCx and CXR   - started on vanc/zosyn     #Endocrine  Hypotension and hypothermia  - check AM cortisol-- on admision >1 month ago cortisol appropriately elevated     DM2  - ISS and FS's   - 8U lantus and 5U admelog     Hypothyroid   - c/w synthroid   - can assess TSH /free T4    #Hematologic/DVT ppx  DVT ppx  -heparin sq for DVT ppx     #Ethics  Last GOC with pal care stating that family wants patient to be comfortable at home; plan for home with hospice, plan for palliative pleurex if pt can tolerate prior to discharge; Per GOC conversation between family and PA on 4/7 at time of arrival to MICU-- Patient made DNR/DNI, MOLST filled out     Leta Chauhan  PGY1, Internal Medicine      Attending Attestation  Pt loan nd examined with MICU team.  I agree with above except as noted below.  66 yo male with complex PMH as above, signif for TANG cirrhosis and variceal bleed this admission, ascites s/p multiple paracenteses, failed attempt to place pleurex catheter for home ascites drain 2/2 pt hypotension in IR, continued hypotensive despite ivf, albumin, midodrine and started on norepi and transferred to MICU where he was made DNR/DNI by family, but agreeable to vasopressors for now.  He had been on d/c plan for home hospice prior to this evening's events.  HGB is at baseline, pt was slightly hypothermic, blood cultures drawn and pt given vanco and zosyn.  NANCI noted likely 2/2 to hypotension/ATN ghazala given liver dz.   POCUS: A line predom bilat lung fields anteriorly with scattered B lines, no consolidation, no pleff.  Cor LVsF appears intact, no pericard effusion, RV<LV size.  Unable to visualize IVC  Plan to continue abx and f/u cultures, continue vasopressor and continue GOC discussion with family who express understanding of the patient's grave condition and do not wish to pursue every aggressive means to keep him alive.    DVT ppx heparin sq  Pt is critically ill requiring multiple bedside visits and therapy changes.  Critical care time 40 min excluding time spent on separate procedures MICU Accept Note    CHIEF COMPLAINT: decompensated cirrhosis    HPI / INTERVAL HISTORY: 68 yo M with PMH HTN, T2D, hypothyroidism, CAD (s/p CABG), TANG (c/b cirrhosis), follicular lymphoma (rituximab), and HBV (tenofovir) p/w CP and constipation with development of hematemesis. Admitted to MICU for hypovolemic shock requiring intubation/sedation for airway protection in setting of UGIB s/p protonix gtt, octreotide gtt, and midodrine. Hepatology following with 2/24 EGD showing esophageal varices s/p banding. Course further c/b persistent encephalopathy, on rifaximin and lactulose; MR brain with c/f hypoxic-ischemic injury. Now on cipro for SBP ppx. s/p paracentesis 3/28 with procedure team, 4.9L output. Repeat S+S 3/29 with recommendation for pureed diet and moderately thick liquids. Per last [facility] documentation, patient was planned to go home with hospice once palliative pleurex is placed, however, IR unable to place pleurex as no clear window for placement per IR, pt also had hypotension s/p ivf bolus with some improvement. However, patient cont to have hypotension without improvement with midodrine increase to 10 tid and bolus of NS. Patient started on levo and transferred to MICU. Per GOC conversation bdetween family and PA on 4/7 at time of arrival to MICU-- Patient made DNR/DNI, MOLST filled out       PAST MEDICAL & SURGICAL HISTORY:  CAD (coronary artery disease)      Diabetes      Lymphoma      Cirrhosis      S/P CABG x 1      FAMILY HISTORY:  No pertinent family history in first degree relatives    HOME MEDICATIONS:      Allergies    Beef (Other)  No Known Drug Allergies    Intolerances        OBJECTIVE:      04-06 @ 07:01  -  04-07 @ 07:00  --------------------------------------------------------  IN: 507 mL / OUT: 320 mL / NET: 187 mL      CAPILLARY BLOOD GLUCOSE      POCT Blood Glucose.: 102 mg/dL (07 Apr 2023 20:18)          LINES:     HOSPITAL MEDICATIONS:  MEDICATIONS  (STANDING):  albumin human  5% IVPB 250 milliLiter(s) IV Intermittent once  atorvastatin 80 milliGRAM(s) Oral at bedtime  chlorhexidine 2% Cloths 1 Application(s) Topical <User Schedule>  ciprofloxacin     Tablet 500 milliGRAM(s) Oral every 24 hours  coronavirus bivalent (EUA) Booster Vaccine (PFIZER) 0.3 milliLiter(s) IntraMuscular once  dextrose 5%. 1000 milliLiter(s) (100 mL/Hr) IV Continuous <Continuous>  dextrose 5%. 1000 milliLiter(s) (50 mL/Hr) IV Continuous <Continuous>  dextrose 50% Injectable 25 Gram(s) IV Push once  dextrose 50% Injectable 12.5 Gram(s) IV Push once  dextrose 50% Injectable 25 Gram(s) IV Push once  glucagon  Injectable 1 milliGRAM(s) IntraMuscular once  insulin glargine Injectable (LANTUS) 8 Unit(s) SubCutaneous at bedtime  insulin lispro (ADMELOG) corrective regimen sliding scale   SubCutaneous at bedtime  insulin lispro (ADMELOG) corrective regimen sliding scale   SubCutaneous three times a day before meals  insulin lispro Injectable (ADMELOG) 5 Unit(s) SubCutaneous three times a day before meals  lactulose Syrup 20 Gram(s) Oral two times a day  levothyroxine 100 MICROGram(s) Oral daily  midodrine. 10 milliGRAM(s) Oral three times a day  multivitamin 1 Tablet(s) Oral daily  pantoprazole    Tablet 40 milliGRAM(s) Oral before breakfast  piperacillin/tazobactam IVPB. 3.375 Gram(s) IV Intermittent once  rifAXIMin 550 milliGRAM(s) Oral two times a day  sodium chloride 0.9%. 1000 milliLiter(s) (100 mL/Hr) IV Continuous <Continuous>  tenofovir disoproxil fumarate (VIREAD) 300 milliGRAM(s) Oral daily  vancomycin  IVPB 1000 milliGRAM(s) IV Intermittent once    MEDICATIONS  (PRN):  dextrose Oral Gel 15 Gram(s) Oral once PRN Blood Glucose LESS THAN 70 milliGRAM(s)/deciliter  sodium chloride 0.65% Nasal 1 Spray(s) Both Nostrils three times a day PRN Nasal Congestion    PHYSICAL EXAM:  T(C): 35 (04-07-23 @ 21:30), Max: 36.7 (04-07-23 @ 06:50)  HR: 89 (04-07-23 @ 21:30) (89 - 96)  BP: 103/58 (04-07-23 @ 22:15) (70/46 - 114/54)  RR: 25 (04-07-23 @ 21:30) (16 - 25)  SpO2: 100% (04-07-23 @ 21:30) (98% - 100%)    GENERAL: NAD, thin   HEAD:  Atraumatic, Normocephalic  EYES: EOMI, PERRLA, conjunctiva and sclera icterus   NECK: Supple, No JVD  CHEST/LUNG: Clear to auscultation bilaterally; No wheeze  HEART: Regular rate and rhythm; No murmurs, rubs, or gallops  ABDOMEN: Soft, Nontender, distended; Bowel sounds present, + fluid wave   EXTREMITIES:  2+ Peripheral Pulses, No clubbing, cyanosis, 2+ pitting edema in LE and 1+ in UE   PSYCH: AAOx1, follows commands   NEUROLOGY: non-focal  SKIN: No rashes     LABS:                        11.7   12.73 )-----------( 106      ( 07 Apr 2023 18:56 )             39.2     Hgb Trend: 11.7<--, 10.1<--, 10.6<--  04-07    150<H>  |  123<H>  |  x   ----------------------------<  x   3.6   |  x   |  x     Ca    7.8<L>      06 Apr 2023 05:20  Phos  4.2     04-06  Mg     2.30     04-07      Creatinine Trend: 1.07<--, 0.82<--, 0.72<--, 0.63<--, 0.67<--, 0.60<--  PT/INR - ( 06 Apr 2023 05:20 )   PT: 20.3 sec;   INR: 1.74 ratio         Venous Blood Gas:  04-07 @ 20:35  7.20/34/33/13/43.3  VBG Lactate: 6.0        ASSESSMENT AND PLAN:  67-year-old male with lymphoma on chemo, diabetes, hypertension, CAD status post CABG, TANG cirrhosis admitted for massive hematemesis hemorraghic shock, was intubated and admitted to MICU. Patient had prolonged hospital course due to GIB s/p EGD with variceal banding, persistent shock, anemia requiring transfusions, reaccumulation fo ascites 2/2 decompensated cirrhosis. Patient has poor mental status attributed to anoxic brain injury from initial hemorrhagic shock. Patient pending palliative pleurex but unable to perform due to hypotension. Now back on levo transferred back to MICU.    #Neuro  A&Ox1, follows commands- may have some component of anoxic brain injury/ ?HE  - continue with lactulose and rifaxamin for HE     #Cardiovascular  shock 2/2 distributive vs sepsis  - c/w midodrine 10mg tid, uptitrate   - on levo, cont to wean as tolerated     #Respiratory  100 on 2L NC, wean     #GI/Nutrition  Decompensated cirrhosis 2/2 TANG  - ascites--> will need pleurex vs paracentesis prior to discharge for palliative measures/ for comfort; unable to start diuretics given hypotension   - SBP ppx--> cipro-- now on zosyn; should be on ciprofloxacin 500mg daily for SBP prophylaxis given low ascitic protein   - HE--> c/w rifaxamin and lactulose   - no active HCC   - EV, banded and without issues- hbg stable    HBV core +  - c/w tenofovir    Diet  - pureed, halal, mod thick liq    #/Renal  NANCI- likely ATN vs HRS  - trend Cr  - c/w midodrine   - albumin as needed  - strict I and O   - can further workup with urine lytes     hypernatremia  - will need free water as tolerates     metabolic acidosis   - lactate of 6, bicarb of 12--> may need bicarb drip if within GOC  - infectious workup as below     #ID  SIRS; increasing WBC and Hr with hypothermia   - f/u BCx  - obtain UA/UCx and CXR   - started on vanc/zosyn     #Endocrine  Hypotension and hypothermia  - check AM cortisol-- on admision >1 month ago cortisol appropriately elevated     DM2  - ISS and FS's   - 8U lantus and 5U admelog     Hypothyroid   - c/w synthroid   - can assess TSH /free T4    #Hematologic/DVT ppx  DVT ppx  -heparin sq for DVT ppx     #Ethics  Last GOC with pal care stating that family wants patient to be comfortable at home; plan for home with hospice, plan for palliative pleurex if pt can tolerate prior to discharge; Per GOC conversation between family and PA on 4/7 at time of arrival to MICU-- Patient made DNR/DNI, MOLST filled out     Leta Chauhan  PGY1, Internal Medicine      Attending Attestation  Pt loan nd examined with MICU team.  I agree with above except as noted below.  68 yo male with complex PMH as above, signif for TANG cirrhosis and variceal bleed this admission, ascites s/p multiple paracenteses, failed attempt to place pleurex catheter for home ascites drain 2/2 pt hypotension in IR, continued hypotensive despite ivf, albumin, midodrine and started on norepi and transferred to MICU where he was made DNR/DNI by family, but agreeable to vasopressors for now.  He had been on d/c plan for home hospice prior to this evening's events.  HGB is at baseline, pt was slightly hypothermic, blood cultures drawn and pt given vanco and zosyn.  NANCI noted likely 2/2 to hypotension/ATN ghazala given liver dz.   POCUS: A line predom bilat lung fields anteriorly with scattered B lines, no consolidation, R pleff.  Cor LVsF appears intact, no pericard effusion, RV<LV size.  Unable to visualize IVC  Plan to continue abx and f/u cultures, continue vasopressor and continue GOC discussion with family who express understanding of the patient's grave condition and do not wish to pursue every aggressive means to keep him alive.    DVT ppx heparin sq  Pt is critically ill requiring multiple bedside visits and therapy changes.  Critical care time 40 min excluding time spent on separate procedures

## 2023-04-07 NOTE — PROGRESS NOTE ADULT - PROBLEM SELECTOR PROBLEM 2
Hypernatremia
Metabolic encephalopathy
Hypernatremia
Liver cirrhosis secondary to TANG
Liver cirrhosis secondary to TANG
Metabolic encephalopathy
Hemorrhagic shock
Hypernatremia
Liver cirrhosis secondary to TANG
Liver cirrhosis secondary to TANG
Esophageal varices with bleeding
Hypernatremia
Hypernatremia
Metabolic encephalopathy
Esophageal varices with bleeding
Liver cirrhosis secondary to TANG
Metabolic encephalopathy
Metabolic encephalopathy
Hemorrhagic shock
Metabolic encephalopathy
Hypernatremia
Esophageal varices with bleeding
Hemorrhagic shock
Esophageal varices with bleeding
Esophageal varices with bleeding
Metabolic encephalopathy
Liver cirrhosis secondary to TANG
Esophageal varices with bleeding
Metabolic encephalopathy
Metabolic encephalopathy
Esophageal varices with bleeding
Liver cirrhosis secondary to TANG
Metabolic encephalopathy
Liver cirrhosis secondary to TANG
Liver cirrhosis secondary to TANG

## 2023-04-07 NOTE — PROGRESS NOTE ADULT - SUBJECTIVE AND OBJECTIVE BOX
67y Male pod 1  s/p  pleurex catheter placement      T(C): 36.7 (04-07-23 @ 06:50), Max: 36.7 (04-06-23 @ 13:52)  HR: 92 (04-07-23 @ 06:50) (92 - 103)  BP: 88/50 (04-07-23 @ 06:50) (77/48 - 119/54)  RR: 17 (04-07-23 @ 06:50) (17 - 19)  SpO2: 98% (04-07-23 @ 06:50) (97% - 100%)  Wt(kg): --    Pt seen, doing well, no anesthesia complications or complaints noted or reported.   No Nausea  Pain well controlled

## 2023-04-07 NOTE — PROVIDER CONTACT NOTE (OTHER) - SITUATION
patient completed 500cc NS IVF bolus
F/S 71.Asymptomatic.
patient BP 82/52.
pt had about 8 really big bowel movements today, should I still administer the lactulose
Noted pt has not voided since yesterday 7pm. Bladder scanner noted 585 cc.
BP 88/50. Hr 92
patient has HR of 103
patient low BP- 72/47
respiratory rate 27
[facility] admitted for GI bleed.

## 2023-04-07 NOTE — PROGRESS NOTE ADULT - PROBLEM SELECTOR PLAN 11
- F: none  - E: replete K<4, Mg<2  - N: pureed diet with moderately thick liquids   - D: hold with risk of bleed; SCDs  - G: protonix daily    code: full  dispo: prognosis guarded; plan for home with home hospice tomorrow if bp improves  -Urinary retention- mcconnell as pt is planned for dc with home hospice    - Updated daughter Romana on 4/7

## 2023-04-07 NOTE — CHART NOTE - NSCHARTNOTEFT_GEN_A_CORE
Pt noted with persistent hypotension, blood pressure 78/50  case discussed attending, as per her recommendations, will Increase Midodrine to 10 po TID, Normal saline Bolus ( 500 cc ) Ordered   will continue to Monitor Closely.

## 2023-04-07 NOTE — PROVIDER CONTACT NOTE (OTHER) - NAME OF MD/NP/PA/DO NOTIFIED:
[facility] [id number]
Maria Del Carmen Lopez
Maria Del Carmen Lopez ACP
OSIRIS KING
Ruthie Washington
Tele BALDO Elliott
BALDO Hernandez
Ruthie Washington
Ileana Zambrano- [id number]
Ruthie Washington

## 2023-04-07 NOTE — PROVIDER CONTACT NOTE (OTHER) - DATE AND TIME:
14-Mar-2023 08:22
04-Apr-2023 08:16
06-Apr-2023 13:59
06-Apr-2023 16:22
17-Mar-2023 00:47
17-Mar-2023 01:13
19-Mar-2023 23:36
01-Apr-2023 06:51
07-Apr-2023 07:05
11-Mar-2023 17:00

## 2023-04-07 NOTE — PROVIDER CONTACT NOTE (OTHER) - ACTION/TREATMENT ORDERED:
Hold NPH and reassess f/s at 2am
No actions done at this time
PA made aware
Straight cath done. 550 cc noted post straight cath.
Provider made aware. Midodrine ordered TID; will administer at due time
IV was ordered normal saline 1000cc post bolus administration
provider notified okay to hold lactulose for the rest of the day
recheck with manual BP
provider made aware. rectal temp ordered as well
will continue to monitor

## 2023-04-07 NOTE — PROGRESS NOTE ADULT - PROBLEM SELECTOR PLAN 2
decompensated with ascites, with variceal bleed, with PSE; MELD 12 3/10/23  - s/p diagnostic para (2/25) and therapeutic para (3/5) removed 4.5L  - s/p diag/therapeutic tap on 3/15, removed 3.6L, neg for SBP; Path neg for malignancy   - therapeutic para 3/28 with 4.9L removed  - c/w rifaximin BID, lactulose BID titrate 2 BM/day   - SBP ppx with Cipro due to low ascitic protein   - trend LFT, RUQ w/o pathology on 3/10  - MRI abdomen at [facility] 12/2022 no HCC  - off diuretic due to hypernatremia  - Unable to place pleurex as no clear window for placement per IR

## 2023-04-07 NOTE — PROVIDER CONTACT NOTE (OTHER) - BACKGROUND
Admitted for GI Hemorrhage
AxO x0, nonverbal. 66 y/o m with pmhx CAD s/p CABG, TANG cirrhosis, follicular lymphoma. presented to ED chest pain and admitting diagnosis of gastrointestinal bleed.
pt has hx of variceal bleeds
68 y/o M, PMH of CAD, cirrhosis, Admitted for GI bleed. Pt on NG tube 1.5 Nelson running at 45ml/hr
68 Y/O M. Admitted for GI Bleed. resolved. PMH of ascites and cirrhosis
AxO x0, nonverbal. 66 y/o m with pmhx CAD s/p CABG, TANG cirrhosis, follicular lymphoma. presented to ED chest pain and admitting diagnosis of gastrointestinal bleed.
patient returned from IR returned from hypotension PA at bedside
Admitted for AMS, liver failure. PMH: DM, HTN. patient has ascites. Patient scheduled to go to IR for pleurx drainage catheter

## 2023-04-07 NOTE — PROVIDER CONTACT NOTE (OTHER) - REASON
BP 88/50. Hr 92
Noted pt has not voided since yesterday 7pm. Bladder scanner noted 585 cc.
low BP
low BP
heart rate 103
pt had about 8 really big bowel movements today, should I still administer the lactulose
hypotension
respiratory rate 27
patient completed 500cc NS IVF bolus
F/S 71.Asymptomatic.

## 2023-04-07 NOTE — PROVIDER CONTACT NOTE (OTHER) - RECOMMENDATIONS
Continue to monitor. Safety maintained.
Provider made aware. Provider ordered straight cath and bladder scan q8h.
provider was made aware of updated vital signs
Provider notified
notify provider
provider made aware. recheck BP on lower leg.
will continue to monitor
provider was made aware of updated vital signs
Hold NPH and reassess f/s
rechecking with manual BP, provider made aware

## 2023-04-07 NOTE — RAPID RESPONSE TEAM SUMMARY - NSSITUATIONBACKGROUNDRRT_GEN_ALL_CORE
66 yo M with PMH HTN, T2D, hypothyroidism, CAD (s/p CABG), TANG (c/b cirrhosis), follicular lymphoma (rituximab), and HBV (tenofovir) p/w CP and constipation with development of hematemesis. Admitted to MICU for hypovolemic shock requiring intubation/sedation for airway protection in setting of UGIB s/p protonix gtt, octreotide gtt, and midodrine (now off). Hepatology following with 2/24 EGD showing esophageal varices s/p banding. Course further c/b persistent encephalopathy, on rifaximin and lactulose; MR brain with c/f hypoxic-ischemic injury. Now on cipro for SBP ppx. s/p paracentesis 3/28 with procedure team, 4.9L output. Repeat S+S 3/29 with recommendation for pureed diet and moderately thick liquids. Pt now pending dc to home hospice.    RRT called for hypotension Systolic BP 60s. On arrival patient lethargic, but arousable, AAOx1 at baseline. Per primary team, patient BP running low during day and midodrine was increased to 10mg TID. Received about 500cc IVF during day. Repeat BP 60/40. Patient was noted to be hypothermic 95F, satting 100% on 2LNC. One set of blood cx drawn. Patient ordered for one dose of vanc and zosyn. Gave 250cc 5% albumin and about 500cc IVF without improvement in BP. New IV access obtained in LUE and levophed was started. MICU called and examined patient; patient accepted to MICU.

## 2023-04-07 NOTE — PROGRESS NOTE ADULT - PROBLEM SELECTOR PROBLEM 1
Hypotension
Metabolic encephalopathy
NANCI (acute kidney injury)
Nausea and vomiting
Metabolic encephalopathy
NANCI (acute kidney injury)
NANCI (acute kidney injury)
Metabolic encephalopathy
Nausea and vomiting
Hypotension
Thrombocytopenia
Anoxic brain injury
Hypotension
Metabolic encephalopathy
Nausea and vomiting
Metabolic encephalopathy
Anoxic brain injury
Anoxic brain injury
Metabolic encephalopathy
Thrombocytopenia
Metabolic encephalopathy
Thrombocytopenia
Hypotension

## 2023-04-07 NOTE — CHART NOTE - NSCHARTNOTEFT_GEN_A_CORE
Informed by RN that patient has BP readings fluctuating from 50s systolic to 90s systolic. Patient was hypotensive throughout the day and last had midodrine 10 mg along with 500cc bolus of NS at 17:30. Patient seen at bedside. Patient is A&oX1 (at baseline). Patient reports feeling "fine". Patient is lethargic but arousable on examination. BP repeated on UE bilaterally with systolic BP in 50s/60s. RRT called by writer for hypotension and lethargy. Patient cool to touch as well. Otherwise, is non-toxic appearing and NAD.     Vital signs obtained. Pt's O2 sat noted to be 87% on room air. Placed on 3L of NC with improvement of oxygen saturation to 98%. Patient hypothermic to 95F.     During RRT, patient received 500cc of LR bolus and 250 cc bolus of albumin 5%. Vanc/Zosyn initiated given hypotension is likely secondary to sepsis. Blood cultures and labs obtained during RRT. Despite NS bolus and albumin, no improvement was seen in blood pressure. Levophed initiated for pressure support with improvement of BP to 90s systolic. Patient transferred to MICU for further pressure support and close monitoring.    Discussed events with patient's daughter & HCP, Romana during RRT. As per daughter, pt is FULL code. Daughter updated on plan of care.    D/w HIC.

## 2023-04-07 NOTE — PROGRESS NOTE ADULT - SUBJECTIVE AND OBJECTIVE BOX
Patient is a 67y old  Male who presents with a chief complaint of right sided chest pain (04 Apr 2023 11:14)      SUBJECTIVE / OVERNIGHT EVENTS: pt seen and examined at 10:35am, no overnight events, pt is unable to give any history due to his mental status, Daughter Romana at bedside, getting FFP, daughter says that he ate well this am, pt apears comfortable, alert, awake, no other new issues reported.  Addendum: pt is hypotensive, gave additional midodrine, dose increased to 10mg, continued on ivf      MEDICATIONS  (STANDING):  atorvastatin 80 milliGRAM(s) Oral at bedtime  chlorhexidine 2% Cloths 1 Application(s) Topical <User Schedule>  ciprofloxacin     Tablet 500 milliGRAM(s) Oral every 24 hours  coronavirus bivalent (EUA) Booster Vaccine (PFIZER) 0.3 milliLiter(s) IntraMuscular once  dextrose 5%. 1000 milliLiter(s) (50 mL/Hr) IV Continuous <Continuous>  dextrose 5%. 1000 milliLiter(s) (100 mL/Hr) IV Continuous <Continuous>  dextrose 50% Injectable 25 Gram(s) IV Push once  dextrose 50% Injectable 12.5 Gram(s) IV Push once  dextrose 50% Injectable 25 Gram(s) IV Push once  glucagon  Injectable 1 milliGRAM(s) IntraMuscular once  insulin glargine Injectable (LANTUS) 8 Unit(s) SubCutaneous at bedtime  insulin lispro (ADMELOG) corrective regimen sliding scale   SubCutaneous at bedtime  insulin lispro (ADMELOG) corrective regimen sliding scale   SubCutaneous three times a day before meals  insulin lispro Injectable (ADMELOG) 5 Unit(s) SubCutaneous three times a day before meals  lactulose Syrup 20 Gram(s) Oral two times a day  levothyroxine 100 MICROGram(s) Oral daily  midodrine. 10 milliGRAM(s) Oral three times a day  multivitamin 1 Tablet(s) Oral daily  pantoprazole    Tablet 40 milliGRAM(s) Oral before breakfast  rifAXIMin 550 milliGRAM(s) Oral two times a day  sodium chloride 0.9% Bolus 500 milliLiter(s) IV Bolus once  sodium chloride 0.9%. 1000 milliLiter(s) (75 mL/Hr) IV Continuous <Continuous>  tamsulosin 0.8 milliGRAM(s) Oral at bedtime  tenofovir disoproxil fumarate (VIREAD) 300 milliGRAM(s) Oral daily    MEDICATIONS  (PRN):  dextrose Oral Gel 15 Gram(s) Oral once PRN Blood Glucose LESS THAN 70 milliGRAM(s)/deciliter  sodium chloride 0.65% Nasal 1 Spray(s) Both Nostrils three times a day PRN Nasal Congestion      Vital Signs Last 24 Hrs  T(C): 36.3 (07 Apr 2023 10:50), Max: 36.7 (06 Apr 2023 15:36)  T(F): 97.3 (07 Apr 2023 10:50), Max: 98 (06 Apr 2023 15:36)  HR: 96 (07 Apr 2023 10:50) (92 - 97)  BP: 78/50 (07 Apr 2023 11:55) (74/53 - 94/53)  BP(mean): 59 (07 Apr 2023 11:55) (59 - 59)  RR: 17 (07 Apr 2023 06:50) (17 - 19)  SpO2: 98% (07 Apr 2023 06:50) (98% - 100%)    Parameters below as of 07 Apr 2023 06:50  Patient On (Oxygen Delivery Method): room air      CAPILLARY BLOOD GLUCOSE      POCT Blood Glucose.: 187 mg/dL (07 Apr 2023 12:37)  POCT Blood Glucose.: 152 mg/dL (07 Apr 2023 08:42)  POCT Blood Glucose.: 207 mg/dL (06 Apr 2023 21:14)  POCT Blood Glucose.: 188 mg/dL (06 Apr 2023 17:14)  POCT Blood Glucose.: 161 mg/dL (06 Apr 2023 14:49)    I&O's Summary    06 Apr 2023 07:01  -  07 Apr 2023 07:00  --------------------------------------------------------  IN: 507 mL / OUT: 320 mL / NET: 187 mL        PHYSICAL EXAM:  GENERAL: NAD, frail appearing male  CHEST/LUNG: Clear to auscultation bilaterally; No wheeze  HEART: Regular rate and rhythm  ABDOMEN: Soft, Nontender, +distention  EXTREMITIES: no LE edema  PSYCH: Calm  NEUROLOGY: alert, awake, nonverbal  SKIN: No rashes or lesions    LABS:                        10.1   8.27  )-----------( 113      ( 06 Apr 2023 05:20 )             32.7     04-06    149<H>  |  118<H>  |  44<H>  ----------------------------<  151<H>  4.1   |  18<L>  |  1.07    Ca    7.8<L>      06 Apr 2023 05:20  Phos  4.2     04-06  Mg     2.40     04-06      PT/INR - ( 06 Apr 2023 05:20 )   PT: 20.3 sec;   INR: 1.74 ratio                   RADIOLOGY & ADDITIONAL TESTS:    Imaging Personally Reviewed:    Consultant(s) Notes Reviewed:      Care Discussed with Consultants/Other Providers:

## 2023-04-07 NOTE — PROGRESS NOTE ADULT - PROBLEM SELECTOR PLAN 6
acute blood loss anemia c/b hypovolemic shock s/p levophed and ICU  - Hb 6.9 on admit now s/p 6 units of pRBC, 1 unit FFP, 1 unit of platelets all on 2/24/23, 1u prbc 3/18, 1U plt 3/27  - s/p octreotide x 72h (2/24-2/27)   - s/p EGD 2/24: large (> 5 mm) esophageal varices. Incompletely eradicated. Banded. Normal duodenal bulb, first portion of the duodenum and second portion of the duodenum.                 - repeat EGD in 4 wks for likely repeat banding  - SBP ppx cipro  - TTE with preserved LVEF 53%  -now with hypotension, on midodrine, dose increased to 10mg, ivf bolus and cont maintainance ivf  - hold diuretic for now

## 2023-04-07 NOTE — PROVIDER CONTACT NOTE (OTHER) - ASSESSMENT
BP 80/54 Pulse 97 sao2 100 RR 17
patient BP 82/52
patient a&ox0 on tube feed belly breathing RR 27 tube feed placement verified by auscultation upon initial assessment
Noted pt has not voided since yesterday 7pm. Bladder scanner noted 585 cc.
patient has HR of 103 asymptomatic
A&Ox0. nonverbal, not in distress. no complaints of pain. Vitals as  per flowsheet.
F/S 71. Asymptomatic. No s/s of hypoglycemia noted. Safety maintained.
patient low BP- 72/47. manual BP 75/50.  continued BP monitorin:40: BP- 87/52  04:03: BP- 83/44  Leg BP: 06:05: 118/49
88/50. Hr 92. 98.1. 98% on RA. Asymptomatic.  0.9 N.S running at 75ml/hr
pt had about 8 really big bowel movements today, should I still administer the lactulose

## 2023-04-07 NOTE — PROGRESS NOTE ADULT - PROBLEM SELECTOR PLAN 1
- likely d/t hypoxia/anoxic brain ischemia in s/o hemorrhagic shock on admission/hypernatremia  - MRI brain 3/15 extensive cortical restricted diffusion throughout the cerebral hemispheres including the basal ganglia and insula b/l.   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 EEG (3/14) frequent left posterior quadrant periodic discharges, risk of seizure has decreased compared to EEG from 3/5. No seizures recorded.   - c/w rifaximin 550 mg BID, lactulose BID, repeat ammonia level 25; encephalopathy likely unrelated to HE  - re-evaluated by S+S 3/29 and now recommended for pureed diet with moderately thick liquids -- will monitor PO intake  - 3/24 Palliative care had GOC w/ son and daughter. Likely current scenario is related to anoxic brain injury; explained despite nutrition/correction of electrolytes clinically unimproved. d/w Hepatology in regards to PEG pt is at increased risk of peritonitis. As per hepatology no significant risk of bleeding with paracentesis in cirrhotic with elevated PT/INR and low platelets.   - procedure team following, s/p para 3/28 with 4.9L output  - Unable to place pleurex as no clear window for placement per IR, pt also had hypotension and was given ivf bolus,  bp improved later again with hypotension, added midodrine, continued ivf maintainence

## 2023-04-08 NOTE — PROGRESS NOTE ADULT - CRITICAL CARE ATTENDING COMMENT
Reviewing the EMR, vitals, imaging, medication list, recent labs, prior records and coordinating care with medical providers
Critically ill patient requiring frequent bedside visits with therapy changes.
Agree with above.  Transferred to MICU overnight for hypotension and AMS, currently on Levophed and Bicarbonate drip with elevated lactate, remains hypotensive.   Actively dying, though intermittently awake and per family request are feeding him understanding high risk for aspiration.  Will not escalate care, keep pressors at current rate.   Patient is DNR/DNI.   Support provided to family.
Critically ill patient requiring frequent bedside visits with therapy changes.

## 2023-04-08 NOTE — PROCEDURE NOTE - NSSITEPREP_SKIN_A_CORE
chlorhexidine
chlorhexidine/Adherence to aseptic technique: hand hygiene prior to donning barriers (gown, gloves), don cap and mask, sterile drape over patient
alcohol/chlorhexidine/Adherence to aseptic technique: hand hygiene prior to donning barriers (gown, gloves), don cap and mask, sterile drape over patient

## 2023-04-08 NOTE — PROCEDURE NOTE - NSINDICATIONS_GEN_A_CORE
ascites
airway protection
ascites/suspected spontaneous bacterial peritonitis
ascites
critical patient
arterial puncture to obtain ABG's/critical patient/monitoring purposes
ascites

## 2023-04-08 NOTE — DISCHARGE NOTE FOR THE EXPIRED PATIENT - HOSPITAL COURSE
66 yo M with PMH HTN, T2D, hypothyroidism, CAD (s/p CABG), TANG (c/b cirrhosis), follicular lymphoma (rituximab), and HBV (tenofovir) p/w CP and constipation with development of hematemesis. Admitted to MICU for hypovolemic shock requiring intubation/sedation for airway protection in setting of UGIB s/p protonix gtt, octreotide gtt, and midodrine. Hepatology following with 2/24 EGD showing esophageal varices s/p banding. Course further c/b persistent encephalopathy, on rifaximin and lactulose; MR brain with c/f hypoxic-ischemic injury. Now on cipro for SBP ppx. s/p paracentesis 3/28 with procedure team, 4.9L output. Repeat S+S 3/29 with recommendation for pureed diet and moderately thick liquids. Per last GOC documentation, patient was planned to go home with hospice once palliative pleurex is placed, however, IR unable to place pleurex as no clear window for placement per IR, pt also had hypotension s/p ivf bolus with some improvement. However, patient cont to have hypotension without improvement with midodrine increase to 10 tid and bolus of NS. Patient started on levo and bicarb gtt and transferred to MICU. Per GOC conversation bdetween family and PA on 4/7 at time of arrival to MICU-- Patient made DNR/DNI, MOLST filled out. In MICU pt became hypotensive despite medical measures and made comfort measures only. He passed away on 4/5/23 at 1226PM w/ family present at bedside.

## 2023-04-08 NOTE — PROCEDURE NOTE - NSPROCNAME_GEN_A_CORE
Paracentesis
Paracentesis
Arterial Puncture/Cannulation
Paracentesis
Paracentesis
Arterial Puncture/Cannulation
Tracheal Intubation

## 2023-04-08 NOTE — CHART NOTE - NSCHARTNOTESELECT_GEN_ALL_CORE
Event Note
Follow Up/Nutrition Services
Hepatology sign off/Event Note
Labs - drop in hgb/Event Note
MICU pocus note
Nutrition Services
PRONOUCEMENT NOTE/Event Note
RRT/Event Note
Supplies/Event Note
hospital bed note/Event Note
EEG prelim
Event Note
IR Pre-procedure note/Event Note
Increased WOB/Event Note
MICU Transfer Note/Transfer Note
MICU accept/Transfer Note
Nutrition Services
Nutrition Services

## 2023-04-08 NOTE — CHART NOTE - NSCHARTNOTEFT_GEN_A_CORE
ICU DEATH PRONOUNCEMENT NOTE     Called to bedside to evaluate the patient for asystole on telemetry.     On physical exam, patient did not respond to verbal or noxious stimuli.  No spontaneous respirations.  Absent heart and breath sounds.  Absent radial and carotid pulses.   Pupils are fixed and dilated, no corneal reflex.  EKG rhythm strip shows asystole. A-line without waveform.  Patient pronounced dead at 1226pm.  Attending notified.  Family declined autopsy.

## 2023-04-08 NOTE — PROCEDURE NOTE - NSPROCDETAILS_GEN_ALL_CORE
location identified, draped/prepped, sterile technique used, needle inserted/introduced/positive blood return obtained via catheter/sutured in place/hemostasis with direct pressure, dressing applied/Seldinger technique/all materials/supplies accounted for at end of procedure
location identified, sterile technique used, catheter introduced, fluid drained
location identified, sterile technique used, catheter introduced, fluid drained/sterile dressing applied
location identified, draped/prepped, sterile technique used, needle inserted/introduced/positive blood return obtained via catheter/connected to a pressurized flush line/sutured in place/all materials/supplies accounted for at end of procedure
location identified, sterile technique used, catheter introduced, fluid drained
patient pre-oxygenated, tube inserted, placement confirmed
location identified, sterile technique used, catheter introduced, fluid drained/sterile dressing applied

## 2023-04-08 NOTE — PROGRESS NOTE ADULT - SUBJECTIVE AND OBJECTIVE BOX
INTERVAL HPI/OVERNIGHT EVENTS:    HPI:   67-year-old male with past medical history of lymphoma on chemo, diabetes, hypertension, CAD status post CABG, cirrhosis presenting with concern of R sided pain and constipation. Patient follows primarily at [facility]. He states the pain began this morning. Sharp, non radiating. No associated N/V/D, dysuria, trouble urinating, fever, chills, cough, dyspnea, sore throat. Pt states he has been constipated for 4d. Was told to take miralax by his oncologist. Pt has known TANG. (2023 11:27)      SUBJECTIVE: Patient seen and examined at bedside.       VITAL SIGNS:  ICU Vital Signs Last 24 Hrs  T(C): 36 (2023 04:00), Max: 36.7 (2023 06:50)  T(F): 96.8 (2023 04:00), Max: 98 (2023 06:50)  HR: 109 (2023 05:00) (89 - 114)  BP: 101/50 (2023 01:00) (70/46 - 114/54)  BP(mean): 62 (2023 01:00) (52 - 69)  ABP: 57/43 (2023 05:00) (45/35 - 75/50)  ABP(mean): 49 (2023 05:00) (40 - 59)  RR: 27 (2023 05:00) (16 - 27)  SpO2: 100% (2023 05:00) (98% - 100%)    O2 Parameters below as of 2023 05:00  Patient On (Oxygen Delivery Method): room air            Plateau pressure:   P/F ratio:      @ 07:  -  07 @ 07:00  --------------------------------------------------------  IN: 507 mL / OUT: 320 mL / NET: 187 mL     @ 07:  -  08 @ 06:04  --------------------------------------------------------  IN: 1603.1 mL / OUT: 170 mL / NET: 1433.1 mL      CAPILLARY BLOOD GLUCOSE      POCT Blood Glucose.: 137 mg/dL (2023 23:27)    ECG:    PHYSICAL EXAM:    General:   HEENT:   Neck:   Respiratory:   Cardiovascular:   Abdomen:   Extremities:  Neurological:    MEDICATIONS:  MEDICATIONS  (STANDING):  albumin human  5% IVPB 250 milliLiter(s) IV Intermittent once  atorvastatin 80 milliGRAM(s) Oral at bedtime  chlorhexidine 2% Cloths 1 Application(s) Topical <User Schedule>  coronavirus bivalent (EUA) Booster Vaccine (PFIZER) 0.3 milliLiter(s) IntraMuscular once  dextrose 5%. 1000 milliLiter(s) (50 mL/Hr) IV Continuous <Continuous>  dextrose 5%. 1000 milliLiter(s) (100 mL/Hr) IV Continuous <Continuous>  dextrose 50% Injectable 25 Gram(s) IV Push once  dextrose 50% Injectable 12.5 Gram(s) IV Push once  dextrose 50% Injectable 25 Gram(s) IV Push once  glucagon  Injectable 1 milliGRAM(s) IntraMuscular once  insulin glargine Injectable (LANTUS) 8 Unit(s) SubCutaneous at bedtime  insulin lispro (ADMELOG) corrective regimen sliding scale   SubCutaneous at bedtime  insulin lispro (ADMELOG) corrective regimen sliding scale   SubCutaneous three times a day before meals  insulin lispro Injectable (ADMELOG) 5 Unit(s) SubCutaneous three times a day before meals  lactulose Syrup 20 Gram(s) Oral two times a day  levothyroxine 100 MICROGram(s) Oral daily  midodrine. 10 milliGRAM(s) Oral three times a day  multivitamin 1 Tablet(s) Oral daily  norepinephrine Infusion 0.05 MICROgram(s)/kG/Min (5.19 mL/Hr) IV Continuous <Continuous>  pantoprazole    Tablet 40 milliGRAM(s) Oral before breakfast  piperacillin/tazobactam IVPB.. 3.375 Gram(s) IV Intermittent every 8 hours  potassium chloride  10 mEq/100 mL IVPB 10 milliEquivalent(s) IV Intermittent every 1 hour  rifAXIMin 550 milliGRAM(s) Oral two times a day  sodium bicarbonate  Infusion 0.203 mEq/kG/Hr (75 mL/Hr) IV Continuous <Continuous>  tenofovir disoproxil fumarate (VIREAD) 300 milliGRAM(s) Oral daily    MEDICATIONS  (PRN):  dextrose Oral Gel 15 Gram(s) Oral once PRN Blood Glucose LESS THAN 70 milliGRAM(s)/deciliter  sodium chloride 0.65% Nasal 1 Spray(s) Both Nostrils three times a day PRN Nasal Congestion      ALLERGIES:  Allergies    [This allergen will not trigger allergy alert] &quot;lentils&quot;-&quot;itchiness&quot; (Other)  Beef (Other)  No Known Drug Allergies    Intolerances        LABS:                        13.8   10.75 )-----------( 147      ( 2023 02:15 )             45.0     04-08    145  |  119<H>  |  58<H>  ----------------------------<  136<H>  3.3<L>   |  9<LL>  |  1.61<H>    Ca    6.8<L>      2023 02:15  Phos  5.3     04-08  Mg     2.20     04-08    TPro  4.2<L>  /  Alb  1.9<L>  /  TBili  3.0<H>  /  DBili  x   /  AST  314<H>  /  ALT  158<H>  /  AlkPhos  493<H>  04-08      Urinalysis Basic - ( 2023 01:58 )    Color: Light Orange / Appearance: Turbid / S.020 / pH: x  Gluc: x / Ketone: Negative  / Bili: Negative / Urobili: <2 mg/dL   Blood: x / Protein: 30 mg/dL / Nitrite: Negative   Leuk Esterase: Large / RBC: 25-50 /HPF / WBC >50 /HPF   Sq Epi: x / Non Sq Epi: x / Bacteria: Many        RADIOLOGY & ADDITIONAL TESTS: Reviewed.   INTERVAL HPI/OVERNIGHT EVENTS: RRT overnight for hypotension/AMS, started on levophed and [facility]icar[facility] gtt[facility]/DNI, pressors capped. Hypotensive, SBP 50s this am.     HPI:   67-year-old male with past medical history of lymphoma on chemo, diabetes, hypertension, CAD status post CABG, cirrhosis presenting with concern of R sided pain and constipation. Patient follows primarily at [facility]. He states the pain began this morning. Sharp, non radiating. No associated N/V/D, dysuria, trouble urinating, fever, chills, cough, dyspnea, sore throat. Pt states he has been constipated for 4d. Was told to take miralax by his oncologist. Pt has known TANG. (2023 11:27)      SUBJECTIVE: Patient seen and examined at bedside.       VITAL SIGNS:  ICU Vital Signs Last 24 Hrs  T(C): 36 (2023 04:00), Max: 36.7 (2023 06:50)  T(F): 96.8 (2023 04:00), Max: 98 (2023 06:50)  HR: 109 (2023 05:00) (89 - 114)  BP: 101/50 (2023 01:00) (70/46 - 114/54)  BP(mean): 62 (2023 01:00) (52 - 69)  ABP: 57/43 (2023 05:00) (45/35 - 75/50)  ABP(mean): 49 (2023 05:00) (40 - 59)  RR: 27 (2023 05:00) (16 - 27)  SpO2: 100% (2023 05:00) (98% - 100%)    O2 Parameters below as of 2023 05:00  Patient On (Oxygen Delivery Method): room air            Plateau pressure:   P/F ratio:      @ 07:01  -   @ 07:00  --------------------------------------------------------  IN: 507 mL / OUT: 320 mL / NET: 187 mL     @ 07:01  -  04-08 @ 06:04  --------------------------------------------------------  IN: 1603.1 mL / OUT: 170 mL / NET: 1433.1 mL      CAPILLARY BLOOD GLUCOSE      POCT Blood Glucose.: 137 mg/dL (2023 23:27)    ECG:    PHYSICAL EXAM:    General:   HEENT:   Neck:   Respiratory:   Cardiovascular:   Abdomen:   Extremities:  Neurological:    MEDICATIONS:  MEDICATIONS  (STANDING):  albumin human  5% IVPB 250 milliLiter(s) IV Intermittent once  atorvastatin 80 milliGRAM(s) Oral at bedtime  chlorhexidine 2% Cloths 1 Application(s) Topical <User Schedule>  coronavirus bivalent (EUA) Booster Vaccine (PFIZER) 0.3 milliLiter(s) IntraMuscular once  dextrose 5%. 1000 milliLiter(s) (50 mL/Hr) IV Continuous <Continuous>  dextrose 5%. 1000 milliLiter(s) (100 mL/Hr) IV Continuous <Continuous>  dextrose 50% Injectable 25 Gram(s) IV Push once  dextrose 50% Injectable 12.5 Gram(s) IV Push once  dextrose 50% Injectable 25 Gram(s) IV Push once  glucagon  Injectable 1 milliGRAM(s) IntraMuscular once  insulin glargine Injectable (LANTUS) 8 Unit(s) SubCutaneous at bedtime  insulin lispro (ADMELOG) corrective regimen sliding scale   SubCutaneous at bedtime  insulin lispro (ADMELOG) corrective regimen sliding scale   SubCutaneous three times a day before meals  insulin lispro Injectable (ADMELOG) 5 Unit(s) SubCutaneous three times a day before meals  lactulose Syrup 20 Gram(s) Oral two times a day  levothyroxine 100 MICROGram(s) Oral daily  midodrine. 10 milliGRAM(s) Oral three times a day  multivitamin 1 Tablet(s) Oral daily  norepinephrine Infusion 0.05 MICROgram(s)/kG/Min (5.19 mL/Hr) IV Continuous <Continuous>  pantoprazole    Tablet 40 milliGRAM(s) Oral before breakfast  piperacillin/tazobactam IVPB.. 3.375 Gram(s) IV Intermittent every 8 hours  potassium chloride  10 mEq/100 mL IVPB 10 milliEquivalent(s) IV Intermittent every 1 hour  rifAXIMin 550 milliGRAM(s) Oral two times a day  sodium bicarbonate  Infusion 0.203 mEq/kG/Hr (75 mL/Hr) IV Continuous <Continuous>  tenofovir disoproxil fumarate (VIREAD) 300 milliGRAM(s) Oral daily    MEDICATIONS  (PRN):  dextrose Oral Gel 15 Gram(s) Oral once PRN Blood Glucose LESS THAN 70 milliGRAM(s)/deciliter  sodium chloride 0.65% Nasal 1 Spray(s) Both Nostrils three times a day PRN Nasal Congestion      ALLERGIES:  Allergies    [This allergen will not trigger allergy alert] &quot;lentils&quot;-&quot;itchiness&quot; (Other)  Beef (Other)  No Known Drug Allergies    Intolerances        LABS:                        13.8   10.75 )-----------( 147      ( 2023 02:15 )             45.0     04-08    145  |  119<H>  |  58<H>  ----------------------------<  136<H>  3.3<L>   |  9<LL>  |  1.61<H>    Ca    6.8<L>      2023 02:15  Phos  5.3     04-08  Mg     2.20     04-08    TPro  4.2<L>  /  Alb  1.9<L>  /  TBili  3.0<H>  /  DBili  x   /  AST  314<H>  /  ALT  158<H>  /  AlkPhos  493<H>        Urinalysis Basic - ( 2023 01:58 )    Color: Light Orange / Appearance: Turbid / S.020 / pH: x  Gluc: x / Ketone: Negative  / Bili: Negative / Urobili: <2 mg/dL   Blood: x / Protein: 30 mg/dL / Nitrite: Negative   Leuk Esterase: Large / RBC: 25-50 /HPF / WBC >50 /HPF   Sq Epi: x / Non Sq Epi: x / Bacteria: Many        RADIOLOGY & ADDITIONAL TESTS: Reviewed.

## 2023-04-08 NOTE — CHART NOTE - NSCHARTNOTEFT_GEN_A_CORE
GOC discussed w/ Pt's children Tamim and Romana. Pt was pending home hospice. Explained that Pt is in critical condition with poor prognosis. Family decided to make Pt DNR/DNI. After arterial line confirmed hypotension with MAP 50, Pressors were rediscussed with the family and they opted not to pursue CVC placement and Levophed gtt was capped at 1 mcg/kg/min. Pt currently appears comfortable and family consoled at bedside. Will continue to monitor. GOC discussed w/ Pt's children Tamim and Romana. Pt was pending home hospice. Explained that Pt is in critical condition with poor prognosis. Family decided to make Pt DNR/DNI. After arterial line confirmed hypotension with MAP 50, Pressors were rediscussed with the family and they opted not to pursue CVC placement and Levophed gtt was capped at 1 mcg/kg/min. Pt currently appears comfortable. Family consoled at bedside. Will continue to monitor.

## 2023-04-08 NOTE — PROGRESS NOTE ADULT - ASSESSMENT
67-year-old male with lymphoma on chemo, diabetes, hypertension, CAD status post CABG, TANG cirrhosis admitted for massive hematemesis hemorraghic shock, was intubated and admitted to MICU. Patient had prolonged hospital course due to GIB s/p EGD with variceal banding, persistent shock, anemia requiring transfusions, reaccumulation fo ascites 2/2 decompensated cirrhosis. Patient has poor mental status attributed to anoxic brain injury from initial hemorrhagic shock. Patient pending palliative pleurex but unable to perform due to hypotension. Now back on levo transferred back to MICU.    #Neuro  A&Ox1, follows commands- may have some component of anoxic brain injury/ ?HE  - continue with lactulose and rifaxamin for HE     #Cardiovascular  shock 2/2 distributive vs sepsis  - c/w midodrine 10mg tid, uptitrate   - on levo, cont to wean as tolerated     #Respiratory  100 on 2L NC, wean     #GI/Nutrition  Decompensated cirrhosis 2/2 TANG  - ascites--> will need pleurex vs paracentesis prior to discharge for palliative measures/ for comfort; unable to start diuretics given hypotension   - SBP ppx--> cipro-- now on zosyn; should be on ciprofloxacin 500mg daily for SBP prophylaxis given low ascitic protein   - HE--> c/w rifaxamin and lactulose   - no active HCC   - EV, banded and without issues- hbg stable    HBV core +  - c/w tenofovir    Diet  - pureed, halal, mod thick liq    #/Renal  NANCI- likely ATN vs HRS  - trend Cr  - c/w midodrine   - albumin as needed  - strict I and O   - can further workup with urine lytes     hypernatremia  - will need free water as tolerates     metabolic acidosis   - lactate of 6, bicarb of 12--> may need bicarb drip if within GOC  - infectious workup as below     #ID  SIRS; increasing WBC and Hr with hypothermia   - f/u BCx  - obtain UA/UCx and CXR   - started on vanc/zosyn     #Endocrine  Hypotension and hypothermia  - check AM cortisol-- on admision >1 month ago cortisol appropriately elevated     DM2  - ISS and FS's   - 8U lantus and 5U admelog     Hypothyroid   - c/w synthroid   - can assess TSH /free T4    #Hematologic/DVT ppx  DVT ppx  -heparin sq for DVT ppx     #Ethics  Last GOC with pal care stating that family wants patient to be comfortable at home; plan for home with hospice, plan for palliative pleurex if pt can tolerate prior to discharge; Per GOC conversation between family and PA on 4/7 at time of arrival to MICU-- Patient made DNR/DNI, MOLST filled out    67-year-old male with lymphoma on chemo, diabetes, hypertension, CAD status post CABG, TANG cirrhosis admitted for massive hematemesis hemorraghic shock, was intubated and admitted to MICU. Patient had prolonged hospital course due to GIB s/p EGD with variceal banding, persistent shock, anemia requiring transfusions, reaccumulation fo ascites 2/2 decompensated cirrhosis. Patient has poor mental status attributed to anoxic brain injury from initial hemorrhagic shock. Patient pending palliative pleurex but unable to perform due to hypotension. Now back on levo transferred back to MICU.    #Neuro  A&Ox1, follows commands- may have some component of anoxic brain injury/ ?HE  - on lactulose and rifaxamin for HE     #Cardiovascular  shock 2/2 distributive vs sepsis  - midodrine 10mg tid   - levo capped at 1    #Respiratory  - 100% on 2L NC    #GI/Nutrition  Decompensated cirrhosis 2/2 TANG  - ascites--> will need pleurex vs paracentesis prior to discharge for palliative measures/ for comfort; unable to start diuretics given hypotension   - SBP ppx--> cipro-- now on zosyn; should be on ciprofloxacin 500mg daily for SBP prophylaxis given low ascitic protein   - HE--> c/w rifaxamin and lactulose   - no active HCC   - EV, banded and without issues- hbg stable    HBV core +  - on tenofovir    Diet  - pureed, halal, mod thick liq - pleasure feeds per family request    #/Renal  NANCI- likely ATN vs HRS  - trend Cr  - c/w midodrine   - albumin as needed  - strict I and O     hypernatremia  - no more lab draws    metabolic acidosis   - lactate of 6, bicarb of 9 , on bicarb gtt at 75/hr  - infectious workup as below   - no more lab draws    #ID  SIRS; increasing WBC and Hr with hypothermia   - f/u BCx  - CXR clear  -  UA pos however mcconnell exchanged - will not repeat, no more lab draws  - started on vanc/zosyn     #Endocrine  Hypotension and hypothermia  - check AM cortisol-- on admision >1 month ago cortisol appropriately elevated   - no more lab draws    DM2  - ISS and FS's   - 8U lantus and 5U admelog   - no more FS    Hypothyroid   - c/w synthroid   - no more lab draws    #Hematologic/DVT ppx  DVT ppx  -heparin sq for DVT ppx     #Ethics  Last GOC with [facility] care stating that family wants patient to be comfortable at home; plan for home with hospice, plan for palliative pleurex if pt can tolerate prior to discharge; Per GOC conversation between family and PA on 4/7 at time of arrival to MICU-- Patient made DNR/DNI, MOLST filled out   4/8- pt hypotensive to 50s, unresponsive. per discussion w/ sons at bedside, comfort measures only.  no further lab draws. family aware pt is actively passing away.

## 2023-04-08 NOTE — PROCEDURE NOTE - PROCEDURE DATE TIME, MLM
08-Apr-2023 02:00
24-Feb-2023 11:15
24-Feb-2023 16:00
05-Mar-2023 16:43
28-Mar-2023 11:00
15-Mar-2023 09:45

## 2023-04-08 NOTE — PROGRESS NOTE ADULT - NUTRITIONAL ASSESSMENT
This patient has been assessed with a concern for Malnutrition and has been determined to have a diagnosis/diagnoses of Severe protein-calorie malnutrition.    This patient is being managed with:   Diet NPO with Tube Feed-  Tube Feeding Modality: Nasogastric  Glucerna 1.5 Nelson (GLUCERNA1.5RTH)  Total Volume for 24 Hours (mL): 1080  Continuous  Starting Tube Feed Rate {mL per Hour}: 45  Until Goal Tube Feed Rate (mL per Hour): 45  Tube Feed Duration (in Hours): 24  Tube Feed Start Time: 15:00  Free Water Flush  Bolus   Total Volume per Flush (mL): 100   Frequency: Every 6 Hours  Entered: Mar 27 2023  2:50PM  
This patient has been assessed with a concern for Malnutrition and has been determined to have a diagnosis/diagnoses of Severe protein-calorie malnutrition.    This patient is being managed with:   Diet NPO with Tube Feed-  Tube Feeding Modality: Orogastric  Glucerna 1.2 Nelson (GLUCERNARTH)  Total Volume for 24 Hours (mL): 960  Continuous  Starting Tube Feed Rate {mL per Hour}: 20  Increase Tube Feed Rate by (mL): 5     Every 6 hours  Until Goal Tube Feed Rate (mL per Hour): 40  Tube Feed Duration (in Hours): 24  Tube Feed Start Time: 12:00  Entered: Mar 13 2023  1:52PM    
This patient has been assessed with a concern for Malnutrition and has been determined to have a diagnosis/diagnoses of Severe protein-calorie malnutrition.    This patient is being managed with:   Diet NPO with Tube Feed-  Tube Feeding Modality: Orogastric  Glucerna 1.5 Nelson (GLUCERNA1.5RTH)  Total Volume for 24 Hours (mL): 1080  Continuous  Starting Tube Feed Rate {mL per Hour}: 45  Until Goal Tube Feed Rate (mL per Hour): 45  Tube Feed Duration (in Hours): 24  Tube Feed Start Time: 17:00  Free Water Flush  Bolus   Total Volume per Flush (mL): 600   Frequency: Every 4 Hours  Entered: Mar 20 2023  3:47PM    
This patient has been assessed with a concern for Malnutrition and has been determined to have a diagnosis/diagnoses of Severe protein-calorie malnutrition.    This patient is being managed with:   Diet Pureed-  Consistent Carbohydrate {No Snacks} (CSTCHO)  DASH/TLC {Sodium & Cholesterol Restricted} (DASH)  Moderately Thick Liquids (MODTHICKLIQS)  Entered: Mar 29 2023  1:08PM  
This patient has been assessed with a concern for Malnutrition and has been determined to have a diagnosis/diagnoses of Severe protein-calorie malnutrition.    This patient is being managed with:   Diet Regular-  Tube Feeding Modality: Orogastric  Glucerna 1.2 Nelson (GLUCERNARTH)  Total Volume for 24 Hours (mL): 960  Continuous  Starting Tube Feed Rate {mL per Hour}: 10  Increase Tube Feed Rate by (mL): 10     Every 8 hours  Until Goal Tube Feed Rate (mL per Hour): 40  Tube Feed Duration (in Hours): 24  Tube Feed Start Time: 12:00  Entered: Feb 28 2023 11:49AM    
This patient has been assessed with a concern for Malnutrition and has been determined to have a diagnosis/diagnoses of Severe protein-calorie malnutrition.    This patient is being managed with:   Diet NPO with Tube Feed-  Tube Feeding Modality: Nasogastric  Glucerna 1.5 Nelson (GLUCERNA1.5RTH)  Total Volume for 24 Hours (mL): 1080  Continuous  Starting Tube Feed Rate {mL per Hour}: 45  Until Goal Tube Feed Rate (mL per Hour): 45  Tube Feed Duration (in Hours): 24  Tube Feed Start Time: 15:00  Free Water Flush  Bolus   Total Volume per Flush (mL): 100   Frequency: Every 6 Hours  Entered: Mar 28 2023 10:40AM  
This patient has been assessed with a concern for Malnutrition and has been determined to have a diagnosis/diagnoses of Severe protein-calorie malnutrition.    This patient is being managed with:   Diet NPO with Tube Feed-  Tube Feeding Modality: Orogastric  Glucerna 1.2 Nelson (GLUCERNARTH)  Total Volume for 24 Hours (mL): 960  Continuous  Starting Tube Feed Rate {mL per Hour}: 10  Increase Tube Feed Rate by (mL): 10     Every 8 hours  Until Goal Tube Feed Rate (mL per Hour): 40  Tube Feed Duration (in Hours): 24  Tube Feed Start Time: 12:00  Entered: Mar  3 2023  8:15AM    
This patient has been assessed with a concern for Malnutrition and has been determined to have a diagnosis/diagnoses of Severe protein-calorie malnutrition.    This patient is being managed with:   Diet NPO with Tube Feed-  Tube Feeding Modality: Orogastric  Glucerna 1.2 Nelson (GLUCERNARTH)  Total Volume for 24 Hours (mL): 960  Continuous  Starting Tube Feed Rate {mL per Hour}: 10  Increase Tube Feed Rate by (mL): 10     Every 8 hours  Until Goal Tube Feed Rate (mL per Hour): 40  Tube Feed Duration (in Hours): 24  Tube Feed Start Time: 12:00  Entered: Mar  3 2023  8:15AM    
This patient has been assessed with a concern for Malnutrition and has been determined to have a diagnosis/diagnoses of Severe protein-calorie malnutrition.    This patient is being managed with:   Diet NPO with Tube Feed-  Tube Feeding Modality: Orogastric  Glucerna 1.5 Nelson (GLUCERNA1.5RTH)  Total Volume for 24 Hours (mL): 1080  Continuous  Starting Tube Feed Rate {mL per Hour}: 45  Until Goal Tube Feed Rate (mL per Hour): 45  Tube Feed Duration (in Hours): 24  Tube Feed Start Time: 17:00  Free Water Flush  Bolus   Total Volume per Flush (mL): 400   Frequency: Every 4 Hours  Entered: Mar 17 2023  6:36PM    
This patient has been assessed with a concern for Malnutrition and has been determined to have a diagnosis/diagnoses of Severe protein-calorie malnutrition.    This patient is being managed with:   Diet Regular-  Tube Feeding Modality: Orogastric  Glucerna 1.2 Nelson (GLUCERNARTH)  Total Volume for 24 Hours (mL): 960  Continuous  Starting Tube Feed Rate {mL per Hour}: 10  Increase Tube Feed Rate by (mL): 10     Every 8 hours  Until Goal Tube Feed Rate (mL per Hour): 40  Tube Feed Duration (in Hours): 24  Tube Feed Start Time: 12:00  Entered: Feb 28 2023 11:49AM    
This patient has been assessed with a concern for Malnutrition and has been determined to have a diagnosis/diagnoses of Severe protein-calorie malnutrition.    This patient is being managed with:   Diet NPO with Tube Feed-  Tube Feeding Modality: Orogastric  Glucerna 1.2 Nelson (GLUCERNARTH)  Total Volume for 24 Hours (mL): 960  Continuous  Starting Tube Feed Rate {mL per Hour}: 10  Increase Tube Feed Rate by (mL): 10     Every 8 hours  Until Goal Tube Feed Rate (mL per Hour): 40  Tube Feed Duration (in Hours): 24  Tube Feed Start Time: 12:00  Entered: Mar  3 2023  8:15AM    
This patient has been assessed with a concern for Malnutrition and has been determined to have a diagnosis/diagnoses of Severe protein-calorie malnutrition.    This patient is being managed with:   Diet NPO with Tube Feed-  Tube Feeding Modality: Orogastric  Glucerna 1.5 Nelson (GLUCERNA1.5RTH)  Total Volume for 24 Hours (mL): 1080  Continuous  Starting Tube Feed Rate {mL per Hour}: 45  Until Goal Tube Feed Rate (mL per Hour): 45  Tube Feed Duration (in Hours): 24  Tube Feed Start Time: 17:00  Free Water Flush  Bolus   Total Volume per Flush (mL): 600   Frequency: Every 4 Hours  Entered: Mar 20 2023  3:47PM  
This patient has been assessed with a concern for Malnutrition and has been determined to have a diagnosis/diagnoses of Severe protein-calorie malnutrition.    This patient is being managed with:   Diet NPO with Tube Feed-  Tube Feeding Modality: Orogastric  Glucerna 1.5 Nelson (GLUCERNA1.5RTH)  Total Volume for 24 Hours (mL): 1080  Continuous  Starting Tube Feed Rate {mL per Hour}: 45  Until Goal Tube Feed Rate (mL per Hour): 45  Tube Feed Duration (in Hours): 24  Tube Feed Start Time: 17:00  Free Water Flush  Bolus   Total Volume per Flush (mL): 600   Frequency: Every 4 Hours  Entered: Mar 20 2023  3:47PM    
This patient has been assessed with a concern for Malnutrition and has been determined to have a diagnosis/diagnoses of Severe protein-calorie malnutrition.    This patient is being managed with:   Diet NPO-  Entered: Apr 8 2023  5:08AM  
This patient has been assessed with a concern for Malnutrition and has been determined to have a diagnosis/diagnoses of Severe protein-calorie malnutrition.    This patient is being managed with:   Diet NPO-  Except Medications  Entered: Mar  6 2023  3:55PM    
This patient has been assessed with a concern for Malnutrition and has been determined to have a diagnosis/diagnoses of Severe protein-calorie malnutrition.    This patient is being managed with:   Diet NPO with Tube Feed-  Tube Feeding Modality: Orogastric  Glucerna 1.2 Nelson (GLUCERNARTH)  Total Volume for 24 Hours (mL): 960  Continuous  Starting Tube Feed Rate {mL per Hour}: 10  Increase Tube Feed Rate by (mL): 10     Every 8 hours  Until Goal Tube Feed Rate (mL per Hour): 40  Tube Feed Duration (in Hours): 24  Tube Feed Start Time: 12:00  Entered: Mar  3 2023  8:15AM    
This patient has been assessed with a concern for Malnutrition and has been determined to have a diagnosis/diagnoses of Severe protein-calorie malnutrition.    This patient is being managed with:   Diet NPO with Tube Feed-  Tube Feeding Modality: Orogastric  Glucerna 1.2 Nelson (GLUCERNARTH)  Total Volume for 24 Hours (mL): 960  Continuous  Starting Tube Feed Rate {mL per Hour}: 40  Until Goal Tube Feed Rate (mL per Hour): 40  Tube Feed Duration (in Hours): 24  Tube Feed Start Time: 12:00  Entered: Mar  7 2023 11:58AM    
This patient has been assessed with a concern for Malnutrition and has been determined to have a diagnosis/diagnoses of Severe protein-calorie malnutrition.    This patient is being managed with:   Diet NPO with Tube Feed-  Tube Feeding Modality: Orogastric  Glucerna 1.2 Nelson (GLUCERNARTH)  Total Volume for 24 Hours (mL): 960  Continuous  Starting Tube Feed Rate {mL per Hour}: 40  Until Goal Tube Feed Rate (mL per Hour): 40  Tube Feed Duration (in Hours): 24  Tube Feed Start Time: 12:00  Entered: Mar  7 2023 11:58AM    
This patient has been assessed with a concern for Malnutrition and has been determined to have a diagnosis/diagnoses of Severe protein-calorie malnutrition.    This patient is being managed with:   Diet Pureed-  Consistent Carbohydrate {No Snacks} (CSTCHO)  DASH/TLC {Sodium & Cholesterol Restricted} (DASH)  Moderately Thick Liquids (MODTHICKLIQS)  Entered: Mar 29 2023  1:08PM  
This patient has been assessed with a concern for Malnutrition and has been determined to have a diagnosis/diagnoses of Severe protein-calorie malnutrition.    This patient is being managed with:   Diet NPO with Tube Feed-  Tube Feeding Modality: Orogastric  Glucerna 1.5 Nelson (GLUCERNA1.5RTH)  Total Volume for 24 Hours (mL): 1080  Continuous  Starting Tube Feed Rate {mL per Hour}: 45  Until Goal Tube Feed Rate (mL per Hour): 45  Tube Feed Duration (in Hours): 24  Tube Feed Start Time: 17:00  Entered: Mar 15 2023 10:32AM    
This patient has been assessed with a concern for Malnutrition and has been determined to have a diagnosis/diagnoses of Severe protein-calorie malnutrition.    This patient is being managed with:   Diet Pureed-  Moderately Thick Liquids (MODTHICKLIQS)  Jesus  Entered: Apr 1 2023 10:00AM  
This patient has been assessed with a concern for Malnutrition and has been determined to have a diagnosis/diagnoses of Severe protein-calorie malnutrition.    This patient is being managed with:   Diet Pureed-  Moderately Thick Liquids (MODTHICKLIQS)  Halal  Supplement Feeding Modality:  Oral  Glucerna Shake Cans or Servings Per Day:  1       Frequency:  Two Times a day  Entered: Apr 4 2023  5:33PM  
This patient has been assessed with a concern for Malnutrition and has been determined to have a diagnosis/diagnoses of Severe protein-calorie malnutrition.    This patient is being managed with:   Diet NPO with Tube Feed-  Tube Feeding Modality: Orogastric  Glucerna 1.5 Nelson (GLUCERNA1.5RTH)  Total Volume for 24 Hours (mL): 1080  Continuous  Starting Tube Feed Rate {mL per Hour}: 45  Until Goal Tube Feed Rate (mL per Hour): 45  Tube Feed Duration (in Hours): 24  Tube Feed Start Time: 17:00  Free Water Flush  Bolus   Total Volume per Flush (mL): 600   Frequency: Every 4 Hours  Entered: Mar 20 2023  3:47PM    
This patient has been assessed with a concern for Malnutrition and has been determined to have a diagnosis/diagnoses of Severe protein-calorie malnutrition.    This patient is being managed with:   Diet NPO with Tube Feed-  Tube Feeding Modality: Orogastric  Glucerna 1.5 Nelson (GLUCERNA1.5RTH)  Total Volume for 24 Hours (mL): 1080  Continuous  Starting Tube Feed Rate {mL per Hour}: 45  Until Goal Tube Feed Rate (mL per Hour): 45  Tube Feed Duration (in Hours): 24  Tube Feed Start Time: 17:00  Free Water Flush  Bolus   Total Volume per Flush (mL): 600   Frequency: Every 4 Hours  Entered: Mar 20 2023  3:47PM    
This patient has been assessed with a concern for Malnutrition and has been determined to have a diagnosis/diagnoses of Severe protein-calorie malnutrition.    This patient is being managed with:   Diet NPO with Tube Feed-  Tube Feeding Modality: Orogastric  Glucerna 1.2 Nelson (GLUCERNARTH)  Total Volume for 24 Hours (mL): 960  Continuous  Starting Tube Feed Rate {mL per Hour}: 20  Increase Tube Feed Rate by (mL): 5     Every 6 hours  Until Goal Tube Feed Rate (mL per Hour): 40  Tube Feed Duration (in Hours): 24  Tube Feed Start Time: 12:00  Entered: Mar 13 2023  1:52PM    
This patient has been assessed with a concern for Malnutrition and has been determined to have a diagnosis/diagnoses of Severe protein-calorie malnutrition.    This patient is being managed with:   Diet Pureed-  Moderately Thick Liquids (MODTHICKLIQS)  Jesus  Entered: Apr 1 2023 10:00AM  
This patient has been assessed with a concern for Malnutrition and has been determined to have a diagnosis/diagnoses of Severe protein-calorie malnutrition.    This patient is being managed with:   Diet Pureed-  Moderately Thick Liquids (MODTHICKLIQS)  Jesus  Entered: Apr 1 2023 10:00AM  
This patient has been assessed with a concern for Malnutrition and has been determined to have a diagnosis/diagnoses of Severe protein-calorie malnutrition.    This patient is being managed with:   Diet NPO with Tube Feed-  Tube Feeding Modality: Orogastric  Glucerna 1.5 Nelson (GLUCERNA1.5RTH)  Total Volume for 24 Hours (mL): 1080  Continuous  Starting Tube Feed Rate {mL per Hour}: 45  Until Goal Tube Feed Rate (mL per Hour): 45  Tube Feed Duration (in Hours): 24  Tube Feed Start Time: 17:00  Free Water Flush  Bolus   Total Volume per Flush (mL): 400   Frequency: Every 4 Hours  Entered: Mar 17 2023  6:36PM    
This patient has been assessed with a concern for Malnutrition and has been determined to have a diagnosis/diagnoses of Severe protein-calorie malnutrition.    This patient is being managed with:   Diet Pureed-  Consistent Carbohydrate {No Snacks} (CSTCHO)  DASH/TLC {Sodium & Cholesterol Restricted} (DASH)  Moderately Thick Liquids (MODTHICKLIQS)  Entered: Mar 29 2023  1:08PM  
This patient has been assessed with a concern for Malnutrition and has been determined to have a diagnosis/diagnoses of Severe protein-calorie malnutrition.      
This patient has been assessed with a concern for Malnutrition and has been determined to have a diagnosis/diagnoses of Severe protein-calorie malnutrition.    This patient is being managed with:   Diet Pureed-  Moderately Thick Liquids (MODTHICKLIQS)  Halal  Supplement Feeding Modality:  Oral  Glucerna Shake Cans or Servings Per Day:  1       Frequency:  Two Times a day  Entered: Apr 4 2023  5:33PM  
This patient has been assessed with a concern for Malnutrition and has been determined to have a diagnosis/diagnoses of Severe protein-calorie malnutrition.    This patient is being managed with:   Diet NPO with Tube Feed-  Tube Feeding Modality: Orogastric  Glucerna 1.5 Nelson (GLUCERNA1.5RTH)  Total Volume for 24 Hours (mL): 1080  Continuous  Starting Tube Feed Rate {mL per Hour}: 45  Until Goal Tube Feed Rate (mL per Hour): 45  Tube Feed Duration (in Hours): 24  Tube Feed Start Time: 17:00  Free Water Flush  Bolus   Total Volume per Flush (mL): 400   Frequency: Every 4 Hours  Entered: Mar 17 2023  6:36PM    
This patient has been assessed with a concern for Malnutrition and has been determined to have a diagnosis/diagnoses of Severe protein-calorie malnutrition.    This patient is being managed with:   Diet NPO with Tube Feed-  Tube Feeding Modality: Orogastric  Glucerna 1.5 Nelson (GLUCERNA1.5RTH)  Total Volume for 24 Hours (mL): 1080  Continuous  Starting Tube Feed Rate {mL per Hour}: 45  Until Goal Tube Feed Rate (mL per Hour): 45  Tube Feed Duration (in Hours): 24  Tube Feed Start Time: 17:00  Free Water Flush  Bolus   Total Volume per Flush (mL): 300   Frequency: Every 4 Hours  Entered: Mar 17 2023 10:56AM    
This patient has been assessed with a concern for Malnutrition and has been determined to have a diagnosis/diagnoses of Severe protein-calorie malnutrition.    This patient is being managed with:   Diet Pureed-  Moderately Thick Liquids (MODTHICKLIQS)  Halal  Supplement Feeding Modality:  Oral  Glucerna Shake Cans or Servings Per Day:  1       Frequency:  Two Times a day  Entered: Apr 4 2023  5:33PM  
This patient has been assessed with a concern for Malnutrition and has been determined to have a diagnosis/diagnoses of Severe protein-calorie malnutrition.    This patient is being managed with:   Diet Pureed-  Consistent Carbohydrate {No Snacks} (CSTCHO)  DASH/TLC {Sodium & Cholesterol Restricted} (DASH)  Moderately Thick Liquids (MODTHICKLIQS)  Entered: Mar 29 2023  1:08PM  
This patient has been assessed with a concern for Malnutrition and has been determined to have a diagnosis/diagnoses of Severe protein-calorie malnutrition.    This patient is being managed with:   Diet NPO with Tube Feed-  Tube Feeding Modality: Nasogastric  Glucerna 1.5 Nelson (GLUCERNA1.5RTH)  Total Volume for 24 Hours (mL): 960  Continuous  Starting Tube Feed Rate {mL per Hour}: 40  Until Goal Tube Feed Rate (mL per Hour): 40  Tube Feed Duration (in Hours): 24  Tube Feed Start Time: 17:00  Free Water Flush  Bolus   Total Volume per Flush (mL): 100   Frequency: Every 6 Hours  Entered: Mar 25 2023  3:08PM  
This patient has been assessed with a concern for Malnutrition and has been determined to have a diagnosis/diagnoses of Severe protein-calorie malnutrition.    This patient is being managed with:   Diet Pureed-  Moderately Thick Liquids (MODTHICKLIQS)  Jesus  Entered: Apr 1 2023 10:00AM

## 2023-04-09 LAB
-  CTX-M RESISTANCE MARKER: SIGNIFICANT CHANGE UP
-  ESBL: SIGNIFICANT CHANGE UP
-  K. PNEUMONIAE GROUP: SIGNIFICANT CHANGE UP
GRAM STN FLD: SIGNIFICANT CHANGE UP
METHOD TYPE: SIGNIFICANT CHANGE UP
SPECIMEN SOURCE: SIGNIFICANT CHANGE UP

## 2023-04-10 LAB
-  AMIKACIN: SIGNIFICANT CHANGE UP
-  AMPICILLIN/SULBACTAM: SIGNIFICANT CHANGE UP
-  AMPICILLIN: SIGNIFICANT CHANGE UP
-  AZTREONAM: SIGNIFICANT CHANGE UP
-  CEFAZOLIN: SIGNIFICANT CHANGE UP
-  CEFEPIME: SIGNIFICANT CHANGE UP
-  CEFTRIAXONE: SIGNIFICANT CHANGE UP
-  CIPROFLOXACIN: SIGNIFICANT CHANGE UP
-  ERTAPENEM: SIGNIFICANT CHANGE UP
-  GENTAMICIN: SIGNIFICANT CHANGE UP
-  IMIPENEM: SIGNIFICANT CHANGE UP
-  LEVOFLOXACIN: SIGNIFICANT CHANGE UP
-  MEROPENEM: SIGNIFICANT CHANGE UP
-  PIPERACILLIN/TAZOBACTAM: SIGNIFICANT CHANGE UP
-  TOBRAMYCIN: SIGNIFICANT CHANGE UP
-  TRIMETHOPRIM/SULFAMETHOXAZOLE: SIGNIFICANT CHANGE UP
CULTURE RESULTS: SIGNIFICANT CHANGE UP
METHOD TYPE: SIGNIFICANT CHANGE UP
ORGANISM # SPEC MICROSCOPIC CNT: SIGNIFICANT CHANGE UP
SPECIMEN SOURCE: SIGNIFICANT CHANGE UP

## 2023-04-13 LAB
CULTURE RESULTS: SIGNIFICANT CHANGE UP
SPECIMEN SOURCE: SIGNIFICANT CHANGE UP

## 2023-04-15 LAB
CULTURE RESULTS: SIGNIFICANT CHANGE UP
SPECIMEN SOURCE: SIGNIFICANT CHANGE UP

## 2023-08-10 NOTE — PROGRESS NOTE ADULT - SUBJECTIVE AND OBJECTIVE BOX
Patient is a 67y old  Male who presents with a chief complaint of GIB (02 Apr 2023 14:07)      SUBJECTIVE / OVERNIGHT EVENTS: pt seen and examined at 11:35am, no overnight events, pt is unable to give any history due to his mental status, per nsg is retaining urine, no other new issues reported.    MEDICATIONS  (STANDING):  atorvastatin 80 milliGRAM(s) Oral at bedtime  chlorhexidine 2% Cloths 1 Application(s) Topical <User Schedule>  ciprofloxacin     Tablet 500 milliGRAM(s) Oral every 24 hours  coronavirus bivalent (EUA) Booster Vaccine (PFIZER) 0.3 milliLiter(s) IntraMuscular once  dextrose 5%. 1000 milliLiter(s) (50 mL/Hr) IV Continuous <Continuous>  dextrose 5%. 1000 milliLiter(s) (100 mL/Hr) IV Continuous <Continuous>  dextrose 50% Injectable 25 Gram(s) IV Push once  dextrose 50% Injectable 12.5 Gram(s) IV Push once  dextrose 50% Injectable 25 Gram(s) IV Push once  glucagon  Injectable 1 milliGRAM(s) IntraMuscular once  insulin glargine Injectable (LANTUS) 8 Unit(s) SubCutaneous at bedtime  insulin lispro (ADMELOG) corrective regimen sliding scale   SubCutaneous at bedtime  insulin lispro (ADMELOG) corrective regimen sliding scale   SubCutaneous three times a day before meals  insulin lispro Injectable (ADMELOG) 5 Unit(s) SubCutaneous three times a day before meals  lactulose Syrup 20 Gram(s) Oral two times a day  levothyroxine 100 MICROGram(s) Oral daily  multivitamin 1 Tablet(s) Oral daily  pantoprazole    Tablet 40 milliGRAM(s) Oral before breakfast  rifAXIMin 550 milliGRAM(s) Oral two times a day  tamsulosin 0.8 milliGRAM(s) Oral at bedtime  tenofovir disoproxil fumarate (VIREAD) 300 milliGRAM(s) Oral daily    MEDICATIONS  (PRN):  dextrose Oral Gel 15 Gram(s) Oral once PRN Blood Glucose LESS THAN 70 milliGRAM(s)/deciliter  sodium chloride 0.65% Nasal 1 Spray(s) Both Nostrils three times a day PRN Nasal Congestion      Vital Signs Last 24 Hrs  T(C): 36.8 (03 Apr 2023 04:13), Max: 36.9 (02 Apr 2023 18:06)  T(F): 98.2 (03 Apr 2023 04:13), Max: 98.4 (02 Apr 2023 18:06)  HR: 84 (03 Apr 2023 04:13) (75 - 85)  BP: 122/58 (03 Apr 2023 04:13) (122/58 - 138/59)  BP(mean): --  RR: 17 (03 Apr 2023 04:13) (17 - 18)  SpO2: 100% (03 Apr 2023 04:13) (100% - 100%)    Parameters below as of 03 Apr 2023 04:13  Patient On (Oxygen Delivery Method): room air      CAPILLARY BLOOD GLUCOSE      POCT Blood Glucose.: 209 mg/dL (03 Apr 2023 11:56)  POCT Blood Glucose.: 231 mg/dL (03 Apr 2023 08:53)  POCT Blood Glucose.: 147 mg/dL (02 Apr 2023 21:30)  POCT Blood Glucose.: 214 mg/dL (02 Apr 2023 17:32)    I&O's Summary    02 Apr 2023 07:01  -  03 Apr 2023 07:00  --------------------------------------------------------  IN: 200 mL / OUT: 720 mL / NET: -520 mL        PHYSICAL EXAM:  GENERAL: NAD, frail appearing male  CHEST/LUNG: Clear to auscultation bilaterally; No wheeze  HEART: Regular rate and rhythm  ABDOMEN: Soft, Nontender, +distention  EXTREMITIES: no LE edema  PSYCH: Calm  NEUROLOGY: alert, awake, nonverbal  SKIN: No rashes or lesions    LABS:                    RADIOLOGY & ADDITIONAL TESTS:    Imaging Personally Reviewed:    Consultant(s) Notes Reviewed:      Care Discussed with Consultants/Other Providers:   Clindamycin Counseling: I counseled the patient regarding use of clindamycin as an antibiotic for prophylactic and/or therapeutic purposes. Clindamycin is active against numerous classes of bacteria, including skin bacteria. Side effects may include nausea, diarrhea, gastrointestinal upset, rash, hives, yeast infections, and in rare cases, colitis.

## 2023-09-08 NOTE — PROGRESS NOTE ADULT - ASSESSMENT
67M HTN, DM2, hypothyroidism, CAD s/p CABG, TANG cirrhosis, follicular lymphoma (on Rituximab OP), chronic Hep B on tenofovir (HBV Core +), presented to the ED w/ chest pain and constipation while in ED developed hematemesis s/p MICU admission for acute blood loss anemia c/b hypovolemic shock  intubated for airway protection (extubated 3/8) s/p 2/24 bedside EGD with banding esophageal varices now with persistent encephalopathy.  Opioid Counseling: I discussed with the patient the potential side effects of opioids including but not limited to addiction, altered mental status, and depression. I stressed avoiding alcohol, benzodiazepines, muscle relaxants and sleep aids unless specifically okayed by a physician. The patient verbalized understanding of the proper use and possible adverse effects of opioids. All of the patient's questions and concerns were addressed. They were instructed to flush the remaining pills down the toilet if they did not need them for pain.

## 2024-03-13 NOTE — ADVANCED PRACTICE NURSE CONSULT - REASON FOR CONSULT
Has not had Gardil.  Recommend for prevention of new HPV infection in future.  Discussed series.  Agrees.  Return in 2 months for 2nd injection   Patient seen on skin care rounds after wound care referral received for assessment of skin impairment and recommendations of topical management of L heel DTPI. Patient is a 68yo M w/ pmhx CAD s/p CABG, TANG cirrhosis, follicular lymphoma (on Rituximab infusions outpt), HTN, DM, hypothyroidism, currently on tenofovir (HBV Core +), presented to the ED w/ chest pain and constipation while in the ED patient w/ multiple episodes of hematemesis and hypotension; admitted to MICU for hypotension, massive upper GI bleed s/p intubation, s/p 2/24 bedside scope with banding x6 of esophageal varices, extubated, GI following. 3/8 NGT placed. EEG ongoing for AMS, r/o seizure. Plan for  paracentesis for ascites. Chart reviewed: WBC 10.84, H/H 11.8/40.1, INR 1.88, Platelets 188, hA1c 6.1, BMI 19.1, ata 12.  Patient seen on skin care rounds after wound care referral received for assessment of skin impairment and recommendations of topical management of L heel DTPI. Patient is a 68yo M w/ pmhx CAD s/p CABG, TANG cirrhosis, follicular lymphoma (on Rituximab infusions outpt), HTN, DM, hypothyroidism, currently on tenofovir (HBV Core +), presented to the ED w/ chest pain and constipation while in the ED patient w/ multiple episodes of hematemesis and hypotension; admitted to MICU for hypotension, massive upper GI bleed s/p intubation, s/p 2/24 bedside scope with banding x6 of esophageal varices, extubated, hepatology following. 3/8 NGT placed. EEG ongoing for AMS, r/o seizure. Plan for  paracentesis for ascites. Chart reviewed: WBC 10.84, H/H 11.8/40.1, INR 1.88, Platelets 188, hA1c 6.1, BMI 19.1, ata 12.

## 2024-04-11 NOTE — GOALS OF CARE CONVERSATION - ADVANCED CARE PLANNING - LOCATION OF DISCUSSION
Face to face
Additional Notes: Patient is currently on doxycycline for acne, OK to continue at this time, may help as an anti-inflammatory for this lesion as well
Detail Level: Generalized
Render Risk Assessment In Note?: no

## 2024-08-19 NOTE — PROGRESS NOTE ADULT - PROBLEM SELECTOR PROBLEM 6
75 y/o male w/ no known PMHx (does not see doctors per sister) w/ newly diagnosed cirrhosis c/b vasoplegia requiring vasopressor support, small EVs, melena 2/2 a duodenal ulcer s/p clipping, HRS requiring CRRT, hepatic encephalopathy, and ascites s/p multiple LVPs presenting for liver transplant evaluation    Neuro:  - Miralax & rifaximin for prevention of hepatic encephalopathy  - Monitoring ammonia  - Thiamine, folic acid, & multivitamin for h/o risky EtOH use  - Melatonin PRN insomnia    Respiratory  - Out of bed to chair, ambulate as tolerated, and incentive spirometry to prevent atelectasis  - Assess need for diuresis daily as patient w/ pulmonary vascular congestion on CXR    Cardiovascular  - levo, vaso  - Will wean pressors as tolerated w/ goal MAP > 65  - Midodrine 20 mg q8hrs to help wean off IV vasopressors  - Monitor lactate  - 8/19: possible LHC this week per cardiology    Gastrointestinal and Nutrition  - Regular diet as tolerated  - Miralax & rifaximin for prevention of hepatic encephalopathy  - Protonix BID for h/o duodenal ulcer  - Monitor LFTs  - Undergoing evaluation for [facility] transplantation  - 8/14 CT A/P: cirrhosis w/ portal HTN, small infarct in left hepatic lobe, small splenic infarct, focal eccentric wall thickening of rectum, and large volume ascites  - 8/14 paracentesis w/ 5 L drained  - 8/19: flex sig scheduled for today for bowel thickening on CT A/P and blood in stool; tap water enema prior    Renal:  - CRRT -50 for acute renal failure likely 2/2 HRS  - Appreciate nephrology recommendations  - Undergoing evaluation for [facility] transplantation    Heme:  - Venodynes for mechanical VTE prophylaxis; holding chemical VTE prophylaxis as patient is coagulopathic 2/2 cirrhosis  - s/p 1 PRBC for lost CRRT circuit    ID:   - Empiric coverage w/ meropenem & fluconazole as per transplant given vasopressor requirements  - Blood cultures w/ MRSE in 1 bottle on 8/12 but repeat cultures sent 8/14 w/ no growth to date  - 8/18: + doxycycline added for Lyme  - 8/19: f/u repeat CMV; replace PICC line     Endo:   - Stress dose steroids for possible adrenal insufficiency given persistent vasopressor requirement  - 8/18: steroids discontinued    Donna Downey DO (EM PGY-2)  Surgical Intensive Care Unit [id number] Esophageal varices with bleeding

## 2024-10-15 NOTE — PROGRESS NOTE ADULT - ASSESSMENT
Medication refill request   67M HTN, DM2, hypothyroidism, CAD s/p CABG, TANG cirrhosis, follicular lymphoma (on Rituximab OP), chronic Hep B on tenofovir (HBV Core +), presented to the ED w/ chest pain and constipation while in ED developed hematemesis s/p MICU admission for acute blood loss anemia c/b hypovolemic shock  intubated for airway protection (extubated 3/8) s/p 2/24 bedside EGD with banding esophageal varices now with persistent encephalopathy.

## 2024-11-08 NOTE — PROGRESS NOTE ADULT - PROBLEM SELECTOR PROBLEM 8
Subjective:       Patient ID: Basim Menendez is a 66 y.o. male.    Chief Complaint: Annual Exam      History of Present Illness    CHIEF COMPLAINT:  Basim presents with symptoms of a sinus infection and a newly discovered inguinal hernia.    HPI:  Basim reports symptoms consistent with a sinus infection, including sinus pain, head cold, and chest congestion for approximately 1 week. He has intermittent, productive coughing, currently infrequent. He has been managing symptoms with OTC medication, specifically a Winn medication called Vanquish, which he describes as similar to Excedrin. Basim performed a COVID test, which was negative.    Basim has discovered a lump in his groin area, identified as a direct inguinal hernia. The lump does not reduce when he lies down. He feels occasional sensations of movement in the area, which may be due to possible fluid in the hernia.    Basim reports subjective fever at times but has not taken his temperature recently. He denies significant sore throat, swollen glands, or severe coughing. He has no known allergies.    MEDICATIONS:  Basim is on Vanquish, a combination of aspirin and other ingredients, for headache and cold symptoms. He is also taking an OTC cold medicine similar to DayQuil or NyQuil, which contains Tylenol and other ingredients for cold symptom relief.    FAMILY HISTORY:  Family history is significant for father, who  at age 50.    TEST RESULTS:  Basim recently underwent a COVID-19 test, which returned a negative result.         Review of Systems   HENT:  Positive for nasal congestion and sinus pressure/congestion.    Respiratory:  Positive for cough.    Gastrointestinal:         Hernia , ' lump ' of R lower abd quadrant         Objective:      Physical Exam  Vitals and nursing note reviewed.   Constitutional:       Appearance: Normal appearance.   HENT:      Mouth/Throat:      Pharynx: Posterior oropharyngeal erythema present. No oropharyngeal  exudate.      Comments: = tenderness over max sinuses  Cardiovascular:      Rate and Rhythm: Normal rate and regular rhythm.      Pulses: Normal pulses.      Heart sounds: Normal heart sounds. No murmur heard.     No friction rub. No gallop.   Pulmonary:      Effort: Pulmonary effort is normal.      Breath sounds: Normal breath sounds. No wheezing.   Abdominal:      Hernia: A hernia is present.      Comments: Right lower  inguinal hernia like lesion  Size is about the size of a golf ball over the lower right abdominal quadrant  The hernia is reducible  No tenderness  No scrotal masses   Skin:     General: Skin is warm and dry.   Neurological:      Mental Status: He is alert.   Psychiatric:         Mood and Affect: Mood normal.         Assessment:       1. Upper respiratory tract infection, unspecified type  -     cefTRIAXone injection 500 mg  -     azithromycin (Z-TONG) 250 MG tablet; Take 2 tablets by mouth on day 1; Take 1 tablet by mouth on days 2-5  Dispense: 6 tablet; Refill: 0    2. Direct inguinal hernia  -     Ambulatory referral/consult to General Surgery; Future; Expected date: 11/15/2024    3. Screening for malignant neoplasm of the rectum  -     Fecal Immunochemical Test (iFOBT); Future; Expected date: 11/08/2024           Plan:       Assessment & Plan    Assessed patient's symptoms as likely sinus infection  Evaluated lump in patient's groin, diagnosed as direct inguinal hernia  Determined hernia requires surgical intervention  Reviewed patient's cholesterol and Plavix use    DIRECT INGUINAL HERNIA:  - Explained direct inguinal hernia: weakness in anterior abdominal muscles.  - Discussed hernia surgery: minor, robotic/endoscopic procedure with small incision.  - Explained recovery time: a few days, with need to take it easy for about a week.  - Basim to take it easy for about a week after hernia surgery.  - Basim to avoid pickleball for approximately 1 week post-surgery.  - Referred to Dr. Sinha for  hernia surgery consultation.  - Contact Dr. Sinha's office to schedule hernia consultation, either Tuesday after lunch at current location or later in the week at [place] office.    INFECTION:  - Started Z-Tong, sent prescription to [facility] pharmacy on [address] [place].  - Administered shot for infection (to be given by Mar[facility]).    LABS:  - Provided fit kit for stool sample.    FOLLOW UP:  - Follow up before returning to [place] for physical exam and to discuss: Flu shot, RSV vaccine, Pneumonia shot (Prevnar 20), New COVID vaccine.       Basim was seen today for annual exam.    Diagnoses and all orders for this visit:    Upper respiratory tract infection, unspecified type  -     cefTRIAXone injection 500 mg  -     azithromycin (Z-TONG) 250 MG tablet; Take 2 tablets by mouth on day 1; Take 1 tablet by mouth on days 2-5    Direct inguinal hernia  -     Ambulatory referral/consult to General Surgery; Future    Screening for malignant neoplasm of the rectum  -     Fecal Immunochemical Test (iFOBT); Future                 Hepatitis B

## (undated) DEVICE — LUBRICATING JELLY HR ONE SHOT 3G

## (undated) DEVICE — GOWN LG

## (undated) DEVICE — CATH IV SAFE BC 22G X 1" (BLUE)

## (undated) DEVICE — CLAMP BX HOT RAD JAW 3

## (undated) DEVICE — ELCTR ECG CONDUCTIVE ADHESIVE

## (undated) DEVICE — PACK IV START WITH CHG

## (undated) DEVICE — DRSG CURITY GAUZE SPONGE 4 X 4" 12-PLY NON-STERILE

## (undated) DEVICE — DENTURE CUP PINK

## (undated) DEVICE — DRSG BANDAID 0.75X3"

## (undated) DEVICE — BITE BLOCK ADULT 20 X 27MM (GREEN)

## (undated) DEVICE — SALIVA EJECTOR (BLUE)

## (undated) DEVICE — BIOPSY FORCEP RADIAL JAW 4 STANDARD WITH NEEDLE

## (undated) DEVICE — TUBING IV SET GRAVITY 3Y 100" MACRO

## (undated) DEVICE — UNDERPAD LINEN SAVER 17 X 24"

## (undated) DEVICE — BASIN EMESIS 10IN GRADUATED MAUVE

## (undated) DEVICE — TUBING MEDI-VAC W MAXIGRIP CONNECTORS 1/4"X6'

## (undated) DEVICE — LINE BREATHE SAMPLNG

## (undated) DEVICE — CONTAINER FORMALIN 80ML YELLOW

## (undated) DEVICE — BIOPSY FORCEP COLD DISP

## (undated) DEVICE — DRSG 2X2

## (undated) DEVICE — SNARE ENDO POLY CAPTIVATOR II 33MM